# Patient Record
Sex: MALE | Race: WHITE | NOT HISPANIC OR LATINO | Employment: OTHER | URBAN - METROPOLITAN AREA
[De-identification: names, ages, dates, MRNs, and addresses within clinical notes are randomized per-mention and may not be internally consistent; named-entity substitution may affect disease eponyms.]

---

## 2017-09-20 ENCOUNTER — TRANSCRIBE ORDERS (OUTPATIENT)
Dept: LAB | Facility: CLINIC | Age: 62
End: 2017-09-20

## 2017-09-20 ENCOUNTER — APPOINTMENT (OUTPATIENT)
Dept: LAB | Facility: CLINIC | Age: 62
End: 2017-09-20
Payer: MEDICARE

## 2017-09-20 DIAGNOSIS — D64.9 ANEMIA, UNSPECIFIED: ICD-10-CM

## 2017-09-20 DIAGNOSIS — E55.9 UNSPECIFIED VITAMIN D DEFICIENCY: ICD-10-CM

## 2017-09-20 DIAGNOSIS — R06.00 DYSPNEA, UNSPECIFIED TYPE: ICD-10-CM

## 2017-09-20 DIAGNOSIS — R07.89 CHEST HEAVINESS: ICD-10-CM

## 2017-09-20 DIAGNOSIS — E78.2 MIXED HYPERLIPIDEMIA: Primary | ICD-10-CM

## 2017-09-20 DIAGNOSIS — E78.2 MIXED HYPERLIPIDEMIA: ICD-10-CM

## 2017-09-20 LAB
25(OH)D3 SERPL-MCNC: 29.8 NG/ML (ref 30–100)
ALBUMIN SERPL BCP-MCNC: 3.4 G/DL (ref 3.5–5)
ALP SERPL-CCNC: 112 U/L (ref 46–116)
ALT SERPL W P-5'-P-CCNC: 46 U/L (ref 12–78)
ANION GAP SERPL CALCULATED.3IONS-SCNC: 9 MMOL/L (ref 4–13)
AST SERPL W P-5'-P-CCNC: 25 U/L (ref 5–45)
BILIRUB SERPL-MCNC: 0.24 MG/DL (ref 0.2–1)
BUN SERPL-MCNC: 10 MG/DL (ref 5–25)
CALCIUM SERPL-MCNC: 8.7 MG/DL (ref 8.3–10.1)
CHLORIDE SERPL-SCNC: 108 MMOL/L (ref 100–108)
CHOLEST SERPL-MCNC: 198 MG/DL (ref 50–200)
CO2 SERPL-SCNC: 23 MMOL/L (ref 21–32)
CREAT SERPL-MCNC: 0.92 MG/DL (ref 0.6–1.3)
ERYTHROCYTE [DISTWIDTH] IN BLOOD BY AUTOMATED COUNT: 12.5 % (ref 11.6–15.1)
GFR SERPL CREATININE-BSD FRML MDRD: 89 ML/MIN/1.73SQ M
GLUCOSE P FAST SERPL-MCNC: 107 MG/DL (ref 65–99)
HCT VFR BLD AUTO: 42.2 % (ref 36.5–49.3)
HDLC SERPL-MCNC: 42 MG/DL (ref 40–60)
HGB BLD-MCNC: 15.1 G/DL (ref 12–17)
LDLC SERPL CALC-MCNC: 132 MG/DL (ref 0–100)
MCH RBC QN AUTO: 30.8 PG (ref 26.8–34.3)
MCHC RBC AUTO-ENTMCNC: 35.8 G/DL (ref 31.4–37.4)
MCV RBC AUTO: 86 FL (ref 82–98)
PLATELET # BLD AUTO: 233 THOUSANDS/UL (ref 149–390)
PMV BLD AUTO: 9.4 FL (ref 8.9–12.7)
POTASSIUM SERPL-SCNC: 3.9 MMOL/L (ref 3.5–5.3)
PROT SERPL-MCNC: 6.6 G/DL (ref 6.4–8.2)
RBC # BLD AUTO: 4.9 MILLION/UL (ref 3.88–5.62)
SODIUM SERPL-SCNC: 140 MMOL/L (ref 136–145)
TRIGL SERPL-MCNC: 121 MG/DL
WBC # BLD AUTO: 7.53 THOUSAND/UL (ref 4.31–10.16)

## 2017-09-20 PROCEDURE — 80053 COMPREHEN METABOLIC PANEL: CPT

## 2017-09-20 PROCEDURE — 36415 COLL VENOUS BLD VENIPUNCTURE: CPT

## 2017-09-20 PROCEDURE — 80061 LIPID PANEL: CPT

## 2017-09-20 PROCEDURE — 85027 COMPLETE CBC AUTOMATED: CPT

## 2017-09-20 PROCEDURE — 82306 VITAMIN D 25 HYDROXY: CPT

## 2017-10-06 ENCOUNTER — APPOINTMENT (OUTPATIENT)
Dept: URGENT CARE | Facility: CLINIC | Age: 62
End: 2017-10-06
Payer: MEDICARE

## 2017-10-06 ENCOUNTER — OFFICE VISIT (OUTPATIENT)
Dept: URGENT CARE | Facility: CLINIC | Age: 62
End: 2017-10-06
Payer: MEDICARE

## 2017-10-06 ENCOUNTER — HOSPITAL ENCOUNTER (EMERGENCY)
Facility: HOSPITAL | Age: 62
Discharge: HOME | End: 2017-10-06
Admitting: EMERGENCY MEDICINE
Payer: MEDICARE

## 2017-10-06 ENCOUNTER — APPOINTMENT (EMERGENCY)
Dept: RADIOLOGY | Facility: HOSPITAL | Age: 62
End: 2017-10-06
Payer: MEDICARE

## 2017-10-06 ENCOUNTER — APPOINTMENT (OUTPATIENT)
Dept: RADIOLOGY | Facility: CLINIC | Age: 62
End: 2017-10-06
Payer: MEDICARE

## 2017-10-06 VITALS
RESPIRATION RATE: 20 BRPM | TEMPERATURE: 97.5 F | SYSTOLIC BLOOD PRESSURE: 127 MMHG | OXYGEN SATURATION: 97 % | DIASTOLIC BLOOD PRESSURE: 96 MMHG | WEIGHT: 214.44 LBS | HEART RATE: 96 BPM

## 2017-10-06 DIAGNOSIS — R06.2 WHEEZING: Primary | ICD-10-CM

## 2017-10-06 DIAGNOSIS — R06.02 SHORTNESS OF BREATH: ICD-10-CM

## 2017-10-06 DIAGNOSIS — J42 ACUTE EXACERBATION OF CHRONIC BRONCHITIS (HCC): ICD-10-CM

## 2017-10-06 DIAGNOSIS — J20.9 ACUTE EXACERBATION OF CHRONIC BRONCHITIS (HCC): ICD-10-CM

## 2017-10-06 LAB
ALBUMIN SERPL BCP-MCNC: 3.7 G/DL (ref 3.5–5)
ALP SERPL-CCNC: 115 U/L (ref 46–116)
ALT SERPL W P-5'-P-CCNC: 29 U/L (ref 12–78)
ANION GAP SERPL CALCULATED.3IONS-SCNC: 7 MMOL/L (ref 4–13)
APTT PPP: 26 SECONDS (ref 23–35)
AST SERPL W P-5'-P-CCNC: 20 U/L (ref 5–45)
BASOPHILS # BLD AUTO: 0.06 THOUSANDS/ΜL (ref 0–0.1)
BASOPHILS NFR BLD AUTO: 1 % (ref 0–1)
BILIRUB SERPL-MCNC: 0.4 MG/DL (ref 0.2–1)
BUN SERPL-MCNC: 10 MG/DL (ref 5–25)
CALCIUM SERPL-MCNC: 8.6 MG/DL (ref 8.3–10.1)
CHLORIDE SERPL-SCNC: 104 MMOL/L (ref 100–108)
CO2 SERPL-SCNC: 29 MMOL/L (ref 21–32)
CREAT SERPL-MCNC: 1.08 MG/DL (ref 0.6–1.3)
EOSINOPHIL # BLD AUTO: 0.18 THOUSAND/ΜL (ref 0–0.61)
EOSINOPHIL NFR BLD AUTO: 3 % (ref 0–6)
ERYTHROCYTE [DISTWIDTH] IN BLOOD BY AUTOMATED COUNT: 12.6 % (ref 11.6–15.1)
GFR SERPL CREATININE-BSD FRML MDRD: 73 ML/MIN/1.73SQ M
GLUCOSE SERPL-MCNC: 106 MG/DL (ref 65–140)
HCT VFR BLD AUTO: 41.7 % (ref 36.5–49.3)
HGB BLD-MCNC: 14.6 G/DL (ref 12–17)
INR PPP: 0.99 (ref 0.86–1.16)
LYMPHOCYTES # BLD AUTO: 1.91 THOUSANDS/ΜL (ref 0.6–4.47)
LYMPHOCYTES NFR BLD AUTO: 26 % (ref 14–44)
MAGNESIUM SERPL-MCNC: 2.1 MG/DL (ref 1.6–2.6)
MCH RBC QN AUTO: 30.7 PG (ref 26.8–34.3)
MCHC RBC AUTO-ENTMCNC: 35 G/DL (ref 31.4–37.4)
MCV RBC AUTO: 88 FL (ref 82–98)
MONOCYTES # BLD AUTO: 0.46 THOUSAND/ΜL (ref 0.17–1.22)
MONOCYTES NFR BLD AUTO: 6 % (ref 4–12)
NEUTROPHILS # BLD AUTO: 4.67 THOUSANDS/ΜL (ref 1.85–7.62)
NEUTS SEG NFR BLD AUTO: 64 % (ref 43–75)
NT-PROBNP SERPL-MCNC: 16 PG/ML
PLATELET # BLD AUTO: 247 THOUSANDS/UL (ref 149–390)
PMV BLD AUTO: 8.6 FL (ref 8.9–12.7)
POTASSIUM SERPL-SCNC: 4 MMOL/L (ref 3.5–5.3)
PROT SERPL-MCNC: 6.7 G/DL (ref 6.4–8.2)
PROTHROMBIN TIME: 13 SECONDS (ref 12.1–14.4)
RBC # BLD AUTO: 4.76 MILLION/UL (ref 3.88–5.62)
SODIUM SERPL-SCNC: 140 MMOL/L (ref 136–145)
TROPONIN I SERPL-MCNC: <0.02 NG/ML
WBC # BLD AUTO: 7.28 THOUSAND/UL (ref 4.31–10.16)

## 2017-10-06 PROCEDURE — 85025 COMPLETE CBC W/AUTO DIFF WBC: CPT | Performed by: PHYSICIAN ASSISTANT

## 2017-10-06 PROCEDURE — 93005 ELECTROCARDIOGRAM TRACING: CPT | Performed by: PHYSICIAN ASSISTANT

## 2017-10-06 PROCEDURE — 71020 HB CHEST X-RAY 2VW FRONTAL&LATL: CPT

## 2017-10-06 PROCEDURE — 99203 OFFICE O/P NEW LOW 30 MIN: CPT

## 2017-10-06 PROCEDURE — 80053 COMPREHEN METABOLIC PANEL: CPT | Performed by: PHYSICIAN ASSISTANT

## 2017-10-06 PROCEDURE — G0463 HOSPITAL OUTPT CLINIC VISIT: HCPCS

## 2017-10-06 PROCEDURE — 85610 PROTHROMBIN TIME: CPT | Performed by: PHYSICIAN ASSISTANT

## 2017-10-06 PROCEDURE — 83735 ASSAY OF MAGNESIUM: CPT | Performed by: PHYSICIAN ASSISTANT

## 2017-10-06 PROCEDURE — 84484 ASSAY OF TROPONIN QUANT: CPT | Performed by: PHYSICIAN ASSISTANT

## 2017-10-06 PROCEDURE — 85730 THROMBOPLASTIN TIME PARTIAL: CPT | Performed by: PHYSICIAN ASSISTANT

## 2017-10-06 PROCEDURE — 83880 ASSAY OF NATRIURETIC PEPTIDE: CPT | Performed by: PHYSICIAN ASSISTANT

## 2017-10-06 PROCEDURE — 94640 AIRWAY INHALATION TREATMENT: CPT

## 2017-10-06 PROCEDURE — 99285 EMERGENCY DEPT VISIT HI MDM: CPT

## 2017-10-06 RX ORDER — ALBUTEROL SULFATE 90 UG/1
2 AEROSOL, METERED RESPIRATORY (INHALATION) EVERY 6 HOURS PRN
Qty: 1 INHALER | Refills: 0 | Status: SHIPPED | OUTPATIENT
Start: 2017-10-06 | End: 2018-09-17 | Stop reason: HOSPADM

## 2017-10-06 RX ORDER — IPRATROPIUM BROMIDE AND ALBUTEROL SULFATE 2.5; .5 MG/3ML; MG/3ML
3 SOLUTION RESPIRATORY (INHALATION)
Qty: 360 ML | Refills: 0 | Status: SHIPPED | OUTPATIENT
Start: 2017-10-06 | End: 2017-11-05

## 2017-10-06 RX ORDER — TAMSULOSIN HYDROCHLORIDE 0.4 MG/1
0.4 CAPSULE ORAL
COMMUNITY
End: 2018-09-13 | Stop reason: SDDI

## 2017-10-06 RX ORDER — CLOPIDOGREL BISULFATE 75 MG/1
75 TABLET ORAL DAILY
COMMUNITY
End: 2018-09-17 | Stop reason: HOSPADM

## 2017-10-06 RX ORDER — PREDNISONE 20 MG/1
60 TABLET ORAL DAILY
Qty: 15 TABLET | Refills: 0 | Status: SHIPPED | OUTPATIENT
Start: 2017-10-06 | End: 2018-09-13 | Stop reason: ALTCHOICE

## 2017-10-06 RX ORDER — ALBUTEROL SULFATE 2.5 MG/3ML
2.5 SOLUTION RESPIRATORY (INHALATION) ONCE
Status: COMPLETED | OUTPATIENT
Start: 2017-10-06 | End: 2017-10-06

## 2017-10-06 RX ORDER — ATORVASTATIN CALCIUM 10 MG/1
10 TABLET, FILM COATED ORAL DAILY
COMMUNITY
End: 2018-09-13 | Stop reason: SDDI

## 2017-10-06 RX ORDER — ALBUTEROL SULFATE 90 UG/1
2 AEROSOL, METERED RESPIRATORY (INHALATION) EVERY 6 HOURS PRN
Status: ON HOLD | COMMUNITY
End: 2018-09-17

## 2017-10-06 RX ORDER — AZITHROMYCIN 250 MG/1
TABLET, FILM COATED ORAL
Qty: 4 TABLET | Refills: 0 | Status: SHIPPED | OUTPATIENT
Start: 2017-10-06 | End: 2018-09-13 | Stop reason: ALTCHOICE

## 2017-10-06 RX ORDER — AZITHROMYCIN 250 MG/1
500 TABLET, FILM COATED ORAL ONCE
Status: COMPLETED | OUTPATIENT
Start: 2017-10-06 | End: 2017-10-06

## 2017-10-06 RX ORDER — AMLODIPINE BESYLATE 10 MG/1
10 TABLET ORAL DAILY
Status: ON HOLD | COMMUNITY
End: 2018-09-17

## 2017-10-06 RX ORDER — CITALOPRAM 40 MG/1
40 TABLET ORAL DAILY
Status: ON HOLD | COMMUNITY
End: 2018-09-17

## 2017-10-06 RX ADMIN — IPRATROPIUM BROMIDE 0.5 MG: 0.5 SOLUTION RESPIRATORY (INHALATION) at 15:02

## 2017-10-06 RX ADMIN — AZITHROMYCIN 500 MG: 250 TABLET, FILM COATED ORAL at 15:03

## 2017-10-06 RX ADMIN — ALBUTEROL SULFATE 2.5 MG: 2.5 SOLUTION RESPIRATORY (INHALATION) at 15:02

## 2017-10-06 NOTE — DISCHARGE INSTRUCTIONS
Acute Bronchitis   WHAT YOU SHOULD KNOW:   Acute bronchitis is swelling and irritation in the air passages of your lungs  This irritation may cause you to cough or have other breathing problems  Acute bronchitis often starts because of another viral illness, such as a cold or the flu  The illness spreads from your nose and throat to your windpipe and airways  Bronchitis is often called a chest cold  Acute bronchitis lasts about 2 weeks and is usually not a serious illness  AFTER YOU LEAVE:   Medicines:   · Ibuprofen or acetaminophen:  These medicines help lower a fever  They are available without a doctor's order  Ask your healthcare provider which medicine is right for you  Ask how much to take and how often to take it  Follow directions  These medicines can cause stomach bleeding if not taken correctly  Ibuprofen can cause kidney damage  Do not take ibuprofen if you have kidney disease, an ulcer, or allergies to aspirin  Acetaminophen can cause liver damage  Do not drink alcohol if you take acetaminophen  · Cough medicine: This medicine helps loosen mucus in your lungs and make it easier to cough up  This can help you breathe easier  · Inhalers: You may need one or more inhalers to help you breathe easier and cough less  An inhaler gives your medicine in a mist form so that you can breathe it into your lungs  Ask your healthcare provider to show you how to use your inhaler correctly  · Steroid medicine:  Steroid medicine helps open your air passages so you can breathe easier  · Take your medicine as directed  Call your healthcare provider if you think your medicine is not helping or if you have side effects  Tell him if you are allergic to any medicine  Keep a list of the medicines, vitamins, and herbs you take  Include the amounts, and when and why you take them  Bring the list or the pill bottles to follow-up visits  Carry your medicine list with you in case of an emergency    How to use an inhaler:   · Shake the inhaler well to make sure you get the correct amount of medicine per puff  Remove the cover from your inhaler's mouthpiece  If you are using a spacer, connect your inhaler to the flat end of the spacer  · Exhale as much air from your lungs as you can  Put the mouthpiece in your mouth past your front teeth and rest it on the top of your tongue  Do not block the mouthpiece opening with your tongue  · Breathe in through your mouth at a slow and steady rate  As you do this, press the inhaler to release the puff of medicine  Finish breathing in slowly and deeply as you inhale the medicine  When your lungs are full, hold your breath for 10 seconds  Then breathe out slowly through puckered lips or through your nose  · If you need to take more puffs, wait at least 1 minute between each puff  · Rinse your mouth with water after you use the inhaler  This may keep you from getting a mouth infection or irritation  · Follow the instructions that come with your inhaler to clean it  You should clean your inhaler at least once a week  Ways to care for yourself:   · Avoid alcohol:  Alcohol dulls your urge to cough and sneeze  When you have bronchitis, you need to be able to cough and sneeze to clear your air passages  Alcohol also causes your body to lose fluid  This can make the mucus in your lungs thicker and harder to cough up  · Avoid irritants in the air:  Do not smoke or allow others to smoke around you  Avoid chemicals, fumes, and dust  Wear a face mask if you must work around dust or fumes  Stay inside on days when air pollution levels are high  If you have allergies, stay inside when pollen counts are high  Avoid aerosol products  This includes spray-on deodorant, bug spray, and hair spray  · Drink more liquids:  Most people should drink at least 8 eight-ounce cups of water a day  You may need to drink more liquids when you have acute bronchitis   Liquids help keep your air passages moist and help you cough up mucus  · Get more rest:  You may feel like resting more  Slowly start to do more each day  Rest when you feel it is needed  · Eat healthy foods:  Eat a variety healthy foods every day  Your diet should include fruits, vegetables, breads, and protein (such as chicken, fish, and beans)  Dairy products (such as milk, cheese, and ice cream) can sometimes increase the amount of mucus your body makes  Ask if you should decrease your intake of dairy products  · Use a humidifier:  Use a cool mist humidifier to increase air moisture in your home  This may make it easier for you to breathe and help decrease your cough  Decrease your risk of acute bronchitis:   · Get the vaccinations you need:  Ask your healthcare provider if you should get vaccinated against the flu or pneumonia  · Avoid things that may irritate your lungs:  Stay inside or cover your mouth and nose with a scarf when you are outside during cold weather  You should also stay inside on days when air pollution levels are high  If you have allergies, stay inside when pollen counts are high  Avoid using aerosol products in your home  This includes spray-on deodorant, bug spray, and hair spray  · Avoid the spread of germs:        [facility] your hands often with soap and water  Carry germ-killing gel with you  You can use the gel to clean your hands when there is no soap and water available  ¨ Do not touch your eyes, nose, or mouth unless you have washed your hands first     ¨ Always cover your mouth when you cough  Cough into a tissue or your shirtsleeve so you do not spread germs from your hands  ¨ Try to avoid people who have a cold or the flu  If you are sick, stay away from others as much as possible  Follow up with your healthcare provider as directed:  Write down questions you have so you will remember to ask them during your follow-up visits    Contact your healthcare provider if:   · You have a fever     · Your skin becomes itchy or you have a rash after you take your medicine  · Your breathing problems do not go away or get worse  · Your cough does not get better with treatment  · You cough up blood  · You have questions or concerns about your condition or care  Seek care immediately or call 911 if:   · You faint  · Your lips or fingernails turn blue  · You feel like you are not getting enough air when you breathe  · You have swelling of your lips, tongue, or throat that makes it hard to breathe or swallow  © 2014 [employer] is for End User's use only and may not be sold, redistributed or otherwise used for commercial purposes  All illustrations and images included in CareNotes® are the copyrighted property of [employer]  or [employer]  The above information is an  only  It is not intended as medical advice for individual conditions or treatments  Talk to your doctor, nurse or pharmacist before following any medical regimen to see if it is safe and effective for you

## 2017-10-06 NOTE — ED NOTES
Performed a Peak flow test; the best of 3 was 300 pre breathing treatment     Shobha Wilkinson RN  10/06/17 4511

## 2017-10-07 NOTE — ED PROVIDER NOTES
History  Chief Complaint   Patient presents with    Chest Pain     chest pain sob today came from urgent care       History provided by:  Patient, relative and medical records (call ahead urgent care exam)  Chest Pain   Pain location:  L chest  Pain quality: tightness    Pain radiates to:  Does not radiate  Pain radiates to the back: no    Pain severity:  Mild  Duration:  3 days  Timing:  Intermittent  Progression:  Unchanged  Chronicity:  New  Context: breathing    Context: no drug use, not eating, no intercourse, not lifting, no movement, not raising an arm, not at rest, no stress and no trauma    Relieved by:  Nothing  Worsened by:  Nothing tried  Ineffective treatments:  None tried  Associated symptoms: cough and shortness of breath    Associated symptoms: no abdominal pain, no AICD problem, no altered mental status, no anorexia, no anxiety, no back pain, no claudication, no diaphoresis, no dizziness, no dysphagia, no fatigue, no fever, no headache, no heartburn, no lower extremity edema, no nausea, no numbness, no orthopnea, no palpitations, no PND, no syncope and not vomiting    Cough:     Cough characteristics:  Productive    Sputum characteristics:  Green, yellow and white    Severity:  Moderate    Onset quality:  Gradual    Duration:  4 days    Timing:  Constant    Progression:  Worsening    Chronicity:  New  Shortness of breath:     Severity:  Mild    Onset quality:  Gradual    Duration:  3 days    Timing:  Constant    Progression:  Waxing and waning  Risk factors: coronary artery disease and smoking    Risk factors: no diabetes mellitus, not obese and no prior DVT/PE    Risk factors comment:  Tobacco use, copd, noncomplaint with inhalers      Prior to Admission Medications   Prescriptions Last Dose Informant Patient Reported? Taking?    Roflumilast (DALIRESP PO)   Yes Yes   Sig: Take 500 mg by mouth daily   albuterol (PROVENTIL HFA,VENTOLIN HFA) 90 mcg/act inhaler   Yes Yes   Sig: Inhale 2 puffs every 6 (six) hours as needed for wheezing   amLODIPine (NORVASC) 10 mg tablet   Yes Yes   Sig: Take 10 mg by mouth daily   atorvastatin (LIPITOR) 10 mg tablet   Yes Yes   Sig: Take 10 mg by mouth daily   citalopram (CeleXA) 40 mg tablet   Yes Yes   Sig: Take 40 mg by mouth daily   clopidogrel (PLAVIX) 75 mg tablet   Yes Yes   Sig: Take 75 mg by mouth daily   tamsulosin (FLOMAX) 0 4 mg   Yes Yes   Sig: Take 0 4 mg by mouth daily with dinner   tiotropium (SPIRIVA) 18 mcg inhalation capsule   Yes Yes   Sig: Place 18 mcg into inhaler and inhale daily      Facility-Administered Medications: None       Past Medical History:   Diagnosis Date    COPD (chronic obstructive pulmonary disease) (HCC)     Enlarged prostate     Hyperlipidemia     Hypertension        Past Surgical History:   Procedure Laterality Date    CARDIAC SURGERY      HERNIA REPAIR         History reviewed  No pertinent family history  I have reviewed and agree with the history as documented  Social History   Substance Use Topics    Smoking status: Current Every Day Smoker    Smokeless tobacco: Never Used    Alcohol use No        Review of Systems   Constitutional: Negative for appetite change, chills, diaphoresis, fatigue, fever and unexpected weight change  HENT: Negative for congestion, ear pain, facial swelling, hearing loss, rhinorrhea, sinus pressure, sore throat, tinnitus and trouble swallowing  Eyes: Negative for photophobia, pain, redness, itching and visual disturbance  Respiratory: Positive for cough, chest tightness, shortness of breath and wheezing  Cardiovascular: Negative for chest pain, palpitations, orthopnea, claudication, leg swelling, syncope and PND  Gastrointestinal: Negative for abdominal pain, anorexia, constipation, diarrhea, heartburn, nausea and vomiting  Genitourinary: Negative for dysuria, flank pain, frequency, hematuria, testicular pain and urgency     Musculoskeletal: Negative for arthralgias, back pain and myalgias  Skin: Negative for rash and wound  Neurological: Negative for dizziness, tremors, syncope, numbness and headaches  Physical Exam  ED Triage Vitals [10/06/17 1326]   Temperature Pulse Respirations Blood Pressure SpO2   97 5 °F (36 4 °C) 75 20 128/75 99 %      Temp Source Heart Rate Source Patient Position - Orthostatic VS BP Location FiO2 (%)   Temporal Monitor Sitting Right arm --      Pain Score       2           Physical Exam   Constitutional: Vital signs are normal  He appears well-developed and well-nourished  Non-toxic appearance  No distress  HENT:   Head: Normocephalic and atraumatic  Right Ear: Tympanic membrane and external ear normal    Left Ear: Tympanic membrane and external ear normal    Nose: Nose normal  No rhinorrhea  Mouth/Throat: Uvula is midline and oropharynx is clear and moist    Eyes: Conjunctivae, EOM and lids are normal  Pupils are equal, round, and reactive to light  Neck: Normal range of motion and full passive range of motion without pain  Neck supple  No JVD present  Cardiovascular: Normal rate, regular rhythm and normal heart sounds  Pulmonary/Chest: Effort normal  No accessory muscle usage or stridor  No tachypnea  No respiratory distress  He has wheezes (fScattered expiratory wheezing)  He has no rales  He exhibits no tenderness  Abdominal: Soft  Normal appearance and bowel sounds are normal  He exhibits no distension  There is no hepatosplenomegaly  There is no tenderness  There is no CVA tenderness  No hernia  Musculoskeletal: Normal range of motion  He exhibits no edema or tenderness  Neurological: He is alert  Skin: Skin is warm, dry and intact  No rash noted  He is not diaphoretic  No erythema  No pallor  Psychiatric: He has a normal mood and affect  His speech is normal and behavior is normal    Nursing note and vitals reviewed        ED Medications  Medications    EMS REPLENISHMENT MED ( Does not apply Given to EMS 10/6/17 1258) albuterol inhalation solution 2 5 mg (2 5 mg Nebulization Given 10/6/17 1502)   ipratropium (ATROVENT) 0 02 % inhalation solution 0 5 mg (0 5 mg Nebulization Given 10/6/17 1502)   azithromycin (ZITHROMAX) tablet 500 mg (500 mg Oral Given 10/6/17 1503)       Diagnostic Studies  Labs Reviewed   CBC AND DIFFERENTIAL - Abnormal        Result Value Ref Range Status    MPV 8 6 (*) 8 9 - 12 7 fL Final    WBC 7 28  4 31 - 10 16 Thousand/uL Final    RBC 4 76  3 88 - 5 62 Million/uL Final    Hemoglobin 14 6  12 0 - 17 0 g/dL Final    Hematocrit 41 7  36 5 - 49 3 % Final    MCV 88  82 - 98 fL Final    MCH 30 7  26 8 - 34 3 pg Final    MCHC 35 0  31 4 - 37 4 g/dL Final    RDW 12 6  11 6 - 15 1 % Final    Platelets 128  157 - 390 Thousands/uL Final    Neutrophils Relative 64  43 - 75 % Final    Lymphocytes Relative 26  14 - 44 % Final    Monocytes Relative 6  4 - 12 % Final    Eosinophils Relative 3  0 - 6 % Final    Basophils Relative 1  0 - 1 % Final    Neutrophils Absolute 4 67  1 85 - 7 62 Thousands/µL Final    Lymphocytes Absolute 1 91  0 60 - 4 47 Thousands/µL Final    Monocytes Absolute 0 46  0 17 - 1 22 Thousand/µL Final    Eosinophils Absolute 0 18  0 00 - 0 61 Thousand/µL Final    Basophils Absolute 0 06  0 00 - 0 10 Thousands/µL Final   PROTIME-INR - Normal    Protime 13 0  12 1 - 14 4 seconds Final    INR 0 99  0 86 - 1 16 Final   APTT - Normal    PTT 26  23 - 35 seconds Final    Narrative:      Therapeutic Heparin Range = 60-90 seconds   MAGNESIUM - Normal    Magnesium 2 1  1 6 - 2 6 mg/dL Final   TROPONIN I - Normal    Troponin I <0 02  <=0 04 ng/mL Final    Comment: 3Autovalidation override    Narrative:     [employer] Chemistry analyzer 99% cutoff is > 0 04 ng/mL in network labs    o cTnI 99% cutoff is useful only when applied to patients in the clinical setting of myocardial ischemia  o cTnI 99% cutoff should be interpreted in the context of clinical history, ECG findings and possibly cardiac imaging to establish correct diagnosis  o cTnI 99% cutoff may be suggestive but clearly not indicative of a coronary event without the clinical setting of myocardial ischemia  NT-BNP PRO (BRAIN NATRIURETIC PEPTIDE) - Normal    NT-proBNP 16  <125 pg/mL Final   COMPREHENSIVE METABOLIC PANEL    Sodium 880  136 - 145 mmol/L Final    Potassium 4 0  3 5 - 5 3 mmol/L Final    Chloride 104  100 - 108 mmol/L Final    CO2 29  21 - 32 mmol/L Final    Anion Gap 7  4 - 13 mmol/L Final    BUN 10  5 - 25 mg/dL Final    Creatinine 1 08  0 60 - 1 30 mg/dL Final    Comment: Standardized to IDMS reference method    Glucose 106  65 - 140 mg/dL Final    Comment:   If the patient is fasting, the ADA then defines impaired fasting glucose as > 100 mg/dL and diabetes as > or equal to 123 mg/dL  Specimen collection should occur prior to Sulfasalazine administration due to the potential for falsely depressed results  Specimen collection should occur prior to Sulfapyridine administration due to the potential for falsely elevated results  Calcium 8 6  8 3 - 10 1 mg/dL Final    AST 20  5 - 45 U/L Final    Comment:   Specimen collection should occur prior to Sulfasalazine administration due to the potential for falsely depressed results  ALT 29  12 - 78 U/L Final    Comment:   Specimen collection should occur prior to Sulfasalazine administration due to the potential for falsely depressed results  Alkaline Phosphatase 115  46 - 116 U/L Final    Total Protein 6 7  6 4 - 8 2 g/dL Final    Albumin 3 7  3 5 - 5 0 g/dL Final    Total Bilirubin 0 40  0 20 - 1 00 mg/dL Final    eGFR 73  ml/min/1 73sq m Final    Narrative:     National Kidney Disease Education Program recommendations are as follows:  GFR calculation is accurate only with a steady state creatinine  Chronic Kidney disease less than 60 ml/min/1 73 sq  meters  Kidney failure less than 15 ml/min/1 73 sq  meters         No orders to display       Procedures  ECG 12 Lead Documentation  Date/Time: 10/6/2017 1:23 PM  Performed by: Lou Smiley  Authorized by: Lou Smiley     Indications / Diagnosis:  Sob  ECG reviewed by me, the ED Provider: yes    Patient location:  ED  Rate:     ECG rate:  77  Rhythm:     Rhythm: sinus rhythm    Ectopy:     Ectopy: none    QRS:     QRS axis:  Normal  Conduction:     Conduction: normal    ST segments:     ST segments:  Normal  T waves:     T waves: normal            Phone Contacts  ED Phone Contact    ED Course  ED Course as of Oct 07 1351   Fri Oct 06, 2017   1420 INR: 0 99   1420 Protime: 13 0   1420 PTT: 26   1420 WBC: 7 28   1421 Hemoglobin: 14 6   1421 Platelets: 585   7891 NT-proBNP: 16   1442 Magnesium: 2 1   1442 Sodium: 140   1442 Potassium: 4 0   1442 CO2: 29   1442 Anion Gap: 7   1442 Creatinine: 1 08   1442 Glucose: 106   1442 Calcium: 8 6   1442 ALT: 29   1442 Alkaline Phosphatase: 115   1442 Total Protein: 6 7   1442 Total Bilirubin: 0 40   1442 eGFR: 73   1607 Ambulatory pulse ox 95%  Pt reassessed and doing/feeling well  Less wheezing and no pain anymore  Ready for d/c     1608 Nebulizer machine provided at bedside, forms completed          MDM  Number of Diagnoses or Management Options  Acute exacerbation of chronic bronchitis ([facility] [address] ): new and requires workup  Wheezing: new and requires workup  Diagnosis management comments: 28-year-old male with acute exacerbation of chronic bronchitis, with productive cough, wheezing, shortness of breath, chest tightness, fatigue/malaise for 3-4 days  Known history of COPD, continued tobacco use  He takes Spiriva for maintenance therapy, he ran out about two weeks ago, states he only uses albuterol rescue inhaler when he really needs it because he fears if he takes his inhaler prior to smoking a cigarette the carpal go deeper into his lungs  Also new is he has been coughing more with each cigarette  No fevers or chills  No palpitations  No edema  No chest pain   Was seen at the urgent care just prior to transfer to the emergency department  He received pre-hospital 324 mg aspirin, 2 5-5 DuoNeb, 125 mg IV Solu-Medrol  He did not have any hypoxic episodes, he has no accessory muscle use or respiratory distress at either facility  He should improvement in emergency department with duo nebs along, ambulatory pulse ox was 95% and above, he does to follow-up from the emergency department with these, and a brisk pace  He is eager for discharge  He will be treated outpatient with oral steroids, oral antibiotics, duo nebs, provided with a nebulizer machine to go home with  He will follow up with PCP in one week, return to ED precautions were discussed with patient and son, understand and agree to plan  Amount and/or Complexity of Data Reviewed  Clinical lab tests: reviewed and ordered  Tests in the radiology section of CPT®: reviewed (Normal CXR at urgent care this morning)      CritCare Time    Disposition  Final diagnoses:   Wheezing   Acute exacerbation of chronic bronchitis [facility])     ED Disposition     ED Disposition Condition Comment    Discharge  [address] [facility] [address] [address] discharge to home/self care      Condition at discharge: Good        Follow-up Information     Follow up With Specialties Details Why Contact Info    Cheryl Tavares MD  Schedule an appointment as soon as possible for a visit Seen in ER need followup for illness [address] [place] [address] [address]  [address] [facility] [address]  [phone]          Discharge Medication List as of 10/6/2017  4:07 PM      START taking these medications    Details   !! albuterol (PROVENTIL HFA,VENTOLIN HFA) 90 mcg/act inhaler Inhale 2 puffs every 6 (six) hours as needed for wheezing, Starting Fri 10/6/2017, Print      azithromycin (ZITHROMAX) 250 mg tablet 250mg daily x 4 days, Print      ipratropium-albuterol (DUO-NEB) 0 5-2 5 mg/mL Take 3 mL by nebulization every 6 (six) hours for 30 days, Starting Fri 10/6/2017, Until Sun 11/5/2017, Print      predniSONE 20 mg tablet Take 3 tablets by mouth daily, Starting Fri 10/6/2017, Print       !! - Potential duplicate medications found  Please discuss with provider  CONTINUE these medications which have CHANGED    Details   tiotropium (SPIRIVA) 18 mcg inhalation capsule Place 1 capsule into inhaler and inhale daily, Starting Fri 10/6/2017, Print         CONTINUE these medications which have NOT CHANGED    Details   !! albuterol (PROVENTIL HFA,VENTOLIN HFA) 90 mcg/act inhaler Inhale 2 puffs every 6 (six) hours as needed for wheezing, Historical Med      amLODIPine (NORVASC) 10 mg tablet Take 10 mg by mouth daily, Historical Med      atorvastatin (LIPITOR) 10 mg tablet Take 10 mg by mouth daily, Historical Med      citalopram (CeleXA) 40 mg tablet Take 40 mg by mouth daily, Historical Med      clopidogrel (PLAVIX) 75 mg tablet Take 75 mg by mouth daily, Historical Med      Roflumilast (DALIRESP PO) Take 500 mg by mouth daily, Historical Med      tamsulosin (FLOMAX) 0 4 mg Take 0 4 mg by mouth daily with dinner, Historical Med       !! - Potential duplicate medications found  Please discuss with provider  No discharge procedures on file      ED Provider  Electronically Signed by       Phil Hermosillo PA-C  10/07/17 1195

## 2017-10-07 NOTE — PROGRESS NOTES
Assessment  1  Chest pain (786 50) (R07 9)    Plan  Chest pain    · EKG/ECG- POC; Status:Active - Perform Order,Retrospective By Protocol Authorization; Requested for:06Oct2017;   Shortness of breath at rest    · Aspirin 81 MG Oral Tablet Chewable   · * XR CHEST PA & LATERAL; Status:Resulted - Requires Verification,Retrospective By  Protocol Authorization;   Done: 20ISP1752 11:30AM    Discussion/Summary  Discussion Summary:   Patient given ASA, Chest x ray and ekg  transferred via [facility] with his  to [facility]    Understands and agrees with treatment plan: The treatment plan was reviewed with the patient/guardian  The patient/guardian understands and agrees with the treatment plan   Counseling Documentation With Imm: The patient, patient's caretaker was counseled regarding  Follow Up Instructions: Follow Up with your Primary Care Provider in 2-3 days  If your symptoms worsen, go to the nearest [place] [address] Emergency Department  Chief Complaint  1  Shortness of Breath  Chief Complaint Free Text Note Form: Pt c/o shortness of breath for the past three days  History of Present Illness  HPI: patient known cardiac with stent x 2 and previous cabg 2000, saw cardio 3 wks ago with normal visit, last nuclear studies negative 3 months ago  states having chest heaviness lt anterior chest since moving a chest by himself 4 days ago, also pain in lt posterior thorax (mid thorax) at posterior axillary line  no pleuritic component  known sever copd continuing to smoke  ran out of spiriva 5 days ago, using rescue inhailer  had cold and bronchitic symptoms beginning with sore throat 2 days ago, no sputum per se, no hemoptysis  sob with activity, not at rest, generally weak  meds noted  Hospital Based Practices Required Assessment:   Pain Assessment   the patient states they have pain  The pain is located in the see hpi   (on a scale of 0 to 10, the patient rates the pain at 5 ) Readiness To Learn: Receptive  Barriers To Learning: none  Education Completed: disease/condition-and-medications   Teaching Method: verbal   Person Taught: patient   Evaluation Of Learning: verbalized/demonstrated understanding   Shortness of Breath: Ronda Dunlap presents with complaints of shortness of breath  Associated symptoms include dyspnea,-exercise intolerance,-fatigue,-weakness,-chest tightness-and-cough, but-no lightheadedness,-no palpitations,-no orthopnea,-no chest pain-and-no choking sensation  Review of Systems  Focused-Male:   Constitutional: feeling poorly-and-feeling tired, but-no fever-and-no chills  ENT: sore throat  Cardiovascular: as noted in HPI  Respiratory: shortness of breath,-cough,-wheezing-and-shortness of breath during exertion, but-no orthopnea-and-no PND  Gastrointestinal: no complaints of abdominal pain, no constipation, no nausea or vomiting, no diarrhea or bloody stools  Genitourinary: no complaints of dysuria or incontinence, no hesitancy, no nocturia, no genital lesion, no inadequacy of penile erection  Integumentary: no complaints of skin rash or lesion, no itching or dry skin, no skin wounds  Neurological: no complaints of headache, no confusion, no numbness or tingling, no dizziness or fainting  ROS Reviewed:   ROS reviewed  Active Problems  1  Headache (784 0) (R51)   2  Hypercholesteremia (272 0) (E78 00)   3  Hypertension (401 9) (I10)   4  Lumbago (724 2) (M54 5)    Past Medical History  Active Problems And Past Medical History Reviewed: The active problems and past medical history were reviewed and updated today  Family History  Mother    1  Family history of cerebrovascular accident (CVA) (V17 1) (Z82 3)   2  Family history of congestive heart failure (V17 49) (Z82 49)  Father    3  Family history of cerebral aneurysm (V17 1) (Z82 49)  Family History Reviewed: The family history was reviewed and updated today         Social History   · Current every day smoker (305 1) (F17 200)  Social History Reviewed: The social history was reviewed and updated today  Surgical History  1  History of Anterior Gastropexy For Hiatal Hernia   2  History of Cath Stent Placement  Surgical History Reviewed: The surgical history was reviewed and updated today  Current Meds   1  Advair Diskus 500-50 MCG/DOSE Inhalation Aerosol Powder Breath Activated; Therapy: (Recorded:02Nov2015) to Recorded   2  Aspir-81 TBEC; Therapy: (Recorded:02Nov2015) to Recorded   3  Avapro 75 MG Oral Tablet; Therapy: (0498 72 13 49) to Recorded   4  BuSpar 15 MG TABS; Take 1 tablet twice daily; Therapy: (0498 72 13 49) to Recorded   5  CeleXA 20 MG Oral Tablet; Therapy: (0498 72 13 49) to Recorded   6  Clopidogrel Bisulfate 75 MG Oral Tablet; Therapy: (0498 72 13 49) to Recorded   7  Flonase 50 MCG/ACT SUSP; Therapy: (0498 72 13 49) to Recorded   8  Loratadine 10 MG Oral Tablet; Therapy: (0498 72 13 49) to Recorded   9  Metamucil POWD;   Therapy: (GYZPSWMI:40UKO6730) to Recorded   10  Metoprolol Tartrate 25 MG Oral Tablet; take 0 5 tablet daily; Therapy: (0498 72 13 49) to Recorded   11  Nitrostat 0 4 MG Sublingual Tablet Sublingual;    Therapy: (Recorded:02Nov2015) to Recorded   12  RaNITidine HCl - 150 MG Oral Tablet; Therapy: (0498 72 13 49) to Recorded   13  Restasis 0 05 % Ophthalmic Emulsion; Therapy: (0498 72 13 49) to Recorded   14  SEROquel 200 MG Oral Tablet; TAKE 1 TABLET Bedtime; Therapy: (0498 72 13 49) to Recorded   15  Spiriva HandiHaler 18 MCG Inhalation Capsule; Therapy: (0498 72 13 49) to Recorded   16  Ventolin  (90 Base) MCG/ACT Inhalation Aerosol Solution; Therapy: (0498 72 13 49) to Recorded   17  VESIcare 10 MG Oral Tablet; Therapy: (0498 72 13 49) to Recorded   18  Voltaren 1 % GEL;     Therapy: (Recorded:02Nov2015) to Recorded    Allergies  1  No Known Drug Allergies    Vitals  Signs   Recorded: 04TLA3491 10:54AM   Temperature: 98 F  Heart Rate: 97  Respiration: 18  Systolic: 091  Diastolic: 86  Weight: 364 lb 3 18 oz  O2 Saturation: 96    Physical Exam    Constitutional   General appearance: No acute distress, well appearing and well nourished  Eyes   Conjunctiva and lids: No swelling, erythema, or discharge  Pupils and irises: Equal, round and reactive to light  Ears, Nose, Mouth, and Throat   External inspection of ears and nose: Normal     Otoscopic examination: Tympanic membrance translucent with normal light reflex  Canals patent without erythema  Nasal mucosa, septum, and turbinates: Normal without edema or erythema  Oropharynx: Abnormal  -injected retropharynx without exudate  Pulmonary   Respiratory effort: No increased work of breathing or signs of respiratory distress  -no stridor, tachypnea  Auscultation of lungs: Abnormal  -bilat insp ronchi and wheeze  rales lt side heard anteriorly and posteriorly  Cardiovascular   Auscultation of heart: Normal rate and rhythm, normal S1 and S2, without murmurs  Examination of extremities for edema and/or varicosities: Normal     Neurologic   Reflexes: 2+ and symmetric  Psychiatric   Orientation to person, place and time: Normal     Mood and affect: Normal        Results/Data  * XR CHEST PA & LATERAL 06Oct2017 11:30AM Laith Yousif Order Number: [id number]   Performing Comments: Rm1     Test Name Result Flag Reference   XR CHEST PA & LATERAL (Report)     CHEST - DUAL ENERGY     INDICATION: Left-sided chest pain, shortness of breath, cough, abnormal lung exam     COMPARISON: None     VIEWS: PA (including soft tissue/bone algorithms) and lateral projections     IMAGES: 4     FINDINGS:        Cardiomediastinal silhouette appears unremarkable  Emphysematous changes are noted consistent with chronic obstructive pulmonary disease   No airspace opacity to suggest focal pneumonia  No pneumothorax or pleural effusion  Old left-sided rib fractures are seen  IMPRESSION:     No active pulmonary disease  COPD         Workstation performed: [id number]     Signed by:   Julissa Shipley MD   10/6/17                               Signatures   Electronically signed by : Liz Hayward DO; Oct  6 2017  1:56PM EST                       (Author)

## 2017-10-08 LAB
ATRIAL RATE: 71 BPM
ATRIAL RATE: 89 BPM
P AXIS: 50 DEGREES
PR INTERVAL: 158 MS
QRS AXIS: 80 DEGREES
QRS AXIS: 94 DEGREES
QRSD INTERVAL: 76 MS
QRSD INTERVAL: 86 MS
QT INTERVAL: 358 MS
QT INTERVAL: 420 MS
QTC INTERVAL: 435 MS
QTC INTERVAL: 456 MS
T WAVE AXIS: 104 DEGREES
T WAVE AXIS: 60 DEGREES
VENTRICULAR RATE: 71 BPM
VENTRICULAR RATE: 89 BPM

## 2018-01-18 NOTE — RESULT NOTES
Verified Results  * XR CHEST PA & LATERAL [id number] 11:30AM [place] Order Number: [id number]   Performing Comments: Rm1     Test Name Result Flag Reference   XR CHEST PA & LATERAL (Report)     CHEST - DUAL ENERGY     INDICATION: Left-sided chest pain, shortness of breath, cough, abnormal lung exam     COMPARISON: None     VIEWS: PA (including soft tissue/bone algorithms) and lateral projections     IMAGES: 4     FINDINGS:        Cardiomediastinal silhouette appears unremarkable  Emphysematous changes are noted consistent with chronic obstructive pulmonary disease  No airspace opacity to suggest focal pneumonia  No pneumothorax or pleural effusion  Old left-sided rib fractures are seen  IMPRESSION:     No active pulmonary disease  COPD         Workstation performed: [id number]     Signed by:   Ines Eduardo MD   10/6/17       Plan  Chest pain    · EKG/ECG- POC; Status:Active - Perform Order; Requested for:06Oct2017;   Shortness of breath at rest    · Aspirin 81 MG Oral Tablet Chewable   · * XR CHEST PA & LATERAL; Status:Complete;   Done: 20TVE1078 11:30AM

## 2018-03-12 ENCOUNTER — TRANSCRIBE ORDERS (OUTPATIENT)
Dept: ADMINISTRATIVE | Facility: HOSPITAL | Age: 63
End: 2018-03-12

## 2018-03-12 DIAGNOSIS — R09.89 ABNORMAL CHEST SOUNDS: Primary | ICD-10-CM

## 2018-03-26 ENCOUNTER — HOSPITAL ENCOUNTER (OUTPATIENT)
Dept: ULTRASOUND IMAGING | Facility: HOSPITAL | Age: 63
Discharge: HOME | End: 2018-03-26
Attending: INTERNAL MEDICINE
Payer: MEDICARE

## 2018-03-26 ENCOUNTER — HOSPITAL ENCOUNTER (OUTPATIENT)
Dept: MRI IMAGING | Facility: HOSPITAL | Age: 63
Discharge: HOME | End: 2018-03-26
Attending: INTERNAL MEDICINE
Payer: MEDICARE

## 2018-03-26 DIAGNOSIS — R09.89 ABNORMAL CHEST SOUNDS: ICD-10-CM

## 2018-03-26 PROCEDURE — 93880 EXTRACRANIAL BILAT STUDY: CPT

## 2018-03-26 PROCEDURE — 93880 EXTRACRANIAL BILAT STUDY: CPT | Performed by: SURGERY

## 2018-03-26 PROCEDURE — 72141 MRI NECK SPINE W/O DYE: CPT

## 2018-04-23 LAB
ALBUMIN SERPL BCP-MCNC: 4.4 G/DL (ref 3.5–5.7)
ALP SERPL-CCNC: 109 IU/L (ref 55–165)
ALT SERPL W P-5'-P-CCNC: 12 IU/L (ref 10–35)
ANION GAP SERPL CALCULATED.3IONS-SCNC: 16 MM/L
AST SERPL W P-5'-P-CCNC: 13 U/L (ref 8–27)
BASOPHILS # BLD AUTO: 0.1 X3/UL (ref 0–0.3)
BASOPHILS # BLD AUTO: 1.1 % (ref 0–2)
BILIRUB SERPL-MCNC: 0.5 MG/DL (ref 0.3–1)
BNP SERPL-MCNC: 21 PG/ML (ref 1–100)
BUN SERPL-MCNC: 8 MG/DL (ref 7–25)
CALCIUM SERPL-MCNC: 9.5 MG/DL (ref 8.6–10.5)
CHLORIDE SERPL-SCNC: 100 MM/L (ref 98–107)
CO2 SERPL-SCNC: 22 MM/L (ref 21–31)
CREAT SERPL-MCNC: 0.95 MG/DL (ref 0.7–1.3)
DEPRECATED RDW RBC AUTO: 12.9 % (ref 11.5–14.5)
EGFR (HISTORICAL): > 60 GFR
EGFR AFRICAN AMERICAN (HISTORICAL): > 60 GFR
EOSINOPHIL # BLD AUTO: 0.1 X3/UL (ref 0–0.5)
EOSINOPHIL NFR BLD AUTO: 0.7 % (ref 0–5)
GLUCOSE (HISTORICAL): 109 MG/DL (ref 65–99)
HCT VFR BLD AUTO: 42.9 % (ref 42–52)
HGB BLD-MCNC: 14.8 G/DL (ref 14–18)
LDL CHOLESTEROL DIRECT (HISTORICAL): 137.3 MG/DL
LYMPHOCYTES # BLD AUTO: 1.4 X3/UL (ref 1.2–4.2)
LYMPHOCYTES NFR BLD AUTO: 14.8 % (ref 20.5–51.1)
MAGNESIUM SERPL-MCNC: 2.2 MG/DL (ref 1.9–2.7)
MCH RBC QN AUTO: 30.1 PG (ref 26–34)
MCHC RBC AUTO-ENTMCNC: 34.4 G/DL (ref 31–36)
MCV RBC AUTO: 87.5 FL (ref 81–99)
MONOCYTES # BLD AUTO: 0.4 X3/UL (ref 0–1)
MONOCYTES NFR BLD AUTO: 4.3 % (ref 1.7–12)
NEUTROPHILS # BLD AUTO: 7.4 X3/UL (ref 1.4–6.5)
NEUTS SEG NFR BLD AUTO: 79.1 % (ref 42.2–75.2)
OSMOLALITY, SERUM (HISTORICAL): 267 MOSM (ref 262–291)
PLATELET # BLD AUTO: 297 X3/UL (ref 130–400)
PMV BLD AUTO: 6.9 FL (ref 8.6–11.7)
POTASSIUM SERPL-SCNC: 4 MM/L (ref 3.5–5.5)
RBC # BLD AUTO: 4.91 X6/UL (ref 4.3–5.9)
SODIUM SERPL-SCNC: 134 MM/L (ref 134–143)
TOTAL PROTEIN (HISTORICAL): 6.6 G/DL (ref 6.4–8.9)
WBC # BLD AUTO: 9.4 X3/UL (ref 4.8–10.8)

## 2018-04-24 LAB
ALBUMIN SERPL BCP-MCNC: 4 G/DL (ref 3.5–5.7)
ALP SERPL-CCNC: 97 IU/L (ref 55–165)
ALT SERPL W P-5'-P-CCNC: 11 IU/L (ref 10–35)
ANION GAP SERPL CALCULATED.3IONS-SCNC: 12.6 MM/L
AST SERPL W P-5'-P-CCNC: 11 U/L (ref 8–27)
BASOPHILS # BLD AUTO: 0.1 X3/UL (ref 0–0.3)
BASOPHILS # BLD AUTO: 1 % (ref 0–2)
BILIRUB SERPL-MCNC: 0.4 MG/DL (ref 0.3–1)
BUN SERPL-MCNC: 9 MG/DL (ref 7–25)
CALCIUM SERPL-MCNC: 9 MG/DL (ref 8.6–10.5)
CHLORIDE SERPL-SCNC: 103 MM/L (ref 98–107)
CO2 SERPL-SCNC: 25 MM/L (ref 21–31)
CREAT SERPL-MCNC: 0.9 MG/DL (ref 0.7–1.3)
DEPRECATED RDW RBC AUTO: 12.9 % (ref 11.5–14.5)
EGFR (HISTORICAL): > 60 GFR
EGFR AFRICAN AMERICAN (HISTORICAL): > 60 GFR
EOSINOPHIL # BLD AUTO: 0.2 X3/UL (ref 0–0.5)
EOSINOPHIL NFR BLD AUTO: 2 % (ref 0–5)
GLUCOSE (HISTORICAL): 104 MG/DL (ref 65–99)
HCT VFR BLD AUTO: 41.1 % (ref 42–52)
HGB BLD-MCNC: 14 G/DL (ref 14–18)
LYMPHOCYTES # BLD AUTO: 2.5 X3/UL (ref 1.2–4.2)
LYMPHOCYTES NFR BLD AUTO: 24.5 % (ref 20.5–51.1)
MCH RBC QN AUTO: 29.8 PG (ref 26–34)
MCHC RBC AUTO-ENTMCNC: 34.2 G/DL (ref 31–36)
MCV RBC AUTO: 87 FL (ref 81–99)
MONOCYTES # BLD AUTO: 0.5 X3/UL (ref 0–1)
MONOCYTES NFR BLD AUTO: 4.8 % (ref 1.7–12)
NEUTROPHILS # BLD AUTO: 6.8 X3/UL (ref 1.4–6.5)
NEUTS SEG NFR BLD AUTO: 67.7 % (ref 42.2–75.2)
OSMOLALITY, SERUM (HISTORICAL): 273 MOSM (ref 262–291)
PLATELET # BLD AUTO: 309 X3/UL (ref 130–400)
PMV BLD AUTO: 7 FL (ref 8.6–11.7)
POTASSIUM SERPL-SCNC: 3.6 MM/L (ref 3.5–5.5)
RBC # BLD AUTO: 4.72 X6/UL (ref 4.3–5.9)
SODIUM SERPL-SCNC: 137 MM/L (ref 134–143)
TOTAL PROTEIN (HISTORICAL): 6 G/DL (ref 6.4–8.9)
TROPONIN I SERPL-MCNC: < 0.03 NG/ML
WBC # BLD AUTO: 10 X3/UL (ref 4.8–10.8)

## 2018-04-25 LAB
ANION GAP SERPL CALCULATED.3IONS-SCNC: 14.2 MM/L
BASOPHILS # BLD AUTO: 0 X3/UL (ref 0–0.3)
BASOPHILS # BLD AUTO: 0.1 % (ref 0–2)
BUN SERPL-MCNC: 14 MG/DL (ref 7–25)
CALCIUM SERPL-MCNC: 9.7 MG/DL (ref 8.6–10.5)
CHLORIDE SERPL-SCNC: 101 MM/L (ref 98–107)
CO2 SERPL-SCNC: 24 MM/L (ref 21–31)
CREAT SERPL-MCNC: 0.87 MG/DL (ref 0.7–1.3)
DEPRECATED RDW RBC AUTO: 13 % (ref 11.5–14.5)
EGFR (HISTORICAL): > 60 GFR
EGFR AFRICAN AMERICAN (HISTORICAL): > 60 GFR
EOSINOPHIL # BLD AUTO: 0 X3/UL (ref 0–0.5)
EOSINOPHIL NFR BLD AUTO: 0 % (ref 0–5)
GLUCOSE (HISTORICAL): 172 MG/DL (ref 65–99)
HCT VFR BLD AUTO: 39.4 % (ref 42–52)
HGB BLD-MCNC: 13.5 G/DL (ref 14–18)
LYMPHOCYTES # BLD AUTO: 0.7 X3/UL (ref 1.2–4.2)
LYMPHOCYTES NFR BLD AUTO: 5.7 % (ref 20.5–51.1)
LYMPHOCYTES NFR BLD AUTO: 9 % (ref 20.5–51.1)
MCH RBC QN AUTO: 29.5 PG (ref 26–34)
MCHC RBC AUTO-ENTMCNC: 34.4 G/DL (ref 31–36)
MCV RBC AUTO: 85.9 FL (ref 81–99)
MONOCYTES # BLD AUTO: 0.1 X3/UL (ref 0–1)
MONOCYTES NFR BLD AUTO: 0.7 % (ref 1.7–12)
NEUTROPHILS # BLD AUTO: 12.1 X3/UL (ref 1.4–6.5)
NEUTROPHILS ABS COUNT (HISTORICAL): 91 % (ref 42.2–75.2)
NEUTS SEG NFR BLD AUTO: 93.5 % (ref 42.2–75.2)
OSMOLALITY, SERUM (HISTORICAL): 275 MOSM (ref 262–291)
PLATELET # BLD AUTO: 307 X3/UL (ref 130–400)
PLATELET ESTIMATE (HISTORICAL): NORMAL
PMV BLD AUTO: 7.1 FL (ref 8.6–11.7)
POTASSIUM SERPL-SCNC: 4.2 MM/L (ref 3.5–5.5)
RBC # BLD AUTO: 4.59 X6/UL (ref 4.3–5.9)
RBC MORPHOLOGY (HISTORICAL): NORMAL
SODIUM SERPL-SCNC: 135 MM/L (ref 134–143)
WBC # BLD AUTO: 12.9 X3/UL (ref 4.8–10.8)

## 2018-07-13 ENCOUNTER — APPOINTMENT (EMERGENCY)
Dept: RADIOLOGY | Facility: HOSPITAL | Age: 63
End: 2018-07-13
Payer: MEDICARE

## 2018-07-13 ENCOUNTER — HOSPITAL ENCOUNTER (EMERGENCY)
Facility: HOSPITAL | Age: 63
Discharge: HOME | End: 2018-07-13
Payer: MEDICARE

## 2018-07-13 VITALS
TEMPERATURE: 98.3 F | DIASTOLIC BLOOD PRESSURE: 81 MMHG | BODY MASS INDEX: 29.35 KG/M2 | HEART RATE: 90 BPM | OXYGEN SATURATION: 94 % | RESPIRATION RATE: 16 BRPM | SYSTOLIC BLOOD PRESSURE: 119 MMHG | WEIGHT: 205 LBS | HEIGHT: 70 IN

## 2018-07-13 DIAGNOSIS — R07.9 CHEST PAIN OF UNCERTAIN ETIOLOGY: Primary | ICD-10-CM

## 2018-07-13 LAB
ANION GAP SERPL CALCULATED.3IONS-SCNC: 10 MMOL/L (ref 4–13)
BASOPHILS # BLD AUTO: 0.1 THOUSANDS/ΜL (ref 0–0.1)
BASOPHILS NFR BLD AUTO: 1 % (ref 0–2)
BUN SERPL-MCNC: 13 MG/DL (ref 7–25)
CALCIUM SERPL-MCNC: 9.3 MG/DL (ref 8.6–10.5)
CHLORIDE SERPL-SCNC: 106 MMOL/L (ref 98–107)
CO2 SERPL-SCNC: 25 MMOL/L (ref 21–31)
CREAT SERPL-MCNC: 0.99 MG/DL (ref 0.7–1.3)
EOSINOPHIL # BLD AUTO: 0.2 THOUSAND/ΜL (ref 0–0.61)
EOSINOPHIL NFR BLD AUTO: 2 % (ref 0–5)
ERYTHROCYTE [DISTWIDTH] IN BLOOD BY AUTOMATED COUNT: 13.6 % (ref 11.5–14.5)
GFR SERPL CREATININE-BSD FRML MDRD: 81 ML/MIN/1.73SQ M
GLUCOSE SERPL-MCNC: 134 MG/DL (ref 65–99)
HCT VFR BLD AUTO: 38.7 % (ref 36.5–49.3)
HGB BLD-MCNC: 13.5 G/DL (ref 14–18)
LYMPHOCYTES # BLD AUTO: 2.2 THOUSANDS/ΜL (ref 0.6–4.47)
LYMPHOCYTES NFR BLD AUTO: 25 % (ref 21–51)
MCH RBC QN AUTO: 30.7 PG (ref 26–34)
MCHC RBC AUTO-ENTMCNC: 35 G/DL (ref 31–37)
MCV RBC AUTO: 88 FL (ref 81–99)
MONOCYTES # BLD AUTO: 0.5 THOUSAND/ΜL (ref 0.17–1.22)
MONOCYTES NFR BLD AUTO: 5 % (ref 2–12)
NEUTROPHILS # BLD AUTO: 5.9 THOUSANDS/ΜL (ref 1.4–6.5)
NEUTS SEG NFR BLD AUTO: 67 % (ref 42–75)
NRBC BLD AUTO-RTO: 0 /100 WBCS
PLATELET # BLD AUTO: 302 THOUSANDS/UL (ref 149–390)
PMV BLD AUTO: 7.2 FL (ref 8.6–11.7)
POTASSIUM SERPL-SCNC: 3.7 MMOL/L (ref 3.5–5.5)
RBC # BLD AUTO: 4.41 MILLION/UL (ref 4.3–5.9)
SODIUM SERPL-SCNC: 141 MMOL/L (ref 134–143)
TROPONIN I SERPL-MCNC: <0.03 NG/ML
WBC # BLD AUTO: 8.8 THOUSAND/UL (ref 4.8–10.8)

## 2018-07-13 PROCEDURE — 80048 BASIC METABOLIC PNL TOTAL CA: CPT | Performed by: NURSE PRACTITIONER

## 2018-07-13 PROCEDURE — 85025 COMPLETE CBC W/AUTO DIFF WBC: CPT | Performed by: NURSE PRACTITIONER

## 2018-07-13 PROCEDURE — 93005 ELECTROCARDIOGRAM TRACING: CPT

## 2018-07-13 PROCEDURE — 96374 THER/PROPH/DIAG INJ IV PUSH: CPT

## 2018-07-13 PROCEDURE — 71046 X-RAY EXAM CHEST 2 VIEWS: CPT

## 2018-07-13 PROCEDURE — 36415 COLL VENOUS BLD VENIPUNCTURE: CPT | Performed by: NURSE PRACTITIONER

## 2018-07-13 PROCEDURE — 99285 EMERGENCY DEPT VISIT HI MDM: CPT

## 2018-07-13 PROCEDURE — 84484 ASSAY OF TROPONIN QUANT: CPT | Performed by: NURSE PRACTITIONER

## 2018-07-13 RX ORDER — 0.9 % SODIUM CHLORIDE 0.9 %
3 VIAL (ML) INJECTION AS NEEDED
Status: DISCONTINUED | OUTPATIENT
Start: 2018-07-13 | End: 2018-07-13 | Stop reason: HOSPADM

## 2018-07-13 RX ORDER — ACETAMINOPHEN 325 MG/1
650 TABLET ORAL ONCE
Status: COMPLETED | OUTPATIENT
Start: 2018-07-13 | End: 2018-07-13

## 2018-07-13 RX ORDER — FLUTICASONE PROPIONATE 50 MCG
1 SPRAY, SUSPENSION (ML) NASAL DAILY
Status: ON HOLD | COMMUNITY
End: 2018-09-17

## 2018-07-13 RX ORDER — ASPIRIN 81 MG/1
81 TABLET ORAL DAILY
Status: ON HOLD | COMMUNITY
End: 2018-09-17

## 2018-07-13 RX ORDER — LORAZEPAM 1 MG/1
0.5 TABLET ORAL DAILY
COMMUNITY
End: 2018-09-17 | Stop reason: HOSPADM

## 2018-07-13 RX ADMIN — ACETAMINOPHEN 650 MG: 325 TABLET ORAL at 18:58

## 2018-07-13 RX ADMIN — SODIUM CHLORIDE 3 ML: 9 INJECTION, SOLUTION INTRAMUSCULAR; INTRAVENOUS; SUBCUTANEOUS at 18:33

## 2018-07-13 NOTE — ED PROVIDER NOTES
History  sscp last night no c/o at present, BIBA due to feeling terrible , smokes daily            Prior to Admission Medications   Prescriptions Last Dose Informant Patient Reported? Taking? Roflumilast (DALIRESP PO)   Yes No   Sig: Take 500 mg by mouth daily   albuterol (PROVENTIL HFA,VENTOLIN HFA) 90 mcg/act inhaler   Yes No   Sig: Inhale 2 puffs every 6 (six) hours as needed for wheezing   albuterol (PROVENTIL HFA,VENTOLIN HFA) 90 mcg/act inhaler   No No   Sig: Inhale 2 puffs every 6 (six) hours as needed for wheezing   amLODIPine (NORVASC) 10 mg tablet   Yes No   Sig: Take 10 mg by mouth daily   atorvastatin (LIPITOR) 10 mg tablet   Yes No   Sig: Take 10 mg by mouth daily   azithromycin (ZITHROMAX) 250 mg tablet   No No   Simg daily x 4 days   citalopram (CeleXA) 40 mg tablet   Yes No   Sig: Take 40 mg by mouth daily   clopidogrel (PLAVIX) 75 mg tablet   Yes No   Sig: Take 75 mg by mouth daily   predniSONE 20 mg tablet   No No   Sig: Take 3 tablets by mouth daily   tamsulosin (FLOMAX) 0 4 mg   Yes No   Sig: Take 0 4 mg by mouth daily with dinner   tiotropium (SPIRIVA) 18 mcg inhalation capsule   No No   Sig: Place 1 capsule into inhaler and inhale daily      Facility-Administered Medications: None       Past Medical History:   Diagnosis Date    COPD (chronic obstructive pulmonary disease) (HCC)     Enlarged prostate     Hyperlipidemia     Hypertension        Past Surgical History:   Procedure Laterality Date    CARDIAC SURGERY      HERNIA REPAIR         No family history on file  I have reviewed and agree with the history as documented  Social History   Substance Use Topics    Smoking status: Current Every Day Smoker    Smokeless tobacco: Never Used    Alcohol use No        Review of Systems   Constitutional: Negative  HENT: Negative  Eyes: Negative  Respiratory: Negative  Cardiovascular: Positive for chest pain  Gastrointestinal: Negative  Endocrine: Negative  Genitourinary: Negative  Musculoskeletal: Negative  Allergic/Immunologic: Negative  Neurological: Negative  Hematological: Negative  Psychiatric/Behavioral: Negative  Physical Exam  Physical Exam   Constitutional: He is oriented to person, place, and time  He appears well-developed and well-nourished  HENT:   Head: Normocephalic and atraumatic  Eyes: Conjunctivae and EOM are normal  Pupils are equal, round, and reactive to light  Neck: Normal range of motion  Cardiovascular: Normal rate, regular rhythm, normal heart sounds and intact distal pulses  Pulmonary/Chest: Breath sounds normal    Abdominal: Soft  Bowel sounds are normal    Musculoskeletal: Normal range of motion  Neurological: He is alert and oriented to person, place, and time  Skin: Skin is warm and dry  Capillary refill takes less than 2 seconds  Nursing note and vitals reviewed  Vital Signs  ED Triage Vitals   Temp Pulse Resp BP SpO2   -- -- -- -- --      Temp src Heart Rate Source Patient Position - Orthostatic VS BP Location FiO2 (%)   -- -- -- -- --      Pain Score       --           There were no vitals filed for this visit  Visual Acuity      ED Medications  Medications - No data to display    Diagnostic Studies  Results Reviewed     None                 No orders to display              Procedures  Procedures       Phone Contacts  ED Phone Contact    ED Course  ED Course as of Aug 06 1314   Fri Jul 13, 2018   1850 SR hr 88 wnl                                 MDM  CritCare Time    Disposition  Final diagnoses:   None     ED Disposition     None      Follow-up Information    None         Patient's Medications   Discharge Prescriptions    No medications on file     No discharge procedures on file      ED Provider  Electronically Signed by           ADRIANA Mccartney  07/13/18 [address] [place] [address]  08/06/18 8850

## 2018-07-13 NOTE — DISCHARGE INSTRUCTIONS
Chest Pain   WHAT YOU NEED TO KNOW:   Chest pain can be caused by a range of conditions, from not serious to life-threatening  Chest pain can be a symptom of a digestive problem, such as acid reflux or a stomach ulcer  An anxiety attack or a strong emotion, such as anger, can also cause chest pain  Infection, inflammation, or a fracture in the bones or cartilage in your chest can cause pain or discomfort  Sometimes chest pain or pressure is caused by poor blood flow to your heart (angina)  Chest pain may also be caused by life-threatening conditions such as a heart attack or blood clot in your lungs  DISCHARGE INSTRUCTIONS:   Call 911 if:   · You have any of the following signs of a heart attack:      ¨ Squeezing, pressure, or pain in your chest that lasts longer than 5 minutes or returns    ¨ Discomfort or pain in your back, neck, jaw, stomach, or arm     ¨ Trouble breathing    ¨ Nausea or vomiting    ¨ Lightheadedness or a sudden cold sweat, especially with chest pain or trouble breathing    Seek care immediately if:   · You have chest discomfort that gets worse, even with medicine  · You cough or vomit blood  · Your bowel movements are black or bloody  · You cannot stop vomiting, or it hurts to swallow  Contact your healthcare provider if:   · You have questions or concerns about your condition or care  Medicines:   · Medicines  may be given to treat the cause of your chest pain  Examples include pain medicine, anxiety medicine, or medicines to increase blood flow to your heart  · Do not take certain medicines without asking your healthcare provider first   These include NSAIDs, herbal or vitamin supplements, or hormones (estrogen or progestin)  · Take your medicine as directed  Contact your healthcare provider if you think your medicine is not helping or if you have side effects  Tell him or her if you are allergic to any medicine   Keep a list of the medicines, vitamins, and herbs you take  Include the amounts, and when and why you take them  Bring the list or the pill bottles to follow-up visits  Carry your medicine list with you in case of an emergency  Follow up with your healthcare provider within 72 hours, or as directed: You may need to return for more tests to find the cause of your chest pain  You may be referred to a specialist, such as a cardiologist or gastroenterologist  Write down your questions so you remember to ask them during your visits  Healthy living tips: The following are general healthy guidelines  If your chest pain is caused by a heart problem, your healthcare provider will give you specific guidelines to follow  · Do not smoke  Nicotine and other chemicals in cigarettes and cigars can cause lung and heart damage  Ask your healthcare provider for information if you currently smoke and need help to quit  E-cigarettes or smokeless tobacco still contain nicotine  Talk to your healthcare provider before you use these products  · Eat a variety of healthy, low-fat foods  Healthy foods include fruits, vegetables, whole-grain breads, low-fat dairy products, beans, lean meats, and fish  Ask for more information about a heart healthy diet  · Ask about activity  Your healthcare provider will tell you which activities to limit or avoid  Ask when you can drive, return to work, and have sex  Ask about the best exercise plan for you  · Maintain a healthy weight  Ask your healthcare provider how much you should weigh  Ask him or her to help you create a weight loss plan if you are overweight  © 2017 [employer] Information is for End User's use only and may not be sold, redistributed or otherwise used for commercial purposes  All illustrations and images included in CareNotes® are the copyrighted property of [employer]  or [employer]  The above information is an  only   It is not intended as medical advice for individual conditions or treatments  Talk to your doctor, nurse or pharmacist before following any medical regimen to see if it is safe and effective for you

## 2018-07-14 NOTE — ED ATTENDING ATTESTATION
I was the Attending Physician at the time of service and I was available for consult      Critical Care Time  CritCare Time    Procedures

## 2018-07-16 LAB
ATRIAL RATE: 103 BPM
P AXIS: 59 DEGREES
PR INTERVAL: 142 MS
QRS AXIS: 93 DEGREES
QRSD INTERVAL: 88 MS
QT INTERVAL: 350 MS
QTC INTERVAL: 458 MS
T WAVE AXIS: 63 DEGREES
VENTRICULAR RATE: 103 BPM

## 2018-09-13 ENCOUNTER — HOSPITAL ENCOUNTER (EMERGENCY)
Facility: HOSPITAL | Age: 63
End: 2018-09-13
Attending: EMERGENCY MEDICINE
Payer: MEDICARE

## 2018-09-13 ENCOUNTER — APPOINTMENT (EMERGENCY)
Dept: RADIOLOGY | Facility: HOSPITAL | Age: 63
End: 2018-09-13
Payer: MEDICARE

## 2018-09-13 ENCOUNTER — HOSPITAL ENCOUNTER (INPATIENT)
Facility: HOSPITAL | Age: 63
LOS: 4 days | Discharge: HOME | DRG: 066 | End: 2018-09-17
Attending: INTERNAL MEDICINE | Admitting: INTERNAL MEDICINE
Payer: MEDICARE

## 2018-09-13 ENCOUNTER — APPOINTMENT (EMERGENCY)
Dept: CT IMAGING | Facility: HOSPITAL | Age: 63
End: 2018-09-13
Payer: MEDICARE

## 2018-09-13 VITALS
DIASTOLIC BLOOD PRESSURE: 83 MMHG | RESPIRATION RATE: 14 BRPM | WEIGHT: 210 LBS | HEART RATE: 71 BPM | OXYGEN SATURATION: 96 % | SYSTOLIC BLOOD PRESSURE: 129 MMHG | HEIGHT: 63 IN | BODY MASS INDEX: 37.21 KG/M2

## 2018-09-13 DIAGNOSIS — I63.9 CVA (CEREBRAL VASCULAR ACCIDENT) (HCC): Primary | ICD-10-CM

## 2018-09-13 DIAGNOSIS — F32.A ANXIETY AND DEPRESSION: ICD-10-CM

## 2018-09-13 DIAGNOSIS — J44.9 CHRONIC OBSTRUCTIVE PULMONARY DISEASE, UNSPECIFIED COPD TYPE (HCC): ICD-10-CM

## 2018-09-13 DIAGNOSIS — R53.1 LEFT-SIDED WEAKNESS: Primary | ICD-10-CM

## 2018-09-13 DIAGNOSIS — F41.9 ANXIETY AND DEPRESSION: ICD-10-CM

## 2018-09-13 DIAGNOSIS — I10 ESSENTIAL HYPERTENSION: ICD-10-CM

## 2018-09-13 DIAGNOSIS — I65.09 VERTEBRAL ARTERY OCCLUSION: ICD-10-CM

## 2018-09-13 DIAGNOSIS — G45.9 TIA (TRANSIENT ISCHEMIC ATTACK): ICD-10-CM

## 2018-09-13 DIAGNOSIS — I63.511 ACUTE RIGHT MCA STROKE (HCC): ICD-10-CM

## 2018-09-13 LAB
ABO GROUP BLD: NORMAL
ANION GAP SERPL CALCULATED.3IONS-SCNC: 4 MMOL/L (ref 4–13)
APTT PPP: 25 SECONDS (ref 24–36)
BLD GP AB SCN SERPL QL: NEGATIVE
BUN SERPL-MCNC: 9 MG/DL (ref 7–25)
CALCIUM SERPL-MCNC: 8.8 MG/DL (ref 8.6–10.5)
CHLORIDE SERPL-SCNC: 104 MMOL/L (ref 98–107)
CO2 SERPL-SCNC: 28 MMOL/L (ref 21–31)
CREAT SERPL-MCNC: 0.82 MG/DL (ref 0.7–1.3)
ERYTHROCYTE [DISTWIDTH] IN BLOOD BY AUTOMATED COUNT: 13.9 % (ref 11.5–14.5)
GFR SERPL CREATININE-BSD FRML MDRD: 94 ML/MIN/1.73SQ M
GLUCOSE SERPL-MCNC: 82 MG/DL (ref 65–99)
HCT VFR BLD AUTO: 40.9 % (ref 36.5–49.3)
HGB BLD-MCNC: 14.4 G/DL (ref 14–18)
INR PPP: 1.02 (ref 0.9–1.5)
MCH RBC QN AUTO: 31 PG (ref 26–34)
MCHC RBC AUTO-ENTMCNC: 35.1 G/DL (ref 31–37)
MCV RBC AUTO: 88 FL (ref 81–99)
PLATELET # BLD AUTO: 223 THOUSANDS/UL (ref 149–390)
PMV BLD AUTO: 7.8 FL (ref 8.6–11.7)
POTASSIUM SERPL-SCNC: 4.5 MMOL/L (ref 3.5–5.5)
PROTHROMBIN TIME: 11.8 SECONDS (ref 10.1–12.9)
RBC # BLD AUTO: 4.63 MILLION/UL (ref 4.3–5.9)
RH BLD: POSITIVE
SODIUM SERPL-SCNC: 136 MMOL/L (ref 134–143)
SPECIMEN EXPIRATION DATE: NORMAL
WBC # BLD AUTO: 7.6 THOUSAND/UL (ref 4.8–10.8)

## 2018-09-13 PROCEDURE — 99291 CRITICAL CARE FIRST HOUR: CPT

## 2018-09-13 PROCEDURE — 86850 RBC ANTIBODY SCREEN: CPT | Performed by: EMERGENCY MEDICINE

## 2018-09-13 PROCEDURE — 93005 ELECTROCARDIOGRAM TRACING: CPT

## 2018-09-13 PROCEDURE — 86901 BLOOD TYPING SEROLOGIC RH(D): CPT | Performed by: EMERGENCY MEDICINE

## 2018-09-13 PROCEDURE — 85610 PROTHROMBIN TIME: CPT | Performed by: EMERGENCY MEDICINE

## 2018-09-13 PROCEDURE — 80048 BASIC METABOLIC PNL TOTAL CA: CPT | Performed by: EMERGENCY MEDICINE

## 2018-09-13 PROCEDURE — 71045 X-RAY EXAM CHEST 1 VIEW: CPT

## 2018-09-13 PROCEDURE — 85027 COMPLETE CBC AUTOMATED: CPT | Performed by: EMERGENCY MEDICINE

## 2018-09-13 PROCEDURE — 96360 HYDRATION IV INFUSION INIT: CPT

## 2018-09-13 PROCEDURE — 85730 THROMBOPLASTIN TIME PARTIAL: CPT | Performed by: EMERGENCY MEDICINE

## 2018-09-13 PROCEDURE — 70498 CT ANGIOGRAPHY NECK: CPT

## 2018-09-13 PROCEDURE — 70496 CT ANGIOGRAPHY HEAD: CPT

## 2018-09-13 PROCEDURE — 36415 COLL VENOUS BLD VENIPUNCTURE: CPT | Performed by: EMERGENCY MEDICINE

## 2018-09-13 PROCEDURE — 86900 BLOOD TYPING SEROLOGIC ABO: CPT | Performed by: EMERGENCY MEDICINE

## 2018-09-13 PROCEDURE — 94760 N-INVAS EAR/PLS OXIMETRY 1: CPT

## 2018-09-13 RX ORDER — ASPIRIN 325 MG
325 TABLET ORAL ONCE
Status: COMPLETED | OUTPATIENT
Start: 2018-09-13 | End: 2018-09-13

## 2018-09-13 RX ADMIN — SODIUM CHLORIDE 1000 ML: 0.9 INJECTION, SOLUTION INTRAVENOUS at 21:26

## 2018-09-13 RX ADMIN — ASPIRIN 325 MG: 325 TABLET ORAL at 21:49

## 2018-09-13 RX ADMIN — IOHEXOL 85 ML: 350 INJECTION, SOLUTION INTRAVENOUS at 21:03

## 2018-09-14 ENCOUNTER — APPOINTMENT (INPATIENT)
Dept: MRI IMAGING | Facility: HOSPITAL | Age: 63
DRG: 066 | End: 2018-09-14
Payer: MEDICARE

## 2018-09-14 ENCOUNTER — APPOINTMENT (INPATIENT)
Dept: NON INVASIVE DIAGNOSTICS | Facility: HOSPITAL | Age: 63
DRG: 066 | End: 2018-09-14
Payer: MEDICARE

## 2018-09-14 PROBLEM — I10 HYPERTENSION: Status: ACTIVE | Noted: 2018-09-14

## 2018-09-14 PROBLEM — J44.9 COPD (CHRONIC OBSTRUCTIVE PULMONARY DISEASE) (HCC): Status: ACTIVE | Noted: 2018-09-14

## 2018-09-14 PROBLEM — R53.1 LEFT-SIDED WEAKNESS: Status: ACTIVE | Noted: 2018-09-14

## 2018-09-14 PROBLEM — E78.5 HYPERLIPIDEMIA: Status: ACTIVE | Noted: 2018-09-14

## 2018-09-14 PROBLEM — I51.9 CARDIAC DISEASE: Status: ACTIVE | Noted: 2018-09-14

## 2018-09-14 LAB
ANION GAP SERPL CALCULATED.3IONS-SCNC: 8 MMOL/L (ref 4–13)
ATRIAL RATE: 82 BPM
BUN SERPL-MCNC: 9 MG/DL (ref 5–25)
CALCIUM SERPL-MCNC: 8.5 MG/DL (ref 8.3–10.1)
CHLORIDE SERPL-SCNC: 106 MMOL/L (ref 100–108)
CHOLEST SERPL-MCNC: 206 MG/DL (ref 50–200)
CO2 SERPL-SCNC: 27 MMOL/L (ref 21–32)
CREAT SERPL-MCNC: 0.9 MG/DL (ref 0.6–1.3)
ERYTHROCYTE [DISTWIDTH] IN BLOOD BY AUTOMATED COUNT: 12.7 % (ref 11.6–15.1)
EST. AVERAGE GLUCOSE BLD GHB EST-MCNC: 111 MG/DL
GFR SERPL CREATININE-BSD FRML MDRD: 91 ML/MIN/1.73SQ M
GLUCOSE SERPL-MCNC: 97 MG/DL (ref 65–140)
HBA1C MFR BLD: 5.5 % (ref 4.2–6.3)
HCT VFR BLD AUTO: 41.9 % (ref 36.5–49.3)
HDLC SERPL-MCNC: 31 MG/DL (ref 40–60)
HGB BLD-MCNC: 14.4 G/DL (ref 12–17)
LDLC SERPL CALC-MCNC: 136 MG/DL (ref 0–100)
MCH RBC QN AUTO: 30.5 PG (ref 26.8–34.3)
MCHC RBC AUTO-ENTMCNC: 34.4 G/DL (ref 31.4–37.4)
MCV RBC AUTO: 89 FL (ref 82–98)
P AXIS: 67 DEGREES
PA ADP BLD-ACNC: 213 PRU (ref 194–418)
PA ADP BLD-ACNC: 405 ARU
PLATELET # BLD AUTO: 236 THOUSANDS/UL (ref 149–390)
PMV BLD AUTO: 9.4 FL (ref 8.9–12.7)
POTASSIUM SERPL-SCNC: 3.5 MMOL/L (ref 3.5–5.3)
PR INTERVAL: 148 MS
QRS AXIS: 94 DEGREES
QRSD INTERVAL: 86 MS
QT INTERVAL: 388 MS
QTC INTERVAL: 453 MS
RBC # BLD AUTO: 4.72 MILLION/UL (ref 3.88–5.62)
SODIUM SERPL-SCNC: 141 MMOL/L (ref 136–145)
T WAVE AXIS: 77 DEGREES
TRIGL SERPL-MCNC: 195 MG/DL
TROPONIN I SERPL-MCNC: <0.02 NG/ML
VENTRICULAR RATE: 82 BPM
WBC # BLD AUTO: 7.25 THOUSAND/UL (ref 4.31–10.16)

## 2018-09-14 PROCEDURE — 80048 BASIC METABOLIC PNL TOTAL CA: CPT | Performed by: PHYSICIAN ASSISTANT

## 2018-09-14 PROCEDURE — G8988 SELF CARE GOAL STATUS: HCPCS

## 2018-09-14 PROCEDURE — 84484 ASSAY OF TROPONIN QUANT: CPT | Performed by: INTERNAL MEDICINE

## 2018-09-14 PROCEDURE — 85027 COMPLETE CBC AUTOMATED: CPT | Performed by: PHYSICIAN ASSISTANT

## 2018-09-14 PROCEDURE — G8996 SWALLOW CURRENT STATUS: HCPCS

## 2018-09-14 PROCEDURE — 80061 LIPID PANEL: CPT | Performed by: PHYSICIAN ASSISTANT

## 2018-09-14 PROCEDURE — 93306 TTE W/DOPPLER COMPLETE: CPT

## 2018-09-14 PROCEDURE — 93306 TTE W/DOPPLER COMPLETE: CPT | Performed by: INTERNAL MEDICINE

## 2018-09-14 PROCEDURE — G8987 SELF CARE CURRENT STATUS: HCPCS

## 2018-09-14 PROCEDURE — 99223 1ST HOSP IP/OBS HIGH 75: CPT | Performed by: INTERNAL MEDICINE

## 2018-09-14 PROCEDURE — 85576 BLOOD PLATELET AGGREGATION: CPT | Performed by: INTERNAL MEDICINE

## 2018-09-14 PROCEDURE — 97166 OT EVAL MOD COMPLEX 45 MIN: CPT

## 2018-09-14 PROCEDURE — G8998 SWALLOW D/C STATUS: HCPCS

## 2018-09-14 PROCEDURE — G8980 MOBILITY D/C STATUS: HCPCS

## 2018-09-14 PROCEDURE — 70551 MRI BRAIN STEM W/O DYE: CPT

## 2018-09-14 PROCEDURE — G8979 MOBILITY GOAL STATUS: HCPCS

## 2018-09-14 PROCEDURE — 97163 PT EVAL HIGH COMPLEX 45 MIN: CPT

## 2018-09-14 PROCEDURE — G8997 SWALLOW GOAL STATUS: HCPCS

## 2018-09-14 PROCEDURE — 92610 EVALUATE SWALLOWING FUNCTION: CPT

## 2018-09-14 PROCEDURE — 83036 HEMOGLOBIN GLYCOSYLATED A1C: CPT | Performed by: PHYSICIAN ASSISTANT

## 2018-09-14 PROCEDURE — 99222 1ST HOSP IP/OBS MODERATE 55: CPT | Performed by: PSYCHIATRY & NEUROLOGY

## 2018-09-14 PROCEDURE — G8978 MOBILITY CURRENT STATUS: HCPCS

## 2018-09-14 RX ORDER — LORAZEPAM 0.5 MG/1
0.5 TABLET ORAL EVERY 8 HOURS PRN
Status: DISCONTINUED | OUTPATIENT
Start: 2018-09-14 | End: 2018-09-17 | Stop reason: HOSPADM

## 2018-09-14 RX ORDER — FLUTICASONE PROPIONATE 50 MCG
1 SPRAY, SUSPENSION (ML) NASAL DAILY
Status: DISCONTINUED | OUTPATIENT
Start: 2018-09-14 | End: 2018-09-17 | Stop reason: HOSPADM

## 2018-09-14 RX ORDER — NICOTINE 21 MG/24HR
14 PATCH, TRANSDERMAL 24 HOURS TRANSDERMAL DAILY
Status: DISCONTINUED | OUTPATIENT
Start: 2018-09-14 | End: 2018-09-17 | Stop reason: HOSPADM

## 2018-09-14 RX ORDER — ACETAMINOPHEN 325 MG/1
650 TABLET ORAL EVERY 4 HOURS PRN
Status: DISCONTINUED | OUTPATIENT
Start: 2018-09-14 | End: 2018-09-17 | Stop reason: HOSPADM

## 2018-09-14 RX ORDER — CLOPIDOGREL BISULFATE 75 MG/1
75 TABLET ORAL DAILY
Status: DISCONTINUED | OUTPATIENT
Start: 2018-09-14 | End: 2018-09-14

## 2018-09-14 RX ORDER — ONDANSETRON 2 MG/ML
4 INJECTION INTRAMUSCULAR; INTRAVENOUS EVERY 6 HOURS PRN
Status: DISCONTINUED | OUTPATIENT
Start: 2018-09-14 | End: 2018-09-17 | Stop reason: HOSPADM

## 2018-09-14 RX ORDER — ASPIRIN 81 MG/1
81 TABLET ORAL DAILY
Status: DISCONTINUED | OUTPATIENT
Start: 2018-09-14 | End: 2018-09-17 | Stop reason: HOSPADM

## 2018-09-14 RX ORDER — AMLODIPINE BESYLATE 10 MG/1
10 TABLET ORAL DAILY
Status: DISCONTINUED | OUTPATIENT
Start: 2018-09-14 | End: 2018-09-17 | Stop reason: HOSPADM

## 2018-09-14 RX ORDER — CLOPIDOGREL BISULFATE 75 MG/1
150 TABLET ORAL DAILY
Status: DISCONTINUED | OUTPATIENT
Start: 2018-09-15 | End: 2018-09-17 | Stop reason: HOSPADM

## 2018-09-14 RX ORDER — LORAZEPAM 0.5 MG/1
0.5 TABLET ORAL
Status: DISCONTINUED | OUTPATIENT
Start: 2018-09-14 | End: 2018-09-14

## 2018-09-14 RX ORDER — CITALOPRAM 20 MG/1
40 TABLET ORAL DAILY
Status: DISCONTINUED | OUTPATIENT
Start: 2018-09-14 | End: 2018-09-17 | Stop reason: HOSPADM

## 2018-09-14 RX ORDER — ATORVASTATIN CALCIUM 40 MG/1
40 TABLET, FILM COATED ORAL EVERY EVENING
Status: DISCONTINUED | OUTPATIENT
Start: 2018-09-14 | End: 2018-09-17 | Stop reason: HOSPADM

## 2018-09-14 RX ORDER — MAGNESIUM HYDROXIDE/ALUMINUM HYDROXICE/SIMETHICONE 120; 1200; 1200 MG/30ML; MG/30ML; MG/30ML
30 SUSPENSION ORAL EVERY 6 HOURS PRN
Status: DISCONTINUED | OUTPATIENT
Start: 2018-09-14 | End: 2018-09-17 | Stop reason: HOSPADM

## 2018-09-14 RX ORDER — ALBUTEROL SULFATE 90 UG/1
2 AEROSOL, METERED RESPIRATORY (INHALATION) EVERY 6 HOURS PRN
Status: DISCONTINUED | OUTPATIENT
Start: 2018-09-14 | End: 2018-09-17 | Stop reason: HOSPADM

## 2018-09-14 RX ORDER — AMLODIPINE BESYLATE 10 MG/1
10 TABLET ORAL DAILY
Status: DISCONTINUED | OUTPATIENT
Start: 2018-09-14 | End: 2018-09-14

## 2018-09-14 RX ADMIN — ASPIRIN 81 MG: 81 TABLET, COATED ORAL at 08:49

## 2018-09-14 RX ADMIN — ACETAMINOPHEN 650 MG: 325 TABLET, FILM COATED ORAL at 01:01

## 2018-09-14 RX ADMIN — ENOXAPARIN SODIUM 40 MG: 40 INJECTION SUBCUTANEOUS at 08:49

## 2018-09-14 RX ADMIN — CITALOPRAM HYDROBROMIDE 40 MG: 20 TABLET ORAL at 08:49

## 2018-09-14 RX ADMIN — AMLODIPINE BESYLATE 10 MG: 10 TABLET ORAL at 08:49

## 2018-09-14 RX ADMIN — ATORVASTATIN CALCIUM 40 MG: 40 TABLET, FILM COATED ORAL at 00:54

## 2018-09-14 RX ADMIN — NICOTINE 14 MG: 14 PATCH, EXTENDED RELEASE TRANSDERMAL at 08:50

## 2018-09-14 RX ADMIN — CLOPIDOGREL 75 MG: 75 TABLET, FILM COATED ORAL at 08:49

## 2018-09-14 RX ADMIN — NICOTINE 14 MG: 14 PATCH, EXTENDED RELEASE TRANSDERMAL at 00:54

## 2018-09-14 RX ADMIN — LORAZEPAM 0.5 MG: 0.5 TABLET ORAL at 09:05

## 2018-09-14 RX ADMIN — FLUTICASONE PROPIONATE 1 SPRAY: 50 SPRAY, METERED NASAL at 08:50

## 2018-09-14 RX ADMIN — LORAZEPAM 0.5 MG: 0.5 TABLET ORAL at 22:19

## 2018-09-14 RX ADMIN — ATORVASTATIN CALCIUM 40 MG: 40 TABLET, FILM COATED ORAL at 17:37

## 2018-09-14 RX ADMIN — TIOTROPIUM BROMIDE 18 MCG: 18 CAPSULE ORAL; RESPIRATORY (INHALATION) at 08:50

## 2018-09-14 NOTE — PLAN OF CARE
Problem: OCCUPATIONAL THERAPY ADULT  Goal: Performs self-care activities at highest level of function for planned discharge setting  See evaluation for individualized goals  Treatment Interventions: ADL retraining, Functional transfer training, UE strengthening/ROM, Cognitive reorientation, Endurance training, Energy conservation, Activityengagement, Continued evaluation, Equipment evaluation/education, Patient/family training, Fine motor coordination activities, Compensatory technique education          See flowsheet documentation for full assessment, interventions and recommendations  Limitation: Decreased ADL status, Decreased UE strength, Decreased Safe judgement during ADL, Decreased cognition, Decreased endurance, Decreased high-level ADLs, Decreased self-care trans, Decreased sensation  Prognosis: Good  Assessment: Pt is a 61 y o  male seen for OT evaluation s/p admit to [facility] on 9/13/2018 w/ Left-sided weakness  MRI revealed: 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  Comorbidities affecting pt's functional performance at time of assessment include: tobacco abuse, HTN, COPD, cardiac disease  Personal factors affecting pt at time of IE include: lives alone, support system is his   Prior to admission, pt was living alone in apartment and reports independent w/ ADLs, independent w/ functional transfers and mobility w/ no AD, independent IADLs,  transports to appointments  Upon evaluation: Pt requires MOD I bed mobility, MOD I functional transfers, supervision functional mobility w/ no AD, supervision-min assist LB ADLS, supervision toileting 2* the following deficits impacting occupational performance:  Decreased endurance, slightly impaired balance, dyspnea on exertion, increased pain, slight dysmetria noted on L side, impaired FM coordination L side   Pt to benefit from continued skilled OT tx while in the hospital to address deficits as defined above and maximize level of functional independence w ADL's and functional mobility  Occupational Performance areas to address include: grooming, dressing, health maintenance, functional mobility, clothing management, cleaning and meal prep, L UE exercises including FM exercises  Pt educated and able to recall symptoms of stroke and what to be aware  Pt educated and provided FM activities of finger to thumb opposition on washcloth, and turning cards/coins over, folding towels, pt verbalized understanding and demonstrated  From OT standpoint, recommendation at time of d/c would be home w/ outpatient OT       OT Discharge Recommendation: Outpatient OT  OT - OK to Discharge:  (when medically stable)      Comments: Gabby Fallon MS, OTR/L

## 2018-09-14 NOTE — PLAN OF CARE
Problem: SLP ADULT - SWALLOWING, IMPAIRED  Goal: Initial SLP swallow eval performed  Outcome: Completed Date Met: 09/14/18

## 2018-09-14 NOTE — CASE MANAGEMENT
Initial Clinical Review    Admission: Date/Time/Statement: 9/13/18 @ 2307     PLEASE NOTE: Patient transferred from [facility] ED to [address] [facility]     Orders Placed This Encounter   Procedures    Inpatient Admission     Standing Status:   Standing     Number of Occurrences:   1     Order Specific Question:   Admitting Physician     Answer:   James Salinas [[id number]]     Order Specific Question:   Level of Care     Answer:   Med Surg [16]     Order Specific Question:   Estimated length of stay     Answer:   More than 2 Midnights     Order Specific Question:   Certification     Answer:   I certify that inpatient services are medically necessary for this patient for a duration of greater than two midnights  See H&P and MD Progress Notes for additional information about the patient's course of treatment  ED: Date/Time/Mode of Arrival:   ED Arrival Information     Patient not seen in ED                       PER ED NOTE from Zeeshan Rajan:  History of Illness: patient presents the emergency department complaining of a an odd feeling on his left side that started 2 days ago but became severe with weakness on the left side about 2 hours prior to arrival today patient also admitted to a headache no slurring of speech or difficulty swallowing feels paresthesias on entire left side and a subjective weakness in the left arm and leg which started 2 hours prior to arrival       CT Head: No acute intracranial findings  If symptoms persist or if further imaging  is clinically indicated, consider follow-up CT or MRI  Low attenuation in the white matter bilaterally, age indeterminate  This  is most commonly from small vessel ischemic disease  CTA Head & Neck: Significant narrowing of the left vertebral artery with apparent occlusion  of the proximal and distal portions of the artery with reconstitution of  flow within a narrowed left vertebral artery between these regions         ADM  Vital Signs:  97 3 - 61 - 17   158/59   93%  Weight: 98 6 kg      Abnormal Labs/Diagnostic Test Results:   MRI: There is area of diffusion restriction in the 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  No acute hemorrhage seen       Past Medical/Surgical History:   Past Medical History:   Diagnosis Date    Cardiac disease     COPD (chronic obstructive pulmonary disease) (HCC)     Enlarged prostate     [facility] [address] [facility]     Hyperlipidemia     Hypertension        Admitting Diagnosis: Stroke (cerebrum) ([facility] [address] ) [I63 9]    Age/Sex: 61 y o  male    Assessment/Plan:   Plan for the Primary Problem(s):  · Left sided weakness  ? Admit to med/surg on stroke pathway  Was stroke alert at [facility]  ED there spoke with Dr Kisha Chinchilla  Will order MRI  On ASA and plavix  ? CTA shows severe narrowing of left vertebral artery     Plan for Additional Problems:   · HTN- allow for permissive HTN  · Hyperlipidemia- check lipid panel  Start statin  · COPD- continue inhalers  · Cardiac disease- denies chest pain  Continue ASA/plavix     VTE Prophylaxis: Enoxaparin (Lovenox)  / sequential compression device   Code Status: full code  POLST: There is no POLST form on file for this patient (pre-hospital)     Anticipated Length of Stay:  Patient will be admitted on an Inpatient basis with an anticipated length of stay of  Greater than 2 midnights     Justification for Hospital Stay: patient admitted on stroke pathway    Admission Orders:  IP  TELE  Neuro checks q1h x 4, q2h x 4, q4h  Consult Neuro  PT / OT / Speech Eval  MRI Brain  ECHO  Serial Trops  CBC, BMP  SCD's    Scheduled Meds:   Current Facility-Administered Medications:                       amLODIPine 10 mg Oral Daily Kahlil Cheese, PA-C   aspirin 81 mg Oral Daily Kahlil Cheese, PA-C   atorvastatin 40 mg Oral QPM Kahlil Cheese, PA-C   citalopram 40 mg Oral Daily Kahlil Cheese, PA-C   clopidogrel 75 mg Oral Daily Blanche Nguyen CRISTIAN Clark   enoxaparin 40 mg Subcutaneous Daily Arturo Hernandez, PA-C   fluticasone 1 spray Nasal Daily Arturo Hernandez, PA-C          nicotine 14 mg Transdermal Daily Arturo Hernandez, PA-C          roflumilast 500 mcg Oral Daily Arturo Chol, PA-C   tiotropium 18 mcg Inhalation Daily Arturo Hernandez, PA-C     Continuous Infusions:    PRN Meds:   acetaminophen    albuterol    aluminum-magnesium hydroxide-simethicone    LORazepam    ondansetron    Thank you,  [facility]  Utilization Review Department  Phone: [phone]; Fax [phone]  ATTENTION: Please call with any questions or concerns to [phone]  and carefully follow the prompts so that you are directed to the right person  Send all requests for admission clinical reviews, approved or denied determinations and any other requests to fax [phone]   All voicemails are confidential

## 2018-09-14 NOTE — ED PROVIDER NOTES
History  Chief Complaint   Patient presents with   [address]     [facility] right to CT scanner, patient had left sided weakness and was brought in by ALS unit  Head patient presents the emergency department complaining of a an odd feeling on his left side that started 2 days ago but became severe with weakness on the left side about 2 hours prior to arrival today patient also admitted to a headache no slurring of speech or difficulty swallowing feels paresthesias on entire left side and a subjective weakness in the left arm and leg which started 2 hours prior to arrival           History provided by:  Patient  Neurologic Problem   Location:  Left  Severity:  Mild  Onset quality:  Sudden  Duration:  2 hours  Timing:  Constant  Progression:  Improving  Chronicity:  New  Associated symptoms: headaches    Associated symptoms: no abdominal pain, no chest pain, no congestion, no cough, no diarrhea, no ear pain, no fatigue, no fever, no myalgias, no nausea, no rash, no rhinorrhea, no shortness of breath, no sore throat, no vomiting and no wheezing        Prior to Admission Medications   Prescriptions Last Dose Informant Patient Reported? Taking?    LORazepam (ATIVAN) 1 mg tablet   Yes No   Sig: Take 0 5 mg by mouth daily   Roflumilast (DALIRESP PO)   Yes No   Sig: Take 500 mg by mouth daily   albuterol (PROVENTIL HFA,VENTOLIN HFA) 90 mcg/act inhaler   Yes No   Sig: Inhale 2 puffs every 6 (six) hours as needed for wheezing   albuterol (PROVENTIL HFA,VENTOLIN HFA) 90 mcg/act inhaler   No No   Sig: Inhale 2 puffs every 6 (six) hours as needed for wheezing   amLODIPine (NORVASC) 10 mg tablet   Yes No   Sig: Take 10 mg by mouth daily   aspirin (ECOTRIN LOW STRENGTH) 81 mg EC tablet   Yes No   Sig: Take 81 mg by mouth daily   citalopram (CeleXA) 40 mg tablet Unknown at Unknown time  Yes No   Sig: Take 40 mg by mouth daily   clopidogrel (PLAVIX) 75 mg tablet   Yes No   Sig: Take 75 mg by mouth daily   fluticasone (FLONASE) 50 mcg/act nasal spray   Yes No   Si spray into each nostril daily   tiotropium (SPIRIVA) 18 mcg inhalation capsule   No No   Sig: Place 1 capsule into inhaler and inhale daily      Facility-Administered Medications: None       Past Medical History:   Diagnosis Date    Cardiac disease     cardiac bypassx2 and stents    COPD (chronic obstructive pulmonary disease) (HCC)     Enlarged prostate     Heart attack ([facility] [address] )     Hyperlipidemia     Hypertension        Past Surgical History:   Procedure Laterality Date    CARDIAC SURGERY      HERNIA REPAIR         History reviewed  No pertinent family history  I have reviewed and agree with the history as documented  Social History   Substance Use Topics    Smoking status: Current Every Day Smoker     Packs/day: 1 00     Types: Cigarettes    Smokeless tobacco: Never Used    Alcohol use No        Review of Systems   Constitutional: Negative for activity change, appetite change, chills, fatigue and fever  HENT: Negative for congestion, ear pain, rhinorrhea and sore throat  Eyes: Negative for discharge, redness and visual disturbance  Respiratory: Negative for cough, chest tightness, shortness of breath and wheezing  Cardiovascular: Negative for chest pain and palpitations  Gastrointestinal: Negative for abdominal pain, constipation, diarrhea, nausea and vomiting  Endocrine: Negative for polydipsia and polyuria  Genitourinary: Negative for difficulty urinating, dysuria, frequency, hematuria and urgency  Musculoskeletal: Negative for arthralgias and myalgias  Skin: Negative for color change, pallor and rash  Neurological: Positive for weakness, numbness and headaches  Negative for dizziness, syncope, speech difficulty and light-headedness  Hematological: Negative for adenopathy  Does not bruise/bleed easily  All other systems reviewed and are negative        Physical Exam  Physical Exam Constitutional: He is oriented to person, place, and time  He appears well-developed and well-nourished  HENT:   Head: Normocephalic and atraumatic  Right Ear: External ear normal    Left Ear: External ear normal    Nose: Nose normal    Mouth/Throat: Oropharynx is clear and moist    Eyes: Conjunctivae and EOM are normal  Pupils are equal, round, and reactive to light  Neck: Normal range of motion  Neck supple  Cardiovascular: Normal rate, regular rhythm, normal heart sounds and intact distal pulses  Pulmonary/Chest: Effort normal and breath sounds normal  No respiratory distress  He has no wheezes  He has no rales  He exhibits no tenderness  Abdominal: Soft  Bowel sounds are normal  He exhibits no distension  There is no tenderness  There is no guarding  Musculoskeletal: Normal range of motion  Neurological: He is alert and oriented to person, place, and time  No cranial nerve deficit or sensory deficit  NIH score is 0 no focal motor sensory deficit or facial asymmetry appreciated no drift   Skin: Skin is warm and dry  Psychiatric: He has a normal mood and affect  Nursing note and vitals reviewed        Vital Signs  ED Triage Vitals   Temp Pulse Respirations Blood Pressure SpO2   -- 09/13/18 2109 09/13/18 2109 09/13/18 2100 09/13/18 2036    74 20 129/83 96 %      Temp src Heart Rate Source Patient Position - Orthostatic VS BP Location FiO2 (%)   -- 09/13/18 2109 09/13/18 2109 09/13/18 2109 --    Monitor Lying Right arm       Pain Score       09/13/18 2109       No Pain           Vitals:    09/13/18 2109 09/13/18 2115 09/13/18 2130 09/13/18 2145   BP: 129/83      Pulse: 74 76 73 77   Patient Position - Orthostatic VS: Lying          Visual Acuity  Visual Acuity      Most Recent Value   L Pupil Size (mm)  4   R Pupil Size (mm)  4          ED Medications  Medications   sodium chloride 0 9 % bolus 1,000 mL (1,000 mL Intravenous New Bag 9/13/18 2126)   iohexol (OMNIPAQUE) 350 MG/ML injection (MULTI-DOSE) 85 mL (85 mL Intravenous Given 9/13/18 2103)   aspirin tablet 325 mg (325 mg Oral Given 9/13/18 2149)       Diagnostic Studies  Results Reviewed     Procedure Component Value Units Date/Time    APTT [[id number]] Collected:  09/13/18 2156    Lab Status: In process Specimen:  Blood from Arm, Right Updated:  09/13/18 2158    Protime-INR [[id number]] Collected:  09/13/18 2156    Lab Status: In process Specimen:  Blood from Arm, Right Updated:  09/13/18 8091    Basic metabolic panel [[id number]] Collected:  09/13/18 2156    Lab Status: In process Specimen:  Blood from Arm, Right Updated:  09/13/18 2158    CBC [[id number]]  (Abnormal) Collected:  09/13/18 2123    Lab Status:  Final result Specimen:  Blood from Arm, Right Updated:  09/13/18 2151     WBC 7 60 Thousand/uL      RBC 4 63 Million/uL      Hemoglobin 14 4 g/dL      Hematocrit 40 9 %      MCV 88 fL      MCH 31 0 pg      MCHC 35 1 g/dL      RDW 13 9 %      Platelets 539 Thousands/uL      MPV 7 8 (L) fL                  CTA stroke alert (head/neck)   Final Result by [facility] (09/13 2158)   Significant narrowing of the left vertebral artery with apparent occlusion   of the proximal and distal portions of the artery with reconstitution of   flow within a narrowed left vertebral artery between these regions  NOTIFICATION:  The critical results of the study were discussed with, and   acknowledged by Dr Ann Marie Balderas, by telephone on September 13, 2018 at   21:48  Signed by Reid Coker MD      CT stroke alert brain   Final Result by Pavel El (09/13 2049)   Addendum 1 of 1 by Pavel El (09/13 2058)    ORIGINAL REPORT    INDICATION:  Acute headache, norm neuro exam; stroke protocol  ORDERING PROVIDER:  QIAN RUVALCABA  TECHNIQUE:  CT of the brain was performed without contrast         Automated mA/kV exposure control was utilized and patient examination was   performed in strict accordance with principles of ALARA        RADIATION AMOUNT:  913 6 mGy-cm  COMPARISON:  None Available  FINDINGS:    There is no acute intracranial hemorrhage, midline shift, mass effect or   extra-axial fluid collection  Gray-white differentiation is preserved  Brain volume and ventricular system are within normal limits for age  The skull base and calvarium are intact  The visualized paranasal sinuses   are unremarkable  The visualized orbits, globes, and mastoid air cells   are unremarkable  IMPRESSION:    No acute intracranial findings  If symptoms persist or if further imaging   is clinically indicated, consider follow-up CT or MRI  Low attenuation in the white matter bilaterally, age indeterminate  This   is most commonly from small vessel ischemic disease         Signed by JOSE DAVID Anand  ADDENDUM #1    NOTIFICATION:  Non-critical findings were relayed by Radiology Support   Staff to Dr Joey Miller on 09/13/2018 at 20:50  Signed by JOSE DAVID Anand  Final   No acute intracranial findings  If symptoms persist or if further imaging   is clinically indicated, consider follow-up CT or MRI  Low attenuation in the white matter bilaterally, age indeterminate    This   is most commonly from small vessel ischemic disease         Signed by JOSE DAVID Anand       X-ray chest 1 view portable    (Results Pending)              Procedures  ECG 12 Lead Documentation  Date/Time: 9/13/2018 9:14 PM  Performed by: Dalton Martinez by: Keyla Welsh     ECG reviewed by me, the ED Provider: yes    Patient location:  ED  Previous ECG:     Comparison to cardiac monitor: Yes    Rate:     ECG rate:  82    ECG rate assessment: normal    Rhythm:     Rhythm: sinus rhythm    QRS:     QRS axis:  Right  Conduction:     Conduction: normal    ST segments:     ST segments:  Non-specific  T waves:     T waves: non-specific             Phone Contacts  ED Phone Contact    ED Course       21:34 Spoke with Dr Killian Pepe on call neuro   Reviewed case and findings in the emergency department she recommends admit to Medicine aspirin and fluids and further stroke workup  Spoke with physician assistant on call for [facility] hospitalist medicine service accepting on behalf of Dr Verónica Rodrigues  Reviewed case and findings she accepts for transfer  findings of occlusion in the left vertebral artery were conveyed to me by the radiologist and advised that this is age indeterminate at this time patient is currently being transferred to [facility] for further evaluation for CVA/TIA and remains neurologically intact                  MDM  Number of Diagnoses or Management Options  CVA (cerebral vascular accident) [facility]): new and requires workup  TIA (transient ischemic attack): new and requires workup  Vertebral artery occlusion: new and requires workup     Amount and/or Complexity of Data Reviewed  Clinical lab tests: ordered and reviewed  Tests in the radiology section of CPT®: ordered and reviewed  Tests in the medicine section of CPT®: reviewed and ordered  Decide to obtain previous medical records or to obtain history from someone other than the patient: yes  Review and summarize past medical records: yes  Independent visualization of images, tracings, or specimens: yes    Risk of Complications, Morbidity, and/or Mortality  Presenting problems: moderate  Management options: moderate    Patient Progress  Patient progress: stable    CritCare Time    Disposition  Final diagnoses:   CVA (cerebral vascular accident) ([facility] [address] )   TIA (transient ischemic attack)   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     Time reflects when diagnosis was documented in both MDM as applicable and the Disposition within this note     Time User Action Codes Description Comment    9/13/2018  9:05 PM Maryjane Zacarias Add [I63 9] CVA (cerebral vascular accident) ([facility] [address] )     9/13/2018  9:06 PM Violette Garcia Add [G45 9] TIA (transient ischemic attack) 9/13/2018 10:05 PM Saul Smiley Add [I65 09] Vertebral artery occlusion     9/13/2018 10:05 PM Saul Smiley Modify [I65 09] Vertebral artery occlusion Age indeterminate      ED Disposition     ED Disposition Condition Comment    Transfer to Another [address] [facility] should be transferred out to [address] [facility]  MD Documentation      Most Recent Value   Patient Condition  The patient has been stabilized such that within reasonable medical probability, no material deterioration of the patient condition or the condition of the unborn child(karen) is likely to result from the transfer   Reason for Transfer  Level of Care needed not available at this facility   Benefits of Transfer  Specialized equipment and/or services available at the receiving facility (Include comment)________________________   Risks of Transfer  Potential for delay in receiving treatment, Potential deterioration of medical condition, Loss of IV, Increased discomfort during transfer   Accepting Physician  Dr Natanael Espana Name, [facility]    Sending MD  Dr Simona Smith   Provider Certification  General risk, such as traffic hazards, adverse weather conditions, rough terrain or turbulence, possible failure of equipment (including vehicle or aircraft), or consequences of actions of persons outside the control of the transport personnel      RN Documentation      Most [address] [facility] Name, Adrianna Merino    Bed Assignment  [address]   Medications Reviewed with Next Provider of Service  Yes   Transport Mode  Ambulance   Level of Care  Advanced life support      Follow-up Information    None         Patient's Medications   Discharge Prescriptions    No medications on file     No discharge procedures on file      ED Provider  Electronically Signed by           Mesha Tillman DO  09/13/18 0267
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
## 2018-09-14 NOTE — PLAN OF CARE
Problem: DISCHARGE PLANNING - CARE MANAGEMENT  Goal: Discharge to post-acute care or home with appropriate resources  INTERVENTIONS:  - Conduct assessment to determine patient/family and health care team treatment goals, and need for post-acute services based on payer coverage, community resources, and patient preferences, and barriers to discharge  - Address psychosocial, clinical, and financial barriers to discharge as identified in assessment in conjunction with the patient/family and health care team  - Arrange appropriate level of post-acute services according to patient's   needs and preference and payer coverage in collaboration with the physician and health care team  - Communicate with and update the patient/family, physician, and health care team regarding progress on the discharge plan  - Arrange appropriate transportation to post-acute venues  Outcome: Progressing  CM following as d/c needs arrise

## 2018-09-14 NOTE — PHYSICAL THERAPY NOTE
PT EVALUATION    Pt  Name: Doug Estrada  Pt  Age: 61 y o  MRN: [id number]  LENGTH OF STAY: 1    Patient Active Problem List   Diagnosis    Left-sided weakness    Hypertension    Hyperlipidemia    COPD [facility]chronic obstructive [facility] disease) ([facility] [address] [facility]    Cardiac disease       Admitting Diagnoses:   [facility] [I63 9]    Past Medical History:   Diagnosis Date    Cardiac disease     cardiac bypassx2 and stents    COPD (chronic obstructive pulmonary disease) ([facility] [address] )     Enlarged prostate     [facility] attack ([facility] )     Hyperlipidemia     Hypertension        Past Surgical History:   Procedure Laterality Date    CARDIAC SURGERY      HERNIA REPAIR         Imaging Studies:  MRI brain wo contrast   Final Result by Clair Zhu MD (09/14 1140)      There is area of diffusion restriction in the 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   No acute hemorrhage seen          I personally discussed this study with Lara Gonzales on 9/14/2018 at 11:40 AM                Workstation performed: [id number]            09/14/18 1345   Note Type   Note type Eval only   Pain Assessment   Pain Assessment 0-10   Pain Score 5   Pain Type Acute pain   Pain Location (lateral thigh)   Pain Orientation Left; Outer   Hospital Pain Intervention(s) Repositioned; Ambulation/increased activity; Emotional support; Rest   Home Living   Type of [address] One level;Stairs to enter with rails  (13STE)   [address]   Prior Function   Level of [place] Independent with ADLs and functional mobility  (w/o AD)   Lives With Alone   Receives Help From Other (Comment)  ()   ADL Assistance Independent   Falls in the last 6 months 1 to 4  (1x)   Restrictions/Precautions   Weight Bearing Precautions Per Order No   Other Precautions Telemetry; Fall Risk;Pain   General   Family/Caregiver Present No   Cognition   Overall Cognitive Status [facility] Arousal/Participation Alert   Orientation Level Oriented X4   Following Commands Follows multistep commands without difficulty   RUE Assessment   RUE Assessment (refer to OT )   LUE Assessment   LUE Assessment (refer to OT)   RLE Assessment   RLE Assessment WFL   Strength RLE   RLE Overall Strength 4+/5   LLE Assessment   LLE Assessment WFL   Strength LLE   LLE Overall Strength 4+/5   Coordination   Movements are Fluid and Coordinated 0  (mild fine motor coordination deficits L hand)   Coordination and Movement Description gross motor coordination intact except mild fine motor coordination deficits L hand   Sensation WFL   Bed Mobility   Supine to Sit 7  Independent   Sit to Supine 7  Independent   Transfers   Sit to Stand 7  Independent   Stand to Sit 7  Independent   Ambulation/Elevation   Gait pattern WNL; Wide RUPALI   Gait Assistance 7  Independent   Assistive Device None   Distance 200'x1   Stair Management Assistance 6  Modified independent   Stair Management Technique One rail R;Foreward; Alternating pattern;Reciprocal   Number of Stairs 4   Balance   Static Sitting Normal   Static Standing Normal   Ambulatory Good   Endurance Deficit   Endurance Deficit Yes   Endurance Deficit Description SOB but relieved w/ rest, SpO2 97-98% RA   Activity Tolerance   Activity Tolerance Patient tolerated treatment well   Nurse Made Aware Tatiana   Assessment   Prognosis Excellent   Problem List Obesity; Decreased coordination  (higher balance deficits)   Assessment Pt 61 y o  male admitted for Left-sided weakness, L hand & face paresthesias w/ (+) Small cortical right MCA (frontal lobe pre central gyrus) acute ischemic infarct per MRI  PTA, pt reports being I w/o AD  Pt referred to PT for mobility assessment & D/C planning  Pt demonstrates no significant decline in function to warrant skilled PT at this time  Pt  I w/ overall functional mobility including amb & stair mgt   Gross coodination, balance & gait WFL  (+) higher level balance & mild fine motor coordination deficits L hand hence will recommend OPPT at D/C  Will D/C PT  Pt may return home when medically cleared  Please advise PT when needs change  Pt to continue amb in unit as tolerated to prevent decline in function  Nsg notified      Barriers to Discharge Inaccessible home environment;Decreased caregiver support   Goals   Patient Goals go home   Treatment Day 0   Plan   Treatment/Interventions Spoke to nursing;OT  (PT eval only)   PT Frequency Other (Comment)  (D/C PT)   Recommendation   Recommendation Outpatient PT   Equipment Recommended Other (Comment)  (none)   PT - OK to Discharge Yes  (when medically cleared)   Barthel Index   Feeding 10   Bathing 5   Grooming Score 5   Dressing Score 5   Bladder Score 10   Bowels Score 10   Toilet Use Score 10   Transfers (Bed/Chair) Score 15   Mobility (Level Surface) Score 15   Stairs Score 10   Barthel Index Score 95   Hx/personal factors: co-morbidities, inaccessible home, home alone, telemetry, h/o of falls and fall risk  Examination: assessed body system, balance, endurance, amb, D/C disposition & fall risk  Clinical: unpredictable (ongoing medical status)  Complexity: high     Coni Jorgensen, PT

## 2018-09-14 NOTE — CONSULTS
Consultation - Neurology   Kenny Salmon 61 y o  male MRN: [id number]  Unit/Bed#: E4 -01 Encounter: [id number]      Assessment/Plan   Assessment:  1  Small cortical right MCA (frontal lobe pre central gyrus) acute ischemic infarct  Location suggests this could be embolic, i e  cardioembolic, despite his risk factors for small vessel thrombotic events including hypertension, hyperlipidemia, tobacco abuse, possible obstructive sleep apnea  Echocardiogram unrevealing for suggestion of embolic source  2   Left hand and face paresthesias secondary to 1   3   Plavix resistance   Plan:  1  Consider loop monitoring surveillance for atrial fibrillation  2  Continue aspirin and Plavix for now, if remains on dual anti-platelet therapy may increase Plavix to 150 mg  Ultimately may require anticoagulation  3  A statin has been restarted  4  Tobacco cessation (extensively discussed)  5  Outpatient sleep evaluation  6  PT and OT eval is a pending, will require hand exercises from OT  7  Stroke teaching initiated  8  Permissive hypertension for 24 hours more, then treat to sustain systolic blood pressure less than 140  9  Follow-up in the Stroke Clinic  10  Further comments and recommendations per the attending neurologist    History of Present Illness     Physician Requesting Consult: Nicki Owens MD  Reason for Consult / Principal Problem: Left sided weakness  Hx and PE limited by:  None  Stroke alert called: at [facility], see notes    HPI: Kenny Salmon is a 61y o  year old right handed male who presented to the [facility] ED as a stroke alert on 09/13/2018 at 2030 hrs after a sudden onset of vague left-sided sensory symptoms  He had a sense of the left facial distortion and tightness, although observers noted no actual lateralizing weakness, he had difficulty with fine motor tasks using the left hand and decreased     Today he reports that although his left fingers are moving fairly normally, his thumb, index, and middle finger feel numb  His face continues to feel droopy    He denies any intraoral sensory changes  He arrived in the ED with stable vital signs blood pressure 129/83  CT head was unremarkable for acute changes, CTA head and neck identified severe stenosis or occlusion in the left vertebral artery, otherwise scattered area of calcified plaque without critical stenosis  NIH stroke scale was 0, thus he did not receive tPA  He was transferred to [facility] in [facility] on the stroke pathway  Since admission MRI brain has revealed a small cortical infarct in the right pre central gyrus  His history gets little confusing due to numerous superimposed symptoms  Approximately a week ago he fell off the couch while standing to adjust curtains, striking his head, his leg, and straining his back and neck  Thus he complains of left leg weakness and difficulty walking which is more related to pain rather than lack of power  He continues to have right-sided neck pain radiating into his head  He has chronic mild left mid facial weakness, decreased brow elevation, and slightly drooping eyelid due to a remote severe bifrontal laceration  As a result of this injury, he also has localized mid frontal headaches  More importantly, he has a history of coronary artery disease, having undergone bypass in the past as well as PCI with stenting  He recalls 2-3 days ago he had a brief episode of palpitations, which he has never experienced in the past   He is maintained on aspirin 81 mg and Plavix 75 mg from his cardiologist, but he self discontinued statin  He continues to smoke 1+ pack of cigarettes per day  Inpatient consult to Neurology  Consult performed by: Chata Champion ordered by: Venita Max          Review of Systems   HENT: Positive for trouble swallowing (Intermittently with liquids and solids for several years)  Eyes: Negative for visual disturbance     Respiratory: Positive for shortness of breath (Chronic)  Cardiovascular: Positive for chest pain Audery Libman a paper cut last week) and leg swelling  Musculoskeletal: Positive for back pain (Post fall), gait problem (Post fall), neck pain (Post fall) and neck stiffness  Neurological: Positive for facial asymmetry (Chronic), weakness (Secondary to pain), numbness and headaches  Psychiatric/Behavioral: Positive for confusion (Feels cloudy today) and sleep disturbance (Snores)  All other systems reviewed and are negative  Historical Information   Past Medical History:   Diagnosis Date    Cardiac disease     cardiac bypassx2 and stents    [facility] [address]     Enlarged prostate     [facility] attack [facility] [address] [facility]     Hyperlipidemia     Hypertension    Lumbago; chronic headaches post CHI  Past Surgical History:   Procedure Laterality Date    CARDIAC SURGERY      HERNIA REPAIR     CABG 2000; PCI/stents X2  Social History   History   Alcohol Use No     History   Drug Use No     History   Smoking Status    Current Every Day Smoker    Packs/day: 1 00    Types: Cigarettes   Smokeless Tobacco    Never Used     Family History: Mo: CVA, CHF  Fa: cerebral aneurysm    Review of previous medical records was  completed       Meds/Allergies   current meds:   Current Facility-Administered Medications   Medication Dose Route Frequency    acetaminophen (TYLENOL) tablet 650 mg  650 mg Oral Q4H PRN    albuterol (PROVENTIL HFA,VENTOLIN HFA) inhaler 2 puff  2 puff Inhalation Q6H PRN    aluminum-magnesium hydroxide-simethicone (MYLANTA) 200-200-20 mg/5 mL oral suspension 30 mL  30 mL Oral Q6H PRN    amLODIPine (NORVASC) tablet 10 mg  10 mg Oral Daily    aspirin (ECOTRIN LOW STRENGTH) EC tablet 81 mg  81 mg Oral Daily    atorvastatin (LIPITOR) tablet 40 mg  40 mg Oral QPM    citalopram (CeleXA) tablet 40 mg  40 mg Oral Daily    clopidogrel (PLAVIX) tablet 75 mg  75 mg Oral Daily    enoxaparin (LOVENOX) subcutaneous injection 40 mg  40 mg Subcutaneous Daily    fluticasone (FLONASE) 50 mcg/act nasal spray 1 spray  1 spray Nasal Daily    LORazepam (ATIVAN) tablet 0 5 mg  0 5 mg Oral Q8H PRN    nicotine (NICODERM CQ) 14 mg/24hr TD 24 hr patch 14 mg  14 mg Transdermal Daily    ondansetron (ZOFRAN) injection 4 mg  4 mg Intravenous Q6H PRN    roflumilast (DALIRESP) tablet 500 mcg  500 mcg Oral Daily    tiotropium (SPIRIVA) capsule for inhaler 18 mcg  18 mcg Inhalation Daily    and PTA meds:   Prior to Admission Medications   Prescriptions Last Dose Informant Patient Reported? Taking?    LORazepam (ATIVAN) 1 mg tablet Past Week at Unknown time  Yes Yes   Sig: Take 0 5 mg by mouth daily   Roflumilast (DALIRESP PO) More than a month at Unknown time  Yes No   Sig: Take 500 mg by mouth daily   albuterol (PROVENTIL HFA,VENTOLIN HFA) 90 mcg/act inhaler 2018 at Unknown time  Yes Yes   Sig: Inhale 2 puffs every 6 (six) hours as needed for wheezing   albuterol (PROVENTIL HFA,VENTOLIN HFA) 90 mcg/act inhaler 2018 at Unknown time  No Yes   Sig: Inhale 2 puffs every 6 (six) hours as needed for wheezing   amLODIPine (NORVASC) 10 mg tablet 2018 at Unknown time  Yes Yes   Sig: Take 10 mg by mouth daily   aspirin (ECOTRIN LOW STRENGTH) 81 mg EC tablet 2018 at Unknown time  Yes Yes   Sig: Take 81 mg by mouth daily   citalopram (CeleXA) 40 mg tablet Past Month at Unknown time  Yes Yes   Sig: Take 40 mg by mouth daily   clopidogrel (PLAVIX) 75 mg tablet 2018 at Unknown time  Yes Yes   Sig: Take 75 mg by mouth daily   fluticasone (FLONASE) 50 mcg/act nasal spray 2018 at Unknown time  Yes Yes   Si spray into each nostril daily   tiotropium (SPIRIVA) 18 mcg inhalation capsule 2018 at Unknown time  No Yes   Sig: Place 1 capsule into inhaler and inhale daily      Facility-Administered Medications: None       No Known Allergies    Objective   Vitals:Blood pressure 126/81, pulse 77, temperature (!) 97 3 °F (36 3 °C), temperature source Tympanic, resp  rate 18, height 5' 10" (1 778 m), weight 98 6 kg (217 lb 6 oz), SpO2 96 %  ,Body mass index is 31 19 kg/m²  Intake/Output Summary (Last 24 hours) at 09/14/18 1021  Last data filed at 09/14/18 0429   Gross per 24 hour   Intake                0 ml   Output              700 ml   Net             -700 ml   Telemetry: SR    Physical Exam   Constitutional: He is oriented to person, place, and time  He appears well-developed and well-nourished  No distress  HENT:   Head: Normocephalic and atraumatic  Mouth/Throat: Oropharynx is clear and moist    Mid frontal cicatrix  Mild soft tissue crowding of posterior pharynx  Dry mucous membranes   Eyes: EOM are normal  Pupils are equal, round, and reactive to light  No scleral icterus  Bilateral conjunctival injection left greater than right   Neck: Normal range of motion  Neck supple  Carotid bruit is not present  Cardiovascular: Normal rate, regular rhythm and intact distal pulses  No murmur heard  Pulmonary/Chest: He has wheezes (Upper airway at rest)  Abdominal: Soft  Bowel sounds are normal    Musculoskeletal: Normal range of motion  He exhibits edema (Ankles into feet bilaterally)  Neurological: He is oriented to person, place, and time  He has a normal Finger-Nose-Finger Test and a normal Heel to [employer]    Reflex Scores:       Tricep reflexes are 2+ on the right side and 2+ on the left side  Bicep reflexes are 1+ on the right side and 1+ on the left side  Brachioradialis reflexes are 1+ on the right side and 1+ on the left side  Patellar reflexes are 1+ on the right side and 1+ on the left side  Achilles reflexes are Right achilles reflex grade: Trace  and 0 (Guarding) on the left side  Skin: Skin is warm and dry  He is not diaphoretic     Superficial varicosities bilateral ankles into feet, mild vascular ischemic changes bilateral ankles and feet   Psychiatric: His speech is normal and behavior is normal  Judgment and thought content normal    Vitals reviewed  Neurologic Exam     Mental Status   Oriented to person, place, and time  Follows 2 step commands  Attention: normal  Concentration: normal    Speech: speech is normal   Level of consciousness: alert  Able to perform simple calculations  Able to name object  Able to repeat  Normal comprehension  Cranial Nerves     CN II   Visual fields full to confrontation  CN III, IV, VI   Pupils are equal, round, and reactive to light  Extraocular motions are normal    Right pupil: Size: 2 mm  Shape: regular  Reactivity: brisk  Left pupil: Size: 2 mm  Shape: regular  Reactivity: brisk  Nystagmus: none   Conjugate gaze: present    CN V   Facial sensation intact  Right facial sensation deficit: none  Left facial sensation deficit: none    CN VII   Facial expression full, symmetric  Right facial weakness: none  Left facial weakness: none    CN VIII   CN VIII normal    Hearing: intact (Finger rub)    CN IX, X   CN IX normal    CN X normal    Palate: symmetric    CN XI   CN XI normal    Right sternocleidomastoid strength: normal  Left sternocleidomastoid strength: normal  Right trapezius strength: normal  Left trapezius strength: normal    CN XII   CN XII normal    Fasciculations: absent  Tongue deviation: none  Optic discs not visualized     Motor Exam   Muscle bulk: normal  Overall muscle tone: normal  Right arm pronator drift: absent  Left arm pronator drift: absent    Strength   Strength 5/5 except as noted     Right interossei: 4/5  Left interossei: 4/5    Sensory Exam   Light touch normal    Vibration normal    Pinprick normal    Temperature intact throughout     Gait, Coordination, and Reflexes     Gait  Gait: (Deferred)    Coordination   Finger to nose coordination: normal  Heel to shin coordination: normal    Tremor   Resting tremor: absent  Intention tremor: absent  Action tremor: absent    Reflexes   Right brachioradialis: 1+  Left brachioradialis: 1+  Right biceps: 1+  Left biceps: 1+  Right triceps: 2+  Left triceps: 2+  Right patellar: 1+  Left patellar: 1+  Right achilles reflex grade: Trace  Left achilles: 0 (Guarding)  Right plantar: normal  Left plantar: normal  Right Acevedo: absent  Left Acevedo: absent  Right ankle clonus: absent  Left ankle clonus: absentImpaired fine motor movements in the left hand and fingers  Lab Results:   CBC:   Results from last 7 days  Lab Units 09/14/18  0436 09/13/18 2123   WBC Thousand/uL 7 25 7 60   RBC Million/uL 4 72 4 63   HEMOGLOBIN g/dL 14 4 14 4   HEMATOCRIT % 41 9 40 9   MCV fL 89 88   PLATELETS Thousands/uL 236 223   , BMP/CMP:   Results from last 7 days  Lab Units 09/14/18  0436 09/13/18 2156   SODIUM mmol/L 141 136   POTASSIUM mmol/L 3 5 4 5   CHLORIDE mmol/L 106 104   CO2 mmol/L 27 28   BUN mg/dL 9 9   CREATININE mg/dL 0 90 0 82   CALCIUM mg/dL 8 5 8 8   EGFR ml/min/1 73sq m 91 94   , Coagulation:   Results from last 7 days  Lab Units 09/13/18 2156   INR  1 02      Results from last 7 days  Lab Units 09/14/18 0436   TRIGLYCERIDES mg/dL 195*   LDL CALC mg/dL 136*   HDL mg/dL 31*       Glu 82=>97  Trop <0 02   (adequate platelet inhibitor effect)  P2Y12 213 (no evidence platelet inhibitor effect)  A1c 5 5    Imaging Studies: I have personally reviewed pertinent reports  and I have personally reviewed pertinent films in [facility] brain: There is a cortical area of diffusion restriction in the right precentral gyrus representing a superficial cortical infarct in the right frontal region in the distribution of the right MCA Mild periventricular and white matter T2 hyperintensities noted related to chronic small vessel ischemic changes No acute intracranial hemorrhage seen    Scalloping of the right lateral ventricle, unchanged since the previous study of July 8, 2013    CT head: There is no acute intracranial hemorrhage, midline shift, mass effect or extra-axial fluid collection  Gray-white differentiation is preserved  Brain volume and ventricular system are within normal limits for age  The skull base and calvarium are intact  The visualized paranasal sinuses are unremarkable  The visualized orbits, globes, and mastoid air cells are unremarkable  CTA head and neck:   Cervical vasculature:   Arch and great vessels:  Atherosclerosis of the aortic arch is present  A three-vessel arch is demonstrated  Great vessels are widely patent  Right carotid: The bulb appears normal   The internal carotid artery is patent through its intradural bifurcation  The external carotid artery is patent  Left carotid: Calcified plaque is noted in the left carotid bulb and proximal left internal carotid artery resulting in at most mild stenosis  The external carotid artery is patent  Vertebrals: The right vertebral is widely patent at its origin  Vessel is then patent through the basilar  There is dominance of the right vertebral artery  There is severe narrowing of the proximal left vertebral artery and possible occlusion of the artery  Severe narrowing of the distal left vertebral artery also suggested proximal to the basilar  artery  Occlusion of the artery is suspected  Intracerebral vasculature: Atherosclerosis of the cavernous and paraclinoid portions of the internal carotid arteries noted bilaterally  The petrous portion of the internal carotid artery bilaterally demonstrates no significant stenosis or aneurysm  Anterior circulation:  Right:  Normal complement of anterior and middle cerebral vessels fills  with no significant stenosis, aneurysm or vascular malformation  Left:  Normal complement of anterior and middle cerebral vessels fills  with no significant stenosis, aneurysm or vascular malformation  Posterior circulation:  Basilar artery demonstrates no significant stenosis    The visualized posterior fossa branches show no significant abnormality  A normal complement of posterior cerebral vessels fills bilaterally with no stenosis, aneurysm or vascular malformation  03/2018 MRI cervical spine (right sided neck pain radiating to head):No significant central canal narrowing No significant cord impingement Multilevel foraminal narrowing with severe right foraminal narrowing at C3-4, severe right foraminal narrowing at C4-5 and moderate to severe left foraminal narrowing Severe bilateral foraminal narrowing at C6/7    07/2013 MRA head (headaches):   INTERNAL CAROTID ARTERIES-  Normal flow related enhancement of the distal cervical, petrous and cavernous  segments of the internal carotid arteries  Normal  ICA  terminus  ANTERIOR CIRCULATION-  Normal A1 segments   Normal anterior communicating artery  Normal flow-related  enhancement of the anterior cerebral  arteries  MIDDLE CEREBRAL ARTERY CIRCULATION- The M1 segment and middle cerebral artery branches demonstrate  normal  flow-related  enhancement  DISTAL VERTEBRAL ARTERIES- Distal vertebral arteries are patent with a normal vertebrobasilar junction  The  posterior inferior cerebellar artery origins are normal     BASILAR ARTERY- Normal   POSTERIOR CEREBRAL ARTERIES-  Both posterior cerebral arteries arises from the basilar tip  Both  arteries  demonstrate normal flow-related enhancement  Normal posterior communicating arteries  07/2013 MRI brain: Several tiny nonspecific T2 and FLAIR hyperintense foci are evident primarily within the deep white  matter of the frontal and parietal lobes bilaterally  These findings are stable  Most likely etiology is mild chronic  microvascular disease including the variety which can be associated with migraine headaches  There is no discrete  mass, mass effect or midline shift   No hemorrhage   Brainstem and cerebellum demonstrate normal signal  Diffusion  imaging is unremarkable  The ventricles are normal in size and contour      EKG, Pathology, and Other Studies: I have personally reviewed pertinent reports  Echo:  LEFT VENTRICLE:  Systolic function was normal  Ejection fraction was estimated to be 55 %  There were no regional wall motion abnormalities  Doppler parameters were consistent with abnormal left ventricular relaxation (grade 1 diastolic dysfunction)  LEFT ATRIUM: Size was normal       EKG: Normal sinus rhythm Rightward axis Borderline ECG When compared with ECG of 13-JUL-2018 18:19, No significant change was found    CXR:Patient is status post median sternotomy with CABG  The cardiomediastinal silhouette is normal in size  There is no consolidation or atelectasis in either lung  There are no pleural effusions  There is no pneumothorax  No acute osseous process  03/2018 Carotid US:  RIGHT: There is <50% stenosis noted in the internal carotid artery  Plaque is homogenous and smooth  Vertebral artery flow is antegrade  There is no significant subclavian artery disease  LEFT: There is <50% stenosis noted in the internal carotid artery  Plaque is heterogenous and irregular  Vertebral artery flow is antegrade  There is no significant subclavian artery disease  Compared to previous study on 6/3/2011, there is no significant change      VTE Prophylaxis: Sequential compression device (Venodyne)  and Enoxaparin (Lovenox)

## 2018-09-14 NOTE — SPEECH THERAPY NOTE
Speech Language/Pathology  Speech/Language Pathology  Assessment    Patient Name: Gaurang Marcano  IBWJP'R Date: 9/14/2018     Problem List  Patient Active Problem List   Diagnosis    Left-sided weakness    Hypertension    Hyperlipidemia    [facility]) [facility] [address]    Cardiac disease     Past Medical History  Past Medical History:   Diagnosis Date    Cardiac disease     cardiac bypassx2 and stents    [facility]ronic obstructive [facility] disease) [facility] [address]     Enlarged prostate     [facility] attack ([facility] [address] [facility]     Hyperlipidemia     Hypertension      Past Surgical History  Past Surgical History:   Procedure Laterality Date    CARDIAC SURGERY      HERNIA REPAIR       Physician Requesting Consult: Blas Marcus MD  Reason for Consult / Principal Problem: Left sided weakness  Hx and PE limited by:  None  Stroke alert called: at [facility], see notes   Per neuro consult[de-identified]  HPI: Gaurang Marcano is a 61y o  year old right handed male who presented to the [facility] ED as a stroke alert on 09/13/2018 at 2030 hrs after a sudden onset of vague left-sided sensory symptoms  He had a sense of the left facial distortion and tightness, although observers noted no actual lateralizing weakness, he had difficulty with fine motor tasks using the left hand and decreased   Today he reports that although his left fingers are moving fairly normally, his thumb, index, and middle finger feel numb  His face continues to feel droopy    He denies any intraoral sensory changes  He arrived in the ED with stable vital signs blood pressure 129/83  CT head was unremarkable for acute changes, CTA head and neck identified severe stenosis or occlusion in the left vertebral artery, otherwise scattered area of calcified plaque without critical stenosis  NIH stroke scale was 0, thus he did not receive tPA  He was transferred to [facility] in [facility] on the stroke pathway    Since admission MRI brain has revealed a small cortical infarct in the right pre central gyrus      His history gets little confusing due to numerous superimposed symptoms  Approximately a week ago he fell off the couch while standing to adjust curtains, striking his head, his leg, and straining his back and neck  Thus he complains of left leg weakness and difficulty walking which is more related to pain rather than lack of power  He continues to have right-sided neck pain radiating into his head  He has chronic mild left mid facial weakness, decreased brow elevation, and slightly drooping eyelid due to a remote severe bifrontal laceration  As a result of this injury, he also has localized mid frontal headaches      More importantly, he has a history of coronary artery disease, having undergone bypass in the past as well as PCI with stenting  He recalls 2-3 days ago he had a brief episode of palpitations, which he has never experienced in the past   He is maintained on aspirin 81 mg and Plavix 75 mg from his cardiologist, but he self discontinued statin  He continues to smoke 1+ pack of cigarettes per day    IMPRESSION:  There is area of diffusion restriction in the 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  No acute hemorrhage seen    Summary:  Pt presents w/ mild left labial weakness/droop  Tolerating diet  Reported he did cough on thin x 1 but was reclined at the time  Tolerating thin liquids and solid food this eval  He also reported he is to go for an egd and colonoscopy, and that he occasionally has "thrown up" food in the past      Recommendations:  Diet: regular  Liquid:thin  Meds:as tolerated  Supervision:none at this time  Positioning:Upright  Strategies: Pt to take PO/Meds only when fully alert and upright  Oral care  Aspiration precautions  Reflux precautions    Eval only, No f/u tx indicated  Pt given lip retraction exercises   When he focuses on the left side he is able to retract it symmetrically  (he was questioning if this may have been premorbid from previous injury)    Patient's goal: wanted to rest  Up much of the night  Consider consult w/:  Nutrition    Reason for consult:  R/o aspiration  Determine safest and least restrictive diet  New neuro event  Current diet:  regular  Premorbid diet[de-identified]  regular  Previous VBS:  none  O2 requirement:  none  Voice/Speech:  Mildly slower rate  Social:  Home alone  Follows commands:  yes                       Cognitive Status:  A&O  Oral mech exam:  Upper plate  Lower plate  Labial weakness on the left/mild    Items administered:  Fish/crab, mashed sweet potatoes, thin liquids  Oral stage:  WNL  Lip closure:wnl  Mastication: wnl  Bolus formation:wnl  Bolus control:wnl  Transfer:wnl  Oral residue:wnl  Pocketing:none    Pharyngeal stage:   WNL  Swallow promptness:prompt  Laryngeal rise:wnl  Wet voice:no  Throat clear:no  Cough:no  Secondary swallows:no  Audible swallows:no  No s/s aspiration    Esophageal stage:  H/o food regurge    Results d/w:  Pt, nursing

## 2018-09-14 NOTE — PLAN OF CARE
Activity Intolerance/Impaired Mobility     Mobility/activity is maintained at optimum level for patient Progressing        Communication Impairment     Ability to express needs and understand communication [address] Discharge to home or other facility with appropriate resources Progressing        Knowledge Deficit     Patient/family/caregiver demonstrates understanding of disease process, treatment plan, medications, and discharge instructions Progressing        Neurological Deficit     Neurological status is stable or improving Progressing        Nutrition     Nutrition/Hydration status is improving Progressing        PAIN - ADULT     Verbalizes/displays adequate comfort level or baseline comfort level Progressing        Potential for Aspiration     Non-ventilated patient's risk of aspiration is minimized Progressing        Potential for Falls     Patient will remain free of falls Progressing

## 2018-09-14 NOTE — OCCUPATIONAL THERAPY NOTE
[address] Evaluation     Patient Name: Frantz GLASS Date: 9/14/2018  Problem List  Patient Active Problem List   Diagnosis    Left-sided weakness    Hypertension    Hyperlipidemia    [facility]ch[facility] disease[facility]    Cardiac disease     Past Medical History  Past Medical History:   Diagnosis Date    Cardiac disease     cardiac bypassx2 and stents    [facility]chronic obstructive [facility] disease) [facility]     Enlarged prostate     [facility]     Hyperlipidemia     Hypertension      Past Surgical History  Past Surgical History:   Procedure Laterality Date    CARDIAC SURGERY      HERNIA REPAIR             09/14/18 1346   Note Type   Note type Eval only   Restrictions/Precautions   Weight Bearing Precautions Per Order No   Other Precautions Fall Risk;Telemetry   Pain Assessment   Pain Assessment 0-10   Pain Score 5   Pain Type Acute pain   Pain Location Leg   Pain Orientation Left   Hospital Pain Intervention(s) Ambulation/increased activity;Repositioned; Emotional support   Response to Interventions tolerated   Home Living   Type of [address] One level;Stairs to enter with rails  ([address])   Bathroom Shower/Tub Tub/shower unit   Bathroom Toilet Standard   Bathroom Equipment Grab bars in shower   [address]  (not using PTA)   Prior Function   Level of [place] Independent with ADLs and functional mobility  (w/o AD)   Lives With Alone   Receives Help From ()   ADL Assistance Independent   IADLs Independent   Falls in the last 6 months 1 to 4   Vocational Retired   Comments pt reports  takes him to appointments   Lifestyle   Autonomy per pt independent w/ ADLs, independent w/ functional transfers and mobility w/ no AD   Reciprocal Relationships    Service to Others retired  work   Intrinsic Gratification watching tv   ADL   Where [facility] Assistance 6  Modified independent   Grooming Assistance 6  Modified Independent   UB Bathing Assistance 6  Modified Independent   LB Bathing Assistance 5  Supervision/Setup   UB Dressing Assistance 6  Modified independent   LB Dressing Assistance 4  Minimal Assistance   Toileting Assistance  6  Modified independent   Functional Assistance 5  Supervision/Setup   Bed Mobility   Supine to Sit 6  Modified independent   Sit to Supine 6  Modified independent   Transfers   Sit to Stand 6  Modified independent   Stand to Sit 6  Modified independent   Functional Mobility   Functional Mobility 5  Supervision   Additional Comments w/ no AD   Balance   Static Sitting Normal   Dynamic Sitting Normal   Static Standing Normal   Dynamic Standing Good   Activity Tolerance   Activity Tolerance Patient tolerated treatment well   Nurse Made Aware appropriate to see per Nevaeh SMITH Assessment   RUE Assessment WFL  (4/5)   LUE Assessment   LUE Assessment WFL  (4/5)   Hand Function   Gross Motor Coordination Functional  (slight dysmetria noted)   Fine Motor Coordination Impaired  (impaired finger to thumb opposition)   Sensation   Light Touch Partial deficits in the LUE   Proprioception   Proprioception No apparent deficits   Vision-Basic Assessment   Current Vision Wears glasses all the time   Patient Visual Report (pt does not have glassess while in hospital)   Vision - Complex Assessment   Ocular Range of Motion [facility]   Acuity Able to read clock/calendar on wall without difficulty   Perception   Inattention/Neglect Appears intact   Cognition   Overall Cognitive Status [facility]   Arousal/Participation Alert; Cooperative   Attention Within functional limits   Orientation Level Oriented X4   Memory Decreased short term memory   Following Commands Follows one step commands without difficulty   Comments pt able to recall some of stroke symptoms and pt w/ STM memory deficts recalled 1/3 words after several minutes   Assessment   Limitation Decreased ADL status; Decreased UE strength;Decreased Safe judgement during ADL;Decreased cognition;Decreased endurance;Decreased high-level ADLs; Decreased self-care trans;Decreased sensation   Prognosis Good   Assessment Pt is a 61 y o  male seen for OT evaluation s/p admit to [facility] on [facility]/13/2018 w/ Left-sided weakness  MRI revealed: 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  Comorbidities affecting pt's functional performance at time of assessment include: tobacco abuse, HTN, COPD, cardiac disease  Personal factors affecting pt at time of IE include: lives alone, support system is his   Prior to admission, pt was living alone in apartment and reports independent w/ ADLs, independent w/ functional transfers and mobility w/ no AD, independent IADLs,  transports to appointments  Upon evaluation: Pt requires MOD I bed mobility, MOD I functional transfers, supervision functional mobility w/ no AD, supervision-min assist LB ADLS, supervision toileting 2* the following deficits impacting occupational performance:  Decreased endurance, impaired STM, slightly impaired balance, dyspnea on exertion, increased pain, slight dysmetria noted on L side, impaired FM coordination L side  Pt to benefit from continued skilled OT tx while in the hospital to address deficits as defined above and maximize level of functional independence w ADL's and functional mobility  Occupational Performance areas to address include: grooming, dressing, health maintenance, functional mobility, clothing management, cleaning and meal prep, L UE exercises including FM exercises  Pt educated and able to recall symptoms of stroke and what to be aware  Pt educated and provided FM activities of finger to thumb opposition on washcloth, and turning cards/coins over, folding towels, pt verbalized understanding and demonstrated   From OT standpoint, recommendation at time of d/c would be home w/ outpatient OT  Goals   Patient Goals "go home"   LTG Time Frame 7-10   Long Term Goal please see below goals   Plan   Treatment Interventions ADL retraining;Functional transfer training;UE strengthening/ROM; Cognitive reorientation; Endurance training;Energy conservation; Activityengagement;Continued evaluation;Equipment evaluation/education;Patient/family training;Fine motor coordination activities; Compensatory technique education   Goal Expiration Date 09/24/18   OT Frequency 3-5x/wk   Recommendation   OT Discharge Recommendation Outpatient OT   OT - OK to Discharge (when medically stable)   Barthel Index   Feeding 10   Bathing 5   Grooming Score 5   Dressing Score 5   Bladder Score 10   Bowels Score 10   Toilet Use Score 10   Transfers (Bed/Chair) Score 15   Mobility (Level Surface) Score 15   Stairs Score 10   Barthel Index Score 95   Modified [place] Scale   Modified Julio Scale 2     Occupational Therapy Goals to be met in 7-10 days:  1) Pt will improve activity tolerance to G for min 30 min txment sessions to enhance ADLs  2) Pt will complete ADLs/self care w/ mod I   3) Pt will improve functional transfers on/off all surfaces using DME PRN w/ G balance/safety including toileting w/ mod I  4) Pt will improve fx'l mobility during I/ADl/leisure tasks using DME PRN w/ g balance/safety w/ mod I  5) Pt will engage in ongoing cognitive assessment w/ G participation to A w/ safe d/c planning/recommendations  7) Pt will demonstrate G carryover of pt/caregiver education and training as appropriate w/ mod I  w/ G tolerance  8) Pt will engage in depression screen/leisure interest checklist w/ G participation to monitor s/s depression and ID 3 positive coping strategies to A w/ emotional regulation and management  9) Pt will demonstrate 100% carryover of E C  techniques w/ mod I t/o fx'l I/ADL/leisure tasks w/o cues s/p skilled education  10) Pt will demonstrate improved standing tolerance to 3-5 minutes during functional tasks w/ no LOB to enhance ADL performance  11) Pt will demonstrate improved b/l UE strength by 1 MMT grade and improve coordination of L UE to enhance ADLS and functional transfers     Documentation completed by: Jorge Luis Sheffield MS, OTR/L

## 2018-09-14 NOTE — EMTALA/ACUTE CARE TRANSFER
[address] [facility] [place]  [facility] [address] [id number]  [phone]  Dept: [phone]      EMTALA TRANSFER CONSENT    NAME Nemo Jin                                         1955                              MRN [id number]    I have been informed of my rights regarding examination, treatment, and transfer   by Dr Shakila Ricardo DO    Benefits: Specialized equipment and/or services available at the receiving facility (Include comment)________________________    Risks: Potential for delay in receiving treatment, Potential deterioration of medical condition, Loss of IV, Increased discomfort during transfer      Consent for Transfer:  I acknowledge that my medical condition has been evaluated and explained to me by the emergency department physician or other qualified medical person and/or my attending physician, who has recommended that I be transferred to the service of  Accepting Physician:  Neo Mayes) at [address] [place] [address] Name, [facility] : [address]  The above potential benefits of such transfer, the potential risks associated with such transfer, and the probable risks of not being transferred have been explained to me, and I fully understand them  The doctor has explained that, in my case, the benefits of transfer outweigh the risks  I agree to be transferred  I authorize the performance of emergency medical procedures and treatments upon me in both transit and upon arrival at the receiving facility  Additionally, I authorize the release of any and all medical records to the receiving facility and request they be transported with me, if possible  I understand that the safest mode of transportation during a medical emergency is an ambulance and that the Hospital advocates the use of this mode of transport   Risks of traveling to the receiving facility by car, including absence of medical control, life sustaining equipment, such as oxygen, and medical personnel has been explained to me and I fully understand them  (MARY CORRECT BOX BELOW)  [  ]  I consent to the stated transfer and to be transported by ambulance/helicopter  [  ]  I consent to the stated transfer, but refuse transportation by ambulance and accept full responsibility for my transportation by car  I understand the risks of non-ambulance transfers and I exonerate the Hospital and its staff from any deterioration in my condition that results from this refusal     X___________________________________________    DATE  18  TIME________  Signature of patient or legally responsible individual signing on patient behalf           RELATIONSHIP TO PATIENT_________________________          Provider Certification    NAME Bob Montes                                         1955                              MRN [id number]    A medical screening exam was performed on the above named patient  Based on the examination:    Condition Necessitating Transfer The primary encounter diagnosis was CVA (cerebral vascular accident) ([facility] )  Diagnoses of TIA (transient ischemic attack) and Vertebral artery occlusion were also pertinent to this visit      Patient Condition: The patient has been stabilized such that within reasonable medical probability, no material deterioration of the patient condition or the condition of the unborn child(karen) is likely to result from the transfer    Reason for Transfer: Level of Care needed not available at this facility    Transfer Requirements: [address]   · Space available and qualified personnel available for treatment as acknowledged by    · Agreed to accept transfer and to provide appropriate medical treatment as acknowledged by        June Gardner)  · Appropriate medical records of the examination and treatment of the patient are provided at the time of transfer   [address] _______  · Transfer will be performed by qualified personnel from    and appropriate transfer equipment as required, including the use of necessary and appropriate life support measures  Provider Certification: I have examined the patient and explained the following risks and benefits of being transferred/refusing transfer to the patient/family:  General risk, such as traffic hazards, adverse weather conditions, rough terrain or turbulence, possible failure of equipment (including vehicle or aircraft), or consequences of actions of persons outside the control of the transport personnel      Based on these reasonable risks and benefits to the patient and/or the unborn child(karen), and based upon the information available at the time of the patients examination, I certify that the medical benefits reasonably to be expected from the provision of appropriate medical treatments at another medical facility outweigh the increasing risks, if any, to the individuals medical condition, and in the case of labor to the unborn child, from effecting the transfer      X____________________________________________ DATE 09/13/18        TIME_______      ORIGINAL - SEND TO MEDICAL RECORDS   COPY - SEND WITH PATIENT DURING TRANSFER

## 2018-09-15 PROBLEM — I63.511 ACUTE RIGHT MCA STROKE (HCC): Status: ACTIVE | Noted: 2018-09-15

## 2018-09-15 PROCEDURE — 97535 SELF CARE MNGMENT TRAINING: CPT

## 2018-09-15 PROCEDURE — 99232 SBSQ HOSP IP/OBS MODERATE 35: CPT | Performed by: INTERNAL MEDICINE

## 2018-09-15 RX ORDER — DOCUSATE SODIUM 100 MG/1
100 CAPSULE, LIQUID FILLED ORAL 2 TIMES DAILY
Status: DISCONTINUED | OUTPATIENT
Start: 2018-09-15 | End: 2018-09-17 | Stop reason: HOSPADM

## 2018-09-15 RX ADMIN — ASPIRIN 81 MG: 81 TABLET, COATED ORAL at 09:18

## 2018-09-15 RX ADMIN — ACETAMINOPHEN 650 MG: 325 TABLET, FILM COATED ORAL at 21:21

## 2018-09-15 RX ADMIN — FLUTICASONE PROPIONATE 1 SPRAY: 50 SPRAY, METERED NASAL at 09:19

## 2018-09-15 RX ADMIN — AMLODIPINE BESYLATE 10 MG: 10 TABLET ORAL at 09:18

## 2018-09-15 RX ADMIN — LORAZEPAM 0.5 MG: 0.5 TABLET ORAL at 19:53

## 2018-09-15 RX ADMIN — LORAZEPAM 0.5 MG: 0.5 TABLET ORAL at 09:27

## 2018-09-15 RX ADMIN — TIOTROPIUM BROMIDE 18 MCG: 18 CAPSULE ORAL; RESPIRATORY (INHALATION) at 09:19

## 2018-09-15 RX ADMIN — DOCUSATE SODIUM 100 MG: 100 CAPSULE, LIQUID FILLED ORAL at 17:32

## 2018-09-15 RX ADMIN — ACETAMINOPHEN 650 MG: 325 TABLET, FILM COATED ORAL at 09:26

## 2018-09-15 RX ADMIN — NICOTINE 14 MG: 14 PATCH, EXTENDED RELEASE TRANSDERMAL at 09:19

## 2018-09-15 RX ADMIN — CITALOPRAM HYDROBROMIDE 40 MG: 20 TABLET ORAL at 09:18

## 2018-09-15 RX ADMIN — CLOPIDOGREL 150 MG: 75 TABLET, FILM COATED ORAL at 09:18

## 2018-09-15 RX ADMIN — ENOXAPARIN SODIUM 40 MG: 40 INJECTION SUBCUTANEOUS at 09:17

## 2018-09-15 RX ADMIN — DOCUSATE SODIUM 100 MG: 100 CAPSULE, LIQUID FILLED ORAL at 09:19

## 2018-09-15 RX ADMIN — ATORVASTATIN CALCIUM 40 MG: 40 TABLET, FILM COATED ORAL at 17:32

## 2018-09-15 NOTE — OCCUPATIONAL THERAPY NOTE
Occupational Therapy Treatment Note:         09/15/18 1530   Restrictions/Precautions   Weight Bearing Precautions Per Order No   Other Precautions Fall Risk;Telemetry   Pain Assessment   Pain Assessment 0-10   Pain Score No Pain   Pain Type Acute pain   Pain Location Head   Pain Orientation Left   ADL   Where Assessed Edge of bed   Grooming Assistance 6  Modified Independent   UB Bathing Assistance 6  Modified Independent   LB Bathing Assistance 5  Supervision/Setup   LB Bathing Deficit Setup   UB Dressing Assistance 6  Modified independent   LB Dressing Assistance 5  Supervision/Setup   LB Dressing Deficit Setup;Verbal cueing;Supervision/safety; Don/doff R sock; Don/doff L sock; Thread LLE into pants; Thread RLE into pants   LB Dressing Comments No LOB noted with activities  Toileting Assistance  5  Supervision/Setup   Toileting Deficit Setup;Verbal cueing;Supervison/safety   Functional Standing Tolerance   Time 10 mins   Activity functional mobility/self care   Comments Slight dizziness noted with initial sit to stand  Recommended counting to 3 instance before taking steps  Bed Mobility   Supine to Sit 6  Modified independent   Transfers   Sit to Stand 5  Supervision   Stand to Sit 5  Supervision   Stand pivot 5  Supervision   Toilet transfer 5  Supervision   Additional Comments cues for safety to pace self  No AD used  Functional Mobility   Functional Mobility 5  Supervision   Additional Comments no AD   Cognition   Overall Cognitive Status WFL   Arousal/Participation Alert; Responsive; Cooperative   Attention Within functional limits   Orientation Level Oriented X4   Memory Decreased short term memory;Decreased recall of precautions   Following Commands Follows multistep commands with increased time or repetition   Comments Pt able to recall reviewed information without need for redirection      Activity Tolerance   Activity Tolerance Patient limited by fatigue   Medical Staff Made Aware reported all findings to nursing staff  Assessment   Assessment Pt was seen for skilled OT with focus on completion of self care tasks, functional mobility, completion of visual spatial/fine motor coordination activity  review of fall prevention and review of current plan of care  See above levels of A required for all functional tasks  Pt required SBA overall for self care tasks with cues to pace self and inhibit noted dizziness  Pt without further c/o of dizziness with functional tasks instance for more than 10 mins  Spoke at length regarding need for fall prevention and pursuit of health lifestyle choices to promote optimal health  Pt reports having good understanding of reviewed information  Pt able to identify signs and symptoms of stroke following review of F A S T  Abbreviation indicators  No LOB noted with activity  Some numbness in L hand with activities  Encouraged item retrieval via card game at table top with focus on internal and external rotation of the wrist and pincher grasp  See above levels of a required for all functional tasks  Pt may benefit from further outpatient therapy with focus on achieving optimal performance levels with all functional tasks     Plan   Treatment Interventions ADL retraining;Functional transfer training;UE strengthening/ROM   Goal Expiration Date 09/24/18   Treatment Day 1   OT Frequency 3-5x/wk   Recommendation   OT Discharge Recommendation Outpatient OT   Barthel Index   Feeding 10   Bathing 5   Grooming Score 5   Dressing Score 5   Bladder Score 10   Bowels Score 10   Toilet Use Score 10   Transfers (Bed/Chair) Score 15   Mobility (Level Surface) Score 15   Stairs Score 10   Barthel Index Score 95   Modified [place] Scale   Modified [place] Scale 2   [place], [address]

## 2018-09-15 NOTE — PROGRESS NOTES
[facility] Internal Medicine Progress Note  Patient: Maria R Giordano 61 y o  male   MRN: [id number]  PCP: Rosalind Ulrich MD  Unit/Bed#: E4 -91 Encounter: [id number]  Date Of Visit: 09/15/18    Assessment:    Principal Problem:    Acute right MCA stroke [facility])  Active Problems:    Left-sided weakness    Hypertension    Hyperlipidemia    COPD (chronic obstructive pulmonary disease) ([facility] [address] )    Cardiac disease      Plan:    · Acute right MCA stroke as seen on MRI with single foci of ski me a in the right frontal cortex possibly consistent with embolic pattern appreciate Neurology input had low platelet inhibitor affect from present dosage Plavix which was doubled to 150 mg daily continue aspirin 81 mg a day based on a API sensitivity noted, started back on statin/ recommendation for loop recorder as outpatient  · Left-sided weakness with most of deficit noted in left hand appreciate OT PT recommendations for outpatient follow-up and hand strengthening needed will need follow up in Stroke Clinic  · Hyperlipidemia had stop taking a statin prior because of thing see heard on media reinstituted also has known history of coronary artery disease status post stents  · Hypertension will continue amlodipine out now will be outside of  permissive hypertension  · COPD continue outpatient management stable  · Constipation at stool softener  · Tobacco abuse smoking cessation counseling given and RT   · Suspect obstructive sleep apnea suggest outpatient sleep study      VTE Pharmacologic Prophylaxis:   Pharmacologic: Enoxaparin (Lovenox)  Mechanical VTE Prophylaxis in Place: Yes    Discussions with Specialists or Other Care Team Provider:  No     Time Spent for Care: 30 minutes  More than 50% of total time spent on counseling and coordination of care as described above        Subjective:   Awake alert no acute distress still some sensation of heaviness in left side of his face and some discoordination is the last 3 fingers of his left hand with some numbness was ambulatory yesterday with PT and OT eval recommended outpatient follow-up  Objective:     Vitals:   Temp (24hrs), Av °F (36 7 °C), Min:96 7 °F (35 9 °C), Max:98 6 °F (37 °C)    HR:  [80-89] 88  Resp:  [18] 18  BP: (107-136)/(72-87) 118/76  SpO2:  [93 %-96 %] 96 %  Body mass index is 31 19 kg/m²  Input and Output Summary (last 24 hours): Intake/Output Summary (Last 24 hours) at 09/15/18 5567  Last data filed at 18 1345   Gross per 24 hour   Intake              480 ml   Output                0 ml   Net              480 ml       Physical Exam:     Physical Exam:   General appearance: alert, appears stated age and cooperative  Head: Normocephalic, without obvious abnormality, atraumatic  Lungs: clear to auscultation bilaterally  Heart: regular rate and rhythm  Abdomen: soft, non-tender; bowel sounds normal; no masses,  no organomegaly  Back: negative  Extremities: extremities normal, atraumatic, no cyanosis or edema  Neurologic: Grossly normal      Additional Data:     Labs:      Results from last 7 days  Lab Units 18  0436   WBC Thousand/uL 7 25   HEMOGLOBIN g/dL 14 4   HEMATOCRIT % 41 9   PLATELETS Thousands/uL 236       Results from last 7 days  Lab Units 18  0436   SODIUM mmol/L 141   POTASSIUM mmol/L 3 5   CHLORIDE mmol/L 106   CO2 mmol/L 27   BUN mg/dL 9   CREATININE mg/dL 0 90   CALCIUM mg/dL 8 5       Results from last 7 days  Lab Units 18  2156   INR  1 02       * I Have Reviewed All Lab Data Listed Above  * Additional Pertinent Lab Tests Reviewed: All Labs For Current Hospital Admission Reviewed    Imaging:  X-ray Chest 1 View Portable    Result Date: 2018  Narrative: INDICATION:  Stroke  ORDERING PROVIDER:  QIAN RUVALCABA  TECHNIQUE:  Frontal chest was obtained at 2157 hours  COMPARISON:  CXR 18  FINDINGS:  Patient is status post median sternotomy with CABG  The cardiomediastinal silhouette is normal in size    There is no consolidation or atelectasis in either lung  There are no pleural effusions  There is no pneumothorax  No acute osseous process  Impression: No acute thoracic findings  Signed by Gypsy Londono MD    Mri Brain Wo Contrast    Result Date: 9/14/2018  Narrative: MRI BRAIN WITHOUT CONTRAST INDICATION: left sided weakness, r/o stroke  COMPARISON:   July 8, 2013 TECHNIQUE:  Sagittal T1, axial T2, axial FLAIR, axial T1, axial [place] and axial diffusion imaging  IMAGE QUALITY:  Diagnostic  FINDINGS: BRAIN PARENCHYMA:  There is a cortical area of diffusion restriction in the right precentral gyrus representing a superficial cortical infarct in the right frontal region in the distribution of the right MCA Mild periventricular and white matter T2 hyperintensities noted related to chronic small vessel ischemic changes No acute intracranial hemorrhage seen  VENTRICLES:  Scalloping of the right lateral ventricle, unchanged since the previous study of July 8, 2013 SELLA AND PITUITARY GLAND:  Normal  ORBITS:  Normal  PARANASAL SINUSES:  Normal  VASCULATURE:  Evaluation of the major intracranial vasculature demonstrates appropriate flow voids  CALVARIUM AND SKULL BASE:  Normal  EXTRACRANIAL SOFT TISSUES:  Normal      Impression: There is area of diffusion restriction in the 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 No acute hemorrhage seen  I personally discussed this study with MARICARMEN ZAPATA on 9/14/2018 at 11:40 AM  Workstation performed: [id number]     Ct Stroke Alert Brain    Addendum Date: 9/13/2018 Addendum:    ORIGINAL REPORT INDICATION:  Acute headache, norm neuro exam; stroke protocol  ORDERING PROVIDER:  QIAN RUVALCABA  TECHNIQUE:  CT of the brain was performed without contrast   Automated mA/kV exposure control was utilized and patient examination was performed in strict accordance with principles of ALARA  RADIATION AMOUNT:  913 6 mGy-cm   COMPARISON: None Available  FINDINGS: There is no acute intracranial hemorrhage, midline shift, mass effect or extra-axial fluid collection  Gray-white differentiation is preserved  Brain volume and ventricular system are within normal limits for age  The skull base and calvarium are intact  The visualized paranasal sinuses are unremarkable  The visualized orbits, globes, and mastoid air cells are unremarkable  IMPRESSION: No acute intracranial findings  If symptoms persist or if further imaging is clinically indicated, consider follow-up CT or MRI  Low attenuation in the white matter bilaterally, age indeterminate  This is most commonly from small vessel ischemic disease Signed by JOSE DAVID Ferrari  ADDENDUM #1 NOTIFICATION:  Non-critical findings were relayed by Radiology Support Staff to Dr Rajat Mohan on 09/13/2018 at 20:50  Signed by JOSE DAVID Ferrari  Result Date: 9/13/2018  Narrative: INDICATION:  Acute headache, norm neuro exam; stroke protocol  ORDERING PROVIDER:  QIAN RUVALCABA  TECHNIQUE:  CT of the brain was performed without contrast   Automated mA/kV exposure control was utilized and patient examination was performed in strict accordance with principles of ALARA  RADIATION AMOUNT:  913 6 mGy-cm  COMPARISON:  None Available  FINDINGS: There is no acute intracranial hemorrhage, midline shift, mass effect or extra-axial fluid collection  Gray-white differentiation is preserved  Brain volume and ventricular system are within normal limits for age  The skull base and calvarium are intact  The visualized paranasal sinuses are unremarkable  The visualized orbits, globes, and mastoid air cells are unremarkable  Impression: No acute intracranial findings  If symptoms persist or if further imaging is clinically indicated, consider follow-up CT or MRI  Low attenuation in the white matter bilaterally, age indeterminate  This is most commonly from small vessel ischemic disease Signed by JOSE DAVID Ferrari      Cta Stroke Alert (head/neck)    Result Date: 9/13/2018  Narrative: INDICATION:  Sudden onset headache; carotid/vertebral dissection suspected  ORDERING PROVIDER:  QIAN RUVALCABA  TECHNIQUE:  Helical CT is performed from aortic arch through the brain after bolus administration of Omnipaque 350  Images are reviewed and processed at a workstation according to the CT angiogram protocol with 3-D and/or MIP post processing imaging generated  Percentage of stenosis was estimated utilizing NASCET criteria with narrowest arterial segment compared with distal normal luminal diameter  Automated mA/kV exposure control was utilized and patient examination was performed in strict accordance with principles of ALARA  RADIATION AMOUNT:  798 8 mGy-cm  COMPARISON:  CT head 9/13/2018, 9/1/2017  FINDINGS: CTA Neck: Arch and great vessels:  Atherosclerosis of the aortic arch is present  A three-vessel arch is demonstrated  Great vessels are widely patent  Right carotid: The bulb appears normal   The internal carotid artery is patent through its intradural bifurcation  The external carotid artery is patent  Left carotid:  Calcified plaque is noted in the left carotid bulb and proximal left internal carotid artery resulting in at most mild stenosis  The external carotid artery is patent  Vertebrals: The right vertebral is widely patent at its origin  Vessel is then patent through the basilar  There is dominance of the right vertebral artery  There is severe narrowing of the proximal left vertebral artery and possible occlusion of the artery image 67 series 6B  Severe narrowing of the distal left vertebral artery also suggested proximal to the basilar artery  Occlusion of the artery is suspected  Soft tissues of the neck demonstrate no significant/acute abnormality  Mild emphysematous changes are present  Nodular density right lung apex measures 8 mm maximally  This appears not significantly changed compared with prior chest CT  The patient is status post prior median sternotomy  No focal bony lesions are seen  CTA Brain: The ventricular system appears normal   There  is no intracranial hemorrhage  There is no mass or mass effect  No abnormal enhancement is seen  There is patchy low density to the subcortical and periventricular white matter compatible with microangiopathic white matter change  No focal bony lesion is seen  Paranasal sinuses and mastoid air cells are clear  Atherosclerosis of the cavernous and paraclinoid portions of the internal carotid arteries noted bilaterally  The petrous portion of the internal carotid artery bilaterally demonstrates no significant stenosis or aneurysm  Anterior circulation: Right:  Normal complement of anterior and middle cerebral vessels fills with no significant stenosis, aneurysm or vascular malformation  Left:  Normal complement of anterior and middle cerebral vessels fills with no significant stenosis, aneurysm or vascular malformation  Posterior circulation:  Basilar artery demonstrates no significant stenosis  The visualized posterior fossa branches show no significant abnormality  A normal complement of posterior cerebral vessels fills bilaterally with no stenosis, aneurysm or vascular malformation  Impression: Significant narrowing of the left vertebral artery with apparent occlusion of the proximal and distal portions of the artery with reconstitution of flow within a narrowed left vertebral artery between these regions  NOTIFICATION:  The critical results of the study were discussed with, and acknowledged by Dr Dania Oh, by telephone on September 13, 2018 at 21:48  Signed by Valerio Ludwig MD    Imaging Reports Reviewed Today Include:  Review MRI  Imaging Personally Reviewed by Myself Includes:    Procedure: X-ray Chest 1 View Portable    Result Date: 9/13/2018  Narrative: INDICATION:  Stroke  ORDERING PROVIDER:  QIAN RUVALCABA   TECHNIQUE:  Frontal chest was obtained at 2157 hours  COMPARISON:  CXR 07/13/18  FINDINGS:  Patient is status post median sternotomy with CABG  The cardiomediastinal silhouette is normal in size  There is no consolidation or atelectasis in either lung  There are no pleural effusions  There is no pneumothorax  No acute osseous process  Impression: No acute thoracic findings  Signed by Mei Tejada MD    Procedure: Mri Brain Wo Contrast    Result Date: 9/14/2018  Narrative: MRI BRAIN WITHOUT CONTRAST INDICATION: left sided weakness, r/o stroke  COMPARISON:   July 8, 2013 TECHNIQUE:  Sagittal T1, axial T2, axial FLAIR, axial T1, axial [place] and axial diffusion imaging  IMAGE QUALITY:  Diagnostic  FINDINGS: BRAIN PARENCHYMA:  There is a cortical area of diffusion restriction in the right precentral gyrus representing a superficial cortical infarct in the right frontal region in the distribution of the right MCA Mild periventricular and white matter T2 hyperintensities noted related to chronic small vessel ischemic changes No acute intracranial hemorrhage seen  VENTRICLES:  Scalloping of the right lateral ventricle, unchanged since the previous study of July 8, 2013 SELLA AND PITUITARY GLAND:  Normal  ORBITS:  Normal  PARANASAL SINUSES:  Normal  VASCULATURE:  Evaluation of the major intracranial vasculature demonstrates appropriate flow voids  CALVARIUM AND SKULL BASE:  Normal  EXTRACRANIAL SOFT TISSUES:  Normal      Impression: There is area of diffusion restriction in the 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 No acute hemorrhage seen  I personally discussed this study with MARICARMEN ZAPATA on 9/14/2018 at 11:40 AM  Workstation performed: [id number]     Procedure: Ct Stroke Alert Brain    Addendum Date: 9/13/2018 Addendum:    ORIGINAL REPORT INDICATION:  Acute headache, norm neuro exam; stroke protocol  ORDERING PROVIDER:  QIAN RUVALCABA   TECHNIQUE:  CT of the brain was performed without contrast   Automated mA/kV exposure control was utilized and patient examination was performed in strict accordance with principles of ALARA  RADIATION AMOUNT:  913 6 mGy-cm  COMPARISON:  None Available  FINDINGS: There is no acute intracranial hemorrhage, midline shift, mass effect or extra-axial fluid collection  Gray-white differentiation is preserved  Brain volume and ventricular system are within normal limits for age  The skull base and calvarium are intact  The visualized paranasal sinuses are unremarkable  The visualized orbits, globes, and mastoid air cells are unremarkable  IMPRESSION: No acute intracranial findings  If symptoms persist or if further imaging is clinically indicated, consider follow-up CT or MRI  Low attenuation in the white matter bilaterally, age indeterminate  This is most commonly from small vessel ischemic disease Signed by JOSE DAVID Ferrari  ADDENDUM #1 NOTIFICATION:  Non-critical findings were relayed by Radiology Support Staff to Dr Rajat Mohan on 09/13/2018 at 20:50  Signed by JOSE DAVID Ferrari  Result Date: 9/13/2018  Narrative: INDICATION:  Acute headache, norm neuro exam; stroke protocol  ORDERING PROVIDER:  QIAN RUVALCABA  TECHNIQUE:  CT of the brain was performed without contrast   Automated mA/kV exposure control was utilized and patient examination was performed in strict accordance with principles of ALARA  RADIATION AMOUNT:  913 6 mGy-cm  COMPARISON:  None Available  FINDINGS: There is no acute intracranial hemorrhage, midline shift, mass effect or extra-axial fluid collection  Gray-white differentiation is preserved  Brain volume and ventricular system are within normal limits for age  The skull base and calvarium are intact  The visualized paranasal sinuses are unremarkable  The visualized orbits, globes, and mastoid air cells are unremarkable  Impression: No acute intracranial findings    If symptoms persist or if further imaging is clinically indicated, consider follow-up CT or MRI  Low attenuation in the white matter bilaterally, age indeterminate  This is most commonly from small vessel ischemic disease Signed by JOSE DAVID Anderson  Procedure: Cta Stroke Alert (head/neck)    Result Date: 9/13/2018  Narrative: INDICATION:  Sudden onset headache; carotid/vertebral dissection suspected  ORDERING PROVIDER:  QIAN RUVALCABA  TECHNIQUE:  Helical CT is performed from aortic arch through the brain after bolus administration of Omnipaque 350  Images are reviewed and processed at a workstation according to the CT angiogram protocol with 3-D and/or MIP post processing imaging generated  Percentage of stenosis was estimated utilizing NASCET criteria with narrowest arterial segment compared with distal normal luminal diameter  Automated mA/kV exposure control was utilized and patient examination was performed in strict accordance with principles of ALARA  RADIATION AMOUNT:  798 8 mGy-cm  COMPARISON:  CT head 9/13/2018, 9/1/2017  FINDINGS: CTA Neck: Arch and great vessels:  Atherosclerosis of the aortic arch is present  A three-vessel arch is demonstrated  Great vessels are widely patent  Right carotid: The bulb appears normal   The internal carotid artery is patent through its intradural bifurcation  The external carotid artery is patent  Left carotid:  Calcified plaque is noted in the left carotid bulb and proximal left internal carotid artery resulting in at most mild stenosis  The external carotid artery is patent  Vertebrals: The right vertebral is widely patent at its origin  Vessel is then patent through the basilar  There is dominance of the right vertebral artery  There is severe narrowing of the proximal left vertebral artery and possible occlusion of the artery image 67 series 6B  Severe narrowing of the distal left vertebral artery also suggested proximal to the basilar artery  Occlusion of the artery is suspected    Soft tissues of the neck demonstrate no significant/acute abnormality  Mild emphysematous changes are present  Nodular density right lung apex measures 8 mm maximally  This appears not significantly changed compared with prior chest CT  The patient is status post prior median sternotomy  No focal bony lesions are seen  CTA Brain: The ventricular system appears normal   There  is no intracranial hemorrhage  There is no mass or mass effect  No abnormal enhancement is seen  There is patchy low density to the subcortical and periventricular white matter compatible with microangiopathic white matter change  No focal bony lesion is seen  Paranasal sinuses and mastoid air cells are clear  Atherosclerosis of the cavernous and paraclinoid portions of the internal carotid arteries noted bilaterally  The petrous portion of the internal carotid artery bilaterally demonstrates no significant stenosis or aneurysm  Anterior circulation: Right:  Normal complement of anterior and middle cerebral vessels fills with no significant stenosis, aneurysm or vascular malformation  Left:  Normal complement of anterior and middle cerebral vessels fills with no significant stenosis, aneurysm or vascular malformation  Posterior circulation:  Basilar artery demonstrates no significant stenosis  The visualized posterior fossa branches show no significant abnormality  A normal complement of posterior cerebral vessels fills bilaterally with no stenosis, aneurysm or vascular malformation  Impression: Significant narrowing of the left vertebral artery with apparent occlusion of the proximal and distal portions of the artery with reconstitution of flow within a narrowed left vertebral artery between these regions  NOTIFICATION:  The critical results of the study were discussed with, and acknowledged by Dr Marcos Aceves, by telephone on September 13, 2018 at 21:48   Signed by Moises Vera MD       Recent Cultures (last 7 days):           Last 24 Hours Medication List: Current Facility-Administered Medications:  acetaminophen 650 mg Oral Q4H PRN Verlyn Debar, PA-C   albuterol 2 puff Inhalation Q6H PRN Verlyn Debar, PA-C   aluminum-magnesium hydroxide-simethicone 30 mL Oral Q6H PRN Verlyn Debar, PA-C   amLODIPine 10 mg Oral Daily Verlyn Debar, PA-C   aspirin 81 mg Oral Daily Verlyn Debar, PA-C   atorvastatin 40 mg Oral QPM Verlyn Jj, PA-C   citalopram 40 mg Oral Daily Verlyn Debar, PA-C   clopidogrel 150 mg Oral Daily Hansel Stearns DO   enoxaparin 40 mg Subcutaneous Daily Verlyn Jj, PA-C   fluticasone 1 spray Nasal Daily Verlyn Jj, PA-C   LORazepam 0 5 mg Oral Q8H PRN Stephen Benz MD   nicotine 14 mg Transdermal Daily Verlyn Jj, PA-C   ondansetron 4 mg Intravenous Q6H PRN Verlyn Jj, PA-C   roflumilast 500 mcg Oral Daily Verlyn Debar, PA-C   tiotropium 18 mcg Inhalation Daily Gracia Gardner, PA-C        Today, Patient Was Seen By: Stephen Benz MD    ** Please Note: Dragon 360 Dictation voice to text software may have been used in the creation of this document   **

## 2018-09-15 NOTE — PLAN OF CARE
Problem: OCCUPATIONAL THERAPY ADULT  Goal: Performs self-care activities at highest level of function for planned discharge setting  See evaluation for individualized goals  Treatment Interventions: ADL retraining, Functional transfer training, UE strengthening/ROM, Cognitive reorientation, Endurance training, Energy conservation, Activityengagement, Continued evaluation, Equipment evaluation/education, Patient/family training, Fine motor coordination activities, Compensatory technique education          See flowsheet documentation for full assessment, interventions and recommendations  Outcome: Progressing  Limitation: Decreased ADL status, Decreased UE strength, Decreased Safe judgement during ADL, Decreased cognition, Decreased endurance, Decreased high-level ADLs, Decreased self-care trans, Decreased sensation  Prognosis: Good  Assessment: Pt was seen for skilled OT with focus on completion of self care tasks, functional mobility, completion of visual spatial/fine motor coordination activity  review of fall prevention and review of current plan of care  See above levels of A required for all functional tasks  Pt required SBA overall for self care tasks with cues to pace self and inhibit noted dizziness  Pt without further c/o of dizziness with functional tasks instance for more than 10 mins  Spoke at length regarding need for fall prevention and pursuit of health lifestyle choices to promote optimal health  Pt reports having good understanding of reviewed information  Pt able to identify signs and symptoms of stroke following review of F A S T  Abbreviation indicators  No LOB noted with activity  Some numbness in L hand with activities  Encouraged item retrieval via card game at table top with focus on internal and external rotation of the wrist and pincher grasp  See above levels of a required for all functional tasks   Pt may benefit from further outpatient therapy with focus on achieving optimal performance levels with all functional tasks       OT Discharge Recommendation: Outpatient OT  OT - OK to Discharge:  (when medically stable)      Comments: Nimo Awad, [address]

## 2018-09-15 NOTE — PLAN OF CARE
Activity Intolerance/Impaired Mobility     Mobility/activity is maintained at optimum level for patient Progressing        CARDIOVASCULAR - ADULT     Maintains optimal cardiac output and hemodynamic stability Progressing     Absence of cardiac dysrhythmias or at baseline rhythm Progressing        Communication Impairment     Ability to express needs and understand communication Lance Ericfelipe Kalyani Discharge to home or other facility with appropriate resources Progressing        DISCHARGE PLANNING - CARE MANAGEMENT     Discharge to post-acute care or home with appropriate resources Progressing        Knowledge Deficit     Patient/family/caregiver demonstrates understanding of disease process, treatment plan, medications, and discharge instructions Progressing        METABOLIC, FLUID AND ELECTROLYTES - ADULT     Electrolytes maintained within normal limits Progressing     Fluid balance maintained Progressing        MUSCULOSKELETAL - ADULT     Maintain or return mobility to safest level of function Progressing        Neurological Deficit     Neurological status is stable or improving Progressing        NEUROSENSORY - ADULT     Achieves stable or improved neurological status Progressing     Achieves maximal functionality and self care Progressing        Nutrition     Nutrition/Hydration status is improving Progressing        PAIN - ADULT     Verbalizes/displays adequate comfort level or baseline comfort level Progressing        Potential for Aspiration     Non-ventilated patient's risk of aspiration is minimized Progressing        Potential for Falls     Patient will remain free of falls Progressing        RESPIRATORY - ADULT     Achieves optimal ventilation and oxygenation Progressing

## 2018-09-16 PROCEDURE — 99232 SBSQ HOSP IP/OBS MODERATE 35: CPT | Performed by: INTERNAL MEDICINE

## 2018-09-16 RX ADMIN — ATORVASTATIN CALCIUM 40 MG: 40 TABLET, FILM COATED ORAL at 17:37

## 2018-09-16 RX ADMIN — CITALOPRAM HYDROBROMIDE 40 MG: 20 TABLET ORAL at 09:36

## 2018-09-16 RX ADMIN — LORAZEPAM 0.5 MG: 0.5 TABLET ORAL at 16:19

## 2018-09-16 RX ADMIN — FLUTICASONE PROPIONATE 1 SPRAY: 50 SPRAY, METERED NASAL at 09:35

## 2018-09-16 RX ADMIN — DOCUSATE SODIUM 100 MG: 100 CAPSULE, LIQUID FILLED ORAL at 09:35

## 2018-09-16 RX ADMIN — ASPIRIN 81 MG: 81 TABLET, COATED ORAL at 09:36

## 2018-09-16 RX ADMIN — DOCUSATE SODIUM 100 MG: 100 CAPSULE, LIQUID FILLED ORAL at 17:37

## 2018-09-16 RX ADMIN — TIOTROPIUM BROMIDE 18 MCG: 18 CAPSULE ORAL; RESPIRATORY (INHALATION) at 09:35

## 2018-09-16 RX ADMIN — NICOTINE 14 MG: 14 PATCH, EXTENDED RELEASE TRANSDERMAL at 09:38

## 2018-09-16 RX ADMIN — LORAZEPAM 0.5 MG: 0.5 TABLET ORAL at 06:48

## 2018-09-16 RX ADMIN — ENOXAPARIN SODIUM 40 MG: 40 INJECTION SUBCUTANEOUS at 09:36

## 2018-09-16 RX ADMIN — ALBUTEROL SULFATE 2 PUFF: 90 AEROSOL, METERED RESPIRATORY (INHALATION) at 02:10

## 2018-09-16 RX ADMIN — CLOPIDOGREL 150 MG: 75 TABLET, FILM COATED ORAL at 09:36

## 2018-09-16 RX ADMIN — ACETAMINOPHEN 650 MG: 325 TABLET, FILM COATED ORAL at 06:51

## 2018-09-16 NOTE — PROGRESS NOTES
[facility] Internal Medicine Progress Note  Patient: Aline Singh 61 y o  male   MRN: [id number]  PCP: Winston Abebe MD  Unit/Bed#: E4 MS [facility]-01 Encounter: [id number]  Date Of Visit: 09/16/18    Assessment:    Principal Problem:    Acute right MCA stroke [facility])  Active Problems:    Left-sided weakness    Hypertension    Hyperlipidemia    COPD (chronic obstructive pulmonary disease) ([address] )    Cardiac disease      Plan:    · Acute right MCA stroke as seen on MRI with single foci ischemia in the right frontal cortex possibly consistent with embolic pattern appreciate Neurology input had low platelet inhibitor effect from present dosage Plavix which was doubled to 150 mg daily continue aspirin 81 mg a day based on a API sensitivity noted, started back on statin/ recommendation for loop recorder as outpatient  · Left-sided weakness with most of deficit noted in left hand appreciate OT PT recommendations for outpatient follow-up and hand strengthening needed will need follow up in Stroke Clinic  · Hyperlipidemia had stop taking a statin prior because of thing see heard on media reinstituted also has known history of coronary artery disease status post stents  · Hypertension will continue amlodipine out now will be outside of  permissive hypertension  · COPD continue outpatient management stable  · Constipation at stool softener  · Tobacco abuse smoking cessation counseling given and RT   · Suspect obstructive sleep apnea suggest outpatient sleep study      VTE Pharmacologic Prophylaxis:   Pharmacologic: Enoxaparin (Lovenox)  Mechanical VTE Prophylaxis in Place: Yes    Discussions with Specialists or Other Care Team Provider:  No     Time Spent for Care: 30 minutes  More than 50% of total time spent on counseling and coordination of care as described above        Subjective:   Awake alert no acute distress still some sensation of heaviness in left side of his face and some discoordination is the last 3 fingers of his left hand with some numbness was ambulatory yesterday with PT and OT martin recommended outpatient follow-up  When I mention this to him he was convinced he was to be placed in rehab and he remains quite in secure to going home by his self do not see criteria for rehab here and based on PT and OT recommendations doubt would qualify  He is extremely anxious and assured him that we can try and make a safe discharge plan for him tomorrow when we have more services available through case management  Objective:     Vitals:   Temp (24hrs), Av 2 °F (36 8 °C), Min:98 °F (36 7 °C), Max:98 5 °F (36 9 °C)    HR:  [79-82] 80  Resp:  [18] 18  BP: (114-124)/(58-70) 122/68  SpO2:  [98 %-99 %] 99 %  Body mass index is 31 19 kg/m²  Input and Output Summary (last 24 hours): Intake/Output Summary (Last 24 hours) at 18 1052  Last data filed at 18 0700   Gross per 24 hour   Intake             1200 ml   Output             1550 ml   Net             -350 ml       Physical Exam:     Physical Exam:   General appearance: alert, appears stated age and cooperative  Head: Normocephalic, without obvious abnormality, atraumatic  Lungs: clear to auscultation bilaterally  Heart: regular rate and rhythm  Abdomen: soft, non-tender; bowel sounds normal; no masses,  no organomegaly  Back: negative  Extremities: extremities normal, atraumatic, no cyanosis or edema  Neurologic: Grossly normal      Additional Data:     Labs:      Results from last 7 days  Lab Units 18  0436   WBC Thousand/uL 7 25   HEMOGLOBIN g/dL 14 4   HEMATOCRIT % 41 9   PLATELETS Thousands/uL 236       Results from last 7 days  Lab Units 18  0436   SODIUM mmol/L 141   POTASSIUM mmol/L 3 5   CHLORIDE mmol/L 106   CO2 mmol/L 27   BUN mg/dL 9   CREATININE mg/dL 0 90   CALCIUM mg/dL 8 5       Results from last 7 days  Lab Units 18  2156   INR  1 02       * I Have Reviewed All Lab Data Listed Above  * Additional Pertinent Lab Tests Reviewed:  All [facility] Admission Reviewed    Imaging:  X-ray Chest 1 View Portable    Result Date: 9/13/2018  Narrative: INDICATION:  Stroke  ORDERING PROVIDER:  QIAN RUVALCABA  TECHNIQUE:  Frontal chest was obtained at 2157 hours  COMPARISON:  CXR 07/13/18  FINDINGS:  Patient is status post median sternotomy with CABG  The cardiomediastinal silhouette is normal in size  There is no consolidation or atelectasis in either lung  There are no pleural effusions  There is no pneumothorax  No acute osseous process  Impression: No acute thoracic findings  Signed by Portillo Martínez MD    Mri Brain Wo Contrast    Result Date: 9/14/2018  Narrative: MRI BRAIN WITHOUT CONTRAST INDICATION: left sided weakness, r/o stroke  COMPARISON:   July 8, 2013 TECHNIQUE:  Sagittal T1, axial T2, axial FLAIR, axial T1, axial [place] and axial diffusion imaging  IMAGE QUALITY:  Diagnostic  FINDINGS: BRAIN PARENCHYMA:  There is a cortical area of diffusion restriction in the right precentral gyrus representing a superficial cortical infarct in the right frontal region in the distribution of the right MCA Mild periventricular and white matter T2 hyperintensities noted related to chronic small vessel ischemic changes No acute intracranial hemorrhage seen  VENTRICLES:  Scalloping of the right lateral ventricle, unchanged since the previous study of July 8, 2013 SELLA AND PITUITARY GLAND:  Normal  ORBITS:  Normal  PARANASAL SINUSES:  Normal  VASCULATURE:  Evaluation of the major intracranial vasculature demonstrates appropriate flow voids   CALVARIUM AND SKULL BASE:  Normal  EXTRACRANIAL SOFT TISSUES:  Normal      Impression: There is area of diffusion restriction in the 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 No acute hemorrhage seen  I personally discussed this study with MARICARMEN ZAPATA on 9/14/2018 at 11:40 AM  Workstation performed: [id number]     Ct Stroke Alert Brain    Addendum Date: 9/13/2018 Addendum:    ORIGINAL REPORT INDICATION:  Acute headache, norm neuro exam; stroke protocol  ORDERING PROVIDER:  QIAN RUVALCABA  TECHNIQUE:  CT of the brain was performed without contrast   Automated mA/kV exposure control was utilized and patient examination was performed in strict accordance with principles of ALARA  RADIATION AMOUNT:  913 6 mGy-cm  COMPARISON:  None Available  FINDINGS: There is no acute intracranial hemorrhage, midline shift, mass effect or extra-axial fluid collection  Gray-white differentiation is preserved  Brain volume and ventricular system are within normal limits for age  The skull base and calvarium are intact  The visualized paranasal sinuses are unremarkable  The visualized orbits, globes, and mastoid air cells are unremarkable  IMPRESSION: No acute intracranial findings  If symptoms persist or if further imaging is clinically indicated, consider follow-up CT or MRI  Low attenuation in the white matter bilaterally, age indeterminate  This is most commonly from small vessel ischemic disease Signed by JOSE DAVID Zapata  ADDENDUM #1 NOTIFICATION:  Non-critical findings were relayed by Radiology Support Staff to Dr Lucie Rodriguez on 09/13/2018 at 20:50  Signed by JOSE DAVID Zapata  Result Date: 9/13/2018  Narrative: INDICATION:  Acute headache, norm neuro exam; stroke protocol  ORDERING PROVIDER:  QIAN RUVALCABA  TECHNIQUE:  CT of the brain was performed without contrast   Automated mA/kV exposure control was utilized and patient examination was performed in strict accordance with principles of ALARA  RADIATION AMOUNT:  913 6 mGy-cm  COMPARISON:  None Available  FINDINGS: There is no acute intracranial hemorrhage, midline shift, mass effect or extra-axial fluid collection  Gray-white differentiation is preserved  Brain volume and ventricular system are within normal limits for age  The skull base and calvarium are intact    The visualized paranasal sinuses are unremarkable  The visualized orbits, globes, and mastoid air cells are unremarkable  Impression: No acute intracranial findings  If symptoms persist or if further imaging is clinically indicated, consider follow-up CT or MRI  Low attenuation in the white matter bilaterally, age indeterminate  This is most commonly from small vessel ischemic disease Signed by JOSE DAVID Acosta  Cta Stroke Alert (head/neck)    Result Date: 9/13/2018  Narrative: INDICATION:  Sudden onset headache; carotid/vertebral dissection suspected  ORDERING PROVIDER:  QIAN RUVALCABA  TECHNIQUE:  Helical CT is performed from aortic arch through the brain after bolus administration of Omnipaque 350  Images are reviewed and processed at a workstation according to the CT angiogram protocol with 3-D and/or MIP post processing imaging generated  Percentage of stenosis was estimated utilizing NASCET criteria with narrowest arterial segment compared with distal normal luminal diameter  Automated mA/kV exposure control was utilized and patient examination was performed in strict accordance with principles of ALARA  RADIATION AMOUNT:  798 8 mGy-cm  COMPARISON:  CT head 9/13/2018, 9/1/2017  FINDINGS: CTA Neck: Arch and great vessels:  Atherosclerosis of the aortic arch is present  A three-vessel arch is demonstrated  Great vessels are widely patent  Right carotid: The bulb appears normal   The internal carotid artery is patent through its intradural bifurcation  The external carotid artery is patent  Left carotid:  Calcified plaque is noted in the left carotid bulb and proximal left internal carotid artery resulting in at most mild stenosis  The external carotid artery is patent  Vertebrals: The right vertebral is widely patent at its origin  Vessel is then patent through the basilar  There is dominance of the right vertebral artery   There is severe narrowing of the proximal left vertebral artery and possible occlusion of the artery image 67 series 6B  Severe narrowing of the distal left vertebral artery also suggested proximal to the basilar artery  Occlusion of the artery is suspected  Soft tissues of the neck demonstrate no significant/acute abnormality  Mild emphysematous changes are present  Nodular density right lung apex measures 8 mm maximally  This appears not significantly changed compared with prior chest CT  The patient is status post prior median sternotomy  No focal bony lesions are seen  CTA Brain: The ventricular system appears normal   There  is no intracranial hemorrhage  There is no mass or mass effect  No abnormal enhancement is seen  There is patchy low density to the subcortical and periventricular white matter compatible with microangiopathic white matter change  No focal bony lesion is seen  Paranasal sinuses and mastoid air cells are clear  Atherosclerosis of the cavernous and paraclinoid portions of the internal carotid arteries noted bilaterally  The petrous portion of the internal carotid artery bilaterally demonstrates no significant stenosis or aneurysm  Anterior circulation: Right:  Normal complement of anterior and middle cerebral vessels fills with no significant stenosis, aneurysm or vascular malformation  Left:  Normal complement of anterior and middle cerebral vessels fills with no significant stenosis, aneurysm or vascular malformation  Posterior circulation:  Basilar artery demonstrates no significant stenosis  The visualized posterior fossa branches show no significant abnormality  A normal complement of posterior cerebral vessels fills bilaterally with no stenosis, aneurysm or vascular malformation  Impression: Significant narrowing of the left vertebral artery with apparent occlusion of the proximal and distal portions of the artery with reconstitution of flow within a narrowed left vertebral artery between these regions   NOTIFICATION:  The critical results of the study were discussed with, and acknowledged by Dr Gia Vega, by telephone on September 13, 2018 at 21:48  Signed by Shaggy Santizo MD    Imaging Reports Reviewed Today Include:  Review MRI  Imaging Personally Reviewed by Myself Includes:    Procedure: X-ray Chest 1 View Portable    Result Date: 9/13/2018  Narrative: INDICATION:  Stroke  ORDERING PROVIDER:  QIAN RUVALCABA  TECHNIQUE:  Frontal chest was obtained at 2157 hours  COMPARISON:  CXR 07/13/18  FINDINGS:  Patient is status post median sternotomy with CABG  The cardiomediastinal silhouette is normal in size  There is no consolidation or atelectasis in either lung  There are no pleural effusions  There is no pneumothorax  No acute osseous process  Impression: No acute thoracic findings  Signed by Shaggy Santizo MD    Procedure: Mri Brain Wo Contrast    Result Date: 9/14/2018  Narrative: MRI BRAIN WITHOUT CONTRAST INDICATION: left sided weakness, r/o stroke  COMPARISON:   July 8, 2013 TECHNIQUE:  Sagittal T1, axial T2, axial FLAIR, axial T1, axial [place] and axial diffusion imaging  IMAGE QUALITY:  Diagnostic  FINDINGS: BRAIN PARENCHYMA:  There is a cortical area of diffusion restriction in the right precentral gyrus representing a superficial cortical infarct in the right frontal region in the distribution of the right MCA Mild periventricular and white matter T2 hyperintensities noted related to chronic small vessel ischemic changes No acute intracranial hemorrhage seen  VENTRICLES:  Scalloping of the right lateral ventricle, unchanged since the previous study of July 8, 2013 SELLA AND PITUITARY GLAND:  Normal  ORBITS:  Normal  PARANASAL SINUSES:  Normal  VASCULATURE:  Evaluation of the major intracranial vasculature demonstrates appropriate flow voids   CALVARIUM AND SKULL BASE:  Normal  EXTRACRANIAL SOFT TISSUES:  Normal      Impression: There is area of diffusion restriction in the 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 No acute hemorrhage seen  I personally discussed this study with Jennifer Jewell on 9/14/2018 at 11:40 AM  Workstation performed: [id number]     Procedure: Ct Stroke Alert Brain    Addendum Date: 9/13/2018 Addendum:    ORIGINAL REPORT INDICATION:  Acute headache, norm neuro exam; stroke protocol  ORDERING PROVIDER:  QIAN RUVALCABA  TECHNIQUE:  CT of the brain was performed without contrast   Automated mA/kV exposure control was utilized and patient examination was performed in strict accordance with principles of ALARA  RADIATION AMOUNT:  913 6 mGy-cm  COMPARISON:  None Available  FINDINGS: There is no acute intracranial hemorrhage, midline shift, mass effect or extra-axial fluid collection  Gray-white differentiation is preserved  Brain volume and ventricular system are within normal limits for age  The skull base and calvarium are intact  The visualized paranasal sinuses are unremarkable  The visualized orbits, globes, and mastoid air cells are unremarkable  IMPRESSION: No acute intracranial findings  If symptoms persist or if further imaging is clinically indicated, consider follow-up CT or MRI  Low attenuation in the white matter bilaterally, age indeterminate  This is most commonly from small vessel ischemic disease Signed by JOSE DAVID Stout  ADDENDUM #1 NOTIFICATION:  Non-critical findings were relayed by Radiology Support Staff to Dr Johnathon Chaudhry on 09/13/2018 at 20:50  Signed by JOSE DAVID Stout  Result Date: 9/13/2018  Narrative: INDICATION:  Acute headache, norm neuro exam; stroke protocol  ORDERING PROVIDER:  QIAN RUVALCABA  TECHNIQUE:  CT of the brain was performed without contrast   Automated mA/kV exposure control was utilized and patient examination was performed in strict accordance with principles of ALARA  RADIATION AMOUNT:  913 6 mGy-cm  COMPARISON:  None Available   FINDINGS: There is no acute intracranial hemorrhage, midline shift, mass effect or extra-axial fluid collection  Gray-white differentiation is preserved  Brain volume and ventricular system are within normal limits for age  The skull base and calvarium are intact  The visualized paranasal sinuses are unremarkable  The visualized orbits, globes, and mastoid air cells are unremarkable  Impression: No acute intracranial findings  If symptoms persist or if further imaging is clinically indicated, consider follow-up CT or MRI  Low attenuation in the white matter bilaterally, age indeterminate  This is most commonly from small vessel ischemic disease Signed by JOSE DAVID Borrego  Procedure: Cta Stroke Alert (head/neck)    Result Date: 9/13/2018  Narrative: INDICATION:  Sudden onset headache; carotid/vertebral dissection suspected  ORDERING PROVIDER:  QIAN RUVALCABA  TECHNIQUE:  Helical CT is performed from aortic arch through the brain after bolus administration of Omnipaque 350  Images are reviewed and processed at a workstation according to the CT angiogram protocol with 3-D and/or MIP post processing imaging generated  Percentage of stenosis was estimated utilizing NASCET criteria with narrowest arterial segment compared with distal normal luminal diameter  Automated mA/kV exposure control was utilized and patient examination was performed in strict accordance with principles of ALARA  RADIATION AMOUNT:  798 8 mGy-cm  COMPARISON:  CT head 9/13/2018, 9/1/2017  FINDINGS: CTA Neck: Arch and great vessels:  Atherosclerosis of the aortic arch is present  A three-vessel arch is demonstrated  Great vessels are widely patent  Right carotid: The bulb appears normal   The internal carotid artery is patent through its intradural bifurcation  The external carotid artery is patent  Left carotid:  Calcified plaque is noted in the left carotid bulb and proximal left internal carotid artery resulting in at most mild stenosis  The external carotid artery is patent  Vertebrals:  The right vertebral is widely patent at its origin  Vessel is then patent through the basilar  There is dominance of the right vertebral artery  There is severe narrowing of the proximal left vertebral artery and possible occlusion of the artery image 67 series 6B  Severe narrowing of the distal left vertebral artery also suggested proximal to the basilar artery  Occlusion of the artery is suspected  Soft tissues of the neck demonstrate no significant/acute abnormality  Mild emphysematous changes are present  Nodular density right lung apex measures 8 mm maximally  This appears not significantly changed compared with prior chest CT  The patient is status post prior median sternotomy  No focal bony lesions are seen  CTA Brain: The ventricular system appears normal   There  is no intracranial hemorrhage  There is no mass or mass effect  No abnormal enhancement is seen  There is patchy low density to the subcortical and periventricular white matter compatible with microangiopathic white matter change  No focal bony lesion is seen  Paranasal sinuses and mastoid air cells are clear  Atherosclerosis of the cavernous and paraclinoid portions of the internal carotid arteries noted bilaterally  The petrous portion of the internal carotid artery bilaterally demonstrates no significant stenosis or aneurysm  Anterior circulation: Right:  Normal complement of anterior and middle cerebral vessels fills with no significant stenosis, aneurysm or vascular malformation  Left:  Normal complement of anterior and middle cerebral vessels fills with no significant stenosis, aneurysm or vascular malformation  Posterior circulation:  Basilar artery demonstrates no significant stenosis  The visualized posterior fossa branches show no significant abnormality  A normal complement of posterior cerebral vessels fills bilaterally with no stenosis, aneurysm or vascular malformation       Impression: Significant narrowing of the left vertebral artery with apparent occlusion of the proximal and distal portions of the artery with reconstitution of flow within a narrowed left vertebral artery between these regions  NOTIFICATION:  The critical results of the study were discussed with, and acknowledged by Dr Dania Oh, by telephone on September 13, 2018 at 21:48  Signed by Valerio Ludwig MD       Recent Cultures (last 7 days):           Last 24 Hours Medication List:     Current Facility-Administered Medications:  acetaminophen 650 mg Oral Q4H PRN Wellington Lather, PA-C   albuterol 2 puff Inhalation Q6H PRN Wellington Lather, PA-C   aluminum-magnesium hydroxide-simethicone 30 mL Oral Q6H PRN Wellington Lather, PA-C   amLODIPine 10 mg Oral Daily Wellington Lather, PA-C   aspirin 81 mg Oral Daily Wellington Lather, PA-C   atorvastatin 40 mg Oral QPM Wellington Lather, PA-C   citalopram 40 mg Oral Daily Wellington Lather, PA-C   clopidogrel 150 mg Oral Daily Hansel Stearns DO   docusate sodium 100 mg Oral BID Juan Dorantes MD   enoxaparin 40 mg Subcutaneous Daily Wellington Lather, PA-C   fluticasone 1 spray Nasal Daily Wellington Lather, PA-C   LORazepam 0 5 mg Oral Q8H PRN Juan Dorantes MD   nicotine 14 mg Transdermal Daily Wellington Lather, PA-C   ondansetron 4 mg Intravenous Q6H PRN Wellington Lather, PA-C   roflumilast 500 mcg Oral Daily Wellington Lather, PA-C   tiotropium 18 mcg Inhalation Daily Wellington Lather, PA-C        Today, Patient Was Seen By: Juan Dorantes MD    ** Please Note: Dragon 360 Dictation voice to text software may have been used in the creation of this document   **

## 2018-09-17 VITALS
OXYGEN SATURATION: 97 % | HEART RATE: 73 BPM | SYSTOLIC BLOOD PRESSURE: 132 MMHG | RESPIRATION RATE: 18 BRPM | BODY MASS INDEX: 31.12 KG/M2 | DIASTOLIC BLOOD PRESSURE: 90 MMHG | WEIGHT: 217.37 LBS | TEMPERATURE: 97.9 F | HEIGHT: 70 IN

## 2018-09-17 PROBLEM — I25.10 CORONARY ARTERY DISEASE INVOLVING NATIVE CORONARY ARTERY OF NATIVE HEART WITHOUT ANGINA PECTORIS: Status: ACTIVE | Noted: 2018-09-14

## 2018-09-17 PROBLEM — F32.A ANXIETY AND DEPRESSION: Status: ACTIVE | Noted: 2018-09-17

## 2018-09-17 PROBLEM — F41.9 ANXIETY AND DEPRESSION: Status: ACTIVE | Noted: 2018-09-17

## 2018-09-17 PROCEDURE — 99239 HOSP IP/OBS DSCHRG MGMT >30: CPT | Performed by: INTERNAL MEDICINE

## 2018-09-17 RX ORDER — CITALOPRAM 40 MG/1
40 TABLET ORAL DAILY
Qty: 30 TABLET | Refills: 0 | Status: SHIPPED | OUTPATIENT
Start: 2018-09-17 | End: 2020-03-12 | Stop reason: HOSPADM

## 2018-09-17 RX ORDER — LORAZEPAM 0.5 MG/1
0.5 TABLET ORAL 2 TIMES DAILY PRN
Qty: 14 TABLET | Refills: 0 | Status: ON HOLD | OUTPATIENT
Start: 2018-09-17 | End: 2021-09-22 | Stop reason: SDUPTHER

## 2018-09-17 RX ORDER — FLUTICASONE PROPIONATE 50 MCG
1 SPRAY, SUSPENSION (ML) NASAL DAILY
Qty: 16 G | Refills: 0 | Status: SHIPPED | OUTPATIENT
Start: 2018-09-17

## 2018-09-17 RX ORDER — CLOPIDOGREL BISULFATE 75 MG/1
150 TABLET ORAL DAILY
Qty: 60 TABLET | Refills: 0 | Status: ON HOLD | OUTPATIENT
Start: 2018-09-18 | End: 2020-07-01

## 2018-09-17 RX ORDER — LORAZEPAM 0.5 MG/1
0.5 TABLET ORAL 2 TIMES DAILY PRN
Qty: 30 TABLET | Refills: 0 | Status: SHIPPED | OUTPATIENT
Start: 2018-09-17 | End: 2018-09-17

## 2018-09-17 RX ORDER — ALBUTEROL SULFATE 90 UG/1
2 AEROSOL, METERED RESPIRATORY (INHALATION) EVERY 4 HOURS PRN
Qty: 1 INHALER | Refills: 0 | Status: SHIPPED | OUTPATIENT
Start: 2018-09-17 | End: 2022-06-09

## 2018-09-17 RX ORDER — ATORVASTATIN CALCIUM 40 MG/1
40 TABLET, FILM COATED ORAL EVERY EVENING
Qty: 30 TABLET | Refills: 0 | Status: SHIPPED | OUTPATIENT
Start: 2018-09-17 | End: 2020-01-16 | Stop reason: SDUPTHER

## 2018-09-17 RX ORDER — ASPIRIN 81 MG/1
81 TABLET ORAL DAILY
Qty: 30 TABLET | Refills: 0 | Status: SHIPPED | OUTPATIENT
Start: 2018-09-17

## 2018-09-17 RX ORDER — AMLODIPINE BESYLATE 10 MG/1
10 TABLET ORAL DAILY
Qty: 30 TABLET | Refills: 0 | Status: SHIPPED | OUTPATIENT
Start: 2018-09-17 | End: 2020-01-16 | Stop reason: DRUGHIGH

## 2018-09-17 RX ADMIN — TIOTROPIUM BROMIDE 18 MCG: 18 CAPSULE ORAL; RESPIRATORY (INHALATION) at 14:42

## 2018-09-17 RX ADMIN — NICOTINE 14 MG: 14 PATCH, EXTENDED RELEASE TRANSDERMAL at 09:41

## 2018-09-17 RX ADMIN — DOCUSATE SODIUM 100 MG: 100 CAPSULE, LIQUID FILLED ORAL at 09:39

## 2018-09-17 RX ADMIN — ENOXAPARIN SODIUM 40 MG: 40 INJECTION SUBCUTANEOUS at 09:39

## 2018-09-17 RX ADMIN — AMLODIPINE BESYLATE 10 MG: 10 TABLET ORAL at 09:40

## 2018-09-17 RX ADMIN — FLUTICASONE PROPIONATE 1 SPRAY: 50 SPRAY, METERED NASAL at 09:44

## 2018-09-17 RX ADMIN — ASPIRIN 81 MG: 81 TABLET, COATED ORAL at 09:41

## 2018-09-17 RX ADMIN — LORAZEPAM 0.5 MG: 0.5 TABLET ORAL at 09:44

## 2018-09-17 RX ADMIN — CLOPIDOGREL 150 MG: 75 TABLET, FILM COATED ORAL at 09:40

## 2018-09-17 RX ADMIN — CITALOPRAM HYDROBROMIDE 40 MG: 20 TABLET ORAL at 09:40

## 2018-09-17 RX ADMIN — LORAZEPAM 0.5 MG: 0.5 TABLET ORAL at 00:28

## 2018-09-17 NOTE — DISCHARGE INSTRUCTIONS
You had a small stroke in the right side of your brain  Continue aspirin 81 mg daily    Increase your Plavix dose to 150 mg daily (two 75 mg tablets daily)  Start Lipitor 40 mg daily  These medications are to help prevent another stroke  Follow-up at the stroke clinic in 4-6 weeks  The heart doctor office will call you to schedule a test called a "loop recorder"   Stop smoking

## 2018-09-17 NOTE — PLAN OF CARE
Problem: DISCHARGE PLANNING - CARE MANAGEMENT  Goal: Discharge to post-acute care or home with appropriate resources  INTERVENTIONS:  - Conduct assessment to determine patient/family and health care team treatment goals, and need for post-acute services based on payer coverage, community resources, and patient preferences, and barriers to discharge  - Address psychosocial, clinical, and financial barriers to discharge as identified in assessment in conjunction with the patient/family and health care team  - Arrange appropriate level of post-acute services according to patient's   needs and preference and payer coverage in collaboration with the physician and health care team  - Communicate with and update the patient/family, physician, and health care team regarding progress on the discharge plan  - Arrange appropriate transportation to post-acute venues   Outcome: Adequate for Discharge  Discharge plan is outpt PT/OT  Pt has SL green sheet with list of therapy locations

## 2018-09-17 NOTE — ASSESSMENT & PLAN NOTE
Without exacerbation  Strongly advised against ongoing tobacco smoking  Continue Spiriva daily  He has not been taking daliresp for months

## 2018-09-17 NOTE — ASSESSMENT & PLAN NOTE
Status post CABG  Without ACS  Continue previous baby aspirin dose  Due to his acute stroke, his Plavix was increased to 150 mg daily by Neurology

## 2018-09-17 NOTE — ASSESSMENT & PLAN NOTE
Patient was admitted for left sided paresthesias of the face and left hand  MRI confirmed small stroke within the right precentral gyrus  Patient has been evaluated by Neurology, PT, OT  The patient has no residual weakness  Plan is to discharge him on aspirin 81 mg, Plavix 150 mg daily, Lipitor 40 mg daily, outpatient OT and PT, outpatient follow-up with the stroke clinic

## 2018-09-17 NOTE — DISCHARGE SUMMARY
Discharge- Erasto Mike 1955, 61 y o  male MRN: [id number]    Unit/Bed#: E4 -01 Encounter: [id number]    Primary Care Provider: Beronica Vivar MD   Date and time admitted to hospital: 9/13/2018 11:08 PM        * Acute right MCA stroke [facility])   Assessment & Plan    Patient was admitted for left sided paresthesias of the face and left hand  MRI confirmed small stroke within the right precentral gyrus  Patient has been evaluated by Neurology, PT, OT  The patient has no residual weakness  Plan is to discharge him on aspirin 81 mg, Plavix 150 mg daily, Lipitor 40 mg daily, outpatient OT and PT, outpatient follow-up with the stroke clinic  Coronary artery disease involving native coronary artery of native heart without angina pectoris   Assessment & Plan    Status post CABG  Without ACS  Continue previous baby aspirin dose  Due to his acute stroke, his Plavix was increased to 150 mg daily by Neurology  COPD (chronic obstructive pulmonary disease) (HCC)   Assessment & Plan    Without exacerbation  Strongly advised against ongoing tobacco smoking  Continue Spiriva daily  He has not been taking daliresp for months          Hypertension   Assessment & Plan    Continue amlodipine 10 mg daily        Hyperlipidemia   Assessment & Plan    Goal LDL less than 100  Will DC on Lipitor 40 mg daily        Anxiety and depression   Assessment & Plan    I tried to call his intensive  Dora Ventura,  Left a voicemail for Ruth Cueva at [phone]  [facility]  The patient is requesting a refill of his Ativan    Will provide one-week supply until he is able to follow up with his PCP  Will prescribe Celexa            Discharging Physician / Practitioner: Kapil Khan MD  PCP: Beronica Vivar MD  Admission Date:   Admission Orders     Ordered        09/13/18 4358  Inpatient Admission  Once             Discharge Date: 09/17/18    Resolved Problems  Date Reviewed: 9/17/2018    None Consultations During Hospital Stay:  · Neurology    Procedures Performed:     · None    Significant Findings / Test Results:     · MRI brain- right precentral gyrus stroke    Incidental Findings:   · none     Test Results Pending at Discharge (will require follow up):   · none     Outpatient Tests Requested:  · Loop recorder    Complications:  None    Reason for Admission: left sided weakness    Hospital Course:     Cydney Falcon is a 61 y o  male patient who originally presented to the hospital on 9/13/2018 due to sudden left-sided weakness and vague left-sided sensory symptoms  He reported a sense of left facial distortion and tightness associated with difficulty performing motor tasks using the left hand  The patient initially presented at [facility]  A stroke alert was called and due to an NIH score of 0, he did not receive tPA  He was transferred to our institution for further evaluation and treatment  He underwent a stroke pathway and was found to have a small stroke in the right precentral gyrus corresponding to his symptoms  He was evaluated by Neurology, PT, OT, speech and case management  He was placed on aspirin 81 mg and Plavix 150 mg daily since his P2Y12 test showed subtherapeutic response to Plavix 75 mg daily  He was also started on Lipitor 40 mg daily  PT and OT recommended outpatient rehab  Neurology also recommended outpatient loop recorder placement which will be set up through the Cardiology office  Prescriptions will be provided from our home start pharmacy  Patient requested a refill for all of his medication  Please see above list of diagnoses and related plan for additional information  Condition at Discharge: good     Discharge Day Visit / Exam:     Subjective:  Denies new weakness or numbness  Only complains of slight tingling of the left 3 fingers    Was reluctant to leave yesterday but feels better now  Vitals: Blood Pressure: 132/90 (09/17/18 0700)  Pulse: 73 (09/17/18 0700)  Temperature: 97 9 °F (36 6 °C) (09/17/18 0700)  Temp Source: Temporal (09/17/18 0700)  Respirations: 18 (09/17/18 0700)  Height: 5' 10" (177 8 cm) (09/13/18 2330)  Weight - Scale: 98 6 kg (217 lb 6 oz) (09/13/18 2330)  SpO2: 97 % (09/17/18 0700)  Exam:   Physical Exam   Constitutional: He is oriented to person, place, and time  He appears well-developed and well-nourished  HENT:   Head: Normocephalic and atraumatic  Mouth/Throat: No oropharyngeal exudate  Eyes: No scleral icterus  Neck: No JVD present  Cardiovascular: Regular rhythm  Exam reveals no friction rub  No murmur heard  Pulmonary/Chest: Effort normal  No respiratory distress  He has no wheezes  Abdominal: Soft  He exhibits no distension  There is no tenderness  Musculoskeletal: He exhibits no edema  Neurological: He is alert and oriented to person, place, and time  Symmetrical motor movements  Steady gait  No facial droop  Fluent speech   Skin: Skin is warm and dry  Psychiatric:   Mood is slightly anxious       Discussion with Family:  Offered to call his sister which he declined    Discharge instructions/Information to patient and family:   See after visit summary for information provided to patient and family  Provisions for Follow-Up Care:  See after visit summary for information related to follow-up care and any pertinent home health orders  Disposition:     Home      Planned Readmission: none     Discharge Statement:  I spent >30 minutes discharging the patient  This time was spent on the day of discharge  I had direct contact with the patient on the day of discharge  Greater than 50% of the total time was spent examining patient, answering all patient questions, arranging and discussing plan of care with patient as well as directly providing post-discharge instructions  Additional time then spent on discharge activities      Discharge Medications:  See after visit summary for reconciled discharge medications provided to patient and family        ** Please Note: This note has been constructed using a voice recognition system **

## 2018-09-17 NOTE — ASSESSMENT & PLAN NOTE
I tried to call his intensive  Cornel Arreola,  Left a voicemail for Blayne Curiel at [phone]  [facility]  The patient is requesting a refill of his Ativan    Will provide one-week supply until he is able to follow up with his PCP  Will prescribe Celexa

## 2018-09-20 ENCOUNTER — TELEPHONE (OUTPATIENT)
Dept: NEUROLOGY | Facility: CLINIC | Age: 63
End: 2018-09-20

## 2018-09-20 NOTE — TELEPHONE ENCOUNTER
----- Message from Brenna Farr sent at 9/14/2018  4:05 PM EDT -----  Regarding: HFU  Please schedule f/u in Stroke Clinic

## 2018-09-20 NOTE — TELEPHONE ENCOUNTER
----- Message from Elizabeth Arndt, [address] sent at 9/14/2018  4:05 PM EDT -----  Regarding: HFU  Please schedule f/u in Stroke Clinic

## 2018-10-10 ENCOUNTER — HOSPITAL ENCOUNTER (EMERGENCY)
Facility: HOSPITAL | Age: 63
Discharge: HOME | End: 2018-10-10
Payer: MEDICARE

## 2018-10-10 ENCOUNTER — APPOINTMENT (EMERGENCY)
Dept: RADIOLOGY | Facility: HOSPITAL | Age: 63
End: 2018-10-10
Payer: MEDICARE

## 2018-10-10 ENCOUNTER — APPOINTMENT (EMERGENCY)
Dept: CT IMAGING | Facility: HOSPITAL | Age: 63
End: 2018-10-10
Payer: MEDICARE

## 2018-10-10 VITALS
WEIGHT: 205 LBS | BODY MASS INDEX: 29.35 KG/M2 | DIASTOLIC BLOOD PRESSURE: 82 MMHG | TEMPERATURE: 98.3 F | RESPIRATION RATE: 22 BRPM | OXYGEN SATURATION: 95 % | HEART RATE: 69 BPM | HEIGHT: 70 IN | SYSTOLIC BLOOD PRESSURE: 129 MMHG

## 2018-10-10 DIAGNOSIS — R07.9 CHEST PAIN, UNSPECIFIED TYPE: Primary | ICD-10-CM

## 2018-10-10 DIAGNOSIS — H53.8 BLURRING OF VISION: ICD-10-CM

## 2018-10-10 LAB
ALBUMIN SERPL BCP-MCNC: 4.3 G/DL (ref 3.5–5.7)
ALP SERPL-CCNC: 83 U/L (ref 55–165)
ALT SERPL W P-5'-P-CCNC: 11 U/L (ref 7–52)
ANION GAP SERPL CALCULATED.3IONS-SCNC: 6 MMOL/L (ref 4–13)
APTT PPP: 28 SECONDS (ref 24–36)
AST SERPL W P-5'-P-CCNC: 12 U/L (ref 13–39)
ATRIAL RATE: 88 BPM
BASOPHILS # BLD AUTO: 0.1 THOUSANDS/ΜL (ref 0–0.1)
BASOPHILS NFR BLD AUTO: 1 % (ref 0–2)
BILIRUB SERPL-MCNC: 0.5 MG/DL (ref 0.2–1)
BUN SERPL-MCNC: 8 MG/DL (ref 7–25)
CALCIUM SERPL-MCNC: 9.1 MG/DL (ref 8.6–10.5)
CHLORIDE SERPL-SCNC: 104 MMOL/L (ref 98–107)
CO2 SERPL-SCNC: 26 MMOL/L (ref 21–31)
CREAT SERPL-MCNC: 0.93 MG/DL (ref 0.7–1.3)
EOSINOPHIL # BLD AUTO: 0.1 THOUSAND/ΜL (ref 0–0.61)
EOSINOPHIL NFR BLD AUTO: 1 % (ref 0–5)
ERYTHROCYTE [DISTWIDTH] IN BLOOD BY AUTOMATED COUNT: 13.4 % (ref 11.5–14.5)
GFR SERPL CREATININE-BSD FRML MDRD: 87 ML/MIN/1.73SQ M
GLUCOSE SERPL-MCNC: 89 MG/DL (ref 65–140)
GLUCOSE SERPL-MCNC: 95 MG/DL (ref 65–99)
HCT VFR BLD AUTO: 42.9 % (ref 36.5–49.3)
HGB BLD-MCNC: 15 G/DL (ref 14–18)
INR PPP: 1.07 (ref 0.9–1.5)
LYMPHOCYTES # BLD AUTO: 1.6 THOUSANDS/ΜL (ref 0.6–4.47)
LYMPHOCYTES NFR BLD AUTO: 20 % (ref 21–51)
MCH RBC QN AUTO: 31.1 PG (ref 26–34)
MCHC RBC AUTO-ENTMCNC: 35 G/DL (ref 31–37)
MCV RBC AUTO: 89 FL (ref 81–99)
MONOCYTES # BLD AUTO: 0.5 THOUSAND/ΜL (ref 0.17–1.22)
MONOCYTES NFR BLD AUTO: 6 % (ref 2–12)
NEUTROPHILS # BLD AUTO: 5.8 THOUSANDS/ΜL (ref 1.4–6.5)
NEUTS SEG NFR BLD AUTO: 72 % (ref 42–75)
NRBC BLD AUTO-RTO: 0 /100 WBCS
P AXIS: 58 DEGREES
PLATELET # BLD AUTO: 236 THOUSANDS/UL (ref 149–390)
PMV BLD AUTO: 7.1 FL (ref 8.6–11.7)
POTASSIUM SERPL-SCNC: 3.7 MMOL/L (ref 3.5–5.5)
PR INTERVAL: 150 MS
PROT SERPL-MCNC: 6.6 G/DL (ref 6.4–8.9)
PROTHROMBIN TIME: 12.4 SECONDS (ref 10.1–12.9)
QRS AXIS: 91 DEGREES
QRSD INTERVAL: 88 MS
QT INTERVAL: 384 MS
QTC INTERVAL: 464 MS
RBC # BLD AUTO: 4.83 MILLION/UL (ref 4.3–5.9)
SODIUM SERPL-SCNC: 136 MMOL/L (ref 134–143)
T WAVE AXIS: 67 DEGREES
TROPONIN I SERPL-MCNC: <0.03 NG/ML
VENTRICULAR RATE: 88 BPM
WBC # BLD AUTO: 8.1 THOUSAND/UL (ref 4.8–10.8)

## 2018-10-10 PROCEDURE — 99285 EMERGENCY DEPT VISIT HI MDM: CPT

## 2018-10-10 PROCEDURE — 84484 ASSAY OF TROPONIN QUANT: CPT

## 2018-10-10 PROCEDURE — 96361 HYDRATE IV INFUSION ADD-ON: CPT

## 2018-10-10 PROCEDURE — 93010 ELECTROCARDIOGRAM REPORT: CPT | Performed by: INTERNAL MEDICINE

## 2018-10-10 PROCEDURE — 96374 THER/PROPH/DIAG INJ IV PUSH: CPT

## 2018-10-10 PROCEDURE — 80053 COMPREHEN METABOLIC PANEL: CPT

## 2018-10-10 PROCEDURE — 85025 COMPLETE CBC W/AUTO DIFF WBC: CPT

## 2018-10-10 PROCEDURE — 85610 PROTHROMBIN TIME: CPT

## 2018-10-10 PROCEDURE — 71045 X-RAY EXAM CHEST 1 VIEW: CPT

## 2018-10-10 PROCEDURE — 85730 THROMBOPLASTIN TIME PARTIAL: CPT

## 2018-10-10 PROCEDURE — 36415 COLL VENOUS BLD VENIPUNCTURE: CPT

## 2018-10-10 PROCEDURE — 82948 REAGENT STRIP/BLOOD GLUCOSE: CPT

## 2018-10-10 PROCEDURE — 93005 ELECTROCARDIOGRAM TRACING: CPT

## 2018-10-10 PROCEDURE — 70450 CT HEAD/BRAIN W/O DYE: CPT

## 2018-10-10 RX ORDER — SODIUM CHLORIDE 9 MG/ML
125 INJECTION, SOLUTION INTRAVENOUS CONTINUOUS
Status: DISCONTINUED | OUTPATIENT
Start: 2018-10-10 | End: 2018-10-10 | Stop reason: HOSPADM

## 2018-10-10 RX ORDER — LORAZEPAM 2 MG/ML
1 INJECTION INTRAMUSCULAR ONCE
Status: COMPLETED | OUTPATIENT
Start: 2018-10-10 | End: 2018-10-10

## 2018-10-10 RX ADMIN — LORAZEPAM 1 MG: 2 INJECTION INTRAMUSCULAR; INTRAVENOUS at 16:28

## 2018-10-10 RX ADMIN — SODIUM CHLORIDE 125 ML/HR: 0.9 INJECTION, SOLUTION INTRAVENOUS at 16:28

## 2018-10-10 NOTE — ED PROVIDER NOTES
History  Chief Complaint   Patient presents with    Chest Pain    Blurred Vision     Nathaniel santana a 24-year-old male came to the emergency department due to chest pain that started several days prior to arrival followed by blurring of vision that started 2 days prior to arrival   Patient denies shortness of breath  He denies fever or chills  He denies headache or dizziness  History provided by:  Patient   used: No    Chest Pain   Pain location:  L chest  Pain quality: throbbing    Pain radiates to:  Does not radiate  Pain radiates to the back: no    Pain severity:  Mild  Onset quality:  Gradual  Duration: Few  Timing:  Intermittent  Progression:  Waxing and waning  Chronicity:  Recurrent  Context: not breathing, no drug use, not eating, no intercourse, not lifting, no movement, not raising an arm, not at rest, no stress and no trauma    Relieved by:  Nothing  Worsened by:  Nothing tried  Ineffective treatments:  None tried  Associated symptoms: no abdominal pain, no AICD problem, no altered mental status, no anorexia, no anxiety, no back pain, no claudication, no cough, no diaphoresis, no dizziness, no dysphagia, no fatigue, no fever, no headache, no heartburn, no lower extremity edema, no nausea, no near-syncope, no numbness, no orthopnea, no palpitations, no PND, no shortness of breath, no syncope, not vomiting and no weakness    Risk factors: hypertension        Prior to Admission Medications   Prescriptions Last Dose Informant Patient Reported? Taking?    LORazepam (ATIVAN) 0 5 mg tablet   No No   Sig: Take 1 tablet (0 5 mg total) by mouth 2 (two) times a day as needed for anxiety for up to 7 days   albuterol (PROVENTIL HFA,VENTOLIN HFA) 90 mcg/act inhaler   No No   Sig: Inhale 2 puffs every 4 (four) hours as needed for wheezing   amLODIPine (NORVASC) 10 mg tablet   No No   Sig: Take 1 tablet (10 mg total) by mouth daily   aspirin (ECOTRIN LOW STRENGTH) 81 mg EC tablet   No No Sig: Take 1 tablet (81 mg total) by mouth daily   atorvastatin (LIPITOR) 40 mg tablet   No No   Sig: Take 1 tablet (40 mg total) by mouth every evening   citalopram (CeleXA) 40 mg tablet   No No   Sig: Take 1 tablet (40 mg total) by mouth daily   clopidogrel (PLAVIX) 75 mg tablet   No No   Sig: Take 2 tablets (150 mg total) by mouth daily   fluticasone (FLONASE) 50 mcg/act nasal spray   No No   Si spray into each nostril daily   tiotropium (SPIRIVA) 18 mcg inhalation capsule   No No   Sig: Place 1 capsule (18 mcg total) into inhaler and inhale daily      Facility-Administered Medications: None       Past Medical History:   Diagnosis Date    [facility] right MCA stroke ([facility] ) 9/15/2018    Cardiac disease     cardiac bypassx2 and stents    COPD (chronic obstructive pulmonary disease) ([facility] )     Enlarged prostate     Heart attack ([facility] )     Hyperlipidemia     Hypertension        Past Surgical History:   Procedure Laterality Date    CARDIAC SURGERY      HERNIA REPAIR         History reviewed  No pertinent family history  I have reviewed and agree with the history as documented  Social History   Substance Use Topics    Smoking status: Current Every Day Smoker     Packs/day: 1 00     Types: Cigarettes    Smokeless tobacco: Never Used    Alcohol use No        Review of Systems   Constitutional: Negative for diaphoresis, fatigue and fever  HENT: Negative for trouble swallowing  Eyes: Negative  Respiratory: Negative for cough and shortness of breath  Cardiovascular: Positive for chest pain  Negative for palpitations, orthopnea, claudication, syncope, PND and near-syncope  Gastrointestinal: Negative for abdominal pain, anorexia, heartburn, nausea and vomiting  Endocrine: Negative  Genitourinary: Negative  Musculoskeletal: Negative for back pain  Skin: Negative  Allergic/Immunologic: Negative  Neurological: Negative for dizziness, weakness, numbness and headaches     Hematological: Negative  Psychiatric/Behavioral: Negative  Physical Exam  Physical Exam   Constitutional: He is oriented to person, place, and time  He appears well-developed and well-nourished  No distress  HENT:   Head: Normocephalic and atraumatic  Right Ear: External ear normal    Left Ear: External ear normal    Nose: Nose normal    Mouth/Throat: Oropharynx is clear and moist  No oropharyngeal exudate  Eyes: Pupils are equal, round, and reactive to light  Conjunctivae and EOM are normal  Right eye exhibits no discharge  Left eye exhibits no discharge  No scleral icterus  Neck: Normal range of motion  Neck supple  No tracheal deviation present  No thyromegaly present  Cardiovascular: Normal rate, regular rhythm, normal heart sounds and intact distal pulses  Pulmonary/Chest: Effort normal and breath sounds normal  No respiratory distress  Abdominal: Soft  Bowel sounds are normal  He exhibits no distension  There is no tenderness  Musculoskeletal: Normal range of motion  He exhibits no edema, tenderness or deformity  Lymphadenopathy:     He has no cervical adenopathy  Neurological: He is alert and oriented to person, place, and time  No cranial nerve deficit or sensory deficit  He exhibits normal muscle tone  Coordination normal    Skin: Skin is warm and dry  No rash noted  He is not diaphoretic  No erythema  No pallor  Psychiatric: He has a normal mood and affect  His behavior is normal  Judgment and thought content normal    Nursing note and vitals reviewed        Vital Signs  ED Triage Vitals [10/10/18 1555]   Temperature Pulse Respirations Blood Pressure SpO2   98 3 °F (36 8 °C) 91 16 145/93 96 %      Temp Source Heart Rate Source Patient Position - Orthostatic VS BP Location FiO2 (%)   Temporal Monitor Sitting Left arm --      Pain Score       No Pain           Vitals:    10/10/18 1615 10/10/18 1630 10/10/18 1700 10/10/18 1715   BP: 124/77 130/83 138/87    Pulse: 79 79 78 73   Patient Position - Orthostatic VS:           Visual Acuity  Visual Acuity      Most Recent Value   L Pupil Size (mm)  3   R Pupil Size (mm)  3          ED Medications  Medications   sodium chloride 0 9 % infusion (125 mL/hr Intravenous New Bag 10/10/18 1628)   LORazepam (ATIVAN) 2 mg/mL injection 1 mg (1 mg Intravenous Given 10/10/18 1628)       Diagnostic Studies  Results Reviewed     Procedure Component Value Units Date/Time    Troponin I [[id number]]  (Normal) Collected:  10/10/18 1615    Lab Status:  Final result Specimen:  Blood from Arm, Right Updated:  10/10/18 1647     Troponin I <0 03 ng/mL     Comprehensive metabolic panel [[id number]]  (Abnormal) Collected:  10/10/18 1615    Lab Status:  Final result Specimen:  Blood from Arm, Right Updated:  10/10/18 1647     Sodium 136 mmol/L      Potassium 3 7 mmol/L      Chloride 104 mmol/L      CO2 26 mmol/L      ANION GAP 6 mmol/L      BUN 8 mg/dL      Creatinine 0 93 mg/dL      Glucose 95 mg/dL      Calcium 9 1 mg/dL      AST 12 (L) U/L      ALT 11 U/L      Alkaline Phosphatase 83 U/L      Total Protein 6 6 g/dL      Albumin 4 3 g/dL      Total Bilirubin 0 50 mg/dL      eGFR 87 ml/min/1 73sq m     Narrative:         National Kidney Disease Education Program recommendations are as follows:  GFR calculation is accurate only with a steady state creatinine  Chronic Kidney disease less than 60 ml/min/1 73 sq  meters  Kidney failure less than 15 ml/min/1 73 sq  meters      Protime-INR [[id number]]  (Normal) Collected:  10/10/18 1616    Lab Status:  Final result Specimen:  Blood from Arm, Right Updated:  10/10/18 1639     Protime 12 4 seconds      INR 1 07    APTT [[id number]]  (Normal) Collected:  10/10/18 1616    Lab Status:  Final result Specimen:  Blood from Arm, Right Updated:  10/10/18 1639     PTT 28 seconds     CBC and differential [[id number]]  (Abnormal) Collected:  10/10/18 1615    Lab Status:  Final result Specimen:  Blood from Arm, Right Updated:  10/10/18 1626     WBC 8 10 Thousand/uL      RBC 4 83 Million/uL      Hemoglobin 15 0 g/dL      Hematocrit 42 9 %      MCV 89 fL      MCH 31 1 pg      MCHC 35 0 g/dL      RDW 13 4 %      MPV 7 1 (L) fL      Platelets 037 Thousands/uL      nRBC 0 /100 WBCs      Neutrophils Relative 72 %      Lymphocytes Relative 20 (L) %      Monocytes Relative 6 %      Eosinophils Relative 1 %      Basophils Relative 1 %      Neutrophils Absolute 5 80 Thousands/µL      Lymphocytes Absolute 1 60 Thousands/µL      Monocytes Absolute 0 50 Thousand/µL      Eosinophils Absolute 0 10 Thousand/µL      Basophils Absolute 0 10 Thousands/µL     Fingerstick Glucose (POCT) [[id number]]  (Normal) Collected:  10/10/18 1619    Lab Status:  Final result Updated:  10/10/18 1620     POC Glucose 89 mg/dl     Troponin I [[id number]] Collected:  10/10/18 1615    Lab Status:  No result Specimen:  Blood from Arm, Right                  CT head without contrast   Final Result by Ed Maycol Spaulding (10/10 1732)   No change  No acute intracranial process  Signed by Stuart Garcia MD      XR chest 1 view portable   ED Interpretation by Rodney Kelley MD (10/10 [phone])   No acute disease      Final Result by Mino Hamilton (10/10 1722)   No acute process             Signed by Mino Hamilton MD                 Procedures  ECG 12 Lead Documentation  Date/Time: 10/10/2018 4:01 PM  Performed by: PRIYA Barba  Authorized by: PRIYA Barba     Indications / Diagnosis:  Chest pain  ECG reviewed by me, the ED Provider: yes    Patient location:  ED  Previous ECG:     Previous ECG:  Unavailable    Comparison to cardiac monitor: Yes    Interpretation:     Interpretation: non-specific    Quality:     Tracing quality:  Limited by artifact  Rate:     ECG rate:  80 beats per minute    ECG rate assessment: normal    Rhythm:     Rhythm: sinus rhythm    Ectopy:     Ectopy: none    QRS:     QRS axis:  Normal    QRS intervals:  Normal  Conduction:     Conduction: normal    ST segments:     ST segments: Non-specific  T waves:     T waves: non-specific             Phone Contacts  ED Phone Contact    ED Course  ED Course as of Oct 10 1738   Wed Oct 10, 2018   1736 Patient feels better and would like to go home  Test results were discussed with him  MDM  Number of Diagnoses or Management Options  Blurring of vision: new and requires workup  Chest pain, unspecified type: new and requires workup     Amount and/or Complexity of Data Reviewed  Clinical lab tests: ordered and reviewed  Tests in the radiology section of CPT®: ordered and reviewed  Tests in the medicine section of CPT®: ordered and reviewed  Decide to obtain previous medical records or to obtain history from someone other than the patient: yes  Review and summarize past medical records: yes  Independent visualization of images, tracings, or specimens: yes    Risk of Complications, Morbidity, and/or Mortality  Presenting problems: moderate  Diagnostic procedures: moderate  Management options: moderate    Patient Progress  Patient progress: stable    CritCare Time    Disposition  Final diagnoses:   Chest pain, unspecified type   Blurring of vision     Time reflects when diagnosis was documented in both MDM as applicable and the Disposition within this note     Time User Action Codes Description Comment    10/10/2018  5:22 PM Soraida Pierre Add [R07 9] Chest pain, unspecified type     10/10/2018  5:22 PM Candelario Kurtz Add [H53 8] Blurring of vision       ED Disposition     ED Disposition Condition Comment    Discharge  [address] discharge to home/self care  Condition at discharge: Good        Follow-up Information     Follow up With Specialties Details Why Contact Info    Nina Mcallister MD Internal Medicine In 2 days  [address] [place] [address]  [phone]            Patient's Medications   Discharge Prescriptions    No medications on file     No discharge procedures on file      ED Provider  Electronically Signed by           Tammie Ortiz MD  10/10/18 2705

## 2018-10-17 ENCOUNTER — TELEPHONE (OUTPATIENT)
Dept: NEUROLOGY | Facility: CLINIC | Age: 63
End: 2018-10-17

## 2018-10-17 NOTE — TELEPHONE ENCOUNTER
30 Day Post Discharge Stroke Follow-Up Call Questionnaire    The purpose of this phone call is to assess patient's general wellbeing or for any assistance needed with follow-up care  Patient did not answer  Left a message on voicemail box for call back

## 2018-10-30 NOTE — TELEPHONE ENCOUNTER
Patient returned my call  Since discharge, he continues to experienced blurred vision  He states it worsens on days his appetite is decreased  Appetite has been poor lately  Appetite worsens with inadequate sleep  Patient has not followed up with sleep center  Patient ambulates independently, performs his own ADLs, and lives alone with no form of transportation  Patient had one ED visit 10/10 for chest pain and blurred vision and was instructed to follow up with his PCP  Patient has not seen is PCP  I reeducated importance of follow up appointments  He verbalized understanding and states he will call their office after he meets his  this week  Patient does not have a car and  transports him to his appointments  Patient did follow up with neurology last week and saw Dr Ohara Fails  I do not see an office encounter from his practice  He is unsure if he wants to continue care with Dr Oriana Lindo and states he thinks he would prefer to establish with [facility]  At that appointment, patient claims there was discussion of a loop recorder  He wishes to see [facility] cardiology for procedure  He has not made an appointment yet  States he will follow up with   Patient manages his own medications  He states he had some difficulty with picking up medications due to affordability  Patient had missed the first few doses of his plavix 75 mg  Patient did not take plavix yesterday either because he forgot to bring it with him after he "went out"  And when he returned home today, this morning, he took it as prescribed  He is not sure how many doses total he has missed  He denies any missed doses of his other medications  I reeducated him on medication compliance  He verbalizes understanding and states he will continue to be compliant  Patient did received stroke binder eladio inpatient  Patient unsure of his risk factors  I discussed with him the importance of follow up appointments  Patient does not monitor his BP unless taking a trip to [address] and uses the automated cuff there  He does not know his average BP  Patient continues to smoke 1/2 pack a day  I reeducated smoking cessation and regular BP monitoring  Patient asking me what steps to take next  I reviewed latest AVS and suggested calling his PCP as he has yet to do so  He again stated he will speak with his  first  I told patient to read stroke binder for additional information  I reeducated stroke symptoms  Patient verbalized understanding  I provided the number to our office of he has any questions or wishes to schedule an appointment  Patient has no other questions or concerns at this time

## 2018-12-06 ENCOUNTER — PATIENT OUTREACH (OUTPATIENT)
Dept: OTHER | Facility: HOSPITAL | Age: 63
End: 2018-12-06

## 2018-12-14 ENCOUNTER — TRANSCRIBE ORDERS (OUTPATIENT)
Dept: ADMINISTRATIVE | Facility: HOSPITAL | Age: 63
End: 2018-12-14

## 2018-12-17 ENCOUNTER — PATIENT OUTREACH (OUTPATIENT)
Dept: CASE MANAGEMENT | Facility: OTHER | Age: 63
End: 2018-12-17

## 2018-12-20 ENCOUNTER — TRANSCRIBE ORDERS (OUTPATIENT)
Dept: ADMINISTRATIVE | Facility: HOSPITAL | Age: 63
End: 2018-12-20

## 2018-12-20 DIAGNOSIS — I63.9 CEREBROVASCULAR ACCIDENT (CVA), UNSPECIFIED MECHANISM (HCC): Primary | ICD-10-CM

## 2019-01-03 ENCOUNTER — HOSPITAL ENCOUNTER (OUTPATIENT)
Dept: NON INVASIVE DIAGNOSTICS | Facility: HOSPITAL | Age: 64
Discharge: HOME | End: 2019-01-03
Payer: MEDICARE

## 2019-01-03 DIAGNOSIS — I63.9 CEREBROVASCULAR ACCIDENT (CVA), UNSPECIFIED MECHANISM (HCC): ICD-10-CM

## 2019-01-03 PROCEDURE — 93880 EXTRACRANIAL BILAT STUDY: CPT

## 2019-01-03 PROCEDURE — 93306 TTE W/DOPPLER COMPLETE: CPT | Performed by: INTERNAL MEDICINE

## 2019-01-03 PROCEDURE — 93880 EXTRACRANIAL BILAT STUDY: CPT | Performed by: SURGERY

## 2019-01-03 PROCEDURE — 93306 TTE W/DOPPLER COMPLETE: CPT

## 2019-01-10 ENCOUNTER — HOSPITAL ENCOUNTER (EMERGENCY)
Facility: HOSPITAL | Age: 64
Discharge: HOME | End: 2019-01-10
Attending: EMERGENCY MEDICINE | Admitting: EMERGENCY MEDICINE
Payer: MEDICARE

## 2019-01-10 ENCOUNTER — APPOINTMENT (EMERGENCY)
Dept: CT IMAGING | Facility: HOSPITAL | Age: 64
End: 2019-01-10
Payer: MEDICARE

## 2019-01-10 VITALS
BODY MASS INDEX: 30.78 KG/M2 | WEIGHT: 215 LBS | HEIGHT: 70 IN | TEMPERATURE: 97.3 F | RESPIRATION RATE: 18 BRPM | SYSTOLIC BLOOD PRESSURE: 130 MMHG | OXYGEN SATURATION: 96 % | DIASTOLIC BLOOD PRESSURE: 81 MMHG | HEART RATE: 84 BPM

## 2019-01-10 DIAGNOSIS — N45.1 LEFT EPIDIDYMITIS: Primary | ICD-10-CM

## 2019-01-10 LAB
ALBUMIN SERPL BCP-MCNC: 3.9 G/DL (ref 3.5–5.7)
ALP SERPL-CCNC: 77 U/L (ref 55–165)
ALT SERPL W P-5'-P-CCNC: 11 U/L (ref 7–52)
ANION GAP SERPL CALCULATED.3IONS-SCNC: 5 MMOL/L (ref 4–13)
AST SERPL W P-5'-P-CCNC: 10 U/L (ref 13–39)
BASOPHILS # BLD AUTO: 0.1 THOUSANDS/ΜL (ref 0–0.1)
BASOPHILS NFR BLD AUTO: 1 % (ref 0–2)
BILIRUB SERPL-MCNC: 0.4 MG/DL (ref 0.2–1)
BILIRUB UR QL STRIP: NEGATIVE
BUN SERPL-MCNC: 12 MG/DL (ref 7–25)
CALCIUM SERPL-MCNC: 9 MG/DL (ref 8.6–10.5)
CHLORIDE SERPL-SCNC: 104 MMOL/L (ref 98–107)
CLARITY UR: CLEAR
CO2 SERPL-SCNC: 27 MMOL/L (ref 21–31)
COLOR UR: NORMAL
CREAT SERPL-MCNC: 0.94 MG/DL (ref 0.7–1.3)
EOSINOPHIL # BLD AUTO: 0.2 THOUSAND/ΜL (ref 0–0.61)
EOSINOPHIL NFR BLD AUTO: 3 % (ref 0–5)
ERYTHROCYTE [DISTWIDTH] IN BLOOD BY AUTOMATED COUNT: 13.4 % (ref 11.5–14.5)
GFR SERPL CREATININE-BSD FRML MDRD: 86 ML/MIN/1.73SQ M
GLUCOSE SERPL-MCNC: 95 MG/DL (ref 65–99)
GLUCOSE UR STRIP-MCNC: NEGATIVE MG/DL
HCT VFR BLD AUTO: 42.7 % (ref 36.5–49.3)
HGB BLD-MCNC: 14.7 G/DL (ref 14–18)
HGB UR QL STRIP.AUTO: NEGATIVE
KETONES UR STRIP-MCNC: NEGATIVE MG/DL
LEUKOCYTE ESTERASE UR QL STRIP: NEGATIVE
LIPASE SERPL-CCNC: 24 U/L (ref 11–82)
LYMPHOCYTES # BLD AUTO: 2.1 THOUSANDS/ΜL (ref 0.6–4.47)
LYMPHOCYTES NFR BLD AUTO: 29 % (ref 21–51)
MCH RBC QN AUTO: 30.9 PG (ref 26–34)
MCHC RBC AUTO-ENTMCNC: 34.4 G/DL (ref 31–37)
MCV RBC AUTO: 90 FL (ref 81–99)
MONOCYTES # BLD AUTO: 0.5 THOUSAND/ΜL (ref 0.17–1.22)
MONOCYTES NFR BLD AUTO: 7 % (ref 2–12)
NEUTROPHILS # BLD AUTO: 4.4 THOUSANDS/ΜL (ref 1.4–6.5)
NEUTS SEG NFR BLD AUTO: 61 % (ref 42–75)
NITRITE UR QL STRIP: NEGATIVE
NRBC BLD AUTO-RTO: 0 /100 WBCS
PH UR STRIP.AUTO: 7.5 [PH] (ref 5–8)
PLATELET # BLD AUTO: 248 THOUSANDS/UL (ref 149–390)
PMV BLD AUTO: 7 FL (ref 8.6–11.7)
POTASSIUM SERPL-SCNC: 3.6 MMOL/L (ref 3.5–5.5)
PROT SERPL-MCNC: 5.8 G/DL (ref 6.4–8.9)
PROT UR STRIP-MCNC: NEGATIVE MG/DL
RBC # BLD AUTO: 4.75 MILLION/UL (ref 4.3–5.9)
SODIUM SERPL-SCNC: 136 MMOL/L (ref 134–143)
SP GR UR STRIP.AUTO: 1.01 (ref 1–1.03)
UROBILINOGEN UR QL STRIP.AUTO: 0.2 E.U./DL
WBC # BLD AUTO: 7.2 THOUSAND/UL (ref 4.8–10.8)

## 2019-01-10 PROCEDURE — 36415 COLL VENOUS BLD VENIPUNCTURE: CPT | Performed by: PHYSICIAN ASSISTANT

## 2019-01-10 PROCEDURE — 99284 EMERGENCY DEPT VISIT MOD MDM: CPT

## 2019-01-10 PROCEDURE — 96374 THER/PROPH/DIAG INJ IV PUSH: CPT

## 2019-01-10 PROCEDURE — 83690 ASSAY OF LIPASE: CPT | Performed by: PHYSICIAN ASSISTANT

## 2019-01-10 PROCEDURE — 85025 COMPLETE CBC W/AUTO DIFF WBC: CPT | Performed by: PHYSICIAN ASSISTANT

## 2019-01-10 PROCEDURE — 81003 URINALYSIS AUTO W/O SCOPE: CPT | Performed by: PHYSICIAN ASSISTANT

## 2019-01-10 PROCEDURE — 74176 CT ABD & PELVIS W/O CONTRAST: CPT

## 2019-01-10 PROCEDURE — 96361 HYDRATE IV INFUSION ADD-ON: CPT

## 2019-01-10 PROCEDURE — 80053 COMPREHEN METABOLIC PANEL: CPT | Performed by: PHYSICIAN ASSISTANT

## 2019-01-10 RX ORDER — TRAMADOL HYDROCHLORIDE 50 MG/1
TABLET ORAL
Qty: 20 TABLET | Refills: 0 | Status: SHIPPED | OUTPATIENT
Start: 2019-01-10 | End: 2019-03-24 | Stop reason: ALTCHOICE

## 2019-01-10 RX ORDER — CIPROFLOXACIN 500 MG/1
500 TABLET, FILM COATED ORAL EVERY 12 HOURS SCHEDULED
Qty: 20 TABLET | Refills: 0 | Status: SHIPPED | OUTPATIENT
Start: 2019-01-10 | End: 2019-01-20

## 2019-01-10 RX ORDER — ASPIRIN 81 MG/1
81 TABLET, CHEWABLE ORAL DAILY
COMMUNITY
End: 2019-02-08

## 2019-01-10 RX ADMIN — MORPHINE SULFATE 2 MG: 2 INJECTION, SOLUTION INTRAMUSCULAR; INTRAVENOUS at 14:31

## 2019-01-10 RX ADMIN — SODIUM CHLORIDE 1000 ML: 0.9 INJECTION, SOLUTION INTRAVENOUS at 14:07

## 2019-01-10 NOTE — DISCHARGE INSTRUCTIONS
Epididymitis   WHAT YOU SHOULD KNOW:   Epididymitis is inflammation of your epididymis  The epididymis is a long curled tube inside your scrotum  It stores and carries sperm from your testicles to your penis  Acute epididymitis lasts for 6 weeks or less and becomes chronic if it lasts longer than 3 months  AFTER YOU LEAVE:   Medicines:   · Antibiotics: This medicine is given if epididymitis is caused by a bacterial infection  Take them as directed  · NSAIDs:  These medicines decrease swelling, pain, and fever  NSAIDs are available without a doctor's order  Ask which medicine is right for you and how much to take  Take as directed  NSAIDs can cause stomach bleeding or kidney problems if not taken correctly  · Pain medicine: You may be given a prescription medicine to decrease pain  Do not wait until the pain is severe before you take this medicine  · Take your medicine as directed  Call your healthcare provider if you think your medicine is not helping or if you have side effects  Tell him if you are allergic to any medicine  Keep a list of the medicines, vitamins, and herbs you take  Include the amounts, and when and why you take them  Bring the list or the pill bottles to follow-up visits  Carry your medicine list with you in case of an emergency  Follow up with your healthcare provider as directed: You may need to return for blood tests or other testing after you are done with treatment  Write down your questions so you remember to ask them during your visits  Self-care:   · Ice:  Ice helps decrease swelling and pain  Ice may also help prevent tissue damage  Use an ice pack or put crushed ice in a plastic bag  Cover it with a towel and place it on your swollen testicle or scrotum for 15 to 20 minutes every hour as directed  · Rest:  Rest or decreased activity may help decrease your pain  It may also help you heal faster  Return to normal activities as directed      · Safe sex:  Use a latex condom during oral, vaginal, or anal sex  Do not have sex with someone who has an STI  If you have an infection, let your sexual partner know so they can be checked for an STI and treated if needed  Do not have sex while you or your partner is being treated for an STI, or until your healthcare provider says that it is okay  · Scrotal support: You may be told to put a pillow or rolled up towel under your scrotum to elevate your scrotum when you sit or lie down  This may help reduce your pain  An athletic supporter may make you more comfortable when you stand  Contact your healthcare providerif:   · You have a fever  · Your signs and symptoms do not improve within 3 days of treatment or come back after treatment  · You have questions or concerns about your condition or care  Seek care immediately or call 911 if:   · You feel lightheaded or faint  · You have severe pain in your testicles that starts suddenly or follows an injury  · Your symptoms become worse, even after you start treatment with medicine  © 2014 [employer] is for End User's use only and may not be sold, redistributed or otherwise used for commercial purposes  All illustrations and images included in CareNotes® are the copyrighted property of [employer]  or [employer]  The above information is an  only  It is not intended as medical advice for individual conditions or treatments  Talk to your doctor, nurse or pharmacist before following any medical regimen to see if it is safe and effective for you

## 2019-01-10 NOTE — ED PROVIDER NOTES
History  Chief Complaint   Patient presents with    Abdominal Pain     x 2weeks  feels constipated     Patient is a 79-year-old male with a history of hypertension and recent CVA in September of this year on Plavix and aspirin who presents to the emergency department for evaluation of left-sided flank pain that radiates into his left groin and left testicle intermittently over the last 2 weeks  Patient states that the pain gets significantly worse at night roughly around 4:00 a m  In the morning that wakes him from sleep  Patient does admit to having constipation over the last 1 2 weeks which generally is not normal for him  He also complains of some abnormal sensation during urination  He denies recent trauma fevers chills nausea vomiting diarrhea chest pain shortness of breath cough hemoptysis  Prior to Admission Medications   Prescriptions Last Dose Informant Patient Reported? Taking?    LORazepam (ATIVAN) 0 5 mg tablet   No Yes   Sig: Take 1 tablet (0 5 mg total) by mouth 2 (two) times a day as needed for anxiety for up to 7 days   albuterol (PROVENTIL HFA,VENTOLIN HFA) 90 mcg/act inhaler   No Yes   Sig: Inhale 2 puffs every 4 (four) hours as needed for wheezing   amLODIPine (NORVASC) 10 mg tablet   No Yes   Sig: Take 1 tablet (10 mg total) by mouth daily   aspirin (ECOTRIN LOW STRENGTH) 81 mg EC tablet   No No   Sig: Take 1 tablet (81 mg total) by mouth daily   aspirin 81 mg chewable tablet   Yes Yes   Sig: Chew 81 mg daily   atorvastatin (LIPITOR) 40 mg tablet   No Yes   Sig: Take 1 tablet (40 mg total) by mouth every evening   citalopram (CeleXA) 40 mg tablet   No Yes   Sig: Take 1 tablet (40 mg total) by mouth daily   clopidogrel (PLAVIX) 75 mg tablet   No Yes   Sig: Take 2 tablets (150 mg total) by mouth daily   fluticasone (FLONASE) 50 mcg/act nasal spray   No Yes   Si spray into each nostril daily   tiotropium (SPIRIVA) 18 mcg inhalation capsule   No Yes   Sig: Place 1 capsule (18 mcg total) into inhaler and inhale daily      Facility-Administered Medications: None       Past Medical History:   Diagnosis Date    Acute right MCA stroke ([facility] ) 9/15/2018    Cardiac disease     cardiac bypassx2 and stents    COPD (chronic obstructive pulmonary disease) ([facility] )     Enlarged prostate     Heart attack ([facility] )     Hyperlipidemia     Hypertension        Past Surgical History:   Procedure Laterality Date    CARDIAC SURGERY      HERNIA REPAIR         History reviewed  No pertinent family history  I have reviewed and agree with the history as documented  Social History   Substance Use Topics    Smoking status: Current Every Day Smoker     Packs/day: 1 00     Types: Cigarettes    Smokeless tobacco: Never Used    Alcohol use No        Review of Systems   Constitutional: Negative for chills, diaphoresis, fatigue and fever  HENT: Negative for congestion, ear pain, rhinorrhea, sneezing and sore throat  Respiratory: Negative for cough, shortness of breath, wheezing and stridor  Cardiovascular: Negative for chest pain, palpitations and leg swelling  Gastrointestinal: Positive for abdominal pain  Negative for abdominal distention, blood in stool, constipation, diarrhea, nausea and vomiting  Genitourinary: Positive for testicular pain  Negative for difficulty urinating, dysuria, frequency, hematuria and urgency  Musculoskeletal: Negative for gait problem, myalgias and neck pain  Skin: Negative for rash  Neurological: Negative for dizziness, syncope, weakness, light-headedness and headaches  All other systems reviewed and are negative  Physical Exam  Physical Exam   Constitutional: He appears well-developed and well-nourished  HENT:   Head: Normocephalic and atraumatic  Cardiovascular: Normal rate, regular rhythm, normal heart sounds and intact distal pulses  Exam reveals no gallop and no friction rub  No murmur heard    Pulmonary/Chest: Effort normal and breath sounds normal  No respiratory distress  He has no wheezes  He has no rales  He exhibits no tenderness  Abdominal: Soft  Bowel sounds are normal  He exhibits no distension and no mass  There is no tenderness  There is no rebound and no guarding  No hernia  Genitourinary: Penis normal  Left testis shows tenderness  Left testis shows no mass  Cremasteric reflex is not absent on the left side  Musculoskeletal: Normal range of motion  He exhibits no edema or deformity  Skin: Skin is warm and dry  Capillary refill takes less than 2 seconds  No rash noted  No erythema  No pallor         Vital Signs  ED Triage Vitals   Temperature Pulse Respirations Blood Pressure SpO2   01/10/19 1305 01/10/19 1305 01/10/19 1305 01/10/19 1305 01/10/19 1305   (!) 97 3 °F (36 3 °C) 86 20 121/80 96 %      Temp Source Heart Rate Source Patient Position - Orthostatic VS BP Location FiO2 (%)   01/10/19 1305 -- 01/10/19 1656 01/10/19 1656 --   Temporal  Lying Left arm       Pain Score       01/10/19 1305       No Pain           Vitals:    01/10/19 1305 01/10/19 1656   BP: 121/80 130/81   Pulse: 86 84   Patient Position - Orthostatic VS:  Lying       Visual Acuity      ED Medications  Medications   sodium chloride 0 9 % bolus 1,000 mL (0 mL Intravenous Stopped 1/10/19 1656)   morphine injection 2 mg (2 mg Intravenous Given 1/10/19 1431)       Diagnostic Studies  Results Reviewed     Procedure Component Value Units Date/Time    UA w Reflex to Microscopic w Reflex to Culture [[id number]]  (Normal) Collected:  01/10/19 1629    Lab Status:  Final result Specimen:  Urine from Urine, Clean Catch Updated:  01/10/19 1639     Color, UA Straw     Clarity, UA Clear     Specific Gravity, UA 1 010     pH, UA 7 5     Leukocytes, UA Negative     Nitrite, UA Negative     Protein, UA Negative mg/dl      Glucose, UA Negative mg/dl      Ketones, UA Negative mg/dl      Urobilinogen, UA 0 2 E U /dl      Bilirubin, UA Negative     Blood, UA Negative    Lipase [[id number]]  (Normal) Collected:  01/10/19 1404    Lab Status:  Final result Specimen:  Blood from Arm, Right Updated:  01/10/19 1449     Lipase 24 u/L     Comprehensive metabolic panel [[id number]]  (Abnormal) Collected:  01/10/19 1404    Lab Status:  Final result Specimen:  Blood from Arm, Right Updated:  01/10/19 1449     Sodium 136 mmol/L      Potassium 3 6 mmol/L      Chloride 104 mmol/L      CO2 27 mmol/L      ANION GAP 5 mmol/L      BUN 12 mg/dL      Creatinine 0 94 mg/dL      Glucose 95 mg/dL      Calcium 9 0 mg/dL      AST 10 (L) U/L      ALT 11 U/L      Alkaline Phosphatase 77 U/L      Total Protein 5 8 (L) g/dL      Albumin 3 9 g/dL      Total Bilirubin 0 40 mg/dL      eGFR 86 ml/min/1 73sq m     Narrative:         National Kidney Disease Education Program recommendations are as follows:  GFR calculation is accurate only with a steady state creatinine  Chronic Kidney disease less than 60 ml/min/1 73 sq  meters  Kidney failure less than 15 ml/min/1 73 sq  meters  CBC and differential [[id number]]  (Abnormal) Collected:  01/10/19 1404    Lab Status:  Final result Specimen:  Blood from Arm, Right Updated:  01/10/19 1412     WBC 7 20 Thousand/uL      RBC 4 75 Million/uL      Hemoglobin 14 7 g/dL      Hematocrit 42 7 %      MCV 90 fL      MCH 30 9 pg      MCHC 34 4 g/dL      RDW 13 4 %      MPV 7 0 (L) fL      Platelets 208 Thousands/uL      nRBC 0 /100 WBCs      Neutrophils Relative 61 %      Lymphocytes Relative 29 %      Monocytes Relative 7 %      Eosinophils Relative 3 %      Basophils Relative 1 %      Neutrophils Absolute 4 40 Thousands/µL      Lymphocytes Absolute 2 10 Thousands/µL      Monocytes Absolute 0 50 Thousand/µL      Eosinophils Absolute 0 20 Thousand/µL      Basophils Absolute 0 10 Thousands/µL                  CT abdomen pelvis wo contrast   Final Result by Dallin Orellana MD (01/10 1558)      No urinary tract calculus or inflammatory process identified in the abdomen or pelvis  Workstation performed: [id number]                    Procedures  Procedures       Phone Contacts  ED Phone Contact    ED Course                               MDM  Number of Diagnoses or Management Options  Left epididymitis:   Diagnosis management comments: Labs and imaging are reassuring CT reveals no acute intra-abdominal pathology based on patient's physical exam with tenderness along the posterior aspect of the epididymis at this time will treat for epididymitis with Cipro and have patient follow up with his family physician for recheck in a week  CritCare Time    Disposition  Final diagnoses:   Left epididymitis     Time reflects when diagnosis was documented in both MDM as applicable and the Disposition within this note     Time User Action Codes Description Comment    1/10/2019  4:43 PM Jacques Jamshid Add [N45 1] Left epididymitis       ED Disposition     ED Disposition Condition Comment    Discharge  [address] discharge to home/self care      Condition at discharge: Good        Follow-up Information     Follow up With Specialties Details Why Amarilys Ware MD Internal Medicine In 1 week  [address]  [address] [facility] [address]  [phone]            Discharge Medication List as of 1/10/2019  4:45 PM      START taking these medications    Details   ciprofloxacin (CIPRO) 500 mg tablet Take 1 tablet (500 mg total) by mouth every 12 (twelve) hours for 10 days, Starting Thu 1/10/2019, Until Sun 1/20/2019, Print         CONTINUE these medications which have NOT CHANGED    Details   albuterol (PROVENTIL HFA,VENTOLIN HFA) 90 mcg/act inhaler Inhale 2 puffs every 4 (four) hours as needed for wheezing, Starting Mon 9/17/2018, Print      amLODIPine (NORVASC) 10 mg tablet Take 1 tablet (10 mg total) by mouth daily, Starting Mon 9/17/2018, Print      aspirin 81 mg chewable tablet Chew 81 mg daily, Historical Med      atorvastatin (LIPITOR) 40 mg tablet Take 1 tablet (40 mg total) by mouth every evening, Starting Mon 9/17/2018, Print      citalopram (CeleXA) 40 mg tablet Take 1 tablet (40 mg total) by mouth daily, Starting Mon 9/17/2018, Print      clopidogrel (PLAVIX) 75 mg tablet Take 2 tablets (150 mg total) by mouth daily, Starting Tue 9/18/2018, Print      fluticasone (FLONASE) 50 mcg/act nasal spray 1 spray into each nostril daily, Starting Mon 9/17/2018, Print      LORazepam (ATIVAN) 0 5 mg tablet Take 1 tablet (0 5 mg total) by mouth 2 (two) times a day as needed for anxiety for up to 7 days, Starting Mon 9/17/2018, Until Thu 1/10/2019, Print      tiotropium (SPIRIVA) 18 mcg inhalation capsule Place 1 capsule (18 mcg total) into inhaler and inhale daily, Starting Mon 9/17/2018, Print      aspirin (ECOTRIN LOW STRENGTH) 81 mg EC tablet Take 1 tablet (81 mg total) by mouth daily, Starting Mon 9/17/2018, Print           No discharge procedures on file      ED Provider  Electronically Signed by           Renan Mccoy PA-C  01/10/19 7115

## 2019-01-24 ENCOUNTER — CONSULT (OUTPATIENT)
Dept: CARDIOLOGY CLINIC | Facility: CLINIC | Age: 64
End: 2019-01-24
Payer: MEDICARE

## 2019-01-24 VITALS
WEIGHT: 214 LBS | SYSTOLIC BLOOD PRESSURE: 140 MMHG | DIASTOLIC BLOOD PRESSURE: 78 MMHG | HEIGHT: 70 IN | BODY MASS INDEX: 30.64 KG/M2 | HEART RATE: 80 BPM

## 2019-01-24 DIAGNOSIS — E78.2 MIXED HYPERLIPIDEMIA: ICD-10-CM

## 2019-01-24 DIAGNOSIS — I25.10 CORONARY ARTERY DISEASE INVOLVING NATIVE CORONARY ARTERY OF NATIVE HEART WITHOUT ANGINA PECTORIS: Primary | ICD-10-CM

## 2019-01-24 DIAGNOSIS — Z86.73 HISTORY OF ISCHEMIC STROKE IN PRIOR THREE MONTHS: ICD-10-CM

## 2019-01-24 DIAGNOSIS — I63.511 ACUTE RIGHT MCA STROKE (HCC): ICD-10-CM

## 2019-01-24 PROCEDURE — 99214 OFFICE O/P EST MOD 30 MIN: CPT | Performed by: INTERNAL MEDICINE

## 2019-01-24 NOTE — ASSESSMENT & PLAN NOTE
It was recommended that he have a loop recorder but somehow this was not performed  I am going to arrange this  The purpose is to exclude paroxysmal atrial fibrillation and the need for more intense anticoagulation

## 2019-01-24 NOTE — PROGRESS NOTES
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 but patent native coronaries  Hypertension  Adequately controlled  History of ischemic stroke in prior three months  It was recommended that he have a loop recorder but somehow this was not performed  I am going to arrange this  The purpose is to exclude paroxysmal atrial fibrillation and the need for more intense anticoagulation  Hyperlipidemia   tolerating statin therapy  Follow up Plan:  Loop recorder implant is being arranged and this note will serve as the H and P when he comes for that  HPI:  The patient is seen in follow-up today regarding a history of CAD  In September he had a stroke and was hospitalized at [facility] in [facility]  He was seen by Neurology  Although an echo is normal loop recorder implant was recommended  He has not had this because there was a mixup in address  No recent chest pain or chest pressure  Ambulation is reasonable  Word finding has been occasionally difficult and garbled  No fast walking because of chronic dyspnea  No results found for this visit on 01/24/19  Most recent or relevant cardiac/vascular testing:    Echocardiogram 1/3/2019:  Normal LV function  Mild right ventricular enlargement  Past Surgical History:   Procedure Laterality Date    CARDIAC CATHETERIZATION  08/19/2015    LIMA occluded   No severe native lesions    CORONARY ARTERY BYPASS GRAFT  2000    HERNIA REPAIR       CMP:   Lab Results   Component Value Date     06/22/2018    K 3 6 01/10/2019    K 3 4 (L) 06/22/2018     01/10/2019     06/22/2018    CO2 27 01/10/2019    CO2 24 06/22/2018    BUN 12 01/10/2019    BUN 14 06/22/2018    CREATININE 0 94 01/10/2019    CREATININE 0 86 06/22/2018    EGFR 86 01/10/2019       Lipid Profile:   Lab Results   Component Value Date    TRIG 195 (H) 09/14/2018    HDL 31 (L) 09/14/2018         Review of Systems   10  point ROS  was otherwise non pertinent or negative except as per HPI or as below  Gait:  Normal but slow  Word-finding is occasionally difficult  Objective:     /78   Pulse 80   Ht 5' 10" (1 778 m)   Wt 97 1 kg (214 lb)   BMI 30 71 kg/m²     PHYSICAL EXAM:    General:  Normal appearance in no distress  Eyes:  Anicteric  Oral mucosa:  Moist   Neck:  No JVD  Carotid upstrokes are brisk without bruits  No masses  Chest:  Clear to auscultation and percussion  Cardiac:  Normal PMI  Normal S1 and S2  No murmur gallop or rub  Abdomen:  Soft and nontender  No palpable organomegaly or aortic enlargement  Extremities:  No peripheral edema  Musculoskeletal:  Symmetric  Vascular:  Femoral pulses are brisk without bruits  Popliteal pulses are intact bilaterally  Pedal pulses are diminished  Neuro:  Grossly symmetric  Psych:  Alert and oriented x3          Current Outpatient Prescriptions:     albuterol (PROVENTIL HFA,VENTOLIN HFA) 90 mcg/act inhaler, Inhale 2 puffs every 4 (four) hours as needed for wheezing, Disp: 1 Inhaler, Rfl: 0    amLODIPine (NORVASC) 10 mg tablet, Take 1 tablet (10 mg total) by mouth daily, Disp: 30 tablet, Rfl: 0    aspirin (ECOTRIN LOW STRENGTH) 81 mg EC tablet, Take 1 tablet (81 mg total) by mouth daily, Disp: 30 tablet, Rfl: 0    aspirin 81 mg chewable tablet, Chew 81 mg daily, Disp: , Rfl:     atorvastatin (LIPITOR) 40 mg tablet, Take 1 tablet (40 mg total) by mouth every evening, Disp: 30 tablet, Rfl: 0    citalopram (CeleXA) 40 mg tablet, Take 1 tablet (40 mg total) by mouth daily, Disp: 30 tablet, Rfl: 0    clopidogrel (PLAVIX) 75 mg tablet, Take 2 tablets (150 mg total) by mouth daily, Disp: 60 tablet, Rfl: 0    fluticasone (FLONASE) 50 mcg/act nasal spray, 1 spray into each nostril daily, Disp: 16 g, Rfl: 0    LORazepam (ATIVAN) 0 5 mg tablet, Take 1 tablet (0 5 mg total) by mouth 2 (two) times a day as needed for anxiety for up to 7 days, Disp: 14 tablet, Rfl: 0    tiotropium (SPIRIVA) 18 mcg inhalation capsule, Place 1 capsule (18 mcg total) into inhaler and inhale daily, Disp: 30 capsule, Rfl: 0    traMADol (ULTRAM) 50 mg tablet, 1-2 tabs p o  Q 6 hr p r n   Pain, Disp: 20 tablet, Rfl: 0  Allergies   Allergen Reactions    Gabapentin Headache     Past Medical History:   Diagnosis Date    [facility] ([facility] ) 9/15/2018    CAD (coronary artery disease)     s/p CABG 2000    COPD (chronic obstructive pulmonary disease) (HCC)     Enlarged prostate     [facility]     Hyperlipidemia     Hypertension     Lexiscan nuclear stress test 03/19/2016    EF 74% Normal           History   Smoking Status    Current Every Day Smoker    Packs/day: 1 00    Types: Cigarettes   Smokeless Tobacco    Never Used

## 2019-02-06 ENCOUNTER — HOSPITAL ENCOUNTER (OUTPATIENT)
Dept: SURGERY | Facility: HOSPITAL | Age: 64
Discharge: HOME | End: 2019-02-06

## 2019-02-08 ENCOUNTER — HOSPITAL ENCOUNTER (OUTPATIENT)
Facility: HOSPITAL | Age: 64
Setting detail: OBSERVATION
Discharge: HOME | End: 2019-02-09
Attending: EMERGENCY MEDICINE | Admitting: INTERNAL MEDICINE
Payer: MEDICARE

## 2019-02-08 ENCOUNTER — APPOINTMENT (EMERGENCY)
Dept: RADIOLOGY | Facility: HOSPITAL | Age: 64
End: 2019-02-08
Payer: MEDICARE

## 2019-02-08 DIAGNOSIS — R07.9 CHEST PAIN: Primary | ICD-10-CM

## 2019-02-08 DIAGNOSIS — R20.2 PARESTHESIAS: ICD-10-CM

## 2019-02-08 LAB
ALBUMIN SERPL BCP-MCNC: 4.1 G/DL (ref 3.5–5.7)
ALP SERPL-CCNC: 86 U/L (ref 55–165)
ALT SERPL W P-5'-P-CCNC: 11 U/L (ref 7–52)
ANION GAP SERPL CALCULATED.3IONS-SCNC: 7 MMOL/L (ref 4–13)
APTT PPP: 33 SECONDS (ref 26–38)
AST SERPL W P-5'-P-CCNC: 12 U/L (ref 13–39)
ATRIAL RATE: 76 BPM
BASOPHILS # BLD AUTO: 0.1 THOUSANDS/ΜL (ref 0–0.1)
BASOPHILS NFR BLD AUTO: 1 % (ref 0–2)
BILIRUB SERPL-MCNC: 0.6 MG/DL (ref 0.2–1)
BNP SERPL-MCNC: 17 PG/ML (ref 1–100)
BUN SERPL-MCNC: 8 MG/DL (ref 7–25)
CALCIUM SERPL-MCNC: 9.1 MG/DL (ref 8.6–10.5)
CHLORIDE SERPL-SCNC: 99 MMOL/L (ref 98–107)
CO2 SERPL-SCNC: 26 MMOL/L (ref 21–31)
CREAT SERPL-MCNC: 0.91 MG/DL (ref 0.7–1.3)
EOSINOPHIL # BLD AUTO: 0.2 THOUSAND/ΜL (ref 0–0.61)
EOSINOPHIL NFR BLD AUTO: 2 % (ref 0–5)
ERYTHROCYTE [DISTWIDTH] IN BLOOD BY AUTOMATED COUNT: 13.1 % (ref 11.5–14.5)
GFR SERPL CREATININE-BSD FRML MDRD: 89 ML/MIN/1.73SQ M
GLUCOSE SERPL-MCNC: 101 MG/DL (ref 65–99)
HCT VFR BLD AUTO: 43.9 % (ref 36.5–49.3)
HGB BLD-MCNC: 14.9 G/DL (ref 14–18)
INR PPP: 1.03 (ref 0.9–1.5)
LYMPHOCYTES # BLD AUTO: 2.2 THOUSANDS/ΜL (ref 0.6–4.47)
LYMPHOCYTES NFR BLD AUTO: 27 % (ref 21–51)
MAGNESIUM SERPL-MCNC: 2 MG/DL (ref 1.9–2.7)
MCH RBC QN AUTO: 30.8 PG (ref 26–34)
MCHC RBC AUTO-ENTMCNC: 33.8 G/DL (ref 31–37)
MCV RBC AUTO: 91 FL (ref 81–99)
MONOCYTES # BLD AUTO: 0.6 THOUSAND/ΜL (ref 0.17–1.22)
MONOCYTES NFR BLD AUTO: 7 % (ref 2–12)
NEUTROPHILS # BLD AUTO: 5 THOUSANDS/ΜL (ref 1.4–6.5)
NEUTS SEG NFR BLD AUTO: 63 % (ref 42–75)
NRBC BLD AUTO-RTO: 0 /100 WBCS
P AXIS: 40 DEGREES
PLATELET # BLD AUTO: 253 THOUSANDS/UL (ref 149–390)
PMV BLD AUTO: 7.2 FL (ref 8.6–11.7)
POTASSIUM SERPL-SCNC: 3.7 MMOL/L (ref 3.5–5.5)
PR INTERVAL: 152 MS
PROT SERPL-MCNC: 6.5 G/DL (ref 6.4–8.9)
PROTHROMBIN TIME: 12 SECONDS (ref 10.2–13)
QRS AXIS: 81 DEGREES
QRSD INTERVAL: 90 MS
QT INTERVAL: 398 MS
QTC INTERVAL: 447 MS
RBC # BLD AUTO: 4.82 MILLION/UL (ref 4.3–5.9)
SODIUM SERPL-SCNC: 132 MMOL/L (ref 134–143)
T WAVE AXIS: 67 DEGREES
TROPONIN I SERPL-MCNC: <0.03 NG/ML
TROPONIN I SERPL-MCNC: <0.03 NG/ML
VENTRICULAR RATE: 76 BPM
WBC # BLD AUTO: 8 THOUSAND/UL (ref 4.8–10.8)

## 2019-02-08 PROCEDURE — 93010 ELECTROCARDIOGRAM REPORT: CPT | Performed by: INTERNAL MEDICINE

## 2019-02-08 PROCEDURE — 83880 ASSAY OF NATRIURETIC PEPTIDE: CPT | Performed by: EMERGENCY MEDICINE

## 2019-02-08 PROCEDURE — 80053 COMPREHEN METABOLIC PANEL: CPT | Performed by: EMERGENCY MEDICINE

## 2019-02-08 PROCEDURE — 99285 EMERGENCY DEPT VISIT HI MDM: CPT

## 2019-02-08 PROCEDURE — 85610 PROTHROMBIN TIME: CPT | Performed by: EMERGENCY MEDICINE

## 2019-02-08 PROCEDURE — 36415 COLL VENOUS BLD VENIPUNCTURE: CPT | Performed by: EMERGENCY MEDICINE

## 2019-02-08 PROCEDURE — 93005 ELECTROCARDIOGRAM TRACING: CPT

## 2019-02-08 PROCEDURE — 85730 THROMBOPLASTIN TIME PARTIAL: CPT | Performed by: EMERGENCY MEDICINE

## 2019-02-08 PROCEDURE — 71045 X-RAY EXAM CHEST 1 VIEW: CPT

## 2019-02-08 PROCEDURE — 84484 ASSAY OF TROPONIN QUANT: CPT | Performed by: EMERGENCY MEDICINE

## 2019-02-08 PROCEDURE — 84484 ASSAY OF TROPONIN QUANT: CPT | Performed by: INTERNAL MEDICINE

## 2019-02-08 PROCEDURE — 83735 ASSAY OF MAGNESIUM: CPT | Performed by: EMERGENCY MEDICINE

## 2019-02-08 PROCEDURE — 85025 COMPLETE CBC W/AUTO DIFF WBC: CPT | Performed by: EMERGENCY MEDICINE

## 2019-02-08 PROCEDURE — 99220 PR INITIAL OBSERVATION CARE/DAY 70 MINUTES: CPT | Performed by: INTERNAL MEDICINE

## 2019-02-08 RX ORDER — ALBUTEROL SULFATE 90 UG/1
2 AEROSOL, METERED RESPIRATORY (INHALATION) EVERY 4 HOURS PRN
Status: DISCONTINUED | OUTPATIENT
Start: 2019-02-08 | End: 2019-02-09 | Stop reason: HOSPADM

## 2019-02-08 RX ORDER — FLUTICASONE PROPIONATE 50 MCG
1 SPRAY, SUSPENSION (ML) NASAL DAILY
Status: DISCONTINUED | OUTPATIENT
Start: 2019-02-09 | End: 2019-02-09 | Stop reason: HOSPADM

## 2019-02-08 RX ORDER — NICOTINE 21 MG/24HR
1 PATCH, TRANSDERMAL 24 HOURS TRANSDERMAL DAILY
Status: DISCONTINUED | OUTPATIENT
Start: 2019-02-09 | End: 2019-02-09 | Stop reason: HOSPADM

## 2019-02-08 RX ORDER — LORAZEPAM 0.5 MG/1
0.5 TABLET ORAL 2 TIMES DAILY PRN
Status: DISCONTINUED | OUTPATIENT
Start: 2019-02-08 | End: 2019-02-09 | Stop reason: HOSPADM

## 2019-02-08 RX ORDER — ONDANSETRON 2 MG/ML
4 INJECTION INTRAMUSCULAR; INTRAVENOUS EVERY 8 HOURS PRN
Status: DISCONTINUED | OUTPATIENT
Start: 2019-02-08 | End: 2019-02-09 | Stop reason: HOSPADM

## 2019-02-08 RX ORDER — CLOPIDOGREL BISULFATE 75 MG/1
150 TABLET ORAL DAILY
Status: DISCONTINUED | OUTPATIENT
Start: 2019-02-09 | End: 2019-02-09 | Stop reason: HOSPADM

## 2019-02-08 RX ORDER — AMLODIPINE BESYLATE 5 MG/1
10 TABLET ORAL DAILY
Status: DISCONTINUED | OUTPATIENT
Start: 2019-02-09 | End: 2019-02-09 | Stop reason: HOSPADM

## 2019-02-08 RX ORDER — ASPIRIN 81 MG/1
81 TABLET ORAL DAILY
Status: DISCONTINUED | OUTPATIENT
Start: 2019-02-09 | End: 2019-02-09 | Stop reason: HOSPADM

## 2019-02-08 RX ORDER — ACETAMINOPHEN 325 MG/1
650 TABLET ORAL EVERY 6 HOURS PRN
Status: DISCONTINUED | OUTPATIENT
Start: 2019-02-08 | End: 2019-02-09 | Stop reason: HOSPADM

## 2019-02-08 RX ORDER — ATORVASTATIN CALCIUM 40 MG/1
40 TABLET, FILM COATED ORAL EVERY EVENING
Status: DISCONTINUED | OUTPATIENT
Start: 2019-02-08 | End: 2019-02-09 | Stop reason: HOSPADM

## 2019-02-08 RX ORDER — CITALOPRAM 20 MG/1
40 TABLET ORAL DAILY
Status: DISCONTINUED | OUTPATIENT
Start: 2019-02-09 | End: 2019-02-09 | Stop reason: HOSPADM

## 2019-02-08 RX ADMIN — LORAZEPAM 0.5 MG: 0.5 TABLET ORAL at 20:29

## 2019-02-08 RX ADMIN — ATORVASTATIN CALCIUM 40 MG: 40 TABLET, FILM COATED ORAL at 20:02

## 2019-02-08 NOTE — ED PROVIDER NOTES
History  Chief Complaint   Patient presents with    Numbness     "traveling all over my body now" Started 3 days ago per patient    Tingling     Hx ischemic CVA, DM, dyslipidemia, CAD s/p CABG with known occlusion of a mammary graft to the LAD but patent native coronaries (per Cardiology consult 2019), presents with tingling in both feet and hands and also intermittent left chest pain with SOB  No fever, cough, or acute GI/ symptoms  No focal weakness  ECHO 6273:   -Systolic function was normal   -Ejection fraction was estimated to be 60 %   -no regional wall motion abnormalities   -there was mild concentric hypertrophy            Prior to Admission Medications   Prescriptions Last Dose Informant Patient Reported? Taking?    LORazepam (ATIVAN) 0 5 mg tablet 2019 at Unknown time  No Yes   Sig: Take 1 tablet (0 5 mg total) by mouth 2 (two) times a day as needed for anxiety for up to 7 days   albuterol (PROVENTIL HFA,VENTOLIN HFA) 90 mcg/act inhaler 2019 at Unknown time  No Yes   Sig: Inhale 2 puffs every 4 (four) hours as needed for wheezing   amLODIPine (NORVASC) 10 mg tablet 2019 at Unknown time  No Yes   Sig: Take 1 tablet (10 mg total) by mouth daily   aspirin (ECOTRIN LOW STRENGTH) 81 mg EC tablet 2019 at Unknown time  No Yes   Sig: Take 1 tablet (81 mg total) by mouth daily   atorvastatin (LIPITOR) 40 mg tablet Past Week at Unknown time  No Yes   Sig: Take 1 tablet (40 mg total) by mouth every evening   citalopram (CeleXA) 40 mg tablet 2019 at Unknown time  No Yes   Sig: Take 1 tablet (40 mg total) by mouth daily   clopidogrel (PLAVIX) 75 mg tablet 2019 at Unknown time  No Yes   Sig: Take 2 tablets (150 mg total) by mouth daily   fluticasone (FLONASE) 50 mcg/act nasal spray 2019 at Unknown time  No Yes   Si spray into each nostril daily   tiotropium (SPIRIVA) 18 mcg inhalation capsule 2019 at Unknown time  No Yes   Sig: Place 1 capsule (18 mcg total) into inhaler and inhale daily   traMADol (ULTRAM) 50 mg tablet Not Taking at Unknown time  No No   Si-2 tabs p o  Q 6 hr p r n  Pain   Patient not taking: Reported on 2019       Facility-Administered Medications: None       Past Medical History:   Diagnosis Date    Acute right MCA stroke ([facility] ) 9/15/2018    CAD (coronary artery disease)     s/p CABG     COPD (chronic obstructive pulmonary disease) ([facility] )     Heart attack ([facility] )     Hyperlipidemia     Hypertension     Lexiscan nuclear stress test 2016    EF 74% Normal       Past Surgical History:   Procedure Laterality Date    CARDIAC CATHETERIZATION  2015    LIMA occluded  No severe native lesions    CORONARY ARTERY BYPASS GRAFT      HERNIA REPAIR      times 2       History reviewed  No pertinent family history  I have reviewed and agree with the history as documented  Social History   Substance Use Topics    Smoking status: Current Every Day Smoker     Packs/day: 1 00     Types: Cigarettes    Smokeless tobacco: Never Used    Alcohol use No        Review of Systems   Constitutional: Negative for chills and fever  HENT: Negative for rhinorrhea and sore throat  Eyes: Negative for visual disturbance  Respiratory: Positive for shortness of breath  Negative for cough  Cardiovascular: Positive for chest pain  Negative for leg swelling  Gastrointestinal: Negative for abdominal pain, diarrhea, nausea and vomiting  Genitourinary: Negative for dysuria  Musculoskeletal: Negative for back pain and myalgias  Skin: Negative for rash  Neurological: Negative for dizziness and headaches  Has tingling   Psychiatric/Behavioral: Negative for confusion  All other systems reviewed and are negative  Physical Exam  Physical Exam   Constitutional: He is oriented to person, place, and time  He appears well-developed and well-nourished     HENT:   Nose: Nose normal    Mouth/Throat: Oropharynx is clear and moist  No oropharyngeal exudate  Eyes: Pupils are equal, round, and reactive to light  Conjunctivae and EOM are normal  No scleral icterus  Neck: Normal range of motion  Neck supple  No JVD present  No tracheal deviation present  Cardiovascular: Normal rate, regular rhythm and normal heart sounds  No murmur heard  Pulmonary/Chest: Effort normal and breath sounds normal  No respiratory distress  He has no wheezes  He has no rales  Abdominal: Soft  Bowel sounds are normal  There is no tenderness  There is no guarding  Musculoskeletal: Normal range of motion  He exhibits no tenderness  Neurological: He is alert and oriented to person, place, and time  No cranial nerve deficit or sensory deficit  He exhibits normal muscle tone  5/5 motor, nl sens   Skin: Skin is warm and dry  Psychiatric: He has a normal mood and affect  His behavior is normal    Nursing note and vitals reviewed        Vital Signs  ED Triage Vitals [02/08/19 1413]   Temperature Pulse Respirations Blood Pressure SpO2   97 9 °F (36 6 °C) 88 18 129/80 98 %      Temp Source Heart Rate Source Patient Position - Orthostatic VS BP Location FiO2 (%)   Temporal Monitor Sitting Left arm --      Pain Score       2           Vitals:    02/09/19 0342 02/09/19 0746 02/09/19 0905 02/09/19 1135   BP: 110/60 108/82 122/80 92/56   Pulse: 74 79  72   Patient Position - Orthostatic VS: Lying Lying  Lying       Visual Acuity  Visual Acuity      Most Recent Value   L Pupil Size (mm)  3   R Pupil Size (mm)  3          ED Medications  Medications   amLODIPine (NORVASC) tablet 10 mg (10 mg Oral Given 2/9/19 0905)   aspirin (ECOTRIN LOW STRENGTH) EC tablet 81 mg (81 mg Oral Given 2/9/19 0906)   atorvastatin (LIPITOR) tablet 40 mg (40 mg Oral Given 2/8/19 2002)   citalopram (CeleXA) tablet 40 mg (40 mg Oral Given 2/9/19 0916)   clopidogrel (PLAVIX) tablet 150 mg (150 mg Oral Given 2/9/19 0906)   fluticasone (FLONASE) 50 mcg/act nasal spray 1 spray (1 spray Nasal Given 2/9/19 0917)   tiotropium (SPIRIVA) capsule for inhaler 18 mcg (18 mcg Inhalation Not Given 2/9/19 0917)   albuterol (PROVENTIL HFA,VENTOLIN HFA) inhaler 2 puff (2 puffs Inhalation Given 2/9/19 0917)   LORazepam (ATIVAN) tablet 0 5 mg (0 5 mg Oral Given 2/9/19 0906)   ondansetron (ZOFRAN) injection 4 mg (not administered)   nicotine (NICODERM CQ) 21 mg/24 hr TD 24 hr patch 1 patch (1 patch Transdermal Medication Applied 2/9/19 0905)   enoxaparin (LOVENOX) subcutaneous injection 40 mg (40 mg Subcutaneous Not Given 2/9/19 0907)   acetaminophen (TYLENOL) tablet 650 mg (not administered)       Diagnostic Studies  Results Reviewed     Procedure Component Value Units Date/Time    B-Type Natriuretic Peptide [facility] ONLY) [[id number]]  (Normal) Collected:  02/08/19 1459    Lab Status:  Final result Specimen:  Blood from Arm, Left Updated:  02/08/19 1532     BNP 17 pg/mL     Troponin I [[id number]]  (Normal) Collected:  02/08/19 1459    Lab Status:  Final result Specimen:  Blood from Arm, Left Updated:  02/08/19 1530     Troponin I <0 03 ng/mL     Magnesium [[id number]]  (Normal) Collected:  02/08/19 1459    Lab Status:  Final result Specimen:  Blood from Arm, Left Updated:  02/08/19 1527     Magnesium 2 0 mg/dL     Comprehensive metabolic panel [[id number]]  (Abnormal) Collected:  02/08/19 1459    Lab Status:  Final result Specimen:  Blood from Arm, Left Updated:  02/08/19 1527     Sodium 132 (L) mmol/L      Potassium 3 7 mmol/L      Chloride 99 mmol/L      CO2 26 mmol/L      ANION GAP 7 mmol/L      BUN 8 mg/dL      Creatinine 0 91 mg/dL      Glucose 101 (H) mg/dL      Calcium 9 1 mg/dL      AST 12 (L) U/L      ALT 11 U/L      Alkaline Phosphatase 86 U/L      Total Protein 6 5 g/dL      Albumin 4 1 g/dL      Total Bilirubin 0 60 mg/dL      eGFR 89 ml/min/1 73sq m     Narrative:         National Kidney Disease Education Program recommendations are as follows:  GFR calculation is accurate only with a steady state creatinine  Chronic Kidney disease less than 60 ml/min/1 73 sq  meters  Kidney failure less than 15 ml/min/1 73 sq  meters  Protime-INR [[id number]]  (Normal) Collected:  02/08/19 1459    Lab Status:  Final result Specimen:  Blood from Arm, Left Updated:  02/08/19 1521     Protime 12 0 seconds      INR 1 03    APTT [[id number]]  (Normal) Collected:  02/08/19 1459    Lab Status:  Final result Specimen:  Blood from Arm, Left Updated:  02/08/19 1521     PTT 33 seconds     CBC and differential [[id number]]  (Abnormal) Collected:  02/08/19 1459    Lab Status:  Final result Specimen:  Blood from Arm, Left Updated:  02/08/19 1508     WBC 8 00 Thousand/uL      RBC 4 82 Million/uL      Hemoglobin 14 9 g/dL      Hematocrit 43 9 %      MCV 91 fL      MCH 30 8 pg      MCHC 33 8 g/dL      RDW 13 1 %      MPV 7 2 (L) fL      Platelets 331 Thousands/uL      nRBC 0 /100 WBCs      Neutrophils Relative 63 %      Lymphocytes Relative 27 %      Monocytes Relative 7 %      Eosinophils Relative 2 %      Basophils Relative 1 %      Neutrophils Absolute 5 00 Thousands/µL      Lymphocytes Absolute 2 20 Thousands/µL      Monocytes Absolute 0 60 Thousand/µL      Eosinophils Absolute 0 20 Thousand/µL      Basophils Absolute 0 10 Thousands/µL     UA w Reflex to Microscopic [[id number]]     Lab Status:  No result Specimen:  Urine                  XR chest 1 view portable   Final Result by Subhash Ellis MD (02/08 1626)      No active pulmonary disease              Workstation performed: [id number]                    Procedures  ECG 12 Lead Documentation  Date/Time: 2/8/2019 2:51 PM  Performed by: Davin Isaac  Authorized by: Davin Iasac     ECG reviewed by me, the ED Provider: yes    Patient location:  ED  Comments:      NSR at 75, with TWI in V2/3, no ST elevations, no PVCs; c/w EKG dated 10/10/2018 TWI are new           Phone Contacts  ED Phone Contact    ED Course  ED Course as of Feb 09 1237 Fri Feb 08, 2019   1633 Trop negative; discussed w/Dr Joshua Lora who will admit (tele-obs) for further evaluation of chest pain; patient has HEART score = 4 (moderate risk for MACE)                                MDM  Number of Diagnoses or Management Options  Diagnosis management comments: Initial Impression: non-specific generalized paresthesias w/o focal weakness, also chest pain, no fever; will screen with EKG, CXR and labs, then re-evaluate; already took Plavix today      Disposition  Final diagnoses:   Chest pain   Paresthesias     Time reflects when diagnosis was documented in both MDM as applicable and the Disposition within this note     Time User Action Codes Description Comment    2/8/2019  4:39 PM Ebb  Add [R07 9] Chest pain     2/8/2019  4:39 PM Ebb  Add [R20 2] Paresthesias       ED Disposition     ED Disposition Condition Date/Time Comment    Admit  Fri Feb 8, 2019  4:39 PM Case was discussed with Dr Joshua Lora and the patient's admission status was agreed to be Admission Status: observation status to the service of Dr Joshua Lora           Follow-up Information    None         Current Discharge Medication List      CONTINUE these medications which have NOT CHANGED    Details   albuterol (PROVENTIL HFA,VENTOLIN HFA) 90 mcg/act inhaler Inhale 2 puffs every 4 (four) hours as needed for wheezing  Qty: 1 Inhaler, Refills: 0    Associated Diagnoses: Chronic obstructive pulmonary disease, unspecified COPD type (HCC)      amLODIPine (NORVASC) 10 mg tablet Take 1 tablet (10 mg total) by mouth daily  Qty: 30 tablet, Refills: 0    Associated Diagnoses: Essential hypertension      aspirin (ECOTRIN LOW STRENGTH) 81 mg EC tablet Take 1 tablet (81 mg total) by mouth daily  Qty: 30 tablet, Refills: 0    Associated Diagnoses: Acute right MCA stroke (HCC)      atorvastatin (LIPITOR) 40 mg tablet Take 1 tablet (40 mg total) by mouth every evening  Qty: 30 tablet, Refills: 0    Associated Diagnoses: Acute right MCA stroke (HCC) citalopram (CeleXA) 40 mg tablet Take 1 tablet (40 mg total) by mouth daily  Qty: 30 tablet, Refills: 0    Associated Diagnoses: Anxiety and depression      clopidogrel (PLAVIX) 75 mg tablet Take 2 tablets (150 mg total) by mouth daily  Qty: 60 tablet, Refills: 0    Associated Diagnoses: Acute right MCA stroke ([facility])      fluticasone (FLONASE) 50 mcg/act nasal spray 1 spray into each nostril daily  Qty: 16 g, Refills: 0    Associated Diagnoses: Chronic obstructive pulmonary disease, unspecified COPD type ([facility])      LORazepam (ATIVAN) 0 5 mg tablet Take 1 tablet (0 5 mg total) by mouth 2 (two) times a day as needed for anxiety for up to 7 days  Qty: 14 tablet, Refills: 0    Associated Diagnoses: Anxiety and depression      tiotropium (SPIRIVA) 18 mcg inhalation capsule Place 1 capsule (18 mcg total) into inhaler and inhale daily  Qty: 30 capsule, Refills: 0    Associated Diagnoses: Chronic obstructive pulmonary disease, unspecified COPD type ([facility])      traMADol (ULTRAM) 50 mg tablet 1-2 tabs p o  Q 6 hr p r n  Pain  Qty: 20 tablet, Refills: 0    Associated Diagnoses: Left epididymitis           No discharge procedures on file      ED Provider  Electronically Signed by           Elder Moore MD  02/09/19 8718

## 2019-02-09 VITALS
SYSTOLIC BLOOD PRESSURE: 118 MMHG | DIASTOLIC BLOOD PRESSURE: 76 MMHG | RESPIRATION RATE: 18 BRPM | BODY MASS INDEX: 30.92 KG/M2 | OXYGEN SATURATION: 97 % | TEMPERATURE: 97.8 F | WEIGHT: 216 LBS | HEART RATE: 82 BPM | HEIGHT: 70 IN

## 2019-02-09 LAB
ANION GAP SERPL CALCULATED.3IONS-SCNC: 7 MMOL/L (ref 4–13)
BASOPHILS # BLD AUTO: 0.1 THOUSANDS/ΜL (ref 0–0.1)
BASOPHILS NFR BLD AUTO: 1 % (ref 0–2)
BUN SERPL-MCNC: 12 MG/DL (ref 7–25)
CALCIUM SERPL-MCNC: 9.1 MG/DL (ref 8.6–10.5)
CHLORIDE SERPL-SCNC: 105 MMOL/L (ref 98–107)
CO2 SERPL-SCNC: 27 MMOL/L (ref 21–31)
CREAT SERPL-MCNC: 0.92 MG/DL (ref 0.7–1.3)
EOSINOPHIL # BLD AUTO: 0.2 THOUSAND/ΜL (ref 0–0.61)
EOSINOPHIL NFR BLD AUTO: 3 % (ref 0–5)
ERYTHROCYTE [DISTWIDTH] IN BLOOD BY AUTOMATED COUNT: 13.2 % (ref 11.5–14.5)
GFR SERPL CREATININE-BSD FRML MDRD: 88 ML/MIN/1.73SQ M
GLUCOSE SERPL-MCNC: 100 MG/DL (ref 65–99)
HCT VFR BLD AUTO: 41.8 % (ref 36.5–49.3)
HGB BLD-MCNC: 14.5 G/DL (ref 14–18)
LYMPHOCYTES # BLD AUTO: 2.2 THOUSANDS/ΜL (ref 0.6–4.47)
LYMPHOCYTES NFR BLD AUTO: 27 % (ref 21–51)
MCH RBC QN AUTO: 31.5 PG (ref 26–34)
MCHC RBC AUTO-ENTMCNC: 34.6 G/DL (ref 31–37)
MCV RBC AUTO: 91 FL (ref 81–99)
MONOCYTES # BLD AUTO: 0.6 THOUSAND/ΜL (ref 0.17–1.22)
MONOCYTES NFR BLD AUTO: 7 % (ref 2–12)
NEUTROPHILS # BLD AUTO: 5.1 THOUSANDS/ΜL (ref 1.4–6.5)
NEUTS SEG NFR BLD AUTO: 62 % (ref 42–75)
NRBC BLD AUTO-RTO: 0 /100 WBCS
PLATELET # BLD AUTO: 244 THOUSANDS/UL (ref 149–390)
PMV BLD AUTO: 7.6 FL (ref 8.6–11.7)
POTASSIUM SERPL-SCNC: 3.5 MMOL/L (ref 3.5–5.5)
RBC # BLD AUTO: 4.59 MILLION/UL (ref 4.3–5.9)
SODIUM SERPL-SCNC: 139 MMOL/L (ref 134–143)
WBC # BLD AUTO: 8.2 THOUSAND/UL (ref 4.8–10.8)

## 2019-02-09 PROCEDURE — 80048 BASIC METABOLIC PNL TOTAL CA: CPT | Performed by: INTERNAL MEDICINE

## 2019-02-09 PROCEDURE — 99214 OFFICE O/P EST MOD 30 MIN: CPT | Performed by: STUDENT IN AN ORGANIZED HEALTH CARE EDUCATION/TRAINING PROGRAM

## 2019-02-09 PROCEDURE — 99217 PR OBSERVATION CARE DISCHARGE MANAGEMENT: CPT | Performed by: INTERNAL MEDICINE

## 2019-02-09 PROCEDURE — 85025 COMPLETE CBC W/AUTO DIFF WBC: CPT | Performed by: INTERNAL MEDICINE

## 2019-02-09 RX ADMIN — ENOXAPARIN SODIUM 40 MG: 40 INJECTION SUBCUTANEOUS at 09:05

## 2019-02-09 RX ADMIN — FLUTICASONE PROPIONATE 1 SPRAY: 50 SPRAY, METERED NASAL at 09:17

## 2019-02-09 RX ADMIN — ATORVASTATIN CALCIUM 40 MG: 40 TABLET, FILM COATED ORAL at 17:10

## 2019-02-09 RX ADMIN — ASPIRIN 81 MG: 81 TABLET, COATED ORAL at 09:06

## 2019-02-09 RX ADMIN — LORAZEPAM 0.5 MG: 0.5 TABLET ORAL at 09:06

## 2019-02-09 RX ADMIN — CITALOPRAM HYDROBROMIDE 40 MG: 20 TABLET ORAL at 09:16

## 2019-02-09 RX ADMIN — AMLODIPINE BESYLATE 10 MG: 5 TABLET ORAL at 09:05

## 2019-02-09 RX ADMIN — CLOPIDOGREL BISULFATE 150 MG: 75 TABLET ORAL at 09:06

## 2019-02-09 RX ADMIN — ALBUTEROL SULFATE 2 PUFF: 90 AEROSOL, METERED RESPIRATORY (INHALATION) at 09:17

## 2019-02-09 RX ADMIN — NICOTINE 1 PATCH: 21 PATCH, EXTENDED RELEASE TRANSDERMAL at 09:05

## 2019-02-09 NOTE — PLAN OF CARE
Problem: DISCHARGE PLANNING - CARE MANAGEMENT  Goal: Discharge to post-acute care or home with appropriate resources  INTERVENTIONS:  - Conduct assessment to determine patient/family and health care team treatment goals, and need for post-acute services based on payer coverage, community resources, and patient preferences, and barriers to discharge  - Address psychosocial, clinical, and financial barriers to discharge as identified in assessment in conjunction with the patient/family and health care team  - Arrange appropriate level of post-acute services according to patient's   needs and preference and payer coverage in collaboration with the physician and health care team  - Communicate with and update the patient/family, physician, and health care team regarding progress on the discharge plan  - Arrange appropriate transportation to post-acute venues  Discharge to home      Outcome: Progressing

## 2019-02-09 NOTE — H&P
H&P- Kenia Jose 1955, 61 y o  male MRN: [id number]    Unit/Bed#: -01 Encounter: [id number]    Primary Care Provider: Jean Pierre Ricci MD   Date and time admitted to hospital: 2/8/2019  2:11 PM        * Chest pain   Assessment & Plan    · Will monitor on tele  · Trend troponins  · Cardiology consult  · Continue home meds     Coronary artery disease involving native coronary artery of native heart without angina pectoris   Assessment & Plan    · Please see above  · Will continue home meds  · Cardiology consult     Anxiety and depression   Assessment & Plan    · Stable  · Continue home meds     History of CVA (cerebrovascular accident)   Assessment & Plan    · Patient diagnosed with CVA in September of 2018  · Continue statin and Plavix  · Supportive care  · Patient is scheduled to get a loop recorder implanted by Cardiology     Hypertension   Assessment & Plan    · BP stable  · Continue home meds       VTE Prophylaxis: Enoxaparin (Lovenox)  Code Status: full  POLST: There is no POLST form on file for this patient (pre-hospital)    Anticipated Length of Stay:  Patient will be admitted on an Observation basis with an anticipated length of stay of  < 2 midnights  Justification for Hospital Stay:  Chest pain    Total Time for Visit, including Counseling / Coordination of Care: 45 minutes  Greater than 50% of this total time spent on direct patient counseling and coordination of care  Chief Complaint:   Chest pain    History of Present Illness:    Kenia Jose is a 61 y o  male who presents with chest pain  Patient states that he has been having left-sided chest pain associated with numbness tingling in fingers of bilateral upper extremities  Pain is nonradiating  No alleviating or exacerbating factors  Pain is intermittent and nonexertional   Patient states he felt similar symptoms when he was previously found to have coronary artery disease  Patient denies any nausea or vomiting    Denies any palpitations  Patient recently had a CVA in September of 2018  He also had an echocardiogram done in January which was just last month  Patient also scheduled to have loop recorder implanted soon with Cardiology  Review of Systems:  Review of Systems   Constitutional: Positive for activity change  Negative for chills, fatigue and fever  Respiratory: Negative for cough and shortness of breath  Cardiovascular: Positive for chest pain  Negative for palpitations and leg swelling  Gastrointestinal: Negative for abdominal pain  Genitourinary: Negative for dysuria  Neurological: Positive for numbness  Negative for dizziness, syncope, facial asymmetry and weakness  All other systems reviewed and are negative  Past Medical and Surgical History:   Past Medical History:   Diagnosis Date    Acute right MCA stroke ([facility] ) 9/15/2018    CAD (coronary artery disease)     s/p CABG 2000    COPD (chronic obstructive pulmonary disease) ([facility] )     Heart attack ([facility] )     Hyperlipidemia     Hypertension     Lexiscan nuclear stress test 03/19/2016    EF 74% Normal       Past Surgical History:   Procedure Laterality Date    CARDIAC CATHETERIZATION  08/19/2015    LIMA occluded  No severe native lesions    CORONARY ARTERY BYPASS GRAFT  2000    HERNIA REPAIR      times 2       Meds/Allergies:  Prior to Admission medications    Medication Sig Start Date End Date Taking?  Authorizing Provider   albuterol (PROVENTIL HFA,VENTOLIN HFA) 90 mcg/act inhaler Inhale 2 puffs every 4 (four) hours as needed for wheezing 9/17/18  Yes Yeny Montero MD   amLODIPine (NORVASC) 10 mg tablet Take 1 tablet (10 mg total) by mouth daily 9/17/18  Yes Yeny Montero MD   aspirin (ECOTRIN LOW STRENGTH) 81 mg EC tablet Take 1 tablet (81 mg total) by mouth daily 9/17/18  Yes Yeny Montero MD   atorvastatin (LIPITOR) 40 mg tablet Take 1 tablet (40 mg total) by mouth every evening 9/17/18  Yes Yeny Montero MD   citalopram (CeleXA) 40 mg tablet Take 1 tablet (40 mg total) by mouth daily 9/17/18  Yes Lawrence Brice MD   clopidogrel (PLAVIX) 75 mg tablet Take 2 tablets (150 mg total) by mouth daily 9/18/18  Yes Lawrence Brice MD   fluticasone [facility] Spurling) 50 mcg/act nasal spray 1 spray into each nostril daily 9/17/18  Yes Lawrence Brice MD   LORazepam (ATIVAN) 0 5 mg tablet Take 1 tablet (0 5 mg total) by mouth 2 (two) times a day as needed for anxiety for up to 7 days 9/17/18 2/8/19 Yes Lawrence Brice MD   tiotropium [facility]) 18 mcg inhalation capsule Place 1 capsule (18 mcg total) into inhaler and inhale daily 9/17/18  Yes Lawrence Brice MD   traMADol (ULTRAM) 50 mg tablet 1-2 tabs p o  Q 6 hr p r n  Pain  Patient not taking: Reported on 2/8/2019  1/10/19   Celine Miller PA-C   aspirin 81 mg chewable tablet Chew 81 mg daily  2/8/19  Historical Provider, MD     I have reviewed home medications with patient personally  Allergies: Allergies   Allergen Reactions    Gabapentin Headache       Social History:  Marital Status: Single   Occupation: none  Patient Pre-hospital Living Situation:  Lives alone  Patient Pre-hospital Level of Mobility:  Ambulatory  Patient Pre-hospital Diet Restrictions:  None  Substance Use History:   History   Alcohol Use No     History   Smoking Status    Current Every Day Smoker    Packs/day: 1 00    Types: Cigarettes   Smokeless Tobacco    Never Used     History   Drug Use No       Family History:  I have reviewed the patients family history    Physical Exam:   Vitals:   Blood Pressure: 122/70 (02/08/19 1709)  Pulse: 76 (02/08/19 1709)  Temperature: 97 5 °F (36 4 °C) (02/08/19 1709)  Temp Source: Temporal (02/08/19 1709)  Respirations: 20 (02/08/19 1709)  Height: 5' 10" (177 8 cm) (Stated) (02/08/19 1709)  Weight - Scale: 98 kg (216 lb) (Stranding scale) (02/08/19 1709)  SpO2: 96 % (02/08/19 1709)    Physical Exam   Constitutional: He appears well-developed  No distress  HENT:   Head: Normocephalic and atraumatic     Eyes: Conjunctivae and EOM are normal    Neck: Normal range of motion  Neck supple  Cardiovascular: Normal rate and regular rhythm  Pulmonary/Chest: Effort normal  No respiratory distress  Abdominal: Soft  He exhibits no distension  There is no tenderness  Musculoskeletal: Normal range of motion  He exhibits no edema  Neurological: He is alert  No cranial nerve deficit  Skin: Skin is warm and dry  Psychiatric: He has a normal mood and affect  Additional Data:   Lab Results: I have personally reviewed pertinent reports  Results from last 7 days  Lab Units 02/08/19  1459   WBC Thousand/uL 8 00   HEMOGLOBIN g/dL 14 9   HEMATOCRIT % 43 9   PLATELETS Thousands/uL 253   NEUTROS PCT % 63   LYMPHS PCT % 27   MONOS PCT % 7   EOS PCT % 2       Results from last 7 days  Lab Units 02/08/19  1459   POTASSIUM mmol/L 3 7   CHLORIDE mmol/L 99   CO2 mmol/L 26   BUN mg/dL 8   CREATININE mg/dL 0 91   CALCIUM mg/dL 9 1   ALK PHOS U/L 86   ALT U/L 11   AST U/L 12*       Results from last 7 days  Lab Units 02/08/19  1459   INR  1 03               Imaging: I have personally reviewed pertinent reports  XR chest 1 view portable   Final Result by Brooklyn Marvin MD (02/08 1626)      No active pulmonary disease  Workstation performed: [id number]             [employer] / [employer] Records Reviewed: Yes     ** Please Note: This note has been constructed using a voice recognition system   **

## 2019-02-09 NOTE — DISCHARGE INSTRUCTIONS
Anxiety   WHAT YOU NEED TO KNOW:   Anxiety is a condition that causes you to feel extremely worried or nervous  The feelings are so strong that they can cause problems with your daily activities or sleep  Anxiety may be triggered by something you fear, or it may happen without a cause  Family or work stress, smoking, caffeine, and alcohol can increase your risk for anxiety  Certain medicines or health conditions can also increase your risk  Anxiety can become a long-term condition if it is not managed or treated  DISCHARGE INSTRUCTIONS:   Call 911 if:   · You have chest pain, tightness, or heaviness that may spread to your shoulders, arms, jaw, neck, or back  · You feel like hurting yourself or someone else  Contact your healthcare provider if:   · Your symptoms get worse or do not get better with treatment  · Your anxiety keeps you from doing your regular daily activities  · You have new symptoms since your last visit  · You have questions or concerns about your condition or care  Medicines:   · Medicines  may be given to help you feel more calm and relaxed, and decrease your symptoms  · Take your medicine as directed  Contact your healthcare provider if you think your medicine is not helping or if you have side effects  Tell him of her if you are allergic to any medicine  Keep a list of the medicines, vitamins, and herbs you take  Include the amounts, and when and why you take them  Bring the list or the pill bottles to follow-up visits  Carry your medicine list with you in case of an emergency  Follow up with your healthcare provider within 2 weeks or as directed:  Write down your questions so you remember to ask them during your visits  Manage anxiety:   · Talk to someone about your anxiety  Your healthcare provider may suggest counseling  Cognitive behavioral therapy can help you understand and change how you react to events that trigger your symptoms   You might feel more comfortable talking with a friend or family member about your anxiety  Choose someone you know will be supportive and encouraging  · Find ways to relax  Activities such as exercise, meditation, or listening to music can help you relax  Spend time with friends, or do things you enjoy  · Practice deep breathing  Deep breathing can help you relax when you feel anxious  Focus on taking slow, deep breaths several times a day, or during an anxiety attack  Breathe in through your nose and out through your mouth  · Create a regular sleep routine  Regular sleep can help you feel calmer during the day  Go to sleep and wake up at the same times every day  Do not watch television or use the computer right before bed  Your room should be comfortable, dark, and quiet  · Eat a variety of healthy foods  Healthy foods include fruits, vegetables, low-fat dairy products, lean meats, fish, whole-grain breads, and cooked beans  Healthy foods can help you feel less anxious and have more energy  · Exercise regularly  Exercise can increase your energy level  Exercise may also lift your mood and help you sleep better  Your healthcare provider can help you create an exercise plan  · Do not smoke  Nicotine and other chemicals in cigarettes and cigars can increase anxiety  Ask your healthcare provider for information if you currently smoke and need help to quit  E-cigarettes or smokeless tobacco still contain nicotine  Talk to your healthcare provider before you use these products  · Do not have caffeine  Caffeine can make your symptoms worse  Do not have foods or drinks that are meant to increase your energy level  · Limit or do not drink alcohol  Ask your healthcare provider if alcohol is safe for you  You may not be able to drink alcohol if you take certain anxiety or depression medicines  Limit alcohol to 1 drink per day if you are a woman  Limit alcohol to 2 drinks per day if you are a man   A drink of alcohol is 12 ounces of beer, 5 ounces of wine, or 1½ ounces of liquor  · Do not use drugs  Drugs can make your anxiety worse  It can also make anxiety hard to manage  Talk to your healthcare provider if you use drugs and want help to quit  © 2017 [address] [employer] Information is for End User's use only and may not be sold, redistributed or otherwise used for commercial purposes  All illustrations and images included in CareNotes® are the copyrighted property of "[employer]  or T[employer]  The above information is an  only  It is not intended as medical advice for individual conditions or treatments  Talk to your doctor, nurse or pharmacist before following any medical regimen to see if it is safe and effective for you  How to Stop Smoking   WHAT YOU NEED TO KNOW:   You will improve your health and the health of others around you if you stop smoking  Your risk for heart and lung disease, cancer, stroke, heart attack, and vision problems will also decrease  You can benefit from quitting no matter how long you have smoked  DISCHARGE INSTRUCTIONS:   Prepare to stop smoking:  Nicotine is a highly addictive drug found in cigarettes  Withdrawal symptoms can happen when you stop smoking and make it hard to quit  These include anxiety, depression, irritability, trouble sleeping, and increased appetite  You increase your chances of success if you prepare to quit  · Set a quit date  Roger Owusu a date that is within the next 2 weeks  Do not pick a day that you think may be stressful or busy  Write down the day or [place] it on your calender  · Tell friends and family that you plan to quit  Explain that you may have withdrawal symptoms when you try to quit  Ask them to support you  They may be able to encourage you and help reduce your stress to make it easier for you to quit  · Make a list of your reasons for quitting    Put the list somewhere you will see it every day, such as your refrigerator  You can look at the list when you have a craving  · Remove all tobacco and nicotine products from your home, car, and workplace  Also, remove anything else that will tempt you to smoke, such as lighters, matches, or ashtrays  Clean your car, home, and places at work that smell like smoke  The smell of smoke can trigger a craving  · Identify triggers that make you want to smoke  This may include activities, feelings, or people  Also write down 1 way you can deal with each of your triggers  For example, if you want to smoke as soon as you wake up, plan another activity during this time, such as exercise  · Make a plan for how you will quit  Learn about the tools that can help you quit, such as medicine, counseling, or nicotine replacement therapy  Choose at least 2 options to help you quit  Tools to help you stop smoking:   · Counseling  from a trained healthcare provider can provide you with support and skills to quit smoking  The provider will also teach you to manage your withdrawal symptoms and cravings  You may receive counseling from one counselor, in group therapy, or through phone therapy called a quit line  · Nicotine replacement therapy (NRT)  such as nicotine patches, gum, or lozenges may help reduce your nicotine cravings  You may get these without a doctor's order  Do not use e-cigarettes or smokeless tobacco in place of cigarettes or to help you quit  They still contain nicotine  · Prescription medicines  such as nasal sprays or nicotine inhalers may help reduce your withdrawal symptoms  Other medicines may also be used to reduce your urge to smoke  Ask your healthcare provider about these medicines  You may need to start certain medicines 2 weeks before your quit date for them to work well  · Hypnosis  is a practice that helps guide you through thoughts and feelings  Hypnosis may help decrease your cravings and make you more willing to quit  · Acupuncture therapy  uses very thin needles to balance energy channels in the body  This is thought to help decrease cravings and symptoms of nicotine withdrawal      · Support groups  let you talk to others who are trying to quit or have already quit  It may be helpful to speak with others about how they quit  Manage your cravings:   · Avoid situations, people, and places that tempt you to smoke  Go to nonsmoking places, such as libraries or restaurants  Understand what tempts you and try to avoid these things  · Keep your hands busy  Hold things such as a stress ball or pen  · Put candy or toothpicks in your mouth  Keep lollipops, sugarless gum, or toothpicks with you at all times  · Do not have alcohol or caffeine  These drinks may tempt you to smoke  Drink healthy liquids such as water or juice instead  · Reward yourself when you resist your cravings  Rewards will motivate you and help you stay positive  · Do an activity that distracts you from your craving  Examples include going for a walk, exercising, or cleaning  Prevent weight gain after you quit:  You may gain a few pounds after you quit smoking  It is healthier for you to gain a few pounds than to continue to smoke  The following can help you prevent weight gain:  · Eat healthy foods  These include fruits, vegetables, whole-grain breads, low-fat dairy products, beans, lean meats, and fish  Eat healthy snacks, such as low-fat yogurt, if you get hungry between meals  · Drink water before, during, and between meals  This will make your stomach feel full and help prevent you from overeating  Ask your healthcare provider how much liquid to drink each day and which liquids are best for you  · Exercise  Take a walk or do some kind of exercise every day  Ask your healthcare provider what exercise is right for you  This may help reduce your cravings and reduce stress    For support and more information: · Smokefree  gov  Phone: 4- 457 - 050-4278  Web Address: www [employer]  © 2017 [address] [employer] Information is for End User's use only and may not be sold, redistributed or otherwise used for commercial purposes  All illustrations and images included in CareNotes® are the copyrighted property of A D A M , Inc  or [employer]  The above information is an  only  It is not intended as medical advice for individual conditions or treatments  Talk to your doctor, nurse or pharmacist before following any medical regimen to see if it is safe and effective for you  Lung Cancer Screening   WHAT YOU NEED TO KNOW:   What is lung cancer screening? Lung cancer screening is a test done every year to find lung cancer early  Screening is different from diagnosis because screening is used before you have any signs or symptoms  Screening may be helpful if you are or were a heavy smoker, or if you smoked for many years  This is because smoking increases your risk for lung cancer  Lung cancer screening has benefits and risks  Talk with your healthcare provider about the benefits and risks to help you decide if lung cancer screening is right for you  What do I need to know about lung cancer? · Lung cancer cells can form a tumor in your lungs and can spread to other areas of your body  This cancer is often found after it has spread  By this time, it is more difficult to treat  Treatment may include surgery to remove lung tissue that contains cancer cells  · Lung cancer is the leading cause of cancer deaths  Each year, about 220,000 people find out that they have lung cancer  About 150,000 people die each year from lung cancer  · Half of the people who have lung cancer are over the age of 79  The most common age of people who die from lung cancer is 67  · Talk to your healthcare provider if you think you have signs or symptoms of lung cancer   Examples include chest pain, a cough that does not go away, coughing up blood, wheezing, hoarseness, and neck swelling  Am I a good candidate for lung cancer screening? You may be able to have lung cancer screening if the following are true:  · You are between 54and [de-identified]years old  · You have never had lung cancer  · You do not currently have any signs or symptoms of lung cancer  · You currently smoke, or you quit less than 15 years ago  You also are or were a heavy smoker (30 pack-years or more)  · You do not have any other serious conditions that may affect how long you live  · You are willing to have surgery if screening shows signs of lung cancer  · You are willing to have screening every year until you have not smoked for 15 years or reach 80years of age  Screening may also stop if you develop another health condition or you are no longer willing to have treatment  What are pack-years? Pack-years are the number of cigarette packs you smoked multiplied by the number of years you smoked  A heavy smoker has 30 pack-years or more  Examples of 30 pack-years are 1 pack of cigarettes smoked each day for 30 years, or 2 packs each day for 15 years  Your healthcare provider can help you calculate your pack-years  How is lung cancer screening done? Lung cancer screening is done with a low-dose CT scan (LDCT)  A CT uses an x-ray and a computer to give detailed pictures of your lungs  You will lie on a table for this test  A low amount of radiation from the x-ray machine is used to take pictures of your lungs  This test only takes a few minutes  You will be asked to hold your breath for about 6 seconds while the pictures are taken  What are the benefits of lung cancer screening? · LDCT can find some kinds of lung cancer early  This means it can be treated with less invasive surgery, or less lung tissue may need to be removed  · The scan is not invasive or painful, and takes only a short amount of time      · LDCT does not leave radiation in your body after the scan is done  · LDCT can lower your risk of death from lung cancer by 16% to 20% because the cancer is found early  Your risk for death from any cause is lowered by 6 7%  This is because the scan may show signs of problems other than lung cancer that need to be treated  What are the risks of lung cancer screening? · LDCT exposes you to a small amount of radiation that can increase your risk for cancer  The amount is less than is given during a mammogram  It is about the same amount you get from the sun in a year  Because you will need to be screened every year, your total radiation exposure over time is increased  · The test can give a false-positive result  This means that screening shows you have lung cancer, but follow-up tests show that you do not  You may get more tests or even treatments that are not needed  It can also be stressful to think you have lung cancer when you do not  The risk for a false-positive result is about 23%  · The test can give a false-negative result  This means that the test does not find signs of cancer, but you later develop signs and symptoms and need treatment  A false-negative result can keep you from getting treatment as early as possible  The risk for a false-negative result is about 4%  · Screening may lead to over-diagnosis if tumors are found that are not life-threatening  Some forms of cancer grow quickly and need to be treated  Other forms grow slowly and may not be life-threatening in your lifetime  · Screening may lead to over-treatment  This means you get treatment that is not necessary  About 10% of people with lung cancer receive treatment that was not needed  What happens after I have lung cancer screening? You will meet with your healthcare provider to go over the results of your screening   You may need more tests to diagnose anything that showed up on the screening test   Lung cancer screening needs to be done each year  Even if your result shows you do not have lung cancer, it is important to continue getting screened each year  This is the best way to find a new cancer, at the earliest possible stage  What questions should I ask my healthcare provider to help me make a decision about screening? · How high is my risk for lung cancer? · What are my pack-years? · Will I be able to help create my treatment plan if screening shows I have lung cancer? · Will my insurance cover screening? · Where is the screening done? · Do I need to do anything to get ready to have screening? · When and how do I get the results of my screening? What do I need to think about before I decide to have lung cancer screening? · Benefits and risks of lung cancer screening:      ¨ Do I understand the benefits and risks of lung cancer screening with LDCT? How concerned am I about the risks? Do I think the benefits are greater than the risks? ¨ One of the main risks is that the radiation I get during screening can cause cancer  Do I understand how high the risk is? How worried am I about the risk? · Possibility of finding lung cancer: How will I feel if I find out I have lung cancer? Am I willing to get the treatment I might need? · Possibility of false results:  I might get a false-positive or false-negative result  How will I feel if the test results are wrong? · Need for annual screening:  Screening needs to be done every year  Am I willing to have screening until I am a nonsmoker for 15 years, am 80, or develop another condition? Screening will also stop if I am no longer willing to have treatment  How do I feel about the need for surgery or other treatment for lung cancer? Will I be comfortable with my decision not to have treatment, and to stop having screening? What do I need to know about quitting smoking? If you currently smoke, it is very important that you quit   If you already quit smoking, it is important that you do not start smoking again  You will also need to avoid secondhand smoke from others  Nicotine and other chemicals in cigarettes and cigars increase your risk for cancer  Your risk for lung cancer gets lower each year that you do not smoke  Even if you get lung cancer screening every year, it is still important not to smoke  Ask your healthcare provider for information if you currently smoke and need help to quit  You can find support and more information here:  · Smokefree  gov  Phone: 3- 304 - 159-8020  Web Address: [employer] smokefree  gov  Where can I find support and more information about lung cancer? · American [employer] Association  [address] [place] [address],[address]  [address]  Phone: [facility] [address]  Phone: 7- 547 - 339-7754  Web Address: [facility]  CARE AGREEMENT:   You have the right to help plan your care  Learn about your health condition and how it may be treated  Discuss treatment options with your caregivers to decide what care you want to receive  You always have the right to refuse treatment  The above information is an  only  It is not intended as medical advice for individual conditions or treatments  Talk to your doctor, nurse or pharmacist before following any medical regimen to see if it is safe and effective for you  © 2017 [address] [employer] Information is for End User's use only and may not be sold, redistributed or otherwise used for commercial purposes  All illustrations and images included in CareNotes® are the copyrighted property of A D A M , Inc  or T[employer] Latif

## 2019-02-09 NOTE — CONSULTS
[facility]    (Previously [facility])   Cardiology Consultation      Chief Complaint:  Chest pain      History of Present Illness:    63M with PMH of 2v CABG, CVA recently, COPD, who presents with chest pain  Patient reports that his symptoms began in a glove and stocking pattern of tingling in both arms and legs  Patient reports that the pain progressed proximally on his left arm and then he had his usual substernal pressure-like chest pain  This pain eventually resolved after his arrival to the ED  Patient reports taking nitro in the past  He reports that the nitro sublingual typically works (on one occasion, he had to take two tablets instead of just one)  However, he gets a severe headache and therefore, did not try taking nitro prior to coming into the ED  Denies repeat symptoms  Felt well throughout the rest of his stay  Had no other concerns and was comfortable going home today  We discussed the plan for ILR to evaluate for a fib  We discussed possibility of a pacemaker (to allow for use of beta-blockers in patients with CAD, this is an ACC class 1 indication for implantation of pacemaker)  However, he was not interested in undergoing a PPM implantation  He continues to smoke  Review of Systems: a 12 point review of systems was conducted and is negative except for as mentioned in the HPI or as below  ROS    Past Medical and Surgical History:  Past Medical History:   Diagnosis Date    Acute right MCA stroke ([facility] [address] ) 9/15/2018    CAD (coronary artery disease)     s/p CABG 2000    COPD [facility]chronic obstructive pulmonary disease) ([facility] [address] )     Heart attack ([facility] [address] )     Hyperlipidemia     Hypertension     Lexiscan nuclear stress test 03/19/2016    EF 74% Normal     Past Surgical History:   Procedure Laterality Date    CARDIAC CATHETERIZATION  08/19/2015    LIMA occluded   No severe native lesions    CORONARY ARTERY BYPASS GRAFT  2000    HERNIA REPAIR      times 2     History   Alcohol Use No     History   Drug Use No     History   Smoking Status    Current Every Day Smoker    Packs/day: 1 00    Types: Cigarettes   Smokeless Tobacco    Never Used       Family History:  History reviewed  No pertinent family history  Medication:  No prescriptions prior to admission  Allergies: Allergies   Allergen Reactions    Gabapentin Headache       Physical Exam:  Vitals: Blood pressure 118/76, pulse 82, temperature 97 8 °F (36 6 °C), temperature source Temporal, resp  rate 18, height 5' 10" (1 778 m), weight 98 kg (216 lb), SpO2 97 %  , Body mass index is 30 99 kg/m²  GEN: Appears well  No acute distress  HEENT: pupils equal, round, and reactive to light; extraocular muscles intact  NECK: supple, no carotid bruits   HEART: regular rhythm, normal S1 and S2, No murmurs, clicks, gallops or rubs   LUNGS: clear to auscultation bilaterally; no wheezes, rales, or rhonchi   ABDOMEN: normal bowel sounds, soft, no tenderness, no distention  EXTREMITIES: peripheral pulses normal; no clubbing, cyanosis, or edema  MSK: 5/5 strength x 4 extremities  Skin: No cyanosis  Neuro: AOx3    Cardiac Imaging:   Systolic function was normal  Ejection fraction was estimated to be 55 %  There were no regional wall motion abnormalities  Doppler parameters were consistent with abnormal left ventricular relaxation (grade 1 diastolic dysfunction)  EKG: NSR  Rightward axis  Anterior infarct       Lab Results:   Troponins:   Results from last 7 days  Lab Units 02/08/19 2008 02/08/19  1459   TROPONIN I ng/mL <0 03 <0 03     CBC with diff:   Results from last 7 days  Lab Units 02/09/19  0629 02/08/19  1459   WBC Thousand/uL 8 20 8 00   HEMOGLOBIN g/dL 14 5 14 9   HEMATOCRIT % 41 8 43 9   MCV fL 91 91   PLATELETS Thousands/uL 244 253   MCH pg 31 5 30 8   MCHC g/dL 34 6 33 8   RDW % 13 2 13 1   MPV fL 7 6* 7 2*   NRBC AUTO /100 WBCs 0 0     CMP:  Results from last 7 days  Lab Units 02/09/19  0629 02/08/19  1459   POTASSIUM mmol/L 3 5 3 7   CHLORIDE mmol/L 105 99   CO2 mmol/L 27 26   BUN mg/dL 12 8   CREATININE mg/dL 0 92 0 91   CALCIUM mg/dL 9 1 9 1   AST U/L  --  12*   ALT U/L  --  11   ALK PHOS U/L  --  86   EGFR ml/min/1 73sq m 88 89     Lipid Profile:       Assessment/Plan:    CAD: Chest pain  - c/w home meds  - can not take beta-blockers due to bradycardia  - patient is not interested in pacemaker  - unable to escalate anti-anginals at this time  - counseled on smoking cessation  - note that plavix is for stroke  - headaches with nitrates  - outpatient f/u for possible stress test

## 2019-02-09 NOTE — SOCIAL WORK
Evaluated the pt at the bedside  Pt states he lives alone in a 2 story home  Pt states he ahs 2 CHYNA and 13 steps inside  Pt indicated he is independent with ADL's and IADL's  Does not drive  Pt states his PCP is in [facility] and his [facility]  drives him to the PCP  Pt walks to the [facility] and [address] [place] [address] in [place]  Pt states his sister lives in [place]  He does not have transport home and refuses to pay for a cab  Pt states "I always walk home"  States he has no one to call  Nursing supervisor states there is no christopher cash for a cab  Pt is awaiting a evaluation by MD Belinda Armas the Observation status of admission and provided booklet of same  Pt verbalized understanding of same  CM reviewed d/c planning process including the following: identifying help at home, patient preference for d/c planning needs, availability of treatment team to discuss questions or concerns patient and/or family may have regarding understanding medications and recognizing signs and symptoms once discharged  CM also encouraged patient to follow up with all recommended appointments after discharge  Patient advised of importance for patient and family to participate in managing patients medical well being

## 2019-02-09 NOTE — ASSESSMENT & PLAN NOTE
· No tele events  · Troponins negative  · Cardiology consult appreciated, no acute intervention needed  · Continue home meds  · Follow up as an outpatient with Cardiology

## 2019-02-09 NOTE — ASSESSMENT & PLAN NOTE
· Patient diagnosed with CVA in September of 2018  · Continue statin and Plavix  · Supportive care  · Patient is scheduled to get a loop recorder implanted by Cardiology

## 2019-02-09 NOTE — DISCHARGE SUMMARY
Discharge- Dwayne Pierre 1955, 61 y o  male MRN: [id number]    Unit/Bed#: -01 Encounter: [id number]    Primary Care Provider: Amos Liirano MD   Date and time admitted to hospital: 2/8/2019  2:11 PM        * Chest pain   Assessment & Plan    · No tele events  · Troponins negative  · Cardiology consult appreciated, no acute intervention needed  · Continue home meds  · Follow up as an outpatient with Cardiology     Coronary artery disease involving native coronary artery of native heart without angina pectoris   Assessment & Plan    · Stable  · Continue home meds     Anxiety and depression   Assessment & Plan    · Stable  · Continue home meds     History of CVA (cerebrovascular accident)   Assessment & Plan    · Patient diagnosed with CVA in September of 2018  · Continue statin and Plavix  · Supportive care  · Patient is scheduled to get a loop recorder implanted by Cardiology     Hypertension   Assessment & Plan    · BP stable  · Continue home meds       Discharging Physician / Practitioner: Sina Harding MD  PCP: Amos Liriano MD  Admission Date:   Admission Orders     Ordered        02/08/19 1640  Place in Observation (expected length of stay for this patient is less than two midnights)  Once             Discharge Date: 02/09/19    Resolved Problems  Date Reviewed: 1/24/2019    None          Consultations During Hospital Stay:  · Cardiology    Procedures Performed:   · None    Significant Findings / Test Results:   · Chest pain    Incidental Findings:   · None     Test Results Pending at Discharge (will require follow up): · None     Outpatient Tests Requested:  · None    Complications:  None    Reason for Admission:  Chest pain    Hospital Course:     Dwayne Pierre is a 61 y o  male patient who originally presented to the hospital on 2/8/2019 due to chest pain  Patient admitted with chest pain that was left-sided  Patient also had some numbness tingling in bilateral upper and lower extremities  Symptoms gradually improved  Troponins were negative  Cardiology was consulted and recommended outpatient follow-up  No signs of acute ACS  Patient was advised to follow up with Cardiology as outpatient  Return to ER if worsening symptoms  Patient was in agreement with discharge plan  Please see above list of diagnoses and related plan for additional information  Condition at Discharge: stable     Discharge Day Visit / Exam:     Subjective:  No chest pain or shortness of breath  Patient is tolerating diet  Patient is ambulating well  Vitals: Blood Pressure: 92/56 (02/09/19 1135)  Pulse: 72 (02/09/19 1135)  Temperature: 98 2 °F (36 8 °C) (02/09/19 1135)  Temp Source: Temporal (02/09/19 1135)  Respirations: 18 (02/09/19 1135)  Height: 5' 10" (177 8 cm) (Stated) (02/08/19 1709)  Weight - Scale: 98 kg (216 lb) (patient didnt want to get on standing scale he ststaed his w) (02/09/19 0600)  SpO2: 91 % (02/09/19 1135)     Exam:   Physical Exam   Constitutional: He appears well-developed  No distress  HENT:   Head: Normocephalic and atraumatic  Eyes: Conjunctivae and EOM are normal    Neck: Normal range of motion  Neck supple  Cardiovascular: Normal rate and regular rhythm  Pulmonary/Chest: Effort normal  No respiratory distress  Abdominal: Soft  He exhibits no distension  There is no tenderness  Musculoskeletal: Normal range of motion  He exhibits no edema  Neurological: He is alert  No cranial nerve deficit  Skin: Skin is warm and dry  Psychiatric: He has a normal mood and affect  Discussion with Family:  No family at bedside    Discharge instructions/Information to patient and family:   See after visit summary for information provided to patient and family  Provisions for Follow-Up Care:  See after visit summary for information related to follow-up care and any pertinent home health orders        Disposition:     Home    For Discharges to [facility]F:   · Not Applicable to this Patient - Not Applicable to this Patient    Planned Readmission: no     Discharge Statement:  I spent 35 minutes discharging the patient  This time was spent on the day of discharge  I had direct contact with the patient on the day of discharge  Greater than 50% of the total time was spent examining patient, answering all patient questions, arranging and discussing plan of care with patient as well as directly providing post-discharge instructions  Additional time then spent on discharge activities  Discharge Medications:  See after visit summary for reconciled discharge medications provided to patient and family        ** Please Note: This note has been constructed using a voice recognition system **

## 2019-02-09 NOTE — UTILIZATION REVIEW
Initial Clinical Review    Admission: Date/Time/Statement:   OBS  ORDER   2/8  @    1640   Orders Placed This Encounter   Procedures    Place in Observation (expected length of stay for this patient is less than two midnights)     Standing Status:   Standing     Number of Occurrences:   1     Order Specific Question:   Admitting Physician     Answer:   Kobe Veloz [[id number]]     Order Specific Question:   Level of Care     Answer:   Med Surg [[id number]]     Order Specific Question:   Bed request comments     Answer:   Telemetry     ED: Date/Time/Mode of Arrival:   ED Arrival Information     Expected Arrival Acuity Means of Arrival Escorted By Service Admission Type    - 2/8/2019 14:05 Urgent Walk-In Self General Medicine Urgent    Arrival Complaint    Numbness in feet and hands        Chief Complaint:   Chief Complaint   Patient presents with    Numbness     "traveling all over my body now" Started 3 days ago per patient    Tingling     History of Illness: Sagrario Matamoros is a 61 y o  male who presents with chest pain  Patient states that he has been having left-sided chest pain associated with numbness tingling in fingers of bilateral upper extremities  Pain is nonradiating  No alleviating or exacerbating factors  Pain is intermittent and nonexertional   Patient states he felt similar symptoms when he was previously found to have coronary artery disease  Patient denies any nausea or vomiting  Denies any palpitations        Patient recently had a CVA in September of 2018  He also had an echocardiogram done in January which was just last month    Patient also scheduled to have loop recorder implanted soon with Cardiology          ED Vital Signs:   ED Triage Vitals [02/08/19 1413]   Temperature Pulse Respirations Blood Pressure SpO2   97 9 °F (36 6 °C) 88 18 129/80 98 %      Temp Source Heart Rate Source Patient Position - Orthostatic VS BP Location FiO2 (%)   Temporal Monitor Sitting Left arm --      Pain Score 2        Wt Readings from Last 1 Encounters:   02/09/19 98 kg (216 lb)     Vital Signs (abnormal): above    Pertinent Labs/Diagnostic Test Results:    NA  132  CXR:  NAD    ED Treatment:   Medication Administration from 02/08/2019 1405 to 02/08/2019 1708     None        Past Medical/Surgical History:    Active Ambulatory Problems     Diagnosis Date Noted    Hypertension 09/14/2018    Hyperlipidemia 09/14/2018    [facility]ch[facility] pulmonary [facility]) ([facility] [address] ) 09/14/2018    Coronary artery disease involving native coronary artery of native heart without angina pectoris 09/14/2018    History of CVA (cerebrovascular accident) 09/15/2018    Anxiety and depression 09/17/2018     Resolved Ambulatory Problems     Diagnosis Date Noted    No Resolved Ambulatory Problems     Past Medical History:   Diagnosis Date    [facility] right MCA stroke ([facility] ) 9/15/2018    CAD (coronary artery disease)     [facility]ch[facility]     Heart attack ([facility] )     Hyperlipidemia     Hypertension     Lexiscan nuclear stress test 03/19/2016     Admitting Diagnosis: Numbness [R20 0]  Tingling [R20 2]  Chest pain [R07 9]  Paresthesias [R20 2]       Assessment/Plan:   Chest pain   Assessment & Plan     · Will monitor on tele  · Trend troponins  · Cardiology consult  · Continue home meds      Coronary artery disease involving native coronary artery of native heart without angina pectoris   Assessment & Plan     · Please see above  · Will continue home meds  · Cardiology consult      Anxiety and depression   Assessment & Plan     · Stable  · Continue home meds      History of CVA (cerebrovascular accident)   Assessment & Plan     · Patient diagnosed with CVA in September of 2018  · Continue statin and Plavix  · Supportive care  · Patient is scheduled to get a loop recorder implanted by Cardiology     Hypertension   Assessment & Plan     · BP stable  · Continue home meds        Anticipated Length of Stay: Patient will be admitted on an Observation basis with an anticipated length of stay of  < 2 midnights     Justification for Hospital Stay:  Chest pain    Admission Orders:   OBS  ORDER   2/8  @  1640  Scheduled Meds:   Current Facility-Administered Medications:  acetaminophen 650 mg Oral Q6H PRN Stacey Hernandez MD   albuterol 2 puff Inhalation Q4H PRN Stacey Hernandez MD   amLODIPine 10 mg Oral Daily Stacey Hernandez MD   aspirin 81 mg Oral Daily Stacey Hernandez MD   atorvastatin 40 mg Oral QPM Stacey Hernandez MD   citalopram 40 mg Oral Daily Stacey Hernandez MD   clopidogrel 150 mg Oral Daily Stacey Hernandez MD   enoxaparin 40 mg Subcutaneous Daily Stacey Hernandez MD   fluticasone 1 spray Nasal Daily Stacey Hernandez MD   LORazepam 0 5 mg Oral BID PRN Stacey Hernandez MD   nicotine 1 patch Transdermal Daily Stacey Hernandez MD   ondansetron 4 mg Intravenous Q8H PRN Stacey Hernandez MD   tiotropium 18 mcg Inhalation Daily Stacey Hernandez MD     Continuous Infusions:    PRN Meds:   acetaminophen    albuterol    LORazepam    ondansetron     Tele  Cardiac  Diet  Cons cardiology

## 2019-02-09 NOTE — NURSING NOTE
Discharge instructions and medications reviewed with patient at bedside  Verbalized understanding of same  Patient discharged to home via wheelchair; vital signs stable at time of discharge  Roommates spouse taking patient to his home in [place]

## 2019-02-09 NOTE — PLAN OF CARE
Problem: Potential for Falls  Goal: Patient will remain free of falls  INTERVENTIONS:  - Assess patient frequently for physical needs  -  Identify cognitive and physical deficits and behaviors that affect risk of falls  -  [place] fall precautions as indicated by assessment   - Educate patient/family on patient safety including physical limitations  - Instruct patient to call for assistance with activity based on assessment  - Modify environment to reduce risk of injury  - Consider OT/PT consult to assist with strengthening/mobility   Outcome: Progressing      Problem: CARDIOVASCULAR - ADULT  Goal: Maintains optimal cardiac output and hemodynamic stability  INTERVENTIONS:  - Monitor I/O, vital signs and rhythm  - Monitor for S/S and trends of decreased cardiac output i e  bleeding, hypotension  - Administer and titrate ordered vasoactive medications to optimize hemodynamic stability  - Assess quality of pulses, skin color and temperature  - Assess for signs of decreased coronary artery perfusion - ex   Angina  - Instruct patient to report change in severity of symptoms   Outcome: Progressing    Goal: Absence of cardiac dysrhythmias or at baseline rhythm  INTERVENTIONS:  - Continuous cardiac monitoring, monitor vital signs, obtain 12 lead EKG if indicated  - Administer antiarrhythmic and heart rate control medications as ordered  - Monitor electrolytes and administer replacement therapy as ordered   Outcome: Progressing      Problem: PAIN - ADULT  Goal: Verbalizes/displays adequate comfort level or baseline comfort level  Interventions:  - Encourage patient to monitor pain and request assistance  - Assess pain using appropriate pain scale  - Administer analgesics based on type and severity of pain and evaluate response  - Implement non-pharmacological measures as appropriate and evaluate response  - Consider cultural and social influences on pain and pain management  - Notify physician/advanced practitioner if interventions unsuccessful or patient reports new pain   Outcome: Progressing      Problem: INFECTION - ADULT  Goal: Absence or prevention of progression during hospitalization  INTERVENTIONS:  - Assess and monitor for signs and symptoms of infection  - Monitor lab/diagnostic results  - Monitor all insertion sites, i e  indwelling lines, tubes, and drains  - Monitor endotracheal (as able) and nasal secretions for changes in amount and color  - [place] appropriate cooling/warming therapies per order  - Administer medications as ordered  - Instruct and encourage patient and family to use good hand hygiene technique  - Identify and instruct in appropriate isolation precautions for identified infection/condition   Outcome: Progressing    Goal: Absence of fever/infection during neutropenic period  INTERVENTIONS:  - Monitor WBC  - Implement neutropenic guidelines   Outcome: Progressing      Problem: SAFETY ADULT  Goal: Maintain or return to baseline ADL function  INTERVENTIONS:  -  Assess patient's ability to carry out ADLs; assess patient's baseline for ADL function and identify physical deficits which impact ability to perform ADLs (bathing, care of mouth/teeth, toileting, grooming, dressing, etc )  - Assess/evaluate cause of self-care deficits   - Assess range of motion  - Assess patient's mobility; develop plan if impaired  - Assess patient's need for assistive devices and provide as appropriate  - Encourage maximum independence but intervene and supervise when necessary  ¯ Involve family in performance of ADLs  ¯ Assess for home care needs following discharge   ¯ Request OT consult to assist with ADL evaluation and planning for discharge  ¯ Provide patient education as appropriate   Outcome: Progressing    Goal: Maintain or return mobility status to optimal level  INTERVENTIONS:  - Assess patient's baseline mobility status (ambulation, transfers, stairs, etc )    - Identify cognitive and physical deficits and behaviors that affect mobility  - Identify mobility aids required to assist with transfers and/or ambulation (gait belt, sit-to-stand, lift, walker, cane, etc )  - [place] fall precautions as indicated by assessment  - Record patient progress and toleration of activity level on Mobility SBAR; progress patient to next Phase/Stage  - Instruct patient to call for assistance with activity based on assessment  - Request Rehabilitation consult to assist with strengthening/weightbearing, etc    Outcome: Progressing      Problem: DISCHARGE PLANNING  Goal: Discharge to home or other facility with appropriate resources  INTERVENTIONS:  - Identify barriers to discharge w/patient and caregiver  - Arrange for needed discharge resources and transportation as appropriate  - Identify discharge learning needs (meds, wound care, etc )  - Arrange for interpretive services to assist at discharge as needed  - Refer to Case Management Department for coordinating discharge planning if the patient needs post-hospital services based on physician/advanced practitioner order or complex needs related to functional status, cognitive ability, or social support system   Outcome: Progressing

## 2019-02-27 ENCOUNTER — HOSPITAL ENCOUNTER (OUTPATIENT)
Dept: CT IMAGING | Facility: HOSPITAL | Age: 64
Discharge: HOME | End: 2019-02-27
Payer: MEDICARE

## 2019-02-27 ENCOUNTER — TRANSCRIBE ORDERS (OUTPATIENT)
Dept: ADMINISTRATIVE | Facility: HOSPITAL | Age: 64
End: 2019-02-27

## 2019-02-27 DIAGNOSIS — F17.200 TOBACCO USE DISORDER: Primary | ICD-10-CM

## 2019-02-27 DIAGNOSIS — F17.200 TOBACCO USE DISORDER: ICD-10-CM

## 2019-03-24 ENCOUNTER — APPOINTMENT (EMERGENCY)
Dept: RADIOLOGY | Facility: HOSPITAL | Age: 64
End: 2019-03-24
Payer: MEDICARE

## 2019-03-24 ENCOUNTER — APPOINTMENT (EMERGENCY)
Dept: CT IMAGING | Facility: HOSPITAL | Age: 64
End: 2019-03-24
Payer: MEDICARE

## 2019-03-24 ENCOUNTER — HOSPITAL ENCOUNTER (OUTPATIENT)
Facility: HOSPITAL | Age: 64
Setting detail: OBSERVATION
Discharge: HOME | End: 2019-03-25
Attending: EMERGENCY MEDICINE | Admitting: INTERNAL MEDICINE
Payer: MEDICARE

## 2019-03-24 DIAGNOSIS — R07.9 CHEST PAIN, UNSPECIFIED TYPE: Primary | ICD-10-CM

## 2019-03-24 DIAGNOSIS — E78.2 MIXED HYPERLIPIDEMIA: ICD-10-CM

## 2019-03-24 DIAGNOSIS — F17.200 TOBACCO DEPENDENCE SYNDROME: ICD-10-CM

## 2019-03-24 DIAGNOSIS — I25.10 CORONARY ARTERY DISEASE INVOLVING NATIVE CORONARY ARTERY OF NATIVE HEART WITHOUT ANGINA PECTORIS: ICD-10-CM

## 2019-03-24 PROBLEM — K21.9 GASTROESOPHAGEAL REFLUX DISEASE: Status: ACTIVE | Noted: 2019-03-24

## 2019-03-24 PROBLEM — J30.2 SEASONAL ALLERGIC RHINITIS: Status: ACTIVE | Noted: 2019-03-24

## 2019-03-24 PROBLEM — K57.90 DIVERTICULAR DISEASE: Status: ACTIVE | Noted: 2019-03-24

## 2019-03-24 PROBLEM — G40.401 GRAND MAL STATUS (HCC): Status: ACTIVE | Noted: 2019-03-24

## 2019-03-24 PROBLEM — E53.9 VITAMIN B DEFICIENCY: Status: ACTIVE | Noted: 2019-03-24

## 2019-03-24 PROBLEM — R13.10 DYSPHAGIA: Status: ACTIVE | Noted: 2019-03-24

## 2019-03-24 PROBLEM — G47.00 INSOMNIA: Status: ACTIVE | Noted: 2019-03-24

## 2019-03-24 PROBLEM — R32 URINARY INCONTINENCE: Status: ACTIVE | Noted: 2019-03-24

## 2019-03-24 PROBLEM — K52.9 DYSPEPTIC DIARRHEA: Status: ACTIVE | Noted: 2019-03-24

## 2019-03-24 PROBLEM — K57.12 DIVERTICULITIS OF SMALL INTESTINE: Status: ACTIVE | Noted: 2019-03-24

## 2019-03-24 PROBLEM — E66.3 OVERWEIGHT: Status: ACTIVE | Noted: 2019-03-24

## 2019-03-24 PROBLEM — K59.09 CHRONIC CONSTIPATION: Status: ACTIVE | Noted: 2019-03-24

## 2019-03-24 PROBLEM — N52.9 ED (ERECTILE DYSFUNCTION) OF ORGANIC ORIGIN: Status: ACTIVE | Noted: 2019-03-24

## 2019-03-24 PROBLEM — K42.9 UMBILICAL HERNIA: Status: ACTIVE | Noted: 2019-03-24

## 2019-03-24 PROBLEM — I95.1 ORTHOSTATIC HYPOTENSION: Status: ACTIVE | Noted: 2019-03-24

## 2019-03-24 LAB
ALBUMIN SERPL BCP-MCNC: 3.8 G/DL (ref 3.5–5.7)
ALP SERPL-CCNC: 76 U/L (ref 55–165)
ALT SERPL W P-5'-P-CCNC: 9 U/L (ref 7–52)
ANION GAP SERPL CALCULATED.3IONS-SCNC: 6 MMOL/L (ref 4–13)
APTT PPP: 31 SECONDS (ref 26–38)
AST SERPL W P-5'-P-CCNC: 11 U/L (ref 13–39)
ATRIAL RATE: 80 BPM
BASOPHILS # BLD AUTO: 0.1 THOUSANDS/ΜL (ref 0–0.1)
BASOPHILS NFR BLD AUTO: 1 % (ref 0–2)
BILIRUB SERPL-MCNC: 0.4 MG/DL (ref 0.2–1)
BILIRUB UR QL STRIP: NEGATIVE
BNP SERPL-MCNC: 27 PG/ML (ref 1–100)
BUN SERPL-MCNC: 10 MG/DL (ref 7–25)
CALCIUM SERPL-MCNC: 8.9 MG/DL (ref 8.6–10.5)
CHLORIDE SERPL-SCNC: 106 MMOL/L (ref 98–107)
CK SERPL-CCNC: 48 U/L (ref 30–308)
CLARITY UR: CLEAR
CO2 SERPL-SCNC: 24 MMOL/L (ref 21–31)
COLOR UR: YELLOW
CREAT SERPL-MCNC: 0.96 MG/DL (ref 0.7–1.3)
EOSINOPHIL # BLD AUTO: 0.2 THOUSAND/ΜL (ref 0–0.61)
EOSINOPHIL NFR BLD AUTO: 3 % (ref 0–5)
ERYTHROCYTE [DISTWIDTH] IN BLOOD BY AUTOMATED COUNT: 12.8 % (ref 11.5–14.5)
GFR SERPL CREATININE-BSD FRML MDRD: 83 ML/MIN/1.73SQ M
GLUCOSE SERPL-MCNC: 100 MG/DL (ref 65–99)
GLUCOSE SERPL-MCNC: 81 MG/DL (ref 65–140)
GLUCOSE UR STRIP-MCNC: NEGATIVE MG/DL
HCT VFR BLD AUTO: 44.2 % (ref 42–47)
HGB BLD-MCNC: 15 G/DL (ref 14–18)
HGB UR QL STRIP.AUTO: NEGATIVE
INR PPP: 1.01 (ref 0.9–1.5)
KETONES UR STRIP-MCNC: NEGATIVE MG/DL
LEUKOCYTE ESTERASE UR QL STRIP: NEGATIVE
LYMPHOCYTES # BLD AUTO: 2.8 THOUSANDS/ΜL (ref 0.6–4.47)
LYMPHOCYTES NFR BLD AUTO: 32 % (ref 21–51)
MAGNESIUM SERPL-MCNC: 2.1 MG/DL (ref 1.9–2.7)
MCH RBC QN AUTO: 30.4 PG (ref 26–34)
MCHC RBC AUTO-ENTMCNC: 33.8 G/DL (ref 31–37)
MCV RBC AUTO: 90 FL (ref 81–99)
MONOCYTES # BLD AUTO: 0.6 THOUSAND/ΜL (ref 0.17–1.22)
MONOCYTES NFR BLD AUTO: 7 % (ref 2–12)
NEUTROPHILS # BLD AUTO: 5 THOUSANDS/ΜL (ref 1.4–6.5)
NEUTS SEG NFR BLD AUTO: 58 % (ref 42–75)
NITRITE UR QL STRIP: NEGATIVE
P AXIS: 54 DEGREES
PH UR STRIP.AUTO: 6.5 [PH]
PLATELET # BLD AUTO: 286 THOUSANDS/UL (ref 149–390)
PMV BLD AUTO: 7.5 FL (ref 8.6–11.7)
POTASSIUM SERPL-SCNC: 3.7 MMOL/L (ref 3.5–5.5)
PR INTERVAL: 150 MS
PROT SERPL-MCNC: 5.9 G/DL (ref 6.4–8.9)
PROT UR STRIP-MCNC: NEGATIVE MG/DL
PROTHROMBIN TIME: 11.7 SECONDS (ref 10.2–13)
QRS AXIS: 79 DEGREES
QRSD INTERVAL: 86 MS
QT INTERVAL: 378 MS
QTC INTERVAL: 435 MS
RBC # BLD AUTO: 4.93 MILLION/UL (ref 4.3–5.9)
SODIUM SERPL-SCNC: 136 MMOL/L (ref 134–143)
SP GR UR STRIP.AUTO: 1.01 (ref 1–1.03)
T WAVE AXIS: 69 DEGREES
TROPONIN I SERPL-MCNC: <0.03 NG/ML
TROPONIN I SERPL-MCNC: <0.03 NG/ML
TSH SERPL DL<=0.05 MIU/L-ACNC: 1.19 UIU/ML (ref 0.45–5.33)
UROBILINOGEN UR QL STRIP.AUTO: 0.2 E.U./DL
VENTRICULAR RATE: 80 BPM
WBC # BLD AUTO: 8.8 THOUSAND/UL (ref 4.8–10.8)

## 2019-03-24 PROCEDURE — 36415 COLL VENOUS BLD VENIPUNCTURE: CPT | Performed by: EMERGENCY MEDICINE

## 2019-03-24 PROCEDURE — 84484 ASSAY OF TROPONIN QUANT: CPT | Performed by: EMERGENCY MEDICINE

## 2019-03-24 PROCEDURE — 99285 EMERGENCY DEPT VISIT HI MDM: CPT

## 2019-03-24 PROCEDURE — 96375 TX/PRO/DX INJ NEW DRUG ADDON: CPT

## 2019-03-24 PROCEDURE — 93005 ELECTROCARDIOGRAM TRACING: CPT

## 2019-03-24 PROCEDURE — 96361 HYDRATE IV INFUSION ADD-ON: CPT

## 2019-03-24 PROCEDURE — 85610 PROTHROMBIN TIME: CPT | Performed by: EMERGENCY MEDICINE

## 2019-03-24 PROCEDURE — 85730 THROMBOPLASTIN TIME PARTIAL: CPT | Performed by: EMERGENCY MEDICINE

## 2019-03-24 PROCEDURE — 83880 ASSAY OF NATRIURETIC PEPTIDE: CPT | Performed by: EMERGENCY MEDICINE

## 2019-03-24 PROCEDURE — 70450 CT HEAD/BRAIN W/O DYE: CPT

## 2019-03-24 PROCEDURE — 84443 ASSAY THYROID STIM HORMONE: CPT | Performed by: EMERGENCY MEDICINE

## 2019-03-24 PROCEDURE — 99219 PR INITIAL OBSERVATION CARE/DAY 50 MINUTES: CPT | Performed by: PHYSICIAN ASSISTANT

## 2019-03-24 PROCEDURE — 81003 URINALYSIS AUTO W/O SCOPE: CPT | Performed by: EMERGENCY MEDICINE

## 2019-03-24 PROCEDURE — 83735 ASSAY OF MAGNESIUM: CPT | Performed by: EMERGENCY MEDICINE

## 2019-03-24 PROCEDURE — 85025 COMPLETE CBC W/AUTO DIFF WBC: CPT | Performed by: EMERGENCY MEDICINE

## 2019-03-24 PROCEDURE — 71045 X-RAY EXAM CHEST 1 VIEW: CPT

## 2019-03-24 PROCEDURE — 80053 COMPREHEN METABOLIC PANEL: CPT | Performed by: EMERGENCY MEDICINE

## 2019-03-24 PROCEDURE — 82948 REAGENT STRIP/BLOOD GLUCOSE: CPT

## 2019-03-24 PROCEDURE — 82550 ASSAY OF CK (CPK): CPT | Performed by: EMERGENCY MEDICINE

## 2019-03-24 PROCEDURE — 96374 THER/PROPH/DIAG INJ IV PUSH: CPT

## 2019-03-24 PROCEDURE — 93010 ELECTROCARDIOGRAM REPORT: CPT | Performed by: INTERNAL MEDICINE

## 2019-03-24 RX ORDER — CITALOPRAM 10 MG/1
40 TABLET ORAL
Status: DISCONTINUED | OUTPATIENT
Start: 2019-03-24 | End: 2019-03-25 | Stop reason: HOSPADM

## 2019-03-24 RX ORDER — ASPIRIN 81 MG/1
324 TABLET, CHEWABLE ORAL ONCE
Status: COMPLETED | OUTPATIENT
Start: 2019-03-24 | End: 2019-03-24

## 2019-03-24 RX ORDER — NICOTINE 21 MG/24HR
1 PATCH, TRANSDERMAL 24 HOURS TRANSDERMAL DAILY
Status: DISCONTINUED | OUTPATIENT
Start: 2019-03-25 | End: 2019-03-25 | Stop reason: HOSPADM

## 2019-03-24 RX ORDER — FLUTICASONE PROPIONATE 50 MCG
1 SPRAY, SUSPENSION (ML) NASAL DAILY
Status: DISCONTINUED | OUTPATIENT
Start: 2019-03-25 | End: 2019-03-25 | Stop reason: HOSPADM

## 2019-03-24 RX ORDER — ASPIRIN 81 MG/1
81 TABLET ORAL DAILY
Status: DISCONTINUED | OUTPATIENT
Start: 2019-03-25 | End: 2019-03-25 | Stop reason: HOSPADM

## 2019-03-24 RX ORDER — ACETAMINOPHEN AND CODEINE PHOSPHATE 300; 30 MG/1; MG/1
1 TABLET ORAL DAILY PRN
Status: ON HOLD | COMMUNITY
End: 2020-07-01

## 2019-03-24 RX ORDER — CLOPIDOGREL BISULFATE 75 MG/1
150 TABLET ORAL DAILY
Status: DISCONTINUED | OUTPATIENT
Start: 2019-03-25 | End: 2019-03-25 | Stop reason: HOSPADM

## 2019-03-24 RX ORDER — ATORVASTATIN CALCIUM 40 MG/1
40 TABLET, FILM COATED ORAL EVERY EVENING
Status: DISCONTINUED | OUTPATIENT
Start: 2019-03-24 | End: 2019-03-25 | Stop reason: HOSPADM

## 2019-03-24 RX ORDER — ALBUTEROL SULFATE 90 UG/1
2 AEROSOL, METERED RESPIRATORY (INHALATION) EVERY 4 HOURS PRN
Status: DISCONTINUED | OUTPATIENT
Start: 2019-03-24 | End: 2019-03-25 | Stop reason: HOSPADM

## 2019-03-24 RX ORDER — ONDANSETRON 2 MG/ML
4 INJECTION INTRAMUSCULAR; INTRAVENOUS ONCE
Status: COMPLETED | OUTPATIENT
Start: 2019-03-24 | End: 2019-03-24

## 2019-03-24 RX ORDER — CITALOPRAM 10 MG/1
40 TABLET ORAL DAILY
Status: DISCONTINUED | OUTPATIENT
Start: 2019-03-25 | End: 2019-03-24

## 2019-03-24 RX ORDER — HEPARIN SODIUM 5000 [USP'U]/ML
5000 INJECTION, SOLUTION INTRAVENOUS; SUBCUTANEOUS EVERY 8 HOURS SCHEDULED
Status: DISCONTINUED | OUTPATIENT
Start: 2019-03-24 | End: 2019-03-25 | Stop reason: HOSPADM

## 2019-03-24 RX ORDER — AMLODIPINE BESYLATE 5 MG/1
10 TABLET ORAL DAILY
Status: DISCONTINUED | OUTPATIENT
Start: 2019-03-25 | End: 2019-03-25 | Stop reason: HOSPADM

## 2019-03-24 RX ORDER — FENTANYL CITRATE 50 UG/ML
50 INJECTION, SOLUTION INTRAMUSCULAR; INTRAVENOUS ONCE
Status: COMPLETED | OUTPATIENT
Start: 2019-03-24 | End: 2019-03-24

## 2019-03-24 RX ORDER — ACETAMINOPHEN 325 MG/1
650 TABLET ORAL EVERY 6 HOURS PRN
Status: DISCONTINUED | OUTPATIENT
Start: 2019-03-24 | End: 2019-03-25 | Stop reason: HOSPADM

## 2019-03-24 RX ORDER — ACETAMINOPHEN 325 MG/1
650 TABLET ORAL ONCE
Status: COMPLETED | OUTPATIENT
Start: 2019-03-24 | End: 2019-03-24

## 2019-03-24 RX ORDER — LORAZEPAM 0.5 MG/1
0.5 TABLET ORAL 2 TIMES DAILY PRN
Status: DISCONTINUED | OUTPATIENT
Start: 2019-03-24 | End: 2019-03-25 | Stop reason: HOSPADM

## 2019-03-24 RX ADMIN — HEPARIN SODIUM 5000 UNITS: 5000 INJECTION, SOLUTION INTRAVENOUS; SUBCUTANEOUS at 23:20

## 2019-03-24 RX ADMIN — SODIUM CHLORIDE 1000 ML: 0.9 INJECTION, SOLUTION INTRAVENOUS at 19:24

## 2019-03-24 RX ADMIN — CITALOPRAM HYDROBROMIDE 40 MG: 10 TABLET ORAL at 23:21

## 2019-03-24 RX ADMIN — ASPIRIN 81 MG 324 MG: 81 TABLET ORAL at 20:46

## 2019-03-24 RX ADMIN — FENTANYL CITRATE: 50 INJECTION INTRAMUSCULAR; INTRAVENOUS at 19:50

## 2019-03-24 RX ADMIN — ATORVASTATIN CALCIUM 40 MG: 40 TABLET, FILM COATED ORAL at 23:21

## 2019-03-24 RX ADMIN — ONDANSETRON 4 MG: 2 INJECTION INTRAMUSCULAR; INTRAVENOUS at 19:29

## 2019-03-24 RX ADMIN — LORAZEPAM 0.5 MG: 0.5 TABLET ORAL at 23:21

## 2019-03-24 RX ADMIN — ACETAMINOPHEN 650 MG: 325 TABLET ORAL at 20:46

## 2019-03-25 VITALS
TEMPERATURE: 97.7 F | OXYGEN SATURATION: 96 % | HEART RATE: 77 BPM | HEIGHT: 70 IN | SYSTOLIC BLOOD PRESSURE: 142 MMHG | BODY MASS INDEX: 30.24 KG/M2 | DIASTOLIC BLOOD PRESSURE: 86 MMHG | RESPIRATION RATE: 18 BRPM | WEIGHT: 211.2 LBS

## 2019-03-25 LAB
ANION GAP SERPL CALCULATED.3IONS-SCNC: 7 MMOL/L (ref 4–13)
ATRIAL RATE: 51 BPM
BUN SERPL-MCNC: 9 MG/DL (ref 7–25)
CALCIUM SERPL-MCNC: 8.7 MG/DL (ref 8.6–10.5)
CHLORIDE SERPL-SCNC: 107 MMOL/L (ref 98–107)
CO2 SERPL-SCNC: 24 MMOL/L (ref 21–31)
CREAT SERPL-MCNC: 0.85 MG/DL (ref 0.7–1.3)
ERYTHROCYTE [DISTWIDTH] IN BLOOD BY AUTOMATED COUNT: 13 % (ref 11.5–14.5)
GFR SERPL CREATININE-BSD FRML MDRD: 92 ML/MIN/1.73SQ M
GLUCOSE SERPL-MCNC: 94 MG/DL (ref 65–99)
HCT VFR BLD AUTO: 42.8 % (ref 42–47)
HGB BLD-MCNC: 14.8 G/DL (ref 14–18)
MCH RBC QN AUTO: 31.3 PG (ref 26–34)
MCHC RBC AUTO-ENTMCNC: 34.6 G/DL (ref 31–37)
MCV RBC AUTO: 91 FL (ref 81–99)
P AXIS: 33 DEGREES
PLATELET # BLD AUTO: 282 THOUSANDS/UL (ref 149–390)
PMV BLD AUTO: 7.8 FL (ref 8.6–11.7)
POTASSIUM SERPL-SCNC: 3.6 MMOL/L (ref 3.5–5.5)
PR INTERVAL: 168 MS
QRS AXIS: 81 DEGREES
QRSD INTERVAL: 74 MS
QT INTERVAL: 446 MS
QTC INTERVAL: 411 MS
RBC # BLD AUTO: 4.73 MILLION/UL (ref 4.3–5.9)
SODIUM SERPL-SCNC: 138 MMOL/L (ref 134–143)
T WAVE AXIS: 71 DEGREES
TROPONIN I SERPL-MCNC: <0.03 NG/ML
VENTRICULAR RATE: 51 BPM
WBC # BLD AUTO: 6.1 THOUSAND/UL (ref 4.8–10.8)

## 2019-03-25 PROCEDURE — 85027 COMPLETE CBC AUTOMATED: CPT | Performed by: PHYSICIAN ASSISTANT

## 2019-03-25 PROCEDURE — 84484 ASSAY OF TROPONIN QUANT: CPT | Performed by: PHYSICIAN ASSISTANT

## 2019-03-25 PROCEDURE — 93010 ELECTROCARDIOGRAM REPORT: CPT | Performed by: INTERNAL MEDICINE

## 2019-03-25 PROCEDURE — 93005 ELECTROCARDIOGRAM TRACING: CPT

## 2019-03-25 PROCEDURE — 99215 OFFICE O/P EST HI 40 MIN: CPT | Performed by: PHYSICIAN ASSISTANT

## 2019-03-25 PROCEDURE — 80048 BASIC METABOLIC PNL TOTAL CA: CPT | Performed by: PHYSICIAN ASSISTANT

## 2019-03-25 PROCEDURE — 99217 PR OBSERVATION CARE DISCHARGE MANAGEMENT: CPT | Performed by: HOSPITALIST

## 2019-03-25 RX ADMIN — AMLODIPINE BESYLATE 10 MG: 5 TABLET ORAL at 08:08

## 2019-03-25 RX ADMIN — LORAZEPAM 0.5 MG: 0.5 TABLET ORAL at 10:49

## 2019-03-25 RX ADMIN — CLOPIDOGREL BISULFATE 150 MG: 75 TABLET ORAL at 08:08

## 2019-03-25 RX ADMIN — NICOTINE 1 PATCH: 21 PATCH TRANSDERMAL at 08:08

## 2019-03-25 RX ADMIN — TIOTROPIUM BROMIDE 18 MCG: 18 CAPSULE ORAL; RESPIRATORY (INHALATION) at 08:07

## 2019-03-25 RX ADMIN — HEPARIN SODIUM 5000 UNITS: 5000 INJECTION, SOLUTION INTRAVENOUS; SUBCUTANEOUS at 05:42

## 2019-03-25 RX ADMIN — ASPIRIN 81 MG: 81 TABLET, COATED ORAL at 08:08

## 2019-03-25 RX ADMIN — FLUTICASONE PROPIONATE 1 SPRAY: 50 SPRAY, METERED NASAL at 08:07

## 2019-03-27 ENCOUNTER — HOSPITAL ENCOUNTER (EMERGENCY)
Facility: HOSPITAL | Age: 64
Discharge: HOME | End: 2019-03-27
Attending: EMERGENCY MEDICINE | Admitting: EMERGENCY MEDICINE
Payer: MEDICARE

## 2019-03-27 VITALS
HEIGHT: 70 IN | RESPIRATION RATE: 20 BRPM | WEIGHT: 209.44 LBS | HEART RATE: 66 BPM | OXYGEN SATURATION: 96 % | BODY MASS INDEX: 29.98 KG/M2 | SYSTOLIC BLOOD PRESSURE: 121 MMHG | TEMPERATURE: 98.1 F | DIASTOLIC BLOOD PRESSURE: 73 MMHG

## 2019-03-27 DIAGNOSIS — R07.9 CHEST PAIN, UNSPECIFIED TYPE: Primary | ICD-10-CM

## 2019-03-27 LAB
ALBUMIN SERPL BCP-MCNC: 4.5 G/DL (ref 3.5–5.7)
ALP SERPL-CCNC: 86 U/L (ref 55–165)
ALT SERPL W P-5'-P-CCNC: 11 U/L (ref 7–52)
ANION GAP SERPL CALCULATED.3IONS-SCNC: 9 MMOL/L (ref 4–13)
APTT PPP: 33 SECONDS (ref 26–38)
AST SERPL W P-5'-P-CCNC: 10 U/L (ref 13–39)
ATRIAL RATE: 71 BPM
BASOPHILS # BLD AUTO: 0.1 THOUSANDS/ΜL (ref 0–0.1)
BASOPHILS NFR BLD AUTO: 1 % (ref 0–2)
BILIRUB SERPL-MCNC: 0.7 MG/DL (ref 0.2–1)
BUN SERPL-MCNC: 11 MG/DL (ref 7–25)
CALCIUM SERPL-MCNC: 9.4 MG/DL (ref 8.6–10.5)
CHLORIDE SERPL-SCNC: 102 MMOL/L (ref 98–107)
CO2 SERPL-SCNC: 26 MMOL/L (ref 21–31)
CREAT SERPL-MCNC: 1.07 MG/DL (ref 0.7–1.3)
EOSINOPHIL # BLD AUTO: 0.2 THOUSAND/ΜL (ref 0–0.61)
EOSINOPHIL NFR BLD AUTO: 2 % (ref 0–5)
ERYTHROCYTE [DISTWIDTH] IN BLOOD BY AUTOMATED COUNT: 12.9 % (ref 11.5–14.5)
GFR SERPL CREATININE-BSD FRML MDRD: 73 ML/MIN/1.73SQ M
GLUCOSE SERPL-MCNC: 90 MG/DL (ref 65–99)
HCT VFR BLD AUTO: 44.6 % (ref 42–47)
HGB BLD-MCNC: 15.6 G/DL (ref 14–18)
INR PPP: 1.02 (ref 0.9–1.5)
LIPASE SERPL-CCNC: 22 U/L (ref 11–82)
LYMPHOCYTES # BLD AUTO: 2.2 THOUSANDS/ΜL (ref 0.6–4.47)
LYMPHOCYTES NFR BLD AUTO: 26 % (ref 21–51)
MCH RBC QN AUTO: 31.4 PG (ref 26–34)
MCHC RBC AUTO-ENTMCNC: 35 G/DL (ref 31–37)
MCV RBC AUTO: 90 FL (ref 81–99)
MONOCYTES # BLD AUTO: 0.6 THOUSAND/ΜL (ref 0.17–1.22)
MONOCYTES NFR BLD AUTO: 7 % (ref 2–12)
NEUTROPHILS # BLD AUTO: 5.4 THOUSANDS/ΜL (ref 1.4–6.5)
NEUTS SEG NFR BLD AUTO: 64 % (ref 42–75)
P AXIS: 41 DEGREES
PLATELET # BLD AUTO: 282 THOUSANDS/UL (ref 149–390)
PMV BLD AUTO: 7.4 FL (ref 8.6–11.7)
POTASSIUM SERPL-SCNC: 3.9 MMOL/L (ref 3.5–5.5)
PR INTERVAL: 154 MS
PROT SERPL-MCNC: 6.7 G/DL (ref 6.4–8.9)
PROTHROMBIN TIME: 11.8 SECONDS (ref 10.2–13)
QRS AXIS: 69 DEGREES
QRSD INTERVAL: 84 MS
QT INTERVAL: 392 MS
QTC INTERVAL: 425 MS
RBC # BLD AUTO: 4.97 MILLION/UL (ref 4.3–5.9)
SODIUM SERPL-SCNC: 137 MMOL/L (ref 134–143)
T WAVE AXIS: 59 DEGREES
TROPONIN I SERPL-MCNC: <0.03 NG/ML
VENTRICULAR RATE: 71 BPM
WBC # BLD AUTO: 8.4 THOUSAND/UL (ref 4.8–10.8)

## 2019-03-27 PROCEDURE — 85025 COMPLETE CBC W/AUTO DIFF WBC: CPT | Performed by: EMERGENCY MEDICINE

## 2019-03-27 PROCEDURE — 80053 COMPREHEN METABOLIC PANEL: CPT | Performed by: EMERGENCY MEDICINE

## 2019-03-27 PROCEDURE — 84484 ASSAY OF TROPONIN QUANT: CPT | Performed by: EMERGENCY MEDICINE

## 2019-03-27 PROCEDURE — 85610 PROTHROMBIN TIME: CPT | Performed by: EMERGENCY MEDICINE

## 2019-03-27 PROCEDURE — 83690 ASSAY OF LIPASE: CPT | Performed by: EMERGENCY MEDICINE

## 2019-03-27 PROCEDURE — 93005 ELECTROCARDIOGRAM TRACING: CPT

## 2019-03-27 PROCEDURE — 85730 THROMBOPLASTIN TIME PARTIAL: CPT | Performed by: EMERGENCY MEDICINE

## 2019-03-27 PROCEDURE — 36415 COLL VENOUS BLD VENIPUNCTURE: CPT | Performed by: EMERGENCY MEDICINE

## 2019-03-27 PROCEDURE — 93010 ELECTROCARDIOGRAM REPORT: CPT | Performed by: INTERNAL MEDICINE

## 2019-03-27 PROCEDURE — 99285 EMERGENCY DEPT VISIT HI MDM: CPT

## 2019-03-27 RX ORDER — CLOPIDOGREL BISULFATE 75 MG/1
150 TABLET ORAL ONCE
Status: COMPLETED | OUTPATIENT
Start: 2019-03-27 | End: 2019-03-27

## 2019-03-27 RX ORDER — LORAZEPAM 0.5 MG/1
0.5 TABLET ORAL ONCE
Status: COMPLETED | OUTPATIENT
Start: 2019-03-27 | End: 2019-03-27

## 2019-03-27 RX ADMIN — LORAZEPAM 0.5 MG: 0.5 TABLET ORAL at 11:10

## 2019-03-27 RX ADMIN — CLOPIDOGREL BISULFATE 150 MG: 75 TABLET ORAL at 11:49

## 2019-03-27 NOTE — ED PROVIDER NOTES
History  Chief Complaint   Patient presents with    Chest Pain     started this am  about 2 am took 1 nitro then     PATIENT PRESENTS TO THE ER BECAUSE OF CHEST PAIN, HE POINTS TO THE LEFT UPPER SUB GLENOID AREA  IT IS BOTH SHARP AND CRAMPY  IT STARTED AT 2:00 A  M , AND THEN HE HAD ANOTHER EPISODE AT AROUND 6:30 A  M   HE DENIES DIAPHORESIS OR SHORTNESS OF BREATH  HE NOTES THAT HE WAS JUST HERE FOR THE SAME COMPLAINT, ADMITTED OVERNIGHT ON  NIGHT IN DISCHARGE MONDAY, AND WONDERS WHY IT HAS COME BACK  HE DENIES EPIGASTRIC OR RIGHT UPPER QUADRANT PAIN  Prior to Admission Medications   Prescriptions Last Dose Informant Patient Reported? Taking?    LORazepam (ATIVAN) 0 5 mg tablet   No No   Sig: Take 1 tablet (0 5 mg total) by mouth 2 (two) times a day as needed for anxiety for up to 7 days   acetaminophen-codeine (TYLENOL #3) 300-30 mg per tablet  Self Yes Yes   Sig: Take 1 tablet by mouth daily as needed for moderate pain   albuterol (PROVENTIL HFA,VENTOLIN HFA) 90 mcg/act inhaler   No Yes   Sig: Inhale 2 puffs every 4 (four) hours as needed for wheezing   amLODIPine (NORVASC) 10 mg tablet   No Yes   Sig: Take 1 tablet (10 mg total) by mouth daily   aspirin (ECOTRIN LOW STRENGTH) 81 mg EC tablet   No Yes   Sig: Take 1 tablet (81 mg total) by mouth daily   atorvastatin (LIPITOR) 40 mg tablet   No Yes   Sig: Take 1 tablet (40 mg total) by mouth every evening   citalopram (CeleXA) 40 mg tablet   No Yes   Sig: Take 1 tablet (40 mg total) by mouth daily   clopidogrel (PLAVIX) 75 mg tablet   No Yes   Sig: Take 2 tablets (150 mg total) by mouth daily   fluticasone (FLONASE) 50 mcg/act nasal spray   No Yes   Si spray into each nostril daily   tiotropium (SPIRIVA) 18 mcg inhalation capsule   No Yes   Sig: Place 1 capsule (18 mcg total) into inhaler and inhale daily      Facility-Administered Medications: None       Past Medical History:   Diagnosis Date    [facility] ) 9/15/2018    CAD (coronary artery disease)     s/p CABG 2000    [facility]     [facility]    [facility] attack [facility]     Hyperlipidemia     Hypertension     Lexiscan nuclear stress test 03/19/2016    EF 74% Normal       Past Surgical History:   Procedure Laterality Date    CARDIAC CATHETERIZATION  08/19/2015    LIMA occluded  No severe native lesions    COLONOSCOPY      CORONARY ARTERY BYPASS GRAFT  2000    HERNIA REPAIR      times 2       Family History   Problem Relation Age of Onset    Cancer Mother     Heart disease Mother     Cancer Father     Cancer Maternal Grandmother     Cancer Paternal Grandfather      I have reviewed and agree with the history as documented  Social History     Tobacco Use    Smoking status: Current Every Day Smoker     Packs/day: 1 00     Years: 50 00     Pack years: 50 00     Types: Cigarettes    Smokeless tobacco: Never Used   Substance Use Topics    Alcohol use: Not Currently    Drug use: Never        Review of Systems   Respiratory: Negative for cough and shortness of breath  Cardiovascular: Positive for chest pain  Negative for palpitations and leg swelling  Gastrointestinal: Negative for abdominal pain  Genitourinary: Negative for difficulty urinating and dysuria  Neurological: Negative for dizziness, syncope and headaches  All other systems reviewed and are negative  Physical Exam  Physical Exam   Constitutional: He is oriented to person, place, and time  He appears well-developed and well-nourished  PATIENT IS AMBULATORY AND IN NO ACUTE DISTRESS  HE IS NONTOXIC  HENT:   Head: Normocephalic and atraumatic  Eyes: EOM are normal    Neck: Normal range of motion  Neck supple  Cardiovascular: Normal rate, regular rhythm and normal pulses  Exam reveals no friction rub  No systolic murmur is present  Pulmonary/Chest: Effort normal  No respiratory distress  Abdominal: Soft   Bowel sounds are normal  He exhibits no distension  There is no tenderness  There is no guarding  Musculoskeletal: Normal range of motion  Lymphadenopathy:     He has no cervical adenopathy  Neurological: He is alert and oriented to person, place, and time  No cranial nerve deficit  Skin: Skin is warm and dry  Capillary refill takes less than 2 seconds  No rash noted  He is not diaphoretic  No erythema  Vitals reviewed        Vital Signs  ED Triage Vitals [03/27/19 0953]   Temperature Pulse Respirations Blood Pressure SpO2   97 7 °F (36 5 °C) 72 20 105/75 98 %      Temp src Heart Rate Source Patient Position - Orthostatic VS BP Location FiO2 (%)   -- -- Lying Left arm --      Pain Score       No Pain           Vitals:    03/27/19 0953   BP: 105/75   Pulse: 72   Patient Position - Orthostatic VS: Lying         Visual Acuity      ED Medications  Medications - No data to display    Diagnostic Studies  Results Reviewed     Procedure Component Value Units Date/Time    CBC and differential [[id number]] Collected:  03/27/19 1002    Lab Status:  No result Specimen:  Blood from Arm, Right     Protime-INR [[id number]] Collected:  03/27/19 1002    Lab Status:  No result Specimen:  Blood from Arm, Right     APTT [[id number]] Collected:  03/27/19 1002    Lab Status:  No result Specimen:  Blood from Arm, Right     Comprehensive metabolic panel [[id number]] Collected:  03/27/19 1002    Lab Status:  No result Specimen:  Blood from Arm, Right     Lipase [[id number]] Collected:  03/27/19 1002    Lab Status:  No result Specimen:  Blood from Arm, Right                  No orders to display              Procedures  ECG 12 Lead Documentation  Date/Time: 3/27/2019 10:02 AM  Performed by: Erick Joshi DO  Authorized by: Erick Joshi DO     Indications / Diagnosis:  CHEST PAIN  ECG reviewed by me, the ED Provider: yes    Patient location:  ED  Previous ECG:     Previous ECG:  Unavailable  Interpretation:     Interpretation: normal    Rate:     ECG rate:  71    ECG rate assessment: normal    Rhythm:     Rhythm: sinus rhythm    Ectopy:     Ectopy: none    ST segments:     ST segments:  Normal  Comments:      NO ST SEGMENT ELEVATION OR DEPRESSION TO SUGGEST ISCHEMIA           Phone Contacts  ED Phone Contact    ED Course    EXAMINATION AND EVALUATION  PATIENT TOOK HIS ASPIRIN PRIOR TO ARRIVAL  ED CHEST PAIN WAS WAXING AND WANING DURING HIS ED VISIT  LATER HE DID ASK FOR ATIVAN WHICH HE HAD TAKEN, AND HE FELT HE NEEDED  HE FELT BETTER AFTER TAKING ATIVAN  I REVIEWED THE PATIENT'S CASE IN COARSE WITH THE CARDIOLOGIST, WHO RECOMMENDED THAT THE PATIENT IS SAFE FOR DISCHARGE GIVEN THE INITIAL ONSET OF PAIN GREATER THAN 8 HOURS PRIOR TO ARRIVAL  THE PATIENT WAS OFFERED AND AGREED TO A 2ND TROPONIN AT APPROXIMATELY 12 NOON, HOWEVER HE SHORTLY THEREAFTER CHANGES MIND AND REQUESTED DISCHARGE, WHICH IS APPROPRIATE GIVEN HIS NORMAL EKG AND NORMAL TROPONIN                             MDM    Disposition  Final diagnoses:   None     ED Disposition     None      Follow-up Information    None         Patient's Medications   Discharge Prescriptions    No medications on file     No discharge procedures on file      ED Provider  Electronically Signed by           Marisol Cazares DO  03/27/19 9132

## 2019-03-27 NOTE — DISCHARGE INSTRUCTIONS
CONTINUE YOUR USUAL MEDS, AND FOLLOW UP WITH YOUR SCHEDULED SCAN TOMORROW  RETURN IF YOUR CP IS SIGNIFICANTLY WORSE

## 2019-03-28 ENCOUNTER — HOSPITAL ENCOUNTER (OUTPATIENT)
Dept: NON INVASIVE DIAGNOSTICS | Facility: CLINIC | Age: 64
Discharge: HOME | End: 2019-03-28
Payer: MEDICARE

## 2019-03-28 DIAGNOSIS — F17.200 TOBACCO DEPENDENCE SYNDROME: ICD-10-CM

## 2019-03-28 DIAGNOSIS — I25.10 CORONARY ARTERY DISEASE INVOLVING NATIVE CORONARY ARTERY OF NATIVE HEART WITHOUT ANGINA PECTORIS: ICD-10-CM

## 2019-03-28 DIAGNOSIS — E78.2 MIXED HYPERLIPIDEMIA: ICD-10-CM

## 2019-03-28 DIAGNOSIS — R07.9 CHEST PAIN, UNSPECIFIED TYPE: ICD-10-CM

## 2019-03-28 PROCEDURE — 93016 CV STRESS TEST SUPVJ ONLY: CPT | Performed by: INTERNAL MEDICINE

## 2019-03-28 PROCEDURE — 93017 CV STRESS TEST TRACING ONLY: CPT

## 2019-03-28 PROCEDURE — 78452 HT MUSCLE IMAGE SPECT MULT: CPT | Performed by: INTERNAL MEDICINE

## 2019-03-28 PROCEDURE — A9502 TC99M TETROFOSMIN: HCPCS

## 2019-03-28 PROCEDURE — 93018 CV STRESS TEST I&R ONLY: CPT | Performed by: INTERNAL MEDICINE

## 2019-03-28 PROCEDURE — 78452 HT MUSCLE IMAGE SPECT MULT: CPT

## 2019-03-28 RX ADMIN — REGADENOSON 0.4 MG: 0.08 INJECTION, SOLUTION INTRAVENOUS at 11:35

## 2019-03-29 LAB
ARRHY DURING EX: NORMAL
CHEST PAIN STATEMENT: NORMAL
MAX DIASTOLIC BP: 70 MMHG
MAX HEART RATE: 121 BPM
MAX PREDICTED HEART RATE: 156 BPM
MAX. SYSTOLIC BP: 134 MMHG
PROTOCOL NAME: NORMAL
REASON FOR TERMINATION: NORMAL
TARGET HR FORMULA: NORMAL
TEST INDICATION: NORMAL
TIME IN EXERCISE PHASE: NORMAL

## 2019-04-11 ENCOUNTER — OFFICE VISIT (OUTPATIENT)
Dept: CARDIOLOGY CLINIC | Facility: CLINIC | Age: 64
End: 2019-04-11
Payer: MEDICARE

## 2019-04-11 VITALS
WEIGHT: 218 LBS | BODY MASS INDEX: 31.21 KG/M2 | SYSTOLIC BLOOD PRESSURE: 118 MMHG | DIASTOLIC BLOOD PRESSURE: 78 MMHG | HEIGHT: 70 IN | HEART RATE: 88 BPM

## 2019-04-11 DIAGNOSIS — E78.2 MIXED HYPERLIPIDEMIA: ICD-10-CM

## 2019-04-11 DIAGNOSIS — I25.10 CORONARY ARTERY DISEASE INVOLVING NATIVE CORONARY ARTERY OF NATIVE HEART WITHOUT ANGINA PECTORIS: ICD-10-CM

## 2019-04-11 DIAGNOSIS — F17.200 TOBACCO DEPENDENCE SYNDROME: ICD-10-CM

## 2019-04-11 DIAGNOSIS — I25.119 CORONARY ARTERY DISEASE INVOLVING NATIVE CORONARY ARTERY OF NATIVE HEART WITH ANGINA PECTORIS (HCC): Primary | ICD-10-CM

## 2019-04-11 PROCEDURE — 93000 ELECTROCARDIOGRAM COMPLETE: CPT | Performed by: INTERNAL MEDICINE

## 2019-04-11 PROCEDURE — 99214 OFFICE O/P EST MOD 30 MIN: CPT | Performed by: INTERNAL MEDICINE

## 2019-04-22 ENCOUNTER — APPOINTMENT (OUTPATIENT)
Dept: LAB | Facility: HOSPITAL | Age: 64
End: 2019-04-22
Payer: MEDICARE

## 2019-04-22 ENCOUNTER — TRANSCRIBE ORDERS (OUTPATIENT)
Dept: ADMINISTRATIVE | Facility: HOSPITAL | Age: 64
End: 2019-04-22

## 2019-04-22 ENCOUNTER — HOSPITAL ENCOUNTER (OUTPATIENT)
Dept: RADIOLOGY | Facility: HOSPITAL | Age: 64
Discharge: HOME | End: 2019-04-22
Payer: MEDICARE

## 2019-04-22 DIAGNOSIS — M25.572 PAIN OF JOINT OF LEFT ANKLE AND FOOT: ICD-10-CM

## 2019-04-22 DIAGNOSIS — R41.3 MEMORY LOSS: ICD-10-CM

## 2019-04-22 DIAGNOSIS — R51.9 FACIAL PAIN: ICD-10-CM

## 2019-04-22 DIAGNOSIS — M25.572 PAIN OF JOINT OF LEFT ANKLE AND FOOT: Primary | ICD-10-CM

## 2019-04-22 LAB
AMMONIA PLAS-SCNC: 55 UMOL/L (ref 25–90)
ERYTHROCYTE [SEDIMENTATION RATE] IN BLOOD: 13 MM/HOUR (ref 0–20)
TSH SERPL DL<=0.05 MIU/L-ACNC: 0.95 UIU/ML (ref 0.45–5.33)
VIT B12 SERPL-MCNC: 266 PG/ML (ref 100–900)

## 2019-04-22 PROCEDURE — 86592 SYPHILIS TEST NON-TREP QUAL: CPT

## 2019-04-22 PROCEDURE — 73630 X-RAY EXAM OF FOOT: CPT

## 2019-04-22 PROCEDURE — 86618 LYME DISEASE ANTIBODY: CPT

## 2019-04-22 PROCEDURE — 82607 VITAMIN B-12: CPT

## 2019-04-22 PROCEDURE — 82140 ASSAY OF AMMONIA: CPT

## 2019-04-22 PROCEDURE — 36415 COLL VENOUS BLD VENIPUNCTURE: CPT

## 2019-04-22 PROCEDURE — 84443 ASSAY THYROID STIM HORMONE: CPT

## 2019-04-22 PROCEDURE — 85652 RBC SED RATE AUTOMATED: CPT

## 2019-04-22 PROCEDURE — 73610 X-RAY EXAM OF ANKLE: CPT

## 2019-04-23 LAB
B BURGDOR IGG SER IA-ACNC: 0.12
B BURGDOR IGM SER IA-ACNC: 0.44
RPR SER QL: NORMAL

## 2019-04-29 ENCOUNTER — APPOINTMENT (EMERGENCY)
Dept: RADIOLOGY | Facility: HOSPITAL | Age: 64
End: 2019-04-29
Payer: MEDICARE

## 2019-04-29 ENCOUNTER — HOSPITAL ENCOUNTER (EMERGENCY)
Facility: HOSPITAL | Age: 64
Discharge: HOME | End: 2019-04-29
Attending: EMERGENCY MEDICINE | Admitting: EMERGENCY MEDICINE
Payer: MEDICARE

## 2019-04-29 VITALS
BODY MASS INDEX: 30.92 KG/M2 | HEIGHT: 70 IN | DIASTOLIC BLOOD PRESSURE: 76 MMHG | TEMPERATURE: 97.5 F | RESPIRATION RATE: 14 BRPM | OXYGEN SATURATION: 98 % | HEART RATE: 70 BPM | WEIGHT: 216 LBS | SYSTOLIC BLOOD PRESSURE: 129 MMHG

## 2019-04-29 DIAGNOSIS — R07.9 CHEST PAIN, UNSPECIFIED TYPE: Primary | ICD-10-CM

## 2019-04-29 LAB
ALBUMIN SERPL BCP-MCNC: 4.1 G/DL (ref 3.5–5.7)
ALP SERPL-CCNC: 85 U/L (ref 55–165)
ALT SERPL W P-5'-P-CCNC: 13 U/L (ref 7–52)
ANION GAP SERPL CALCULATED.3IONS-SCNC: 7 MMOL/L (ref 4–13)
APTT PPP: 30 SECONDS (ref 26–38)
AST SERPL W P-5'-P-CCNC: 17 U/L (ref 13–39)
ATRIAL RATE: 67 BPM
BILIRUB SERPL-MCNC: 0.4 MG/DL (ref 0.2–1)
BNP SERPL-MCNC: 43 PG/ML (ref 1–100)
BUN SERPL-MCNC: 11 MG/DL (ref 7–25)
CALCIUM SERPL-MCNC: 9.3 MG/DL (ref 8.6–10.5)
CHLORIDE SERPL-SCNC: 102 MMOL/L (ref 98–107)
CO2 SERPL-SCNC: 25 MMOL/L (ref 21–31)
CREAT SERPL-MCNC: 0.99 MG/DL (ref 0.7–1.3)
DEPRECATED D DIMER PPP: 166 NG/ML (FEU)
GFR SERPL CREATININE-BSD FRML MDRD: 80 ML/MIN/1.73SQ M
GLUCOSE SERPL-MCNC: 101 MG/DL (ref 65–99)
INR PPP: 0.99 (ref 0.9–1.5)
P AXIS: 47 DEGREES
POTASSIUM SERPL-SCNC: 3.8 MMOL/L (ref 3.5–5.5)
PR INTERVAL: 168 MS
PROT SERPL-MCNC: 6.2 G/DL (ref 6.4–8.9)
PROTHROMBIN TIME: 11.5 SECONDS (ref 10.2–13)
QRS AXIS: 80 DEGREES
QRSD INTERVAL: 76 MS
QT INTERVAL: 398 MS
QTC INTERVAL: 420 MS
SODIUM SERPL-SCNC: 134 MMOL/L (ref 134–143)
T WAVE AXIS: 67 DEGREES
TROPONIN I SERPL-MCNC: <0.03 NG/ML
VENTRICULAR RATE: 67 BPM

## 2019-04-29 PROCEDURE — 80053 COMPREHEN METABOLIC PANEL: CPT | Performed by: EMERGENCY MEDICINE

## 2019-04-29 PROCEDURE — 84484 ASSAY OF TROPONIN QUANT: CPT | Performed by: EMERGENCY MEDICINE

## 2019-04-29 PROCEDURE — 71045 X-RAY EXAM CHEST 1 VIEW: CPT

## 2019-04-29 PROCEDURE — 96374 THER/PROPH/DIAG INJ IV PUSH: CPT

## 2019-04-29 PROCEDURE — 83880 ASSAY OF NATRIURETIC PEPTIDE: CPT | Performed by: EMERGENCY MEDICINE

## 2019-04-29 PROCEDURE — 36415 COLL VENOUS BLD VENIPUNCTURE: CPT | Performed by: EMERGENCY MEDICINE

## 2019-04-29 PROCEDURE — 85379 FIBRIN DEGRADATION QUANT: CPT | Performed by: EMERGENCY MEDICINE

## 2019-04-29 PROCEDURE — 99285 EMERGENCY DEPT VISIT HI MDM: CPT

## 2019-04-29 PROCEDURE — 93010 ELECTROCARDIOGRAM REPORT: CPT | Performed by: INTERNAL MEDICINE

## 2019-04-29 PROCEDURE — 85610 PROTHROMBIN TIME: CPT | Performed by: EMERGENCY MEDICINE

## 2019-04-29 PROCEDURE — 85730 THROMBOPLASTIN TIME PARTIAL: CPT | Performed by: EMERGENCY MEDICINE

## 2019-04-29 PROCEDURE — 93005 ELECTROCARDIOGRAM TRACING: CPT

## 2019-04-29 RX ORDER — LORAZEPAM 2 MG/ML
1 INJECTION INTRAMUSCULAR ONCE
Status: COMPLETED | OUTPATIENT
Start: 2019-04-29 | End: 2019-04-29

## 2019-04-29 RX ORDER — ASPIRIN 325 MG
325 TABLET ORAL ONCE
Status: COMPLETED | OUTPATIENT
Start: 2019-04-29 | End: 2019-04-29

## 2019-04-29 RX ADMIN — LORAZEPAM 1 MG: 2 INJECTION INTRAMUSCULAR; INTRAVENOUS at 10:33

## 2019-04-29 RX ADMIN — ASPIRIN 325 MG: 325 TABLET, FILM COATED ORAL at 10:32

## 2019-06-07 ENCOUNTER — HOSPITAL ENCOUNTER (OUTPATIENT)
Dept: NON INVASIVE DIAGNOSTICS | Facility: HOSPITAL | Age: 64
Discharge: HOME | End: 2019-06-07
Payer: MEDICARE

## 2019-06-07 ENCOUNTER — TRANSCRIBE ORDERS (OUTPATIENT)
Dept: ADMINISTRATIVE | Facility: HOSPITAL | Age: 64
End: 2019-06-07

## 2019-06-07 DIAGNOSIS — D64.9 ANEMIA, UNSPECIFIED TYPE: ICD-10-CM

## 2019-06-07 DIAGNOSIS — I25.10 ATHEROSCLEROSIS OF NATIVE CORONARY ARTERY, ANGINA PRESENCE UNSPECIFIED, UNSPECIFIED WHETHER NATIVE OR TRANSPLANTED HEART: ICD-10-CM

## 2019-06-07 DIAGNOSIS — I25.10 ATHEROSCLEROSIS OF NATIVE CORONARY ARTERY, ANGINA PRESENCE UNSPECIFIED, UNSPECIFIED WHETHER NATIVE OR TRANSPLANTED HEART: Primary | ICD-10-CM

## 2019-06-07 DIAGNOSIS — E78.2 MIXED HYPERLIPIDEMIA: ICD-10-CM

## 2019-06-07 DIAGNOSIS — E11.8 TYPE 2 DIABETES MELLITUS WITH COMPLICATION, UNSPECIFIED WHETHER LONG TERM INSULIN USE: ICD-10-CM

## 2019-06-07 LAB
ATRIAL RATE: 72 BPM
P AXIS: 48 DEGREES
PR INTERVAL: 158 MS
QRS AXIS: 77 DEGREES
QRSD INTERVAL: 90 MS
QT INTERVAL: 402 MS
QTC INTERVAL: 440 MS
T WAVE AXIS: 65 DEGREES
VENTRICULAR RATE: 72 BPM

## 2019-06-07 PROCEDURE — 93010 ELECTROCARDIOGRAM REPORT: CPT | Performed by: INTERNAL MEDICINE

## 2019-06-07 PROCEDURE — 93005 ELECTROCARDIOGRAM TRACING: CPT

## 2019-06-11 ENCOUNTER — OFFICE VISIT (OUTPATIENT)
Dept: CARDIOLOGY CLINIC | Facility: CLINIC | Age: 64
End: 2019-06-11
Payer: MEDICARE

## 2019-06-11 VITALS
WEIGHT: 225 LBS | BODY MASS INDEX: 32.21 KG/M2 | DIASTOLIC BLOOD PRESSURE: 78 MMHG | HEART RATE: 84 BPM | SYSTOLIC BLOOD PRESSURE: 116 MMHG | HEIGHT: 70 IN

## 2019-06-11 DIAGNOSIS — I25.10 CORONARY ARTERIOSCLEROSIS IN NATIVE ARTERY: Primary | ICD-10-CM

## 2019-06-11 DIAGNOSIS — F17.200 TOBACCO DEPENDENCE SYNDROME: ICD-10-CM

## 2019-06-11 DIAGNOSIS — R60.0 LOWER EXTREMITY EDEMA: ICD-10-CM

## 2019-06-11 PROCEDURE — 99214 OFFICE O/P EST MOD 30 MIN: CPT | Performed by: INTERNAL MEDICINE

## 2019-06-11 RX ORDER — PANTOPRAZOLE SODIUM 40 MG/1
40 TABLET, DELAYED RELEASE ORAL DAILY
COMMUNITY
End: 2019-07-30 | Stop reason: SDUPTHER

## 2019-06-11 RX ORDER — NITROGLYCERIN 0.4 MG/1
0.4 TABLET SUBLINGUAL
COMMUNITY

## 2019-06-11 RX ORDER — FUROSEMIDE 40 MG/1
40 TABLET ORAL DAILY
Qty: 90 TABLET | Refills: 3 | Status: SHIPPED | OUTPATIENT
Start: 2019-06-11 | End: 2019-06-11 | Stop reason: CLARIF

## 2019-06-11 RX ORDER — FUROSEMIDE 40 MG/1
40 TABLET ORAL DAILY
Qty: 90 TABLET | Refills: 3 | Status: SHIPPED | OUTPATIENT
Start: 2019-06-11 | End: 2020-08-30

## 2019-06-14 ENCOUNTER — HOSPITAL ENCOUNTER (OUTPATIENT)
Dept: NON INVASIVE DIAGNOSTICS | Facility: HOSPITAL | Age: 64
Discharge: HOME | End: 2019-06-14
Payer: MEDICARE

## 2019-06-14 ENCOUNTER — APPOINTMENT (OUTPATIENT)
Dept: LAB | Facility: HOSPITAL | Age: 64
End: 2019-06-14
Payer: MEDICARE

## 2019-06-14 DIAGNOSIS — I25.10 ATHEROSCLEROSIS OF NATIVE CORONARY ARTERY, ANGINA PRESENCE UNSPECIFIED, UNSPECIFIED WHETHER NATIVE OR TRANSPLANTED HEART: ICD-10-CM

## 2019-06-14 DIAGNOSIS — R60.0 LOWER EXTREMITY EDEMA: ICD-10-CM

## 2019-06-14 DIAGNOSIS — R06.02 SHORTNESS OF BREATH: Primary | ICD-10-CM

## 2019-06-14 DIAGNOSIS — D64.9 ANEMIA, UNSPECIFIED TYPE: ICD-10-CM

## 2019-06-14 DIAGNOSIS — E11.8 TYPE 2 DIABETES MELLITUS WITH COMPLICATION, UNSPECIFIED WHETHER LONG TERM INSULIN USE: ICD-10-CM

## 2019-06-14 DIAGNOSIS — E78.2 MIXED HYPERLIPIDEMIA: ICD-10-CM

## 2019-06-14 LAB
ALBUMIN SERPL BCP-MCNC: 4 G/DL (ref 3.5–5.7)
ALP SERPL-CCNC: 83 U/L (ref 55–165)
ALT SERPL W P-5'-P-CCNC: 13 U/L (ref 7–52)
ANION GAP SERPL CALCULATED.3IONS-SCNC: 6 MMOL/L (ref 4–13)
AST SERPL W P-5'-P-CCNC: 13 U/L (ref 13–39)
BILIRUB SERPL-MCNC: 0.5 MG/DL (ref 0.2–1)
BNP SERPL-MCNC: 36 PG/ML (ref 1–100)
BUN SERPL-MCNC: 8 MG/DL (ref 7–25)
CALCIUM SERPL-MCNC: 9.1 MG/DL (ref 8.6–10.5)
CHLORIDE SERPL-SCNC: 100 MMOL/L (ref 98–107)
CHOLEST SERPL-MCNC: 158 MG/DL (ref 0–200)
CO2 SERPL-SCNC: 26 MMOL/L (ref 21–31)
CREAT SERPL-MCNC: 0.92 MG/DL (ref 0.7–1.3)
ERYTHROCYTE [DISTWIDTH] IN BLOOD BY AUTOMATED COUNT: 12.8 % (ref 11.5–14.5)
EST. AVERAGE GLUCOSE BLD GHB EST-MCNC: 120 MG/DL
GFR SERPL CREATININE-BSD FRML MDRD: 88 ML/MIN/1.73SQ M
GLUCOSE SERPL-MCNC: 97 MG/DL (ref 65–99)
HBA1C MFR BLD: 5.8 % (ref 4.2–6.3)
HCT VFR BLD AUTO: 40.6 % (ref 42–47)
HDLC SERPL-MCNC: 30 MG/DL (ref 40–60)
HGB BLD-MCNC: 14.4 G/DL (ref 14–18)
LDLC SERPL CALC-MCNC: 88 MG/DL (ref 0–100)
MCH RBC QN AUTO: 31 PG (ref 26–34)
MCHC RBC AUTO-ENTMCNC: 35.4 G/DL (ref 31–37)
MCV RBC AUTO: 88 FL (ref 81–99)
NONHDLC SERPL-MCNC: 128 MG/DL
PLATELET # BLD AUTO: 277 THOUSANDS/UL (ref 149–390)
PMV BLD AUTO: 7.4 FL (ref 8.6–11.7)
POTASSIUM SERPL-SCNC: 3.7 MMOL/L (ref 3.5–5.5)
PROT SERPL-MCNC: 6.4 G/DL (ref 6.4–8.9)
RBC # BLD AUTO: 4.63 MILLION/UL (ref 4.3–5.9)
SODIUM SERPL-SCNC: 132 MMOL/L (ref 134–143)
TRIGL SERPL-MCNC: 202 MG/DL (ref 44–166)
WBC # BLD AUTO: 9 THOUSAND/UL (ref 4.8–10.8)

## 2019-06-14 PROCEDURE — 93971 EXTREMITY STUDY: CPT

## 2019-06-14 PROCEDURE — 83036 HEMOGLOBIN GLYCOSYLATED A1C: CPT

## 2019-06-14 PROCEDURE — 80061 LIPID PANEL: CPT

## 2019-06-14 PROCEDURE — 83880 ASSAY OF NATRIURETIC PEPTIDE: CPT

## 2019-06-14 PROCEDURE — 36415 COLL VENOUS BLD VENIPUNCTURE: CPT

## 2019-06-14 PROCEDURE — 93971 EXTREMITY STUDY: CPT | Performed by: SURGERY

## 2019-06-14 PROCEDURE — 80053 COMPREHEN METABOLIC PANEL: CPT

## 2019-06-14 PROCEDURE — 85027 COMPLETE CBC AUTOMATED: CPT

## 2019-06-27 ENCOUNTER — TELEPHONE (OUTPATIENT)
Dept: CARDIOLOGY CLINIC | Facility: CLINIC | Age: 64
End: 2019-06-27

## 2019-07-01 ENCOUNTER — OFFICE VISIT (OUTPATIENT)
Dept: URGENT CARE | Facility: CLINIC | Age: 64
End: 2019-07-01
Payer: MEDICARE

## 2019-07-01 VITALS
OXYGEN SATURATION: 96 % | HEART RATE: 90 BPM | BODY MASS INDEX: 32.21 KG/M2 | TEMPERATURE: 97.7 F | WEIGHT: 225 LBS | SYSTOLIC BLOOD PRESSURE: 131 MMHG | RESPIRATION RATE: 16 BRPM | DIASTOLIC BLOOD PRESSURE: 80 MMHG | HEIGHT: 70 IN

## 2019-07-01 DIAGNOSIS — T20.10XA SUPERFICIAL BURN OF FACE, INITIAL ENCOUNTER: Primary | ICD-10-CM

## 2019-07-01 PROCEDURE — 99214 OFFICE O/P EST MOD 30 MIN: CPT | Performed by: NURSE PRACTITIONER

## 2019-07-01 PROCEDURE — G0463 HOSPITAL OUTPT CLINIC VISIT: HCPCS | Performed by: NURSE PRACTITIONER

## 2019-07-01 NOTE — PATIENT INSTRUCTIONS
Use the bacitracin 2-3x/day to the burns until healed  If you run out of the small tube given here, just make sure you buy plain bacitracin not regular triple antibiotic ointment  Superficial Burn   AMBULATORY CARE:   What is a superficial burn? A superficial burn, or first-degree burn, is when the outer layer of skin has been burned  Common signs and symptoms of a superficial burn include the following:   · Red, dry, tender skin    · Swelling    · Area turns white when touched  Call 911 for any of the following:   · You have trouble breathing  Seek care immediately if:   · You have a burn to the face, neck, hands, or genitals  · Your burn covers a large area such as your trunk or entire arm or leg  · You have increased redness, numbness, or swelling in the superficial burn area  · You have red streaks or blisters spreading outward from the superficial burn  · Your pain is not relieved, or is getting worse even after you take medicine  Contact your healthcare provider if:   · You have a fever  · You have questions or concerns about your condition or care  Treatment for a superficial burn  may not be needed  A superficial burn usually heals in 3 to 5 days without scarring or blisters  Use the following first-aid guide to treat your burn:  · Remove clothing and jewelry immediately  · Flush liquid chemicals from your skin completely with large amounts of cool running water  Do not splash the chemicals into your eyes  · Brush dry chemicals off your skin if large amounts of water are not available  Small amounts of water will activate some chemicals, such as lime, and cause more damage  Do not splash the chemicals into your eyes  · Run cool or cold temperature water over the burned area for 10 minutes  A cold or cool compress can also be put on the burn  Do not use ice or ice water on the affected area  This may cause more damage to the skin      · Use an antibacterial ointment or skin cream, such as aloe vera cream, to soothe the skin  Do not put butter, petroleum jelly, or other home remedies on skin burned by a chemical     · Do not put a bandage on the burn until you are told to do so by your healthcare provider  Prevent superficial burns:   · Do not leave cups, mugs, or bowls containing hot liquids at the edge of a table  Keep pot handles turned away from the stove front  · Do not leave a lit cigarette  Discard it properly  Keep cigarette lighters and matches in a safe place where children cannot reach them  · Keep your water heater setting to low or medium (90°F to 120°F, or 32°C to 48°C)  · Wear sunscreen that has a sun protectant factor (SPF) of 15 or higher  The sunscreen should also have ultraviolet A (UVA) and ultraviolet B (UVB) protection  Follow the directions on the label when you use sunscreen  Put on more sunscreen if you are in the sun for more than an hour  Reapply sunscreen often if you go swimming or are sweating  Follow up with your healthcare provider as directed:  Write down your questions so you remember to ask them during your visits  © 2017 [employer] Information is for End User's use only and may not be sold, redistributed or otherwise used for commercial purposes  All illustrations and images included in CareNotes® are the copyrighted property of [employer]  or [employer]  The above information is an  only  It is not intended as medical advice for individual conditions or treatments  Talk to your doctor, nurse or pharmacist before following any medical regimen to see if it is safe and effective for you

## 2019-07-30 ENCOUNTER — HOSPITAL ENCOUNTER (EMERGENCY)
Facility: HOSPITAL | Age: 64
Discharge: HOME | End: 2019-07-30
Attending: EMERGENCY MEDICINE | Admitting: EMERGENCY MEDICINE
Payer: MEDICARE

## 2019-07-30 ENCOUNTER — APPOINTMENT (EMERGENCY)
Dept: RADIOLOGY | Facility: HOSPITAL | Age: 64
End: 2019-07-30
Payer: MEDICARE

## 2019-07-30 VITALS
WEIGHT: 215 LBS | SYSTOLIC BLOOD PRESSURE: 111 MMHG | RESPIRATION RATE: 22 BRPM | BODY MASS INDEX: 30.78 KG/M2 | HEIGHT: 70 IN | OXYGEN SATURATION: 95 % | HEART RATE: 75 BPM | DIASTOLIC BLOOD PRESSURE: 73 MMHG | TEMPERATURE: 98.1 F

## 2019-07-30 DIAGNOSIS — R07.9 CHEST PAIN, UNSPECIFIED: Primary | ICD-10-CM

## 2019-07-30 DIAGNOSIS — K44.9 HIATAL HERNIA: ICD-10-CM

## 2019-07-30 LAB
ALBUMIN SERPL BCP-MCNC: 4.1 G/DL (ref 3.5–5.7)
ALP SERPL-CCNC: 92 U/L (ref 55–165)
ALT SERPL W P-5'-P-CCNC: 14 U/L (ref 7–52)
ANION GAP SERPL CALCULATED.3IONS-SCNC: 9 MMOL/L (ref 4–13)
APTT PPP: 33 SECONDS (ref 23–37)
AST SERPL W P-5'-P-CCNC: 15 U/L (ref 13–39)
BASOPHILS # BLD AUTO: 0.1 THOUSANDS/ΜL (ref 0–0.1)
BASOPHILS NFR BLD AUTO: 1 % (ref 0–2)
BILIRUB SERPL-MCNC: 0.5 MG/DL (ref 0.2–1)
BUN SERPL-MCNC: 8 MG/DL (ref 7–25)
CALCIUM SERPL-MCNC: 8.9 MG/DL (ref 8.6–10.5)
CHLORIDE SERPL-SCNC: 100 MMOL/L (ref 98–107)
CO2 SERPL-SCNC: 25 MMOL/L (ref 21–31)
CREAT SERPL-MCNC: 1.05 MG/DL (ref 0.7–1.3)
DEPRECATED D DIMER PPP: 155 NG/ML (FEU)
EOSINOPHIL # BLD AUTO: 0.2 THOUSAND/ΜL (ref 0–0.61)
EOSINOPHIL NFR BLD AUTO: 3 % (ref 0–5)
ERYTHROCYTE [DISTWIDTH] IN BLOOD BY AUTOMATED COUNT: 13.1 % (ref 11.5–14.5)
GFR SERPL CREATININE-BSD FRML MDRD: 75 ML/MIN/1.73SQ M
GLUCOSE SERPL-MCNC: 153 MG/DL (ref 65–99)
HCT VFR BLD AUTO: 41.6 % (ref 42–47)
HGB BLD-MCNC: 14.4 G/DL (ref 14–18)
INR PPP: 1 (ref 0.9–1.5)
LYMPHOCYTES # BLD AUTO: 1.9 THOUSANDS/ΜL (ref 0.6–4.47)
LYMPHOCYTES NFR BLD AUTO: 26 % (ref 21–51)
MCH RBC QN AUTO: 30.6 PG (ref 26–34)
MCHC RBC AUTO-ENTMCNC: 34.6 G/DL (ref 31–37)
MCV RBC AUTO: 88 FL (ref 81–99)
MONOCYTES # BLD AUTO: 0.4 THOUSAND/ΜL (ref 0.17–1.22)
MONOCYTES NFR BLD AUTO: 6 % (ref 2–12)
NEUTROPHILS # BLD AUTO: 4.7 THOUSANDS/ΜL (ref 1.4–6.5)
NEUTS SEG NFR BLD AUTO: 65 % (ref 42–75)
PLATELET # BLD AUTO: 262 THOUSANDS/UL (ref 149–390)
PMV BLD AUTO: 7.4 FL (ref 8.6–11.7)
POTASSIUM SERPL-SCNC: 3.5 MMOL/L (ref 3.5–5.5)
PROT SERPL-MCNC: 6.6 G/DL (ref 6.4–8.9)
PROTHROMBIN TIME: 11.6 SECONDS (ref 10.2–13)
RBC # BLD AUTO: 4.71 MILLION/UL (ref 4.3–5.9)
SODIUM SERPL-SCNC: 134 MMOL/L (ref 134–143)
TROPONIN I SERPL-MCNC: <0.03 NG/ML
WBC # BLD AUTO: 7.2 THOUSAND/UL (ref 4.8–10.8)

## 2019-07-30 PROCEDURE — 36415 COLL VENOUS BLD VENIPUNCTURE: CPT | Performed by: EMERGENCY MEDICINE

## 2019-07-30 PROCEDURE — 80053 COMPREHEN METABOLIC PANEL: CPT | Performed by: EMERGENCY MEDICINE

## 2019-07-30 PROCEDURE — 84484 ASSAY OF TROPONIN QUANT: CPT | Performed by: EMERGENCY MEDICINE

## 2019-07-30 PROCEDURE — 85025 COMPLETE CBC W/AUTO DIFF WBC: CPT | Performed by: EMERGENCY MEDICINE

## 2019-07-30 PROCEDURE — 85610 PROTHROMBIN TIME: CPT | Performed by: EMERGENCY MEDICINE

## 2019-07-30 PROCEDURE — 71045 X-RAY EXAM CHEST 1 VIEW: CPT

## 2019-07-30 PROCEDURE — 99285 EMERGENCY DEPT VISIT HI MDM: CPT

## 2019-07-30 PROCEDURE — 85730 THROMBOPLASTIN TIME PARTIAL: CPT | Performed by: EMERGENCY MEDICINE

## 2019-07-30 PROCEDURE — 93005 ELECTROCARDIOGRAM TRACING: CPT

## 2019-07-30 PROCEDURE — 85379 FIBRIN DEGRADATION QUANT: CPT | Performed by: EMERGENCY MEDICINE

## 2019-07-30 RX ORDER — PANTOPRAZOLE SODIUM 40 MG/1
40 TABLET, DELAYED RELEASE ORAL DAILY
Qty: 30 TABLET | Refills: 0 | Status: SHIPPED | OUTPATIENT
Start: 2019-07-30 | End: 2022-05-11

## 2019-07-30 RX ORDER — FAMOTIDINE 20 MG/1
20 TABLET, FILM COATED ORAL 2 TIMES DAILY
Qty: 60 TABLET | Refills: 0 | Status: SHIPPED | OUTPATIENT
Start: 2019-07-30 | End: 2020-07-01 | Stop reason: ALTCHOICE

## 2019-07-30 RX ORDER — GINSENG 100 MG
1 CAPSULE ORAL ONCE
Status: COMPLETED | OUTPATIENT
Start: 2019-07-30 | End: 2019-07-30

## 2019-07-30 RX ORDER — CALCIUM CARBONATE 750 MG/1
1 TABLET, CHEWABLE ORAL 3 TIMES DAILY PRN
Qty: 60 TABLET | Refills: 0 | Status: SHIPPED | OUTPATIENT
Start: 2019-07-30

## 2019-07-30 RX ORDER — MAGNESIUM HYDROXIDE/ALUMINUM HYDROXICE/SIMETHICONE 120; 1200; 1200 MG/30ML; MG/30ML; MG/30ML
30 SUSPENSION ORAL ONCE
Status: COMPLETED | OUTPATIENT
Start: 2019-07-30 | End: 2019-07-30

## 2019-07-30 RX ADMIN — ALUMINUM HYDROXIDE, MAGNESIUM HYDROXIDE, AND SIMETHICONE 30 ML: 200; 200; 20 SUSPENSION ORAL at 17:14

## 2019-07-30 RX ADMIN — BACITRACIN ZINC 1 SMALL APPLICATION: 500 OINTMENT TOPICAL at 18:35

## 2019-07-30 NOTE — ED PROVIDER NOTES
History  Chief Complaint   Patient presents with    Chest Pain     pt here for Chest pain for past 2 days  HX open heart 2000  Patient is a 28-year-old male with a history of a hiatal hernia who states he has had discomfort in his substernal area for the past 2 days  He says the discomfort is like an ache but occasionally gets sharp and burning  He states the pain occasionally goes through to his back  Pain is not worse with position  It is not worse with deep breath  It is not worse with eating  Patient denies any shortness breath  Denies any fever chills  Denies any cough          Prior to Admission Medications   Prescriptions Last Dose Informant Patient Reported? Taking?    LORazepam (ATIVAN) 0 5 mg tablet 7/30/2019 at Unknown time  No Yes   Sig: Take 1 tablet (0 5 mg total) by mouth 2 (two) times a day as needed for anxiety for up to 7 days   acetaminophen-codeine (TYLENOL #3) 300-30 mg per tablet Not Taking at Unknown time Self Yes No   Sig: Take 1 tablet by mouth daily as needed for moderate pain   albuterol (PROVENTIL HFA,VENTOLIN HFA) 90 mcg/act inhaler 7/30/2019 at Unknown time  No Yes   Sig: Inhale 2 puffs every 4 (four) hours as needed for wheezing   amLODIPine (NORVASC) 10 mg tablet 7/30/2019 at Unknown time  No Yes   Sig: Take 1 tablet (10 mg total) by mouth daily   aspirin (ECOTRIN LOW STRENGTH) 81 mg EC tablet 7/30/2019 at Unknown time  No Yes   Sig: Take 1 tablet (81 mg total) by mouth daily   atorvastatin (LIPITOR) 40 mg tablet 7/30/2019 at Unknown time  No Yes   Sig: Take 1 tablet (40 mg total) by mouth every evening   citalopram (CeleXA) 40 mg tablet 7/30/2019 at Unknown time  No Yes   Sig: Take 1 tablet (40 mg total) by mouth daily   clopidogrel (PLAVIX) 75 mg tablet 7/30/2019 at Unknown time  No Yes   Sig: Take 2 tablets (150 mg total) by mouth daily   Patient taking differently: Take 75 mg by mouth 2 (two) times a day    fluticasone (FLONASE) 50 mcg/act nasal spray 7/30/2019 at Unknown time  No Yes   Si spray into each nostril daily   furosemide (LASIX) 40 mg tablet 2019 at Unknown time  No Yes   Sig: Take 1 tablet (40 mg total) by mouth daily   nitroglycerin (NITROSTAT) 0 4 mg SL tablet Unknown at Unknown time  Yes No   Sig: Place 0 4 mg under the tongue every 5 (five) minutes as needed for chest pain   pantoprazole (PROTONIX) 40 mg tablet 2019 at Unknown time  Yes Yes   Sig: Take 40 mg by mouth daily   roflumilast (DALIRESP) 500 mcg tablet 2019 at Unknown time  Yes Yes   Sig: Take 500 mcg by mouth daily   tiotropium (SPIRIVA) 18 mcg inhalation capsule 2019 at Unknown time  No Yes   Sig: Place 1 capsule (18 mcg total) into inhaler and inhale daily      Facility-Administered Medications: None       Past Medical History:   Diagnosis Date    Acute right MCA [facility] ([facility] ) 9/15/2018    CAD (coronary artery disease)     s/p CABG     COPD [facility]chronic obstructive [facility] disease) [facility]     Grand mal status ([facility] ) 3/24/2019    Heart attack ([facility]     Hyperlipidemia     Hypertension     Lexiscan nuclear stress test 2016    EF 74% Normal    TIA (transient ischemic attack) 2018       Past Surgical History:   Procedure Laterality Date    CARDIAC CATHETERIZATION  2015    LIMA occluded  No severe native lesions    COLONOSCOPY      CORONARY ARTERY BYPASS GRAFT      HERNIA REPAIR      times 2       Family History   Problem Relation Age of Onset    Cancer Mother     Heart disease Mother     Cancer Father     Cancer Maternal Grandmother     Cancer Paternal Grandfather      I have reviewed and agree with the history as documented  Social History     Tobacco Use    Smoking status: Current Every Day Smoker     Packs/day: 0 25     Years: 50 00     Pack years: 12 50     Types: Cigarettes    Smokeless tobacco: Never Used   Substance Use Topics    Alcohol use: Not Currently    Drug use: Never        Review of Systems   Constitutional: Negative  HENT: Negative  Respiratory: Negative  Cardiovascular: Positive for chest pain  Gastrointestinal: Negative  Genitourinary: Negative  Musculoskeletal: Negative  Skin: Negative  Neurological: Negative  Hematological: Bruises/bleeds easily  Psychiatric/Behavioral: The patient is nervous/anxious  All other systems reviewed and are negative  Physical Exam  Physical Exam   Constitutional: He is oriented to person, place, and time  He appears well-developed and well-nourished  HENT:   Head: Normocephalic and atraumatic  Neck: Normal range of motion  Cardiovascular: Normal rate, regular rhythm and normal pulses  Pulmonary/Chest: Effort normal and breath sounds normal    Abdominal: Soft  There is tenderness  There is no rebound and no guarding  Mild epigastric tenderness palpation   Musculoskeletal: Normal range of motion  Right lower leg: He exhibits edema  He exhibits no tenderness  Left lower leg: He exhibits no tenderness and no edema  Trace edema right lower extremity negative Paz sign  Negative Homans sign  Negative erythema   Neurological: He is alert and oriented to person, place, and time  Skin: Skin is warm and dry  No erythema  Psychiatric: He has a normal mood and affect  His behavior is normal    Nursing note and vitals reviewed        Vital Signs  ED Triage Vitals [07/30/19 1625]   Temperature Pulse Respirations Blood Pressure SpO2   98 1 °F (36 7 °C) 78 20 120/68 96 %      Temp Source Heart Rate Source Patient Position - Orthostatic VS BP Location FiO2 (%)   Temporal Monitor Lying Left arm --      Pain Score       3           Vitals:    07/30/19 1625 07/30/19 1730   BP: 120/68 92/68   Pulse: 78 68   Patient Position - Orthostatic VS: Lying          Visual Acuity      ED Medications  Medications   aluminum-magnesium hydroxide-simethicone (MYLANTA) 200-200-20 mg/5 mL oral suspension 30 mL (30 mL Oral Given 7/30/19 1714)       Diagnostic Studies  Results Reviewed     Procedure Component Value Units Date/Time    Comprehensive metabolic panel [[id number]]  (Abnormal) Collected:  07/30/19 1711    Lab Status:  Final result Specimen:  Blood from Arm, Right Updated:  07/30/19 1742     Sodium 134 mmol/L      Potassium 3 5 mmol/L      Chloride 100 mmol/L      CO2 25 mmol/L      ANION GAP 9 mmol/L      BUN 8 mg/dL      Creatinine 1 05 mg/dL      Glucose 153 mg/dL      Calcium 8 9 mg/dL      AST 15 U/L      ALT 14 U/L      Alkaline Phosphatase 92 U/L      Total Protein 6 6 g/dL      Albumin 4 1 g/dL      Total Bilirubin 0 50 mg/dL      eGFR 75 ml/min/1 73sq m     Narrative:       [place] guidelines for Chronic Kidney Disease (CKD):     Stage 1 with normal or high GFR (GFR > 90 mL/min/1 73 square meters)    Stage 2 Mild CKD (GFR = 60-89 mL/min/1 73 square meters)    Stage 3A Moderate CKD (GFR = 45-59 mL/min/1 73 square meters)    Stage 3B Moderate CKD (GFR = 30-44 mL/min/1 73 square meters)    Stage 4 Severe CKD (GFR = 15-29 mL/min/1 73 square meters)    Stage 5 End Stage CKD (GFR <15 mL/min/1 73 square meters)  Note: GFR calculation is accurate only with a steady state creatinine    Troponin I [[id number]]  (Normal) Collected:  07/30/19 1711    Lab Status:  Final result Specimen:  Blood from Arm, Right Updated:  07/30/19 1741     Troponin I <0 03 ng/mL     Protime-INR [[id number]]  (Normal) Collected:  07/30/19 1711    Lab Status:  Final result Specimen:  Blood from Arm, Right Updated:  07/30/19 1731     Protime 11 6 seconds      INR 1 00    D-Dimer [[id number]]  (Normal) Collected:  07/30/19 1711    Lab Status:  Final result Specimen:  Blood from Arm, Right Updated:  07/30/19 1731     D-Dimer, Quant 155 ng/ml (FEU)     APTT [[id number]]  (Normal) Collected:  07/30/19 1711    Lab Status:  Final result Specimen:  Blood from Arm, Right Updated:  07/30/19 1731     PTT 33 seconds     CBC and differential [[id number]]  (Abnormal) Collected:  07/30/19 1711    Lab Status:  Final result Specimen:  Blood from Arm, Right Updated:  07/30/19 1724     WBC 7 20 Thousand/uL      RBC 4 71 Million/uL      Hemoglobin 14 4 g/dL      Hematocrit 41 6 %      MCV 88 fL      MCH 30 6 pg      MCHC 34 6 g/dL      RDW 13 1 %      MPV 7 4 fL      Platelets 726 Thousands/uL      Neutrophils Relative 65 %      Lymphocytes Relative 26 %      Monocytes Relative 6 %      Eosinophils Relative 3 %      Basophils Relative 1 %      Neutrophils Absolute 4 70 Thousands/µL      Lymphocytes Absolute 1 90 Thousands/µL      Monocytes Absolute 0 40 Thousand/µL      Eosinophils Absolute 0 20 Thousand/µL      Basophils Absolute 0 10 Thousands/µL                  XR chest 1 view portable   ED Interpretation by Tone Alva MD (07/30 1736)   nad                 Procedures  Procedures       ED Course                               MDM  Number of Diagnoses or Management Options  Diagnosis management comments: Troponin D-dimer negative  EKG normal   Chest x-ray no acute disease  Patient given GI cocktail with mild moderate relief of symptoms  Suspect hiatal hernias etiology       Amount and/or Complexity of Data Reviewed  Clinical lab tests: reviewed  Tests in the radiology section of CPT®: reviewed  Tests in the medicine section of CPT®: reviewed    Risk of Complications, Morbidity, and/or Mortality  Presenting problems: moderate  Diagnostic procedures: moderate  Management options: low    Patient Progress  Patient progress: improved      Disposition  Final diagnoses:   None     ED Disposition     None      Follow-up Information    None         Patient's Medications   Discharge Prescriptions    No medications on file     No discharge procedures on file      ED Provider  Electronically Signed by           Tone Alva MD  07/30/19 6559

## 2019-07-31 LAB
ATRIAL RATE: 81 BPM
P AXIS: 57 DEGREES
PR INTERVAL: 154 MS
QRS AXIS: 87 DEGREES
QRSD INTERVAL: 86 MS
QT INTERVAL: 398 MS
QTC INTERVAL: 462 MS
T WAVE AXIS: 75 DEGREES
VENTRICULAR RATE: 81 BPM

## 2019-07-31 PROCEDURE — 93010 ELECTROCARDIOGRAM REPORT: CPT | Performed by: INTERNAL MEDICINE

## 2019-09-26 ENCOUNTER — APPOINTMENT (EMERGENCY)
Dept: CT IMAGING | Facility: HOSPITAL | Age: 64
End: 2019-09-26
Payer: MEDICARE

## 2019-09-26 ENCOUNTER — APPOINTMENT (EMERGENCY)
Dept: RADIOLOGY | Facility: HOSPITAL | Age: 64
End: 2019-09-26
Payer: MEDICARE

## 2019-09-26 ENCOUNTER — HOSPITAL ENCOUNTER (EMERGENCY)
Facility: HOSPITAL | Age: 64
Discharge: HOME | End: 2019-09-26
Admitting: FAMILY MEDICINE
Payer: MEDICARE

## 2019-09-26 VITALS
HEIGHT: 70 IN | TEMPERATURE: 98.3 F | DIASTOLIC BLOOD PRESSURE: 93 MMHG | RESPIRATION RATE: 17 BRPM | OXYGEN SATURATION: 96 % | HEART RATE: 72 BPM | WEIGHT: 220 LBS | SYSTOLIC BLOOD PRESSURE: 147 MMHG | BODY MASS INDEX: 31.5 KG/M2

## 2019-09-26 DIAGNOSIS — E04.1 THYROID NODULE: ICD-10-CM

## 2019-09-26 DIAGNOSIS — T07.XXXA MULTIPLE CONTUSIONS: Primary | ICD-10-CM

## 2019-09-26 PROCEDURE — 70450 CT HEAD/BRAIN W/O DYE: CPT

## 2019-09-26 PROCEDURE — 73610 X-RAY EXAM OF ANKLE: CPT

## 2019-09-26 PROCEDURE — 73110 X-RAY EXAM OF WRIST: CPT

## 2019-09-26 PROCEDURE — 72125 CT NECK SPINE W/O DYE: CPT

## 2019-09-26 PROCEDURE — 99284 EMERGENCY DEPT VISIT MOD MDM: CPT

## 2019-09-26 PROCEDURE — 73000 X-RAY EXAM OF COLLAR BONE: CPT

## 2019-09-26 NOTE — ED PROVIDER NOTES
History  Chief Complaint   Patient presents with   Shayy Kashmir Fall     about a week ago  Complaints of head pain, left arm pain, and left ankle pain  Patient states that approximately 1 week ago he was walking up the stairs at a medical center he went around a corner on a landing when he did not see a patio brick tripped over it and hit the left side of his body on a concrete wall  Since then patient has had intermittent left temporal headache left-sided neck pain, left collarbone pain, left wrist pain, left ankle pain  History provided by:  Patient  Fall       Prior to Admission Medications   Prescriptions Last Dose Informant Patient Reported? Taking?    LORazepam (ATIVAN) 0 5 mg tablet   No No   Sig: Take 1 tablet (0 5 mg total) by mouth 2 (two) times a day as needed for anxiety for up to 7 days   acetaminophen-codeine (TYLENOL #3) 300-30 mg per tablet  Self Yes No   Sig: Take 1 tablet by mouth daily as needed for moderate pain   albuterol (PROVENTIL HFA,VENTOLIN HFA) 90 mcg/act inhaler   No No   Sig: Inhale 2 puffs every 4 (four) hours as needed for wheezing   amLODIPine (NORVASC) 10 mg tablet   No No   Sig: Take 1 tablet (10 mg total) by mouth daily   aspirin (ECOTRIN LOW STRENGTH) 81 mg EC tablet   No No   Sig: Take 1 tablet (81 mg total) by mouth daily   atorvastatin (LIPITOR) 40 mg tablet   No No   Sig: Take 1 tablet (40 mg total) by mouth every evening   calcium carbonate (TUMS EX) 750 mg chewable tablet   No No   Sig: Chew 1 tablet (750 mg total) 3 (three) times a day as needed for indigestion or heartburn   citalopram (CeleXA) 40 mg tablet   No No   Sig: Take 1 tablet (40 mg total) by mouth daily   clopidogrel (PLAVIX) 75 mg tablet   No No   Sig: Take 2 tablets (150 mg total) by mouth daily   Patient taking differently: Take 75 mg by mouth 2 (two) times a day    famotidine (PEPCID) 20 mg tablet   No No   Sig: Take 1 tablet (20 mg total) by mouth 2 (two) times a day   fluticasone (FLONASE) 50 mcg/act nasal spray   No No   Si spray into each nostril daily   furosemide (LASIX) 40 mg tablet   No No   Sig: Take 1 tablet (40 mg total) by mouth daily   nitroglycerin (NITROSTAT) 0 4 mg SL tablet   Yes No   Sig: Place 0 4 mg under the tongue every 5 (five) minutes as needed for chest pain   pantoprazole (PROTONIX) 40 mg tablet   No No   Sig: Take 1 tablet (40 mg total) by mouth daily   roflumilast (DALIRESP) 500 mcg tablet   Yes No   Sig: Take 500 mcg by mouth daily   tiotropium (SPIRIVA) 18 mcg inhalation capsule   No No   Sig: Place 1 capsule (18 mcg total) into inhaler and inhale daily      Facility-Administered Medications: None       Past Medical History:   Diagnosis Date    Acute right MCA stroke ([facility] ) 9/15/2018    CAD (coronary artery disease)     s/p CABG     COPD [facility]chronic obstructive pulmonary disease) ([facility]     Grand mal status ([facility] ) 3/24/2019    Heart attack ([facility] )     Hyperlipidemia     Hypertension     Lexiscan nuclear stress test 2016    EF 74% Normal    TIA (transient ischemic attack) 2018       Past Surgical History:   Procedure Laterality Date    CARDIAC CATHETERIZATION  2015    LIMA occluded  No severe native lesions    COLONOSCOPY      CORONARY ARTERY BYPASS GRAFT      HERNIA REPAIR      times 2       Family History   Problem Relation Age of Onset    Cancer Mother     Heart disease Mother     Cancer Father     Cancer Maternal Grandmother     Cancer Paternal Grandfather      I have reviewed and agree with the history as documented      Social History     Tobacco Use    Smoking status: Current Every Day Smoker     Packs/day: 0 25     Years: 50 00     Pack years: 12 50     Types: Cigars, Pipe    Smokeless tobacco: Never Used   Substance Use Topics    Alcohol use: Not Currently    Drug use: Never        Review of Systems    Physical Exam  Physical Exam    Vital Signs  ED Triage Vitals [19 1457]   Temperature Pulse Respirations Blood Pressure SpO2 98 3 °F (36 8 °C) 72 17 147/93 96 %      Temp Source Heart Rate Source Patient Position - Orthostatic VS BP Location FiO2 (%)   Temporal Monitor Sitting Left arm --      Pain Score       4           Vitals:    09/26/19 1457   BP: 147/93   Pulse: 72   Patient Position - Orthostatic VS: Sitting         Visual Acuity      ED Medications  Medications - No data to display    Diagnostic Studies  Results Reviewed     None                 XR clavicle LEFT   ED Interpretation by Darren Austin PA-C (09/26 1638)   NAD      XR wrist 3+ views LEFT   ED Interpretation by Darren Austin PA-C (09/26 1638)   NAD      XR ankle 3+ views LEFT   ED Interpretation by Darren Austin PA-C (09/26 1638)   NAD      CT head without contrast   Final Result by Robert Warner MD (09/26 1608)      No acute intracranial abnormality  Workstation performed: [id number]         CT spine cervical without contrast   Final Result by Robert Warner MD (09/26 1607)      No cervical spine fracture or traumatic malalignment  Workstation performed: [id number]                    Procedures  Procedures       ED Course                               MDM    Disposition  Final diagnoses:   Multiple contusions   Thyroid nodule     Time reflects when diagnosis was documented in both MDM as applicable and the Disposition within this note     Time User Action Codes Description Comment    9/26/2019  4:41 PM Sneha Ly [T07  XXXA] Multiple contusions     9/26/2019  4:41 PM Sneha Ly [E04 1] Thyroid nodule       ED Disposition     ED Disposition Condition Date/Time Comment    Discharge Stable Thu Sep 26, 2019  4:41 PM [address] discharge to home/self care              Follow-up Information     Follow up With Specialties Details Why Contact Info    Jennifer Gonzalez MD Internal Medicine Schedule an appointment as soon as possible for a visit   [address]  [address] [address]  [phone]            Patient's Medications   Discharge Prescriptions    No medications on file     No discharge procedures on file      ED Provider  Electronically Signed by           Adrianna Roberson PA-C  09/26/19 0951

## 2019-09-26 NOTE — ED NOTES
Patient describes pain to the left forearm, left ankle and left neck for 1x week  No tenderness or difficulty with ROM noted on assessment of the neck, left arm, and left ankle        Cleve Kayser, RN  09/26/19 9269

## 2019-10-10 ENCOUNTER — TRANSCRIBE ORDERS (OUTPATIENT)
Dept: ADMINISTRATIVE | Facility: HOSPITAL | Age: 64
End: 2019-10-10

## 2019-10-10 DIAGNOSIS — E04.1 THYROID NODULE: Primary | ICD-10-CM

## 2019-10-10 DIAGNOSIS — H93.19 TINNITUS, UNSPECIFIED LATERALITY: ICD-10-CM

## 2019-10-10 DIAGNOSIS — R51.9 NONINTRACTABLE HEADACHE, UNSPECIFIED CHRONICITY PATTERN, UNSPECIFIED HEADACHE TYPE: Primary | ICD-10-CM

## 2019-10-18 ENCOUNTER — HOSPITAL ENCOUNTER (OUTPATIENT)
Dept: ULTRASOUND IMAGING | Facility: HOSPITAL | Age: 64
Discharge: HOME | End: 2019-10-18
Payer: MEDICARE

## 2019-10-18 ENCOUNTER — HOSPITAL ENCOUNTER (OUTPATIENT)
Dept: MRI IMAGING | Facility: HOSPITAL | Age: 64
Discharge: HOME | End: 2019-10-18
Payer: MEDICARE

## 2019-10-18 ENCOUNTER — HOSPITAL ENCOUNTER (OUTPATIENT)
Dept: RADIOLOGY | Facility: HOSPITAL | Age: 64
Discharge: HOME | End: 2019-10-18
Payer: MEDICARE

## 2019-10-18 ENCOUNTER — TRANSCRIBE ORDERS (OUTPATIENT)
Dept: ADMINISTRATIVE | Facility: HOSPITAL | Age: 64
End: 2019-10-18

## 2019-10-18 DIAGNOSIS — H93.19 TINNITUS, UNSPECIFIED LATERALITY: ICD-10-CM

## 2019-10-18 DIAGNOSIS — M25.552 PAIN OF LEFT HIP JOINT: ICD-10-CM

## 2019-10-18 DIAGNOSIS — E04.1 THYROID NODULE: ICD-10-CM

## 2019-10-18 DIAGNOSIS — M25.569 ARTHRALGIA OF LOWER LEG, UNSPECIFIED LATERALITY: ICD-10-CM

## 2019-10-18 DIAGNOSIS — M25.552 PAIN OF LEFT HIP JOINT: Primary | ICD-10-CM

## 2019-10-18 DIAGNOSIS — R51.9 NONINTRACTABLE HEADACHE, UNSPECIFIED CHRONICITY PATTERN, UNSPECIFIED HEADACHE TYPE: ICD-10-CM

## 2019-10-18 DIAGNOSIS — M54.50 LOW BACK PAIN, UNSPECIFIED BACK PAIN LATERALITY, UNSPECIFIED CHRONICITY, UNSPECIFIED WHETHER SCIATICA PRESENT: ICD-10-CM

## 2019-10-18 PROCEDURE — 73502 X-RAY EXAM HIP UNI 2-3 VIEWS: CPT

## 2019-10-18 PROCEDURE — 76536 US EXAM OF HEAD AND NECK: CPT

## 2019-10-18 PROCEDURE — 73562 X-RAY EXAM OF KNEE 3: CPT

## 2019-10-18 PROCEDURE — 70551 MRI BRAIN STEM W/O DYE: CPT

## 2019-10-18 PROCEDURE — 72110 X-RAY EXAM L-2 SPINE 4/>VWS: CPT

## 2019-11-14 ENCOUNTER — OFFICE VISIT (OUTPATIENT)
Dept: CARDIOLOGY CLINIC | Facility: CLINIC | Age: 64
End: 2019-11-14
Payer: MEDICARE

## 2019-11-14 VITALS
BODY MASS INDEX: 32.78 KG/M2 | OXYGEN SATURATION: 96 % | DIASTOLIC BLOOD PRESSURE: 72 MMHG | WEIGHT: 229 LBS | SYSTOLIC BLOOD PRESSURE: 104 MMHG | HEART RATE: 104 BPM | HEIGHT: 70 IN

## 2019-11-14 DIAGNOSIS — R07.89 OTHER CHEST PAIN: ICD-10-CM

## 2019-11-14 DIAGNOSIS — I25.10 CORONARY ARTERY DISEASE INVOLVING NATIVE CORONARY ARTERY OF NATIVE HEART WITHOUT ANGINA PECTORIS: ICD-10-CM

## 2019-11-14 DIAGNOSIS — R05.8 PRODUCTIVE COUGH: ICD-10-CM

## 2019-11-14 DIAGNOSIS — R60.0 LOWER EXTREMITY EDEMA: ICD-10-CM

## 2019-11-14 DIAGNOSIS — I10 ESSENTIAL HYPERTENSION: ICD-10-CM

## 2019-11-14 DIAGNOSIS — J44.9 CHRONIC OBSTRUCTIVE PULMONARY DISEASE, UNSPECIFIED COPD TYPE (HCC): ICD-10-CM

## 2019-11-14 DIAGNOSIS — R00.0 TACHYCARDIA: Primary | ICD-10-CM

## 2019-11-14 DIAGNOSIS — R50.9 FEVER, UNSPECIFIED FEVER CAUSE: ICD-10-CM

## 2019-11-14 PROCEDURE — 99214 OFFICE O/P EST MOD 30 MIN: CPT | Performed by: INTERNAL MEDICINE

## 2019-11-14 PROCEDURE — 93000 ELECTROCARDIOGRAM COMPLETE: CPT | Performed by: INTERNAL MEDICINE

## 2019-11-14 RX ORDER — MELOXICAM 15 MG/1
15 TABLET ORAL DAILY PRN
Refills: 0 | COMMUNITY
Start: 2019-10-30 | End: 2021-03-24

## 2019-11-14 NOTE — PROGRESS NOTES
[facility]     Lana  / 59 y o  male / : 1955 / MRN: [id number]  Date of Visit: 19    ASSESSMENT/PLAN  1  Dyspnea on exertion   · Chronic, BNP normal 4x this year  · Has underlying COPD, has not seen pulmonology for >2 years - will place referral  · He continues to smoke      2  Right lower extremity edema   · Chronic in the last 6 months- lower extremity doppler in  negative for DVT   · Appears improved on my exam today  · Continue lasix on a PRN basis     3  CAD   · S/p remote CABG in   · Junnie Decant 3/28/19 was nonischemic  · No recent exertional chest pain or NTG use  · Continue ASA and atorvastatin     4  HTN - controlled   · Continue amlodipine     5  HLD   · Tolerating atorvastatin     6  Hx of CVA   · Scheduled for loop recorder on multiple occasions but ultimately decided not to proceed   · On ASA and Plavix      7  Tobacco abuse   · Continues to smoke 1ppd   · Cessation discussed at length     He reports intermittent subjective fevers in the last few days with worsening cough productive of white/yellow sputum  He is tachycardic today  Will check CXR, CBC, BMP  Will review results by phone - f/u in 1 year  Encouraged him to f/u with pulmonology  SUBJECTIVE:  HPI: Lana  is a 59 y o  male who presents regarding dyspnea on exertion, edema and chest pain  He has chronic dyspnea on exertion with underlying COPD  He previously followed with a pulmonologist in [facility] but has not followed up in <2 years  Multiple BNPs have been normal this year  At last visit I advised him to f/u with pulm but he has failed to f/u due to a 1-month wait for an appointment  He continues to have right lower extremity edema - lower extremity doppler this summer was negative for DVT  He takes lasix as needed, when edema is worse - he rarely requires it  In the last few days he reports worsening dyspnea with cough productive of white/yellow sputum   He notes intermittent subjective fevers and sharp left-sided chest pain while in bed, not associated with exertion  Cardiac testing:    Lexiscan 3/28/19 -  No evidence of ischemia  EF 65%     TTE 1/3/19 - EF 60%, mild LVH       Social history: Current smoker, 0 5-1 ppd     Allergies   Allergen Reactions    Gabapentin Headache         Current Outpatient Medications:     albuterol (PROVENTIL HFA,VENTOLIN HFA) 90 mcg/act inhaler, Inhale 2 puffs every 4 (four) hours as needed for wheezing, Disp: 1 Inhaler, Rfl: 0    amLODIPine (NORVASC) 10 mg tablet, Take 1 tablet (10 mg total) by mouth daily, Disp: 30 tablet, Rfl: 0    aspirin (ECOTRIN LOW STRENGTH) 81 mg EC tablet, Take 1 tablet (81 mg total) by mouth daily, Disp: 30 tablet, Rfl: 0    atorvastatin (LIPITOR) 40 mg tablet, Take 1 tablet (40 mg total) by mouth every evening, Disp: 30 tablet, Rfl: 0    calcium carbonate (TUMS EX) 750 mg chewable tablet, Chew 1 tablet (750 mg total) 3 (three) times a day as needed for indigestion or heartburn, Disp: 60 tablet, Rfl: 0    clopidogrel (PLAVIX) 75 mg tablet, Take 2 tablets (150 mg total) by mouth daily (Patient taking differently: Take 75 mg by mouth daily ), Disp: 60 tablet, Rfl: 0    famotidine (PEPCID) 20 mg tablet, Take 1 tablet (20 mg total) by mouth 2 (two) times a day, Disp: 60 tablet, Rfl: 0    fluticasone (FLONASE) 50 mcg/act nasal spray, 1 spray into each nostril daily, Disp: 16 g, Rfl: 0    furosemide (LASIX) 40 mg tablet, Take 1 tablet (40 mg total) by mouth daily (Patient taking differently: Take 40 mg by mouth as needed ), Disp: 90 tablet, Rfl: 3    LORazepam (ATIVAN) 0 5 mg tablet, Take 1 tablet (0 5 mg total) by mouth 2 (two) times a day as needed for anxiety for up to 7 days, Disp: 14 tablet, Rfl: 0    nitroglycerin (NITROSTAT) 0 4 mg SL tablet, Place 0 4 mg under the tongue every 5 (five) minutes as needed for chest pain, Disp: , Rfl:     pantoprazole (PROTONIX) 40 mg tablet, Take 1 tablet (40 mg total) by mouth daily, Disp: 30 tablet, Rfl: 0    roflumilast (DALIRESP) 500 mcg tablet, Take 500 mcg by mouth daily, Disp: , Rfl:     tiotropium (SPIRIVA) 18 mcg inhalation capsule, Place 1 capsule (18 mcg total) into inhaler and inhale daily, Disp: 30 capsule, Rfl: 0    acetaminophen-codeine (TYLENOL #3) 300-30 mg per tablet, Take 1 tablet by mouth daily as needed for moderate pain, Disp: , Rfl:     citalopram (CeleXA) 40 mg tablet, Take 1 tablet (40 mg total) by mouth daily (Patient not taking: Reported on 11/14/2019), Disp: 30 tablet, Rfl: 0    meloxicam (MOBIC) 15 mg tablet, Take 15 mg by mouth daily as needed, Disp: , Rfl: 0    Past Medical History:   Diagnosis Date    Acute right MCA stroke ([facility] ) 9/15/2018    CAD (coronary artery disease)     s/p CABG 2000    COPD [facility]chronic obstructive pulmonary disease) ([facility]     Grand mal status ([facility] ) 3/24/2019    Heart attack ([facility]     Hyperlipidemia     Hypertension     Lexiscan nuclear stress test 03/19/2016    EF 74% Normal    TIA (transient ischemic attack) 09/13/2018       Family History   Problem Relation Age of Onset    Cancer Mother     Heart disease Mother     Cancer Father     Cancer Maternal Grandmother     Cancer Paternal Grandfather        Past Surgical History:   Procedure Laterality Date    CARDIAC CATHETERIZATION  08/19/2015    LIMA occluded   No severe native lesions    COLONOSCOPY      CORONARY ARTERY BYPASS GRAFT  2000    HERNIA REPAIR      times 2       Social History     Socioeconomic History    Marital status: Single     Spouse name: Not on file    Number of children: Not on file    Years of education: Not on file    Highest education level: Not on file   Occupational History    Not on file   Social Needs    Financial resource strain: Not on file    Food insecurity:     Worry: Not on file     Inability: Not on file    Transportation needs:     Medical: Not on file     Non-medical: Not on file   Tobacco Use    Smoking status: Current Every Day Smoker     Packs/day: 0 25     Years: 50 00     Pack years: 12 50     Types: Cigars, Pipe    Smokeless tobacco: Never Used   Substance and Sexual Activity    Alcohol use: Not Currently    Drug use: Never    Sexual activity: Not Currently   Lifestyle    Physical activity:     Days per week: Not on file     Minutes per session: Not on file    Stress: Not on file   Relationships    Social connections:     Talks on phone: Not on file     Gets together: Not on file     Attends [employer] witness service: Not on file     Active member of club or organization: Not on file     Attends meetings of clubs or organizations: Not on file     Relationship status: Not on file    Intimate partner violence:     Fear of current or ex partner: Not on file     Emotionally abused: Not on file     Physically abused: Not on file     Forced sexual activity: Not on file   Other Topics Concern    Not on file   Social History Narrative    Not on file       Review of Systems   Constitution: Negative  HENT: Negative  Eyes: Negative  Cardiovascular: Positive for chest pain, dyspnea on exertion and leg swelling (right lower extremity)  Negative for claudication, irregular heartbeat, near-syncope, orthopnea, palpitations, paroxysmal nocturnal dyspnea and syncope  Respiratory: Positive for cough, shortness of breath, sputum production and wheezing  Endocrine: Negative  Skin: Negative  Musculoskeletal: Negative  Gastrointestinal: Negative  Genitourinary: Negative  Neurological: Negative for dizziness and light-headedness  Psychiatric/Behavioral: Negative  OBJECTIVE:  Vitals: /72   Pulse 104   Ht 5' 10" (1 778 m)   Wt 104 kg (229 lb)   SpO2 96%   BMI 32 86 kg/m²     Physical exam:   Physical Exam   Constitutional: He is oriented to person, place, and time  He appears well-developed and well-nourished  No distress  HENT:   Head: Normocephalic and atraumatic     Eyes: EOM are normal  No scleral icterus  Neck: Normal range of motion  Neck supple  No JVD present  Cardiovascular: Regular rhythm, S1 normal and S2 normal  Tachycardia present  No murmur heard  Pulmonary/Chest: Effort normal  He has wheezes (end expiratory)  He has no rales  Abdominal: Soft  Bowel sounds are normal    Musculoskeletal: He exhibits edema (1+ right lower extremity)  Neurological: He is alert and oriented to person, place, and time  Skin: Skin is warm and dry  Psychiatric: He has a normal mood and affect   His behavior is normal        Lab Results:   Lab Results   Component Value Date    HGBA1C 5 8 06/14/2019    HGBA1C 5 5 09/14/2018     No results found for: CHOL  Lab Results   Component Value Date    HDL 30 (L) 06/14/2019    HDL 31 (L) 09/14/2018    HDL 42 09/20/2017     Lab Results   Component Value Date    LDLCALC 88 06/14/2019    LDLCALC 136 (H) 09/14/2018    LDLCALC 132 (H) 09/20/2017     Lab Results   Component Value Date    TRIG 202 (H) 06/14/2019    TRIG 195 (H) 09/14/2018    TRIG 121 09/20/2017     No results found for: CHOLHDL

## 2019-11-15 ENCOUNTER — APPOINTMENT (EMERGENCY)
Dept: RADIOLOGY | Facility: HOSPITAL | Age: 64
End: 2019-11-15
Payer: MEDICARE

## 2019-11-15 ENCOUNTER — HOSPITAL ENCOUNTER (EMERGENCY)
Facility: HOSPITAL | Age: 64
Discharge: HOME | End: 2019-11-15
Attending: EMERGENCY MEDICINE
Payer: MEDICARE

## 2019-11-15 VITALS
WEIGHT: 229 LBS | SYSTOLIC BLOOD PRESSURE: 111 MMHG | TEMPERATURE: 97.7 F | HEIGHT: 70 IN | DIASTOLIC BLOOD PRESSURE: 79 MMHG | RESPIRATION RATE: 20 BRPM | HEART RATE: 84 BPM | OXYGEN SATURATION: 96 % | BODY MASS INDEX: 32.78 KG/M2

## 2019-11-15 DIAGNOSIS — Z72.0 TOBACCO ABUSE: ICD-10-CM

## 2019-11-15 DIAGNOSIS — I10 HYPERTENSION: ICD-10-CM

## 2019-11-15 DIAGNOSIS — Z86.79 HISTORY OF CORONARY ARTERY DISEASE: ICD-10-CM

## 2019-11-15 DIAGNOSIS — E78.5 HYPERLIPIDEMIA: ICD-10-CM

## 2019-11-15 DIAGNOSIS — R07.9 CHEST PAIN: ICD-10-CM

## 2019-11-15 DIAGNOSIS — J44.1 ACUTE EXACERBATION OF CHRONIC OBSTRUCTIVE PULMONARY DISEASE (COPD) (HCC): Primary | ICD-10-CM

## 2019-11-15 LAB
ALBUMIN SERPL BCP-MCNC: 4.3 G/DL (ref 3.5–5.7)
ALP SERPL-CCNC: 68 U/L (ref 55–165)
ALT SERPL W P-5'-P-CCNC: 18 U/L (ref 7–52)
ANION GAP SERPL CALCULATED.3IONS-SCNC: 7 MMOL/L (ref 4–13)
APTT PPP: 25 SECONDS (ref 23–37)
AST SERPL W P-5'-P-CCNC: 17 U/L (ref 13–39)
BASOPHILS # BLD AUTO: 0 THOUSANDS/ΜL (ref 0–0.1)
BASOPHILS NFR BLD AUTO: 0 % (ref 0–2)
BILIRUB SERPL-MCNC: 0.4 MG/DL (ref 0.2–1)
BNP SERPL-MCNC: 19 PG/ML (ref 1–100)
BUN SERPL-MCNC: 10 MG/DL (ref 7–25)
CALCIUM SERPL-MCNC: 9 MG/DL (ref 8.6–10.5)
CHLORIDE SERPL-SCNC: 101 MMOL/L (ref 98–107)
CO2 SERPL-SCNC: 25 MMOL/L (ref 21–31)
CREAT SERPL-MCNC: 0.92 MG/DL (ref 0.7–1.3)
EOSINOPHIL # BLD AUTO: 0.3 THOUSAND/ΜL (ref 0–0.61)
EOSINOPHIL NFR BLD AUTO: 3 % (ref 0–5)
ERYTHROCYTE [DISTWIDTH] IN BLOOD BY AUTOMATED COUNT: 13.3 % (ref 11.5–14.5)
GFR SERPL CREATININE-BSD FRML MDRD: 88 ML/MIN/1.73SQ M
GLUCOSE SERPL-MCNC: 88 MG/DL (ref 65–99)
HCT VFR BLD AUTO: 41.6 % (ref 42–47)
HGB BLD-MCNC: 14.7 G/DL (ref 14–18)
INR PPP: 1.01 (ref 0.9–1.5)
LYMPHOCYTES # BLD AUTO: 2.2 THOUSANDS/ΜL (ref 0.6–4.47)
LYMPHOCYTES NFR BLD AUTO: 26 % (ref 21–51)
MCH RBC QN AUTO: 31 PG (ref 26–34)
MCHC RBC AUTO-ENTMCNC: 35.4 G/DL (ref 31–37)
MCV RBC AUTO: 88 FL (ref 81–99)
MONOCYTES # BLD AUTO: 0.5 THOUSAND/ΜL (ref 0.17–1.22)
MONOCYTES NFR BLD AUTO: 6 % (ref 2–12)
NEUTROPHILS # BLD AUTO: 5.5 THOUSANDS/ΜL (ref 1.4–6.5)
NEUTS SEG NFR BLD AUTO: 65 % (ref 42–75)
PLATELET # BLD AUTO: 248 THOUSANDS/UL (ref 149–390)
PMV BLD AUTO: 7.1 FL (ref 8.6–11.7)
POTASSIUM SERPL-SCNC: 3.8 MMOL/L (ref 3.5–5.5)
PROT SERPL-MCNC: 6.5 G/DL (ref 6.4–8.9)
PROTHROMBIN TIME: 11.7 SECONDS (ref 10.2–13)
RBC # BLD AUTO: 4.75 MILLION/UL (ref 4.3–5.9)
SODIUM SERPL-SCNC: 133 MMOL/L (ref 134–143)
TROPONIN I SERPL-MCNC: <0.03 NG/ML
TROPONIN I SERPL-MCNC: <0.03 NG/ML
WBC # BLD AUTO: 8.4 THOUSAND/UL (ref 4.8–10.8)

## 2019-11-15 PROCEDURE — 80053 COMPREHEN METABOLIC PANEL: CPT | Performed by: PHYSICIAN ASSISTANT

## 2019-11-15 PROCEDURE — 84484 ASSAY OF TROPONIN QUANT: CPT | Performed by: PHYSICIAN ASSISTANT

## 2019-11-15 PROCEDURE — 83880 ASSAY OF NATRIURETIC PEPTIDE: CPT | Performed by: PHYSICIAN ASSISTANT

## 2019-11-15 PROCEDURE — 99285 EMERGENCY DEPT VISIT HI MDM: CPT | Performed by: PHYSICIAN ASSISTANT

## 2019-11-15 PROCEDURE — 85730 THROMBOPLASTIN TIME PARTIAL: CPT | Performed by: PHYSICIAN ASSISTANT

## 2019-11-15 PROCEDURE — 36415 COLL VENOUS BLD VENIPUNCTURE: CPT | Performed by: PHYSICIAN ASSISTANT

## 2019-11-15 PROCEDURE — 85610 PROTHROMBIN TIME: CPT | Performed by: PHYSICIAN ASSISTANT

## 2019-11-15 PROCEDURE — 93005 ELECTROCARDIOGRAM TRACING: CPT

## 2019-11-15 PROCEDURE — 85025 COMPLETE CBC W/AUTO DIFF WBC: CPT | Performed by: PHYSICIAN ASSISTANT

## 2019-11-15 PROCEDURE — 71046 X-RAY EXAM CHEST 2 VIEWS: CPT

## 2019-11-15 PROCEDURE — 99284 EMERGENCY DEPT VISIT MOD MDM: CPT

## 2019-11-15 RX ORDER — ACETAMINOPHEN 325 MG/1
650 TABLET ORAL ONCE
Status: COMPLETED | OUTPATIENT
Start: 2019-11-15 | End: 2019-11-15

## 2019-11-15 RX ORDER — AMOXICILLIN AND CLAVULANATE POTASSIUM 875; 125 MG/1; MG/1
1 TABLET, FILM COATED ORAL ONCE
Status: COMPLETED | OUTPATIENT
Start: 2019-11-15 | End: 2019-11-15

## 2019-11-15 RX ORDER — AMOXICILLIN AND CLAVULANATE POTASSIUM 875; 125 MG/1; MG/1
1 TABLET, FILM COATED ORAL EVERY 12 HOURS SCHEDULED
Qty: 14 TABLET | Refills: 0 | Status: SHIPPED | OUTPATIENT
Start: 2019-11-15 | End: 2019-11-22

## 2019-11-15 RX ADMIN — ACETAMINOPHEN 650 MG: 325 TABLET ORAL at 20:21

## 2019-11-15 RX ADMIN — AMOXICILLIN AND CLAVULANATE POTASSIUM 1 TABLET: 875; 125 TABLET, FILM COATED ORAL at 19:56

## 2019-11-15 NOTE — ED PROVIDER NOTES
History  Chief Complaint   Patient presents with    URI     went to see his heart doctor yesterday  Dr Marisela Weiss told him that he may have bronchitis and wanted him to have labs and a chest x ray  Patient was unable to go yesterday and didnt want to wait till [address] [place] [address]  [de-identified] male presents emergency room with complaint of chest pain and shortness of breath which she states has been intermittent over the past 2 weeks  Patient states when he lies down at night he has difficulty with shortness of breath  He has had increasing chest pain over the past 2 days in his left-sided chest   Pain currently is a 3/10 and describes it as a pressure feeling  Patient without fevers chills no nausea vomiting or diaphoresis  Patient denies headache lightheaded dizziness  Patient was seen by cardiology Dr Daryl Fleming in 2 days ago and was advised to present to emergency room for further evaluation due to shortness of breath and chest pain however patient states he did not want to come the day and waited until today if the pain still persisted    No medication or treatment prior to arrival       History provided by:  Patient   used: No    URI   Presenting symptoms: congestion, cough and rhinorrhea    Presenting symptoms: no ear pain, no fatigue, no fever and no sore throat    Congestion:     Location:  Nasal    Interferes with sleep: no      Interferes with eating/drinking: no    Cough:     Cough characteristics:  Productive    Sputum characteristics:  Yellow    Severity:  Mild    Onset quality:  Gradual    Duration:  2 weeks    Timing:  Intermittent    Progression:  Unchanged    Chronicity:  New  Severity:  Moderate  Onset quality:  Gradual  Duration:  2 weeks  Timing:  Constant  Progression:  Unchanged  Chronicity:  New  Relieved by:  Nothing  Worsened by:  Nothing  Ineffective treatments:  Inhaler  Associated symptoms: no headaches, no myalgias, no neck pain, no sneezing and no wheezing    Risk factors: chronic cardiac disease and chronic respiratory disease    Chest Pain   Pain location:  L chest  Pain quality: pressure    Pain radiates to:  L arm  Pain radiates to the back: no    Pain severity:  Mild (3/10 currenlty at rest in ER)  Onset quality:  Gradual  Duration:  2 days  Timing:  Intermittent  Progression:  Waxing and waning  Chronicity:  Recurrent  Context: movement and at rest    Relieved by:  Nothing  Worsened by:  Exertion  Ineffective treatments:  None tried  Associated symptoms: cough and shortness of breath    Associated symptoms: no abdominal pain, no diaphoresis, no dizziness, no fatigue, no fever, no headache, no nausea, no palpitations, no syncope, not vomiting and no weakness    Shortness of breath:     Severity:  Mild    Onset quality:  Gradual    Duration:  2 weeks    Timing:  Intermittent    Progression:  Waxing and waning  Risk factors: coronary artery disease, hypertension, obesity and smoking        Allergies   Allergen Reactions    Gabapentin Headache           Prior to Admission Medications   Prescriptions Last Dose Informant Patient Reported? Taking?    LORazepam (ATIVAN) 0 5 mg tablet   No No   Sig: Take 1 tablet (0 5 mg total) by mouth 2 (two) times a day as needed for anxiety for up to 7 days   acetaminophen-codeine (TYLENOL #3) 300-30 mg per tablet  Self Yes No   Sig: Take 1 tablet by mouth daily as needed for moderate pain   albuterol (PROVENTIL HFA,VENTOLIN HFA) 90 mcg/act inhaler   No No   Sig: Inhale 2 puffs every 4 (four) hours as needed for wheezing   amLODIPine (NORVASC) 10 mg tablet   No No   Sig: Take 1 tablet (10 mg total) by mouth daily   aspirin (ECOTRIN LOW STRENGTH) 81 mg EC tablet   No No   Sig: Take 1 tablet (81 mg total) by mouth daily   atorvastatin (LIPITOR) 40 mg tablet   No No   Sig: Take 1 tablet (40 mg total) by mouth every evening   calcium carbonate (TUMS EX) 750 mg chewable tablet   No No   Sig: Chew 1 tablet (750 mg total) 3 (three) times a day as needed for indigestion or heartburn   citalopram (CeleXA) 40 mg tablet   No No   Sig: Take 1 tablet (40 mg total) by mouth daily   Patient not taking: Reported on 2019   clopidogrel (PLAVIX) 75 mg tablet   No No   Sig: Take 2 tablets (150 mg total) by mouth daily   Patient taking differently: Take 75 mg by mouth daily    famotidine (PEPCID) 20 mg tablet   No No   Sig: Take 1 tablet (20 mg total) by mouth 2 (two) times a day   fluticasone (FLONASE) 50 mcg/act nasal spray   No No   Si spray into each nostril daily   furosemide (LASIX) 40 mg tablet   No No   Sig: Take 1 tablet (40 mg total) by mouth daily   Patient taking differently: Take 40 mg by mouth as needed    meloxicam (MOBIC) 15 mg tablet   Yes No   Sig: Take 15 mg by mouth daily as needed   nitroglycerin (NITROSTAT) 0 4 mg SL tablet   Yes No   Sig: Place 0 4 mg under the tongue every 5 (five) minutes as needed for chest pain   pantoprazole (PROTONIX) 40 mg tablet   No No   Sig: Take 1 tablet (40 mg total) by mouth daily   roflumilast (DALIRESP) 500 mcg tablet   Yes No   Sig: Take 500 mcg by mouth daily   tiotropium (SPIRIVA) 18 mcg inhalation capsule   No No   Sig: Place 1 capsule (18 mcg total) into inhaler and inhale daily      Facility-Administered Medications: None       Past Medical History:   Diagnosis Date    [facility] right MCA stroke ([facility] ) 9/15/2018    CAD (coronary artery disease)     s/p CABG     COPD [facility]chronic obstructive [facility] disease) [facility] )     [facility] ) 3/24/2019    Heart attack ([facility] )     Hyperlipidemia     Hypertension     Lexiscan nuclear stress test 2016    EF 74% Normal    TIA (transient ischemic attack) 2018       Past Surgical History:   Procedure Laterality Date    CARDIAC CATHETERIZATION  2015    LIMA occluded   No severe native lesions    COLONOSCOPY      CORONARY ARTERY BYPASS GRAFT  2000    HERNIA REPAIR      times 2       Family History   Problem Relation Age of Onset    Cancer Mother  Heart disease Mother     Cancer Father     Cancer Maternal Grandmother     Cancer Paternal Grandfather      I have reviewed and agree with the history as documented  Social History     Tobacco Use    Smoking status: Current Every Day Smoker     Packs/day: 0 25     Years: 50 00     Pack years: 12 50     Types: Cigars, Pipe    Smokeless tobacco: Never Used   Substance Use Topics    Alcohol use: Not Currently    Drug use: Never        Review of Systems   Constitutional: Negative for chills, diaphoresis, fatigue and fever  HENT: Positive for congestion and rhinorrhea  Negative for ear pain, sneezing and sore throat  Respiratory: Positive for cough and shortness of breath  Negative for wheezing and stridor  Cardiovascular: Positive for chest pain  Negative for palpitations, leg swelling and syncope  Gastrointestinal: Negative for abdominal pain, constipation, diarrhea, nausea and vomiting  Genitourinary: Negative for difficulty urinating, dysuria, frequency, hematuria and urgency  Musculoskeletal: Negative for gait problem, myalgias and neck pain  Skin: Negative for rash  Neurological: Negative for dizziness, syncope, weakness, light-headedness and headaches  All other systems reviewed and are negative  Physical Exam  Physical Exam   Constitutional: He is oriented to person, place, and time  He appears well-developed and well-nourished  HENT:   Head: Normocephalic and atraumatic  Right Ear: External ear normal    Left Ear: External ear normal    Nose: Nose normal    Mouth/Throat: Oropharynx is clear and moist    Eyes: Pupils are equal, round, and reactive to light  Conjunctivae and EOM are normal    Neck: Normal range of motion  Neck supple  No tracheal deviation present  Cardiovascular: Normal rate, regular rhythm, normal heart sounds and intact distal pulses  Pulmonary/Chest: Effort normal  No accessory muscle usage or stridor  No tachypnea  No respiratory distress   He has no wheezes  He has rhonchi (Scattered throughout bilaterally)  He has no rales  Abdominal: Soft  Bowel sounds are normal  He exhibits no distension  There is no tenderness  obese   Musculoskeletal: Normal range of motion  He exhibits no edema or tenderness  Neurological: He is alert and oriented to person, place, and time  Skin: Skin is warm and dry  No rash noted  No erythema  Nursing note and vitals reviewed        Vital Signs  ED Triage Vitals [11/15/19 1658]   Temperature Pulse Respirations Blood Pressure SpO2   97 7 °F (36 5 °C) 99 18 149/86 96 %      Temp Source Heart Rate Source Patient Position - Orthostatic VS BP Location FiO2 (%)   Temporal Monitor Sitting Left arm --      Pain Score       2           Vitals:    11/15/19 2000 11/15/19 2015 11/15/19 2030 11/15/19 2045   BP: 123/85  111/79    Pulse: 77 81 93 84   Patient Position - Orthostatic VS:             Visual Acuity      ED Medications  Medications   amoxicillin-clavulanate (AUGMENTIN) 875-125 mg per tablet 1 tablet (1 tablet Oral Given 11/15/19 1956)   acetaminophen (TYLENOL) tablet 650 mg (650 mg Oral Given 11/15/19 2021)       Diagnostic Studies  Results Reviewed     Procedure Component Value Units Date/Time    Troponin I [[id number]]  (Normal) Collected:  11/15/19 2000    Lab Status:  Final result Specimen:  Blood from Arm, Left Updated:  11/15/19 2026     Troponin I <0 03 ng/mL     B-Type Natriuretic Peptide ([facility]) [[id number]]  (Normal) Collected:  11/15/19 1729    Lab Status:  Final result Specimen:  Blood from Arm, Left Updated:  11/15/19 1826     BNP 19 pg/mL     Protime-INR [[id number]]  (Normal) Collected:  11/15/19 1815    Lab Status:  Final result Specimen:  Blood from Arm, Left Updated:  11/15/19 1826     Protime 11 7 seconds      INR 1 01    APTT [[id number]]  (Normal) Collected:  11/15/19 1815    Lab Status:  Final result Specimen:  Blood from Arm, Left Updated:  11/15/19 1826     PTT 25 seconds     Comprehensive metabolic panel [[id number]]  (Abnormal) Collected:  11/15/19 1729    Lab Status:  Final result Specimen:  Blood from Arm, Left Updated:  11/15/19 1820     Sodium 133 mmol/L      Potassium 3 8 mmol/L      Chloride 101 mmol/L      CO2 25 mmol/L      ANION GAP 7 mmol/L      BUN 10 mg/dL      Creatinine 0 92 mg/dL      Glucose 88 mg/dL      Calcium 9 0 mg/dL      AST 17 U/L      ALT 18 U/L      Alkaline Phosphatase 68 U/L      Total Protein 6 5 g/dL      Albumin 4 3 g/dL      Total Bilirubin 0 40 mg/dL      eGFR 88 ml/min/1 73sq m     Narrative:       [place] guidelines for Chronic Kidney Disease (CKD):     Stage 1 with normal or high GFR (GFR > 90 mL/min/1 73 square meters)    Stage 2 Mild CKD (GFR = 60-89 mL/min/1 73 square meters)    Stage 3A Moderate CKD (GFR = 45-59 mL/min/1 73 square meters)    Stage 3B Moderate CKD (GFR = 30-44 mL/min/1 73 square meters)    Stage 4 Severe CKD (GFR = 15-29 mL/min/1 73 square meters)    Stage 5 End Stage CKD (GFR <15 mL/min/1 73 square meters)  Note: GFR calculation is accurate only with a steady state creatinine    Troponin I [[id number]]  (Normal) Collected:  11/15/19 1729    Lab Status:  Final result Specimen:  Blood from Arm, Left Updated:  11/15/19 1817     Troponin I <0 03 ng/mL     CBC and differential [[id number]]  (Abnormal) Collected:  11/15/19 1729    Lab Status:  Final result Specimen:  Blood from Arm, Left Updated:  11/15/19 1743     WBC 8 40 Thousand/uL      RBC 4 75 Million/uL      Hemoglobin 14 7 g/dL      Hematocrit 41 6 %      MCV 88 fL      MCH 31 0 pg      MCHC 35 4 g/dL      RDW 13 3 %      MPV 7 1 fL      Platelets 634 Thousands/uL      Neutrophils Relative 65 %      Lymphocytes Relative 26 %      Monocytes Relative 6 %      Eosinophils Relative 3 %      Basophils Relative 0 %      Neutrophils Absolute 5 50 Thousands/µL      Lymphocytes Absolute 2 20 Thousands/µL      Monocytes Absolute 0 50 Thousand/µL      Eosinophils Absolute 0 30 Thousand/µL      Basophils Absolute 0 00 Thousands/µL                  XR chest 2 views   Final Result by Romana Galloway, MD (11/15 1750)      No acute cardiopulmonary disease  Workstation performed: [id number]                    Procedures  ECG 12 Lead Documentation Only  Date/Time: 11/15/2019 5:11 PM  Performed by: Shay Velazquez PA-C  Authorized by: Shay Velazquez PA-C     Indications / Diagnosis:  Shortness of breath  ECG reviewed by me, the ED Provider: yes (& Dr Iraj Huang)    Patient location:  ED  Interpretation:     Interpretation: normal    Rate:     ECG rate:  86    ECG rate assessment: normal    Rhythm:     Rhythm: sinus rhythm    QRS:     QRS axis:  Normal  Conduction:     Conduction: normal    ST segments:     ST segments:  Normal  T waves:     T waves: normal             ED Course  ED Course as of Nov 15 2054   Fri Nov 15, 2019   1929 Upon re-evaluation patient still has some mild baseline chest pain which he describes as pressure 3/10 left-sided chest   No shortness of breath currently while at rest       1932 Call placed to hospitalist to discuss possible admission versus discharge home  Discussed with Belem Lopez hospitalist on call who will discuss with Dr Marisela Weiss of Cardiology as they would like to clear this with Cardiology first       1947 Discussed with Belem Lopez, according to Dr Marisela Weiss patient was not told to come to ER from a cardiac standpoint  Will await for second troponin  If negative will d/c patient with recommendation for outpatient follow-up diagnosis of acute bronchitis at this time with antibiotics  2050 Patient 2nd troponin returned within normal limits  No change in his appears to be chronic chest pain of the left chest   Will treat for acute COPD exacerbation with antibiotics and advise follow-up close to PCP and Cardiology    Advised if he develops increasing chest pain or new worsening symptoms shortness of breath re-presented to emergency room or call 911              HEART Risk Score      Most Recent Value   History  0 Filed at: 11/15/2019 1736   ECG  0 Filed at: 11/15/2019 1736   Age  1 Filed at: 11/15/2019 1736   Risk Factors  2 Filed at: 11/15/2019 1736   Troponin  0 Filed at: 11/15/2019 1736   Heart Score Risk Calculator   History  0 Filed at: 11/15/2019 1736   ECG  0 Filed at: 11/15/2019 1736   Age  1 Filed at: 11/15/2019 1736   Risk Factors  2 Filed at: 11/15/2019 1736   Troponin  0 Filed at: 11/15/2019 1736   HEART Score  3 Filed at: 11/15/2019 1736   HEART Score  3 Filed at: 11/15/2019 1736                            MDM  Number of Diagnoses or Management Options  Acute exacerbation of chronic obstructive pulmonary disease (COPD) ([facility] ): new and requires workup  Chest pain:   History of coronary artery disease:   Hyperlipidemia:   Hypertension:   Tobacco abuse:      Amount and/or Complexity of Data Reviewed  Clinical lab tests: ordered and reviewed  Tests in the radiology section of CPT®: reviewed  Discuss the patient with other providers: yes (Hospitalist, [employer])  Independent visualization of images, tracings, or specimens: yes    Risk of Complications, Morbidity, and/or Mortality  Presenting problems: moderate  Diagnostic procedures: moderate  Management options: moderate  General comments: 28-year-old male with complaint of shortness of breath and productive cough presents with additional complaint of chest pain which appears to be chronic  Patient is status post troponin x2 with returned within normal limits as well as chest x-ray with no acute findings  Will treat for acute COPD exacerbation encouraged tobacco cessation advised follow with PCP and Cardiology patient develops new or worsening symptoms return to emergency room      Patient Progress  Patient progress: stable      Disposition  Final diagnoses:   Acute exacerbation of chronic obstructive pulmonary disease (COPD) ([facility] )   Chest pain   Hypertension   Hyperlipidemia   History of coronary artery disease   Tobacco abuse     Time reflects when diagnosis was documented in both MDM as applicable and the Disposition within this note     Time User Action Codes Description Comment    11/15/2019  7:52 PM Magdalene Oakes Add [J44 1] Acute exacerbation of chronic obstructive pulmonary disease (COPD) ([facility] )     11/15/2019  7:52 PM Kiersten MAIN Add [R07 9] Chest pain     11/15/2019  8:01 PM Magdalene Oakes Add [I10] Hypertension     11/15/2019  8:02 PM Magdalene Oakes Add [E78 5] Hyperlipidemia     11/15/2019  8:02 PM Kiersten MAIN Add [Z86 79] History of coronary artery disease     11/15/2019  8:02 PM Kiersten MAIN Add [Z72 0] Tobacco abuse       ED Disposition     ED Disposition Condition Date/Time Comment    Discharge Stable Fri Nov 15, 2019  8:46 PM [address] discharge to home/self care  Follow-up Information     Follow up With Specialties Details Why Contact Info    Rae Balderas MD Internal Medicine Schedule an appointment as soon as possible for a visit in 5 days If symptoms worsen return to ER  [address] [facility]      Anthony Rubi MD Cardiology Schedule an appointment as soon as possible for a visit  If symptoms worsen  [address] [facility]            Patient's Medications   Discharge Prescriptions    AMOXICILLIN-CLAVULANATE (AUGMENTIN) 875-125 MG PER TABLET    Take 1 tablet by mouth every 12 (twelve) hours for 7 days       Start Date: 11/15/2019End Date: 11/22/2019       Order Dose: 1 tablet       Quantity: 14 tablet    Refills: 0     No discharge procedures on file      ED Provider  Electronically Signed by           Irina Lantigua PA-C  11/15/19 9157

## 2019-11-16 NOTE — DISCHARGE INSTRUCTIONS
Take medication as prescribed  If you develop new or worsening symptoms including chest pain shortness breath return to emergency room

## 2019-11-18 LAB
ATRIAL RATE: 86 BPM
P AXIS: 56 DEGREES
PR INTERVAL: 154 MS
QRS AXIS: 88 DEGREES
QRSD INTERVAL: 88 MS
QT INTERVAL: 366 MS
QTC INTERVAL: 437 MS
T WAVE AXIS: 70 DEGREES
VENTRICULAR RATE: 86 BPM

## 2019-11-18 PROCEDURE — 93010 ELECTROCARDIOGRAM REPORT: CPT | Performed by: INTERNAL MEDICINE

## 2020-01-03 ENCOUNTER — HOSPITAL ENCOUNTER (EMERGENCY)
Facility: HOSPITAL | Age: 65
Discharge: HOME | End: 2020-01-03
Attending: EMERGENCY MEDICINE | Admitting: EMERGENCY MEDICINE
Payer: MEDICARE

## 2020-01-03 ENCOUNTER — APPOINTMENT (EMERGENCY)
Dept: CT IMAGING | Facility: HOSPITAL | Age: 65
End: 2020-01-03
Payer: MEDICARE

## 2020-01-03 ENCOUNTER — APPOINTMENT (EMERGENCY)
Dept: RADIOLOGY | Facility: HOSPITAL | Age: 65
End: 2020-01-03
Payer: MEDICARE

## 2020-01-03 VITALS
RESPIRATION RATE: 23 BRPM | OXYGEN SATURATION: 98 % | TEMPERATURE: 97.9 F | BODY MASS INDEX: 31.5 KG/M2 | WEIGHT: 220 LBS | SYSTOLIC BLOOD PRESSURE: 131 MMHG | HEART RATE: 80 BPM | DIASTOLIC BLOOD PRESSURE: 83 MMHG | HEIGHT: 70 IN

## 2020-01-03 DIAGNOSIS — R07.9 CHEST PAIN: ICD-10-CM

## 2020-01-03 DIAGNOSIS — R29.90 STROKE-LIKE SYMPTOM: ICD-10-CM

## 2020-01-03 DIAGNOSIS — L03.90 CELLULITIS: Primary | ICD-10-CM

## 2020-01-03 LAB
ALBUMIN SERPL BCP-MCNC: 4.1 G/DL (ref 3.5–5.7)
ALP SERPL-CCNC: 74 U/L (ref 55–165)
ALT SERPL W P-5'-P-CCNC: 27 U/L (ref 7–52)
ANION GAP SERPL CALCULATED.3IONS-SCNC: 7 MMOL/L (ref 4–13)
AST SERPL W P-5'-P-CCNC: 21 U/L (ref 13–39)
BASOPHILS # BLD AUTO: 0.1 THOUSANDS/ΜL (ref 0–0.1)
BASOPHILS NFR BLD AUTO: 1 % (ref 0–2)
BILIRUB SERPL-MCNC: 0.4 MG/DL (ref 0.2–1)
BILIRUB UR QL STRIP: NEGATIVE
BUN SERPL-MCNC: 8 MG/DL (ref 7–25)
CALCIUM SERPL-MCNC: 9.1 MG/DL (ref 8.6–10.5)
CHLORIDE SERPL-SCNC: 102 MMOL/L (ref 98–107)
CLARITY UR: CLEAR
CO2 SERPL-SCNC: 26 MMOL/L (ref 21–31)
COLOR UR: YELLOW
CREAT SERPL-MCNC: 0.86 MG/DL (ref 0.7–1.3)
EOSINOPHIL # BLD AUTO: 0.1 THOUSAND/ΜL (ref 0–0.61)
EOSINOPHIL NFR BLD AUTO: 1 % (ref 0–5)
ERYTHROCYTE [DISTWIDTH] IN BLOOD BY AUTOMATED COUNT: 13.6 % (ref 11.5–14.5)
GFR SERPL CREATININE-BSD FRML MDRD: 92 ML/MIN/1.73SQ M
GLUCOSE SERPL-MCNC: 108 MG/DL (ref 65–99)
GLUCOSE UR STRIP-MCNC: NEGATIVE MG/DL
HCT VFR BLD AUTO: 43.6 % (ref 42–47)
HGB BLD-MCNC: 14.8 G/DL (ref 14–18)
HGB UR QL STRIP.AUTO: NEGATIVE
KETONES UR STRIP-MCNC: NEGATIVE MG/DL
LEUKOCYTE ESTERASE UR QL STRIP: NEGATIVE
LYMPHOCYTES # BLD AUTO: 1.6 THOUSANDS/ΜL (ref 0.6–4.47)
LYMPHOCYTES NFR BLD AUTO: 17 % (ref 21–51)
MCH RBC QN AUTO: 31 PG (ref 26–34)
MCHC RBC AUTO-ENTMCNC: 34 G/DL (ref 31–37)
MCV RBC AUTO: 91 FL (ref 81–99)
MONOCYTES # BLD AUTO: 0.5 THOUSAND/ΜL (ref 0.17–1.22)
MONOCYTES NFR BLD AUTO: 6 % (ref 2–12)
NEUTROPHILS # BLD AUTO: 7.2 THOUSANDS/ΜL (ref 1.4–6.5)
NEUTS SEG NFR BLD AUTO: 75 % (ref 42–75)
NITRITE UR QL STRIP: NEGATIVE
PH UR STRIP.AUTO: 7 [PH]
PLATELET # BLD AUTO: 258 THOUSANDS/UL (ref 149–390)
PMV BLD AUTO: 6.9 FL (ref 8.6–11.7)
POTASSIUM SERPL-SCNC: 3.5 MMOL/L (ref 3.5–5.5)
PROT SERPL-MCNC: 6.3 G/DL (ref 6.4–8.9)
PROT UR STRIP-MCNC: NEGATIVE MG/DL
RBC # BLD AUTO: 4.78 MILLION/UL (ref 4.3–5.9)
SODIUM SERPL-SCNC: 135 MMOL/L (ref 134–143)
SP GR UR STRIP.AUTO: 1.01 (ref 1–1.03)
TROPONIN I SERPL-MCNC: <0.03 NG/ML
TSH SERPL DL<=0.05 MIU/L-ACNC: 0.87 UIU/ML (ref 0.45–5.33)
UROBILINOGEN UR QL STRIP.AUTO: 0.2 E.U./DL
WBC # BLD AUTO: 9.6 THOUSAND/UL (ref 4.8–10.8)

## 2020-01-03 PROCEDURE — 84484 ASSAY OF TROPONIN QUANT: CPT | Performed by: EMERGENCY MEDICINE

## 2020-01-03 PROCEDURE — 73130 X-RAY EXAM OF HAND: CPT

## 2020-01-03 PROCEDURE — 80053 COMPREHEN METABOLIC PANEL: CPT | Performed by: EMERGENCY MEDICINE

## 2020-01-03 PROCEDURE — 71046 X-RAY EXAM CHEST 2 VIEWS: CPT

## 2020-01-03 PROCEDURE — 99284 EMERGENCY DEPT VISIT MOD MDM: CPT | Performed by: EMERGENCY MEDICINE

## 2020-01-03 PROCEDURE — 81003 URINALYSIS AUTO W/O SCOPE: CPT

## 2020-01-03 PROCEDURE — 84443 ASSAY THYROID STIM HORMONE: CPT | Performed by: EMERGENCY MEDICINE

## 2020-01-03 PROCEDURE — 70496 CT ANGIOGRAPHY HEAD: CPT

## 2020-01-03 PROCEDURE — 70498 CT ANGIOGRAPHY NECK: CPT

## 2020-01-03 PROCEDURE — 36415 COLL VENOUS BLD VENIPUNCTURE: CPT | Performed by: EMERGENCY MEDICINE

## 2020-01-03 PROCEDURE — 85025 COMPLETE CBC W/AUTO DIFF WBC: CPT | Performed by: EMERGENCY MEDICINE

## 2020-01-03 PROCEDURE — 99284 EMERGENCY DEPT VISIT MOD MDM: CPT

## 2020-01-03 RX ORDER — ASPIRIN 81 MG/1
324 TABLET, CHEWABLE ORAL ONCE
Status: COMPLETED | OUTPATIENT
Start: 2020-01-03 | End: 2020-01-03

## 2020-01-03 RX ORDER — AMOXICILLIN AND CLAVULANATE POTASSIUM 875; 125 MG/1; MG/1
1 TABLET, FILM COATED ORAL ONCE
Status: COMPLETED | OUTPATIENT
Start: 2020-01-03 | End: 2020-01-03

## 2020-01-03 RX ORDER — LIDOCAINE HYDROCHLORIDE AND EPINEPHRINE BITARTRATE 20; .01 MG/ML; MG/ML
1 INJECTION, SOLUTION SUBCUTANEOUS ONCE
Status: COMPLETED | OUTPATIENT
Start: 2020-01-03 | End: 2020-01-03

## 2020-01-03 RX ORDER — AMOXICILLIN AND CLAVULANATE POTASSIUM 875; 125 MG/1; MG/1
1 TABLET, FILM COATED ORAL EVERY 12 HOURS
Qty: 14 TABLET | Refills: 0 | Status: SHIPPED | OUTPATIENT
Start: 2020-01-03 | End: 2020-01-10

## 2020-01-03 RX ORDER — BACITRACIN, NEOMYCIN, POLYMYXIN B 400; 3.5; 5 [USP'U]/G; MG/G; [USP'U]/G
1 OINTMENT TOPICAL ONCE
Status: COMPLETED | OUTPATIENT
Start: 2020-01-03 | End: 2020-01-03

## 2020-01-03 RX ORDER — NITROGLYCERIN 0.4 MG/1
0.4 TABLET SUBLINGUAL
Status: DISCONTINUED | OUTPATIENT
Start: 2020-01-03 | End: 2020-01-03 | Stop reason: HOSPADM

## 2020-01-03 RX ORDER — SODIUM CHLORIDE 9 MG/ML
3 INJECTION INTRAVENOUS AS NEEDED
Status: DISCONTINUED | OUTPATIENT
Start: 2020-01-03 | End: 2020-01-03 | Stop reason: HOSPADM

## 2020-01-03 RX ADMIN — LIDOCAINE HYDROCHLORIDE,EPINEPHRINE BITARTRATE 1 ML: 20; .01 INJECTION, SOLUTION INFILTRATION; PERINEURAL at 20:20

## 2020-01-03 RX ADMIN — BACITRACIN, NEOMYCIN, POLYMYXIN B 1 SMALL APPLICATION: 400; 3.5; 5 OINTMENT TOPICAL at 20:40

## 2020-01-03 RX ADMIN — ASPIRIN 81 MG 324 MG: 81 TABLET ORAL at 18:45

## 2020-01-03 RX ADMIN — IOHEXOL 85 ML: 350 INJECTION, SOLUTION INTRAVENOUS at 20:05

## 2020-01-03 RX ADMIN — AMOXICILLIN AND CLAVULANATE POTASSIUM 1 TABLET: 875; 125 TABLET, FILM COATED ORAL at 21:17

## 2020-01-03 NOTE — ED PROVIDER NOTES
History  Chief Complaint   Patient presents with    Thumb Injury     swellign in left thumb that began a month ago  patient tried to cut it with a knife to open it up  This is a 60-year-old man who complained of left thumb discomfort  He stated that there was some swelling that he attempted to drain approximately 1 month ago  Initially there is some improvement but the last few days it increased in discomfort as did some feelings of generalized fatigue and malaise  Patient notes that the thumb was worse pain when being palpated better with rest   Rates the pain as moderate  Patient also notes that he has crushing chest pain similar to when he previously had an MI  Worse with exertion  Nothing makes it better he also reports he had some shortness of breath associated with this  The pain occasionally radiates to bilateral shoulders  Patient states he previously had a CABG  Patient also notes shiva he has had right lower extremity weakness  He states it feels similar to his prior CVA  States the symptoms have been present for at least 3 days  The heavy sensation nothing seems to make it feel better or worse  It is not painful  He states there is still some sensation to about everything is decreased  Prior to Admission Medications   Prescriptions Last Dose Informant Patient Reported? Taking?    LORazepam (ATIVAN) 0 5 mg tablet   No No   Sig: Take 1 tablet (0 5 mg total) by mouth 2 (two) times a day as needed for anxiety for up to 7 days   acetaminophen-codeine (TYLENOL #3) 300-30 mg per tablet  Self Yes No   Sig: Take 1 tablet by mouth daily as needed for moderate pain   albuterol (PROVENTIL HFA,VENTOLIN HFA) 90 mcg/act inhaler   No No   Sig: Inhale 2 puffs every 4 (four) hours as needed for wheezing   amLODIPine (NORVASC) 10 mg tablet   No No   Sig: Take 1 tablet (10 mg total) by mouth daily   aspirin (ECOTRIN LOW STRENGTH) 81 mg EC tablet   No No   Sig: Take 1 tablet (81 mg total) by mouth daily   atorvastatin (LIPITOR) 40 mg tablet   No No   Sig: Take 1 tablet (40 mg total) by mouth every evening   calcium carbonate (TUMS EX) 750 mg chewable tablet   No No   Sig: Chew 1 tablet (750 mg total) 3 (three) times a day as needed for indigestion or heartburn   citalopram (CeleXA) 40 mg tablet   No No   Sig: Take 1 tablet (40 mg total) by mouth daily   Patient not taking: Reported on 2019   clopidogrel (PLAVIX) 75 mg tablet   No No   Sig: Take 2 tablets (150 mg total) by mouth daily   Patient taking differently: Take 75 mg by mouth daily    famotidine (PEPCID) 20 mg tablet   No No   Sig: Take 1 tablet (20 mg total) by mouth 2 (two) times a day   fluticasone (FLONASE) 50 mcg/act nasal spray   No No   Si spray into each nostril daily   furosemide (LASIX) 40 mg tablet   No No   Sig: Take 1 tablet (40 mg total) by mouth daily   Patient taking differently: Take 40 mg by mouth as needed    irbesartan (AVAPRO) 75 mg tablet   Yes No   Sig: Take by mouth Daily   meloxicam (MOBIC) 15 mg tablet   Yes No   Sig: Take 15 mg by mouth daily as needed   metoprolol succinate (TOPROL-XL) 25 mg 24 hr tablet   Yes No   Sig: Take by mouth Daily   nitroglycerin (NITROSTAT) 0 4 mg SL tablet   Yes No   Sig: Place 0 4 mg under the tongue every 5 (five) minutes as needed for chest pain   pantoprazole (PROTONIX) 40 mg tablet   No No   Sig: Take 1 tablet (40 mg total) by mouth daily   roflumilast (DALIRESP) 500 mcg tablet   Yes No   Sig: Take 500 mcg by mouth daily   solifenacin (VESICARE) 10 MG tablet   Yes No   Sig: Take by mouth Daily   tiotropium (SPIRIVA) 18 mcg inhalation capsule   No No   Sig: Place 1 capsule (18 mcg total) into inhaler and inhale daily      Facility-Administered Medications: None       Past Medical History:   Diagnosis Date    Acute right MCA stroke ([place] [address] ) 9/15/2018    CAD (coronary artery disease)     s/p CABG 2000    COPD (chronic obstructive pulmonary disease) ([place] [address] )     Grand mal status ([place] [address] ) 3/24/2019    [facility] [address] [facility]     Hyperlipidemia     Hypertension     Lexiscan nuclear stress test 03/19/2016    EF 74% Normal    TIA (transient ischemic attack) 09/13/2018       Past Surgical History:   Procedure Laterality Date    CARDIAC CATHETERIZATION  08/19/2015    LIMA occluded  No severe native lesions    COLONOSCOPY      CORONARY ARTERY BYPASS GRAFT  2000    HERNIA REPAIR      times 2       Family History   Problem Relation Age of Onset    Cancer Mother     Heart disease Mother     Cancer Father     Cancer Maternal Grandmother     Cancer Paternal Grandfather      I have reviewed and agree with the history as documented  Social History     Tobacco Use    Smoking status: Current Every Day Smoker     Packs/day: 0 25     Years: 50 00     Pack years: 12 50     Types: Cigars, Pipe    Smokeless tobacco: Never Used   Substance Use Topics    Alcohol use: Not Currently    Drug use: Never        Review of Systems   Constitutional: Positive for activity change, chills, fatigue and fever  HENT: Positive for congestion  Eyes: Negative for visual disturbance  Respiratory: Positive for chest tightness and shortness of breath  Negative for cough  Cardiovascular: Positive for chest pain  Negative for palpitations and leg swelling  Gastrointestinal: Positive for nausea  Negative for abdominal distention, abdominal pain, constipation, diarrhea and vomiting  Endocrine: Negative for polyuria  Genitourinary: Negative for dysuria  Musculoskeletal: Positive for myalgias  Skin: Positive for rash and wound  Allergic/Immunologic: Negative for immunocompromised state  Neurological: Positive for dizziness, weakness, numbness and headaches  Physical Exam  Physical Exam   Constitutional: He is oriented to person, place, and time  He appears well-developed and well-nourished  No distress  HENT:   Head: Normocephalic and atraumatic     Eyes: Pupils are equal, round, and reactive to light  Conjunctivae and EOM are normal    Neck: Normal range of motion  Neck supple  Cardiovascular: Normal rate, regular rhythm and normal heart sounds  Pulmonary/Chest: Effort normal and breath sounds normal  No respiratory distress  Abdominal: Soft  Bowel sounds are normal  He exhibits no distension  There is no tenderness  There is no rebound and no guarding  Musculoskeletal: Normal range of motion  Neurological: He is alert and oriented to person, place, and time  Skin: Skin is warm and dry  Psychiatric: He has a normal mood and affect   His behavior is normal  Judgment and thought content normal        Vital Signs  ED Triage Vitals [01/03/20 1739]   Temperature Pulse Respirations Blood Pressure SpO2   97 9 °F (36 6 °C) 104 16 153/89 95 %      Temp Source Heart Rate Source Patient Position - Orthostatic VS BP Location FiO2 (%)   Temporal Monitor Sitting Left arm --      Pain Score       7           Vitals:    01/03/20 1739   BP: 153/89   Pulse: 104   Patient Position - Orthostatic VS: Sitting         Visual Acuity      ED Medications  Medications   sodium chloride (PF) 0 9 % injection 3 mL (has no administration in time range)   aspirin chewable tablet 324 mg (has no administration in time range)   nitroglycerin (NITROSTAT) SL tablet 0 4 mg (has no administration in time range)       Diagnostic Studies  Results Reviewed     Procedure Component Value Units Date/Time    CBC and differential [[id number]]     Lab Status:  No result Specimen:  Blood     Troponin I [[id number]]     Lab Status:  No result Specimen:  Blood     Comprehensive metabolic panel [[id number]]     Lab Status:  No result Specimen:  Blood     TSH, 3rd generation [[id number]]     Lab Status:  No result Specimen:  Blood                  X-ray chest 2 views    (Results Pending)   CT stroke alert brain    (Results Pending)   CTA stroke alert (head/neck)    (Results Pending)   X-ray chest 1 view portable    (Results Pending) Procedures  Procedures         ED Course             Stroke Assessment     Row Name 01/03/20 1810             NIH Stroke Scale    Interval  Baseline      Level of Consciousness (1a )  0      LOC Questions (1b )  0      LOC Commands (1c )  0      Best Gaze (2 )  0      Visual (3 )  0      Facial Palsy (4 )  0      Motor Arm, Left (5a )  0      Motor Arm, Right (5b )  0      Motor Leg, Left (6a )  0      Motor Leg, Right (6b )  0      Limb Ataxia (7 )  0      Sensory (8 )  1      Best Language (9 )  0      Dysarthria (10 )  0      Extinction and Inattention (11 ) (Formerly Neglect)  0      Total  1          First Filed Value   TPA Decision  Patient not a TPA candidate  Patient is not a candidate options  Unclear time of onset outside appropriate time window , Symptoms resolved/clearly non disabling  MDM  Number of Diagnoses or Management Options  Cellulitis: new and requires workup  Diagnosis management comments: This is a 77-year-old male who appeared to have cellulitis around his thumb  Patient started on Augmentin for the cellulitis  Patient given troponin chest x-ray and EKG for his crushing chest pain similar to previous coronary artery disease  Chest x-ray, EKG and troponin all demonstrated no signs of acute coronary disease  Patient was complaining of stroke-like symptoms so a CTA head and neck was completed which demonstrated no acute findings  Patient was in the process of being admitted upstairs when he elected to leave against medical advice  Patient appeared of sound mind and appeared to understand consequences         Amount and/or Complexity of Data Reviewed  Clinical lab tests: ordered and reviewed  Tests in the radiology section of CPT®: ordered and reviewed          Disposition  Final diagnoses:   None     ED Disposition     None      Follow-up Information    None         Patient's Medications   Discharge Prescriptions    No medications on file     No discharge procedures on file      ED Provider  Electronically Signed by           Alona Weinberg MD  01/04/20 7243

## 2020-01-16 ENCOUNTER — OFFICE VISIT (OUTPATIENT)
Dept: CARDIOLOGY CLINIC | Facility: CLINIC | Age: 65
End: 2020-01-16
Payer: MEDICARE

## 2020-01-16 VITALS
DIASTOLIC BLOOD PRESSURE: 72 MMHG | WEIGHT: 225 LBS | BODY MASS INDEX: 32.21 KG/M2 | HEIGHT: 70 IN | SYSTOLIC BLOOD PRESSURE: 108 MMHG | HEART RATE: 88 BPM

## 2020-01-16 DIAGNOSIS — E78.2 MIXED HYPERLIPIDEMIA: ICD-10-CM

## 2020-01-16 DIAGNOSIS — I63.511 ACUTE RIGHT MCA STROKE (HCC): ICD-10-CM

## 2020-01-16 DIAGNOSIS — R60.0 LOWER EXTREMITY EDEMA: ICD-10-CM

## 2020-01-16 DIAGNOSIS — I25.10 CORONARY ARTERY DISEASE INVOLVING NATIVE CORONARY ARTERY OF NATIVE HEART WITHOUT ANGINA PECTORIS: Primary | ICD-10-CM

## 2020-01-16 PROCEDURE — 99214 OFFICE O/P EST MOD 30 MIN: CPT | Performed by: INTERNAL MEDICINE

## 2020-01-16 RX ORDER — ATORVASTATIN CALCIUM 40 MG/1
40 TABLET, FILM COATED ORAL EVERY EVENING
Qty: 90 TABLET | Refills: 5 | Status: ON HOLD
Start: 2020-01-16 | End: 2020-07-02 | Stop reason: SDUPTHER

## 2020-01-16 NOTE — PROGRESS NOTES
Patient ID: Jordi Byrd is a 59 y o  male  Plan:      Hyperlipidemia    He had inexplicably stop atorvastatin  I reiterated that he should restart this  Coronary artery disease involving native coronary artery of native heart without angina pectoris    No symptoms 19 or 20 years post CABG  James Biggs last year was nonischemic  Lower extremity edema    Chronic  Right greater than left  I would like to see how he does off amlodipine  Prior venous duplex exam was normal       Follow up Plan:  Changes as above  One month return visit to see how he is doing  He also has a chronic cough and I strongly implored him to stop cigarette smoking  HPI:   The patient is seen in follow-up today regarding dyspnea, edema, CAD, and leg swelling  He has had no recent chest pain or chest pressure  He is also worried about his chronic cough but has not been able to cut back on his cigarette habit  To reiterate, he had heart bypass surgery in the year 2000  In 2015 heart catheterization revealed an occluded mammary graft to the LAD but no severe native lesions  Most recent or relevant cardiac/vascular testing:      Lexiscan Myoview 03/28/2019: Normal ejection fraction  No ischemia  Echocardiogram 01/03/2019: Normal ejection fraction  Mild LVH  Past Surgical History:   Procedure Laterality Date    CARDIAC CATHETERIZATION  08/19/2015    LIMA occluded   No severe native lesions    COLONOSCOPY      CORONARY ARTERY BYPASS GRAFT  2000    HERNIA REPAIR      times 2     CMP:   Lab Results   Component Value Date     06/22/2018    K 3 5 01/03/2020    K 3 4 (L) 06/22/2018     01/03/2020     06/22/2018    CO2 26 01/03/2020    CO2 24 06/22/2018    BUN 8 01/03/2020    BUN 14 06/22/2018    CREATININE 0 86 01/03/2020    CREATININE 0 86 06/22/2018    EGFR 92 01/03/2020       Lipid Profile:   Lab Results   Component Value Date    TRIG 202 (H) 06/14/2019    HDL 30 (L) 06/14/2019 Review of Systems   10  point ROS  was otherwise non pertinent or negative except as per HPI or as below  Gait:   Slow  Objective:     /72   Pulse 88   Ht 5' 10" (1 778 m)   Wt 102 kg (225 lb)   BMI 32 28 kg/m²     PHYSICAL EXAM:    General:  Normal appearance in no distress  Eyes:  Anicteric  Oral mucosa:  Moist   Neck:  No JVD  Carotid upstrokes are brisk without bruits  No masses  Chest:  Clear to auscultation and percussion  Well-healed midline sternotomy scar  Cardiac:  Normal PMI  Normal S1 and S2  No murmur gallop or rub  Abdomen:  Soft and nontender  No palpable organomegaly or aortic enlargement  Extremities:    1 to 2+ edema in the right lower leg  1+ in the left lower leg  Musculoskeletal:  Symmetric  Vascular:  Femoral pulses are brisk without bruits  Popliteal pulses are intact bilaterally  Pedal pulses are intact  Neuro:  Grossly symmetric  Psych:  Alert and oriented x3          Current Outpatient Medications:     albuterol (PROVENTIL HFA,VENTOLIN HFA) 90 mcg/act inhaler, Inhale 2 puffs every 4 (four) hours as needed for wheezing, Disp: 1 Inhaler, Rfl: 0    aspirin (ECOTRIN LOW STRENGTH) 81 mg EC tablet, Take 1 tablet (81 mg total) by mouth daily, Disp: 30 tablet, Rfl: 0    calcium carbonate (TUMS EX) 750 mg chewable tablet, Chew 1 tablet (750 mg total) 3 (three) times a day as needed for indigestion or heartburn, Disp: 60 tablet, Rfl: 0    clopidogrel (PLAVIX) 75 mg tablet, Take 2 tablets (150 mg total) by mouth daily (Patient taking differently: Take 75 mg by mouth daily ), Disp: 60 tablet, Rfl: 0    fluticasone (FLONASE) 50 mcg/act nasal spray, 1 spray into each nostril daily, Disp: 16 g, Rfl: 0    furosemide (LASIX) 40 mg tablet, Take 1 tablet (40 mg total) by mouth daily (Patient taking differently: Take 40 mg by mouth as needed ), Disp: 90 tablet, Rfl: 3    LORazepam (ATIVAN) 0 5 mg tablet, Take 1 tablet (0 5 mg total) by mouth 2 (two) times a day as needed for anxiety for up to 7 days, Disp: 14 tablet, Rfl: 0    meloxicam (MOBIC) 15 mg tablet, Take 15 mg by mouth daily as needed, Disp: , Rfl: 0    nitroglycerin (NITROSTAT) 0 4 mg SL tablet, Place 0 4 mg under the tongue every 5 (five) minutes as needed for chest pain, Disp: , Rfl:     pantoprazole (PROTONIX) 40 mg tablet, Take 1 tablet (40 mg total) by mouth daily, Disp: 30 tablet, Rfl: 0    roflumilast (DALIRESP) 500 mcg tablet, Take 500 mcg by mouth daily, Disp: , Rfl:     tiotropium (SPIRIVA) 18 mcg inhalation capsule, Place 1 capsule (18 mcg total) into inhaler and inhale daily, Disp: 30 capsule, Rfl: 0    acetaminophen-codeine (TYLENOL #3) 300-30 mg per tablet, Take 1 tablet by mouth daily as needed for moderate pain, Disp: , Rfl:     atorvastatin (LIPITOR) 40 mg tablet, Take 1 tablet (40 mg total) by mouth every evening, Disp: 90 tablet, Rfl: 5    citalopram (CeleXA) 40 mg tablet, Take 1 tablet (40 mg total) by mouth daily (Patient not taking: Reported on 11/14/2019), Disp: 30 tablet, Rfl: 0    famotidine (PEPCID) 20 mg tablet, Take 1 tablet (20 mg total) by mouth 2 (two) times a day (Patient not taking: Reported on 1/16/2020), Disp: 60 tablet, Rfl: 0    irbesartan (AVAPRO) 75 mg tablet, Take by mouth Daily, Disp: , Rfl:     metoprolol succinate (TOPROL-XL) 25 mg 24 hr tablet, Take by mouth Daily, Disp: , Rfl:     solifenacin (VESICARE) 10 MG tablet, Take by mouth Daily, Disp: , Rfl:   Allergies   Allergen Reactions    Gabapentin Headache     Past Medical History:   Diagnosis Date    [facility] ) 9/15/2018    CAD (coronary artery disease)     s/p CABG 2000    [facility] [facility]     Grand mal status ([facility] ) 3/24/2019    Heart attack ([facility] [facility]     Hyperlipidemia     Hypertension     Lexiscan nuclear stress test 03/19/2016    EF 74% Normal    TIA (transient ischemic attack) 09/13/2018           Social History     Tobacco Use   Smoking Status Current Every Day Smoker    Packs/day: 0 25    Years: 50 00    Pack years: 12 50    Types: Cigars, Pipe   Smokeless Tobacco Never Used

## 2020-01-16 NOTE — ASSESSMENT & PLAN NOTE
Chronic  Right greater than left  I would like to see how he does off amlodipine    Prior venous duplex exam was normal

## 2020-02-10 ENCOUNTER — TRANSCRIBE ORDERS (OUTPATIENT)
Dept: ADMINISTRATIVE | Facility: HOSPITAL | Age: 65
End: 2020-02-10

## 2020-02-10 DIAGNOSIS — R39.12 POOR URINARY STREAM: ICD-10-CM

## 2020-02-10 DIAGNOSIS — N50.819 TESTICLE PAIN: ICD-10-CM

## 2020-02-10 DIAGNOSIS — F17.209 NICOTINE DEPENDENCE WITH NICOTINE-INDUCED DISORDER, UNSPECIFIED NICOTINE PRODUCT TYPE: Primary | ICD-10-CM

## 2020-02-21 ENCOUNTER — HOSPITAL ENCOUNTER (OUTPATIENT)
Dept: ULTRASOUND IMAGING | Facility: HOSPITAL | Age: 65
Discharge: HOME | End: 2020-02-21
Payer: MEDICARE

## 2020-02-21 DIAGNOSIS — R39.12 POOR URINARY STREAM: ICD-10-CM

## 2020-02-21 DIAGNOSIS — N50.819 TESTICLE PAIN: ICD-10-CM

## 2020-02-21 PROCEDURE — 76870 US EXAM SCROTUM: CPT

## 2020-02-21 PROCEDURE — 76770 US EXAM ABDO BACK WALL COMP: CPT

## 2020-03-11 ENCOUNTER — APPOINTMENT (OUTPATIENT)
Dept: CT IMAGING | Facility: HOSPITAL | Age: 65
End: 2020-03-11
Payer: MEDICARE

## 2020-03-11 ENCOUNTER — HOSPITAL ENCOUNTER (OUTPATIENT)
Facility: HOSPITAL | Age: 65
Setting detail: OBSERVATION
Discharge: HOME | End: 2020-03-12
Attending: EMERGENCY MEDICINE | Admitting: INTERNAL MEDICINE
Payer: MEDICARE

## 2020-03-11 ENCOUNTER — APPOINTMENT (EMERGENCY)
Dept: RADIOLOGY | Facility: HOSPITAL | Age: 65
End: 2020-03-11
Payer: MEDICARE

## 2020-03-11 DIAGNOSIS — F17.200 TOBACCO DEPENDENCE SYNDROME: ICD-10-CM

## 2020-03-11 DIAGNOSIS — I20.0 UNSTABLE ANGINA PECTORIS (HCC): Primary | ICD-10-CM

## 2020-03-11 DIAGNOSIS — R07.9 CHEST PAIN: ICD-10-CM

## 2020-03-11 DIAGNOSIS — I25.10 CORONARY ARTERY DISEASE INVOLVING NATIVE CORONARY ARTERY OF NATIVE HEART WITHOUT ANGINA PECTORIS: ICD-10-CM

## 2020-03-11 PROBLEM — E87.6 HYPOKALEMIA: Status: ACTIVE | Noted: 2020-03-11

## 2020-03-11 LAB
ANION GAP SERPL CALCULATED.3IONS-SCNC: 11 MMOL/L (ref 4–13)
ANION GAP SERPL CALCULATED.3IONS-SCNC: 8 MMOL/L (ref 4–13)
ATRIAL RATE: 74 BPM
ATRIAL RATE: 91 BPM
BASOPHILS # BLD AUTO: 0.1 THOUSANDS/ΜL (ref 0–0.1)
BASOPHILS NFR BLD AUTO: 1 % (ref 0–2)
BUN SERPL-MCNC: 8 MG/DL (ref 7–25)
BUN SERPL-MCNC: 9 MG/DL (ref 7–25)
CALCIUM SERPL-MCNC: 8.8 MG/DL (ref 8.6–10.5)
CALCIUM SERPL-MCNC: 8.9 MG/DL (ref 8.6–10.5)
CHLORIDE SERPL-SCNC: 100 MMOL/L (ref 98–107)
CHLORIDE SERPL-SCNC: 102 MMOL/L (ref 98–107)
CO2 SERPL-SCNC: 27 MMOL/L (ref 21–31)
CO2 SERPL-SCNC: 27 MMOL/L (ref 21–31)
CREAT SERPL-MCNC: 0.9 MG/DL (ref 0.7–1.3)
CREAT SERPL-MCNC: 0.91 MG/DL (ref 0.7–1.3)
D DIMER PPP FEU-MCNC: 0.21 UG/ML FEU
EOSINOPHIL # BLD AUTO: 0.3 THOUSAND/ΜL (ref 0–0.61)
EOSINOPHIL NFR BLD AUTO: 3 % (ref 0–5)
ERYTHROCYTE [DISTWIDTH] IN BLOOD BY AUTOMATED COUNT: 13 % (ref 11.5–14.5)
ERYTHROCYTE [DISTWIDTH] IN BLOOD BY AUTOMATED COUNT: 13.2 % (ref 11.5–14.5)
GFR SERPL CREATININE-BSD FRML MDRD: 88 ML/MIN/1.73SQ M
GFR SERPL CREATININE-BSD FRML MDRD: 89 ML/MIN/1.73SQ M
GLUCOSE SERPL-MCNC: 97 MG/DL (ref 65–99)
GLUCOSE SERPL-MCNC: 99 MG/DL (ref 65–99)
HCT VFR BLD AUTO: 42 % (ref 42–47)
HCT VFR BLD AUTO: 42.2 % (ref 42–47)
HGB BLD-MCNC: 14.4 G/DL (ref 14–18)
HGB BLD-MCNC: 14.5 G/DL (ref 14–18)
LYMPHOCYTES # BLD AUTO: 2.7 THOUSANDS/ΜL (ref 0.6–4.47)
LYMPHOCYTES NFR BLD AUTO: 25 % (ref 21–51)
MCH RBC QN AUTO: 30.8 PG (ref 26–34)
MCH RBC QN AUTO: 31 PG (ref 26–34)
MCHC RBC AUTO-ENTMCNC: 34.2 G/DL (ref 31–37)
MCHC RBC AUTO-ENTMCNC: 34.5 G/DL (ref 31–37)
MCV RBC AUTO: 90 FL (ref 81–99)
MCV RBC AUTO: 90 FL (ref 81–99)
MONOCYTES # BLD AUTO: 0.6 THOUSAND/ΜL (ref 0.17–1.22)
MONOCYTES NFR BLD AUTO: 6 % (ref 2–12)
NEUTROPHILS # BLD AUTO: 7 THOUSANDS/ΜL (ref 1.4–6.5)
NEUTS SEG NFR BLD AUTO: 65 % (ref 42–75)
P AXIS: 51 DEGREES
P AXIS: 70 DEGREES
PLATELET # BLD AUTO: 244 THOUSANDS/UL (ref 149–390)
PLATELET # BLD AUTO: 244 THOUSANDS/UL (ref 149–390)
PMV BLD AUTO: 7.2 FL (ref 8.6–11.7)
PMV BLD AUTO: 7.6 FL (ref 8.6–11.7)
POTASSIUM SERPL-SCNC: 3 MMOL/L (ref 3.5–5.5)
POTASSIUM SERPL-SCNC: 3.2 MMOL/L (ref 3.5–5.5)
PR INTERVAL: 142 MS
PR INTERVAL: 152 MS
QRS AXIS: 80 DEGREES
QRS AXIS: 88 DEGREES
QRSD INTERVAL: 88 MS
QRSD INTERVAL: 94 MS
QT INTERVAL: 398 MS
QT INTERVAL: 444 MS
QTC INTERVAL: 489 MS
QTC INTERVAL: 492 MS
RBC # BLD AUTO: 4.67 MILLION/UL (ref 4.3–5.9)
RBC # BLD AUTO: 4.69 MILLION/UL (ref 4.3–5.9)
SODIUM SERPL-SCNC: 137 MMOL/L (ref 134–143)
SODIUM SERPL-SCNC: 138 MMOL/L (ref 134–143)
T WAVE AXIS: 70 DEGREES
T WAVE AXIS: 80 DEGREES
TROPONIN I SERPL-MCNC: <0.03 NG/ML
VENTRICULAR RATE: 74 BPM
VENTRICULAR RATE: 91 BPM
WBC # BLD AUTO: 10.8 THOUSAND/UL (ref 4.8–10.8)
WBC # BLD AUTO: 9.8 THOUSAND/UL (ref 4.8–10.8)

## 2020-03-11 PROCEDURE — 70450 CT HEAD/BRAIN W/O DYE: CPT

## 2020-03-11 PROCEDURE — 84484 ASSAY OF TROPONIN QUANT: CPT | Performed by: EMERGENCY MEDICINE

## 2020-03-11 PROCEDURE — 36415 COLL VENOUS BLD VENIPUNCTURE: CPT | Performed by: EMERGENCY MEDICINE

## 2020-03-11 PROCEDURE — 80048 BASIC METABOLIC PNL TOTAL CA: CPT | Performed by: PHYSICIAN ASSISTANT

## 2020-03-11 PROCEDURE — 85379 FIBRIN DEGRADATION QUANT: CPT | Performed by: EMERGENCY MEDICINE

## 2020-03-11 PROCEDURE — 93005 ELECTROCARDIOGRAM TRACING: CPT

## 2020-03-11 PROCEDURE — 85027 COMPLETE CBC AUTOMATED: CPT | Performed by: PHYSICIAN ASSISTANT

## 2020-03-11 PROCEDURE — 71046 X-RAY EXAM CHEST 2 VIEWS: CPT

## 2020-03-11 PROCEDURE — 99220 PR INITIAL OBSERVATION CARE/DAY 70 MINUTES: CPT | Performed by: INTERNAL MEDICINE

## 2020-03-11 PROCEDURE — 99285 EMERGENCY DEPT VISIT HI MDM: CPT

## 2020-03-11 PROCEDURE — 85025 COMPLETE CBC W/AUTO DIFF WBC: CPT | Performed by: EMERGENCY MEDICINE

## 2020-03-11 PROCEDURE — 84484 ASSAY OF TROPONIN QUANT: CPT | Performed by: PHYSICIAN ASSISTANT

## 2020-03-11 PROCEDURE — 80048 BASIC METABOLIC PNL TOTAL CA: CPT | Performed by: EMERGENCY MEDICINE

## 2020-03-11 PROCEDURE — 93010 ELECTROCARDIOGRAM REPORT: CPT | Performed by: INTERNAL MEDICINE

## 2020-03-11 PROCEDURE — 99284 EMERGENCY DEPT VISIT MOD MDM: CPT | Performed by: EMERGENCY MEDICINE

## 2020-03-11 RX ORDER — OXYBUTYNIN CHLORIDE 5 MG/1
10 TABLET, EXTENDED RELEASE ORAL DAILY
Status: DISCONTINUED | OUTPATIENT
Start: 2020-03-11 | End: 2020-03-12 | Stop reason: HOSPADM

## 2020-03-11 RX ORDER — POTASSIUM CHLORIDE 20 MEQ/1
20 TABLET, EXTENDED RELEASE ORAL DAILY
Status: DISCONTINUED | OUTPATIENT
Start: 2020-03-11 | End: 2020-03-12 | Stop reason: HOSPADM

## 2020-03-11 RX ORDER — SODIUM CHLORIDE 9 MG/ML
3 INJECTION INTRAVENOUS AS NEEDED
Status: DISCONTINUED | OUTPATIENT
Start: 2020-03-11 | End: 2020-03-12 | Stop reason: HOSPADM

## 2020-03-11 RX ORDER — FLUTICASONE PROPIONATE 50 MCG
1 SPRAY, SUSPENSION (ML) NASAL DAILY
Status: DISCONTINUED | OUTPATIENT
Start: 2020-03-11 | End: 2020-03-12 | Stop reason: HOSPADM

## 2020-03-11 RX ORDER — HEPARIN SODIUM 5000 [USP'U]/ML
5000 INJECTION, SOLUTION INTRAVENOUS; SUBCUTANEOUS EVERY 8 HOURS SCHEDULED
Status: DISCONTINUED | OUTPATIENT
Start: 2020-03-11 | End: 2020-03-12 | Stop reason: HOSPADM

## 2020-03-11 RX ORDER — ALBUTEROL SULFATE 90 UG/1
2 AEROSOL, METERED RESPIRATORY (INHALATION) EVERY 4 HOURS PRN
Status: DISCONTINUED | OUTPATIENT
Start: 2020-03-11 | End: 2020-03-12 | Stop reason: HOSPADM

## 2020-03-11 RX ORDER — METOPROLOL SUCCINATE 25 MG/1
25 TABLET, EXTENDED RELEASE ORAL DAILY
Status: DISCONTINUED | OUTPATIENT
Start: 2020-03-11 | End: 2020-03-12 | Stop reason: HOSPADM

## 2020-03-11 RX ORDER — ASPIRIN 81 MG/1
81 TABLET ORAL DAILY
Status: DISCONTINUED | OUTPATIENT
Start: 2020-03-11 | End: 2020-03-12 | Stop reason: HOSPADM

## 2020-03-11 RX ORDER — LOSARTAN POTASSIUM 25 MG/1
25 TABLET ORAL DAILY
Status: DISCONTINUED | OUTPATIENT
Start: 2020-03-11 | End: 2020-03-12 | Stop reason: HOSPADM

## 2020-03-11 RX ORDER — LORAZEPAM 0.5 MG/1
0.5 TABLET ORAL 2 TIMES DAILY PRN
Status: DISCONTINUED | OUTPATIENT
Start: 2020-03-11 | End: 2020-03-12 | Stop reason: HOSPADM

## 2020-03-11 RX ORDER — CLOPIDOGREL BISULFATE 75 MG/1
75 TABLET ORAL DAILY
Status: DISCONTINUED | OUTPATIENT
Start: 2020-03-11 | End: 2020-03-12 | Stop reason: HOSPADM

## 2020-03-11 RX ORDER — NICOTINE 21 MG/24HR
14 PATCH, TRANSDERMAL 24 HOURS TRANSDERMAL DAILY
Status: DISCONTINUED | OUTPATIENT
Start: 2020-03-11 | End: 2020-03-12 | Stop reason: HOSPADM

## 2020-03-11 RX ORDER — FAMOTIDINE 20 MG/1
20 TABLET, FILM COATED ORAL 2 TIMES DAILY
Status: DISCONTINUED | OUTPATIENT
Start: 2020-03-11 | End: 2020-03-12 | Stop reason: HOSPADM

## 2020-03-11 RX ORDER — NICOTINE 21 MG/24HR
1 PATCH, TRANSDERMAL 24 HOURS TRANSDERMAL DAILY
Status: DISCONTINUED | OUTPATIENT
Start: 2020-03-11 | End: 2020-03-11

## 2020-03-11 RX ORDER — NITROGLYCERIN 0.4 MG/1
0.4 TABLET SUBLINGUAL
Status: DISCONTINUED | OUTPATIENT
Start: 2020-03-11 | End: 2020-03-12 | Stop reason: HOSPADM

## 2020-03-11 RX ORDER — POTASSIUM CHLORIDE 20 MEQ/1
40 TABLET, EXTENDED RELEASE ORAL ONCE
Status: COMPLETED | OUTPATIENT
Start: 2020-03-11 | End: 2020-03-11

## 2020-03-11 RX ORDER — MELOXICAM 7.5 MG/1
15 TABLET ORAL DAILY PRN
Status: DISCONTINUED | OUTPATIENT
Start: 2020-03-11 | End: 2020-03-12 | Stop reason: HOSPADM

## 2020-03-11 RX ORDER — ATORVASTATIN CALCIUM 40 MG/1
40 TABLET, FILM COATED ORAL EVERY EVENING
Status: DISCONTINUED | OUTPATIENT
Start: 2020-03-11 | End: 2020-03-12 | Stop reason: HOSPADM

## 2020-03-11 RX ORDER — LORAZEPAM 0.5 MG/1
0.5 TABLET ORAL ONCE
Status: COMPLETED | OUTPATIENT
Start: 2020-03-11 | End: 2020-03-11

## 2020-03-11 RX ORDER — CALCIUM CARBONATE 200(500)MG
750 TABLET,CHEWABLE ORAL 3 TIMES DAILY PRN
Status: DISCONTINUED | OUTPATIENT
Start: 2020-03-11 | End: 2020-03-12 | Stop reason: HOSPADM

## 2020-03-11 RX ORDER — PANTOPRAZOLE SODIUM 40 MG/1
40 TABLET, DELAYED RELEASE ORAL
Status: DISCONTINUED | OUTPATIENT
Start: 2020-03-11 | End: 2020-03-12 | Stop reason: HOSPADM

## 2020-03-11 RX ADMIN — LOSARTAN POTASSIUM 25 MG: 25 TABLET, FILM COATED ORAL at 08:54

## 2020-03-11 RX ADMIN — ROFLUMILAST 500 MCG: 500 TABLET ORAL at 08:56

## 2020-03-11 RX ADMIN — FAMOTIDINE 20 MG: 20 TABLET ORAL at 17:53

## 2020-03-11 RX ADMIN — OXYBUTYNIN CHLORIDE 10 MG: 5 TABLET, EXTENDED RELEASE ORAL at 08:55

## 2020-03-11 RX ADMIN — FAMOTIDINE 20 MG: 20 TABLET ORAL at 08:53

## 2020-03-11 RX ADMIN — CLOPIDOGREL BISULFATE 75 MG: 75 TABLET ORAL at 08:53

## 2020-03-11 RX ADMIN — POTASSIUM CHLORIDE 40 MEQ: 1500 TABLET, EXTENDED RELEASE ORAL at 06:06

## 2020-03-11 RX ADMIN — FLUTICASONE PROPIONATE 1 SPRAY: 50 SPRAY, METERED NASAL at 09:02

## 2020-03-11 RX ADMIN — LORAZEPAM 0.5 MG: 0.5 TABLET ORAL at 04:24

## 2020-03-11 RX ADMIN — TIOTROPIUM BROMIDE 18 MCG: 18 CAPSULE ORAL; RESPIRATORY (INHALATION) at 09:00

## 2020-03-11 RX ADMIN — HEPARIN SODIUM 5000 UNITS: 5000 INJECTION, SOLUTION INTRAVENOUS; SUBCUTANEOUS at 08:51

## 2020-03-11 RX ADMIN — METOPROLOL SUCCINATE 25 MG: 25 TABLET, EXTENDED RELEASE ORAL at 09:00

## 2020-03-11 RX ADMIN — ASPIRIN 81 MG: 81 TABLET, COATED ORAL at 08:57

## 2020-03-11 RX ADMIN — MELOXICAM 15 MG: 7.5 TABLET ORAL at 21:56

## 2020-03-11 RX ADMIN — HEPARIN SODIUM 5000 UNITS: 5000 INJECTION, SOLUTION INTRAVENOUS; SUBCUTANEOUS at 15:45

## 2020-03-11 RX ADMIN — HEPARIN SODIUM 5000 UNITS: 5000 INJECTION, SOLUTION INTRAVENOUS; SUBCUTANEOUS at 21:58

## 2020-03-11 RX ADMIN — ALBUTEROL SULFATE 2 PUFF: 90 AEROSOL, METERED RESPIRATORY (INHALATION) at 21:58

## 2020-03-11 RX ADMIN — ATORVASTATIN CALCIUM 40 MG: 40 TABLET, FILM COATED ORAL at 17:52

## 2020-03-11 RX ADMIN — LORAZEPAM 0.5 MG: 0.5 TABLET ORAL at 17:53

## 2020-03-11 NOTE — ED PROVIDER NOTES
History  Chief Complaint   Patient presents with    Chest Pain     Seven days of increased frequency of exertional chest pain associated shortness of breath  The pain is described as pressing radiating to the neck and the left arm lasting minutes and completely resolving afterwards  No fever no chills no cough no sputum no sore throat no abdominal pain or urinary bowel stiffness  Patient has chronic right leg edema  He had an ultrasound in the middle of last year that was negative for DVT  There has been no progression of this edema  To his knowledge he has never had a DVT  Prior to Admission Medications   Prescriptions Last Dose Informant Patient Reported? Taking?    LORazepam (ATIVAN) 0 5 mg tablet   No No   Sig: Take 1 tablet (0 5 mg total) by mouth 2 (two) times a day as needed for anxiety for up to 7 days   acetaminophen-codeine (TYLENOL #3) 300-30 mg per tablet  Self Yes No   Sig: Take 1 tablet by mouth daily as needed for moderate pain   albuterol (PROVENTIL HFA,VENTOLIN HFA) 90 mcg/act inhaler   No No   Sig: Inhale 2 puffs every 4 (four) hours as needed for wheezing   aspirin (ECOTRIN LOW STRENGTH) 81 mg EC tablet   No No   Sig: Take 1 tablet (81 mg total) by mouth daily   atorvastatin (LIPITOR) 40 mg tablet   No No   Sig: Take 1 tablet (40 mg total) by mouth every evening   calcium carbonate (TUMS EX) 750 mg chewable tablet   No No   Sig: Chew 1 tablet (750 mg total) 3 (three) times a day as needed for indigestion or heartburn   citalopram (CeleXA) 40 mg tablet   No No   Sig: Take 1 tablet (40 mg total) by mouth daily   Patient not taking: Reported on 11/14/2019   clopidogrel (PLAVIX) 75 mg tablet   No No   Sig: Take 2 tablets (150 mg total) by mouth daily   Patient taking differently: Take 75 mg by mouth daily    famotidine (PEPCID) 20 mg tablet   No No   Sig: Take 1 tablet (20 mg total) by mouth 2 (two) times a day   Patient not taking: Reported on 1/16/2020   fluticasone (FLONASE) 50 mcg/act nasal spray   No No   Si spray into each nostril daily   furosemide (LASIX) 40 mg tablet   No No   Sig: Take 1 tablet (40 mg total) by mouth daily   Patient taking differently: Take 40 mg by mouth as needed    irbesartan (AVAPRO) 75 mg tablet   Yes No   Sig: Take by mouth Daily   meloxicam (MOBIC) 15 mg tablet   Yes No   Sig: Take 15 mg by mouth daily as needed   metoprolol succinate (TOPROL-XL) 25 mg 24 hr tablet   Yes No   Sig: Take by mouth Daily   nitroglycerin (NITROSTAT) 0 4 mg SL tablet   Yes No   Sig: Place 0 4 mg under the tongue every 5 (five) minutes as needed for chest pain   pantoprazole (PROTONIX) 40 mg tablet   No No   Sig: Take 1 tablet (40 mg total) by mouth daily   roflumilast (DALIRESP) 500 mcg tablet   Yes No   Sig: Take 500 mcg by mouth daily   solifenacin (VESICARE) 10 MG tablet   Yes No   Sig: Take by mouth Daily   tiotropium (SPIRIVA) 18 mcg inhalation capsule   No No   Sig: Place 1 capsule (18 mcg total) into inhaler and inhale daily      Facility-Administered Medications: None       Past Medical History:   Diagnosis Date    [facility] ) 9/15/2018    CAD (coronary artery disease)     s/p CABG     [facility] [address] [facility]     Grand mal status ([facility] ) 3/24/2019    Heart attack ([facility] [address] [facility]     Hyperlipidemia     Hypertension     Lexiscan nuclear stress test 2016    EF 74% Normal    TIA (transient ischemic attack) 2018       Past Surgical History:   Procedure Laterality Date    CARDIAC CATHETERIZATION  2015    LIMA occluded  No severe native lesions    COLONOSCOPY      CORONARY ARTERY BYPASS GRAFT      HERNIA REPAIR      times 2       Family History   Problem Relation Age of Onset    Cancer Mother     Heart disease Mother     Cancer Father     Cancer Maternal Grandmother     Cancer Paternal Grandfather      I have reviewed and agree with the history as documented      E-Cigarette/Vaping    E-Cigarette Use Never User      E-Cigarette/Vaping Substances     Social History     Tobacco Use    Smoking status: Current Every Day Smoker     Packs/day: 1 00     Years: 50 00     Pack years: 50 00     Types: Cigars, Pipe    Smokeless tobacco: Never Used   Substance Use Topics    Alcohol use: Not Currently    Drug use: Never       Review of Systems   Constitutional: Negative for fever  HENT: Negative for rhinorrhea  Eyes: Negative for visual disturbance  Respiratory: Positive for shortness of breath  Cardiovascular: Positive for chest pain  Gastrointestinal: Negative for abdominal pain, diarrhea and vomiting  Endocrine: Negative for polydipsia  Genitourinary: Negative for dysuria, frequency and hematuria  Musculoskeletal: Negative for neck stiffness  Skin: Negative for rash  Allergic/Immunologic: Negative for immunocompromised state  Neurological: Negative for speech difficulty, weakness and numbness  Psychiatric/Behavioral: Negative for confusion  Physical Exam  Physical Exam   Constitutional: He is oriented to person, place, and time  He appears well-nourished  No distress  HENT:   Head: Normocephalic and atraumatic  Eyes: Conjunctivae and EOM are normal    Neck: Normal range of motion  Neck supple  Cardiovascular: Regular rhythm and normal heart sounds  Pulmonary/Chest: Effort normal  He has wheezes in the right upper field, the right middle field, the right lower field, the left upper field, the left middle field and the left lower field  Abdominal: Soft  Bowel sounds are normal  He exhibits no mass  There is no tenderness  There is no guarding  Musculoskeletal:        Right lower leg: He exhibits edema  He exhibits no tenderness  Neurological: He is alert and oriented to person, place, and time  Skin: Skin is warm and dry  Psychiatric: He has a normal mood and affect         Vital Signs  ED Triage Vitals [03/11/20 0224]   Temp Pulse Respirations Blood Pressure SpO2   -- 85 22 139/98 98 %      Temp src Heart Rate Source Patient Position - Orthostatic VS BP Location FiO2 (%)   -- -- -- -- --      Pain Score       No Pain           Vitals:    03/11/20 0224   BP: 139/98   Pulse: 85         Visual Acuity      ED Medications  Medications   sodium chloride (PF) 0 9 % injection 3 mL (has no administration in time range)       Diagnostic Studies  Results Reviewed     Procedure Component Value Units Date/Time    D-Dimer [[id number]]     Lab Status:  No result Specimen:  Blood     CBC and differential [[id number]]     Lab Status:  No result Specimen:  Blood     Basic metabolic panel [[id number]]     Lab Status:  No result Specimen:  Blood     Troponin I [[id number]]     Lab Status:  No result Specimen:  Blood                  X-ray chest 2 views    (Results Pending)              Procedures  Procedures         ED Course                               MDM  Number of Diagnoses or Management Options  Diagnosis management comments: Change in pattern of chest pain on exertion in a patient with significant coronary history  EKG performed at 2:17 a m  Shows sinus rhythm rate of 90 1:00 a m  Within normal limits no acute ischemic changes  Heart score is 5  Seeming troponin is negative plan will be to observe  Disposition  Final diagnoses:   None     ED Disposition     None      Follow-up Information    None         Patient's Medications   Discharge Prescriptions    No medications on file     No discharge procedures on file      PDMP Review     None          ED Provider  Electronically Signed by           Drea Strange MD  03/11/20 5111

## 2020-03-11 NOTE — PLAN OF CARE
Problem: PAIN - ADULT  Goal: Verbalizes/displays adequate comfort level or baseline comfort level  Description  Interventions:  - Encourage patient to monitor pain and request assistance  - Assess pain using appropriate pain scale  - Administer analgesics based on type and severity of pain and evaluate response  - Implement non-pharmacological measures as appropriate and evaluate response  - Consider cultural and social influences on pain and pain management  - Notify physician/advanced practitioner if interventions unsuccessful or patient reports new pain  3/11/2020 1856 by Jenifer Brice RN  Outcome: Progressing  3/11/2020 1856 by Jenifer Brice RN  Outcome: Not Progressing     Problem: INFECTION - ADULT  Goal: Absence or prevention of progression during hospitalization  Description  INTERVENTIONS:  - Assess and monitor for signs and symptoms of infection  - Monitor lab/diagnostic results  - Monitor all insertion sites, i e  indwelling lines, tubes, and drains  - Monitor endotracheal if appropriate and nasal secretions for changes in amount and color  - [place] appropriate cooling/warming therapies per order  - Administer medications as ordered  - Instruct and encourage patient and family to use good hand hygiene technique  - Identify and instruct in appropriate isolation precautions for identified infection/condition  3/11/2020 1856 by Jenifer Brice RN  Outcome: Progressing  3/11/2020 1856 by Jenifer Brice RN  Outcome: Not Progressing     Problem: SAFETY ADULT  Goal: Patient will remain free of falls  Description  INTERVENTIONS:  - Assess patient frequently for physical needs  -  Identify cognitive and physical deficits and behaviors that affect risk of falls    -  [place] fall precautions as indicated by assessment   - Educate patient/family on patient safety including physical limitations  - Instruct patient to call for assistance with activity based on assessment  - Modify environment to reduce risk of injury  - Consider OT/PT consult to assist with strengthening/mobility  3/11/2020 1856 by Camilo Lamas RN  Outcome: Progressing  3/11/2020 1856 by Camilo Lamas RN  Outcome: Not Progressing  Goal: Maintain or return to baseline ADL function  Description  INTERVENTIONS:  -  Assess patient's ability to carry out ADLs; assess patient's baseline for ADL function and identify physical deficits which impact ability to perform ADLs (bathing, care of mouth/teeth, toileting, grooming, dressing, etc )  - Assess/evaluate cause of self-care deficits   - Assess range of motion  - Assess patient's mobility; develop plan if impaired  - Assess patient's need for assistive devices and provide as appropriate  - Encourage maximum independence but intervene and supervise when necessary  - Involve family in performance of ADLs  - Assess for home care needs following discharge   - Consider OT consult to assist with ADL evaluation and planning for discharge  - Provide patient education as appropriate  3/11/2020 1856 by Camilo Lamas RN  Outcome: Progressing  3/11/2020 1856 by Camilo Lamas RN  Outcome: Not Progressing  Goal: Maintain or return mobility status to optimal level  Description  INTERVENTIONS:  - Assess patient's baseline mobility status (ambulation, transfers, stairs, etc )    - Identify cognitive and physical deficits and behaviors that affect mobility  - Identify mobility aids required to assist with transfers and/or ambulation (gait belt, sit-to-stand, lift, walker, cane, etc )  - [place] fall precautions as indicated by assessment  - Record patient progress and toleration of activity level on Mobility SBAR; progress patient to next Phase/Stage  - Instruct patient to call for assistance with activity based on assessment  - Consider rehabilitation consult to assist with strengthening/weightbearing, etc   3/11/2020 1856 by Camilo Lamas RN  Outcome: Progressing  3/11/2020 1856 by Camilo Lamas RN  Outcome: Not Progressing Problem: DISCHARGE PLANNING  Goal: Discharge to home or other facility with appropriate resources  Description  INTERVENTIONS:  - Identify barriers to discharge w/patient and caregiver  - Arrange for needed discharge resources and transportation as appropriate  - Identify discharge learning needs (meds, wound care, etc )  - Arrange for interpretive services to assist at discharge as needed  - Refer to Case Management Department for coordinating discharge planning if the patient needs post-hospital services based on physician/advanced practitioner order or complex needs related to functional status, cognitive ability, or social support system  3/11/2020 1856 by Emanuel Mccabe RN  Outcome: Progressing  3/11/2020 1856 by Emanuel Mccabe RN  Outcome: Not Progressing     Problem: Knowledge Deficit  Goal: Patient/family/caregiver demonstrates understanding of disease process, treatment plan, medications, and discharge instructions  Description  Complete learning assessment and assess knowledge base    Interventions:  - Provide teaching at level of understanding  - Provide teaching via preferred learning methods  3/11/2020 1856 by Emanuel Mccabe RN  Outcome: Progressing  3/11/2020 1856 by Emanuel Mccabe RN  Outcome: Not Progressing     Problem: CARDIOVASCULAR - ADULT  Goal: Maintains optimal cardiac output and hemodynamic stability  Description  INTERVENTIONS:  - Monitor I/O, vital signs and rhythm  - Monitor for S/S and trends of decreased cardiac output  - Administer and titrate ordered vasoactive medications to optimize hemodynamic stability  - Assess quality of pulses, skin color and temperature  - Assess for signs of decreased coronary artery perfusion  - Instruct patient to report change in severity of symptoms  3/11/2020 1856 by Emanuel Mccabe RN  Outcome: Progressing  3/11/2020 1856 by Emanuel Mccabe RN  Outcome: Not Progressing  Goal: Absence of cardiac dysrhythmias or at baseline rhythm  Description  INTERVENTIONS:  - Continuous cardiac monitoring, vital signs, obtain 12 lead EKG if ordered  - Administer antiarrhythmic and heart rate control medications as ordered  - Monitor electrolytes and administer replacement therapy as ordered  3/11/2020 1856 by Carlyn Restrepo RN  Outcome: Progressing  3/11/2020 1856 by Carlyn Restrepo RN  Outcome: Not Progressing

## 2020-03-11 NOTE — ASSESSMENT & PLAN NOTE
Patient received p o  Repletion in the ER, will continue K-Dur 20 mEq p o  Daily and monitor with repeat labs in a m

## 2020-03-11 NOTE — ED NOTES
Pt reported  # 3 chest pain  3rd troponin completed   EKG completed and seen by ER physician     Bhaskar Cruz RN  03/11/20 0797

## 2020-03-11 NOTE — ED NOTES
Patient is resting comfortably  Sleeping pertodically since change of shift    Offering no complaints     Abdelrahman Avila, RN  03/11/20 1100

## 2020-03-11 NOTE — H&P
H&P- Pablo Lozano 1955, 72 y o  male MRN: [id number]    Unit/Bed#: ED 02 Encounter: [id number]    Primary Care Provider: Vinicius Jernigan MD   Date and time admitted to hospital: 3/11/2020  2:24 AM        Chest pain  Assessment & Plan  · Placed in observation telemetry  · Trend cardiac enzymes  · Repeat EKG with 3rd cardiac enzyme  · Consult Cardiology in a m  · Continue pre-hospital aspirin 81 mg p o  Daily, Plavix 75 mg p o  Daily, Toprol XL 25 mg p o  Daily, Cozaar 25 mg p o  Daily and resume Lipitor 40 mg p o  Daily    Tobacco dependence syndrome  Assessment & Plan  Give nicotine 21 mg transdermal daily consult for smoking cessation education in a m  Gastroesophageal reflux disease  Assessment & Plan  Continue pre-hospital Protonix 40 mg p o  Daily and Pepcid 20 mg p o  B i d  Anxiety and depression  Assessment & Plan  Continue pre-hospital Ativan 0 5 mg p o  B i d  P r n  COPD (chronic obstructive pulmonary disease) (HCC)  Assessment & Plan  Continue pre-hospital Spiriva 18 mcg inhalation daily, Daliresp 500 mg p o  Daily, Flonase 50 mcg 1 spray each nostril daily and albuterol MDI p r n  Hyperlipidemia  Assessment & Plan  Resume previously prescribed Lipitor 40 mg p o  Daily    Hypertension  Assessment & Plan  Continue pre-hospital Toprol XL 25 mg p o  Daily and Cozaar 25 mg p o  Daily      VTE Prophylaxis: Heparin  Code Status:  Level 1  POLST: There is no POLST form on file for this patient (pre-hospital)  Discussion with family:  None present at bedside at time of exam    Anticipated Length of Stay:  Patient will be admitted on an Observation basis with an anticipated length of stay of  < 2 midnights  Justification for Hospital Stay:  Chest pain rule out MI requiring further cardiac evaluation    Chief Complaint:   Chest pain x1 week    History of Present Illness:    Pablo Lozano is a 72 y o  male who presents with chest pain x1 week    Patient presents ER for further evaluation treatment of his one-week history of intermittent chest pain  Patient reports midsternal chest pain which he rates as a 5/10 and pressure in nature with radiation into his right neck and shoulder accompanied with some lightheadedness and shortness of breath  Patient denies any diaphoresis, nausea, vomiting, palpitations or syncope  Patient has a longstanding cardiac history and was recently seen in the office by Dr Alondra Nieto on 01/16/2020 for routine follow-up  Patient has a history of noncompliance and had recently stopped his atorvastatin due to something I saw on TV  Patient has a history of CABG approximately 20 years ago and cardiac catheterization on 3 separate occasions with last being 10 years ago according to the patient though Dr Xiomara Berrios note reflects that it was in 2015 and revealed an occluded mammary graft to the LAD but no severe native lesions  Patient recently underwent a Pennye Mullens on 03/28/2019 which showed normal ejection fraction and no ischemia and most recent echocardiogram was on 01/03/2019 which showed a normal ejection fraction with mild left ventricular hypertrophy  Patient reports his chest pain has been episodic and the episode that brought him to the ER was tonight while walking up a hill lasting approximately 15 minutes before resolving on its own  Patient is somewhat vague in his history and though he initially called this pain later he related that I might have just been scared  Review of Systems:  Review of Systems   Constitutional: Negative for chills, diaphoresis and fever  Respiratory: Positive for shortness of breath  Cardiovascular: Positive for chest pain and leg swelling  Gastrointestinal: Negative for diarrhea, nausea and vomiting  Genitourinary: Negative for dysuria, frequency, hematuria and urgency  Musculoskeletal: Positive for neck pain  Neurological: Positive for light-headedness  Negative for dizziness, syncope and weakness  All other systems reviewed and are negative  Past Medical and Surgical History:   Past Medical History:   Diagnosis Date    Acute right MCA stroke ([facility] [address] ) 9/15/2018    CAD (coronary artery disease)     s/p CABG 2000    COPD (chronic obstructive pulmonary disease) ([facility] [address] )     Grand mal status ([facility] [address] ) 3/24/2019    Heart attack ([facility] [address] )     Hyperlipidemia     Hypertension     Lexiscan nuclear stress test 03/19/2016    EF 74% Normal    TIA (transient ischemic attack) 09/13/2018       Past Surgical History:   Procedure Laterality Date    CARDIAC CATHETERIZATION  08/19/2015    LIMA occluded  No severe native lesions    COLONOSCOPY      CORONARY ARTERY BYPASS GRAFT  2000    HERNIA REPAIR      times 2       Meds/Allergies:  Prior to Admission medications    Medication Sig Start Date End Date Taking?  Authorizing Provider   aspirin (ECOTRIN LOW STRENGTH) 81 mg EC tablet Take 1 tablet (81 mg total) by mouth daily 9/17/18  Yes Malissa Haas MD   calcium carbonate (TUMS EX) 750 mg chewable tablet Chew 1 tablet (750 mg total) 3 (three) times a day as needed for indigestion or heartburn 7/30/19  Yes Radha Bauer MD   clopidogrel (PLAVIX) 75 mg tablet Take 2 tablets (150 mg total) by mouth daily  Patient taking differently: Take 75 mg by mouth daily  9/18/18  Yes Malissa Haas MD   fluticasone [facility]) 50 mcg/act nasal spray 1 spray into each nostril daily 9/17/18  Yes Malissa Haas MD   LORazepam (ATIVAN) 0 5 mg tablet Take 1 tablet (0 5 mg total) by mouth 2 (two) times a day as needed for anxiety for up to 7 days 9/17/18  Yes Malissa Haas MD   nitroglycerin (NITROSTAT) 0 4 mg SL tablet Place 0 4 mg under the tongue every 5 (five) minutes as needed for chest pain   Yes Historical Provider, MD   pantoprazole (PROTONIX) 40 mg tablet Take 1 tablet (40 mg total) by mouth daily 7/30/19  Yes Radha Bauer MD   roflumilast (DALIRESP) 500 mcg tablet Take 500 mcg by mouth daily   Yes Historical Provider, MD   tiotropium (SPIRIVA) 18 mcg inhalation capsule Place 1 capsule (18 mcg total) into inhaler and inhale daily 9/17/18  Yes Cassandra Rincon MD   acetaminophen-codeine (TYLENOL #3) 300-30 mg per tablet Take 1 tablet by mouth daily as needed for moderate pain    Historical Provider, MD   albuterol (PROVENTIL HFA,VENTOLIN HFA) 90 mcg/act inhaler Inhale 2 puffs every 4 (four) hours as needed for wheezing 9/17/18   Cassandra Rincon MD   atorvastatin (LIPITOR) 40 mg tablet Take 1 tablet (40 mg total) by mouth every evening 1/16/20   Yadira Talbert MD   citalopram (CeleXA) 40 mg tablet Take 1 tablet (40 mg total) by mouth daily  Patient not taking: Reported on 11/14/2019 9/17/18   Cassandra Rincon MD   famotidine (PEPCID) 20 mg tablet Take 1 tablet (20 mg total) by mouth 2 (two) times a day 7/30/19   Roxi Brownlee MD   furosemide (LASIX) 40 mg tablet Take 1 tablet (40 mg total) by mouth daily  Patient taking differently: Take 40 mg by mouth as needed  6/11/19   Mandeep Urrutia PA-C   irbesartan (AVAPRO) 75 mg tablet Take by mouth Daily 4/11/17   Historical Provider, MD   meloxicam (MOBIC) 15 mg tablet Take 15 mg by mouth daily as needed 10/30/19   Historical Provider, MD   metoprolol succinate (TOPROL-XL) 25 mg 24 hr tablet Take by mouth Daily 4/11/17   Historical Provider, MD   solifenacin (VESICARE) 10 MG tablet Take by mouth Daily 4/11/17   Historical Provider, MD     I have reviewed home medications with patient personally  Allergies:    Allergies   Allergen Reactions    Gabapentin Headache       Social History:  Marital Status: Single   Occupation:  Retired   Patient Pre-hospital Living Situation:  Resides at home alone  Patient Pre-hospital Level of Mobility:  Full without assist  Patient Pre-hospital Diet Restrictions:  None  Substance Use History:   Social History     Substance and Sexual Activity   Alcohol Use Not Currently     Social History     Tobacco Use   Smoking Status Current Every Day Smoker    Packs/day: 1 00    Years: 50 00    Pack years: 50 00    Types: Cigars, Pipe   Smokeless Tobacco Never Used     Social History     Substance and Sexual Activity   Drug Use Never       Family History:  I have reviewed the patients family history    Physical Exam:   Vitals:   Blood Pressure: 156/76 (03/11/20 0530)  Pulse: 66 (03/11/20 0530)  Respirations: (!) 23 (03/11/20 0530)  Weight - Scale: 102 kg (225 lb) (03/11/20 0224)  SpO2: 95 % (03/11/20 0530)    Physical Exam   Constitutional: He is oriented to person, place, and time  He appears well-developed and well-nourished  HENT:   Head: Normocephalic and atraumatic  Mouth/Throat: No oropharyngeal exudate  Eyes: Pupils are equal, round, and reactive to light  EOM are normal  No scleral icterus  Neck: Normal range of motion  Neck supple  No JVD present  Cardiovascular: Normal rate, regular rhythm and normal heart sounds  No murmur heard  Pulmonary/Chest: Effort normal  No respiratory distress  He has wheezes  He has no rhonchi  He has no rales  Abdominal: Soft  Bowel sounds are normal  There is no tenderness  There is no rebound and no guarding  Musculoskeletal: Normal range of motion  He exhibits edema  2+ edema right lower extremity which patient states is chronic   Lymphadenopathy:     He has no cervical adenopathy  Neurological: He is alert and oriented to person, place, and time  Skin: Skin is warm and dry  No rash noted  No erythema  Psychiatric: He has a normal mood and affect  His behavior is normal    Nursing note and vitals reviewed  Additional Data:   Lab Results: I have personally reviewed pertinent reports      Results from last 7 days   Lab Units 03/11/20  0235   WBC Thousand/uL 10 80   HEMOGLOBIN g/dL 14 4   HEMATOCRIT % 42 0   PLATELETS Thousands/uL 244   NEUTROS PCT % 65   LYMPHS PCT % 25   MONOS PCT % 6   EOS PCT % 3     Results from last 7 days   Lab Units 03/11/20  0235   POTASSIUM mmol/L 3 0*   CHLORIDE mmol/L 100 CO2 mmol/L 27   BUN mg/dL 9   CREATININE mg/dL 0 90   CALCIUM mg/dL 8 8                   Imaging: I have personally reviewed pertinent reports  X-ray chest 2 views   Final Result by Damari Colindres MD (03/11 0256)      No acute cardiopulmonary disease  Workstation performed: [id number]             EKG, Pathology, and Other Studies Reviewed on Admission:   · EKG: N/A    NetAccess / Epic Records Reviewed: Yes     ** Please Note: This note has been constructed using a voice recognition system   **

## 2020-03-11 NOTE — ASSESSMENT & PLAN NOTE
Continue pre-hospital Spiriva 18 mcg inhalation daily, Daliresp 500 mg p o  Daily, Flonase 50 mcg 1 spray each nostril daily and albuterol MDI p r n

## 2020-03-11 NOTE — ASSESSMENT & PLAN NOTE
· Placed in observation telemetry  · Trend cardiac enzymes  · Repeat EKG with 3rd cardiac enzyme  · Consult Cardiology in a m  · Continue pre-hospital aspirin 81 mg p o  Daily, Plavix 75 mg p o  Daily, Toprol XL 25 mg p o  Daily, Cozaar 25 mg p o  Daily and resume Lipitor 40 mg p o   Daily

## 2020-03-12 ENCOUNTER — APPOINTMENT (OUTPATIENT)
Dept: NON INVASIVE DIAGNOSTICS | Facility: HOSPITAL | Age: 65
End: 2020-03-12
Payer: MEDICARE

## 2020-03-12 VITALS
RESPIRATION RATE: 18 BRPM | HEART RATE: 64 BPM | TEMPERATURE: 97.3 F | SYSTOLIC BLOOD PRESSURE: 175 MMHG | HEIGHT: 70 IN | OXYGEN SATURATION: 97 % | WEIGHT: 224.87 LBS | BODY MASS INDEX: 32.19 KG/M2 | DIASTOLIC BLOOD PRESSURE: 92 MMHG

## 2020-03-12 LAB
ANION GAP SERPL CALCULATED.3IONS-SCNC: 9 MMOL/L (ref 4–13)
BUN SERPL-MCNC: 10 MG/DL (ref 7–25)
CALCIUM SERPL-MCNC: 8.7 MG/DL (ref 8.6–10.5)
CHLORIDE SERPL-SCNC: 103 MMOL/L (ref 98–107)
CO2 SERPL-SCNC: 25 MMOL/L (ref 21–31)
CREAT SERPL-MCNC: 0.89 MG/DL (ref 0.7–1.3)
ERYTHROCYTE [DISTWIDTH] IN BLOOD BY AUTOMATED COUNT: 13 % (ref 11.5–14.5)
GFR SERPL CREATININE-BSD FRML MDRD: 90 ML/MIN/1.73SQ M
GLUCOSE SERPL-MCNC: 139 MG/DL (ref 65–99)
HCT VFR BLD AUTO: 39.5 % (ref 42–47)
HGB BLD-MCNC: 13.8 G/DL (ref 14–18)
MCH RBC QN AUTO: 31.4 PG (ref 26–34)
MCHC RBC AUTO-ENTMCNC: 34.9 G/DL (ref 31–37)
MCV RBC AUTO: 90 FL (ref 81–99)
PLATELET # BLD AUTO: 247 THOUSANDS/UL (ref 149–390)
PMV BLD AUTO: 7.8 FL (ref 8.6–11.7)
POTASSIUM SERPL-SCNC: 3.3 MMOL/L (ref 3.5–5.5)
RBC # BLD AUTO: 4.4 MILLION/UL (ref 4.3–5.9)
SODIUM SERPL-SCNC: 137 MMOL/L (ref 134–143)
WBC # BLD AUTO: 8.1 THOUSAND/UL (ref 4.8–10.8)

## 2020-03-12 PROCEDURE — 93306 TTE W/DOPPLER COMPLETE: CPT

## 2020-03-12 PROCEDURE — 99217 PR OBSERVATION CARE DISCHARGE MANAGEMENT: CPT | Performed by: INTERNAL MEDICINE

## 2020-03-12 PROCEDURE — 85027 COMPLETE CBC AUTOMATED: CPT | Performed by: INTERNAL MEDICINE

## 2020-03-12 PROCEDURE — 93306 TTE W/DOPPLER COMPLETE: CPT | Performed by: INTERNAL MEDICINE

## 2020-03-12 PROCEDURE — 80048 BASIC METABOLIC PNL TOTAL CA: CPT | Performed by: INTERNAL MEDICINE

## 2020-03-12 PROCEDURE — 99213 OFFICE O/P EST LOW 20 MIN: CPT | Performed by: INTERNAL MEDICINE

## 2020-03-12 RX ORDER — NICOTINE 21 MG/24HR
1 PATCH, TRANSDERMAL 24 HOURS TRANSDERMAL DAILY
Qty: 28 PATCH | Refills: 0 | Status: SHIPPED | OUTPATIENT
Start: 2020-03-13 | End: 2022-06-30

## 2020-03-12 RX ADMIN — MELOXICAM 15 MG: 7.5 TABLET ORAL at 09:56

## 2020-03-12 RX ADMIN — ASPIRIN 81 MG: 81 TABLET, COATED ORAL at 09:53

## 2020-03-12 RX ADMIN — LORAZEPAM 0.5 MG: 0.5 TABLET ORAL at 05:31

## 2020-03-12 RX ADMIN — OXYBUTYNIN CHLORIDE 10 MG: 5 TABLET, EXTENDED RELEASE ORAL at 09:53

## 2020-03-12 RX ADMIN — FLUTICASONE PROPIONATE 1 SPRAY: 50 SPRAY, METERED NASAL at 09:53

## 2020-03-12 RX ADMIN — HEPARIN SODIUM 5000 UNITS: 5000 INJECTION, SOLUTION INTRAVENOUS; SUBCUTANEOUS at 05:31

## 2020-03-12 RX ADMIN — TIOTROPIUM BROMIDE 18 MCG: 18 CAPSULE ORAL; RESPIRATORY (INHALATION) at 09:53

## 2020-03-12 RX ADMIN — LOSARTAN POTASSIUM 25 MG: 25 TABLET, FILM COATED ORAL at 09:53

## 2020-03-12 RX ADMIN — POTASSIUM CHLORIDE 20 MEQ: 1500 TABLET, EXTENDED RELEASE ORAL at 09:54

## 2020-03-12 RX ADMIN — FAMOTIDINE 20 MG: 20 TABLET ORAL at 09:53

## 2020-03-12 RX ADMIN — PANTOPRAZOLE SODIUM 40 MG: 40 TABLET, DELAYED RELEASE ORAL at 05:31

## 2020-03-12 RX ADMIN — CLOPIDOGREL BISULFATE 75 MG: 75 TABLET ORAL at 09:53

## 2020-03-12 NOTE — DISCHARGE SUMMARY
Discharge- Ean Pearson 1955, 72 y o  male MRN: [id number]    Unit/Bed#: -02 Encounter: [id number]    Primary Care Provider: Latrice Velazquez MD   Date and time admitted to hospital: 3/11/2020  2:24 AM        Hypokalemia  Assessment & Plan  · Repleted    Tobacco dependence syndrome  Assessment & Plan  · Nicotine patch    Coronary artery disease involving native coronary artery of native heart without angina pectoris  Assessment & Plan  · Continue home medication regimen    Hypertension  Assessment & Plan  · Continue pre-hospital Toprol XL 25 mg p o  Daily and Cozaar 25 mg p o  Daily    * Chest pain  Assessment & Plan  · Serial troponins negative  · Seen by Cardiology  · Recommend outpatient stress test  · Patient cleared for discharge by Cardiology        Discharging Physician / Practitioner: Ed Malik MD  PCP: Latrice Velazquez MD  Admission Date:   Admission Orders (From admission, onward)     Ordered        03/11/20 0430  Place in Observation  Once                   Discharge Date: 03/12/20    Resolved Problems  Date Reviewed: 3/11/2020    None          Consultations During Hospital Stay:  · cardiology    Procedures Performed:   · n/a    Significant Findings / Test Results:   · n/a    Incidental Findings:   · n/a     Test Results Pending at Discharge (will require follow up):   · n/a     Outpatient Tests Requested:  · Stress test    Complications:     n/a    Reason for Admission:  Chest pain    Hospital Course:     Ean Pearson is a 72 y o  male patient who originally presented to the hospital on 3/11/2020 due to chest pain  Patient was admitted under observation  Serial troponins were negative  EKG was unremarkable  He was seen in consultation by cardiology recommended outpatient stress test   As patient's symptoms improved, he was deemed stable for discharge and will be following up with his primary care physician Cardiology as an outpatient      Please see above list of diagnoses and related plan for additional information  Condition at Discharge: fair     Discharge Day Visit / Exam:     Subjective:  Patient seen examined  No acute events overnight  Feels better today  Vitals: Blood Pressure: (!) 175/92 (03/12/20 1157)  Pulse: 64 (03/12/20 1157)  Temperature: (!) 97 3 °F (36 3 °C) (03/12/20 1157)  Temp Source: Tympanic (03/12/20 1157)  Respirations: 18 (03/12/20 1157)  Height: 5' 10" (177 8 cm) (03/11/20 1210)  Weight - Scale: 102 kg (224 lb 13 9 oz) (03/12/20 0600)  SpO2: 97 % (03/12/20 1157)  Exam:   Physical Exam   Constitutional: He is oriented to person, place, and time  He appears well-developed and well-nourished  HENT:   Head: Normocephalic and atraumatic  Eyes: Pupils are equal, round, and reactive to light  EOM are normal    Neck: Normal range of motion  Neck supple  Cardiovascular: Normal rate and regular rhythm  Pulmonary/Chest: Effort normal and breath sounds normal    Abdominal: Soft  Bowel sounds are normal    Obese   Musculoskeletal: Normal range of motion  He exhibits no edema  Neurological: He is alert and oriented to person, place, and time  Skin: Skin is warm  Capillary refill takes less than 2 seconds  Psychiatric: He has a normal mood and affect  His behavior is normal  Judgment and thought content normal    Nursing note and vitals reviewed  Discussion with Family: n/a    Discharge instructions/Information to patient and family:   See after visit summary for information provided to patient and family  Provisions for Follow-Up Care:  See after visit summary for information related to follow-up care and any pertinent home health orders  Disposition:     Home    For Discharges to [facility] SNF:   · Not Applicable to this Patient - Not Applicable to this Patient    Planned Readmission:    n/a     Discharge Statement:  I spent 33 minutes discharging the patient  This time was spent on the day of discharge   I had direct contact with the patient on the day of discharge  Greater than 50% of the total time was spent examining patient, answering all patient questions, arranging and discussing plan of care with patient as well as directly providing post-discharge instructions  Additional time then spent on discharge activities  Discharge Medications:  See after visit summary for reconciled discharge medications provided to patient and family        ** Please Note: This note has been constructed using a voice recognition system **

## 2020-03-12 NOTE — SOCIAL WORK
Chart reviewed by cm, assessment was completed, pt is aware that he is here as an obs, lives in 2nd floor apartment, 2 steps outside, 13 steps to his apartemnt, pt has hx of anxiety and depression, pt does not drive , he has a  that drives him or he walks, pt was asked about tte [facility] bus, he stated he used them before I explained he is able to get the services if he wants, I offered to make a referral for him and he stated why should you? I can walk across the street and fill out the paper work, pt has a rx plan at [address] [place], he has wcane and nebulizer, he stated he will nned a ride home, pt smokes 1/2- 1pkg cig/day he declined teaching and stated he knows what he needs to do, denies alcohol, d/c ordre has been written, he requested he make his own appointments since he needs to have his cm take him, [facility] ambulatory transport was arranged for the pt, pt was gong to walk home, pt in agreement with the d/c and d/c plan home  Patient/caregiver received discharge checklist   Content reviewed  Patient/caregiver encouraged to participate in discharge plan of care prior to discharge home  CM reviewed d/c planning process including the following: identifying help at home, patient preference for d/c planning needs, availability of treatment team to discuss questions or concerns patient and/or family may have regarding understanding medications and recognizing signs and symptoms once discharged  CM also encouraged patient to follow up with all recommended appointments after discharge  Patient advised of importance for patient and family to participate in managing patients medical well being

## 2020-03-12 NOTE — PLAN OF CARE
Problem: PAIN - ADULT  Goal: Verbalizes/displays adequate comfort level or baseline comfort level  Description  Interventions:  - Encourage patient to monitor pain and request assistance  - Assess pain using appropriate pain scale  - Administer analgesics based on type and severity of pain and evaluate response  - Implement non-pharmacological measures as appropriate and evaluate response  - Consider cultural and social influences on pain and pain management  - Notify physician/advanced practitioner if interventions unsuccessful or patient reports new pain  Outcome: Progressing     Problem: INFECTION - ADULT  Goal: Absence or prevention of progression during hospitalization  Description  INTERVENTIONS:  - Assess and monitor for signs and symptoms of infection  - Monitor lab/diagnostic results  - Monitor all insertion sites, i e  indwelling lines, tubes, and drains  - Monitor endotracheal if appropriate and nasal secretions for changes in amount and color  - [place] appropriate cooling/warming therapies per order  - Administer medications as ordered  - Instruct and encourage patient and family to use good hand hygiene technique  - Identify and instruct in appropriate isolation precautions for identified infection/condition  Outcome: Progressing     Problem: SAFETY ADULT  Goal: Patient will remain free of falls  Description  INTERVENTIONS:  - Assess patient frequently for physical needs  -  Identify cognitive and physical deficits and behaviors that affect risk of falls    -  [place] fall precautions as indicated by assessment   - Educate patient/family on patient safety including physical limitations  - Instruct patient to call for assistance with activity based on assessment  - Modify environment to reduce risk of injury  - Consider OT/PT consult to assist with strengthening/mobility  Outcome: Progressing  Goal: Maintain or return to baseline ADL function  Description  INTERVENTIONS:  -  Assess patient's ability to carry out ADLs; assess patient's baseline for ADL function and identify physical deficits which impact ability to perform ADLs (bathing, care of mouth/teeth, toileting, grooming, dressing, etc )  - Assess/evaluate cause of self-care deficits   - Assess range of motion  - Assess patient's mobility; develop plan if impaired  - Assess patient's need for assistive devices and provide as appropriate  - Encourage maximum independence but intervene and supervise when necessary  - Involve family in performance of ADLs  - Assess for home care needs following discharge   - Consider OT consult to assist with ADL evaluation and planning for discharge  - Provide patient education as appropriate  Outcome: Progressing  Goal: Maintain or return mobility status to optimal level  Description  INTERVENTIONS:  - Assess patient's baseline mobility status (ambulation, transfers, stairs, etc )    - Identify cognitive and physical deficits and behaviors that affect mobility  - Identify mobility aids required to assist with transfers and/or ambulation (gait belt, sit-to-stand, lift, walker, cane, etc )  - [place] fall precautions as indicated by assessment  - Record patient progress and toleration of activity level on Mobility SBAR; progress patient to next Phase/Stage  - Instruct patient to call for assistance with activity based on assessment  - Consider rehabilitation consult to assist with strengthening/weightbearing, etc   Outcome: Progressing     Problem: DISCHARGE PLANNING  Goal: Discharge to home or other facility with appropriate resources  Description  INTERVENTIONS:  - Identify barriers to discharge w/patient and caregiver  - Arrange for needed discharge resources and transportation as appropriate  - Identify discharge learning needs (meds, wound care, etc )  - Refer to Case Management Department for coordinating discharge planning if the patient needs post-hospital services based on physician/advanced practitioner order or complex needs related to functional status, cognitive ability, or social support system   Outcome: Progressing     Problem: Knowledge Deficit  Goal: Patient/family/caregiver demonstrates understanding of disease process, treatment plan, medications, and discharge instructions  Description  Complete learning assessment and assess knowledge base  Interventions:  - Provide teaching at level of understanding  - Provide teaching via preferred learning methods  Outcome: Progressing     Problem: CARDIOVASCULAR - ADULT  Goal: Maintains optimal cardiac output and hemodynamic stability  Description  INTERVENTIONS:  - Monitor I/O, vital signs and rhythm  - Monitor for S/S and trends of decreased cardiac output  - Administer and titrate ordered vasoactive medications to optimize hemodynamic stability  - Assess quality of pulses, skin color and temperature  - Assess for signs of decreased coronary artery perfusion  - Instruct patient to report change in severity of symptoms  Outcome: Progressing  Goal: Absence of cardiac dysrhythmias or at baseline rhythm  Description  INTERVENTIONS:  - Continuous cardiac monitoring, vital signs, obtain 12 lead EKG if ordered  - Administer antiarrhythmic and heart rate control medications as ordered  - Monitor electrolytes and administer replacement therapy as ordered  Outcome: Progressing     Problem: Potential for Falls  Goal: Patient will remain free of falls  Description  INTERVENTIONS:  - Assess patient frequently for physical needs  -  Identify cognitive and physical deficits and behaviors that affect risk of falls    -  [place] fall precautions as indicated by assessment   - Educate patient/family on patient safety including physical limitations  - Instruct patient to call for assistance with activity based on assessment  - Modify environment to reduce risk of injury  - Consider OT/PT consult to assist with strengthening/mobility  Outcome: Progressing

## 2020-03-12 NOTE — UTILIZATION REVIEW
Initial Clinical Review    Admission: Date/Time/Statement: Admission Orders (From admission, onward)     Ordered        03/11/20 0430  Place in Observation  Once                   Orders Placed This Encounter   Procedures    Place in Observation     Standing Status:   Standing     Number of Occurrences:   1     Order Specific Question:   Admitting Physician     Answer:   Sugey Reeder [[id number]]     Order Specific Question:   Level of Care     Answer:   Med Surg [[id number]]     Order Specific Question:   Bed request comments     Answer:   tele     ED Arrival Information     Expected Arrival Acuity Means of Arrival Escorted By Service Admission Type    - 3/11/2020 02:11 Urgent Ambulance [facility] Urgent    Arrival Complaint    chest pain        Chief Complaint   Patient presents with    Chest Pain     Assessment/Plan:   65 yom Seven days of increased frequency of exertional chest pain associated shortness of breath  The pain is described as pressing radiating to the neck and the left arm lasting minutes and completely resolving afterwards  No fever no chills no cough no sputum no sore throat no abdominal pain or urinary bowel stiffness  Patient has chronic right leg edema  He had an ultrasound in the middle of last year that was negative for DVT  There has been no progression of this edema  To his knowledge he has never had a DVT  Chest pain  Assessment & Plan  · Placed in observation telemetry  · Trend cardiac enzymes  · Repeat EKG with 3rd cardiac enzyme  · Consult Cardiology in a m  · Continue pre-hospital aspirin 81 mg p o  Daily, Plavix 75 mg p o  Daily, Toprol XL 25 mg p o  Daily, Cozaar 25 mg p o  Daily and resume Lipitor 40 mg p o   Daily  ED Triage Vitals   Temperature Pulse Respirations Blood Pressure SpO2   03/11/20 1210 03/11/20 0224 03/11/20 0224 03/11/20 0224 03/11/20 0224   (!) 97 2 °F (36 2 °C) 85 22 139/98 98 %      Temp Source Heart Rate Source Patient Position - Orthostatic VS BP Location FiO2 (%)   03/11/20 1210 03/11/20 1210 03/11/20 1210 03/11/20 1210 --   Temporal Monitor Lying Left arm       Pain Score       03/11/20 0224       No Pain        Wt Readings from Last 1 Encounters:   03/12/20 102 kg (224 lb 13 9 oz)     Additional Vital Signs:   Date/Time  Temp  Pulse  Resp  BP  MAP (mmHg)  SpO2  O2 Device  Patient Position - Orthostatic VS   03/12/20 0327  96 9 °F (36 1 °C)Abnormal   86  18  139/69  --  96 %  None (Room air)  Sitting   03/11/20 2338  98 °F (36 7 °C)  66  18  112/64  --  96 %  None (Room air)  Lying   03/11/20 1913  98 9 °F (37 2 °C)  78  19  120/87  --  97 %  None (Room air)  Lying   03/11/20 1621  98 7 °F (37 1 °C)  68  20  157/88  --  96 %  None (Room air)  Lying   Pertinent Labs/Diagnostic Test Results:   Results from last 7 days   Lab Units 03/12/20  0553 03/11/20  0644 03/11/20  0235   WBC Thousand/uL 8 10 9 80 10 80   HEMOGLOBIN g/dL 13 8* 14 5 14 4   HEMATOCRIT % 39 5* 42 2 42 0   PLATELETS Thousands/uL 247 244 244   NEUTROS ABS Thousands/µL  --   --  7 00*     Results from last 7 days   Lab Units 03/11/20  0644 03/11/20  0235   SODIUM mmol/L 137 138   POTASSIUM mmol/L 3 2* 3 0*   CHLORIDE mmol/L 102 100   CO2 mmol/L 27 27   ANION GAP mmol/L 8 11   BUN mg/dL 8 9   CREATININE mg/dL 0 91 0 90   EGFR ml/min/1 73sq m 88 89   CALCIUM mg/dL 8 9 8 8     Results from last 7 days   Lab Units 03/11/20  0644 03/11/20  0235   GLUCOSE RANDOM mg/dL 99 97     Results from last 7 days   Lab Units 03/11/20  1154 03/11/20  0942 03/11/20  0644 03/11/20  0235   TROPONIN I ng/mL <0 03 <0 03 <0 03 <0 03     Results from last 7 days   Lab Units 03/11/20  0235   D-DIMER QUANTITATIVE ug/ml FEU 0 21   3/11  Cxr=No acute cardiopulmonary disease    Ct head=  Ekg=Normal sinus rhythm    ED Treatment:   Medication Administration from 03/11/2020 0211 to 03/11/2020 1214       Date/Time Order Dose Route Action     03/11/2020 0424 LORazepam (ATIVAN) tablet 0 5 mg 0 5 mg Oral Given     03/11/2020 0606 potassium chloride (K-DUR,KLOR-CON) CR tablet 40 mEq 40 mEq Oral Given     03/11/2020 0857 aspirin (ECOTRIN LOW STRENGTH) EC tablet 81 mg 81 mg Oral Given     03/11/2020 0853 clopidogrel (PLAVIX) tablet 75 mg 75 mg Oral Given     03/11/2020 0853 famotidine (PEPCID) tablet 20 mg 20 mg Oral Given     03/11/2020 0902 fluticasone (FLONASE) 50 mcg/act nasal spray 1 spray 1 spray Nasal Given     03/11/2020 0854 losartan (COZAAR) tablet 25 mg 25 mg Oral Given     03/11/2020 0900 metoprolol succinate (TOPROL-XL) 24 hr tablet 25 mg 25 mg Oral Given     03/11/2020 0855 oxybutynin (DITROPAN-XL) 24 hr tablet 10 mg 10 mg Oral Given     03/11/2020 0900 tiotropium (SPIRIVA) capsule for inhaler 18 mcg 18 mcg Inhalation Given     03/11/2020 0851 heparin (porcine) subcutaneous injection 5,000 Units 5,000 Units Subcutaneous Given     03/11/2020 0856 roflumilast (DALIRESP) tablet 500 mcg 500 mcg Oral Given        Past Medical History:   Diagnosis Date    Acute right MCA stroke ([facility] ) 9/15/2018    CAD (coronary artery disease)     s/p CABG 2000    [facility]ch[facility]     Grand mal status ([facility] ) 3/24/2019    Heart attack ([facility] )     Hyperlipidemia     Hypertension     Lexiscan nuclear stress test 03/19/2016    EF 74% Normal    TIA (transient ischemic attack) 09/13/2018     Present on Admission:   Chest pain   Hypertension   Hyperlipidemia   Anxiety and depression   Gastroesophageal reflux disease   [facility]   Tobacco dependence syndrome   Hypokalemia  Admitting Diagnosis: Unstable angina pectoris ([facility] ) [I20 0]  Chest pain [R07 9]  Age/Sex: 72 y o  male  Admission Orders:  ekg with serial troponin  Telemetry  Scd/foot pumps  Consult cardio  Scheduled Medications:  aspirin 81 mg Oral Daily   atorvastatin 40 mg Oral QPM   clopidogrel 75 mg Oral Daily   famotidine 20 mg Oral BID   fluticasone 1 spray Nasal Daily   heparin (porcine) 5,000 Units Subcutaneous Q8H [address]   losartan 25 mg Oral Daily   metoprolol succinate 25 mg Oral Daily   nicotine 14 mg Transdermal Daily   oxybutynin 10 mg Oral Daily   pantoprazole 40 mg Oral Early Morning   potassium chloride 20 mEq Oral Daily   roflumilast 500 mcg Oral Daily   tiotropium 18 mcg Inhalation Daily     PRN Meds:  albuterol 2 puff Inhalation Q4H PRN   calcium carbonate 750 mg Oral TID PRN   LORazepam 0 5 mg Oral BID PRN   meloxicam 15 mg Oral Daily PRN   nitroglycerin 0 4 mg Sublingual Q5 Min PRN   sodium chloride (PF) 3 mL Intravenous PRN     Network Utilization Review Department  [email or web address]  ATTENTION: Please call with any questions or concerns to [phone] and carefully listen to the prompts so that you are directed to the right person  All voicemails are confidential   Kianna Isaacs all requests for admission clinical reviews, approved or denied determinations and any other requests to dedicated fax number below belonging to the campus where the patient is receiving treatment   List of dedicated fax numbers for the Facilities:  [address] DENIALS (Administrative/Medical Necessity) [phone]   [address] (Maternity/NICU/Pediatrics) [phone]   Lesli Mcneal [phone]   [facility] [phone]   Michael Shaw [phone]   Nitza Swift [phone]   [address] [phone]   [facility]  [phone]   [address] [facility]  [address] [facility] [address] [facility] [phone]

## 2020-03-12 NOTE — ASSESSMENT & PLAN NOTE
Prior CABG  Again unclear whether current symptoms represent angina  Patient cannot walk quickly  With negative troponin I am in favor of early discharge but return for [facility] in the relative near term

## 2020-03-12 NOTE — CONSULTS
Consult- Yoni Jeffries 1955, 72 y o  male MRN: [id number]    Unit/Bed#: -02 Encounter: [id number]    Primary Care Provider: Karly Rodríguez MD   Date and time admitted to hospital: 3/11/2020  2:24 AM      Inpatient consult to Cardiology  Consult performed by: Thad Reyna MD  Consult ordered by: Rochell Duverney, PA-C          Tobacco dependence syndrome  Assessment & Plan  Smoking cessation again discussed  Coronary artery disease involving native coronary artery of native heart without angina pectoris  Assessment & Plan  Prior CABG  Again unclear whether current symptoms represent angina  Patient cannot walk quickly  With negative troponin I am in favor of early discharge but return for [facility] in the relative near term  Hyperlipidemia  Assessment & Plan  Tolerating statin therapy  * Chest pain  Assessment & Plan  A mix of typical and atypical features  Other summary comments: In general the patient seems well today  If the echocardiogram is abnormal then outpatient [facility] will be arranged  HPI: Yoni Jeffries is a 72y o  year old male who presented with a constellation of symptoms  Five days ago he walked up pill at midnight to get cigarettes and in addition to not feeling quite right may have had brief chest pain  However he returned home  Since then he has felt stuffy in the head and a little bit out of it  He also has had some fleeting chest pain  It is hard to nail him down as to when exactly has this chest pain but it seems to last less than 30 seconds  Sometimes it is central in sometimes it radiates around his chest   Yesterday mainly because he still was not feeling quite right he came to the hospital and was admitted for further care  He has  had negative troponin  Currently he is having an echocardiogram     To reiterate, he had heart bypass surgery in the year 2000   In 2015 heart catheterization revealed an occluded mammary graft to the LAD but no severe native lesions  EKG:   Sinus rhythm  No ischemic ST segment changes  MOST  RECENT CARDIAC IMAGING:   Echocardiogram 1/3/2019: Normal LV function  Mild right ventricular enlargement         Review of Systems: a 10 point review of systems was conducted and is negative except for as mentioned in the HPI or as below  Historical Information   Past Medical History:   Diagnosis Date    [facility] MCA stroke ([facility] ) 9/15/2018    CAD (coronary artery disease)     s/p CABG 2000    COPD (chronic obstructive pulmonary disease) ([facility] )     Grand mal status ([place] [id number] ) 3/24/2019    Heart attack ([place] [id number] )     Hyperlipidemia     Hypertension     Lexiscan nuclear stress test 03/19/2016    EF 74% Normal    TIA (transient ischemic attack) 09/13/2018     Past Surgical History:   Procedure Laterality Date    CARDIAC CATHETERIZATION  08/19/2015    LIMA occluded   No severe native lesions    COLONOSCOPY      CORONARY ARTERY BYPASS GRAFT  2000    HERNIA REPAIR      times 2     Social History     Substance and Sexual Activity   Alcohol Use Not Currently     Social History     Substance and Sexual Activity   Drug Use Never     Social History     Tobacco Use   Smoking Status Current Every Day Smoker    Packs/day: 1 00    Years: 50 00    Pack years: 50 00    Types: Cigars, Pipe   Smokeless Tobacco Never Used       Family History:   No longer relevant    Meds/Allergies   all current active meds have been reviewed  Medications Prior to Admission   Medication    albuterol (PROVENTIL HFA,VENTOLIN HFA) 90 mcg/act inhaler    aspirin (ECOTRIN LOW STRENGTH) 81 mg EC tablet    atorvastatin (LIPITOR) 40 mg tablet    calcium carbonate (TUMS EX) 750 mg chewable tablet    clopidogrel (PLAVIX) 75 mg tablet    fluticasone (FLONASE) 50 mcg/act nasal spray    furosemide (LASIX) 40 mg tablet    LORazepam (ATIVAN) 0 5 mg tablet    metoprolol succinate (TOPROL-XL) 25 mg 24 hr tablet    nitroglycerin (NITROSTAT) 0 4 mg SL tablet    pantoprazole (PROTONIX) 40 mg tablet    roflumilast (DALIRESP) 500 mcg tablet    tiotropium (SPIRIVA) 18 mcg inhalation capsule    acetaminophen-codeine (TYLENOL #3) 300-30 mg per tablet    citalopram (CeleXA) 40 mg tablet    famotidine (PEPCID) 20 mg tablet    irbesartan (AVAPRO) 75 mg tablet    meloxicam (MOBIC) 15 mg tablet    solifenacin (VESICARE) 10 MG tablet       Allergies   Allergen Reactions    Gabapentin Headache       Objective   Vitals: Blood pressure 167/97, pulse 71, temperature 97 9 °F (36 6 °C), temperature source Tympanic, resp  rate 18, height 5' 10" (1 778 m), weight 102 kg (224 lb 13 9 oz), SpO2 97 %  , Body mass index is 32 27 kg/m² ,   Orthostatic Blood Pressures      Most Recent Value   Blood Pressure  167/97 filed at 03/12/2020 0808   Patient Position - Orthostatic VS  Sitting filed at 03/12/2020 8132          Systolic (27NNM), QLM:461 , Min:112 , OCQ:644     Diastolic (85IFV), WXD:58, Min:64, Max:97              Physical Exam:   General:  Normal appearance in no distress  Eyes:  Anicteric  Oral mucosa:  Moist   Neck:  No JVD  Carotid upstrokes are brisk without bruits  No masses  Chest:  Clear to auscultation and percussion  Well-healed midline sternotomy scar  Cardiac:  Normal PMI  Normal S1 and S2  No murmur gallop or rub  Abdomen:  Soft and nontender  No palpable organomegaly or aortic enlargement  Extremities:  Trace pretibial edema  Musculoskeletal:  Symmetric  Vascular:  Femoral pulses are brisk without bruits  Popliteal pulses are intact bilaterally  Pedal pulses are intact  Neuro:  Grossly symmetric  Psych:  Alert and oriented x3          Lab Results:     Troponins:   Results from last 7 days   Lab Units 03/11/20  1154 03/11/20  0942 03/11/20  0644   TROPONIN I ng/mL <0 03 <0 03 <0 03     BNP:   Results from last 6 Months   Lab Units 11/15/19  1729   BNP pg/mL 19       CBC :   Results from last 7 days   Lab Units 03/12/20  0553 03/11/20  0644   WBC Thousand/uL 8 10 9 80   HEMOGLOBIN g/dL 13 8* 14 5   HEMATOCRIT % 39 5* 42 2   MCV fL 90 90   PLATELETS Thousands/uL 247 244     TSH:     CMP:   Results from last 7 days   Lab Units 03/12/20  0553 03/11/20  0644   POTASSIUM mmol/L 3 3* 3 2*   CHLORIDE mmol/L 103 102   CO2 mmol/L 25 27   BUN mg/dL 10 8   CREATININE mg/dL 0 89 0 91   EGFR ml/min/1 73sq m 90 88     Lipid Profile:     Coags:

## 2020-03-12 NOTE — ASSESSMENT & PLAN NOTE
· Serial troponins negative  · Seen by Cardiology  · Recommend outpatient stress test  · Patient cleared for discharge by Cardiology

## 2020-03-17 ENCOUNTER — APPOINTMENT (EMERGENCY)
Dept: RADIOLOGY | Facility: HOSPITAL | Age: 65
End: 2020-03-17
Payer: MEDICARE

## 2020-03-17 ENCOUNTER — HOSPITAL ENCOUNTER (EMERGENCY)
Facility: HOSPITAL | Age: 65
Discharge: HOME | End: 2020-03-17
Attending: EMERGENCY MEDICINE | Admitting: EMERGENCY MEDICINE
Payer: MEDICARE

## 2020-03-17 VITALS
RESPIRATION RATE: 20 BRPM | HEIGHT: 70 IN | SYSTOLIC BLOOD PRESSURE: 154 MMHG | DIASTOLIC BLOOD PRESSURE: 92 MMHG | TEMPERATURE: 97.2 F | WEIGHT: 220 LBS | OXYGEN SATURATION: 95 % | BODY MASS INDEX: 31.5 KG/M2 | HEART RATE: 92 BPM

## 2020-03-17 DIAGNOSIS — K20.80 PILL ESOPHAGITIS: Primary | ICD-10-CM

## 2020-03-17 DIAGNOSIS — T50.905A PILL ESOPHAGITIS: Primary | ICD-10-CM

## 2020-03-17 PROCEDURE — 99285 EMERGENCY DEPT VISIT HI MDM: CPT

## 2020-03-17 PROCEDURE — 99285 EMERGENCY DEPT VISIT HI MDM: CPT | Performed by: EMERGENCY MEDICINE

## 2020-03-17 PROCEDURE — 93005 ELECTROCARDIOGRAM TRACING: CPT

## 2020-03-17 PROCEDURE — 36415 COLL VENOUS BLD VENIPUNCTURE: CPT | Performed by: EMERGENCY MEDICINE

## 2020-03-17 PROCEDURE — 70360 X-RAY EXAM OF NECK: CPT

## 2020-03-17 RX ORDER — MAGNESIUM HYDROXIDE/ALUMINUM HYDROXICE/SIMETHICONE 120; 1200; 1200 MG/30ML; MG/30ML; MG/30ML
15 SUSPENSION ORAL ONCE
Status: COMPLETED | OUTPATIENT
Start: 2020-03-17 | End: 2020-03-17

## 2020-03-17 RX ORDER — IPRATROPIUM BROMIDE AND ALBUTEROL SULFATE .5; 3 MG/3ML; MG/3ML
1 SOLUTION RESPIRATORY (INHALATION) ONCE
Status: COMPLETED | OUTPATIENT
Start: 2020-03-17 | End: 2020-03-17

## 2020-03-17 RX ORDER — LIDOCAINE HYDROCHLORIDE 20 MG/ML
10 SOLUTION OROPHARYNGEAL ONCE
Status: COMPLETED | OUTPATIENT
Start: 2020-03-17 | End: 2020-03-17

## 2020-03-17 RX ADMIN — LIDOCAINE HYDROCHLORIDE 10 ML: 20 SOLUTION ORAL; TOPICAL at 08:55

## 2020-03-17 RX ADMIN — ALUMINUM HYDROXIDE, MAGNESIUM HYDROXIDE, AND SIMETHICONE 15 ML: 200; 200; 20 SUSPENSION ORAL at 08:56

## 2020-03-17 NOTE — ED PROVIDER NOTES
History  Chief Complaint   Patient presents with    Shortness of Breath     patient awoke this morning around 0300 and took a sleeping pill  States it got stuck in his throat (this happens frequently since his stroke) Since then he has had wheezing and a cough  Patient arrives via EMS for complaint of pain in his lower throat since taking a diphenhydramine pill to sleep last night  He awoke at 0300 hours to do this and states he could feel it get stuck despite drinking water  He states this happens often since he has had a stroke, even with small pills  He has been trying to cough it out, vomit it up, including giving himself the Heimlich maneuver, without relief  He drank several cups of water, tea and milk without relief  He reported EMS that he was wheezing and they gave a nebulizer treatment on route  He does have a history asthma/COPD for which he uses an albuterol inhaler prn  He had no wheezing or dyspnea upon arrival to the emergency department  He is not coughing and has normal oxygen saturations on room air  He denies any other pain or symptoms elsewhere  No recent travel or sick contacts  No associated fever, LH/dizziness, CP, abdominal pain, n/v/d  Denies other injury or complaint                    History provided by:  Patient, medical records and EMS personnel  Shortness of Breath   Associated symptoms: sore throat and wheezing (pre-hospital)    Associated symptoms: no abdominal pain, no chest pain, no cough, no diaphoresis, no fever, no headaches, no neck pain, no rash and no vomiting    Sore Throat   Associated symptoms: shortness of breath    Associated symptoms: no abdominal pain, no adenopathy, no chest pain, no chills, no cough, no fever, no headaches, no neck stiffness, no rash, no rhinorrhea and no trouble swallowing    Swallowed Foreign Body   Location:  Upper esophagus  Quality:  Burning  Severity:  Moderate  Onset quality:  Sudden  Duration:  5 hours  Timing: Constant  Progression:  Unchanged  Chronicity:  New  Context:  Took a sleeping pill prior to symptom onset  Relieved by:  Nothing  Worsened by:  Nothing  Ineffective treatments:  Drinking liquids  Associated symptoms: shortness of breath, sore throat and wheezing (pre-hospital)    Associated symptoms: no abdominal pain, no chest pain, no congestion, no cough, no diarrhea, no fatigue, no fever, no headaches, no loss of consciousness, no myalgias, no nausea, no rash, no rhinorrhea and no vomiting        Prior to Admission Medications   Prescriptions Last Dose Informant Patient Reported? Taking?    LORazepam (ATIVAN) 0 5 mg tablet   No No   Sig: Take 1 tablet (0 5 mg total) by mouth 2 (two) times a day as needed for anxiety for up to 7 days   acetaminophen-codeine (TYLENOL #3) 300-30 mg per tablet  Self Yes No   Sig: Take 1 tablet by mouth daily as needed for moderate pain   albuterol (PROVENTIL HFA,VENTOLIN HFA) 90 mcg/act inhaler   No No   Sig: Inhale 2 puffs every 4 (four) hours as needed for wheezing   aspirin (ECOTRIN LOW STRENGTH) 81 mg EC tablet   No No   Sig: Take 1 tablet (81 mg total) by mouth daily   atorvastatin (LIPITOR) 40 mg tablet   No No   Sig: Take 1 tablet (40 mg total) by mouth every evening   calcium carbonate (TUMS EX) 750 mg chewable tablet   No No   Sig: Chew 1 tablet (750 mg total) 3 (three) times a day as needed for indigestion or heartburn   clopidogrel (PLAVIX) 75 mg tablet   No No   Sig: Take 2 tablets (150 mg total) by mouth daily   Patient taking differently: Take 75 mg by mouth daily    famotidine (PEPCID) 20 mg tablet   No No   Sig: Take 1 tablet (20 mg total) by mouth 2 (two) times a day   fluticasone (FLONASE) 50 mcg/act nasal spray   No No   Si spray into each nostril daily   furosemide (LASIX) 40 mg tablet   No No   Sig: Take 1 tablet (40 mg total) by mouth daily   Patient taking differently: Take 40 mg by mouth as needed    irbesartan (AVAPRO) 75 mg tablet   Yes No   Sig: Take by mouth Daily   meloxicam (MOBIC) 15 mg tablet   Yes No   Sig: Take 15 mg by mouth daily as needed   metoprolol succinate (TOPROL-XL) 25 mg 24 hr tablet   Yes No   Sig: Take by mouth Daily   nicotine (NICODERM CQ) 14 mg/24hr TD 24 hr patch   No No   Sig: Place 1 patch on the skin daily   nitroglycerin (NITROSTAT) 0 4 mg SL tablet   Yes No   Sig: Place 0 4 mg under the tongue every 5 (five) minutes as needed for chest pain   pantoprazole (PROTONIX) 40 mg tablet   No No   Sig: Take 1 tablet (40 mg total) by mouth daily   roflumilast (DALIRESP) 500 mcg tablet   Yes No   Sig: Take 500 mcg by mouth daily   solifenacin (VESICARE) 10 MG tablet   Yes No   Sig: Take by mouth Daily   tiotropium (SPIRIVA) 18 mcg inhalation capsule   No No   Sig: Place 1 capsule (18 mcg total) into inhaler and inhale daily      Facility-Administered Medications: None       Past Medical History:   Diagnosis Date    Acute right MCA stroke ([facility] ) 9/15/2018    CAD (coronary artery disease)     s/p CABG 2000    COPD (chronic obstructive pulmonary disease) ([facility] )     Grand mal status ([facility] ) 3/24/2019    Heart attack ([facility] )     Hyperlipidemia     Hypertension     Lexiscan nuclear stress test 03/19/2016    EF 74% Normal    TIA (transient ischemic attack) 09/13/2018       Past Surgical History:   Procedure Laterality Date    CARDIAC CATHETERIZATION  08/19/2015    LIMA occluded  No severe native lesions    COLONOSCOPY      CORONARY ARTERY BYPASS GRAFT  2000    HERNIA REPAIR      times 2       Family History   Problem Relation Age of Onset    Cancer Mother     Heart disease Mother     Cancer Father     Cancer Maternal Grandmother     Cancer Paternal Grandfather      I have reviewed and agree with the history as documented      E-Cigarette/Vaping    E-Cigarette Use Never User      E-Cigarette/Vaping Substances     Social History     Tobacco Use    Smoking status: Current Every Day Smoker     Packs/day: 1 00     Years: 50 00     Pack years: 50  00     Types: Cigars, Pipe    Smokeless tobacco: Never Used   Substance Use Topics    Alcohol use: Not Currently    Drug use: Never       Review of Systems   Constitutional: Negative for chills, diaphoresis, fatigue and fever  HENT: Positive for sore throat  Negative for congestion, rhinorrhea and trouble swallowing  Respiratory: Positive for shortness of breath and wheezing (pre-hospital)  Negative for cough and chest tightness  Cardiovascular: Negative for chest pain, palpitations and leg swelling  Gastrointestinal: Negative for abdominal distention, abdominal pain, constipation, diarrhea, nausea and vomiting  Genitourinary: Negative for difficulty urinating, dysuria, flank pain, frequency, hematuria and urgency  Musculoskeletal: Negative for arthralgias, back pain, myalgias, neck pain and neck stiffness  Skin: Negative for pallor and rash  Neurological: Negative for dizziness, loss of consciousness, weakness, light-headedness and headaches  Hematological: Negative for adenopathy  Psychiatric/Behavioral: Negative for confusion  The patient is not nervous/anxious  All other systems reviewed and are negative  Physical Exam  Physical Exam   Constitutional: He is oriented to person, place, and time  He appears well-developed and well-nourished  Non-toxic appearance  He does not appear ill  No distress  HENT:   Head: Normocephalic  Mouth/Throat: Oropharynx is clear and moist  No oropharyngeal exudate  Eyes: Pupils are equal, round, and reactive to light  Conjunctivae and EOM are normal  No scleral icterus  Neck: Normal range of motion  Neck supple  Cardiovascular: Normal rate and regular rhythm  No murmur heard  Pulmonary/Chest: Effort normal and breath sounds normal    Abdominal: Soft  Bowel sounds are normal  He exhibits no distension  There is no tenderness  Musculoskeletal: Normal range of motion  He exhibits no edema or tenderness     Lymphadenopathy:     He has no cervical adenopathy  Neurological: He is alert and oriented to person, place, and time  He has normal reflexes  No cranial nerve deficit  He exhibits normal muscle tone  Skin: Skin is warm and dry  No rash noted  He is not diaphoretic  No erythema  No pallor  Psychiatric: He has a normal mood and affect  His behavior is normal  Thought content normal    Vitals reviewed  Vital Signs  ED Triage Vitals [03/17/20 0807]   Temperature Pulse Respirations Blood Pressure SpO2   (!) 97 2 °F (36 2 °C) 92 20 154/92 95 %      Temp Source Heart Rate Source Patient Position - Orthostatic VS BP Location FiO2 (%)   Temporal Monitor Sitting Left arm --      Pain Score       No Pain           Vitals:    03/17/20 0807   BP: 154/92   Pulse: 92   Patient Position - Orthostatic VS: Sitting         Visual Acuity      ED Medications  Medications   ipratropium-albuterol (FOR EMS ONLY) (DUO-NEB) 0 5-2 5 mg/3 mL inhalation solution 3 mL (has no administration in time range)   aluminum-magnesium hydroxide-simethicone (MYLANTA) 200-200-20 mg/5 mL oral suspension 15 mL (15 mL Oral Given 3/17/20 0856)   Lidocaine Viscous HCl (XYLOCAINE) 2 % mucosal solution 10 mL (10 mL Swish & Swallow Given 3/17/20 0855)       Diagnostic Studies  Results Reviewed     Procedure Component Value Units Date/Time    CBC and differential [[id number]] Collected:  03/17/20 0814    Lab Status:  No result Specimen:  Blood from Arm, Left     Comprehensive metabolic panel [[id number]] Collected:  03/17/20 0814    Lab Status:  No result Specimen:  Blood from Arm, Left     Troponin I [[id number]] Collected:  03/17/20 3138    Lab Status:  No result Specimen:  Blood from Arm, Left                  XR neck soft tissue   Final Result by Adrianne Olsen MD (03/17 8787)      No radiopaque foreign body appreciated  Workstation performed: [id number]                    Procedures  ECG 12 Lead Documentation Only  Date/Time: 3/17/2020 8:04 AM  Performed by:  Castro Newman [id number] [place] [address]  Authorized by: Tamiko Mars DO     Indications / Diagnosis:  Throat pain  ECG reviewed by me, the ED Provider: yes    Patient location:  ED  Interpretation:     Interpretation: non-specific    Rate:     ECG rate:  91  Rhythm:     Rhythm: sinus rhythm    Ectopy:     Ectopy: none    QRS:     QRS axis:  Normal  Conduction:     Conduction: normal    ST segments:     ST segments:  Normal  T waves:     T waves: normal    Other findings:     Other findings: prolonged qTc interval               ED Course  ED Course as of Mar 17 0911   Tue Mar 17, 2020   0901   Results reviewed with patient  Feels better  All questions answered to the patient's satisfaction  Recommend continued supportive treatment at home and follow up with GI                                           MDM  Number of Diagnoses or Management Options  Pill esophagitis:   Diagnosis management comments: DDx:  Throat pain - pill esophagitis, doubt perforation or bleeding, aspiration or pneumonia  A/P: Will check soft tissue neck x-ray, treat symptoms with GI cocktail, reevaluate for further work up and disposition  Amount and/or Complexity of Data Reviewed  Tests in the radiology section of CPT®: reviewed and ordered  Review and summarize past medical records: yes          Disposition  Final diagnoses:   Pill esophagitis     Time reflects when diagnosis was documented in both MDM as applicable and the Disposition within this note     Time User Action Codes Description Comment    3/17/2020  9:03 AM [id number] [place] [address]  2800, 2000 Susan [address]e [[id number]] Pill esophagitis       ED Disposition     ED Disposition Condition Date/Time Comment    Discharge Stable Tue Mar 17, 2020  9:03 AM [facility] discharge to home/self care              Follow-up Information     Follow up With Specialties Details Why [facility] Call today for further evaluation and treatment [address] [id number]  [phone]            Patient's Medications   Discharge Prescriptions    ALUMINUM-MAGNESIUM HYDROXIDE 200-200 MG/5ML SUSPENSION    Take 5 mL by mouth every 6 (six) hours as needed for heartburn       Start Date: 3/17/2020 End Date: --       Order Dose: 5 mL       Quantity: 355 mL    Refills: 0     No discharge procedures on file      PDMP Review     None          ED Provider  Electronically Signed by           Casey Barnett DO  03/17/20 0822

## 2020-03-18 LAB
ATRIAL RATE: 91 BPM
P AXIS: 53 DEGREES
PR INTERVAL: 150 MS
QRS AXIS: 90 DEGREES
QRSD INTERVAL: 88 MS
QT INTERVAL: 392 MS
QTC INTERVAL: 482 MS
T WAVE AXIS: 68 DEGREES
VENTRICULAR RATE: 91 BPM

## 2020-03-18 PROCEDURE — 93010 ELECTROCARDIOGRAM REPORT: CPT | Performed by: INTERNAL MEDICINE

## 2020-04-19 ENCOUNTER — HOSPITAL ENCOUNTER (EMERGENCY)
Facility: HOSPITAL | Age: 65
Discharge: HOME | End: 2020-04-19
Attending: EMERGENCY MEDICINE | Admitting: EMERGENCY MEDICINE
Payer: MEDICARE

## 2020-04-19 ENCOUNTER — APPOINTMENT (EMERGENCY)
Dept: CT IMAGING | Facility: HOSPITAL | Age: 65
End: 2020-04-19
Payer: MEDICARE

## 2020-04-19 ENCOUNTER — APPOINTMENT (EMERGENCY)
Dept: RADIOLOGY | Facility: HOSPITAL | Age: 65
End: 2020-04-19
Payer: MEDICARE

## 2020-04-19 VITALS
DIASTOLIC BLOOD PRESSURE: 89 MMHG | BODY MASS INDEX: 31.5 KG/M2 | OXYGEN SATURATION: 97 % | HEIGHT: 70 IN | SYSTOLIC BLOOD PRESSURE: 145 MMHG | HEART RATE: 89 BPM | TEMPERATURE: 97.2 F | RESPIRATION RATE: 34 BRPM | WEIGHT: 220 LBS

## 2020-04-19 DIAGNOSIS — S06.0X9A CONCUSSION: ICD-10-CM
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
## 2020-04-19 LAB
ANION GAP SERPL CALCULATED.3IONS-SCNC: 6 MMOL/L (ref 4–13)
APTT PPP: 30 SECONDS (ref 23–37)
BUN SERPL-MCNC: 9 MG/DL (ref 7–25)
CALCIUM SERPL-MCNC: 9.1 MG/DL (ref 8.6–10.5)
CHLORIDE SERPL-SCNC: 101 MMOL/L (ref 98–107)
CO2 SERPL-SCNC: 28 MMOL/L (ref 21–31)
CREAT SERPL-MCNC: 0.98 MG/DL (ref 0.7–1.3)
ERYTHROCYTE [DISTWIDTH] IN BLOOD BY AUTOMATED COUNT: 13.3 % (ref 11.5–14.5)
GFR SERPL CREATININE-BSD FRML MDRD: 81 ML/MIN/1.73SQ M
GLUCOSE SERPL-MCNC: 107 MG/DL (ref 65–99)
HCT VFR BLD AUTO: 42 % (ref 42–47)
HGB BLD-MCNC: 14.7 G/DL (ref 14–18)
INR PPP: 1.03 (ref 0.9–1.5)
MCH RBC QN AUTO: 31.3 PG (ref 26–34)
MCHC RBC AUTO-ENTMCNC: 34.9 G/DL (ref 31–37)
MCV RBC AUTO: 90 FL (ref 81–99)
PLATELET # BLD AUTO: 253 THOUSANDS/UL (ref 149–390)
PMV BLD AUTO: 7.1 FL (ref 8.6–11.7)
POTASSIUM SERPL-SCNC: 3.5 MMOL/L (ref 3.5–5.5)
PROTHROMBIN TIME: 11.8 SECONDS (ref 10.2–13)
RBC # BLD AUTO: 4.69 MILLION/UL (ref 4.3–5.9)
SODIUM SERPL-SCNC: 135 MMOL/L (ref 134–143)
TROPONIN I SERPL-MCNC: <0.03 NG/ML
WBC # BLD AUTO: 8.2 THOUSAND/UL (ref 4.8–10.8)

## 2020-04-19 PROCEDURE — 71046 X-RAY EXAM CHEST 2 VIEWS: CPT

## 2020-04-19 PROCEDURE — 84484 ASSAY OF TROPONIN QUANT: CPT | Performed by: EMERGENCY MEDICINE

## 2020-04-19 PROCEDURE — 99285 EMERGENCY DEPT VISIT HI MDM: CPT | Performed by: EMERGENCY MEDICINE

## 2020-04-19 PROCEDURE — 85027 COMPLETE CBC AUTOMATED: CPT | Performed by: EMERGENCY MEDICINE

## 2020-04-19 PROCEDURE — 93005 ELECTROCARDIOGRAM TRACING: CPT

## 2020-04-19 PROCEDURE — 85610 PROTHROMBIN TIME: CPT | Performed by: EMERGENCY MEDICINE

## 2020-04-19 PROCEDURE — 99284 EMERGENCY DEPT VISIT MOD MDM: CPT

## 2020-04-19 PROCEDURE — 36415 COLL VENOUS BLD VENIPUNCTURE: CPT | Performed by: EMERGENCY MEDICINE

## 2020-04-19 PROCEDURE — 70496 CT ANGIOGRAPHY HEAD: CPT

## 2020-04-19 PROCEDURE — 80048 BASIC METABOLIC PNL TOTAL CA: CPT | Performed by: EMERGENCY MEDICINE

## 2020-04-19 PROCEDURE — 85730 THROMBOPLASTIN TIME PARTIAL: CPT | Performed by: EMERGENCY MEDICINE

## 2020-04-19 PROCEDURE — 70498 CT ANGIOGRAPHY NECK: CPT

## 2020-04-19 RX ADMIN — IOHEXOL 100 ML: 350 INJECTION, SOLUTION INTRAVENOUS at 22:03

## 2020-04-20 LAB
ATRIAL RATE: 86 BPM
P AXIS: 56 DEGREES
PR INTERVAL: 156 MS
QRS AXIS: 80 DEGREES
QRSD INTERVAL: 88 MS
QT INTERVAL: 398 MS
QTC INTERVAL: 476 MS
T WAVE AXIS: 68 DEGREES
VENTRICULAR RATE: 86 BPM

## 2020-04-20 PROCEDURE — 93010 ELECTROCARDIOGRAM REPORT: CPT | Performed by: INTERNAL MEDICINE

## 2020-05-09 NOTE — DISCHARGE INSTRUCTIONS
Blurred Vision   WHAT YOU NEED TO KNOW:   Blurred vision is when you cannot see fine details  You may have blurred vision if you are nearsighted or farsighted and you need glasses  Blurred vision may be caused by a corneal abrasion (scratch on the cornea) or a corneal ulcer (open sore)  You may have blurred vision if your eye came into contact with a chemical  A foreign body or infection may also cause blurred vision  Medical conditions, such as cataracts, glaucoma, detached retina, and nerve disorders can also cause blurred vision  Blurred vision may also be caused by a concussion or a tumor  If you have diabetes, you may develop diabetic retinopathy  Diabetic retinopathy damages the blood vessels of your retina  DISCHARGE INSTRUCTIONS:   Return to the emergency department if:   · You have weakness in an arm or leg, difficulty speaking or seeing, and a severe headache  · You have a fever, eye pain, or discharge  · You have a sudden loss of vision  Contact your healthcare provider if:   · Your blurred vision gets worse  · Your blurred vision is worse in the morning  · You have a sudden headache or eye pain  · Your eye has swelling, redness, or discharge  · You see floaters, flashes of light, fine dots, or cobweb shapes  · You have questions or concerns about your condition or care  Medicines: You may  need any of the following:  · Prescription pain medicine  may be given  Ask how to take this medicine safely  · Antibiotics  help prevent or treat an eye infection caused by bacteria  It may be given as eyedrops or an ointment  · Take your medicine as directed  Contact your healthcare provider if you think your medicine is not helping or if you have side effects  Tell him of her if you are allergic to any medicine  Keep a list of the medicines, vitamins, and herbs you take  Include the amounts, and when and why you take them  Bring the list or the pill bottles to follow-up visits  Carry your medicine list with you in case of an emergency  Manage your blurred vision:  Your healthcare provider may ask you to do any of the following:  · Use artificial tears  to keep your eye moist or to soothe your irritated eye  · Apply a cool compress  to decrease any swelling or pain  Wet a clean washcloth with cool water and place it on your eye  Use the cool compress as often as directed  · Wear an eye patch as directed  to protect your eye  Follow up with your healthcare provider as directed: You may need other eye exams and medicines  Write down your questions so you remember to ask them during your visits  © 2017 [address] [employer] Information is for End User's use only and may not be sold, redistributed or otherwise used for commercial purposes  All illustrations and images included in CareNotes® are the copyrighted property of [employer]  or [employer]  The above information is an  only  It is not intended as medical advice for individual conditions or treatments  Talk to your doctor, nurse or pharmacist before following any medical regimen to see if it is safe and effective for you  Chest Pain   WHAT YOU NEED TO KNOW:   Chest pain can be caused by a range of conditions, from not serious to life-threatening  Chest pain can be a symptom of a digestive problem, such as acid reflux or a stomach ulcer  An anxiety attack or a strong emotion, such as anger, can also cause chest pain  Infection, inflammation, or a fracture in the bones or cartilage in your chest can cause pain or discomfort  Sometimes chest pain or pressure is caused by poor blood flow to your heart (angina)  Chest pain may also be caused by life-threatening conditions such as a heart attack or blood clot in your lungs     DISCHARGE INSTRUCTIONS:   Call 911 if:   · You have any of the following signs of a heart attack:      ¨ Squeezing, pressure, or pain in your chest that lasts longer than 5 minutes or returns    ¨ Discomfort or pain in your back, neck, jaw, stomach, or arm     ¨ Trouble breathing    ¨ Nausea or vomiting    ¨ Lightheadedness or a sudden cold sweat, especially with chest pain or trouble breathing    Return to the emergency department if:   · You have chest discomfort that gets worse, even with medicine  · You cough or vomit blood  · Your bowel movements are black or bloody  · You cannot stop vomiting, or it hurts to swallow  Contact your healthcare provider if:   · You have questions or concerns about your condition or care  Medicines:   · Medicines  may be given to treat the cause of your chest pain  Examples include pain medicine, anxiety medicine, or medicines to increase blood flow to your heart  · Do not take certain medicines without asking your healthcare provider first   These include NSAIDs, herbal or vitamin supplements, or hormones (estrogen or progestin)  · Take your medicine as directed  Contact your healthcare provider if you think your medicine is not helping or if you have side effects  Tell him or her if you are allergic to any medicine  Keep a list of the medicines, vitamins, and herbs you take  Include the amounts, and when and why you take them  Bring the list or the pill bottles to follow-up visits  Carry your medicine list with you in case of an emergency  Follow up with your healthcare provider within 72 hours, or as directed: You may need to return for more tests to find the cause of your chest pain  You may be referred to a specialist, such as a cardiologist or gastroenterologist  Write down your questions so you remember to ask them during your visits  Healthy living tips: The following are general healthy guidelines  If your chest pain is caused by a heart problem, your healthcare provider will give you specific guidelines to follow  · Do not smoke    Nicotine and other chemicals in cigarettes and cigars can cause lung and heart damage  Ask your healthcare provider for information if you currently smoke and need help to quit  E-cigarettes or smokeless tobacco still contain nicotine  Talk to your healthcare provider before you use these products  · Eat a variety of healthy, low-fat foods  Healthy foods include fruits, vegetables, whole-grain breads, low-fat dairy products, beans, lean meats, and fish  Ask for more information about a heart healthy diet  · Ask about activity  Your healthcare provider will tell you which activities to limit or avoid  Ask when you can drive, return to work, and have sex  Ask about the best exercise plan for you  · Maintain a healthy weight  Ask your healthcare provider how much you should weigh  Ask him or her to help you create a weight loss plan if you are overweight  · Get the flu and pneumonia vaccines  All adults should get the influenza (flu) vaccine  Get it every year as soon as it becomes available  The pneumococcal vaccine is given to adults aged 72 years or older  The vaccine is given every 5 years to prevent pneumococcal disease, such as pneumonia  © 2017 [address] [facility] Information is for End User's use only and may not be sold, redistributed or otherwise used for commercial purposes  All illustrations and images included in CareNotes® are the copyrighted property of A D A M [employer] Inc  or [employer]  The above information is an  only  It is not intended as medical advice for individual conditions or treatments  Talk to your doctor, nurse or pharmacist before following any medical regimen to see if it is safe and effective for you  tachycardia tachycardia tachycardia

## 2020-05-26 ENCOUNTER — APPOINTMENT (EMERGENCY)
Dept: RADIOLOGY | Facility: HOSPITAL | Age: 65
End: 2020-05-26
Payer: MEDICARE

## 2020-05-26 ENCOUNTER — HOSPITAL ENCOUNTER (EMERGENCY)
Facility: HOSPITAL | Age: 65
Discharge: HOME | End: 2020-05-26
Attending: INTERNAL MEDICINE | Admitting: INTERNAL MEDICINE
Payer: MEDICARE

## 2020-05-26 VITALS
HEIGHT: 70 IN | OXYGEN SATURATION: 96 % | HEART RATE: 106 BPM | BODY MASS INDEX: 31.5 KG/M2 | SYSTOLIC BLOOD PRESSURE: 160 MMHG | WEIGHT: 220 LBS | DIASTOLIC BLOOD PRESSURE: 100 MMHG | RESPIRATION RATE: 16 BRPM

## 2020-05-26 DIAGNOSIS — J44.1 COPD EXACERBATION (HCC): Primary | ICD-10-CM

## 2020-05-26 DIAGNOSIS — J44.1 CHRONIC OBSTRUCTIVE PULMONARY DISEASE WITH ACUTE EXACERBATION (HCC): ICD-10-CM

## 2020-05-26 LAB
ALBUMIN SERPL BCP-MCNC: 4.1 G/DL (ref 3.5–5.7)
ALP SERPL-CCNC: 70 U/L (ref 55–165)
ALT SERPL W P-5'-P-CCNC: 16 U/L (ref 7–52)
ANION GAP SERPL CALCULATED.3IONS-SCNC: 10 MMOL/L (ref 4–13)
APTT PPP: 26 SECONDS (ref 23–37)
AST SERPL W P-5'-P-CCNC: 14 U/L (ref 13–39)
BASOPHILS # BLD AUTO: 0.1 THOUSANDS/ΜL (ref 0–0.1)
BASOPHILS NFR BLD AUTO: 1 % (ref 0–2)
BILIRUB SERPL-MCNC: 0.5 MG/DL (ref 0.2–1)
BNP SERPL-MCNC: 40 PG/ML (ref 1–100)
BUN SERPL-MCNC: 9 MG/DL (ref 7–25)
CALCIUM SERPL-MCNC: 8.8 MG/DL (ref 8.6–10.5)
CHLORIDE SERPL-SCNC: 102 MMOL/L (ref 98–107)
CO2 SERPL-SCNC: 25 MMOL/L (ref 21–31)
CREAT SERPL-MCNC: 0.95 MG/DL (ref 0.7–1.3)
EOSINOPHIL # BLD AUTO: 0.3 THOUSAND/ΜL (ref 0–0.61)
EOSINOPHIL NFR BLD AUTO: 4 % (ref 0–5)
ERYTHROCYTE [DISTWIDTH] IN BLOOD BY AUTOMATED COUNT: 12.9 % (ref 11.5–14.5)
GFR SERPL CREATININE-BSD FRML MDRD: 84 ML/MIN/1.73SQ M
GLUCOSE SERPL-MCNC: 121 MG/DL (ref 65–99)
HCT VFR BLD AUTO: 40.5 % (ref 42–47)
HGB BLD-MCNC: 14 G/DL (ref 14–18)
INR PPP: 1.06 (ref 0.84–1.19)
LYMPHOCYTES # BLD AUTO: 2 THOUSANDS/ΜL (ref 0.6–4.47)
LYMPHOCYTES NFR BLD AUTO: 24 % (ref 21–51)
MCH RBC QN AUTO: 30.8 PG (ref 26–34)
MCHC RBC AUTO-ENTMCNC: 34.6 G/DL (ref 31–37)
MCV RBC AUTO: 89 FL (ref 81–99)
MONOCYTES # BLD AUTO: 0.5 THOUSAND/ΜL (ref 0.17–1.22)
MONOCYTES NFR BLD AUTO: 6 % (ref 2–12)
NEUTROPHILS # BLD AUTO: 5.3 THOUSANDS/ΜL (ref 1.4–6.5)
NEUTS SEG NFR BLD AUTO: 65 % (ref 42–75)
PLATELET # BLD AUTO: 246 THOUSANDS/UL (ref 149–390)
PMV BLD AUTO: 7 FL (ref 8.6–11.7)
POTASSIUM SERPL-SCNC: 3.1 MMOL/L (ref 3.5–5.5)
PROT SERPL-MCNC: 6 G/DL (ref 6.4–8.9)
PROTHROMBIN TIME: 13.3 SECONDS (ref 11.6–14.5)
RBC # BLD AUTO: 4.56 MILLION/UL (ref 4.3–5.9)
SODIUM SERPL-SCNC: 137 MMOL/L (ref 134–143)
TROPONIN I SERPL-MCNC: <0.03 NG/ML
WBC # BLD AUTO: 8.2 THOUSAND/UL (ref 4.8–10.8)

## 2020-05-26 PROCEDURE — 85730 THROMBOPLASTIN TIME PARTIAL: CPT | Performed by: INTERNAL MEDICINE

## 2020-05-26 PROCEDURE — 99285 EMERGENCY DEPT VISIT HI MDM: CPT | Performed by: INTERNAL MEDICINE

## 2020-05-26 PROCEDURE — 71045 X-RAY EXAM CHEST 1 VIEW: CPT

## 2020-05-26 PROCEDURE — 96374 THER/PROPH/DIAG INJ IV PUSH: CPT

## 2020-05-26 PROCEDURE — 85025 COMPLETE CBC W/AUTO DIFF WBC: CPT | Performed by: INTERNAL MEDICINE

## 2020-05-26 PROCEDURE — 36415 COLL VENOUS BLD VENIPUNCTURE: CPT | Performed by: INTERNAL MEDICINE

## 2020-05-26 PROCEDURE — 94640 AIRWAY INHALATION TREATMENT: CPT

## 2020-05-26 PROCEDURE — 94640 AIRWAY INHALATION TREATMENT: CPT | Performed by: INTERNAL MEDICINE

## 2020-05-26 PROCEDURE — 99285 EMERGENCY DEPT VISIT HI MDM: CPT

## 2020-05-26 PROCEDURE — 83880 ASSAY OF NATRIURETIC PEPTIDE: CPT | Performed by: INTERNAL MEDICINE

## 2020-05-26 PROCEDURE — 93005 ELECTROCARDIOGRAM TRACING: CPT

## 2020-05-26 PROCEDURE — 80053 COMPREHEN METABOLIC PANEL: CPT | Performed by: INTERNAL MEDICINE

## 2020-05-26 PROCEDURE — 85610 PROTHROMBIN TIME: CPT | Performed by: INTERNAL MEDICINE

## 2020-05-26 PROCEDURE — 84484 ASSAY OF TROPONIN QUANT: CPT | Performed by: INTERNAL MEDICINE

## 2020-05-26 RX ORDER — CEFUROXIME AXETIL 250 MG/1
250 TABLET ORAL EVERY 12 HOURS SCHEDULED
Qty: 14 TABLET | Refills: 0 | Status: SHIPPED | OUTPATIENT
Start: 2020-05-26 | End: 2020-06-02

## 2020-05-26 RX ORDER — METHYLPREDNISOLONE SODIUM SUCCINATE 125 MG/2ML
80 INJECTION, POWDER, LYOPHILIZED, FOR SOLUTION INTRAMUSCULAR; INTRAVENOUS ONCE
Status: COMPLETED | OUTPATIENT
Start: 2020-05-26 | End: 2020-05-26

## 2020-05-26 RX ORDER — ALBUTEROL SULFATE 2.5 MG/3ML
2.5 SOLUTION RESPIRATORY (INHALATION) ONCE
Status: COMPLETED | OUTPATIENT
Start: 2020-05-26 | End: 2020-05-26

## 2020-05-26 RX ORDER — PREDNISONE 10 MG/1
TABLET ORAL
Qty: 20 TABLET | Refills: 0 | Status: SHIPPED | OUTPATIENT
Start: 2020-05-26 | End: 2020-07-01 | Stop reason: ALTCHOICE

## 2020-05-26 RX ORDER — CEFUROXIME AXETIL 250 MG/1
250 TABLET ORAL ONCE
Status: COMPLETED | OUTPATIENT
Start: 2020-05-26 | End: 2020-05-26

## 2020-05-26 RX ORDER — ALBUTEROL SULFATE 2.5 MG/3ML
2.5 SOLUTION RESPIRATORY (INHALATION) EVERY 6 HOURS PRN
Qty: 25 VIAL | Refills: 2 | Status: SHIPPED | OUTPATIENT
Start: 2020-05-26

## 2020-05-26 RX ADMIN — ALBUTEROL SULFATE 2.5 MG: 2.5 SOLUTION RESPIRATORY (INHALATION) at 21:40

## 2020-05-26 RX ADMIN — METHYLPREDNISOLONE SODIUM SUCCINATE 80 MG: 125 INJECTION, POWDER, FOR SOLUTION INTRAMUSCULAR; INTRAVENOUS at 23:15

## 2020-05-26 RX ADMIN — ALBUTEROL SULFATE 2.5 MG: 2.5 SOLUTION RESPIRATORY (INHALATION) at 23:16

## 2020-05-26 RX ADMIN — CEFUROXIME AXETIL 250 MG: 250 TABLET, FILM COATED ORAL at 23:14

## 2020-05-27 LAB
ATRIAL RATE: 106 BPM
P AXIS: 51 DEGREES
PR INTERVAL: 152 MS
QRS AXIS: 87 DEGREES
QRSD INTERVAL: 90 MS
QT INTERVAL: 382 MS
QTC INTERVAL: 507 MS
T WAVE AXIS: 73 DEGREES
VENTRICULAR RATE: 106 BPM

## 2020-05-27 PROCEDURE — 93010 ELECTROCARDIOGRAM REPORT: CPT | Performed by: INTERNAL MEDICINE

## 2020-07-01 ENCOUNTER — HOSPITAL ENCOUNTER (OUTPATIENT)
Facility: HOSPITAL | Age: 65
Setting detail: OBSERVATION
Discharge: HOME | End: 2020-07-02
Attending: EMERGENCY MEDICINE | Admitting: INTERNAL MEDICINE
Payer: MEDICARE

## 2020-07-01 ENCOUNTER — APPOINTMENT (EMERGENCY)
Dept: CT IMAGING | Facility: HOSPITAL | Age: 65
End: 2020-07-01
Payer: MEDICARE

## 2020-07-01 ENCOUNTER — APPOINTMENT (EMERGENCY)
Dept: RADIOLOGY | Facility: HOSPITAL | Age: 65
End: 2020-07-01
Payer: MEDICARE

## 2020-07-01 ENCOUNTER — APPOINTMENT (OUTPATIENT)
Dept: NON INVASIVE DIAGNOSTICS | Facility: HOSPITAL | Age: 65
End: 2020-07-01
Payer: MEDICARE

## 2020-07-01 DIAGNOSIS — J44.9 COPD (CHRONIC OBSTRUCTIVE PULMONARY DISEASE) (HCC): ICD-10-CM

## 2020-07-01 DIAGNOSIS — E87.6 HYPOKALEMIA: ICD-10-CM

## 2020-07-01 DIAGNOSIS — Z72.0 TOBACCO USE: ICD-10-CM

## 2020-07-01 DIAGNOSIS — Z79.01 CHRONIC ANTICOAGULATION: ICD-10-CM

## 2020-07-01 DIAGNOSIS — Z95.5 H/O HEART ARTERY STENT: ICD-10-CM

## 2020-07-01 DIAGNOSIS — R07.9 CHEST PAIN, UNSPECIFIED TYPE: Primary | ICD-10-CM

## 2020-07-01 DIAGNOSIS — I25.10 CAD (CORONARY ARTERY DISEASE): ICD-10-CM

## 2020-07-01 DIAGNOSIS — T80.1XXA INTRAVENOUS INFILTRATION, INITIAL ENCOUNTER: ICD-10-CM

## 2020-07-01 DIAGNOSIS — R07.9 CHEST PAIN: ICD-10-CM

## 2020-07-01 DIAGNOSIS — I25.10 CORONARY ARTERIOSCLEROSIS IN NATIVE ARTERY: ICD-10-CM

## 2020-07-01 DIAGNOSIS — I63.511 ACUTE RIGHT MCA STROKE (HCC): ICD-10-CM

## 2020-07-01 LAB
ALBUMIN SERPL BCP-MCNC: 3.7 G/DL (ref 3.5–5)
ALP SERPL-CCNC: 91 U/L (ref 46–116)
ALT SERPL W P-5'-P-CCNC: 21 U/L (ref 12–78)
ANION GAP SERPL CALCULATED.3IONS-SCNC: 9 MMOL/L (ref 4–13)
APTT PPP: 25 SECONDS (ref 23–37)
AST SERPL W P-5'-P-CCNC: 17 U/L (ref 5–45)
ATRIAL RATE: 78 BPM
ATRIAL RATE: 92 BPM
BASOPHILS # BLD AUTO: 0.07 THOUSANDS/ΜL (ref 0–0.1)
BASOPHILS NFR BLD AUTO: 1 % (ref 0–1)
BILIRUB SERPL-MCNC: 0.33 MG/DL (ref 0.2–1)
BUN SERPL-MCNC: 12 MG/DL (ref 5–25)
CALCIUM SERPL-MCNC: 8.6 MG/DL (ref 8.3–10.1)
CHLORIDE SERPL-SCNC: 101 MMOL/L (ref 100–108)
CK SERPL-CCNC: 80 U/L (ref 39–308)
CO2 SERPL-SCNC: 28 MMOL/L (ref 21–32)
CREAT SERPL-MCNC: 1.16 MG/DL (ref 0.6–1.3)
EOSINOPHIL # BLD AUTO: 0.21 THOUSAND/ΜL (ref 0–0.61)
EOSINOPHIL NFR BLD AUTO: 2 % (ref 0–6)
ERYTHROCYTE [DISTWIDTH] IN BLOOD BY AUTOMATED COUNT: 11.9 % (ref 11.6–15.1)
GFR SERPL CREATININE-BSD FRML MDRD: 66 ML/MIN/1.73SQ M
GLUCOSE SERPL-MCNC: 105 MG/DL (ref 65–140)
HCT VFR BLD AUTO: 40.1 % (ref 36.5–49.3)
HGB BLD-MCNC: 14 G/DL (ref 12–17)
IMM GRANULOCYTES # BLD AUTO: 0.04 THOUSAND/UL (ref 0–0.2)
IMM GRANULOCYTES NFR BLD AUTO: 0 % (ref 0–2)
INR PPP: 0.93 (ref 0.84–1.19)
LYMPHOCYTES # BLD AUTO: 2.8 THOUSANDS/ΜL (ref 0.6–4.47)
LYMPHOCYTES NFR BLD AUTO: 27 % (ref 14–44)
MAGNESIUM SERPL-MCNC: 1.9 MG/DL (ref 1.6–2.6)
MCH RBC QN AUTO: 31.1 PG (ref 26.8–34.3)
MCHC RBC AUTO-ENTMCNC: 34.9 G/DL (ref 31.4–37.4)
MCV RBC AUTO: 89 FL (ref 82–98)
MONOCYTES # BLD AUTO: 0.55 THOUSAND/ΜL (ref 0.17–1.22)
MONOCYTES NFR BLD AUTO: 5 % (ref 4–12)
NEUTROPHILS # BLD AUTO: 6.9 THOUSANDS/ΜL (ref 1.85–7.62)
NEUTS SEG NFR BLD AUTO: 65 % (ref 43–75)
NRBC BLD AUTO-RTO: 0 /100 WBCS
P AXIS: 59 DEGREES
P AXIS: 69 DEGREES
PLATELET # BLD AUTO: 259 THOUSANDS/UL (ref 149–390)
PMV BLD AUTO: 9.1 FL (ref 8.9–12.7)
POTASSIUM SERPL-SCNC: 3.1 MMOL/L (ref 3.5–5.3)
PR INTERVAL: 146 MS
PR INTERVAL: 166 MS
PROT SERPL-MCNC: 6.7 G/DL (ref 6.4–8.2)
PROTHROMBIN TIME: 12.6 SECONDS (ref 11.6–14.5)
QRS AXIS: 86 DEGREES
QRS AXIS: 88 DEGREES
QRSD INTERVAL: 84 MS
QRSD INTERVAL: 94 MS
QT INTERVAL: 372 MS
QT INTERVAL: 400 MS
QTC INTERVAL: 456 MS
QTC INTERVAL: 460 MS
RBC # BLD AUTO: 4.5 MILLION/UL (ref 3.88–5.62)
SARS-COV-2 RNA RESP QL NAA+PROBE: NEGATIVE
SODIUM SERPL-SCNC: 138 MMOL/L (ref 136–145)
T WAVE AXIS: 65 DEGREES
T WAVE AXIS: 72 DEGREES
TROPONIN I SERPL-MCNC: <0.02 NG/ML
VENTRICULAR RATE: 78 BPM
VENTRICULAR RATE: 92 BPM
WBC # BLD AUTO: 10.57 THOUSAND/UL (ref 4.31–10.16)

## 2020-07-01 PROCEDURE — 1123F ACP DISCUSS/DSCN MKR DOCD: CPT

## 2020-07-01 PROCEDURE — 93923 UPR/LXTR ART STDY 3+ LVLS: CPT

## 2020-07-01 PROCEDURE — 99220 PR INITIAL OBSERVATION CARE/DAY 70 MINUTES: CPT | Performed by: INTERNAL MEDICINE

## 2020-07-01 PROCEDURE — 94640 AIRWAY INHALATION TREATMENT: CPT

## 2020-07-01 PROCEDURE — 85730 THROMBOPLASTIN TIME PARTIAL: CPT | Performed by: NURSE PRACTITIONER

## 2020-07-01 PROCEDURE — 93005 ELECTROCARDIOGRAM TRACING: CPT

## 2020-07-01 PROCEDURE — 93923 UPR/LXTR ART STDY 3+ LVLS: CPT | Performed by: SURGERY

## 2020-07-01 PROCEDURE — 99284 EMERGENCY DEPT VISIT MOD MDM: CPT | Performed by: NURSE PRACTITIONER

## 2020-07-01 PROCEDURE — 74174 CTA ABD&PLVS W/CONTRAST: CPT

## 2020-07-01 PROCEDURE — 82550 ASSAY OF CK (CPK): CPT | Performed by: NURSE PRACTITIONER

## 2020-07-01 PROCEDURE — 85610 PROTHROMBIN TIME: CPT | Performed by: NURSE PRACTITIONER

## 2020-07-01 PROCEDURE — 99215 OFFICE O/P EST HI 40 MIN: CPT

## 2020-07-01 PROCEDURE — 87635 SARS-COV-2 COVID-19 AMP PRB: CPT | Performed by: NURSE PRACTITIONER

## 2020-07-01 PROCEDURE — 71275 CT ANGIOGRAPHY CHEST: CPT

## 2020-07-01 PROCEDURE — 85025 COMPLETE CBC W/AUTO DIFF WBC: CPT | Performed by: NURSE PRACTITIONER

## 2020-07-01 PROCEDURE — 93010 ELECTROCARDIOGRAM REPORT: CPT | Performed by: INTERNAL MEDICINE

## 2020-07-01 PROCEDURE — 36415 COLL VENOUS BLD VENIPUNCTURE: CPT | Performed by: NURSE PRACTITIONER

## 2020-07-01 PROCEDURE — 84484 ASSAY OF TROPONIN QUANT: CPT | Performed by: INTERNAL MEDICINE

## 2020-07-01 PROCEDURE — 80053 COMPREHEN METABOLIC PANEL: CPT | Performed by: NURSE PRACTITIONER

## 2020-07-01 PROCEDURE — 84484 ASSAY OF TROPONIN QUANT: CPT | Performed by: NURSE PRACTITIONER

## 2020-07-01 PROCEDURE — 83735 ASSAY OF MAGNESIUM: CPT | Performed by: NURSE PRACTITIONER

## 2020-07-01 PROCEDURE — 71045 X-RAY EXAM CHEST 1 VIEW: CPT

## 2020-07-01 PROCEDURE — 99285 EMERGENCY DEPT VISIT HI MDM: CPT

## 2020-07-01 RX ORDER — NITROGLYCERIN 0.4 MG/1
0.4 TABLET SUBLINGUAL
Status: DISCONTINUED | OUTPATIENT
Start: 2020-07-01 | End: 2020-07-02 | Stop reason: HOSPADM

## 2020-07-01 RX ORDER — ATORVASTATIN CALCIUM 40 MG/1
40 TABLET, FILM COATED ORAL EVERY EVENING
Status: DISCONTINUED | OUTPATIENT
Start: 2020-07-01 | End: 2020-07-02 | Stop reason: HOSPADM

## 2020-07-01 RX ORDER — IPRATROPIUM BROMIDE AND ALBUTEROL SULFATE 2.5; .5 MG/3ML; MG/3ML
3 SOLUTION RESPIRATORY (INHALATION) ONCE
Status: COMPLETED | OUTPATIENT
Start: 2020-07-01 | End: 2020-07-01

## 2020-07-01 RX ORDER — POTASSIUM CHLORIDE 20 MEQ/1
40 TABLET, EXTENDED RELEASE ORAL DAILY
Status: DISCONTINUED | OUTPATIENT
Start: 2020-07-02 | End: 2020-07-02 | Stop reason: HOSPADM

## 2020-07-01 RX ORDER — POTASSIUM CHLORIDE 20 MEQ/1
40 TABLET, EXTENDED RELEASE ORAL ONCE
Status: COMPLETED | OUTPATIENT
Start: 2020-07-01 | End: 2020-07-01

## 2020-07-01 RX ORDER — FLUTICASONE PROPIONATE 50 MCG
1 SPRAY, SUSPENSION (ML) NASAL DAILY
Status: DISCONTINUED | OUTPATIENT
Start: 2020-07-01 | End: 2020-07-02 | Stop reason: HOSPADM

## 2020-07-01 RX ORDER — OXYCODONE HYDROCHLORIDE 5 MG/1
5 TABLET ORAL EVERY 4 HOURS PRN
Status: DISCONTINUED | OUTPATIENT
Start: 2020-07-01 | End: 2020-07-02 | Stop reason: HOSPADM

## 2020-07-01 RX ORDER — ONDANSETRON 2 MG/ML
4 INJECTION INTRAMUSCULAR; INTRAVENOUS EVERY 6 HOURS PRN
Status: DISCONTINUED | OUTPATIENT
Start: 2020-07-01 | End: 2020-07-02 | Stop reason: HOSPADM

## 2020-07-01 RX ORDER — PANTOPRAZOLE SODIUM 40 MG/1
40 TABLET, DELAYED RELEASE ORAL DAILY
Status: DISCONTINUED | OUTPATIENT
Start: 2020-07-01 | End: 2020-07-02 | Stop reason: HOSPADM

## 2020-07-01 RX ORDER — CLOPIDOGREL BISULFATE 75 MG/1
75 TABLET ORAL DAILY
Status: DISCONTINUED | OUTPATIENT
Start: 2020-07-01 | End: 2020-07-02 | Stop reason: HOSPADM

## 2020-07-01 RX ORDER — LORAZEPAM 1 MG/1
0.5 TABLET ORAL 2 TIMES DAILY PRN
Status: DISCONTINUED | OUTPATIENT
Start: 2020-07-01 | End: 2020-07-02 | Stop reason: HOSPADM

## 2020-07-01 RX ORDER — ACETAMINOPHEN 325 MG/1
650 TABLET ORAL EVERY 6 HOURS PRN
Status: DISCONTINUED | OUTPATIENT
Start: 2020-07-01 | End: 2020-07-02 | Stop reason: HOSPADM

## 2020-07-01 RX ORDER — LORAZEPAM 0.5 MG/1
0.5 TABLET ORAL ONCE
Status: COMPLETED | OUTPATIENT
Start: 2020-07-01 | End: 2020-07-01

## 2020-07-01 RX ORDER — ASPIRIN 81 MG/1
81 TABLET ORAL DAILY
Status: DISCONTINUED | OUTPATIENT
Start: 2020-07-01 | End: 2020-07-02 | Stop reason: HOSPADM

## 2020-07-01 RX ORDER — FUROSEMIDE 40 MG/1
40 TABLET ORAL DAILY
Status: DISCONTINUED | OUTPATIENT
Start: 2020-07-01 | End: 2020-07-02 | Stop reason: HOSPADM

## 2020-07-01 RX ORDER — CLOPIDOGREL BISULFATE 75 MG/1
75 TABLET ORAL DAILY
COMMUNITY
End: 2020-08-11

## 2020-07-01 RX ORDER — HEPARIN SODIUM 5000 [USP'U]/ML
5000 INJECTION, SOLUTION INTRAVENOUS; SUBCUTANEOUS EVERY 8 HOURS SCHEDULED
Status: DISCONTINUED | OUTPATIENT
Start: 2020-07-01 | End: 2020-07-02 | Stop reason: HOSPADM

## 2020-07-01 RX ADMIN — HEPARIN SODIUM 5000 UNITS: 5000 INJECTION INTRAVENOUS; SUBCUTANEOUS at 14:06

## 2020-07-01 RX ADMIN — TIOTROPIUM BROMIDE 18 MCG: 18 CAPSULE ORAL; RESPIRATORY (INHALATION) at 14:06

## 2020-07-01 RX ADMIN — POTASSIUM CHLORIDE 40 MEQ: 1500 TABLET, EXTENDED RELEASE ORAL at 05:11

## 2020-07-01 RX ADMIN — IPRATROPIUM BROMIDE AND ALBUTEROL SULFATE 3 ML: 2.5; .5 SOLUTION RESPIRATORY (INHALATION) at 09:14

## 2020-07-01 RX ADMIN — FUROSEMIDE 40 MG: 40 TABLET ORAL at 14:04

## 2020-07-01 RX ADMIN — OXYCODONE HYDROCHLORIDE 5 MG: 5 TABLET ORAL at 14:04

## 2020-07-01 RX ADMIN — FLUTICASONE PROPIONATE 1 SPRAY: 50 SPRAY, METERED NASAL at 14:06

## 2020-07-01 RX ADMIN — ASPIRIN 81 MG: 81 TABLET, COATED ORAL at 14:05

## 2020-07-01 RX ADMIN — LORAZEPAM 0.5 MG: 0.5 TABLET ORAL at 05:11

## 2020-07-01 RX ADMIN — ATORVASTATIN CALCIUM 40 MG: 40 TABLET, FILM COATED ORAL at 18:08

## 2020-07-01 RX ADMIN — PANTOPRAZOLE SODIUM 40 MG: 40 TABLET, DELAYED RELEASE ORAL at 14:05

## 2020-07-01 RX ADMIN — IOHEXOL 100 ML: 350 INJECTION, SOLUTION INTRAVENOUS at 06:55

## 2020-07-01 RX ADMIN — HEPARIN SODIUM 5000 UNITS: 5000 INJECTION INTRAVENOUS; SUBCUTANEOUS at 21:26

## 2020-07-01 RX ADMIN — CLOPIDOGREL BISULFATE 75 MG: 75 TABLET ORAL at 14:06

## 2020-07-01 RX ADMIN — TIOTROPIUM BROMIDE 18 MCG: 18 CAPSULE ORAL; RESPIRATORY (INHALATION) at 09:14

## 2020-07-01 NOTE — PLAN OF CARE
Problem: Potential for Falls  Goal: Patient will remain free of falls  Description  INTERVENTIONS:  - Assess patient frequently for physical needs  -  Identify cognitive and physical deficits and behaviors that affect risk of falls    -  [place] fall precautions as indicated by assessment   - Educate patient/family on patient safety including physical limitations  - Instruct patient to call for assistance with activity based on assessment  - Modify environment to reduce risk of injury  - Consider OT/PT consult to assist with strengthening/mobility  7/1/2020 1614 by Alex Coon RN  Outcome: Progressing  7/1/2020 1613 by Alex Coon RN  Outcome: Progressing     Problem: PAIN - ADULT  Goal: Verbalizes/displays adequate comfort level or baseline comfort level  Description  Interventions:  - Encourage patient to monitor pain and request assistance  - Assess pain using appropriate pain scale  - Administer analgesics based on type and severity of pain and evaluate response  - Implement non-pharmacological measures as appropriate and evaluate response  - Consider cultural and social influences on pain and pain management  - Notify physician/advanced practitioner if interventions unsuccessful or patient reports new pain  Outcome: Progressing     Problem: INFECTION - ADULT  Goal: Absence or prevention of progression during hospitalization  Description  INTERVENTIONS:  - Assess and monitor for signs and symptoms of infection  - Monitor lab/diagnostic results  - Monitor all insertion sites, i e  indwelling lines, tubes, and drains  - Monitor endotracheal if appropriate and nasal secretions for changes in amount and color  - [place] appropriate cooling/warming therapies per order  - Administer medications as ordered  - Instruct and encourage patient and family to use good hand hygiene technique  - Identify and instruct in appropriate isolation precautions for identified infection/condition  Outcome: Progressing  Goal: Absence of fever/infection during neutropenic period  Description  INTERVENTIONS:  - Monitor WBC    Outcome: Progressing     Problem: SAFETY ADULT  Goal: Patient will remain free of falls  Description  INTERVENTIONS:  - Assess patient frequently for physical needs  -  Identify cognitive and physical deficits and behaviors that affect risk of falls    -  [place] fall precautions as indicated by assessment   - Educate patient/family on patient safety including physical limitations  - Instruct patient to call for assistance with activity based on assessment  - Modify environment to reduce risk of injury  - Consider OT/PT consult to assist with strengthening/mobility  7/1/2020 1614 by Tess Johnson RN  Outcome: Progressing  7/1/2020 1613 by Tess Johnson RN  Outcome: Progressing  Goal: Maintain or return to baseline ADL function  Description  INTERVENTIONS:  -  Assess patient's ability to carry out ADLs; assess patient's baseline for ADL function and identify physical deficits which impact ability to perform ADLs (bathing, care of mouth/teeth, toileting, grooming, dressing, etc )  - Assess/evaluate cause of self-care deficits   - Assess range of motion  - Assess patient's mobility; develop plan if impaired  - Assess patient's need for assistive devices and provide as appropriate  - Encourage maximum independence but intervene and supervise when necessary  - Involve family in performance of ADLs  - Assess for home care needs following discharge   - Consider OT consult to assist with ADL evaluation and planning for discharge  - Provide patient education as appropriate  Outcome: Progressing  Goal: Maintain or return mobility status to optimal level  Description  INTERVENTIONS:  - Assess patient's baseline mobility status (ambulation, transfers, stairs, etc )    - Identify cognitive and physical deficits and behaviors that affect mobility  - Identify mobility aids required to assist with transfers and/or ambulation (gait belt, sit-to-stand, lift, walker, cane, etc )  - [place] fall precautions as indicated by assessment  - Record patient progress and toleration of activity level on Mobility SBAR; progress patient to next Phase/Stage  - Instruct patient to call for assistance with activity based on assessment  - Consider rehabilitation consult to assist with strengthening/weightbearing, etc   Outcome: Progressing     Problem: DISCHARGE PLANNING  Goal: Discharge to home or other facility with appropriate resources  Description  INTERVENTIONS:  - Identify barriers to discharge w/patient and caregiver  - Arrange for needed discharge resources and transportation as appropriate  - Identify discharge learning needs (meds, wound care, etc )  - Arrange for interpretive services to assist at discharge as needed  - Refer to Case Management Department for coordinating discharge planning if the patient needs post-hospital services based on physician/advanced practitioner order or complex needs related to functional status, cognitive ability, or social support system  Outcome: Progressing     Problem: Knowledge Deficit  Goal: Patient/family/caregiver demonstrates understanding of disease process, treatment plan, medications, and discharge instructions  Description  Complete learning assessment and assess knowledge base    Interventions:  - Provide teaching at level of understanding  - Provide teaching via preferred learning methods  Outcome: Progressing

## 2020-07-01 NOTE — ED NOTES
Patient transported to [address] [facility], [facility]  07/01/20 0791

## 2020-07-01 NOTE — PLAN OF CARE
Problem: Potential for Falls  Goal: Patient will remain free of falls  Description  INTERVENTIONS:  - Assess patient frequently for physical needs  -  Identify cognitive and physical deficits and behaviors that affect risk of falls    -  [place] fall precautions as indicated by assessment   - Educate patient/family on patient safety including physical limitations  - Instruct patient to call for assistance with activity based on assessment  - Modify environment to reduce risk of injury  - Consider OT/PT consult to assist with strengthening/mobility  Outcome: Progressing

## 2020-07-01 NOTE — ASSESSMENT & PLAN NOTE
· Hypokalemia in the setting of furosemide use  · Replete and recheck in a m      Results from last 7 days   Lab Units 07/01/20  0425   POTASSIUM mmol/L 3 1*

## 2020-07-01 NOTE — H&P
H&P- Manjeet Barry 1955, 72 y o  male MRN: [id number]  Unit/Bed#: E4 -01 Encounter: [id number]  Primary Care Provider: Red Houston MD   Date and time admitted to hospital: 7/1/2020  4:17 AM        Assessment and Plan  * Coronary arteriosclerosis in native artery  Assessment & Plan  · Chest pain with history of coronary artery disease status post CABG 2000 follows with Dr Edmar Das  · Has had worsening chest discomfort with activity  · Trend cardiac enzymes and monitor on telemetry but have cardiology evaluate  · Continue clopidogrel and aspirin  · Patient was not taking atorvastatin as prescribed    Results from last 7 days   Lab Units 07/01/20  0722 07/01/20  0425   TROPONIN I ng/mL <0 02 <0 02       Hypokalemia  Assessment & Plan  · Hypokalemia in the setting of furosemide use  · Replete and recheck in a m  Results from last 7 days   Lab Units 07/01/20  0425   POTASSIUM mmol/L 3 1*       Gastroesophageal reflux disease  Assessment & Plan  · GERD continue pantoprazole    Anxiety and depression  Assessment & Plan  · Anxiety and depression  Continue lorazepam    COPD (chronic obstructive pulmonary disease) (HCC)  Assessment & Plan  · COPD without exacerbation  Continue Spiriva and Daliresp  Hypertension  Assessment & Plan  · Essential hypertension states that recently been discontinued on metoprolol succinate, irbesartan, and amlodipine  · Continue furosemide with potassium supplementation    VTE Prophylaxis: Heparin  Code Status: Level 1 - Full Code  Anticipated Length of Stay:  Patient will be admitted on an Observation basis with an anticipated length of stay of  less than 2 midnights  Justification for Hospital Stay: Coronary arteriosclerosis in native artery  Total Time for Visit, including Counseling / Coordination of Care: x  mins  Greater than 50% of this total time spent on direct patient counseling and coordination of care      Chief Complaint:     Chief Complaint   Patient presents with    Chest Pain     Pt reports having on and off chest pain for the past week  Pt states he woke up this morning with more "chest tightness"  Pt reports taking 6 asprin today  History of Present Illness:    Sruthi Gaytan is a 72 y o  male who presents with worsening chest pain  The patient states that since the new year he has had intermittent chest pain  He knew things were bad when he could not walk up a hill and smoke a cigarette at the same time  His cardiologist had recommended a nuclear stress test but then COVID-19 hit and he could not get this scheduled  He woke up yesterday with severe midline substernal chest pain that radiated to the back  This recurred intermittently throughout the day but the patient decided not to seek medical attention until 3:00 a m  this morning when he could not sleep any called EMS  At time examination he is still having some chest discomfort but no shortness of breath    Of note patient is complaining of arm pain due to extravasation of contrast    Review of Systems:  History obtained from chart review and the patient  General ROS: negative for - chills or fever  Psychological ROS: negative for - hallucinations or hostility  Ophthalmic ROS: negative for - blurry vision or loss of vision  Respiratory ROS: negative for - cough or shortness of breath  Cardiovascular ROS: positive for - chest pain  Gastrointestinal ROS: negative for - heartburn or hematemesis  Genito-Urinary ROS: negative for - hematuria  Musculoskeletal ROS: positive for - muscle pain  Neurological ROS: negative for - seizures  Otherwise, all other 12 point review of systems normal     Past Medical and Surgical History:   Past Medical History:   Diagnosis Date    [facility] 9/15/2018    CAD (coronary artery disease)     s/p CABG 2000    COPD (chronic obstructive pulmonary disease) ([facility] )     Grand mal status ([facility] ) 3/24/2019    Heart attack ([facility] )     Hyperlipidemia     Hypertension     Lexiscan nuclear stress test 2016    EF 74% Normal    TIA (transient ischemic attack) 2018     Past Surgical History:   Procedure Laterality Date    CARDIAC CATHETERIZATION  2015    LIMA occluded  No severe native lesions    COLONOSCOPY      CORONARY ARTERY BYPASS GRAFT  2000    HERNIA REPAIR      times 2     Meds/Allergies: Allergies: Allergies   Allergen Reactions    Gabapentin Headache     Prior to Admission Medications   Prescriptions Last Dose Informant Patient Reported? Taking?    LORazepam (ATIVAN) 0 5 mg tablet   No Yes   Sig: Take 1 tablet (0 5 mg total) by mouth 2 (two) times a day as needed for anxiety for up to 7 days   albuterol (2 5 mg/3 mL) 0 083 % nebulizer solution   No Yes   Sig: Take 1 vial (2 5 mg total) by nebulization every 6 (six) hours as needed for wheezing or shortness of breath   albuterol (PROVENTIL HFA,VENTOLIN HFA) 90 mcg/act inhaler   No Yes   Sig: Inhale 2 puffs every 4 (four) hours as needed for wheezing   aluminum-magnesium hydroxide 200-200 MG/5ML suspension   No Yes   Sig: Take 5 mL by mouth every 6 (six) hours as needed for heartburn   aspirin (ECOTRIN LOW STRENGTH) 81 mg EC tablet   No Yes   Sig: Take 1 tablet (81 mg total) by mouth daily   atorvastatin (LIPITOR) 40 mg tablet Not Taking at Unknown time  No No   Sig: Take 1 tablet (40 mg total) by mouth every evening   Patient not taking: Reported on 2020   calcium carbonate (TUMS EX) 750 mg chewable tablet   No Yes   Sig: Chew 1 tablet (750 mg total) 3 (three) times a day as needed for indigestion or heartburn   clopidogrel (PLAVIX) 75 mg tablet   Yes Yes   Sig: Take 75 mg by mouth daily   fluticasone (FLONASE) 50 mcg/act nasal spray   No Yes   Si spray into each nostril daily   furosemide (LASIX) 40 mg tablet   No Yes   Sig: Take 1 tablet (40 mg total) by mouth daily   Patient taking differently: Take 40 mg by mouth as needed    meloxicam (MOBIC) 15 mg tablet   Yes Yes   Sig: Take 15 mg by mouth daily as needed   nicotine (NICODERM CQ) 14 mg/24hr TD 24 hr patch   No Yes   Sig: Place 1 patch on the skin daily   nitroglycerin (NITROSTAT) 0 4 mg SL tablet   Yes Yes   Sig: Place 0 4 mg under the tongue every 5 (five) minutes as needed for chest pain   pantoprazole (PROTONIX) 40 mg tablet   No Yes   Sig: Take 1 tablet (40 mg total) by mouth daily   roflumilast (DALIRESP) 500 mcg tablet   Yes Yes   Sig: Take 500 mcg by mouth daily   solifenacin (VESICARE) 10 MG tablet   Yes Yes   Sig: Take by mouth Daily   tiotropium (SPIRIVA) 18 mcg inhalation capsule   No Yes   Sig: Place 1 capsule (18 mcg total) into inhaler and inhale daily      Facility-Administered Medications: None     Social History:     Social History     Socioeconomic History    Marital status: Single     Spouse name: Not on file    Number of children: Not on file    Years of education: Not on file    Highest education level: Not on file   Occupational History    Not on file   Social Needs    Financial resource strain: Not on file    Food insecurity:     Worry: Not on file     Inability: Not on file    Transportation needs:     Medical: Not on file     Non-medical: Not on file   Tobacco Use    Smoking status: Current Every Day Smoker     Packs/day: 1 00     Years: 50 00     Pack years: 50 00     Types: Cigars, Pipe    Smokeless tobacco: Never Used   Substance and Sexual Activity    Alcohol use: Not Currently    Drug use: Never    Sexual activity: Not Currently   Lifestyle    Physical activity:     Days per week: Not on file     Minutes per session: Not on file    Stress: Not on file   Relationships    Social connections:     Talks on phone: Not on file     Gets together: Not on file     Attends Oriental orthodox service: Not on file     Active member of club or organization: Not on file     Attends meetings of clubs or organizations: Not on file     Relationship status: Not on file    Intimate partner violence:     Fear of current or ex partner: Not on file     Emotionally abused: Not on file     Physically abused: Not on file     Forced sexual activity: Not on file   Other Topics Concern    Not on file   Social History Narrative    Not on file     Patient Pre-hospital Living Situation:   Patient Pre-hospital Level of Mobility:   Patient Pre-hospital Diet Restrictions:     Family History:  Family History   Problem Relation Age of Onset    Cancer Mother     Heart disease Mother     Cancer Father     Cancer Maternal Grandmother     Cancer Paternal Grandfather      Physical Exam:   Vitals:   Blood Pressure: 122/79 (07/01/20 1032)  Pulse: 78 (07/01/20 1032)  Temperature: 97 5 °F (36 4 °C) (07/01/20 1032)  Temp Source: Temporal (07/01/20 1032)  Respirations: 18 (07/01/20 1032)  Height: 5' 10" (177 8 cm) (07/01/20 1032)  Weight - Scale: 98 3 kg (216 lb 11 4 oz) (07/01/20 1032)  SpO2: 93 % (07/01/20 1032)    General appearance: alert, appears stated age and cooperative  Skin: Skin color, texture, turgor normal  No rashes or lesions  Head: Normocephalic, without obvious abnormality, atraumatic  Eyes: conjunctivae/corneas clear  PERRL, EOM's intact  Lungs: diminished breath sounds and wheezes  Heart: regular rate and rhythm  Abdomen: Soft nontender positive bowel sounds  Back: range of motion normal  Extremities: No lower extremity swelling, left upper extremity wrapped  Neurologic: Grossly normal  Psychiatric:  Appropriate mood    Lab Results: I have personally reviewed pertinent reports      Results from last 7 days   Lab Units 07/01/20  0425   WBC Thousand/uL 10 57*   HEMOGLOBIN g/dL 14 0   HEMATOCRIT % 40 1   PLATELETS Thousands/uL 259   NEUTROS PCT % 65   LYMPHS PCT % 27   MONOS PCT % 5   EOS PCT % 2     Results from last 7 days   Lab Units 07/01/20  0425   SODIUM mmol/L 138   POTASSIUM mmol/L 3 1*   CHLORIDE mmol/L 101   CO2 mmol/L 28   ANION GAP mmol/L 9   BUN mg/dL 12   CREATININE mg/dL 1 16   CALCIUM mg/dL 8 6   ALBUMIN g/dL 3  7   TOTAL BILIRUBIN mg/dL 0 33   ALK PHOS U/L 91   ALT U/L 21   AST U/L 17   EGFR ml/min/1 73sq m 66   GLUCOSE RANDOM mg/dL 105     Results from last 7 days   Lab Units 07/01/20  0425   INR  0 93     Results from last 7 days   Lab Units 07/01/20  0722 07/01/20  0425   CK TOTAL U/L  --  80   TROPONIN I ng/mL <0 02 <0 02                    Imaging: I have personally reviewed pertinent films in PACS  Xr Chest 1 View Portable    Result Date: 7/1/2020  Impression: No acute cardiopulmonary disease  Emphysema  Workstation performed: [id number]     Cta Dissection Protocol Chest Abdomen Pelvis W Wo Contrast    Result Date: 7/1/2020  Impression: 1  No CT evidence of aortic dissection  2   Ectasia of the ascending aorta with focal outpouching along the anterior, right lateral margin  In retrospect, this has not significantly changed from a prior CT lung screening performed February 2019  Findings likely represent a small saccular aneurysm  Recommend follow-up cardiothoracic surgery consultation  3   Moderate atherosclerotic disease of the abdominal aorta and its branch vessels  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  4   Colonic diverticulosis  5   Prostatomegaly with probable bladder outlet obstruction  Irregularity along the floor of the urinary bladder, likely due to enlarged prostate gland, however bladder neoplasm not excluded  Recommend follow-up urology consultation and cystoscopy  I personally discussed this study with Dr Keren Liriano on 7/1/2020 at 8:05 AM  Workstation performed: [id number]       EKG, Pathology, and Other Studies Reviewed on Admission:   EKG  Result Date: 07/01/20  Personally reviewed strips with impression of:  Normal sinus rhythm 92 bpm    Allscripts/ Epic Records Reviewed: Yes    ** Please Note: This note has been constructed using a voice recognition system   **

## 2020-07-01 NOTE — ED NOTES
Pt returned from [address] [facility] [employer] notified RN that line extravasated  RN noted L arm swelling  Arm was wrapped with ACE wrap and ice was applied   Forms filled out per protocol     Maria Teresa Anthony RN  07/01/20 4869

## 2020-07-01 NOTE — PROGRESS NOTES
Cardiology Consultation  MD Daniel Pittman MD Ronn Eng, DO, MD Hi Gifford DO, Shelby Ramirez DO, [facility]  ----------------------------------------------------------------  [place] [address] 72 y o  male MRN: [id number]  Unit/Bed#: E4 -01 Encounter: [id number]      07/01/20    Referring Physian: Sanjay Lara DO    Chief Complain/Reason for Referal:  Chest pain    IMPRESSION:  1  Exertional chest pain  2  Coronary artery disease status post CABG about 20 years ago  He thinks he had it done in [place]  3  Dyslipidemia  4  Hypertension  5  CVA in the past      DISCUSSION/RECOMMENDATIONS:   Initial cardiac enzymes are negative   EKG is nonischemic   Has been having off and on chest pain over the past several months  If cardiac enzyme trend remains negative would plan for pharmacologic nuclear stress tomorrow   Just had echocardiogram back in March with normal EF and no regional wall motion abnormalities   Unfortunately he continues to smoke  Counseled on smoking cessation today   Continue aspirin, Plavix, Lipitor, Lasix   Check lipid panel   Check arterial Dopplers of lower extremities  He does describe some intermittent claudication type symptoms as well  I reviewed his CTA of the chest abdomen and pelvis and he has a totally occluded internal iliac artery on the right   If cardiac enzymes are elevated with chest pain, may need direct coronary visualization as opposed to stress test       Gary Almonte DO, [facility]  --------------------------------------------------------  EKG: Normal sinus rhythm  Normal ECG  CTA  IMPRESSION:  1  No CT evidence of aortic dissection  2   Ectasia of the ascending aorta with focal outpouching along the anterior, right lateral margin    In retrospect, this has not significantly changed from a prior CT lung screening performed February 2019   Findings likely represent a small saccular   aneurysm  Recommend follow-up cardiothoracic surgery consultation  3   Moderate atherosclerotic disease of the abdominal aorta and its branch vessels  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  4   Colonic diverticulosis  5   Prostatomegaly with probable bladder outlet obstruction  Irregularity along the floor of the urinary bladder, likely due to enlarged prostate gland, however bladder neoplasm not excluded  Recommend follow-up urology consultation and cystoscopy    CXR: No acute cardiopulmonary disease  Emphysema  PRIOR STRESS TEST:   03/28/2019  SUMMARY:  -  Stress results: There was no chest pain during stress  -  ECG conclusions: The stress ECG was negative for ischemia and normal   -  Perfusion imaging: There was a moderate-sized, mildly severe, fixed myocardial perfusion defect of the inferior wall likely due to diaphragm attenuation   -  Gated SPECT: The calculated left ventricular ejection fraction was 65 %  There was no left ventricular regional abnormality  ECHO:   3/12/2020  LEFT VENTRICLE:  Systolic function was normal  Ejection fraction was estimated to be 60 %  There were no regional wall motion abnormalities      RIGHT VENTRICLE:  The ventricle was mildly dilated      AORTA:  The root exhibited mild dilatation     ======================================================    HPI:  I am seeing this patient in cardiology consultation for:  Chest Pain    Ze Deras is a 72 y o  male with history of coronary artery bypass graft surgery about 20 years ago, active smoking, COPD, obesity, right MCA stroke in the past, hypertension, dyslipidemia who presents to the hospital today with complaints of worsening exertional chest pain    He states for the past several months he has had exertional chest pain which has since progressed to now chest pain and pressure that woke him up from sleep 2 days ago  He describes it as a pain that started his anterior chest and progressed through his chest towards his back  He went for a CTA in the emergency department which ruled out acute dissection  He does have peripheral vascular disease noted on CTA with occlusion of the right internal iliac artery  He does complain of some intermittent claudication type symptoms as well  He follows with our group and was recently seen back in March and recommended for a Lexiscan stress test unfortunately secondary to COVID 19 this was not performed  He was admitted for chest pain  His cardiac enzymes are negative for the initial set  EKG shows no evidence of acute or active ischemia  EF by echo 3 months ago was 60% with no regional wall motion abnormalities          Past Medical History:   Diagnosis Date    Acute right MCA stroke ([facility] [address] ) 9/15/2018    CAD (coronary artery disease)     s/p CABG 2000    COPD (chronic obstructive pulmonary disease) ([facility] [address] )     [facility] 3/24/2019    [facility]     Hyperlipidemia     Hypertension     Lexiscan nuclear stress test 03/19/2016    EF 74% Normal    TIA (transient ischemic attack) 09/13/2018         Scheduled Meds:  Current Facility-Administered Medications:  acetaminophen 650 mg Oral Q6H PRN Kirkman Sinwero, DO   aspirin 81 mg Oral Daily Nader Palmer, DO   atorvastatin 40 mg Oral QPM Nader Palmer, DO   clopidogrel 75 mg Oral Daily Nader Palmer, DO   fluticasone 1 spray Nasal Daily Nader Palmer, DO   furosemide 40 mg Oral Daily Nader Palmer, DO   heparin (porcine) 5,000 Units Subcutaneous Q8H [address] Nader Palmer, DO   LORazepam 0 5 mg Oral BID PRN Kirkman Sinner, DO   nitroglycerin 0 4 mg Sublingual Q5 Min PRN Nader Palmer, DO   ondansetron 4 mg Intravenous Q6H PRN Nader Palmer, DO   pantoprazole 40 mg Oral Daily Nader Palmer, DO   roflumilast 500 mcg Oral Daily Nader Palmer, DO   tiotropium 18 mcg Inhalation Daily Nader Palmer DO     Continuous Infusions:   PRN Meds:   acetaminophen    LORazepam    nitroglycerin    ondansetron  Allergies   Allergen Reactions    Gabapentin Headache     I reviewed the Home Medication list in the chart       Family History   Problem Relation Age of Onset    Cancer Mother     Heart disease Mother     Cancer Father     Cancer Maternal Grandmother     Cancer Paternal Grandfather        Social History     Socioeconomic History    Marital status: Single     Spouse name: Not on file    Number of children: Not on file    Years of education: Not on file    Highest education level: Not on file   Occupational History    Not on file   Social Needs    Financial resource strain: Not on file    Food insecurity:     Worry: Not on file     Inability: Not on file    Transportation needs:     Medical: Not on file     Non-medical: Not on file   Tobacco Use    Smoking status: Current Every Day Smoker     Packs/day: 1 00     Years: 50 00     Pack years: 50 00     Types: Cigars, Pipe    Smokeless tobacco: Never Used   Substance and Sexual Activity    Alcohol use: Not Currently    Drug use: Never    Sexual activity: Not Currently   Lifestyle    Physical activity:     Days per week: Not on file     Minutes per session: Not on file    Stress: Not on file   Relationships    Social connections:     Talks on phone: Not on file     Gets together: Not on file     Attends Orthodoxy service: Not on file     Active member of club or organization: Not on file     Attends meetings of clubs or organizations: Not on file     Relationship status: Not on file    Intimate partner violence:     Fear of current or ex partner: Not on file     Emotionally abused: Not on file     Physically abused: Not on file     Forced sexual activity: Not on file   Other Topics Concern    Not on file   Social History Narrative    Not on file       Review of Systems   Review of Systems   Constitutional: Negative for chills and fever  HENT: Negative for facial swelling and sore throat  Eyes: Negative for visual disturbance  Respiratory: Positive for shortness of breath  Negative for cough, chest tightness and wheezing  Cardiovascular: Positive for chest pain  Negative for palpitations and leg swelling  Gastrointestinal: Negative for abdominal pain, blood in stool, constipation, diarrhea, nausea and vomiting  Endocrine: Negative for cold intolerance and heat intolerance  Genitourinary: Negative for decreased urine volume, difficulty urinating, dysuria and hematuria  Musculoskeletal: Negative for arthralgias, back pain and myalgias  Claudication symptoms   Skin: Negative for rash  Neurological: Negative for dizziness, syncope, weakness and numbness  Psychiatric/Behavioral: Negative for agitation, behavioral problems and confusion  The patient is not nervous/anxious  Vitals:    07/01/20 1032   BP: 122/79   Pulse: 78   Resp: 18   Temp: 97 5 °F (36 4 °C)   SpO2: 93%     I/O     None        Weight (last 2 days)     Date/Time   Weight    07/01/20 1032   98 3 (216 71)              Physical Exam   General appearance: alert and oriented, in no acute distress, obese male  Head: Normocephalic, without obvious abnormality, atraumatic  Eyes: conjunctivae/corneas clear  Anicteric    Neck: no adenopathy, no carotid bruit, no JVD, supple, symmetrical, trachea midline and thyroid not enlarged, no tenderness  Lungs: clear to auscultation bilaterally  Heart: regular rate and rhythm, S1, S2 normal, no murmur, no click, rub or gallop  Abdomen: obese male, soft, non-tender; bowel sounds normal; no masses,  no organomegaly  Extremities: extremities normal, warm and well-perfused; no cyanosis, clubbing, or edema  Skin: Skin color, texture, turgor normal  No rashes or lesions        Results from last 7 days   Lab Units 07/01/20  0425   WBC Thousand/uL 10 57*   HEMOGLOBIN g/dL 14 0   HEMATOCRIT % 40 1   PLATELETS Thousands/uL 259   NEUTROS PCT % 65   MONOS PCT % 5     Results from last 7 days   Lab Units 07/01/20  0425   POTASSIUM mmol/L 3 1*   CHLORIDE mmol/L 101   CO2 mmol/L 28   BUN mg/dL 12   CREATININE mg/dL 1 16   CALCIUM mg/dL 8 6     Results from last 7 days   Lab Units 07/01/20  0425   POTASSIUM mmol/L 3 1*   CHLORIDE mmol/L 101   CO2 mmol/L 28   BUN mg/dL 12   CREATININE mg/dL 1 16   CALCIUM mg/dL 8 6   ALK PHOS U/L 91   ALT U/L 21   AST U/L 17     No results found for: TROPONINT      Results from last 7 days   Lab Units 07/01/20  0722 07/01/20  0425   CK TOTAL U/L  --  80   TROPONIN I ng/mL <0 02 <0 02         Results from last 7 days   Lab Units 07/01/20  0425   INR  0 93               I have personally reviewed the EKG, CXR and Telemetry images directly        Patient Active Problem List    Diagnosis Date Noted    Hypokalemia 03/11/2020     Priority: Low    Lower extremity edema 06/11/2019     Priority: Low    Diverticular disease 03/24/2019     Priority: Low    Chronic constipation 03/24/2019     Priority: Low    Diverticulitis of small intestine 03/24/2019     Priority: Low    Dyspeptic diarrhea 03/24/2019     Priority: Low    Dysphagia 03/24/2019     Priority: Low    ED (erectile dysfunction) of organic origin 03/24/2019     Priority: Low    Gastroesophageal reflux disease 03/24/2019     Priority: Low    Grand mal status ([facility] [address] [facility] 03/24/2019     Priority: Low    Umbilical hernia 24/96/7002     Priority: Low    Insomnia 03/24/2019     Priority: Low    Tobacco dependence syndrome 03/24/2019     Priority: Low    Overweight 03/24/2019     Priority: Low    Urinary incontinence 03/24/2019     Priority: Low    Vitamin B deficiency 03/24/2019     Priority: Low    Seasonal allergic rhinitis 03/24/2019     Priority: Low    Coronary arteriosclerosis in native artery 03/24/2019     Priority: Low    Orthostatic hypotension 03/24/2019     Priority: Low    Chest pain 02/08/2019     Priority: Low    Anxiety and depression 09/17/2018     Priority: Low    History of CVA (cerebrovascular accident) 09/15/2018     Priority: Low    Hypertension 09/14/2018     Priority: Low    Hyperlipidemia 09/14/2018     Priority: Low    COPD (chronic obstructive pulmonary disease) ([facility] [address] ) 09/14/2018     Priority: Low    Coronary artery disease involving native coronary artery of native heart without angina pectoris 09/14/2018     Priority: Low    Low back pain 11/02/2015     Priority: Low    Benign neoplasm of colon 11/19/2010     Priority: Low    Chronic ischemic heart disease 11/19/2010     Priority: Low       Portions of the record may have been created with voice recognition software  Occasional wrong word or "sound a like" substitutions may have occurred due to the inherent limitations of voice recognition software  Read the chart carefully and recognize, using context, where substitutions have occurred          Easton Kirby DO, [facility] [place]  7/1/2020 12:31 PM

## 2020-07-01 NOTE — ED PROVIDER NOTES
History  Chief Complaint   Patient presents with    Chest Pain     Pt reports having on and off chest pain for the past week  Pt states he woke up this morning with more "chest tightness"  Pt reports taking 6 asprin today  This is a 72year old male who comes to the ED via EMS with c/o chest pain that goes through to his back  Pt states that he had chest pain prior to [place] and was to have a stress test but it was cancelled due to [place]  He states over the last week he has experienced intermittent chest pain that radiates into both sides of his neck and worse with exertion and has SOB  He states he has cardiac stents placed around 2009  He states his cardiologist is in [facility]  He smokes 1 ppd x 46 yrs  He denies RDA or ETOH  He told EMS that he wanted "to be brought to a place with a cardiac cath lab"  Pt took 6 baby aspirin today, he states he has nitro but does not like to use it  He was given nitro via EMS and is currently pain free  History provided by:  Medical records and patient   used: No    Chest Pain   Pain location:  Substernal area  Pain radiates to the back: yes    Pain severity:  Moderate  Onset quality:  Gradual  Timing:  Intermittent  Chronicity:  Chronic  Context: movement and at rest    Relieved by:  Aspirin and nitroglycerin  Worsened by:  Exertion and movement  Associated symptoms: back pain, cough, lower extremity edema and shortness of breath    Risk factors: coronary artery disease, high cholesterol, hypertension, male sex, obesity and smoking        Prior to Admission Medications   Prescriptions Last Dose Informant Patient Reported? Taking?    LORazepam (ATIVAN) 0 5 mg tablet   No Yes   Sig: Take 1 tablet (0 5 mg total) by mouth 2 (two) times a day as needed for anxiety for up to 7 days   acetaminophen-codeine (TYLENOL #3) 300-30 mg per tablet  Self Yes Yes   Sig: Take 1 tablet by mouth daily as needed for moderate pain   albuterol (2 5 mg/3 mL) 0 083 % nebulizer solution   No Yes   Sig: Take 1 vial (2 5 mg total) by nebulization every 6 (six) hours as needed for wheezing or shortness of breath   albuterol (PROVENTIL HFA,VENTOLIN HFA) 90 mcg/act inhaler   No Yes   Sig: Inhale 2 puffs every 4 (four) hours as needed for wheezing   aluminum-magnesium hydroxide 200-200 MG/5ML suspension   No Yes   Sig: Take 5 mL by mouth every 6 (six) hours as needed for heartburn   aspirin (ECOTRIN LOW STRENGTH) 81 mg EC tablet   No Yes   Sig: Take 1 tablet (81 mg total) by mouth daily   atorvastatin (LIPITOR) 40 mg tablet Not Taking at Unknown time  No No   Sig: Take 1 tablet (40 mg total) by mouth every evening   Patient not taking: Reported on 2020   calcium carbonate (TUMS EX) 750 mg chewable tablet   No Yes   Sig: Chew 1 tablet (750 mg total) 3 (three) times a day as needed for indigestion or heartburn   clopidogrel (PLAVIX) 75 mg tablet   No Yes   Sig: Take 2 tablets (150 mg total) by mouth daily   Patient taking differently: Take 75 mg by mouth daily    fluticasone (FLONASE) 50 mcg/act nasal spray   No Yes   Si spray into each nostril daily   furosemide (LASIX) 40 mg tablet   No Yes   Sig: Take 1 tablet (40 mg total) by mouth daily   Patient taking differently: Take 40 mg by mouth as needed    irbesartan (AVAPRO) 75 mg tablet   Yes Yes   Sig: Take by mouth Daily   meloxicam (MOBIC) 15 mg tablet   Yes Yes   Sig: Take 15 mg by mouth daily as needed   metoprolol succinate (TOPROL-XL) 25 mg 24 hr tablet   Yes Yes   Sig: Take by mouth Daily   nicotine (NICODERM CQ) 14 mg/24hr TD 24 hr patch   No Yes   Sig: Place 1 patch on the skin daily   nitroglycerin (NITROSTAT) 0 4 mg SL tablet   Yes Yes   Sig: Place 0 4 mg under the tongue every 5 (five) minutes as needed for chest pain   pantoprazole (PROTONIX) 40 mg tablet   No Yes   Sig: Take 1 tablet (40 mg total) by mouth daily   roflumilast (DALIRESP) 500 mcg tablet   Yes Yes   Sig: Take 500 mcg by mouth daily   solifenacin (VESICARE) 10 MG tablet   Yes Yes   Sig: Take by mouth Daily   tiotropium (SPIRIVA) 18 mcg inhalation capsule   No Yes   Sig: Place 1 capsule (18 mcg total) into inhaler and inhale daily      Facility-Administered Medications: None       Past Medical History:   Diagnosis Date    [facility] 9/15/2018    CAD (coronary artery disease)     s/p CABG 2000    [facility]     Grand mal status ([facility] ) 3/24/2019    Heart attack ([facility] )     Hyperlipidemia     Hypertension     Lexiscan nuclear stress test 03/19/2016    EF 74% Normal    TIA (transient ischemic attack) 09/13/2018       Past Surgical History:   Procedure Laterality Date    CARDIAC CATHETERIZATION  08/19/2015    LIMA occluded  No severe native lesions    COLONOSCOPY      CORONARY ARTERY BYPASS GRAFT  2000    HERNIA REPAIR      times 2       Family History   Problem Relation Age of Onset    Cancer Mother     Heart disease Mother     Cancer Father     Cancer Maternal Grandmother     Cancer Paternal Grandfather      I have reviewed and agree with the history as documented  E-Cigarette/Vaping    E-Cigarette Use Never User      E-Cigarette/Vaping Substances     Social History     Tobacco Use    Smoking status: Current Every Day Smoker     Packs/day: 1 00     Years: 50 00     Pack years: 50 00     Types: Cigars, Pipe    Smokeless tobacco: Never Used   Substance Use Topics    Alcohol use: Not Currently    Drug use: Never       Review of Systems   Respiratory: Positive for cough and shortness of breath  Cardiovascular: Positive for chest pain and leg swelling  Musculoskeletal: Positive for back pain  All other systems reviewed and are negative  Physical Exam  Physical Exam   Constitutional: He appears well-developed and well-nourished  Non-toxic appearance  He does not appear ill  No distress  Smells of cigarette smoke    HENT:   Head: Normocephalic and atraumatic     Eyes: Pupils are equal, round, and reactive to light  EOM are normal    Neck: Normal range of motion  Neck supple  Cardiovascular: Normal rate, regular rhythm, intact distal pulses and normal pulses  Pulmonary/Chest: Effort normal    Wet sounding upper airway cough    Abdominal: Soft  Bowel sounds are normal    Musculoskeletal: Normal range of motion  Right lower leg: He exhibits edema  Left lower leg: He exhibits edema  Neurological: He is alert  Skin: Skin is warm and dry  Capillary refill takes less than 2 seconds  Psychiatric: He has a normal mood and affect  His behavior is normal    Nursing note and vitals reviewed  Vital Signs  ED Triage Vitals   Temperature Pulse Respirations Blood Pressure SpO2   07/01/20 0422 07/01/20 0418 07/01/20 0418 07/01/20 0418 07/01/20 0418   97 9 °F (36 6 °C) 87 18 153/100 96 %      Temp Source Heart Rate Source Patient Position - Orthostatic VS BP Location FiO2 (%)   07/01/20 0422 07/01/20 0418 07/01/20 0418 07/01/20 0418 --   Temporal Monitor Lying Left arm       Pain Score       07/01/20 0418       No Pain           Vitals:    07/01/20 0418 07/01/20 0512 07/01/20 0638   BP: 153/100 135/67 137/90   Pulse: 87 84 68   Patient Position - Orthostatic VS: Lying Lying Lying         Visual Acuity      ED Medications  Medications   tiotropium (SPIRIVA) capsule for inhaler 18 mcg (has no administration in time range)   potassium chloride (K-DUR,KLOR-CON) CR tablet 40 mEq (40 mEq Oral Given 7/1/20 0511)   LORazepam (ATIVAN) tablet 0 5 mg (0 5 mg Oral Given 7/1/20 0511)       Diagnostic Studies  Results Reviewed     Procedure Component Value Units Date/Time    Troponin I [[id number]]     Lab Status:  No result Specimen:  Blood     Novel Coron[facility]Covid-[facility] [[id number]]  (Normal) Collected:  07/01/20 0437    Lab Status:  Final result Specimen:  Nares from Nose Updated:  07/01/20 0537     SARS-CoV-2 Negative    Narrative:        The specimen collection materials, transport medium, and/or testing methodology utilized in the production of these test results have been proven to be reliable in a limited validation with an abbreviated program under the Emergency Utilization Authorization provided by the FDA  Testing reported as "Presumptive positive" will be confirmed with secondary testing with a reference laboratory to ensure result accuracy  Clinical caution and judgement should be used with the interpretation of these results with consideration of the clinical impression and other laboratory testing  Testing reported as "Positive" or "Negative" has been proven to be accurate according to standard laboratory validation requirements  All testing is performed with control materials showing appropriate reactivity at standard intervals        Comprehensive metabolic panel [[id number]]  (Abnormal) Collected:  07/01/20 0423    Lab Status:  Final result Specimen:  Blood from Arm, Right Updated:  07/01/20 0509     Sodium 138 mmol/L      Potassium 3 1 mmol/L      Chloride 101 mmol/L      CO2 28 mmol/L      ANION GAP 9 mmol/L      BUN 12 mg/dL      Creatinine 1 16 mg/dL      Glucose 105 mg/dL      Calcium 8 6 mg/dL      AST 17 U/L      ALT 21 U/L      Alkaline Phosphatase 91 U/L      Total Protein 6 7 g/dL      Albumin 3 7 g/dL      Total Bilirubin 0 33 mg/dL      eGFR 66 ml/min/1 73sq m     Narrative:       [place] guidelines for Chronic Kidney Disease (CKD):     Stage 1 with normal or high GFR (GFR > 90 mL/min/1 73 square meters)    Stage 2 Mild CKD (GFR = 60-89 mL/min/1 73 square meters)    Stage 3A Moderate CKD (GFR = 45-59 mL/min/1 73 square meters)    Stage 3B Moderate CKD (GFR = 30-44 mL/min/1 73 square meters)    Stage 4 Severe CKD (GFR = 15-29 mL/min/1 73 square meters)    Stage 5 End Stage CKD (GFR <15 mL/min/1 73 square meters)  Note: GFR calculation is accurate only with a steady state creatinine    Troponin I [[id number]]  (Normal) Collected:  07/01/20 0425    Lab Status:  Final result Specimen:  Blood from Arm, Right Updated:  07/01/20 0453     Troponin I <0 02 ng/mL     Magnesium [[id number]]  (Normal) Collected:  07/01/20 0425    Lab Status:  Final result Specimen:  Blood from Arm, Right Updated:  07/01/20 0452     Magnesium 1 9 mg/dL     CK Total with Reflex CKMB [[id number]]  (Normal) Collected:  07/01/20 0425    Lab Status:  Final result Specimen:  Blood from Arm, Right Updated:  07/01/20 0452     Total CK 80 U/L     Protime-INR [[id number]]  (Normal) Collected:  07/01/20 0425    Lab Status:  Final result Specimen:  Blood from Arm, Right Updated:  07/01/20 0444     Protime 12 6 seconds      INR 0 93    APTT [[id number]]  (Normal) Collected:  07/01/20 0425    Lab Status:  Final result Specimen:  Blood from Arm, Right Updated:  07/01/20 0444     PTT 25 seconds     CBC and differential [[id number]]  (Abnormal) Collected:  07/01/20 0425    Lab Status:  Final result Specimen:  Blood from Arm, Right Updated:  07/01/20 0436     WBC 10 57 Thousand/uL      RBC 4 50 Million/uL      Hemoglobin 14 0 g/dL      Hematocrit 40 1 %      MCV 89 fL      MCH 31 1 pg      MCHC 34 9 g/dL      RDW 11 9 %      MPV 9 1 fL      Platelets 881 Thousands/uL      nRBC 0 /100 WBCs      Neutrophils Relative 65 %      Immat GRANS % 0 %      Lymphocytes Relative 27 %      Monocytes Relative 5 %      Eosinophils Relative 2 %      Basophils Relative 1 %      Neutrophils Absolute 6 90 Thousands/µL      Immature Grans Absolute 0 04 Thousand/uL      Lymphocytes Absolute 2 80 Thousands/µL      Monocytes Absolute 0 55 Thousand/µL      Eosinophils Absolute 0 21 Thousand/µL      Basophils Absolute 0 07 Thousands/µL                  XR chest 1 view portable   ED Interpretation by ADRIANA Bond (07/01 4079)   preliminary reading   No acute process   Waiting on rad read       CTA dissection protocol chest abdomen pelvis w wo contrast    (Results Pending) Procedures  ECG 12 Lead Documentation Only  Date/Time: 7/1/2020 4:19 AM  Performed by: ADRIANA Peoples  Authorized by: ADRIANA Peoples     Indications / Diagnosis:  Chest pain SOB   ECG reviewed by me, the ED Provider: yes (Dr Gina Jiménez )    Patient location:  ED  Previous ECG:     Previous ECG:  Compared to current    Similarity:  No change  Interpretation:     Interpretation: normal    Rate:     ECG rate:  92    ECG rate assessment: normal    Rhythm:     Rhythm: sinus rhythm    Ectopy:     Ectopy: none    QRS:     QRS axis:  Normal    QRS intervals:  Normal  Conduction:     Conduction: normal    ST segments:     ST segments:  Normal  T waves:     T waves: normal               ED Course  ED Course as of Jul 01 0646   Wed Jul 01, 2020   0503 Potassium(!): 3 1   0503 Trop < 0 02 EKG NSR HR score 6   K 3 1 will repleate  Will admit pt to [facility] for serial trops and EKG's  Pt agrees with POC    Troponin I: <0 02   0509 Magnesium: 1 9   0514 Pt asks for ativan- states he didn't take it today  2929 CT states that pt IV left forearm infiltrated  Report form completed  RN aware  Neuro vascular checks ordered  Unable to complete dissection study due to IV infiltration  5945 EXT SARS-COV-2: Negative   0645 Report to Dr Trinidad French for reassessment, review of CT and admit  Pt requesting spiriva  /90 (BP Location: Right arm)   Pulse 68   Temp 97 9 °F (36 6 °C) (Temporal)   Resp 20   SpO2 98%     US AUDIT      Most Recent Value   Initial Alcohol Screen: US AUDIT-C    1  How often do you have a drink containing alcohol?  0 Filed at: 07/01/2020 0420   2  How many drinks containing alcohol do you have on a typical day you are drinking? 0 Filed at: 07/01/2020 0420   3a  Male UNDER 65: How often do you have five or more drinks on one occasion? 0 Filed at: 07/01/2020 0420   3b  FEMALE Any Age, or MALE 65+:  How often do you have 4 or more drinks on one occassion?  0 Filed at: 07/01/2020 0420   Audit-C Score  0 Filed at: 07/01/2020 0420            HEART Risk Score      Most Recent Value   Heart Score Risk Calculator   History  2 Filed at: 07/01/2020 0503   ECG  0 Filed at: 07/01/2020 0503   Age  2 Filed at: 07/01/2020 0503   Risk Factors  2 Filed at: 07/01/2020 0503   Troponin  0 Filed at: 07/01/2020 0503   HEART Score  6 Filed at: 07/01/2020 0503            COLTEN/DAST-10      Most Recent Value   How many times in the past year have you    Used an illegal drug or used a prescription medication for non-medical reasons?   Never Filed at: 07/01/2020 3867                                MDM  Number of Diagnoses or Management Options  Diagnosis management comments: Chest pain  Hx CAD, smoker, stents    DDX:    STEMI  COPD exacerbation  CHF    Plan  EKG  Tele  Labs   Urine  IV  CXR  CT dissection study            Amount and/or Complexity of Data Reviewed  Clinical lab tests: ordered and reviewed  Tests in the radiology section of CPT®: ordered and reviewed  Review and summarize past medical records: yes          Disposition  Final diagnoses:   Chest pain, unspecified type   Hypokalemia   CAD (coronary artery disease)   H/O heart artery stent   Chronic anticoagulation   Tobacco use   COPD (chronic obstructive pulmonary disease) ([facility] )   Intravenous infiltration, initial encounter - left forearm     Time reflects when diagnosis was documented in both MDM as applicable and the Disposition within this note     Time User Action Codes Description Comment    7/1/2020  5:04 AM Ray Root Add [I20 0] Unstable angina pectoris ([facility] )     7/1/2020  5:04 AM Ray Root Remove [I20 0] Unstable angina pectoris ([facility] )     7/1/2020  5:05 AM Ray Root Add [R07 9] Chest pain, unspecified type     7/1/2020  5:05 AM Ray Root Add [E87 6] Hypokalemia     7/1/2020  5:05 AM Ray Root Add [I25 10] CAD (coronary artery disease)     7/1/2020  5:06 AM Ray Root Add [Z95 5] H/O heart artery stent     7/1/2020  5:06 AM Jillyn Brooks Add [Z79 01] Chronic anticoagulation     7/1/2020  5:06 AM Jillyn Brooks Add [Z72 0] Tobacco use     7/1/2020  5:07 AM Jillyn Brooks Add [J44 9] COPD (chronic obstructive pulmonary disease) ([facility] )     7/1/2020  5:58 AM Jillyn Brooks Add [T80  1XXA] Intravenous infiltration, initial encounter     7/1/2020  5:58 AM Margene Even [T80  1XXA] Intravenous infiltration, initial encounter left forearm      ED Disposition     None      Follow-up Information    None         Patient's Medications   Discharge Prescriptions    No medications on file     No discharge procedures on file      PDMP Review     None          ED Provider  Electronically Signed by           Brenna Shukla  07/01/20 1474

## 2020-07-01 NOTE — CONSULTS
Cardiology Consultation  MD Simona Perry MD Burt Duster, DO, MD Nani Monet DO, Tiff Dias DO, [facility] [place]  ----------------------------------------------------------------  [address] 72 y o  male MRN: [id number]  Unit/Bed#: E4 -01 Encounter: [id number]      07/01/20    Referring Physian: Rachel Sanders DO    Chief Complain/Reason for Referal:  Chest pain    IMPRESSION:  1  Exertional chest pain  2  Coronary artery disease status post CABG about 20 years ago  He thinks he had it done in [place]  3  Dyslipidemia  4  Hypertension  5  CVA in the past      DISCUSSION/RECOMMENDATIONS:   Initial cardiac enzymes are negative   EKG is nonischemic   Has been having off and on chest pain over the past several months  If cardiac enzyme trend remains negative would plan for pharmacologic nuclear stress tomorrow   Just had echocardiogram back in March with normal EF and no regional wall motion abnormalities   Unfortunately he continues to smoke  Counseled on smoking cessation today   Continue aspirin, Plavix, Lipitor, Lasix   Check lipid panel   Check arterial Dopplers of lower extremities  He does describe some intermittent claudication type symptoms as well  I reviewed his CTA of the chest abdomen and pelvis and he has a totally occluded internal iliac artery on the right   If cardiac enzymes are elevated with chest pain, may need direct coronary visualization as opposed to stress test       Roscoe Patton DO, [facility] [place]  --------------------------------------------------------  EKG: Normal sinus rhythm  Normal ECG  CTA  IMPRESSION:  1  No CT evidence of aortic dissection  2   Ectasia of the ascending aorta with focal outpouching along the anterior, right lateral margin    In retrospect, this has not significantly changed from a prior CT lung screening performed February 2019   Findings likely represent a small saccular   aneurysm  Recommend follow-up cardiothoracic surgery consultation  3   Moderate atherosclerotic disease of the abdominal aorta and its branch vessels  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  4   Colonic diverticulosis  5   Prostatomegaly with probable bladder outlet obstruction  Irregularity along the floor of the urinary bladder, likely due to enlarged prostate gland, however bladder neoplasm not excluded  Recommend follow-up urology consultation and cystoscopy    CXR: No acute cardiopulmonary disease  Emphysema  PRIOR STRESS TEST:   03/28/2019  SUMMARY:  -  Stress results: There was no chest pain during stress  -  ECG conclusions: The stress ECG was negative for ischemia and normal   -  Perfusion imaging: There was a moderate-sized, mildly severe, fixed myocardial perfusion defect of the inferior wall likely due to diaphragm attenuation   -  Gated SPECT: The calculated left ventricular ejection fraction was 65 %  There was no left ventricular regional abnormality  ECHO:   3/12/2020  LEFT VENTRICLE:  Systolic function was normal  Ejection fraction was estimated to be 60 %  There were no regional wall motion abnormalities      RIGHT VENTRICLE:  The ventricle was mildly dilated      AORTA:  The root exhibited mild dilatation     ======================================================    HPI:  I am seeing this patient in cardiology consultation for:  Chest Pain    Nancy Thakkar is a 72 y o  male with history of coronary artery bypass graft surgery about 20 years ago, active smoking, COPD, obesity, right MCA stroke in the past, hypertension, dyslipidemia who presents to the hospital today with complaints of worsening exertional chest pain    He states for the past several months he has had exertional chest pain which has since progressed to now chest pain and pressure that woke him up from sleep 2 days ago  He describes it as a pain that started his anterior chest and progressed through his chest towards his back  He went for a CTA in the emergency department which ruled out acute dissection  He does have peripheral vascular disease noted on CTA with occlusion of the right internal iliac artery  He does complain of some intermittent claudication type symptoms as well  He follows with our group and was recently seen back in March and recommended for a Lexiscan stress test unfortunately secondary to COVID 19 this was not performed  He was admitted for chest pain  His cardiac enzymes are negative for the initial set  EKG shows no evidence of acute or active ischemia  EF by echo 3 months ago was 60% with no regional wall motion abnormalities          Past Medical History:   Diagnosis Date    Acute right MCA stroke [facility] ) 9/15/2018    CAD (coronary artery disease)     s/p CABG 2000    COPD (chronic obstructive pulmonary disease) [facility] )     [facility] mal status ([facility] ) 3/24/2019    Heart attack ([facility] )     Hyperlipidemia     Hypertension     Lexiscan nuclear stress test 03/19/2016    EF 74% Normal    TIA (transient ischemic attack) 09/13/2018         Scheduled Meds:  Current Facility-Administered Medications:  acetaminophen 650 mg Oral Q6H PRN Janie Bering, DO   aspirin 81 mg Oral Daily Nader Palmer, DO   atorvastatin 40 mg Oral QPM Nader Palmer, DO   clopidogrel 75 mg Oral Daily Nader Palmer, DO   fluticasone 1 spray Nasal Daily Nader Palmer, DO   furosemide 40 mg Oral Daily Nader Palmer, DO   heparin (porcine) 5,000 Units Subcutaneous Q8H [address] Nader Palmer, DO   LORazepam 0 5 mg Oral BID PRN Janie Bering, DO   nitroglycerin 0 4 mg Sublingual Q5 Min PRN Nader Palmer, DO   ondansetron 4 mg Intravenous Q6H PRN Nader Palmer, DO   pantoprazole 40 mg Oral Daily Nader Palmer, DO   roflumilast 500 mcg Oral Daily Nader Palmer, DO   tiotropium 18 mcg Inhalation Daily Nader Palmer DO     Continuous Infusions:   PRN Meds:   acetaminophen    LORazepam    nitroglycerin    ondansetron  Allergies   Allergen Reactions    Gabapentin Headache     I reviewed the Home Medication list in the chart       Family History   Problem Relation Age of Onset    Cancer Mother     Heart disease Mother     Cancer Father     Cancer Maternal Grandmother     Cancer Paternal Grandfather        Social History     Socioeconomic History    Marital status: Single     Spouse name: Not on file    Number of children: Not on file    Years of education: Not on file    Highest education level: Not on file   Occupational History    Not on file   Social Needs    Financial resource strain: Not on file    Food insecurity:     Worry: Not on file     Inability: Not on file    Transportation needs:     Medical: Not on file     Non-medical: Not on file   Tobacco Use    Smoking status: Current Every Day Smoker     Packs/day: 1 00     Years: 50 00     Pack years: 50 00     Types: Cigars, Pipe    Smokeless tobacco: Never Used   Substance and Sexual Activity    Alcohol use: Not Currently    Drug use: Never    Sexual activity: Not Currently   Lifestyle    Physical activity:     Days per week: Not on file     Minutes per session: Not on file    Stress: Not on file   Relationships    Social connections:     Talks on phone: Not on file     Gets together: Not on file     Attends Yarsanism service: Not on file     Active member of club or organization: Not on file     Attends meetings of clubs or organizations: Not on file     Relationship status: Not on file    Intimate partner violence:     Fear of current or ex partner: Not on file     Emotionally abused: Not on file     Physically abused: Not on file     Forced sexual activity: Not on file   Other Topics Concern    Not on file   Social History Narrative    Not on file       Review of Systems   Review of Systems   Constitutional: Negative for chills and fever  HENT: Negative for facial swelling and sore throat  Eyes: Negative for visual disturbance  Respiratory: Positive for shortness of breath  Negative for cough, chest tightness and wheezing  Cardiovascular: Positive for chest pain  Negative for palpitations and leg swelling  Gastrointestinal: Negative for abdominal pain, blood in stool, constipation, diarrhea, nausea and vomiting  Endocrine: Negative for cold intolerance and heat intolerance  Genitourinary: Negative for decreased urine volume, difficulty urinating, dysuria and hematuria  Musculoskeletal: Negative for arthralgias, back pain and myalgias  Claudication symptoms   Skin: Negative for rash  Neurological: Negative for dizziness, syncope, weakness and numbness  Psychiatric/Behavioral: Negative for agitation, behavioral problems and confusion  The patient is not nervous/anxious  Vitals:    07/01/20 1032   BP: 122/79   Pulse: 78   Resp: 18   Temp: 97 5 °F (36 4 °C)   SpO2: 93%     I/O     None        Weight (last 2 days)     Date/Time   Weight    07/01/20 1032   98 3 (216 71)              Physical Exam   General appearance: alert and oriented, in no acute distress, obese male  Head: Normocephalic, without obvious abnormality, atraumatic  Eyes: conjunctivae/corneas clear  Anicteric    Neck: no adenopathy, no carotid bruit, no JVD, supple, symmetrical, trachea midline and thyroid not enlarged, no tenderness  Lungs: clear to auscultation bilaterally  Heart: regular rate and rhythm, S1, S2 normal, no murmur, no click, rub or gallop  Abdomen: obese male, soft, non-tender; bowel sounds normal; no masses,  no organomegaly  Extremities: extremities normal, warm and well-perfused; no cyanosis, clubbing, or edema  Skin: Skin color, texture, turgor normal  No rashes or lesions        Results from last 7 days   Lab Units 07/01/20  0425   WBC Thousand/uL 10 57*   HEMOGLOBIN g/dL 14 0   HEMATOCRIT % 40 1   PLATELETS Thousands/uL 259   NEUTROS PCT % 65   MONOS PCT % 5     Results from last 7 days   Lab Units 07/01/20  0425   POTASSIUM mmol/L 3 1*   CHLORIDE mmol/L 101   CO2 mmol/L 28   BUN mg/dL 12   CREATININE mg/dL 1 16   CALCIUM mg/dL 8 6     Results from last 7 days   Lab Units 07/01/20  0425   POTASSIUM mmol/L 3 1*   CHLORIDE mmol/L 101   CO2 mmol/L 28   BUN mg/dL 12   CREATININE mg/dL 1 16   CALCIUM mg/dL 8 6   ALK PHOS U/L 91   ALT U/L 21   AST U/L 17     No results found for: TROPONINT      Results from last 7 days   Lab Units 07/01/20  0722 07/01/20  0425   CK TOTAL U/L  --  80   TROPONIN I ng/mL <0 02 <0 02         Results from last 7 days   Lab Units 07/01/20  0425   INR  0 93               I have personally reviewed the EKG, CXR and Telemetry images directly        Patient Active Problem List    Diagnosis Date Noted    Hypokalemia 03/11/2020     Priority: Low    Lower extremity edema 06/11/2019     Priority: Low    Diverticular disease 03/24/2019     Priority: Low    Chronic constipation 03/24/2019     Priority: Low    Diverticulitis of small intestine 03/24/2019     Priority: Low    Dyspeptic diarrhea 03/24/2019     Priority: Low    Dysphagia 03/24/2019     Priority: Low    ED (erectile dysfunction) of organic origin 03/24/2019     Priority: Low    Gastroesophageal reflux disease 03/24/2019     Priority: Low    Grand mal status ([facility] ) 03/24/2019     Priority: Low    Umbilical hernia 96/98/6847     Priority: Low    Insomnia 03/24/2019     Priority: Low    Tobacco dependence syndrome 03/24/2019     Priority: Low    Overweight 03/24/2019     Priority: Low    Urinary incontinence 03/24/2019     Priority: Low    Vitamin B deficiency 03/24/2019     Priority: Low    Seasonal allergic rhinitis 03/24/2019     Priority: Low    Coronary arteriosclerosis in native artery 03/24/2019     Priority: Low    Orthostatic hypotension 03/24/2019     Priority: Low    Chest pain 02/08/2019     Priority: Low    Anxiety and depression 09/17/2018     Priority: Low    History of CVA (cerebrovascular accident) 09/15/2018     Priority: Low    Hypertension 09/14/2018     Priority: Low    Hyperlipidemia 09/14/2018     Priority: Low    COPD (chronic obstructive pulmonary disease) ([facility] ) 09/14/2018     Priority: Low    Coronary artery disease involving native coronary artery of native heart without angina pectoris 09/14/2018     Priority: Low    Low back pain 11/02/2015     Priority: Low    Benign neoplasm of colon 11/19/2010     Priority: Low    Chronic ischemic heart disease 11/19/2010     Priority: Low       Portions of the record may have been created with voice recognition software  Occasional wrong word or "sound a like" substitutions may have occurred due to the inherent limitations of voice recognition software  Read the chart carefully and recognize, using context, where substitutions have occurred          Karin Leon DO, [facility]  7/1/2020 12:31 PM

## 2020-07-01 NOTE — ASSESSMENT & PLAN NOTE
· Essential hypertension states that recently been discontinued on metoprolol succinate, irbesartan, and amlodipine  · Continue furosemide with potassium supplementation

## 2020-07-01 NOTE — ASSESSMENT & PLAN NOTE
· Chest pain with history of coronary artery disease status post CABG 2000 follows with Dr Yarelis Loya  · Has had worsening chest discomfort with activity  · Trend cardiac enzymes and monitor on telemetry but have cardiology evaluate  · Continue clopidogrel and aspirin  · Patient was not taking atorvastatin as prescribed    Results from last 7 days   Lab Units 07/01/20  0722 07/01/20  0425   TROPONIN I ng/mL <0 02 <0 02

## 2020-07-02 ENCOUNTER — APPOINTMENT (OUTPATIENT)
Dept: RADIOLOGY | Facility: HOSPITAL | Age: 65
End: 2020-07-02
Payer: MEDICARE

## 2020-07-02 ENCOUNTER — APPOINTMENT (OUTPATIENT)
Dept: NUCLEAR MEDICINE | Facility: HOSPITAL | Age: 65
End: 2020-07-02
Payer: MEDICARE

## 2020-07-02 ENCOUNTER — APPOINTMENT (OUTPATIENT)
Dept: NON INVASIVE DIAGNOSTICS | Facility: HOSPITAL | Age: 65
End: 2020-07-02
Payer: MEDICARE

## 2020-07-02 VITALS
TEMPERATURE: 97.5 F | DIASTOLIC BLOOD PRESSURE: 76 MMHG | OXYGEN SATURATION: 97 % | HEIGHT: 70 IN | BODY MASS INDEX: 31.03 KG/M2 | SYSTOLIC BLOOD PRESSURE: 142 MMHG | WEIGHT: 216.71 LBS | HEART RATE: 67 BPM | RESPIRATION RATE: 18 BRPM

## 2020-07-02 PROBLEM — I71.9 AORTIC ANEURYSM (HCC): Status: ACTIVE | Noted: 2020-07-02

## 2020-07-02 LAB
ALBUMIN SERPL BCP-MCNC: 3.3 G/DL (ref 3.5–5)
ALP SERPL-CCNC: 86 U/L (ref 46–116)
ALT SERPL W P-5'-P-CCNC: 19 U/L (ref 12–78)
ANION GAP SERPL CALCULATED.3IONS-SCNC: 11 MMOL/L (ref 4–13)
AST SERPL W P-5'-P-CCNC: 15 U/L (ref 5–45)
BILIRUB SERPL-MCNC: 0.4 MG/DL (ref 0.2–1)
BUN SERPL-MCNC: 11 MG/DL (ref 5–25)
CALCIUM SERPL-MCNC: 8.3 MG/DL (ref 8.3–10.1)
CHLORIDE SERPL-SCNC: 102 MMOL/L (ref 100–108)
CHOLEST SERPL-MCNC: 203 MG/DL (ref 50–200)
CO2 SERPL-SCNC: 25 MMOL/L (ref 21–32)
CREAT SERPL-MCNC: 1.03 MG/DL (ref 0.6–1.3)
ERYTHROCYTE [DISTWIDTH] IN BLOOD BY AUTOMATED COUNT: 11.9 % (ref 11.6–15.1)
GFR SERPL CREATININE-BSD FRML MDRD: 76 ML/MIN/1.73SQ M
GLUCOSE P FAST SERPL-MCNC: 97 MG/DL (ref 65–99)
GLUCOSE SERPL-MCNC: 97 MG/DL (ref 65–140)
HCT VFR BLD AUTO: 40.1 % (ref 36.5–49.3)
HDLC SERPL-MCNC: 28 MG/DL
HGB BLD-MCNC: 14.1 G/DL (ref 12–17)
LDLC SERPL CALC-MCNC: 142 MG/DL (ref 0–100)
MCH RBC QN AUTO: 31.3 PG (ref 26.8–34.3)
MCHC RBC AUTO-ENTMCNC: 35.2 G/DL (ref 31.4–37.4)
MCV RBC AUTO: 89 FL (ref 82–98)
NONHDLC SERPL-MCNC: 175 MG/DL
PLATELET # BLD AUTO: 258 THOUSANDS/UL (ref 149–390)
PMV BLD AUTO: 9.4 FL (ref 8.9–12.7)
POTASSIUM SERPL-SCNC: 3.3 MMOL/L (ref 3.5–5.3)
PROT SERPL-MCNC: 6.1 G/DL (ref 6.4–8.2)
RBC # BLD AUTO: 4.51 MILLION/UL (ref 3.88–5.62)
SODIUM SERPL-SCNC: 138 MMOL/L (ref 136–145)
TRIGL SERPL-MCNC: 164 MG/DL
WBC # BLD AUTO: 7.71 THOUSAND/UL (ref 4.31–10.16)

## 2020-07-02 PROCEDURE — 80053 COMPREHEN METABOLIC PANEL: CPT | Performed by: INTERNAL MEDICINE

## 2020-07-02 PROCEDURE — NC001 PR NO CHARGE

## 2020-07-02 PROCEDURE — 93016 CV STRESS TEST SUPVJ ONLY: CPT

## 2020-07-02 PROCEDURE — 73100 X-RAY EXAM OF WRIST: CPT

## 2020-07-02 PROCEDURE — RECHECK: Performed by: INTERNAL MEDICINE

## 2020-07-02 PROCEDURE — 80061 LIPID PANEL: CPT | Performed by: INTERNAL MEDICINE

## 2020-07-02 PROCEDURE — 78452 HT MUSCLE IMAGE SPECT MULT: CPT

## 2020-07-02 PROCEDURE — 85027 COMPLETE CBC AUTOMATED: CPT | Performed by: INTERNAL MEDICINE

## 2020-07-02 PROCEDURE — 99217 PR OBSERVATION CARE DISCHARGE MANAGEMENT: CPT | Performed by: INTERNAL MEDICINE

## 2020-07-02 PROCEDURE — 93017 CV STRESS TEST TRACING ONLY: CPT

## 2020-07-02 PROCEDURE — A9502 TC99M TETROFOSMIN: HCPCS

## 2020-07-02 PROCEDURE — 99214 OFFICE O/P EST MOD 30 MIN: CPT

## 2020-07-02 PROCEDURE — 93018 CV STRESS TEST I&R ONLY: CPT

## 2020-07-02 RX ORDER — ISOSORBIDE MONONITRATE 30 MG/1
30 TABLET, EXTENDED RELEASE ORAL DAILY
Qty: 90 TABLET | Refills: 0 | Status: SHIPPED | OUTPATIENT
Start: 2020-07-02 | End: 2020-11-11 | Stop reason: SDUPTHER

## 2020-07-02 RX ORDER — DOCUSATE SODIUM 100 MG/1
100 CAPSULE, LIQUID FILLED ORAL 2 TIMES DAILY
Status: DISCONTINUED | OUTPATIENT
Start: 2020-07-02 | End: 2020-07-02 | Stop reason: HOSPADM

## 2020-07-02 RX ORDER — METOPROLOL SUCCINATE 25 MG/1
25 TABLET, EXTENDED RELEASE ORAL DAILY
Qty: 90 TABLET | Refills: 0 | Status: SHIPPED | OUTPATIENT
Start: 2020-07-02 | End: 2020-11-11 | Stop reason: SDUPTHER

## 2020-07-02 RX ORDER — METOPROLOL SUCCINATE 25 MG/1
25 TABLET, EXTENDED RELEASE ORAL DAILY
Status: DISCONTINUED | OUTPATIENT
Start: 2020-07-02 | End: 2020-07-02 | Stop reason: HOSPADM

## 2020-07-02 RX ORDER — POTASSIUM CHLORIDE 1500 MG/1
20 TABLET, FILM COATED, EXTENDED RELEASE ORAL DAILY
COMMUNITY

## 2020-07-02 RX ORDER — ATORVASTATIN CALCIUM 40 MG/1
40 TABLET, FILM COATED ORAL EVERY EVENING
Qty: 90 TABLET | Refills: 0 | Status: SHIPPED | OUTPATIENT
Start: 2020-07-02 | End: 2022-07-06 | Stop reason: SDUPTHER

## 2020-07-02 RX ORDER — ALBUTEROL SULFATE 90 UG/1
2 AEROSOL, METERED RESPIRATORY (INHALATION) EVERY 4 HOURS PRN
Status: DISCONTINUED | OUTPATIENT
Start: 2020-07-02 | End: 2020-07-02 | Stop reason: HOSPADM

## 2020-07-02 RX ORDER — ISOSORBIDE MONONITRATE 30 MG/1
30 TABLET, EXTENDED RELEASE ORAL DAILY
Status: DISCONTINUED | OUTPATIENT
Start: 2020-07-02 | End: 2020-07-02 | Stop reason: HOSPADM

## 2020-07-02 RX ADMIN — PANTOPRAZOLE SODIUM 40 MG: 40 TABLET, DELAYED RELEASE ORAL at 06:43

## 2020-07-02 RX ADMIN — FLUTICASONE PROPIONATE 1 SPRAY: 50 SPRAY, METERED NASAL at 10:55

## 2020-07-02 RX ADMIN — ACETAMINOPHEN 650 MG: 325 TABLET ORAL at 00:41

## 2020-07-02 RX ADMIN — HEPARIN SODIUM 5000 UNITS: 5000 INJECTION INTRAVENOUS; SUBCUTANEOUS at 05:13

## 2020-07-02 RX ADMIN — DOCUSATE SODIUM 100 MG: 100 CAPSULE, LIQUID FILLED ORAL at 10:55

## 2020-07-02 RX ADMIN — DOCUSATE SODIUM 100 MG: 100 CAPSULE, LIQUID FILLED ORAL at 00:53

## 2020-07-02 RX ADMIN — HEPARIN SODIUM 5000 UNITS: 5000 INJECTION INTRAVENOUS; SUBCUTANEOUS at 15:41

## 2020-07-02 RX ADMIN — CLOPIDOGREL BISULFATE 75 MG: 75 TABLET ORAL at 10:54

## 2020-07-02 RX ADMIN — ATORVASTATIN CALCIUM 40 MG: 40 TABLET, FILM COATED ORAL at 17:18

## 2020-07-02 RX ADMIN — TIOTROPIUM BROMIDE 18 MCG: 18 CAPSULE ORAL; RESPIRATORY (INHALATION) at 10:55

## 2020-07-02 RX ADMIN — POTASSIUM CHLORIDE 40 MEQ: 1500 TABLET, EXTENDED RELEASE ORAL at 10:54

## 2020-07-02 RX ADMIN — DOCUSATE SODIUM 100 MG: 100 CAPSULE, LIQUID FILLED ORAL at 17:18

## 2020-07-02 RX ADMIN — ASPIRIN 81 MG: 81 TABLET, COATED ORAL at 10:54

## 2020-07-02 RX ADMIN — OXYCODONE HYDROCHLORIDE 5 MG: 5 TABLET ORAL at 11:30

## 2020-07-02 RX ADMIN — FUROSEMIDE 40 MG: 40 TABLET ORAL at 10:55

## 2020-07-02 RX ADMIN — REGADENOSON 0.4 MG: 0.08 INJECTION, SOLUTION INTRAVENOUS at 09:24

## 2020-07-02 NOTE — PROGRESS NOTES
Progress Note - Ilana Brush 1955, 72 y o  male MRN: [id number]    Unit/Bed#: E4 -01 Encounter: [id number]    Primary Care Provider: Andrea Oakes MD   Date and time admitted to hospital: 7/1/2020  4:17 AM        * Coronary arteriosclerosis in native artery  Assessment & Plan  · Chest pain with history of coronary artery disease status post CABG 2000 follows with Dr Rj Faulkner  · Has had worsening chest discomfort with activity  · Cardiology ordered nuclear stress test, results pending  · Continue clopidogrel and aspirin  · Patient was not taking atorvastatin as prescribed    Results from last 7 days   Lab Units 07/01/20  1624 07/01/20  1256 07/01/20  0722 07/01/20  0425   TROPONIN I ng/mL <0 02 <0 02 <0 02 <0 02       Aortic aneurysm [facility])  Assessment & Plan  · Aortic aneurysm case was discussed by ED with CTS  · Needs monitoring after discharge    Hypokalemia  Assessment & Plan  · Hypokalemia in the setting of furosemide use  · Continue to replete    Results from last 7 days   Lab Units 07/02/20  0504 07/01/20  0425   POTASSIUM mmol/L 3 3* 3 1*       Gastroesophageal reflux disease  Assessment & Plan  · GERD continue pantoprazole    Anxiety and depression  Assessment & Plan  · Anxiety and depression  Continue lorazepam    COPD (chronic obstructive pulmonary disease) ([facility])  Assessment & Plan  · COPD without exacerbation  Continue Spiriva    Hyperlipidemia  Assessment & Plan  · Hyperlipidemia has not been taking atorvastatin    Advised to restart after discharge    Results from last 7 days   Lab Units 07/02/20  0504   TRIGLYCERIDES mg/dL 164*   CHOLESTEROL mg/dL 203*   LDL CALC mg/dL 142*   HDL mg/dL 28*       Hypertension  Assessment & Plan  · Essential hypertension states that recently been discontinued on metoprolol succinate, irbesartan, and amlodipine  · Continue furosemide with potassium supplementation      VTE Pharmacologic Prophylaxis: Heparin    Patient Centered Rounds: I have performed bedside rounds with nursing staff today  Discussions with Specialists or Other Care Team Provider:   Education and Discussions with Family / Patient:     Time Spent for Care: 25 mins  More than 50% of total time spent on counseling and coordination of care as described above  Current Length of Stay: 0 day(s)  Current Patient Status: Observation     Certification Statement: The patient will continue to require additional inpatient hospital stay due to Coronary arteriosclerosis in native artery  Discharge Plan / Estimated Discharge Date:  Awaiting stress test results    Code Status: Level 1 - Full Code  ______________________________________________________________________________    Subjective:   Patient seen and examined  Still having intermittent chest pain with ambulation  Complaining of left wrist pain after fall several weeks ago    Objective:   Vitals: Blood pressure 142/76, pulse 67, temperature 97 5 °F (36 4 °C), temperature source Temporal, resp  rate 18, height 5' 10" (1 778 m), weight 98 3 kg (216 lb 11 4 oz), SpO2 97 %      Physical Exam:   General appearance: alert, appears stated age and cooperative  Head: Normocephalic, without obvious abnormality, atraumatic  Lungs: diminished breath sounds  Heart: regular rate and rhythm  Abdomen: soft, non-tender, positive bowel sounds   Back: negative  Extremities: left wrist tenderness palpation  Neurologic: Grossly normal    Additional Data:   Labs:  Results from last 7 days   Lab Units 07/02/20  0504 07/01/20  0425   WBC Thousand/uL 7 71 10 57*   HEMOGLOBIN g/dL 14 1 14 0   HEMATOCRIT % 40 1 40 1   MCV fL 89 89   PLATELETS Thousands/uL 258 259   INR   --  0 93     Results from last 7 days   Lab Units 07/02/20  0504 07/01/20  0425   SODIUM mmol/L 138 138   POTASSIUM mmol/L 3 3* 3 1*   CHLORIDE mmol/L 102 101   CO2 mmol/L 25 28   ANION GAP mmol/L 11 9   BUN mg/dL 11 12   CREATININE mg/dL 1 03 1 16   CALCIUM mg/dL 8 3 8 6   ALBUMIN g/dL 3 3* 3 7   TOTAL BILIRUBIN mg/dL 0 40 0 33   ALK PHOS U/L 86 91   ALT U/L 19 21   AST U/L 15 17   EGFR ml/min/1 73sq m 76 66   GLUCOSE RANDOM mg/dL 97 105     Results from last 7 days   Lab Units 07/01/20  0425   MAGNESIUM mg/dL 1 9     Results from last 7 days   Lab Units 07/01/20  1624 07/01/20  1256 07/01/20  0722 07/01/20  0425   CK TOTAL U/L  --   --   --  80   TROPONIN I ng/mL <0 02 <0 02 <0 02 <0 02                          * I Have Reviewed All Lab Data Listed Above  Cultures:               Imaging:  Imaging Reports Reviewed Today Include:   Xr Chest 1 View Portable    Result Date: 7/1/2020  Impression: No acute cardiopulmonary disease  Emphysema  Workstation performed: [id number]     Cta Dissection Protocol Chest Abdomen Pelvis W Wo Contrast    Result Date: 7/1/2020  Impression: 1  No CT evidence of aortic dissection  2   Ectasia of the ascending aorta with focal outpouching along the anterior, right lateral margin  In retrospect, this has not significantly changed from a prior CT lung screening performed February 2019  Findings likely represent a small saccular aneurysm  Recommend follow-up cardiothoracic surgery consultation  3   Moderate atherosclerotic disease of the abdominal aorta and its branch vessels  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  4   Colonic diverticulosis  5   Prostatomegaly with probable bladder outlet obstruction  Irregularity along the floor of the urinary bladder, likely due to enlarged prostate gland, however bladder neoplasm not excluded  Recommend follow-up urology consultation and cystoscopy    I personally discussed this study with Dr Bishop Akers on 7/1/2020 at 8:05 AM  Workstation performed: [id number]     Scheduled Meds:  Current Facility-Administered Medications:  acetaminophen 650 mg Oral Q6H PRN Hershell Lupis, DO   albuterol 2 puff Inhalation Q4H PRN Teryl Morning, CRNP   aspirin 81 mg Oral Daily Dorsie Quiet, DO   atorvastatin 40 mg Oral QPM Nader Palmer, DO   clopidogrel 75 mg Oral Daily Nader Palmer, DO   docusate sodium 100 mg Oral BID ADRIANA Villareal   fluticasone 1 spray Nasal Daily Nader Palmer, DO   furosemide 40 mg Oral Daily Nader Palmer, DO   heparin (porcine) 5,000 Units Subcutaneous [facility] Nader Palmer, DO   LORazepam 0 5 mg Oral BID PRN Dorsie Quiet, DO   morphine injection 2 mg Intravenous Q4H PRN Dorsie Quiet, DO   nitroglycerin 0 4 mg Sublingual Q5 Min PRN Nader Palmer, DO   ondansetron 4 mg Intravenous Q6H PRN Dorsie Quiet, DO   oxyCODONE 5 mg Oral Q4H PRN Dorsie Quiet, DO   pantoprazole 40 mg Oral Daily Nader Palmer, DO   potassium chloride 40 mEq Oral Daily Dorsie Quiet, DO   tiotropium 18 mcg Inhalation Daily Dorsie Quiet, DO       Dorsie Quiet, DO  [facility] Internal Medicine  Hospitalist    ** Please Note: This note has been constructed using a voice recognition system   **

## 2020-07-02 NOTE — SOCIAL WORK
MD is planning on discharging pt today  Pt will need to use the LYFT ride and signed pt  Paper put in bin to be scan  Pt wants to use pharmacy at [facility]placed by [facility] and made RN aware of this  Pt also has keys and wallet in safe and made RN aware of this  TC for LYFT and  time is 1800  RN is aware of this in lobby by parking deck

## 2020-07-02 NOTE — PROGRESS NOTES
Reviewed the results of the stress test done today  There is a small amount of ischemia in the inferoseptal region  EF is preserved  Would treat with antianginal medications at this time  Continue aspirin Plavix Lipitor  Blood pressure is elevated  Add metoprolol XL 25 mg  Add isosorbide mononitrate 30 mg daily with plan to quickly up titrate dose as outpatient based on symptoms  May also need to add Ranexa as outpatient  Needs to quit smoking! If still symptoms despite maximal medical therapy, consideration for catheterization in the future    This can be discussion between himself and his primary cardiologist

## 2020-07-02 NOTE — ASSESSMENT & PLAN NOTE
· Hyperlipidemia has not been taking atorvastatin    Advised to restart after discharge    Results from last 7 days   Lab Units 07/02/20  0504   TRIGLYCERIDES mg/dL 164*   CHOLESTEROL mg/dL 203*   LDL CALC mg/dL 142*   HDL mg/dL 28*

## 2020-07-02 NOTE — ASSESSMENT & PLAN NOTE
· Hypokalemia in the setting of furosemide use    · Continue to replete    Results from last 7 days   Lab Units 07/02/20  0504 07/01/20  0425   POTASSIUM mmol/L 3 3* 3 1*

## 2020-07-02 NOTE — PROGRESS NOTES
Progress Note - Cardiology   Michael Muhammad 72 y o  male MRN: [id number]  Unit/Bed#: E4 -01 Encounter: [id number]      Assessment/Recommendations/Discussion:   1  Exertional chest pain  2  Coronary artery disease status post CABG about 20 years ago  He thinks he had it done in [place]  3  Dyslipidemia  4  Hypertension  5  CVA in the past    PLAN   Troponins negative x3, total cholesterol 203 triglycerides 164 HDL 28    Continue aspirin, Plavix, Lipitor, Lasix   Increase Lipitor to 80 mg   Needs to quit smoking   Lower extremity Doppler/PVRs show MELINDA about 1 3 bilaterally with normal triphasic waveforms  Probably calcified vessels  No evidence of significant obstruction  CTA showed right internal iliac artery obstruction but no significant obstructive disease by CTA in the common iliac or external iliac arteries   Plan for pharmacologic nuclear stress today   Further recommendations pending results of stress test      Will need close outpatient follow-up  Would repeat lipid panel in another 3 months  If triglycerides and LDL are still elevated, would consider adding Lovaza or Vascepa (preferably) to his regimen        Subjective:   HPI  No more episodes of chest pain while in the hospital   Cardiac enzymes are negative  Plan for nuclear stress today      Review of Systems: As noted in HPI  Rest of ROS is negative  Vitals:   /91 (BP Location: Right arm)   Pulse 80   Temp 98 9 °F (37 2 °C) (Temporal)   Resp 18   Ht 5' 10" (1 778 m)   Wt 98 3 kg (216 lb 11 4 oz)   SpO2 95%   BMI 31 09 kg/m²   Vitals:    07/01/20 1032   Weight: 98 3 kg (216 lb 11 4 oz)     No intake or output data in the 24 hours ending 07/02/20 0946    Physical Exam:  General appearance: alert and oriented, in no acute distress, obese male  Head: Normocephalic, without obvious abnormality, atraumatic  Eyes: conjunctivae/corneas clear  Anicteric    Neck: no adenopathy, no carotid bruit, no JVD  Lungs: clear to auscultation bilaterally  Heart:  Midline sternotomy scar, regular rate and rhythm, S1, S2 normal, no murmur, no click, rub or gallop  Abdomen:  Obese, soft, non-tender; bowel sounds normal; no masses,  no organomegaly  Extremities: extremities normal, warm and well-perfused; no cyanosis, clubbing, or edema  Skin: Skin color, texture, turgor normal  No rashes or lesions     Lab Results:  Results from last 7 days   Lab Units 07/02/20  0504   WBC Thousand/uL 7 71   HEMOGLOBIN g/dL 14 1   HEMATOCRIT % 40 1   PLATELETS Thousands/uL 258     Results from last 7 days   Lab Units 07/02/20  0504   POTASSIUM mmol/L 3 3*   CHLORIDE mmol/L 102   CO2 mmol/L 25   BUN mg/dL 11   CREATININE mg/dL 1 03   CALCIUM mg/dL 8 3   ALK PHOS U/L 86   ALT U/L 19   AST U/L 15     Results from last 7 days   Lab Units 07/02/20  0504   POTASSIUM mmol/L 3 3*   CHLORIDE mmol/L 102   CO2 mmol/L 25   BUN mg/dL 11   CREATININE mg/dL 1 03   CALCIUM mg/dL 8 3           Medications:    Current Facility-Administered Medications:     acetaminophen (TYLENOL) tablet 650 mg, 650 mg, Oral, Q6H PRN, Nader Palmer DO, 650 mg at 07/02/20 0041    albuterol (PROVENTIL HFA,VENTOLIN HFA) inhaler 2 puff, 2 puff, Inhalation, Q4H PRN, ADRIANA Guy    aspirin (ECOTRIN LOW STRENGTH) EC tablet 81 mg, 81 mg, Oral, Daily, Nader Palmer DO, 81 mg at 07/01/20 1405    atorvastatin (LIPITOR) tablet 40 mg, 40 mg, Oral, QPM, Nader Palmer DO, 40 mg at 07/01/20 1808    clopidogrel (PLAVIX) tablet 75 mg, 75 mg, Oral, Daily, Nader Palmer DO, 75 mg at 07/01/20 1406    docusate sodium (COLACE) capsule 100 mg, 100 mg, Oral, BID, ADRIANA Crystal, 100 mg at 07/02/20 0053    fluticasone (FLONASE) 50 mcg/act nasal spray 1 spray, 1 spray, Nasal, Daily, Nader Palmer DO, 1 spray at 07/01/20 1406    furosemide (LASIX) tablet 40 mg, 40 mg, Oral, Daily, Nader Palmer, , 40 mg at 07/01/20 1404    heparin (porcine) subcutaneous injection 5,000 Units, 5,000 Units, Subcutaneous, Q8H [facility] & assisted, Nader Palmer DO, 5,000 Units at 07/02/20 0513    LORazepam (ATIVAN) tablet 0 5 mg, 0 5 mg, Oral, BID PRN, Sanjay Catching, DO    morphine injection 2 mg, 2 mg, Intravenous, Q4H PRN, Sanjay Catching, DO    nitroglycerin (NITROSTAT) SL tablet 0 4 mg, 0 4 mg, Sublingual, Q5 Min PRN, Nader Palmer DO    ondansetron (ZOFRAN) injection 4 mg, 4 mg, Intravenous, Q6H PRN, Sanjay Catching, DO    oxyCODONE (ROXICODONE) IR tablet 5 mg, 5 mg, Oral, Q4H PRN, Nader Palmer DO, 5 mg at 07/01/20 1404    pantoprazole (PROTONIX) EC tablet 40 mg, 40 mg, Oral, Daily, Nader Palmer DO, 40 mg at 07/02/20 7364    potassium chloride (K-DUR,KLOR-CON) CR tablet 40 mEq, 40 mEq, Oral, Daily, Nader Palmer DO    tiotropium (SPIRIVA) capsule for inhaler 18 mcg, 18 mcg, Inhalation, Daily, Nader Palmer DO, 18 mcg at 07/01/20 1406    This note was completed in part utilizing [email or web address] Fluency Direct Software  Grammatical errors, random word insertions, spelling mistakes, and incomplete sentences may be an occasional consequence of this system secondary to software limitations, ambient noise, and hardware issues  If you have any questions or concerns about the content, text, or information contained within the body of this dictation, please contact the provider for clarification        Gary Almonte DO, [facility]  7/2/2020 9:46 AM

## 2020-07-02 NOTE — CASE MANAGEMENT
This case management assistant provided the patient with a copy of his medicare obs paperwork and reviewed it with him  He signed the document

## 2020-07-02 NOTE — ASSESSMENT & PLAN NOTE
· Hypokalemia in the setting of furosemide use    · Continue to replete on furosemide    Results from last 7 days   Lab Units 07/02/20  0504 07/01/20  0425   POTASSIUM mmol/L 3 3* 3 1*

## 2020-07-02 NOTE — DISCHARGE SUMMARY
Discharge- Francisca Napier 1955, 72 y o  male MRN: [id number]  Unit/Bed#: E4 -01 Encounter: [id number]  Primary Care Provider: Husam Alfred MD   Date and time admitted to hospital: 7/1/2020  4:17 AM        Admitting Provider:  Ema Escalera DO  Discharge Provider:  Ema Escalera DO  Admission Date: 7/1/2020       Discharge Date: 07/02/20   LOS: 0  Primary Care Physician at Discharge: Husam Alfred, [address] [address]:  Francisca Napier is a 72 y o  male who presented to hospital chest pain  The patient typically follows with Dr Rafaela Aquino in and has history of coronary artery disease status post CABG  He stop taking his atorvastatin and recently was discontinued on his metoprolol amlodipine and ARB  He has had worsening dyspnea and chest pain on exertion which prompted EMS call and patient was brought here to the hospital where he was seen by cardiology  Stress test was ordered which demonstrates small area of reversible ischemia which cardiology recommends the addition of metoprolol and isosorbide  He will need close follow-up with his cardiologist   On CTA there was noted occlusion of right internal iliac artery however lower extremity arterial duplex which demonstrated good MELINDA  He is advised smoking cessation  Please see problem list listed below  DISCHARGE DIAGNOSES  * Coronary arteriosclerosis in native artery  Assessment & Plan  · Chest pain with history of coronary artery disease status post CABG 2000 follows with Dr Leila Guerra  · Has had worsening chest discomfort with activity  · Cardiology ordered nuclear stress test with small area of reversible ischemia which cardiology recommends restarting metoprolol succinate in the addition of isosorbide    Needs close follow-up with Dr Leila Guerra  · Continue clopidogrel and aspirin  · Patient was not taking atorvastatin as prescribed    Results from last 7 days   Lab Units 07/01/20  1624 07/01/20  1256 07/01/20  0722 07/01/20  0425 TROPONIN I ng/mL <0 02 <0 02 <0 02 <0 02       Aortic aneurysm [facility])  Assessment & Plan  · Aortic aneurysm case was discussed by ED with CTS  · Needs monitoring after discharge    Hypokalemia  Assessment & Plan  · Hypokalemia in the setting of furosemide use  · Continue to replete on furosemide    Results from last 7 days   Lab Units 07/02/20  0504 07/01/20  0425   POTASSIUM mmol/L 3 3* 3 1*       Gastroesophageal reflux disease  Assessment & Plan  · GERD continue pantoprazole    Anxiety and depression  Assessment & Plan  · Anxiety and depression  Continue lorazepam    COPD (chronic obstructive pulmonary disease) (HCC)  Assessment & Plan  · COPD without exacerbation  Continue Spiriva    Hyperlipidemia  Assessment & Plan  · Hyperlipidemia has not been taking atorvastatin  Advised to restart after discharge and prescription sent to pharmacy    Results from last 7 days   Lab Units 07/02/20  0504   TRIGLYCERIDES mg/dL 164*   CHOLESTEROL mg/dL 203*   LDL CALC mg/dL 142*   HDL mg/dL 28*       Hypertension  Assessment & Plan  · Essential hypertension states that recently been discontinued on metoprolol succinate, irbesartan, and amlodipine  · Continue furosemide with potassium supplementation    CONSULTING PROVIDERS   IP CONSULT TO CARDIOLOGY    PROCEDURES PERFORMED  NM myocardial perfusion SPECT  Date: 07/02/20  IMPRESSIONS: Abnormal study after pharmacologic vasodilation  There was a small amount of ischemia in the inferoseptal region  Overall left ventricular systolic function was preserved, but there were regional wall motion abnormalities in the inferoseptal wall  VAS MELINDA & waveform analysis multiple levels  Date 7/1/2020  Impression  RIGHT LOWER LIMB  Ankle/Brachial Index: 1 29, which is in the normal range  PPG/PVR Tracings are normal   Triphasic/Biphasic waveforms at the ankle  Metatarsal Pressure: 170 mm Hg  Great Toe Pressure: 123 mm Hg, within the healing range for a non-diabetic    LEFT LOWER LIMB  Ankle/Brachial Index: 1 31, which is in the normal range  PPG/PVR Tracings are normal   Triphasic/Biphasic waveforms at the ankle  Metatarsal Pressure: 230 mm Hg  Great Toe Pressure: 92 mm Hg, within the healing range for a non-diabetic  RADIOLOGY RESULTS  Xr Chest 1 View Portable  Result Date: 7/1/2020  Impression: No acute cardiopulmonary disease  Emphysema  Workstation performed: [id number]     Cta Dissection Protocol Chest Abdomen Pelvis W Wo Contrast  Result Date: 7/1/2020  Impression: 1  No CT evidence of aortic dissection  2   Ectasia of the ascending aorta with focal outpouching along the anterior, right lateral margin  In retrospect, this has not significantly changed from a prior CT lung screening performed February 2019  Findings likely represent a small saccular aneurysm  Recommend follow-up cardiothoracic surgery consultation  3   Moderate atherosclerotic disease of the abdominal aorta and its branch vessels  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  4   Colonic diverticulosis  5   Prostatomegaly with probable bladder outlet obstruction  Irregularity along the floor of the urinary bladder, likely due to enlarged prostate gland, however bladder neoplasm not excluded  Recommend follow-up urology consultation and cystoscopy    I personally discussed this study with Dr Shruti Jiménez on 7/1/2020 at 8:05 AM  Workstation performed: [id number]       LABS  Results from last 7 days   Lab Units 07/02/20  0504 07/01/20  0425   WBC Thousand/uL 7 71 10 57*   HEMOGLOBIN g/dL 14 1 14 0   HEMATOCRIT % 40 1 40 1   MCV fL 89 89   PLATELETS Thousands/uL 258 259   INR   --  0 93     Results from last 7 days   Lab Units 07/02/20  0504 07/01/20  0425   SODIUM mmol/L 138 138   POTASSIUM mmol/L 3 3* 3 1*   CHLORIDE mmol/L 102 101   CO2 mmol/L 25 28   BUN mg/dL 11 12   CREATININE mg/dL 1 03 1 16   CALCIUM mg/dL 8 3 8 6   ALBUMIN g/dL 3 3* 3 7   TOTAL BILIRUBIN mg/dL 0 40 0 33   ALK PHOS U/L 86 91   ALT U/L 19 21   AST U/L 15 17   EGFR ml/min/1 73sq m 76 66   GLUCOSE RANDOM mg/dL 97 105     Results from last 7 days   Lab Units 07/01/20  1624 07/01/20  1256 07/01/20  0722 07/01/20  0425   CK TOTAL U/L  --   --   --  80   TROPONIN I ng/mL <0 02 <0 02 <0 02 <0 02         Results from last 7 days   Lab Units 07/02/20  0504   TRIGLYCERIDES mg/dL 164*   CHOLESTEROL mg/dL 203*   LDL CALC mg/dL 142*   HDL mg/dL 28*       PHYSICAL EXAM:  Please see physical exam found on progress note dated from today  Planned Re-admission:  No  Discharge Disposition: Home/Self Care    Test Results Pending at Discharge:  None  Incidental findings:   1  Ectasia of the ascending aorta with focal outpouching along the anterior, right lateral margin  In retrospect, this has not significantly changed from a prior CT lung screening performed February 2019  Findings likely represent a small saccular aneurysm  Recommend follow-up cardiothoracic surgery consultation  2  Prostatomegaly with probable bladder outlet obstruction  Irregularity along the floor of the urinary bladder, likely due to enlarged prostate gland, however bladder neoplasm not excluded  Recommend follow-up urology consultation and cystoscopy  Medications   · Discharge Medication List: See after visit summary for reconciled discharge medications  Diet restrictions:  Low-sodium diet   Activity restrictions: No strenuous activity  Discharge Condition: stable    Outpatient Follow-Up and Discharge Instructions  See after visit summary section titled Discharge Instructions for information provided to patient and family  Code Status: Level 1 - Full Code  Discharge Statement   I spent 35 minutes discharging the patient  This time was spent on the day of discharge  Greater than 50% of total time was spent with the patient and / or family counseling and / or coordination of care      ** Please Note: This note has been constructed using a voice recognition system   **

## 2020-07-02 NOTE — ASSESSMENT & PLAN NOTE
· Hyperlipidemia has not been taking atorvastatin    Advised to restart after discharge and prescription sent to pharmacy    Results from last 7 days   Lab Units 07/02/20  0504   TRIGLYCERIDES mg/dL 164*   CHOLESTEROL mg/dL 203*   LDL CALC mg/dL 142*   HDL mg/dL 28*

## 2020-07-02 NOTE — ASSESSMENT & PLAN NOTE
· Chest pain with history of coronary artery disease status post CABG 2000 follows with Dr Mary Ellen Mims  · Has had worsening chest discomfort with activity  · Cardiology ordered nuclear stress test with small area of reversible ischemia which cardiology recommends restarting metoprolol succinate in the addition of isosorbide    Needs close follow-up with Dr Mary Ellen Mims  · Continue clopidogrel and aspirin  · Patient was not taking atorvastatin as prescribed    Results from last 7 days   Lab Units 07/01/20  1624 07/01/20  1256 07/01/20  0722 07/01/20  0425   TROPONIN I ng/mL <0 02 <0 02 <0 02 <0 02

## 2020-07-02 NOTE — INCIDENTAL FINDINGS
The following findings require follow up:  Radiographic finding   Findin  Ectasia of the ascending aorta with focal outpouching along the anterior, right lateral margin  In retrospect, this has not significantly changed from a prior CT lung screening performed 2019  Findings likely represent a small saccular aneurysm  Recommend follow-up cardiothoracic surgery consultation  2  Prostatomegaly with probable bladder outlet obstruction  Irregularity along the floor of the urinary bladder, likely due to enlarged prostate gland, however bladder neoplasm not excluded  Recommend follow-up urology consultation and cystoscopy      Follow up should be done within 4 week(s)    Please notify the following clinician to assist with the follow up:   Dr Flakita Jacques MD

## 2020-07-02 NOTE — PLAN OF CARE
Problem: Potential for Falls  Goal: Patient will remain free of falls  Description  INTERVENTIONS:  - Assess patient frequently for physical needs  -  Identify cognitive and physical deficits and behaviors that affect risk of falls    -  [place] fall precautions as indicated by assessment   - Educate patient/family on patient safety including physical limitations  - Instruct patient to call for assistance with activity based on assessment  - Modify environment to reduce risk of injury  - Consider OT/PT consult to assist with strengthening/mobility  Outcome: Progressing     Problem: PAIN - ADULT  Goal: Verbalizes/displays adequate comfort level or baseline comfort level  Description  Interventions:  - Encourage patient to monitor pain and request assistance  - Assess pain using appropriate pain scale  - Administer analgesics based on type and severity of pain and evaluate response  - Implement non-pharmacological measures as appropriate and evaluate response  - Consider cultural and social influences on pain and pain management  - Notify physician/advanced practitioner if interventions unsuccessful or patient reports new pain  Outcome: Progressing     Problem: INFECTION - ADULT  Goal: Absence or prevention of progression during hospitalization  Description  INTERVENTIONS:  - Assess and monitor for signs and symptoms of infection  - Monitor lab/diagnostic results  - Monitor all insertion sites, i e  indwelling lines, tubes, and drains  - Monitor endotracheal if appropriate and nasal secretions for changes in amount and color  - [place] appropriate cooling/warming therapies per order  - Administer medications as ordered  - Instruct and encourage patient and family to use good hand hygiene technique  - Identify and instruct in appropriate isolation precautions for identified infection/condition  Outcome: Progressing  Goal: Absence of fever/infection during neutropenic period  Description  INTERVENTIONS:  - Monitor WBC    Outcome: Progressing     Problem: SAFETY ADULT  Goal: Patient will remain free of falls  Description  INTERVENTIONS:  - Assess patient frequently for physical needs  -  Identify cognitive and physical deficits and behaviors that affect risk of falls    -  [place] fall precautions as indicated by assessment   - Educate patient/family on patient safety including physical limitations  - Instruct patient to call for assistance with activity based on assessment  - Modify environment to reduce risk of injury  - Consider OT/PT consult to assist with strengthening/mobility  Outcome: Progressing  Goal: Maintain or return to baseline ADL function  Description  INTERVENTIONS:  -  Assess patient's ability to carry out ADLs; assess patient's baseline for ADL function and identify physical deficits which impact ability to perform ADLs (bathing, care of mouth/teeth, toileting, grooming, dressing, etc )  - Assess/evaluate cause of self-care deficits   - Assess range of motion  - Assess patient's mobility; develop plan if impaired  - Assess patient's need for assistive devices and provide as appropriate  - Encourage maximum independence but intervene and supervise when necessary  - Involve family in performance of ADLs  - Assess for home care needs following discharge   - Consider OT consult to assist with ADL evaluation and planning for discharge  - Provide patient education as appropriate  Outcome: Progressing  Goal: Maintain or return mobility status to optimal level  Description  INTERVENTIONS:  - Assess patient's baseline mobility status (ambulation, transfers, stairs, etc )    - Identify cognitive and physical deficits and behaviors that affect mobility  - Identify mobility aids required to assist with transfers and/or ambulation (gait belt, sit-to-stand, lift, walker, cane, etc )  - [place] fall precautions as indicated by assessment  - Record patient progress and toleration of activity level on Mobility SBAR; progress patient to next Phase/Stage  - Instruct patient to call for assistance with activity based on assessment  - Consider rehabilitation consult to assist with strengthening/weightbearing, etc   Outcome: Progressing     Problem: DISCHARGE PLANNING  Goal: Discharge to home or other facility with appropriate resources  Description  INTERVENTIONS:  - Identify barriers to discharge w/patient and caregiver  - Arrange for needed discharge resources and transportation as appropriate  - Identify discharge learning needs (meds, wound care, etc )  - Arrange for interpretive services to assist at discharge as needed  - Refer to Case Management Department for coordinating discharge planning if the patient needs post-hospital services based on physician/advanced practitioner order or complex needs related to functional status, cognitive ability, or social support system  Outcome: Progressing     Problem: Knowledge Deficit  Goal: Patient/family/caregiver demonstrates understanding of disease process, treatment plan, medications, and discharge instructions  Description  Complete learning assessment and assess knowledge base    Interventions:  - Provide teaching at level of understanding  - Provide teaching via preferred learning methods  Outcome: Progressing

## 2020-07-02 NOTE — ASSESSMENT & PLAN NOTE
· Chest pain with history of coronary artery disease status post CABG 2000 follows with Dr Leila Guerra  · Has had worsening chest discomfort with activity  · Cardiology ordered nuclear stress test, results pending  · Continue clopidogrel and aspirin  · Patient was not taking atorvastatin as prescribed    Results from last 7 days   Lab Units 07/01/20  1624 07/01/20  1256 07/01/20  0722 07/01/20  0425   TROPONIN I ng/mL <0 02 <0 02 <0 02 <0 02

## 2020-07-02 NOTE — PLAN OF CARE
Problem: Potential for Falls  Goal: Patient will remain free of falls  Description  INTERVENTIONS:  - Assess patient frequently for physical needs  -  Identify cognitive and physical deficits and behaviors that affect risk of falls    -  [place] fall precautions as indicated by assessment   - Educate patient/family on patient safety including physical limitations  - Instruct patient to call for assistance with activity based on assessment  - Modify environment to reduce risk of injury  - Consider OT/PT consult to assist with strengthening/mobility  7/2/2020 1704 by Hanny Michel RN  Outcome: Adequate for Discharge  7/2/2020 1327 by Hanny Michel RN  Outcome: Progressing     Problem: PAIN - ADULT  Goal: Verbalizes/displays adequate comfort level or baseline comfort level  Description  Interventions:  - Encourage patient to monitor pain and request assistance  - Assess pain using appropriate pain scale  - Administer analgesics based on type and severity of pain and evaluate response  - Implement non-pharmacological measures as appropriate and evaluate response  - Consider cultural and social influences on pain and pain management  - Notify physician/advanced practitioner if interventions unsuccessful or patient reports new pain  7/2/2020 1704 by Hanny Michel RN  Outcome: Adequate for Discharge  7/2/2020 1327 by Hanny Michel RN  Outcome: Progressing     Problem: INFECTION - ADULT  Goal: Absence or prevention of progression during hospitalization  Description  INTERVENTIONS:  - Assess and monitor for signs and symptoms of infection  - Monitor lab/diagnostic results  - Monitor all insertion sites, i e  indwelling lines, tubes, and drains  - Monitor endotracheal if appropriate and nasal secretions for changes in amount and color  - [place] appropriate cooling/warming therapies per order  - Administer medications as ordered  - Instruct and encourage patient and family to use good hand hygiene technique  - Identify and instruct in appropriate isolation precautions for identified infection/condition  7/2/2020 1704 by Eliza Sanderson RN  Outcome: Adequate for Discharge  7/2/2020 1327 by Eliza Sanderson RN  Outcome: Progressing  Goal: Absence of fever/infection during neutropenic period  Description  INTERVENTIONS:  - Monitor WBC    7/2/2020 1704 by Eliza Sanderson RN  Outcome: Adequate for Discharge  7/2/2020 1327 by Eliza Sanderson RN  Outcome: Progressing     Problem: SAFETY ADULT  Goal: Patient will remain free of falls  Description  INTERVENTIONS:  - Assess patient frequently for physical needs  -  Identify cognitive and physical deficits and behaviors that affect risk of falls    -  [place] fall precautions as indicated by assessment   - Educate patient/family on patient safety including physical limitations  - Instruct patient to call for assistance with activity based on assessment  - Modify environment to reduce risk of injury  - Consider OT/PT consult to assist with strengthening/mobility  7/2/2020 1704 by Eliza Sanderson RN  Outcome: Adequate for Discharge  7/2/2020 1327 by Eliza Sanderson RN  Outcome: Progressing  Goal: Maintain or return to baseline ADL function  Description  INTERVENTIONS:  -  Assess patient's ability to carry out ADLs; assess patient's baseline for ADL function and identify physical deficits which impact ability to perform ADLs (bathing, care of mouth/teeth, toileting, grooming, dressing, etc )  - Assess/evaluate cause of self-care deficits   - Assess range of motion  - Assess patient's mobility; develop plan if impaired  - Assess patient's need for assistive devices and provide as appropriate  - Encourage maximum independence but intervene and supervise when necessary  - Involve family in performance of ADLs  - Assess for home care needs following discharge   - Consider OT consult to assist with ADL evaluation and planning for discharge  - Provide patient education as appropriate  7/2/2020 1704 by Penny Miller RN  Outcome: Adequate for Discharge  7/2/2020 1327 by Penny Miller RN  Outcome: Progressing  Goal: Maintain or return mobility status to optimal level  Description  INTERVENTIONS:  - Assess patient's baseline mobility status (ambulation, transfers, stairs, etc )    - Identify cognitive and physical deficits and behaviors that affect mobility  - Identify mobility aids required to assist with transfers and/or ambulation (gait belt, sit-to-stand, lift, walker, cane, etc )  - [place] fall precautions as indicated by assessment  - Record patient progress and toleration of activity level on Mobility SBAR; progress patient to next Phase/Stage  - Instruct patient to call for assistance with activity based on assessment  - Consider rehabilitation consult to assist with strengthening/weightbearing, etc   7/2/2020 1704 by Penny Miller RN  Outcome: Adequate for Discharge  7/2/2020 1327 by Penny Miller RN  Outcome: Progressing     Problem: DISCHARGE PLANNING  Goal: Discharge to home or other facility with appropriate resources  Description  INTERVENTIONS:  - Identify barriers to discharge w/patient and caregiver  - Arrange for needed discharge resources and transportation as appropriate  - Identify discharge learning needs (meds, wound care, etc )  - Arrange for interpretive services to assist at discharge as needed  - Refer to Case Management Department for coordinating discharge planning if the patient needs post-hospital services based on physician/advanced practitioner order or complex needs related to functional status, cognitive ability, or social support system  7/2/2020 1704 by Penny Miller RN  Outcome: Adequate for Discharge  7/2/2020 1327 by Penny Miller RN  Outcome: Progressing     Problem: Knowledge Deficit  Goal: Patient/family/caregiver demonstrates understanding of disease process, treatment plan, medications, and discharge instructions  Description  Complete learning assessment and assess knowledge base    Interventions:  - Provide teaching at level of understanding  - Provide teaching via preferred learning methods  7/2/2020 1704 by Aline Patel RN  Outcome: Adequate for Discharge  7/2/2020 1327 by Aline Patel RN  Outcome: Progressing

## 2020-07-06 ENCOUNTER — TELEPHONE (OUTPATIENT)
Dept: UROLOGY | Facility: MEDICAL CENTER | Age: 65
End: 2020-07-06

## 2020-07-06 LAB
CHEST PAIN STATEMENT: NORMAL
MAX DIASTOLIC BP: 80 MMHG
MAX HEART RATE: 122 BPM
MAX PREDICTED HEART RATE: 155 BPM
MAX. SYSTOLIC BP: 142 MMHG
PROTOCOL NAME: NORMAL
REASON FOR TERMINATION: NORMAL
TARGET HR FORMULA: NORMAL
TEST INDICATION: NORMAL
TIME IN EXERCISE PHASE: NORMAL

## 2020-07-06 NOTE — TELEPHONE ENCOUNTER
Post ED follow up  Patient request to call in the afternoon if not giving a call today      Complaint/diagnosis:prostatomegaly    Insurance:medicare    History of Cancer:no    Previous Urologist:no    Outside testing/where:no    Records requested/where: no    Preferred Location:[place]

## 2020-07-06 NOTE — TELEPHONE ENCOUNTER
Called [phone] and left message for patient stating Dr Jaci Severino has an opening on 08/31/2020 at 100 pm with Dr Jaci Severino at the [facility]  Asked patient to call office to confirm appointment

## 2020-07-07 ENCOUNTER — DOCUMENTATION (OUTPATIENT)
Dept: CARDIAC SURGERY | Facility: CLINIC | Age: 65
End: 2020-07-07

## 2020-07-07 NOTE — TELEPHONE ENCOUNTER
Called and spoke with patient  Informed patient that we have him scheduled for an appointment on 08/31/20 at 1:00pm with Dr Mansoor Hewitt  Patient stated that is too far out for him  He would like to be seen sooner  Patient stated that he will call elsewhere to see if they can get him in sooner

## 2020-07-07 NOTE — PROGRESS NOTES
Patient referred to our aortic clinic for recent findings on CTA demonstrating ascending aortic ectasia measuring 38 mm  Reviewed with Dr Rochelle Roth  In light of patients prior h/o CABG, this is not a significant finding and patient does not need to be seen for consultation, nor does he require any ongoing imaging for surveillance    Patient notifed

## 2020-07-17 ENCOUNTER — DOCUMENTATION (OUTPATIENT)
Dept: OTHER | Facility: HOSPITAL | Age: 65
End: 2020-07-17

## 2020-07-24 ENCOUNTER — OFFICE VISIT (OUTPATIENT)
Dept: OBGYN CLINIC | Facility: CLINIC | Age: 65
End: 2020-07-24
Payer: MEDICARE

## 2020-07-24 VITALS
TEMPERATURE: 98.2 F | SYSTOLIC BLOOD PRESSURE: 138 MMHG | BODY MASS INDEX: 30.92 KG/M2 | DIASTOLIC BLOOD PRESSURE: 72 MMHG | WEIGHT: 216 LBS | HEIGHT: 70 IN

## 2020-07-24 DIAGNOSIS — M25.832 ULNAR IMPACTION SYNDROME, LEFT: Primary | ICD-10-CM

## 2020-07-24 PROCEDURE — 99203 OFFICE O/P NEW LOW 30 MIN: CPT | Performed by: ORTHOPAEDIC SURGERY

## 2020-07-24 NOTE — PROGRESS NOTES
CHIEF COMPLAINT:  Chief Complaint   Patient presents with    Left Hand - Pain       SUBJECTIVE:  Michael Muhammad is a 72y o  year old male who presents left hand pain  Patient states that he fell on the outstretched hand about 2 months ago  He did go to the emergency room for evaluation and x-ray which demonstrate no acute fractures  He was instructed to follow up with Orthopedics however he continued to wait to see if it would progressively get better however it has not  He notes most of the pain at nighttime  He will note occasional pain into his fingertips as well  He denies any numbness or tingling  PAST MEDICAL HISTORY:  Past Medical History:   Diagnosis Date    [facility] 9/15/2018    CAD (coronary artery disease)     s/p CABG 2000    COPD [facility] obstructive [facility]) [facility]     [facility]    [facility]     Hyperlipidemia     Hypertension     Lexiscan nuclear stress test 03/19/2016    EF 74% Normal    TIA (transient ischemic attack) 09/13/2018       PAST SURGICAL HISTORY:  Past Surgical History:   Procedure Laterality Date    CARDIAC CATHETERIZATION  08/19/2015    LIMA occluded   No severe native lesions    COLONOSCOPY      CORONARY ARTERY BYPASS GRAFT  2000    HERNIA REPAIR      times 2       FAMILY HISTORY:  Family History   Problem Relation Age of Onset    Cancer Mother     Heart disease Mother     Cancer Father     Cancer Maternal Grandmother     Cancer Paternal Grandfather        SOCIAL HISTORY:  Social History     Tobacco Use    Smoking status: Current Every Day Smoker     Packs/day: 1 00     Years: 50 00     Pack years: 50 00     Types: Cigars, Pipe    Smokeless tobacco: Never Used   Substance Use Topics    Alcohol use: Not Currently    Drug use: Never       MEDICATIONS:    Current Outpatient Medications:     albuterol (2 5 mg/3 mL) 0 083 % nebulizer solution, Take 1 vial (2 5 mg total) by nebulization every 6 (six) hours as needed for wheezing or shortness of breath, Disp: 25 vial, Rfl: 2    albuterol (PROVENTIL HFA,VENTOLIN HFA) 90 mcg/act inhaler, Inhale 2 puffs every 4 (four) hours as needed for wheezing, Disp: 1 Inhaler, Rfl: 0    aluminum-magnesium hydroxide 200-200 MG/5ML suspension, Take 5 mL by mouth every 6 (six) hours as needed for heartburn, Disp: 355 mL, Rfl: 0    aspirin (ECOTRIN LOW STRENGTH) 81 mg EC tablet, Take 1 tablet (81 mg total) by mouth daily, Disp: 30 tablet, Rfl: 0    atorvastatin (LIPITOR) 40 mg tablet, Take 1 tablet (40 mg total) by mouth every evening, Disp: 90 tablet, Rfl: 0    calcium carbonate (TUMS EX) 750 mg chewable tablet, Chew 1 tablet (750 mg total) 3 (three) times a day as needed for indigestion or heartburn, Disp: 60 tablet, Rfl: 0    clopidogrel (PLAVIX) 75 mg tablet, Take 75 mg by mouth daily, Disp: , Rfl:     fluticasone (FLONASE) 50 mcg/act nasal spray, 1 spray into each nostril daily, Disp: 16 g, Rfl: 0    furosemide (LASIX) 40 mg tablet, Take 1 tablet (40 mg total) by mouth daily (Patient taking differently: Take 40 mg by mouth as needed ), Disp: 90 tablet, Rfl: 3    isosorbide mononitrate (IMDUR) 30 mg 24 hr tablet, Take 1 tablet (30 mg total) by mouth daily, Disp: 90 tablet, Rfl: 0    LORazepam (ATIVAN) 0 5 mg tablet, Take 1 tablet (0 5 mg total) by mouth 2 (two) times a day as needed for anxiety for up to 7 days, Disp: 14 tablet, Rfl: 0    meloxicam (MOBIC) 15 mg tablet, Take 15 mg by mouth daily as needed, Disp: , Rfl: 0    metoprolol succinate (TOPROL-XL) 25 mg 24 hr tablet, Take 1 tablet (25 mg total) by mouth daily, Disp: 90 tablet, Rfl: 0    nicotine (NICODERM CQ) 14 mg/24hr TD 24 hr patch, Place 1 patch on the skin daily, Disp: 28 patch, Rfl: 0    nitroglycerin (NITROSTAT) 0 4 mg SL tablet, Place 0 4 mg under the tongue every 5 (five) minutes as needed for chest pain, Disp: , Rfl:     pantoprazole (PROTONIX) 40 mg tablet, Take 1 tablet (40 mg total) by mouth daily, Disp: 30 tablet, Rfl: 0    potassium chloride (K-DUR) 10 mEq tablet, Take 20 mEq by mouth daily, Disp: , Rfl:     roflumilast (DALIRESP) 500 mcg tablet, Take 500 mcg by mouth daily, Disp: , Rfl:     solifenacin (VESICARE) 10 MG tablet, Take by mouth Daily, Disp: , Rfl:     tiotropium (SPIRIVA) 18 mcg inhalation capsule, Place 1 capsule (18 mcg total) into inhaler and inhale daily, Disp: 30 capsule, Rfl: 0    ALLERGIES:  Allergies   Allergen Reactions    Gabapentin Headache       REVIEW OF SYSTEMS:  Review of Systems  ROS:   General: no fever, no chills  HEENT:  No loss of hearing or eyesight problems  Eyes:  No red eyes  Respiratory:  No coughing, shortness of breath or wheezing  Cardiovascular:  No chest pain, no palpitations  GI:  Abdomen soft nontender, denies nausea  Endocrine:  No muscle weakness, no frequent urination, no excessive thirst  Urinary:  No dysuria, no incontinence  Musculoskeletal: see HPI and PE  SKIN:  No skin rash, no dry skin  Neurological:  No headaches, no confusion  Psychiatric:  No suicide thoughts, no anxiety, no depression  Review of all other systems is negative    VITALS:  Vitals:    07/24/20 1007   BP: 138/72   Temp: 98 2 °F (36 8 °C)       LABS:  HgA1c:   Lab Results   Component Value Date    HGBA1C 5 8 06/14/2019     BMP:   Lab Results   Component Value Date    CALCIUM 8 3 07/02/2020     06/22/2018    K 3 3 (L) 07/02/2020    CO2 25 07/02/2020     07/02/2020    BUN 11 07/02/2020    CREATININE 1 03 07/02/2020       _____________________________________________________  PHYSICAL EXAMINATION:  General: well developed and well nourished, alert, oriented times 3 and appears comfortable  Psychiatric: Normal  HEENT: Trachea Midline, No torticollis  Pulmonary: No audible wheezing or strider  Cardiovascular: No discernable arrhythmia   Skin: No masses, erythema, lacerations, fluctation, ulcerations  Neurovascular: Sensation Intact to the Median, Ulnar, Radial Nerve, Motor Intact to the Median, Ulnar, Radial Nerve and Pulses Intact    MUSCULOSKELETAL EXAMINATION:  Left wrist  No erythema edema or ecchymosis noted, skin is warm to touch  No tenderness to palpation over the wrist  Some discomfort is noted with wrist supination and ulnar deviation, none with pronation and ulnar deviation  DRUJ stable with testing  Patient is neurovascular intact    ___________________________________________________  STUDIES REVIEWED:  Images obtained on 07/02/2020 of the Left wrist 3 views demonstrate No acute fractures or dislocations appreciated  Slight ulnar positive variance appreciated  PROCEDURES PERFORMED:  Procedures  No Procedures performed today    _____________________________________________________  ASSESSMENT/PLAN:      Diagnoses and all orders for this visit:    Ulnar impaction syndrome, left  - patient was advised to take Tylenol and anti-inflammatories as needed for pain  - we did advise about a cortisone injection however he would like to hold off at this time  - he will follow up with us as needed      Follow Up:  Return if symptoms worsen or fail to improve      Work/school status:  No restrictions    To Do Next Visit:  Re-evaluation of current issue        Scribe Attestation    I,:   Yocsata Bond PA-C am acting as a scribe while in the presence of the attending physician :        I,:   Destini Dorman MD personally performed the services described in this documentation    as scribed in my presence :

## 2020-08-11 ENCOUNTER — OFFICE VISIT (OUTPATIENT)
Dept: CARDIOLOGY CLINIC | Facility: CLINIC | Age: 65
End: 2020-08-11
Payer: MEDICARE

## 2020-08-11 VITALS
SYSTOLIC BLOOD PRESSURE: 140 MMHG | WEIGHT: 222 LBS | DIASTOLIC BLOOD PRESSURE: 90 MMHG | HEART RATE: 90 BPM | HEIGHT: 70 IN | BODY MASS INDEX: 31.78 KG/M2

## 2020-08-11 DIAGNOSIS — E78.2 MIXED HYPERLIPIDEMIA: ICD-10-CM

## 2020-08-11 DIAGNOSIS — I25.10 CORONARY ARTERY DISEASE INVOLVING NATIVE CORONARY ARTERY OF NATIVE HEART WITHOUT ANGINA PECTORIS: Primary | ICD-10-CM

## 2020-08-11 DIAGNOSIS — F17.200 TOBACCO DEPENDENCE SYNDROME: ICD-10-CM

## 2020-08-11 PROBLEM — R07.89 OTHER CHEST PAIN: Status: ACTIVE | Noted: 2019-02-08

## 2020-08-11 PROCEDURE — 99214 OFFICE O/P EST MOD 30 MIN: CPT | Performed by: INTERNAL MEDICINE

## 2020-08-11 NOTE — PROGRESS NOTES
Patient ID: Ny Davalos is a 72 y o  male  Plan:      Hyperlipidemia  Tolerating statin tx  Coronary artery disease involving native coronary artery of native heart without angina pectoris  Cath 2015 with occluded LIMA but patent native vessels  Tobacco dependence syndrome  Cessation discussed  Follow up Plan: 1 year ecg and f/u      HPI:  Patient is seen in follow-up regarding the above  Since the last visit he was hospitalized at [facility]  There was atypical chest pain  A stress test revealed only a very small zone of ischemia  No chest pain that I would label as typical since then  No symptoms with effort  He remains short of breath with effort  He continues to smoke cigarettes and to be nondiscrete with diet  To reiterate, he had heart bypass surgery in the year 2000  In 2015 heart catheterization revealed an occluded mammary graft to the LAD but no severe native lesions  Most recent or relevant cardiac/vascular testing:    Myoview 07/02/2020:  Normal ejection fraction  Small zone of ischemia in the inferoseptum  Past Surgical History:   Procedure Laterality Date    CARDIAC CATHETERIZATION  08/19/2015    LIMA occluded  No severe native lesions    COLONOSCOPY      CORONARY ARTERY BYPASS GRAFT  2000    HERNIA REPAIR      times 2     CMP:   Lab Results   Component Value Date     06/22/2018    K 3 3 (L) 07/02/2020    K 3 4 (L) 06/22/2018     07/02/2020     06/22/2018    CO2 25 07/02/2020    CO2 24 06/22/2018    BUN 11 07/02/2020    BUN 14 06/22/2018    CREATININE 1 03 07/02/2020    CREATININE 0 86 06/22/2018    EGFR 76 07/02/2020       Lipid Profile:   Lab Results   Component Value Date    TRIG 164 (H) 07/02/2020    HDL 28 (L) 07/02/2020         Review of Systems   10  point ROS  was otherwise non pertinent or negative except as per HPI or as below     Gait: Normal         Objective:     /90   Pulse 90   Ht 5' 10" (3 328 m)   Wt 101 kg (222 lb)   BMI 31 85 kg/m²     PHYSICAL EXAM:      General:  Normal appearance in no distress  Eyes:  Anicteric  Oral mucosa:  Moist   Neck:  No JVD  Carotid upstrokes are brisk without bruits  No masses  Chest:  Clear to auscultation and percussion  Well-healed midline sternotomy scar  Cardiac:  Normal PMI  Normal S1 and S2  No murmur gallop or rub  Abdomen:  Soft and nontender  No palpable organomegaly or aortic enlargement  Extremities:  Trace pretibial edema  Musculoskeletal:  Symmetric  Vascular:  Femoral pulses are brisk without bruits  Popliteal pulses are intact bilaterally  Pedal pulses are intact  Neuro:  Grossly symmetric  Psych:  Alert and oriented x3          Current Outpatient Medications:     albuterol (2 5 mg/3 mL) 0 083 % nebulizer solution, Take 1 vial (2 5 mg total) by nebulization every 6 (six) hours as needed for wheezing or shortness of breath, Disp: 25 vial, Rfl: 2    albuterol (PROVENTIL HFA,VENTOLIN HFA) 90 mcg/act inhaler, Inhale 2 puffs every 4 (four) hours as needed for wheezing, Disp: 1 Inhaler, Rfl: 0    aluminum-magnesium hydroxide 200-200 MG/5ML suspension, Take 5 mL by mouth every 6 (six) hours as needed for heartburn, Disp: 355 mL, Rfl: 0    aspirin (ECOTRIN LOW STRENGTH) 81 mg EC tablet, Take 1 tablet (81 mg total) by mouth daily, Disp: 30 tablet, Rfl: 0    atorvastatin (LIPITOR) 40 mg tablet, Take 1 tablet (40 mg total) by mouth every evening, Disp: 90 tablet, Rfl: 0    calcium carbonate (TUMS EX) 750 mg chewable tablet, Chew 1 tablet (750 mg total) 3 (three) times a day as needed for indigestion or heartburn, Disp: 60 tablet, Rfl: 0    fluticasone (FLONASE) 50 mcg/act nasal spray, 1 spray into each nostril daily, Disp: 16 g, Rfl: 0    furosemide (LASIX) 40 mg tablet, Take 1 tablet (40 mg total) by mouth daily, Disp: 90 tablet, Rfl: 3    isosorbide mononitrate (IMDUR) 30 mg 24 hr tablet, Take 1 tablet (30 mg total) by mouth daily, Disp: 90 tablet, Rfl: 0    LORazepam (ATIVAN) 0 5 mg tablet, Take 1 tablet (0 5 mg total) by mouth 2 (two) times a day as needed for anxiety for up to 7 days, Disp: 14 tablet, Rfl: 0    meloxicam (MOBIC) 15 mg tablet, Take 15 mg by mouth daily as needed, Disp: , Rfl: 0    metoprolol succinate (TOPROL-XL) 25 mg 24 hr tablet, Take 1 tablet (25 mg total) by mouth daily, Disp: 90 tablet, Rfl: 0    nitroglycerin (NITROSTAT) 0 4 mg SL tablet, Place 0 4 mg under the tongue every 5 (five) minutes as needed for chest pain, Disp: , Rfl:     pantoprazole (PROTONIX) 40 mg tablet, Take 1 tablet (40 mg total) by mouth daily, Disp: 30 tablet, Rfl: 0    potassium chloride (K-DUR) 10 mEq tablet, Take 10 mEq by mouth daily , Disp: , Rfl:     roflumilast (DALIRESP) 500 mcg tablet, Take 500 mcg by mouth daily, Disp: , Rfl:     solifenacin (VESICARE) 10 MG tablet, Take by mouth Daily, Disp: , Rfl:     tiotropium (SPIRIVA) 18 mcg inhalation capsule, Place 1 capsule (18 mcg total) into inhaler and inhale daily, Disp: 30 capsule, Rfl: 0    nicotine (NICODERM CQ) 14 mg/24hr TD 24 hr patch, Place 1 patch on the skin daily (Patient not taking: Reported on 8/11/2020), Disp: 28 patch, Rfl: 0  Allergies   Allergen Reactions    Gabapentin Headache     Past Medical History:   Diagnosis Date    [facility]    CAD (coronary artery disease)     s/p CABG 2000    [facility]     Grand mal status ([facility] 3/24/2019    [facility]     Hyperlipidemia     Hypertension     Lexiscan nuclear stress test 03/19/2016    EF 74% Normal    TIA (transient ischemic attack) 09/13/2018           Social History     Tobacco Use   Smoking Status Current Every Day Smoker    Packs/day: 1 00    Years: 50 00    Pack years: 50 00    Types: Cigars, Pipe   Smokeless Tobacco Never Used

## 2020-08-18 ENCOUNTER — TRANSCRIBE ORDERS (OUTPATIENT)
Dept: ADMINISTRATIVE | Facility: HOSPITAL | Age: 65
End: 2020-08-18

## 2020-08-18 DIAGNOSIS — S93.402A SPRAIN OF LEFT ANKLE, UNSPECIFIED LIGAMENT, INITIAL ENCOUNTER: Primary | ICD-10-CM

## 2020-08-30 DIAGNOSIS — R60.0 LOWER EXTREMITY EDEMA: ICD-10-CM

## 2020-08-30 RX ORDER — FUROSEMIDE 40 MG/1
TABLET ORAL
Qty: 90 TABLET | Refills: 3 | Status: SHIPPED | OUTPATIENT
Start: 2020-08-30 | End: 2021-08-31 | Stop reason: SDUPTHER

## 2020-09-01 ENCOUNTER — HOSPITAL ENCOUNTER (OUTPATIENT)
Dept: MRI IMAGING | Facility: HOSPITAL | Age: 65
Discharge: HOME | End: 2020-09-01
Payer: MEDICARE

## 2020-09-01 DIAGNOSIS — S93.402A SPRAIN OF LEFT ANKLE, UNSPECIFIED LIGAMENT, INITIAL ENCOUNTER: ICD-10-CM

## 2020-09-01 PROCEDURE — G1004 CDSM NDSC: HCPCS

## 2020-09-01 PROCEDURE — 73721 MRI JNT OF LWR EXTRE W/O DYE: CPT

## 2020-09-23 ENCOUNTER — HOSPITAL ENCOUNTER (OUTPATIENT)
Facility: HOSPITAL | Age: 65
Setting detail: OBSERVATION
Discharge: HOME | End: 2020-09-23
Attending: EMERGENCY MEDICINE | Admitting: INTERNAL MEDICINE
Payer: MEDICARE

## 2020-09-23 ENCOUNTER — APPOINTMENT (EMERGENCY)
Dept: RADIOLOGY | Facility: HOSPITAL | Age: 65
End: 2020-09-23
Payer: MEDICARE

## 2020-09-23 VITALS
TEMPERATURE: 98.6 F | HEART RATE: 85 BPM | BODY MASS INDEX: 31.47 KG/M2 | RESPIRATION RATE: 18 BRPM | WEIGHT: 219.8 LBS | SYSTOLIC BLOOD PRESSURE: 105 MMHG | HEIGHT: 70 IN | OXYGEN SATURATION: 96 % | DIASTOLIC BLOOD PRESSURE: 55 MMHG

## 2020-09-23 DIAGNOSIS — E87.6 HYPOKALEMIA: ICD-10-CM

## 2020-09-23 DIAGNOSIS — R07.9 CHEST PAIN: Primary | ICD-10-CM

## 2020-09-23 PROBLEM — G89.29 CHRONIC PAIN OF LEFT ANKLE: Status: ACTIVE | Noted: 2020-09-08

## 2020-09-23 PROBLEM — M25.572 CHRONIC PAIN OF LEFT ANKLE: Status: ACTIVE | Noted: 2020-09-08

## 2020-09-23 LAB
ALBUMIN SERPL BCP-MCNC: 4.6 G/DL (ref 3.5–5.7)
ALP SERPL-CCNC: 73 U/L (ref 55–165)
ALT SERPL W P-5'-P-CCNC: 14 U/L (ref 7–52)
ANION GAP SERPL CALCULATED.3IONS-SCNC: 12 MMOL/L (ref 4–13)
ANION GAP SERPL CALCULATED.3IONS-SCNC: 9 MMOL/L (ref 4–13)
AST SERPL W P-5'-P-CCNC: 13 U/L (ref 13–39)
ATRIAL RATE: 109 BPM
ATRIAL RATE: 81 BPM
ATRIAL RATE: 83 BPM
ATRIAL RATE: 99 BPM
BASOPHILS # BLD AUTO: 0.1 THOUSANDS/ΜL (ref 0–0.1)
BASOPHILS NFR BLD AUTO: 1 % (ref 0–2)
BILIRUB SERPL-MCNC: 0.7 MG/DL (ref 0.2–1)
BNP SERPL-MCNC: 49 PG/ML (ref 1–100)
BUN SERPL-MCNC: 12 MG/DL (ref 7–25)
BUN SERPL-MCNC: 16 MG/DL (ref 7–25)
CALCIUM SERPL-MCNC: 9.1 MG/DL (ref 8.6–10.5)
CALCIUM SERPL-MCNC: 9.2 MG/DL (ref 8.6–10.5)
CHLORIDE SERPL-SCNC: 100 MMOL/L (ref 98–107)
CHLORIDE SERPL-SCNC: 98 MMOL/L (ref 98–107)
CO2 SERPL-SCNC: 25 MMOL/L (ref 21–31)
CO2 SERPL-SCNC: 25 MMOL/L (ref 21–31)
CREAT SERPL-MCNC: 1.03 MG/DL (ref 0.7–1.3)
CREAT SERPL-MCNC: 1.04 MG/DL (ref 0.7–1.3)
EOSINOPHIL # BLD AUTO: 0.1 THOUSAND/ΜL (ref 0–0.61)
EOSINOPHIL NFR BLD AUTO: 1 % (ref 0–5)
ERYTHROCYTE [DISTWIDTH] IN BLOOD BY AUTOMATED COUNT: 13.1 % (ref 11.5–14.5)
GFR SERPL CREATININE-BSD FRML MDRD: 75 ML/MIN/1.73SQ M
GFR SERPL CREATININE-BSD FRML MDRD: 76 ML/MIN/1.73SQ M
GLUCOSE SERPL-MCNC: 105 MG/DL (ref 65–99)
GLUCOSE SERPL-MCNC: 254 MG/DL (ref 65–99)
HCT VFR BLD AUTO: 41.6 % (ref 42–47)
HGB BLD-MCNC: 15.2 G/DL (ref 14–18)
LYMPHOCYTES # BLD AUTO: 1.6 THOUSANDS/ΜL (ref 0.6–4.47)
LYMPHOCYTES NFR BLD AUTO: 17 % (ref 21–51)
MCH RBC QN AUTO: 31.6 PG (ref 26–34)
MCHC RBC AUTO-ENTMCNC: 36.4 G/DL (ref 31–37)
MCV RBC AUTO: 87 FL (ref 81–99)
MONOCYTES # BLD AUTO: 0.4 THOUSAND/ΜL (ref 0.17–1.22)
MONOCYTES NFR BLD AUTO: 5 % (ref 2–12)
NEUTROPHILS # BLD AUTO: 7.2 THOUSANDS/ΜL (ref 1.4–6.5)
NEUTS SEG NFR BLD AUTO: 77 % (ref 42–75)
P AXIS: 33 DEGREES
P AXIS: 48 DEGREES
P AXIS: 51 DEGREES
PLATELET # BLD AUTO: 244 THOUSANDS/UL (ref 149–390)
PLATELET BLD QL SMEAR: ADEQUATE
PMV BLD AUTO: 7.3 FL (ref 8.6–11.7)
POTASSIUM SERPL-SCNC: 2.8 MMOL/L (ref 3.5–5.5)
POTASSIUM SERPL-SCNC: 4 MMOL/L (ref 3.5–5.5)
PR INTERVAL: 146 MS
PR INTERVAL: 150 MS
PR INTERVAL: 160 MS
PR INTERVAL: 164 MS
PROT SERPL-MCNC: 6.6 G/DL (ref 6.4–8.9)
QRS AXIS: 76 DEGREES
QRS AXIS: 87 DEGREES
QRS AXIS: 97 DEGREES
QRS AXIS: 98 DEGREES
QRSD INTERVAL: 72 MS
QRSD INTERVAL: 86 MS
QRSD INTERVAL: 92 MS
QRSD INTERVAL: 92 MS
QT INTERVAL: 348 MS
QT INTERVAL: 368 MS
QT INTERVAL: 404 MS
QT INTERVAL: 428 MS
QTC INTERVAL: 468 MS
QTC INTERVAL: 472 MS
QTC INTERVAL: 474 MS
QTC INTERVAL: 497 MS
RBC # BLD AUTO: 4.79 MILLION/UL (ref 4.3–5.9)
RBC MORPH BLD: NORMAL
SODIUM SERPL-SCNC: 134 MMOL/L (ref 134–143)
SODIUM SERPL-SCNC: 135 MMOL/L (ref 134–143)
T WAVE AXIS: 105 DEGREES
T WAVE AXIS: 64 DEGREES
T WAVE AXIS: 73 DEGREES
T WAVE AXIS: 78 DEGREES
TROPONIN I SERPL-MCNC: <0.03 NG/ML
VENTRICULAR RATE: 109 BPM
VENTRICULAR RATE: 81 BPM
VENTRICULAR RATE: 83 BPM
VENTRICULAR RATE: 99 BPM
WBC # BLD AUTO: 9.3 THOUSAND/UL (ref 4.8–10.8)

## 2020-09-23 PROCEDURE — 96374 THER/PROPH/DIAG INJ IV PUSH: CPT

## 2020-09-23 PROCEDURE — 36415 COLL VENOUS BLD VENIPUNCTURE: CPT | Performed by: EMERGENCY MEDICINE

## 2020-09-23 PROCEDURE — 83880 ASSAY OF NATRIURETIC PEPTIDE: CPT | Performed by: EMERGENCY MEDICINE

## 2020-09-23 PROCEDURE — 99285 EMERGENCY DEPT VISIT HI MDM: CPT | Performed by: EMERGENCY MEDICINE

## 2020-09-23 PROCEDURE — 93010 ELECTROCARDIOGRAM REPORT: CPT | Performed by: INTERNAL MEDICINE

## 2020-09-23 PROCEDURE — 85025 COMPLETE CBC W/AUTO DIFF WBC: CPT | Performed by: EMERGENCY MEDICINE

## 2020-09-23 PROCEDURE — 93005 ELECTROCARDIOGRAM TRACING: CPT

## 2020-09-23 PROCEDURE — 71045 X-RAY EXAM CHEST 1 VIEW: CPT

## 2020-09-23 PROCEDURE — 80053 COMPREHEN METABOLIC PANEL: CPT | Performed by: EMERGENCY MEDICINE

## 2020-09-23 PROCEDURE — 99214 OFFICE O/P EST MOD 30 MIN: CPT | Performed by: INTERNAL MEDICINE

## 2020-09-23 PROCEDURE — 80048 BASIC METABOLIC PNL TOTAL CA: CPT | Performed by: INTERNAL MEDICINE

## 2020-09-23 PROCEDURE — 99285 EMERGENCY DEPT VISIT HI MDM: CPT

## 2020-09-23 PROCEDURE — 96375 TX/PRO/DX INJ NEW DRUG ADDON: CPT

## 2020-09-23 PROCEDURE — 84484 ASSAY OF TROPONIN QUANT: CPT | Performed by: PHYSICIAN ASSISTANT

## 2020-09-23 PROCEDURE — 99236 HOSP IP/OBS SAME DATE HI 85: CPT | Performed by: INTERNAL MEDICINE

## 2020-09-23 PROCEDURE — 94640 AIRWAY INHALATION TREATMENT: CPT

## 2020-09-23 PROCEDURE — 84484 ASSAY OF TROPONIN QUANT: CPT | Performed by: EMERGENCY MEDICINE

## 2020-09-23 RX ORDER — IPRATROPIUM BROMIDE AND ALBUTEROL SULFATE 2.5; .5 MG/3ML; MG/3ML
3 SOLUTION RESPIRATORY (INHALATION) ONCE
Status: COMPLETED | OUTPATIENT
Start: 2020-09-23 | End: 2020-09-23

## 2020-09-23 RX ORDER — ASPIRIN 81 MG/1
81 TABLET ORAL DAILY
Status: DISCONTINUED | OUTPATIENT
Start: 2020-09-23 | End: 2020-09-23 | Stop reason: HOSPADM

## 2020-09-23 RX ORDER — OXYBUTYNIN CHLORIDE 5 MG/1
10 TABLET, EXTENDED RELEASE ORAL DAILY
Status: DISCONTINUED | OUTPATIENT
Start: 2020-09-23 | End: 2020-09-23 | Stop reason: HOSPADM

## 2020-09-23 RX ORDER — FUROSEMIDE 40 MG/1
40 TABLET ORAL DAILY
Status: DISCONTINUED | OUTPATIENT
Start: 2020-09-23 | End: 2020-09-23 | Stop reason: HOSPADM

## 2020-09-23 RX ORDER — METHYLPREDNISOLONE SODIUM SUCCINATE 125 MG/2ML
60 INJECTION, POWDER, LYOPHILIZED, FOR SOLUTION INTRAMUSCULAR; INTRAVENOUS ONCE
Status: COMPLETED | OUTPATIENT
Start: 2020-09-23 | End: 2020-09-23

## 2020-09-23 RX ORDER — HEPARIN SODIUM 5000 [USP'U]/ML
5000 INJECTION, SOLUTION INTRAVENOUS; SUBCUTANEOUS EVERY 8 HOURS SCHEDULED
Status: DISCONTINUED | OUTPATIENT
Start: 2020-09-23 | End: 2020-09-23 | Stop reason: HOSPADM

## 2020-09-23 RX ORDER — ATORVASTATIN CALCIUM 40 MG/1
40 TABLET, FILM COATED ORAL EVERY EVENING
Status: DISCONTINUED | OUTPATIENT
Start: 2020-09-23 | End: 2020-09-23 | Stop reason: HOSPADM

## 2020-09-23 RX ORDER — ONDANSETRON 2 MG/ML
4 INJECTION INTRAMUSCULAR; INTRAVENOUS EVERY 6 HOURS PRN
Status: DISCONTINUED | OUTPATIENT
Start: 2020-09-23 | End: 2020-09-23 | Stop reason: HOSPADM

## 2020-09-23 RX ORDER — LORAZEPAM 0.5 MG/1
0.5 TABLET ORAL 2 TIMES DAILY PRN
Status: DISCONTINUED | OUTPATIENT
Start: 2020-09-23 | End: 2020-09-23 | Stop reason: HOSPADM

## 2020-09-23 RX ORDER — NICOTINE 21 MG/24HR
1 PATCH, TRANSDERMAL 24 HOURS TRANSDERMAL DAILY
Status: DISCONTINUED | OUTPATIENT
Start: 2020-09-23 | End: 2020-09-23

## 2020-09-23 RX ORDER — ISOSORBIDE MONONITRATE 30 MG/1
30 TABLET, EXTENDED RELEASE ORAL DAILY
Status: DISCONTINUED | OUTPATIENT
Start: 2020-09-23 | End: 2020-09-23 | Stop reason: HOSPADM

## 2020-09-23 RX ORDER — METOPROLOL SUCCINATE 25 MG/1
25 TABLET, EXTENDED RELEASE ORAL DAILY
Status: DISCONTINUED | OUTPATIENT
Start: 2020-09-23 | End: 2020-09-23 | Stop reason: HOSPADM

## 2020-09-23 RX ORDER — FLUTICASONE PROPIONATE 50 MCG
1 SPRAY, SUSPENSION (ML) NASAL DAILY
Status: DISCONTINUED | OUTPATIENT
Start: 2020-09-23 | End: 2020-09-23 | Stop reason: HOSPADM

## 2020-09-23 RX ORDER — PANTOPRAZOLE SODIUM 40 MG/1
40 TABLET, DELAYED RELEASE ORAL
Status: DISCONTINUED | OUTPATIENT
Start: 2020-09-23 | End: 2020-09-23 | Stop reason: HOSPADM

## 2020-09-23 RX ORDER — CALCIUM CARBONATE 200(500)MG
750 TABLET,CHEWABLE ORAL 3 TIMES DAILY PRN
Status: DISCONTINUED | OUTPATIENT
Start: 2020-09-23 | End: 2020-09-23 | Stop reason: HOSPADM

## 2020-09-23 RX ORDER — ALBUTEROL SULFATE 90 UG/1
2 AEROSOL, METERED RESPIRATORY (INHALATION) EVERY 4 HOURS PRN
Status: DISCONTINUED | OUTPATIENT
Start: 2020-09-23 | End: 2020-09-23 | Stop reason: HOSPADM

## 2020-09-23 RX ORDER — NITROGLYCERIN 0.4 MG/1
0.4 TABLET SUBLINGUAL
Status: DISCONTINUED | OUTPATIENT
Start: 2020-09-23 | End: 2020-09-23 | Stop reason: HOSPADM

## 2020-09-23 RX ORDER — ASPIRIN 81 MG/1
81 TABLET, CHEWABLE ORAL ONCE
Status: COMPLETED | OUTPATIENT
Start: 2020-09-23 | End: 2020-09-23

## 2020-09-23 RX ORDER — NICOTINE 21 MG/24HR
1 PATCH, TRANSDERMAL 24 HOURS TRANSDERMAL DAILY
Status: DISCONTINUED | OUTPATIENT
Start: 2020-09-23 | End: 2020-09-23 | Stop reason: HOSPADM

## 2020-09-23 RX ORDER — POTASSIUM CHLORIDE 20 MEQ/1
40 TABLET, EXTENDED RELEASE ORAL ONCE
Status: COMPLETED | OUTPATIENT
Start: 2020-09-23 | End: 2020-09-23

## 2020-09-23 RX ORDER — POTASSIUM CHLORIDE 750 MG/1
20 TABLET, EXTENDED RELEASE ORAL DAILY
Status: DISCONTINUED | OUTPATIENT
Start: 2020-09-23 | End: 2020-09-23 | Stop reason: HOSPADM

## 2020-09-23 RX ORDER — POTASSIUM CHLORIDE 14.9 MG/ML
20 INJECTION INTRAVENOUS ONCE
Status: COMPLETED | OUTPATIENT
Start: 2020-09-23 | End: 2020-09-23

## 2020-09-23 RX ORDER — SODIUM CHLORIDE 9 MG/ML
3 INJECTION INTRAVENOUS
Status: DISCONTINUED | OUTPATIENT
Start: 2020-09-23 | End: 2020-09-23 | Stop reason: HOSPADM

## 2020-09-23 RX ADMIN — ISOSORBIDE MONONITRATE 30 MG: 30 TABLET, EXTENDED RELEASE ORAL at 08:10

## 2020-09-23 RX ADMIN — METOPROLOL SUCCINATE 25 MG: 25 TABLET, EXTENDED RELEASE ORAL at 08:10

## 2020-09-23 RX ADMIN — POTASSIUM CHLORIDE 40 MEQ: 1500 TABLET, EXTENDED RELEASE ORAL at 02:11

## 2020-09-23 RX ADMIN — METHYLPREDNISOLONE SODIUM SUCCINATE 60 MG: 125 INJECTION, POWDER, FOR SOLUTION INTRAMUSCULAR; INTRAVENOUS at 01:51

## 2020-09-23 RX ADMIN — TIOTROPIUM BROMIDE 18 MCG: 18 CAPSULE ORAL; RESPIRATORY (INHALATION) at 08:42

## 2020-09-23 RX ADMIN — POTASSIUM CHLORIDE 20 MEQ: 750 TABLET, EXTENDED RELEASE ORAL at 08:10

## 2020-09-23 RX ADMIN — OXYBUTYNIN CHLORIDE 10 MG: 5 TABLET, EXTENDED RELEASE ORAL at 08:10

## 2020-09-23 RX ADMIN — IPRATROPIUM BROMIDE AND ALBUTEROL SULFATE 3 ML: 2.5; .5 SOLUTION RESPIRATORY (INHALATION) at 01:52

## 2020-09-23 RX ADMIN — HEPARIN SODIUM 5000 UNITS: 5000 INJECTION INTRAVENOUS; SUBCUTANEOUS at 13:43

## 2020-09-23 RX ADMIN — LORAZEPAM 0.5 MG: 0.5 TABLET ORAL at 08:42

## 2020-09-23 RX ADMIN — FUROSEMIDE 40 MG: 40 TABLET ORAL at 08:11

## 2020-09-23 RX ADMIN — POTASSIUM CHLORIDE 20 MEQ: 14.9 INJECTION, SOLUTION INTRAVENOUS at 02:13

## 2020-09-23 RX ADMIN — PANTOPRAZOLE SODIUM 40 MG: 40 TABLET, DELAYED RELEASE ORAL at 05:26

## 2020-09-23 RX ADMIN — ASPIRIN 81 MG 81 MG: 81 TABLET ORAL at 02:20

## 2020-09-23 RX ADMIN — HEPARIN SODIUM 5000 UNITS: 5000 INJECTION INTRAVENOUS; SUBCUTANEOUS at 05:25

## 2020-10-02 ENCOUNTER — OFFICE VISIT (OUTPATIENT)
Dept: URGENT CARE | Facility: CLINIC | Age: 65
End: 2020-10-02
Payer: MEDICARE

## 2020-10-02 ENCOUNTER — LAB (OUTPATIENT)
Dept: LAB | Facility: CLINIC | Age: 65
End: 2020-10-02
Payer: MEDICARE

## 2020-10-02 ENCOUNTER — TRANSCRIBE ORDERS (OUTPATIENT)
Dept: LAB | Facility: CLINIC | Age: 65
End: 2020-10-02

## 2020-10-02 ENCOUNTER — APPOINTMENT (OUTPATIENT)
Dept: RADIOLOGY | Facility: CLINIC | Age: 65
End: 2020-10-02
Payer: MEDICARE

## 2020-10-02 VITALS
HEIGHT: 70 IN | BODY MASS INDEX: 31.7 KG/M2 | RESPIRATION RATE: 18 BRPM | SYSTOLIC BLOOD PRESSURE: 145 MMHG | DIASTOLIC BLOOD PRESSURE: 82 MMHG | WEIGHT: 221.4 LBS | HEART RATE: 88 BPM | OXYGEN SATURATION: 97 %

## 2020-10-02 DIAGNOSIS — R53.83 FATIGUE, UNSPECIFIED TYPE: ICD-10-CM

## 2020-10-02 DIAGNOSIS — E78.2 MIXED HYPERLIPIDEMIA: ICD-10-CM

## 2020-10-02 DIAGNOSIS — E11.9 DIABETES MELLITUS WITHOUT COMPLICATION (HCC): ICD-10-CM

## 2020-10-02 DIAGNOSIS — R05.9 COUGH: ICD-10-CM

## 2020-10-02 DIAGNOSIS — R10.9 FLANK PAIN: Primary | ICD-10-CM

## 2020-10-02 DIAGNOSIS — D64.9 ANEMIA, UNSPECIFIED TYPE: ICD-10-CM

## 2020-10-02 DIAGNOSIS — E78.2 MIXED HYPERLIPIDEMIA: Primary | ICD-10-CM

## 2020-10-02 LAB
25(OH)D3 SERPL-MCNC: 26 NG/ML (ref 30–100)
ALBUMIN SERPL BCP-MCNC: 3.8 G/DL (ref 3.5–5)
ALP SERPL-CCNC: 87 U/L (ref 46–116)
ALT SERPL W P-5'-P-CCNC: 26 U/L (ref 12–78)
ANION GAP SERPL CALCULATED.3IONS-SCNC: 4 MMOL/L (ref 4–13)
AST SERPL W P-5'-P-CCNC: 13 U/L (ref 5–45)
BASOPHILS # BLD AUTO: 0.07 THOUSANDS/ΜL (ref 0–0.1)
BASOPHILS NFR BLD AUTO: 1 % (ref 0–1)
BILIRUB SERPL-MCNC: 0.81 MG/DL (ref 0.2–1)
BUN SERPL-MCNC: 10 MG/DL (ref 5–25)
CALCIUM SERPL-MCNC: 8.9 MG/DL (ref 8.3–10.1)
CHLORIDE SERPL-SCNC: 106 MMOL/L (ref 100–108)
CHOLEST SERPL-MCNC: 245 MG/DL (ref 50–200)
CO2 SERPL-SCNC: 27 MMOL/L (ref 21–32)
CREAT SERPL-MCNC: 1.13 MG/DL (ref 0.6–1.3)
EOSINOPHIL # BLD AUTO: 0.29 THOUSAND/ΜL (ref 0–0.61)
EOSINOPHIL NFR BLD AUTO: 3 % (ref 0–6)
ERYTHROCYTE [DISTWIDTH] IN BLOOD BY AUTOMATED COUNT: 12.2 % (ref 11.6–15.1)
EST. AVERAGE GLUCOSE BLD GHB EST-MCNC: 108 MG/DL
FERRITIN SERPL-MCNC: 70 NG/ML (ref 8–388)
FOLATE SERPL-MCNC: 16.7 NG/ML (ref 3.1–17.5)
GFR SERPL CREATININE-BSD FRML MDRD: 68 ML/MIN/1.73SQ M
GLUCOSE P FAST SERPL-MCNC: 96 MG/DL (ref 65–99)
HBA1C MFR BLD: 5.4 %
HCT VFR BLD AUTO: 43 % (ref 36.5–49.3)
HDLC SERPL-MCNC: 37 MG/DL
HGB BLD-MCNC: 15.2 G/DL (ref 12–17)
IMM GRANULOCYTES # BLD AUTO: 0.03 THOUSAND/UL (ref 0–0.2)
IMM GRANULOCYTES NFR BLD AUTO: 0 % (ref 0–2)
IRON SERPL-MCNC: 115 UG/DL (ref 65–175)
LDLC SERPL CALC-MCNC: 165 MG/DL (ref 0–100)
LYMPHOCYTES # BLD AUTO: 2.34 THOUSANDS/ΜL (ref 0.6–4.47)
LYMPHOCYTES NFR BLD AUTO: 24 % (ref 14–44)
MCH RBC QN AUTO: 31.3 PG (ref 26.8–34.3)
MCHC RBC AUTO-ENTMCNC: 35.3 G/DL (ref 31.4–37.4)
MCV RBC AUTO: 89 FL (ref 82–98)
MONOCYTES # BLD AUTO: 0.64 THOUSAND/ΜL (ref 0.17–1.22)
MONOCYTES NFR BLD AUTO: 7 % (ref 4–12)
NEUTROPHILS # BLD AUTO: 6.21 THOUSANDS/ΜL (ref 1.85–7.62)
NEUTS SEG NFR BLD AUTO: 65 % (ref 43–75)
NONHDLC SERPL-MCNC: 208 MG/DL
NRBC BLD AUTO-RTO: 0 /100 WBCS
PLATELET # BLD AUTO: 248 THOUSANDS/UL (ref 149–390)
PMV BLD AUTO: 9.6 FL (ref 8.9–12.7)
POTASSIUM SERPL-SCNC: 3.7 MMOL/L (ref 3.5–5.3)
PROT SERPL-MCNC: 6.8 G/DL (ref 6.4–8.2)
RBC # BLD AUTO: 4.85 MILLION/UL (ref 3.88–5.62)
SL AMB  POCT GLUCOSE, UA: NEGATIVE
SL AMB LEUKOCYTE ESTERASE,UA: NEGATIVE
SL AMB POCT BILIRUBIN,UA: NEGATIVE
SL AMB POCT BLOOD,UA: NEGATIVE
SL AMB POCT CLARITY,UA: CLEAR
SL AMB POCT COLOR,UA: YELLOW
SL AMB POCT KETONES,UA: NEGATIVE
SL AMB POCT NITRITE,UA: NEGATIVE
SL AMB POCT PH,UA: 7
SL AMB POCT SPECIFIC GRAVITY,UA: 1
SL AMB POCT URINE PROTEIN: NEGATIVE
SL AMB POCT UROBILINOGEN: 0.2
SODIUM SERPL-SCNC: 137 MMOL/L (ref 136–145)
TIBC SERPL-MCNC: 277 UG/DL (ref 250–450)
TRIGL SERPL-MCNC: 214 MG/DL
VIT B12 SERPL-MCNC: 342 PG/ML (ref 100–900)
WBC # BLD AUTO: 9.58 THOUSAND/UL (ref 4.31–10.16)

## 2020-10-02 PROCEDURE — 85025 COMPLETE CBC W/AUTO DIFF WBC: CPT | Performed by: INTERNAL MEDICINE

## 2020-10-02 PROCEDURE — 80053 COMPREHEN METABOLIC PANEL: CPT

## 2020-10-02 PROCEDURE — 82607 VITAMIN B-12: CPT

## 2020-10-02 PROCEDURE — G0463 HOSPITAL OUTPT CLINIC VISIT: HCPCS | Performed by: PHYSICIAN ASSISTANT

## 2020-10-02 PROCEDURE — 82306 VITAMIN D 25 HYDROXY: CPT

## 2020-10-02 PROCEDURE — 82728 ASSAY OF FERRITIN: CPT

## 2020-10-02 PROCEDURE — 81002 URINALYSIS NONAUTO W/O SCOPE: CPT | Performed by: PHYSICIAN ASSISTANT

## 2020-10-02 PROCEDURE — 36415 COLL VENOUS BLD VENIPUNCTURE: CPT | Performed by: INTERNAL MEDICINE

## 2020-10-02 PROCEDURE — 99213 OFFICE O/P EST LOW 20 MIN: CPT | Performed by: PHYSICIAN ASSISTANT

## 2020-10-02 PROCEDURE — 83540 ASSAY OF IRON: CPT

## 2020-10-02 PROCEDURE — 83550 IRON BINDING TEST: CPT

## 2020-10-02 PROCEDURE — 86618 LYME DISEASE ANTIBODY: CPT

## 2020-10-02 PROCEDURE — 71046 X-RAY EXAM CHEST 2 VIEWS: CPT

## 2020-10-02 PROCEDURE — 82746 ASSAY OF FOLIC ACID SERUM: CPT

## 2020-10-02 PROCEDURE — 83036 HEMOGLOBIN GLYCOSYLATED A1C: CPT

## 2020-10-02 PROCEDURE — 80061 LIPID PANEL: CPT

## 2020-10-03 LAB — B BURGDOR IGG+IGM SER-ACNC: <0.91 ISR (ref 0–0.9)

## 2020-10-24 ENCOUNTER — APPOINTMENT (EMERGENCY)
Dept: RADIOLOGY | Facility: HOSPITAL | Age: 65
End: 2020-10-24
Payer: MEDICARE

## 2020-10-24 ENCOUNTER — HOSPITAL ENCOUNTER (EMERGENCY)
Facility: HOSPITAL | Age: 65
Discharge: HOME | End: 2020-10-25
Attending: EMERGENCY MEDICINE
Payer: MEDICARE

## 2020-10-24 ENCOUNTER — APPOINTMENT (EMERGENCY)
Dept: CT IMAGING | Facility: HOSPITAL | Age: 65
End: 2020-10-24
Payer: MEDICARE

## 2020-10-24 DIAGNOSIS — R93.89 ABNORMAL CT SCAN: ICD-10-CM

## 2020-10-24 DIAGNOSIS — R91.1 LUNG NODULE: ICD-10-CM

## 2020-10-24 DIAGNOSIS — I71.2 THORACIC AORTIC ANEURYSM (HCC): ICD-10-CM

## 2020-10-24 DIAGNOSIS — R10.9 ABDOMINAL PAIN: Primary | ICD-10-CM

## 2020-10-24 DIAGNOSIS — K42.9 UMBILICAL HERNIA: ICD-10-CM

## 2020-10-24 LAB
ALBUMIN SERPL BCP-MCNC: 4.1 G/DL (ref 3.5–5)
ALP SERPL-CCNC: 91 U/L (ref 46–116)
ALT SERPL W P-5'-P-CCNC: 24 U/L (ref 12–78)
ANION GAP SERPL CALCULATED.3IONS-SCNC: 6 MMOL/L (ref 4–13)
AST SERPL W P-5'-P-CCNC: 13 U/L (ref 5–45)
BASOPHILS # BLD AUTO: 0.04 THOUSANDS/ΜL (ref 0–0.1)
BASOPHILS NFR BLD AUTO: 1 % (ref 0–1)
BILIRUB SERPL-MCNC: 0.49 MG/DL (ref 0.2–1)
BUN SERPL-MCNC: 6 MG/DL (ref 5–25)
CALCIUM SERPL-MCNC: 9.1 MG/DL (ref 8.3–10.1)
CHLORIDE SERPL-SCNC: 100 MMOL/L (ref 100–108)
CO2 SERPL-SCNC: 30 MMOL/L (ref 21–32)
CREAT SERPL-MCNC: 0.98 MG/DL (ref 0.6–1.3)
EOSINOPHIL # BLD AUTO: 0.19 THOUSAND/ΜL (ref 0–0.61)
EOSINOPHIL NFR BLD AUTO: 2 % (ref 0–6)
ERYTHROCYTE [DISTWIDTH] IN BLOOD BY AUTOMATED COUNT: 11.7 % (ref 11.6–15.1)
GFR SERPL CREATININE-BSD FRML MDRD: 81 ML/MIN/1.73SQ M
GLUCOSE SERPL-MCNC: 108 MG/DL (ref 65–140)
HCT VFR BLD AUTO: 40.9 % (ref 36.5–49.3)
HGB BLD-MCNC: 14.9 G/DL (ref 12–17)
IMM GRANULOCYTES # BLD AUTO: 0.03 THOUSAND/UL (ref 0–0.2)
IMM GRANULOCYTES NFR BLD AUTO: 0 % (ref 0–2)
LIPASE SERPL-CCNC: 145 U/L (ref 73–393)
LYMPHOCYTES # BLD AUTO: 2.01 THOUSANDS/ΜL (ref 0.6–4.47)
LYMPHOCYTES NFR BLD AUTO: 26 % (ref 14–44)
MCH RBC QN AUTO: 30.5 PG (ref 26.8–34.3)
MCHC RBC AUTO-ENTMCNC: 36.4 G/DL (ref 31.4–37.4)
MCV RBC AUTO: 84 FL (ref 82–98)
MONOCYTES # BLD AUTO: 0.41 THOUSAND/ΜL (ref 0.17–1.22)
MONOCYTES NFR BLD AUTO: 5 % (ref 4–12)
NEUTROPHILS # BLD AUTO: 5.11 THOUSANDS/ΜL (ref 1.85–7.62)
NEUTS SEG NFR BLD AUTO: 66 % (ref 43–75)
NRBC BLD AUTO-RTO: 0 /100 WBCS
PLATELET # BLD AUTO: 235 THOUSANDS/UL (ref 149–390)
PMV BLD AUTO: 9 FL (ref 8.9–12.7)
POTASSIUM SERPL-SCNC: 3.6 MMOL/L (ref 3.5–5.3)
PROT SERPL-MCNC: 7.1 G/DL (ref 6.4–8.2)
RBC # BLD AUTO: 4.88 MILLION/UL (ref 3.88–5.62)
SODIUM SERPL-SCNC: 136 MMOL/L (ref 136–145)
TROPONIN I SERPL-MCNC: <0.02 NG/ML
WBC # BLD AUTO: 7.79 THOUSAND/UL (ref 4.31–10.16)

## 2020-10-24 PROCEDURE — 71275 CT ANGIOGRAPHY CHEST: CPT

## 2020-10-24 PROCEDURE — 85025 COMPLETE CBC W/AUTO DIFF WBC: CPT | Performed by: EMERGENCY MEDICINE

## 2020-10-24 PROCEDURE — G1004 CDSM NDSC: HCPCS

## 2020-10-24 PROCEDURE — 83690 ASSAY OF LIPASE: CPT | Performed by: EMERGENCY MEDICINE

## 2020-10-24 PROCEDURE — 93005 ELECTROCARDIOGRAM TRACING: CPT

## 2020-10-24 PROCEDURE — 71045 X-RAY EXAM CHEST 1 VIEW: CPT

## 2020-10-24 PROCEDURE — 80053 COMPREHEN METABOLIC PANEL: CPT | Performed by: EMERGENCY MEDICINE

## 2020-10-24 PROCEDURE — 84484 ASSAY OF TROPONIN QUANT: CPT | Performed by: EMERGENCY MEDICINE

## 2020-10-24 PROCEDURE — 36415 COLL VENOUS BLD VENIPUNCTURE: CPT

## 2020-10-24 PROCEDURE — 99285 EMERGENCY DEPT VISIT HI MDM: CPT

## 2020-10-24 PROCEDURE — 74177 CT ABD & PELVIS W/CONTRAST: CPT

## 2020-10-25 VITALS
HEART RATE: 82 BPM | RESPIRATION RATE: 17 BRPM | BODY MASS INDEX: 31.47 KG/M2 | OXYGEN SATURATION: 99 % | WEIGHT: 219.36 LBS | SYSTOLIC BLOOD PRESSURE: 160 MMHG | DIASTOLIC BLOOD PRESSURE: 88 MMHG | TEMPERATURE: 99.7 F

## 2020-10-25 LAB
ATRIAL RATE: 77 BPM
BACTERIA UR QL AUTO: ABNORMAL /HPF
BILIRUB UR QL STRIP: NEGATIVE
CLARITY UR: CLEAR
COLOR UR: YELLOW
GLUCOSE UR STRIP-MCNC: NEGATIVE MG/DL
HGB UR QL STRIP.AUTO: ABNORMAL
KETONES UR STRIP-MCNC: NEGATIVE MG/DL
LEUKOCYTE ESTERASE UR QL STRIP: NEGATIVE
NITRITE UR QL STRIP: NEGATIVE
NON-SQ EPI CELLS URNS QL MICRO: ABNORMAL /HPF
P AXIS: 50 DEGREES
PH UR STRIP.AUTO: 6.5 [PH]
PR INTERVAL: 148 MS
PROT UR STRIP-MCNC: NEGATIVE MG/DL
QRS AXIS: 86 DEGREES
QRSD INTERVAL: 92 MS
QT INTERVAL: 376 MS
QTC INTERVAL: 425 MS
RBC #/AREA URNS AUTO: ABNORMAL /HPF
SP GR UR STRIP.AUTO: 1.01 (ref 1–1.03)
T WAVE AXIS: 64 DEGREES
UROBILINOGEN UR QL STRIP.AUTO: 0.2 E.U./DL
VENTRICULAR RATE: 77 BPM
WBC #/AREA URNS AUTO: ABNORMAL /HPF

## 2020-10-25 PROCEDURE — 99285 EMERGENCY DEPT VISIT HI MDM: CPT | Performed by: EMERGENCY MEDICINE

## 2020-10-25 PROCEDURE — 93010 ELECTROCARDIOGRAM REPORT: CPT | Performed by: INTERNAL MEDICINE

## 2020-10-25 PROCEDURE — 81001 URINALYSIS AUTO W/SCOPE: CPT | Performed by: EMERGENCY MEDICINE

## 2020-10-25 RX ORDER — LORAZEPAM 0.5 MG/1
0.25 TABLET ORAL ONCE
Status: COMPLETED | OUTPATIENT
Start: 2020-10-25 | End: 2020-10-25

## 2020-10-25 RX ADMIN — LORAZEPAM 0.25 MG: 0.5 TABLET ORAL at 00:37

## 2020-10-25 RX ADMIN — IOHEXOL 100 ML: 350 INJECTION, SOLUTION INTRAVENOUS at 00:05

## 2020-11-11 ENCOUNTER — HOSPITAL ENCOUNTER (OUTPATIENT)
Dept: GASTROENTEROLOGY | Facility: HOSPITAL | Age: 65
Setting detail: OUTPATIENT SURGERY
Discharge: HOME | End: 2020-11-11
Attending: INTERNAL MEDICINE

## 2020-11-11 DIAGNOSIS — I25.10 CORONARY ARTERIOSCLEROSIS IN NATIVE ARTERY: ICD-10-CM

## 2020-11-11 RX ORDER — METOPROLOL SUCCINATE 25 MG/1
25 TABLET, EXTENDED RELEASE ORAL DAILY
Qty: 90 TABLET | Refills: 3 | Status: SHIPPED | OUTPATIENT
Start: 2020-11-11 | End: 2021-10-27

## 2020-11-11 RX ORDER — ISOSORBIDE MONONITRATE 30 MG/1
30 TABLET, EXTENDED RELEASE ORAL DAILY
Qty: 90 TABLET | Refills: 3 | Status: SHIPPED | OUTPATIENT
Start: 2020-11-11 | End: 2021-10-27

## 2020-12-09 ENCOUNTER — HOSPITAL ENCOUNTER (OUTPATIENT)
Dept: GASTROENTEROLOGY | Facility: HOSPITAL | Age: 65
Setting detail: OUTPATIENT SURGERY
Discharge: HOME | End: 2020-12-09
Attending: INTERNAL MEDICINE

## 2021-01-15 ENCOUNTER — TRANSCRIBE ORDERS (OUTPATIENT)
Dept: ADMINISTRATIVE | Facility: HOSPITAL | Age: 66
End: 2021-01-15

## 2021-01-15 DIAGNOSIS — R10.30 LOWER ABDOMINAL PAIN, UNSPECIFIED: Primary | ICD-10-CM

## 2021-01-19 ENCOUNTER — HOSPITAL ENCOUNTER (OUTPATIENT)
Dept: ULTRASOUND IMAGING | Facility: HOSPITAL | Age: 66
Discharge: HOME | End: 2021-01-19
Payer: MEDICARE

## 2021-01-19 DIAGNOSIS — I25.10 DISEASE OF CARDIOVASCULAR SYSTEM: ICD-10-CM

## 2021-01-19 DIAGNOSIS — R10.30 LOWER ABDOMINAL PAIN, UNSPECIFIED: ICD-10-CM

## 2021-01-19 PROCEDURE — 76700 US EXAM ABDOM COMPLETE: CPT

## 2021-01-19 PROCEDURE — 76856 US EXAM PELVIC COMPLETE: CPT

## 2021-02-04 ENCOUNTER — TRANSCRIBE ORDERS (OUTPATIENT)
Dept: ADMINISTRATIVE | Facility: HOSPITAL | Age: 66
End: 2021-02-04

## 2021-02-04 DIAGNOSIS — Q45.3 CONGENITAL ANOMALIES OF PANCREAS: Primary | ICD-10-CM

## 2021-02-11 ENCOUNTER — HOSPITAL ENCOUNTER (OUTPATIENT)
Dept: MRI IMAGING | Facility: HOSPITAL | Age: 66
Discharge: HOME | End: 2021-02-11
Payer: MEDICARE

## 2021-02-11 DIAGNOSIS — Q45.3 CONGENITAL ANOMALIES OF PANCREAS: ICD-10-CM

## 2021-02-11 PROCEDURE — G1004 CDSM NDSC: HCPCS

## 2021-02-11 PROCEDURE — 74183 MRI ABD W/O CNTR FLWD CNTR: CPT

## 2021-02-11 PROCEDURE — A9585 GADOBUTROL INJECTION: HCPCS | Performed by: RADIOLOGY

## 2021-02-11 RX ADMIN — GADOBUTROL 10 ML: 604.72 INJECTION INTRAVENOUS at 16:03

## 2021-03-12 ENCOUNTER — APPOINTMENT (EMERGENCY)
Dept: RADIOLOGY | Facility: HOSPITAL | Age: 66
End: 2021-03-12
Payer: MEDICARE

## 2021-03-12 ENCOUNTER — APPOINTMENT (EMERGENCY)
Dept: CT IMAGING | Facility: HOSPITAL | Age: 66
End: 2021-03-12
Payer: MEDICARE

## 2021-03-12 ENCOUNTER — HOSPITAL ENCOUNTER (EMERGENCY)
Facility: HOSPITAL | Age: 66
Discharge: HOME | End: 2021-03-12
Attending: EMERGENCY MEDICINE | Admitting: EMERGENCY MEDICINE
Payer: MEDICARE

## 2021-03-12 VITALS
HEIGHT: 70 IN | DIASTOLIC BLOOD PRESSURE: 101 MMHG | WEIGHT: 220 LBS | OXYGEN SATURATION: 99 % | TEMPERATURE: 96.9 F | RESPIRATION RATE: 20 BRPM | SYSTOLIC BLOOD PRESSURE: 131 MMHG | BODY MASS INDEX: 31.5 KG/M2 | HEART RATE: 58 BPM

## 2021-03-12 DIAGNOSIS — R41.840 CONCENTRATION DEFICIT: Primary | ICD-10-CM

## 2021-03-12 DIAGNOSIS — H53.9 VISUAL CHANGES: ICD-10-CM

## 2021-03-12 LAB
ALBUMIN SERPL BCP-MCNC: 4 G/DL (ref 3.5–5.7)
ALP SERPL-CCNC: 80 U/L (ref 55–165)
ALT SERPL W P-5'-P-CCNC: 14 U/L (ref 7–52)
ANION GAP SERPL CALCULATED.3IONS-SCNC: 5 MMOL/L (ref 4–13)
AST SERPL W P-5'-P-CCNC: 16 U/L (ref 13–39)
ATRIAL RATE: 74 BPM
BASOPHILS # BLD AUTO: 0.1 THOUSANDS/ΜL (ref 0–0.1)
BASOPHILS NFR BLD AUTO: 1 % (ref 0–2)
BILIRUB SERPL-MCNC: 0.5 MG/DL (ref 0.2–1)
BUN SERPL-MCNC: 8 MG/DL (ref 7–25)
CALCIUM SERPL-MCNC: 8.7 MG/DL (ref 8.6–10.5)
CHLORIDE SERPL-SCNC: 97 MMOL/L (ref 98–107)
CO2 SERPL-SCNC: 27 MMOL/L (ref 21–31)
CREAT SERPL-MCNC: 1.02 MG/DL (ref 0.7–1.3)
EOSINOPHIL # BLD AUTO: 0.3 THOUSAND/ΜL (ref 0–0.61)
EOSINOPHIL NFR BLD AUTO: 4 % (ref 0–5)
ERYTHROCYTE [DISTWIDTH] IN BLOOD BY AUTOMATED COUNT: 12.6 % (ref 11.5–14.5)
GFR SERPL CREATININE-BSD FRML MDRD: 76 ML/MIN/1.73SQ M
GLUCOSE SERPL-MCNC: 92 MG/DL (ref 65–99)
HCT VFR BLD AUTO: 38.1 % (ref 42–47)
HGB BLD-MCNC: 13.3 G/DL (ref 14–18)
LYMPHOCYTES # BLD AUTO: 2.8 THOUSANDS/ΜL (ref 0.6–4.47)
LYMPHOCYTES NFR BLD AUTO: 33 % (ref 21–51)
MCH RBC QN AUTO: 31 PG (ref 26–34)
MCHC RBC AUTO-ENTMCNC: 34.9 G/DL (ref 31–37)
MCV RBC AUTO: 89 FL (ref 81–99)
MONOCYTES # BLD AUTO: 0.5 THOUSAND/ΜL (ref 0.17–1.22)
MONOCYTES NFR BLD AUTO: 6 % (ref 2–12)
NEUTROPHILS # BLD AUTO: 4.7 THOUSANDS/ΜL (ref 1.4–6.5)
NEUTS SEG NFR BLD AUTO: 56 % (ref 42–75)
P AXIS: 55 DEGREES
PLATELET # BLD AUTO: 221 THOUSANDS/UL (ref 149–390)
PMV BLD AUTO: 7.1 FL (ref 8.6–11.7)
POTASSIUM SERPL-SCNC: 4 MMOL/L (ref 3.5–5.5)
PR INTERVAL: 164 MS
PROT SERPL-MCNC: 6 G/DL (ref 6.4–8.9)
QRS AXIS: 79 DEGREES
QRSD INTERVAL: 90 MS
QT INTERVAL: 420 MS
QTC INTERVAL: 466 MS
RBC # BLD AUTO: 4.29 MILLION/UL (ref 4.3–5.9)
SODIUM SERPL-SCNC: 129 MMOL/L (ref 134–143)
T WAVE AXIS: 81 DEGREES
TROPONIN I SERPL-MCNC: <0.03 NG/ML
VENTRICULAR RATE: 74 BPM
WBC # BLD AUTO: 8.4 THOUSAND/UL (ref 4.8–10.8)

## 2021-03-12 PROCEDURE — 71045 X-RAY EXAM CHEST 1 VIEW: CPT

## 2021-03-12 PROCEDURE — 99285 EMERGENCY DEPT VISIT HI MDM: CPT | Performed by: EMERGENCY MEDICINE

## 2021-03-12 PROCEDURE — 80053 COMPREHEN METABOLIC PANEL: CPT | Performed by: EMERGENCY MEDICINE

## 2021-03-12 PROCEDURE — 84484 ASSAY OF TROPONIN QUANT: CPT | Performed by: EMERGENCY MEDICINE

## 2021-03-12 PROCEDURE — 93010 ELECTROCARDIOGRAM REPORT: CPT | Performed by: INTERNAL MEDICINE

## 2021-03-12 PROCEDURE — 93005 ELECTROCARDIOGRAM TRACING: CPT

## 2021-03-12 PROCEDURE — 96360 HYDRATION IV INFUSION INIT: CPT

## 2021-03-12 PROCEDURE — G1004 CDSM NDSC: HCPCS

## 2021-03-12 PROCEDURE — 85025 COMPLETE CBC W/AUTO DIFF WBC: CPT | Performed by: EMERGENCY MEDICINE

## 2021-03-12 PROCEDURE — 70450 CT HEAD/BRAIN W/O DYE: CPT

## 2021-03-12 PROCEDURE — 36415 COLL VENOUS BLD VENIPUNCTURE: CPT | Performed by: EMERGENCY MEDICINE

## 2021-03-12 PROCEDURE — 99284 EMERGENCY DEPT VISIT MOD MDM: CPT

## 2021-03-12 RX ADMIN — SODIUM CHLORIDE 500 ML: 0.9 INJECTION, SOLUTION INTRAVENOUS at 17:08

## 2021-03-12 NOTE — DISCHARGE INSTRUCTIONS
Continue taking her aspirin  Please call the neurology office that has been given to you for an appointment  The scheduling department however may call you to set up an appointment  Return to the ER for any new, concerning, worsening issues

## 2021-03-12 NOTE — ED PROVIDER NOTES
History  Chief Complaint   Patient presents with    Arm Pain    Leg Pain     for past few days (3 days)    Visual Field Change     since end of october per patient     59-year-old male presents emergency room noting various complaints  He notes that for about a week, he has had some difficulty with concentration at times  He also states that when he tries to follow things with his eyes he ends up just staring straight ahead  He also states that he has some difficulty in moving his left arm and leg at times but that appears to be related to pain and currently the patient has no pain because he notes he took a cab to the ER instead of walking  The patient also notes that his stomach feels firm to him any as a patch of numbness on his back over the distal thoracic region  Patient states that symptoms have been ongoing for at least a week and he had a recent workup with his family doctor due to stomach issues and was found to have a duodenal ulcer  The patient denies any headache at this time and is here for evaluation  Prior to Admission Medications   Prescriptions Last Dose Informant Patient Reported? Taking?    LORazepam (ATIVAN) 0 5 mg tablet   No No   Sig: Take 1 tablet (0 5 mg total) by mouth 2 (two) times a day as needed for anxiety for up to 7 days   albuterol (2 5 mg/3 mL) 0 083 % nebulizer solution   No No   Sig: Take 1 vial (2 5 mg total) by nebulization every 6 (six) hours as needed for wheezing or shortness of breath   albuterol (PROVENTIL HFA,VENTOLIN HFA) 90 mcg/act inhaler   No No   Sig: Inhale 2 puffs every 4 (four) hours as needed for wheezing   aspirin (ECOTRIN LOW STRENGTH) 81 mg EC tablet   No No   Sig: Take 1 tablet (81 mg total) by mouth daily   atorvastatin (LIPITOR) 40 mg tablet   No No   Sig: Take 1 tablet (40 mg total) by mouth every evening   calcium carbonate (TUMS EX) 750 mg chewable tablet   No No   Sig: Chew 1 tablet (750 mg total) 3 (three) times a day as needed for indigestion or heartburn   fluticasone (FLONASE) 50 mcg/act nasal spray   No No   Si spray into each nostril daily   furosemide (LASIX) 40 mg tablet   No No   Sig: take 1 tablet by mouth once daily   isosorbide mononitrate (IMDUR) 30 mg 24 hr tablet   No No   Sig: Take 1 tablet (30 mg total) by mouth daily   meloxicam (MOBIC) 15 mg tablet   Yes No   Sig: Take 15 mg by mouth daily as needed   metoprolol succinate (TOPROL-XL) 25 mg 24 hr tablet   No No   Sig: Take 1 tablet (25 mg total) by mouth daily   nicotine (NICODERM CQ) 14 mg/24hr TD 24 hr patch   No No   Sig: Place 1 patch on the skin daily   Patient not taking: Reported on 2020   nitroglycerin (NITROSTAT) 0 4 mg SL tablet   Yes No   Sig: Place 0 4 mg under the tongue every 5 (five) minutes as needed for chest pain   pantoprazole (PROTONIX) 40 mg tablet   No No   Sig: Take 1 tablet (40 mg total) by mouth daily   potassium chloride (K-DUR) 10 mEq tablet   Yes No   Sig: Take 10 mEq by mouth daily    roflumilast (DALIRESP) 500 mcg tablet   Yes No   Sig: Take 500 mcg by mouth daily   solifenacin (VESICARE) 10 MG tablet   Yes No   Sig: Take by mouth Daily   tiotropium (SPIRIVA) 18 mcg inhalation capsule   No No   Sig: Place 1 capsule (18 mcg total) into inhaler and inhale daily      Facility-Administered Medications: None       Past Medical History:   Diagnosis Date    Acute right MCA stroke ([facility] ) 9/15/2018    CAD (coronary artery disease)     s/p CABG     [facility]     Grand mal status ([facility] ) 3/24/2019    [facility]     Hyperlipidemia     Hypertension     Lexiscan nuclear stress test 2016    EF 74% Normal    TIA (transient ischemic attack) 2018       Past Surgical History:   Procedure Laterality Date    CARDIAC CATHETERIZATION  2015    LIMA occluded   No severe native lesions    COLONOSCOPY      CORONARY ARTERY BYPASS GRAFT      HERNIA REPAIR      times 2       Family History   Problem Relation Age of Onset    Cancer Mother     Heart disease Mother     Cancer Father     Cancer Maternal Grandmother     Cancer Paternal Grandfather      I have reviewed and agree with the history as documented  E-Cigarette/Vaping    E-Cigarette Use Never User      E-Cigarette/Vaping Substances    Nicotine Yes     THC No     CBD No     Flavoring No     Other No     Unknown No      Social History     Tobacco Use    Smoking status: Former Smoker     Packs/day: 1 00     Years: 50 00     Pack years: 50 00     Types: Cigars, Pipe    Smokeless tobacco: Never Used   Substance Use Topics    Alcohol use: Not Currently    Drug use: Never       Review of Systems   Constitutional: Positive for activity change  HENT: Negative for trouble swallowing and voice change  Eyes: Positive for visual disturbance  Respiratory: Positive for chest tightness  Negative for shortness of breath  Cardiovascular: Negative for chest pain  Gastrointestinal: Positive for abdominal distention  Negative for abdominal pain and diarrhea  Musculoskeletal: Positive for myalgias  Skin: Negative for color change and rash  Neurological: Negative for dizziness and light-headedness  Patient feels that he has having issues with concentration   Psychiatric/Behavioral: Negative for agitation  Physical Exam  Physical Exam  Vitals signs and nursing note reviewed  Constitutional:       Appearance: He is well-developed  HENT:      Head: Normocephalic and atraumatic  Right Ear: External ear normal       Left Ear: External ear normal       Mouth/Throat:      Mouth: Mucous membranes are moist    Eyes:      Conjunctiva/sclera: Conjunctivae normal    Neck:      Musculoskeletal: Neck supple  Cardiovascular:      Rate and Rhythm: Normal rate and regular rhythm  Pulses: Normal pulses  Heart sounds: No murmur  Pulmonary:      Effort: Pulmonary effort is normal  No respiratory distress  Breath sounds: Normal breath sounds  Abdominal:      General: Bowel sounds are normal       Palpations: Abdomen is soft  There is no mass  Tenderness: There is no abdominal tenderness  Hernia: No hernia is present  Musculoskeletal:         General: No swelling, tenderness, deformity or signs of injury  Skin:     General: Skin is warm and dry  Capillary Refill: Capillary refill takes less than 2 seconds  Neurological:      General: No focal deficit present  Mental Status: He is alert and oriented to person, place, and time  Mental status is at baseline  Cranial Nerves: No cranial nerve deficit  Sensory: No sensory deficit     Psychiatric:         Mood and Affect: Mood normal          Vital Signs  ED Triage Vitals [03/12/21 1433]   Temperature Pulse Respirations Blood Pressure SpO2   (!) 96 9 °F (36 1 °C) 79 18 167/88 98 %      Temp Source Heart Rate Source Patient Position - Orthostatic VS BP Location FiO2 (%)   Temporal Monitor Sitting Left arm --      Pain Score       --           Vitals:    03/12/21 1433 03/12/21 1700 03/12/21 1750   BP: 167/88  (!) 131/101   Pulse: 79 (!) 51 58   Patient Position - Orthostatic VS: Sitting           Visual Acuity      ED Medications  Medications   sodium chloride 0 9 % bolus 500 mL (0 mL Intravenous Stopped 3/12/21 1800)       Diagnostic Studies  Results Reviewed     Procedure Component Value Units Date/Time    Comprehensive metabolic panel [[id number]]  (Abnormal) Collected: 03/12/21 1511    Lab Status: Final result Specimen: Blood from Arm, Right Updated: 03/12/21 1539     Sodium 129 mmol/L      Potassium 4 0 mmol/L      Chloride 97 mmol/L      CO2 27 mmol/L      ANION GAP 5 mmol/L      BUN 8 mg/dL      Creatinine 1 02 mg/dL      Glucose 92 mg/dL      Calcium 8 7 mg/dL      AST 16 U/L      ALT 14 U/L      Alkaline Phosphatase 80 U/L      Total Protein 6 0 g/dL      Albumin 4 0 g/dL      Total Bilirubin 0 50 mg/dL      eGFR 76 ml/min/1 73sq m Narrative:      National Kidney Disease Foundation guidelines for Chronic Kidney Disease (CKD):     Stage 1 with normal or high GFR (GFR > 90 mL/min/1 73 square meters)    Stage 2 Mild CKD (GFR = 60-89 mL/min/1 73 square meters)    Stage 3A Moderate CKD (GFR = 45-59 mL/min/1 73 square meters)    Stage 3B Moderate CKD (GFR = 30-44 mL/min/1 73 square meters)    Stage 4 Severe CKD (GFR = 15-29 mL/min/1 73 square meters)    Stage 5 End Stage CKD (GFR <15 mL/min/1 73 square meters)  Note: GFR calculation is accurate only with a steady state creatinine    Troponin I [[id number]]  (Normal) Collected: 03/12/21 1511    Lab Status: Final result Specimen: Blood from Arm, Right Updated: 03/12/21 1539     Troponin I <0 03 ng/mL     CBC and differential [[id number]]  (Abnormal) Collected: 03/12/21 1511    Lab Status: Final result Specimen: Blood from Arm, Right Updated: 03/12/21 1524     WBC 8 40 Thousand/uL      RBC 4 29 Million/uL      Hemoglobin 13 3 g/dL      Hematocrit 38 1 %      MCV 89 fL      MCH 31 0 pg      MCHC 34 9 g/dL      RDW 12 6 %      MPV 7 1 fL      Platelets 217 Thousands/uL      Neutrophils Relative 56 %      Lymphocytes Relative 33 %      Monocytes Relative 6 %      Eosinophils Relative 4 %      Basophils Relative 1 %      Neutrophils Absolute 4 70 Thousands/µL      Lymphocytes Absolute 2 80 Thousands/µL      Monocytes Absolute 0 50 Thousand/µL      Eosinophils Absolute 0 30 Thousand/µL      Basophils Absolute 0 10 Thousands/µL                  CT head without contrast   Final Result by Melissa Mackey MD (03/12 1618)   No acute intracranial abnormality  Workstation performed: [id number]      XR chest 1 view    (Results Pending)              Procedures  ECG 12 Lead Documentation Only    Date/Time: 3/12/2021 4:03 PM  Performed by: Natalie Gamino DO  Authorized by:  Natalie Gamino DO     ECG reviewed by me, the ED Provider: yes    Patient location:  ED  Comments:      EKG shows a sinus rhythm at 74 per with a normal axis there is no definitive acute ST or T-wave changes noted  ED Course  ED Course as of Mar 12 2030   Fri Mar 12, 2021   1620 CT scan resulted around 4:20 p m  noting no acute intracranial abnormalities  [id number] Patient does not appear to have any extraocular muscle abnormalities on my evaluation in the ER, and the patient is ambulatory without any difficulty  Discussed the case with Dr Denton Jovel, who believes that the patient can follow-up in the office for an outpatient eval       1650 The patient can also follow up with ophthalmologist for repeat evaluation of visual changes  SBIRT 22yo+      Most Recent Value   SBIRT (22 yo +)   In order to provide better care to our patients, we are screening all of our patients for alcohol and drug use  Would it be okay to ask you these screening questions? No Filed at: 03/12/2021 1513                    MDM    Disposition  Final diagnoses:   Concentration deficit   Visual changes     Time reflects when diagnosis was documented in both MDM as applicable and the Disposition within this note     Time User Action Codes Description Comment    3/12/2021  4:50 PM Marin Zuniga Add [R41 840] Concentration deficit     3/12/2021  4:50 PM Trace Gibson Add [H53 9] Visual changes       ED Disposition     ED Disposition Condition Date/Time Comment    Discharge Stable Fri Mar 12, 2021  4:50 PM [address] discharge to home/self care              Follow-up Information    None         Discharge Medication List as of 3/12/2021  4:54 PM      CONTINUE these medications which have NOT CHANGED    Details   albuterol (2 5 mg/3 mL) 0 083 % nebulizer solution Take 1 vial (2 5 mg total) by nebulization every 6 (six) hours as needed for wheezing or shortness of breath, Starting Tue 5/26/2020, Normal      albuterol (PROVENTIL HFA,VENTOLIN HFA) 90 mcg/act inhaler Inhale 2 puffs every 4 (four) hours as needed for wheezing, Starting Mon 9/17/2018, Print      aspirin (ECOTRIN LOW STRENGTH) 81 mg EC tablet Take 1 tablet (81 mg total) by mouth daily, Starting Mon 9/17/2018, Print      atorvastatin (LIPITOR) 40 mg tablet Take 1 tablet (40 mg total) by mouth every evening, Starting Thu 7/2/2020, Normal      calcium carbonate (TUMS EX) 750 mg chewable tablet Chew 1 tablet (750 mg total) 3 (three) times a day as needed for indigestion or heartburn, Starting Tue 7/30/2019, Print      fluticasone (FLONASE) 50 mcg/act nasal spray 1 spray into each nostril daily, Starting Mon 9/17/2018, Print      furosemide (LASIX) 40 mg tablet take 1 tablet by mouth once daily, Normal      isosorbide mononitrate (IMDUR) 30 mg 24 hr tablet Take 1 tablet (30 mg total) by mouth daily, Starting Wed 11/11/2020, Normal      LORazepam (ATIVAN) 0 5 mg tablet Take 1 tablet (0 5 mg total) by mouth 2 (two) times a day as needed for anxiety for up to 7 days, Starting Mon 9/17/2018, Print      meloxicam (MOBIC) 15 mg tablet Take 15 mg by mouth daily as needed, Starting Wed 10/30/2019, Historical Med      metoprolol succinate (TOPROL-XL) 25 mg 24 hr tablet Take 1 tablet (25 mg total) by mouth daily, Starting Wed 11/11/2020, Normal      nicotine (NICODERM CQ) 14 mg/24hr TD 24 hr patch Place 1 patch on the skin daily, Starting Fri 3/13/2020, Normal      nitroglycerin (NITROSTAT) 0 4 mg SL tablet Place 0 4 mg under the tongue every 5 (five) minutes as needed for chest pain, Historical Med      pantoprazole (PROTONIX) 40 mg tablet Take 1 tablet (40 mg total) by mouth daily, Starting Tue 7/30/2019, Print      potassium chloride (K-DUR) 10 mEq tablet Take 10 mEq by mouth daily , Historical Med      roflumilast (DALIRESP) 500 mcg tablet Take 500 mcg by mouth daily, Historical Med      solifenacin (VESICARE) 10 MG tablet Take by mouth Daily, Starting Tue 4/11/2017, Historical Med      tiotropium (SPIRIVA) 18 mcg inhalation capsule Place 1 capsule (18 mcg total) into inhaler and inhale daily, Starting Mon 9/17/2018, Print               PDMP Review     None          ED Provider  Electronically Signed by           Jason Moya DO  03/12/21 2030

## 2021-03-24 ENCOUNTER — ANESTHESIA (OUTPATIENT)
Dept: GASTROENTEROLOGY | Facility: HOSPITAL | Age: 66
End: 2021-03-24

## 2021-03-24 ENCOUNTER — HOSPITAL ENCOUNTER (OUTPATIENT)
Dept: GASTROENTEROLOGY | Facility: HOSPITAL | Age: 66
Setting detail: OUTPATIENT SURGERY
Discharge: HOME | End: 2021-03-24
Attending: INTERNAL MEDICINE | Admitting: INTERNAL MEDICINE
Payer: MEDICARE

## 2021-03-24 ENCOUNTER — ANESTHESIA EVENT (OUTPATIENT)
Dept: GASTROENTEROLOGY | Facility: HOSPITAL | Age: 66
End: 2021-03-24

## 2021-03-24 VITALS
TEMPERATURE: 97.2 F | HEART RATE: 75 BPM | OXYGEN SATURATION: 95 % | SYSTOLIC BLOOD PRESSURE: 131 MMHG | RESPIRATION RATE: 18 BRPM | WEIGHT: 215 LBS | HEIGHT: 70 IN | BODY MASS INDEX: 30.78 KG/M2 | DIASTOLIC BLOOD PRESSURE: 75 MMHG

## 2021-03-24 DIAGNOSIS — K92.1 MELENA: ICD-10-CM

## 2021-03-24 DIAGNOSIS — R63.4 ABNORMAL WEIGHT LOSS: ICD-10-CM

## 2021-03-24 DIAGNOSIS — R10.84 GENERALIZED ABDOMINAL PAIN: ICD-10-CM

## 2021-03-24 PROCEDURE — 88305 TISSUE EXAM BY PATHOLOGIST: CPT | Performed by: PATHOLOGY

## 2021-03-24 RX ORDER — LIDOCAINE HYDROCHLORIDE 10 MG/ML
INJECTION, SOLUTION EPIDURAL; INFILTRATION; INTRACAUDAL; PERINEURAL AS NEEDED
Status: DISCONTINUED | OUTPATIENT
Start: 2021-03-24 | End: 2021-03-24

## 2021-03-24 RX ORDER — SODIUM CHLORIDE, SODIUM LACTATE, POTASSIUM CHLORIDE, CALCIUM CHLORIDE 600; 310; 30; 20 MG/100ML; MG/100ML; MG/100ML; MG/100ML
125 INJECTION, SOLUTION INTRAVENOUS CONTINUOUS
Status: DISCONTINUED | OUTPATIENT
Start: 2021-03-24 | End: 2021-03-28 | Stop reason: HOSPADM

## 2021-03-24 RX ORDER — IPRATROPIUM BROMIDE AND ALBUTEROL SULFATE 2.5; .5 MG/3ML; MG/3ML
3 SOLUTION RESPIRATORY (INHALATION) ONCE
Status: COMPLETED | OUTPATIENT
Start: 2021-03-24 | End: 2021-03-24

## 2021-03-24 RX ORDER — PROPOFOL 10 MG/ML
INJECTION, EMULSION INTRAVENOUS AS NEEDED
Status: DISCONTINUED | OUTPATIENT
Start: 2021-03-24 | End: 2021-03-24

## 2021-03-24 RX ADMIN — PROPOFOL 30 MG: 10 INJECTION, EMULSION INTRAVENOUS at 10:24

## 2021-03-24 RX ADMIN — PROPOFOL 100 MG: 10 INJECTION, EMULSION INTRAVENOUS at 10:19

## 2021-03-24 RX ADMIN — SODIUM CHLORIDE, SODIUM LACTATE, POTASSIUM CHLORIDE, AND CALCIUM CHLORIDE 125 ML/HR: .6; .31; .03; .02 INJECTION, SOLUTION INTRAVENOUS at 09:07

## 2021-03-24 RX ADMIN — IPRATROPIUM BROMIDE AND ALBUTEROL SULFATE 3 ML: 2.5; .5 SOLUTION RESPIRATORY (INHALATION) at 09:03

## 2021-03-24 RX ADMIN — PROPOFOL 30 MG: 10 INJECTION, EMULSION INTRAVENOUS at 10:22

## 2021-03-24 RX ADMIN — LIDOCAINE HYDROCHLORIDE 50 MG: 10 INJECTION, SOLUTION EPIDURAL; INFILTRATION; INTRACAUDAL; PERINEURAL at 10:19

## 2021-03-24 NOTE — ANESTHESIA PREPROCEDURE EVALUATION
Procedure:  EGD    Relevant Problems   CARDIO   (+) Aortic aneurysm (HCC)   (+) Chest pain- none recently   (+) Coronary arteriosclerosis in native artery   (+) Coronary artery disease involving native coronary artery of native heart without angina pectoris   (+) Hyperlipidemia   (+) Hypertension      GI/HEPATIC   (+) Dysphagia   (+) Gastroesophageal reflux disease      MUSCULOSKELETAL   (+) Low back pain      NEURO/PSYCH   (+) Anxiety and depression   (+) Chronic pain of left ankle   (+) Grand mal status (HCC)   (+) History of CVA (cerebrovascular accident)      PULMONARY   (+) COPD (chronic obstructive pulmonary disease) ([facility] )- pt states sx are at baseline        Physical Exam    Airway    Mallampati score: II  TM Distance: >3 FB  Neck ROM: full     Dental   upper dentures and lower dentures,     Cardiovascular      Pulmonary      Other Findings        Anesthesia Plan  ASA Score- 3     Anesthesia Type- IV sedation with anesthesia with ASA Monitors  Additional Monitors:   Airway Plan:     Comment: Duoneb tx pre-procedure  Plan Factors-Exercise tolerance (METS): >4 METS  Chart reviewed  EKG reviewed  Existing labs reviewed  Patient summary reviewed  Patient is a current smoker  Patient instructed to abstain from smoking on day of procedure  Patient smoked on day of surgery  There is medical exclusion for perioperative obstructive sleep apnea risk education  Induction- intravenous  Postoperative Plan-     Informed Consent- Anesthetic plan and risks discussed with patient  I personally reviewed this patient with the CRNA  Discussed and agreed on the Anesthesia Plan with the CRNA  Lucius Gonsalves

## 2021-03-24 NOTE — DISCHARGE INSTRUCTIONS
Upper Endoscopy   WHAT YOU NEED TO KNOW:   An upper endoscopy is also called an upper gastrointestinal (GI) endoscopy, or an esophagogastroduodenoscopy (EGD)  You may feel bloated, gassy, or have some abdominal discomfort after your procedure  Your throat may be sore for 24 to 36 hours  You may burp or pass gas from air that is still inside your body  DISCHARGE INSTRUCTIONS:   Call 911 if:   · You have sudden chest pain or trouble breathing  Seek care immediately if:   · You feel dizzy or faint  · You have trouble swallowing  · You have severe throat pain  · Your bowel movements are very dark or black  · Your abdomen is hard and firm and you have severe pain  · You vomit blood  Contact your healthcare provider if:   · You feel full or bloated and cannot burp or pass gas  · You have not had a bowel movement for 3 days after your procedure  · You have neck pain  · You have a fever or chills  · You have nausea or are vomiting  · You have a rash or hives  · You have questions or concerns about your endoscopy  Relieve a sore throat:  Suck on throat lozenges or crushed ice  Gargle with a small amount of warm salt water  Mix 1 teaspoon of salt and 1 cup of warm water to make salt water  Relieve gas and discomfort from bloating:  Lie on your right side with a heating pad on your abdomen  Take short walks to help pass gas  Eat small meals until bloating is relieved  Rest after your procedure:  Do not drive or make important decisions until the day after your procedure  Return to your normal activity as directed  You can usually return to work the day after your procedure  Follow up with your healthcare provider as directed:  Write down your questions so you remember to ask them during your visits  © Copyright [address] Information is for End User's use only and may not be sold, redistributed or otherwise used for commercial purposes   All illustrations and images included in CareNotes® are the copyrighted property of A D A M , Inc  or Anna Oconnell   The above information is an  only  It is not intended as medical advice for individual conditions or treatments  Talk to your doctor, nurse or pharmacist before following any medical regimen to see if it is safe and effective for you  Hiatal Hernia   WHAT YOU NEED TO KNOW:   A hiatal hernia is a condition that causes part of your stomach to bulge through the hiatus (small opening) in your diaphragm  The part of the stomach may move up and down, or it may get trapped above the diaphragm  DISCHARGE INSTRUCTIONS:   Seek care immediately if:   · You have severe abdominal pain  · You try to vomit but nothing comes out (retching)  · You have severe chest pain and sudden trouble breathing  · Your bowel movements are black or bloody  · Your vomit looks like coffee grounds or has blood in it  Contact your healthcare provider if:   · Your symptoms are getting worse  · You have nausea, and you are vomiting  · You are losing weight without trying  · You have questions or concerns about your condition or care  Medicines:   · Medicines  may be given to relieve heartburn symptoms  These medicines help to decrease or block stomach acid  You may also be given medicines that help to tighten the esophageal sphincter  · Take your medicine as directed  Contact your healthcare provider if you think your medicine is not helping or if you have side effects  Tell him or her if you are allergic to any medicine  Keep a list of the medicines, vitamins, and herbs you take  Include the amounts, and when and why you take them  Bring the list or the pill bottles to follow-up visits  Carry your medicine list with you in case of an emergency  Follow up with your healthcare provider as directed:  Write down your questions so you remember to ask them during your visits    Self care: · Avoid foods that make your symptoms worse  These may include spicy foods, fruit juices, alcohol, caffeine, chocolate, and mint  · Eat several small meals during the day  Small meals give your stomach less food to digest     · Avoid lying down and bending forward after you eat  Do not eat meals 2 to 3 hours before bedtime  This decreases your risk for reflux  · Maintain a healthy weight  If you are overweight, weight loss may help relieve your symptoms  · Sleep with your head elevated  at least 6 inches  · Do not smoke  Smoking can increase your symptoms of heartburn  © Copyright [address] Information is for End User's use only and may not be sold, redistributed or otherwise used for commercial purposes  All illustrations and images included in CareNotes® are the copyrighted property of [employer]  or [address] [place], [place] [address]   The above information is an  only  It is not intended as medical advice for individual conditions or treatments  Talk to your doctor, nurse or pharmacist before following any medical regimen to see if it is safe and effective for you

## 2021-03-24 NOTE — INTERVAL H&P NOTE
H&P reviewed  After examining the patient I find no changes in the patients condition since the H&P had been written      Vitals:    03/24/21 0848   BP: 139/92   Pulse: 73   Resp: 18   Temp: 97 6 °F (36 4 °C)   SpO2: 97%

## 2021-03-24 NOTE — NURSING NOTE
This patient was unable to obtain a ride to his home in [facility]  With permission from my [address] took patient to his home on [id number] and accompanied him inside his residence  Patient very appreciative and is in stable conditon post EGD

## 2021-04-07 ENCOUNTER — IMMUNIZATIONS (OUTPATIENT)
Dept: FAMILY MEDICINE CLINIC | Facility: HOSPITAL | Age: 66
End: 2021-04-07

## 2021-04-07 DIAGNOSIS — Z23 ENCOUNTER FOR IMMUNIZATION: Primary | ICD-10-CM

## 2021-04-07 PROCEDURE — 91301 SARS-COV-2 / COVID-19 MRNA VACCINE (MODERNA) 100 MCG: CPT

## 2021-04-07 PROCEDURE — 0011A SARS-COV-2 / COVID-19 MRNA VACCINE (MODERNA) 100 MCG: CPT

## 2021-04-28 ENCOUNTER — APPOINTMENT (EMERGENCY)
Dept: CT IMAGING | Facility: HOSPITAL | Age: 66
End: 2021-04-28
Payer: COMMERCIAL

## 2021-04-28 ENCOUNTER — HOSPITAL ENCOUNTER (EMERGENCY)
Facility: HOSPITAL | Age: 66
Discharge: HOME | End: 2021-04-29
Attending: EMERGENCY MEDICINE | Admitting: EMERGENCY MEDICINE
Payer: COMMERCIAL

## 2021-04-28 DIAGNOSIS — F10.929 ALCOHOL INTOXICATION (HCC): Primary | ICD-10-CM

## 2021-04-28 DIAGNOSIS — S09.90XA HEAD INJURY: ICD-10-CM

## 2021-04-28 PROCEDURE — 99284 EMERGENCY DEPT VISIT MOD MDM: CPT | Performed by: EMERGENCY MEDICINE

## 2021-04-28 PROCEDURE — 70450 CT HEAD/BRAIN W/O DYE: CPT

## 2021-04-28 PROCEDURE — 99284 EMERGENCY DEPT VISIT MOD MDM: CPT

## 2021-04-28 PROCEDURE — 72125 CT NECK SPINE W/O DYE: CPT

## 2021-04-29 VITALS
TEMPERATURE: 97 F | RESPIRATION RATE: 16 BRPM | HEART RATE: 100 BPM | OXYGEN SATURATION: 94 % | WEIGHT: 215 LBS | SYSTOLIC BLOOD PRESSURE: 105 MMHG | BODY MASS INDEX: 30.85 KG/M2 | DIASTOLIC BLOOD PRESSURE: 70 MMHG

## 2021-04-29 NOTE — ED NOTES
Pt states he still does not have a ride  Was amb to br with steady gait       Kaylin Birmingham RN  37/41/47 9843

## 2021-04-29 NOTE — ED NOTES
Pt states he wants to go home, but does not have a ride home  I tried to explain to him that etoh in his system does not allow for him to leave safely by himself       Hector Still RN  67/18/21 9075

## 2021-04-29 NOTE — ED PROVIDER NOTES
History  Chief Complaint   Patient presents with    Fall    Alcohol Intoxication     Fall 2/2 gait instability 2/2 intox with etoh  Wanted to refuse ems, but I took medical command and he was slurring speech and unable to explain back rationale for transport and eval  Denies any injuries other than abraion to forehead  No neck pain  No loc  Initially did not know he was on ASA but at this time we are activating trauma eval           Prior to Admission Medications   Prescriptions Last Dose Informant Patient Reported? Taking?    LORazepam (ATIVAN) 0 5 mg tablet   No No   Sig: Take 1 tablet (0 5 mg total) by mouth 2 (two) times a day as needed for anxiety for up to 7 days   albuterol (2 5 mg/3 mL) 0 083 % nebulizer solution   No Yes   Sig: Take 1 vial (2 5 mg total) by nebulization every 6 (six) hours as needed for wheezing or shortness of breath   albuterol (PROVENTIL HFA,VENTOLIN HFA) 90 mcg/act inhaler   No Yes   Sig: Inhale 2 puffs every 4 (four) hours as needed for wheezing   aspirin (ECOTRIN LOW STRENGTH) 81 mg EC tablet   No Yes   Sig: Take 1 tablet (81 mg total) by mouth daily   atorvastatin (LIPITOR) 40 mg tablet   No Yes   Sig: Take 1 tablet (40 mg total) by mouth every evening   calcium carbonate (TUMS EX) 750 mg chewable tablet   No No   Sig: Chew 1 tablet (750 mg total) 3 (three) times a day as needed for indigestion or heartburn   fluticasone (FLONASE) 50 mcg/act nasal spray   No Yes   Si spray into each nostril daily   furosemide (LASIX) 40 mg tablet   No Yes   Sig: take 1 tablet by mouth once daily   isosorbide mononitrate (IMDUR) 30 mg 24 hr tablet   No Yes   Sig: Take 1 tablet (30 mg total) by mouth daily   metoprolol succinate (TOPROL-XL) 25 mg 24 hr tablet   No Yes   Sig: Take 1 tablet (25 mg total) by mouth daily   nicotine (NICODERM CQ) 14 mg/24hr TD 24 hr patch   No No   Sig: Place 1 patch on the skin daily   Patient not taking: Reported on 2020   nitroglycerin (NITROSTAT) 0 4 mg SL tablet   Yes No   Sig: Place 0 4 mg under the tongue every 5 (five) minutes as needed for chest pain   pantoprazole (PROTONIX) 40 mg tablet   No Yes   Sig: Take 1 tablet (40 mg total) by mouth daily   potassium chloride (K-DUR) 10 mEq tablet   Yes Yes   Sig: Take 10 mEq by mouth daily    roflumilast (DALIRESP) 500 mcg tablet   Yes Yes   Sig: Take 500 mcg by mouth daily   tiotropium (SPIRIVA) 18 mcg inhalation capsule   No Yes   Sig: Place 1 capsule (18 mcg total) into inhaler and inhale daily      Facility-Administered Medications: None       Past Medical History:   Diagnosis Date    Acute right MCA stroke ([facility] ) 9/15/2018    Arthritis     CAD (coronary artery disease)     s/p CABG 2000    COPD (chronic obstructive pulmonary disease) ([facility] )     GERD (gastroesophageal reflux disease)     Grand mal status ([facility] ) 3/24/2019    Heart attack ([facility] )     Heart failure ([facility] )     Hyperlipidemia     Hypertension     Lexiscan nuclear stress test 03/19/2016    EF 74% Normal    Myocardial infarction ([facility] )     Shortness of breath     Stroke ([facility] )     TIA (transient ischemic attack) 09/13/2018       Past Surgical History:   Procedure Laterality Date    CARDIAC CATHETERIZATION  08/19/2015    LIMA occluded  No severe native lesions    COLONOSCOPY      CORONARY ARTERY BYPASS GRAFT  2000    HERNIA REPAIR      umbilical hernia x2    TONSILLECTOMY         Family History   Problem Relation Age of Onset    Cancer Mother     Heart disease Mother     Cancer Father     Cancer Maternal Grandmother     Cancer Paternal Grandfather      I have reviewed and agree with the history as documented      E-Cigarette/Vaping    E-Cigarette Use Never User      E-Cigarette/Vaping Substances    Nicotine Yes     THC No     CBD No     Flavoring No     Other No     Unknown No      Social History     Tobacco Use    Smoking status: Current Every Day Smoker     Packs/day: 1 00     Years: 50 00     Pack years: 50 00     Types: Cigars, Pipe    Smokeless tobacco: Never Used    Tobacco comment: last cigarette was 0300 3/24/21   Substance Use Topics    Alcohol use: Yes     Frequency: Monthly or less    Drug use: Never       Review of Systems   Constitutional: Negative for fever  HENT: Negative for rhinorrhea  Eyes: Negative for visual disturbance  Respiratory: Negative for shortness of breath  Cardiovascular: Negative for chest pain  Gastrointestinal: Negative for abdominal pain, diarrhea and vomiting  Endocrine: Negative for polydipsia  Genitourinary: Negative for dysuria, frequency and hematuria  Musculoskeletal: Negative for neck stiffness  Skin: Negative for rash  Allergic/Immunologic: Negative for immunocompromised state  Neurological: Negative for speech difficulty, weakness and numbness  Psychiatric/Behavioral: Negative for suicidal ideas  Physical Exam  Physical Exam  Constitutional:       General: He is not in acute distress  Appearance: He is not diaphoretic  HENT:      Head: Normocephalic  Comments: Small abrasion to right forehead  No evidence fx to face/martita/vault     Nose: Nose normal    Eyes:      Conjunctiva/sclera: Conjunctivae normal    Neck:      Musculoskeletal: Normal range of motion and neck supple  Trachea: No tracheal deviation  Comments: Cervical spine nontender no deformity no instability  Cardiovascular:      Heart sounds: Normal heart sounds  Pulmonary:      Effort: Pulmonary effort is normal  No respiratory distress  Breath sounds: Normal breath sounds  No stridor  Chest:      Chest wall: No tenderness  Abdominal:      General: Bowel sounds are normal  There is no distension  Palpations: Abdomen is soft  Tenderness: There is no abdominal tenderness  There is no guarding or rebound  Genitourinary:     Comments: No Cost-Vertebral Angle Tenderness  Musculoskeletal:      Comments:    With exceptions above, thoracic, lumbar and sacral spine nontender, no deformity, no instability, no overlying skin changes  Pelvis stable, nontender  With exceptions above, all limbs nontender, no deformities, no instability, joints stable FROM, distal LT sensation intact, distal tendon and intrinsic motor function intact, distal pulses and perfusion intact  Skin:     Capillary Refill: Capillary refill takes less than 2 seconds  Neurological:      Mental Status: He is alert and oriented to person, place, and time  Sensory: No sensory deficit  Motor: No abnormal muscle tone  Comments: Oriented but intox         Vital Signs  ED Triage Vitals [04/28/21 2255]   Temperature Pulse Respirations Blood Pressure SpO2   (!) 97 °F (36 1 °C) 95 16 113/74 91 %      Temp src Heart Rate Source Patient Position - Orthostatic VS BP Location FiO2 (%)   -- -- -- -- --      Pain Score       1           Vitals:    04/29/21 0225 04/29/21 0230 04/29/21 0522 04/29/21 0604   BP:  98/53 109/72 105/70   Pulse: 95 93 103 100         Visual Acuity  Visual Acuity      Most Recent Value   L Pupil Size (mm)  3   R Pupil Size (mm)  3          ED Medications  Medications - No data to display    Diagnostic Studies  Results Reviewed     None                 CT cervical spine without contrast   Final Result by Venice Whitman DO (04/28 2344)      No cervical spine fracture or traumatic malalignment  Workstation performed: [id number]         CT head without contrast   Final Result by Venice Whitman DO (04/28 2339)      No acute intracranial abnormality  Workstation performed: [id number]                    Procedures  Procedures         ED Course                             SBIRT 20yo+      Most Recent Value   SBIRT (25 yo +)   In order to provide better care to our patients, we are screening all of our patients for alcohol and drug use  Would it be okay to ask you these screening questions?   Yes Filed at: 04/28/2021 2937   Initial Alcohol Screen: US AUDIT-C 1  How often do you have a drink containing alcohol? 1 Filed at: 04/28/2021 2303   2  How many drinks containing alcohol do you have on a typical day you are drinking? 3 Filed at: 04/28/2021 2303   3a  Male UNDER 65: How often do you have five or more drinks on one occasion? 1 Filed at: 04/28/2021 2303   3b  FEMALE Any Age, or MALE 65+: How often do you have 4 or more drinks on one occassion? 1 Filed at: 04/28/2021 2303   Audit-C Score  6 Filed at: 04/28/2021 2303   COLTEN: How many times in the past year have you    Used an illegal drug or used a prescription medication for non-medical reasons? Never Filed at: 04/28/2021 2303                    MDM  Number of Diagnoses or Management Options  Alcohol intoxication ([facility] ):   Head injury:   Diagnosis management comments: CT head/neck neg, DC with escort    TRAUMA ASSESSMENT NOTES (SUPPLEMENTAL TO H&P)  PRIMARY SURVEY  Airway: patent and speaking  Breathing: both lung fields CTA  Circulation: distal pulses intact x4 limbs  Disability: GCS = eye 4 voice 5 motor 6 = total 15 but intox and slurring words  Exposure: completed  Cervical Spine: imaged b/c intox - nontender, no deformities  Family History: Non-Contributory  Consultants: NONE  Direct to CT: Yes - as soon as we established he took aspirin and activated trauma eval  [facility] Transfer? No      Disposition  Final diagnoses:   Alcohol intoxication ([facility] )   Head injury     Time reflects when diagnosis was documented in both MDM as applicable and the Disposition within this note     Time User Action Codes Description Comment    4/28/2021 11:59 PM Gatito Marsh [F10 [id number]] Alcohol intoxication ([facility] )     4/28/2021 11:59 PM [address] [[id number]] Head injury       ED Disposition     ED Disposition Condition Date/Time Comment    Discharge Stable Wed Apr 28, 2021 11:59 PM [address] discharge to home/self care              Follow-up Information     Follow up With Specialties Details Why Contact Info Red Houston MD Internal Medicine Schedule an appointment as soon as possible for a visit in 2 days  [address] [facility]  [address] [address]  [phone]            Discharge Medication List as of 4/29/2021  5:16 AM      CONTINUE these medications which have NOT CHANGED    Details   albuterol (2 5 mg/3 mL) 0 083 % nebulizer solution Take 1 vial (2 5 mg total) by nebulization every 6 (six) hours as needed for wheezing or shortness of breath, Starting Tue 5/26/2020, Normal      albuterol (PROVENTIL HFA,VENTOLIN HFA) 90 mcg/act inhaler Inhale 2 puffs every 4 (four) hours as needed for wheezing, Starting Mon 9/17/2018, Print      aspirin (ECOTRIN LOW STRENGTH) 81 mg EC tablet Take 1 tablet (81 mg total) by mouth daily, Starting Mon 9/17/2018, Print      atorvastatin (LIPITOR) 40 mg tablet Take 1 tablet (40 mg total) by mouth every evening, Starting Thu 7/2/2020, Normal      fluticasone (FLONASE) 50 mcg/act nasal spray 1 spray into each nostril daily, Starting Mon 9/17/2018, Print      furosemide (LASIX) 40 mg tablet take 1 tablet by mouth once daily, Normal      isosorbide mononitrate (IMDUR) 30 mg 24 hr tablet Take 1 tablet (30 mg total) by mouth daily, Starting Wed 11/11/2020, Normal      metoprolol succinate (TOPROL-XL) 25 mg 24 hr tablet Take 1 tablet (25 mg total) by mouth daily, Starting Wed 11/11/2020, Normal      pantoprazole (PROTONIX) 40 mg tablet Take 1 tablet (40 mg total) by mouth daily, Starting Tue 7/30/2019, Print      potassium chloride (K-DUR) 10 mEq tablet Take 10 mEq by mouth daily , Historical Med      roflumilast (DALIRESP) 500 mcg tablet Take 500 mcg by mouth daily, Historical Med      tiotropium (SPIRIVA) 18 mcg inhalation capsule Place 1 capsule (18 mcg total) into inhaler and inhale daily, Starting Mon 9/17/2018, Print      calcium carbonate (TUMS EX) 750 mg chewable tablet Chew 1 tablet (750 mg total) 3 (three) times a day as needed for indigestion or heartburn, Starting Tue 7/30/2019, Print      LORazepam (ATIVAN) 0 5 mg tablet Take 1 tablet (0 5 mg total) by mouth 2 (two) times a day as needed for anxiety for up to 7 days, Starting Mon 9/17/2018, Print      nicotine (NICODERM CQ) 14 mg/24hr TD 24 hr patch Place 1 patch on the skin daily, Starting Fri 3/13/2020, Normal      nitroglycerin (NITROSTAT) 0 4 mg SL tablet Place 0 4 mg under the tongue every 5 (five) minutes as needed for chest pain, Historical Med           No discharge procedures on file      PDMP Review     None          ED Provider  Electronically Signed by           Tomas Cabot, MD  04/29/21 6668

## 2021-05-07 ENCOUNTER — IMMUNIZATIONS (OUTPATIENT)
Dept: FAMILY MEDICINE CLINIC | Facility: HOSPITAL | Age: 66
End: 2021-05-07

## 2021-05-07 DIAGNOSIS — Z23 ENCOUNTER FOR IMMUNIZATION: Primary | ICD-10-CM

## 2021-05-07 PROCEDURE — 91301 SARS-COV-2 / COVID-19 MRNA VACCINE (MODERNA) 100 MCG: CPT

## 2021-05-07 PROCEDURE — 0012A SARS-COV-2 / COVID-19 MRNA VACCINE (MODERNA) 100 MCG: CPT

## 2021-05-28 ENCOUNTER — OFFICE VISIT (OUTPATIENT)
Dept: CARDIOLOGY CLINIC | Facility: CLINIC | Age: 66
End: 2021-05-28
Payer: COMMERCIAL

## 2021-05-28 VITALS
BODY MASS INDEX: 30.92 KG/M2 | WEIGHT: 216 LBS | HEIGHT: 70 IN | DIASTOLIC BLOOD PRESSURE: 100 MMHG | SYSTOLIC BLOOD PRESSURE: 152 MMHG | HEART RATE: 86 BPM

## 2021-05-28 DIAGNOSIS — I25.119 CORONARY ARTERY DISEASE INVOLVING NATIVE CORONARY ARTERY OF NATIVE HEART WITH ANGINA PECTORIS (HCC): ICD-10-CM

## 2021-05-28 DIAGNOSIS — I25.10 CORONARY ARTERIOSCLEROSIS IN NATIVE ARTERY: ICD-10-CM

## 2021-05-28 DIAGNOSIS — R07.89 OTHER CHEST PAIN: Primary | ICD-10-CM

## 2021-05-28 DIAGNOSIS — I71.2 THORACIC AORTIC ANEURYSM, WITHOUT RUPTURE (HCC): ICD-10-CM

## 2021-05-28 DIAGNOSIS — I10 ESSENTIAL HYPERTENSION: ICD-10-CM

## 2021-05-28 DIAGNOSIS — I25.10 CORONARY ARTERY DISEASE INVOLVING NATIVE CORONARY ARTERY OF NATIVE HEART WITHOUT ANGINA PECTORIS: ICD-10-CM

## 2021-05-28 PROCEDURE — 99214 OFFICE O/P EST MOD 30 MIN: CPT | Performed by: PHYSICIAN ASSISTANT

## 2021-05-28 PROCEDURE — 93000 ELECTROCARDIOGRAM COMPLETE: CPT | Performed by: PHYSICIAN ASSISTANT

## 2021-05-28 RX ORDER — LISINOPRIL 10 MG/1
10 TABLET ORAL DAILY
Qty: 30 TABLET | Refills: 3 | Status: SHIPPED | OUTPATIENT
Start: 2021-05-28 | End: 2021-06-14

## 2021-05-28 NOTE — PATIENT INSTRUCTIONS
Add lisinopril 10 mg mg daily      Nuclear stress test    Blood work in 10 days    Return in one month

## 2021-05-28 NOTE — ASSESSMENT & PLAN NOTE
Cath 2015 with occluded LIMA but patent native vessels     Now with chest pain   Will check a nuclear non-walking stress test   Patient cannot walk on a treadmill due to a torn meniscus

## 2021-05-28 NOTE — PROGRESS NOTES
[facility]   Outpatient Note  Surthi Gaytan  1955  [id number]  [facility], [facility] [address] [address] [phone]    Sruthi Gaytan is a 77 y o  male    Assessment and Plan:   Coronary arteriosclerosis in native artery  Cath 2015 with occluded LIMA but patent native vessels  Now with chest pain   Will check a nuclear non-walking stress test   Patient cannot walk on a treadmill due to a torn meniscus     Hyperlipidemia  On atorvastatin and tolerating     Chest pain  Chest pain that is aggravated with movement of the upper body and left arm  See additional comments     Hypertension  Not optimally controlled   Will start at Lisinopril 10 mg and check a chem-7 in ten days          Additional Plan:   Medications as detailed above  Pertinent testing orders as outlined  Available lab and test results are reviewed with the patient and any additional required labs are ordered as noted  Return visit will be in one month or earlier if there are problems  The patient is encouraged to call in the meantime if there are questions or concerns  Subjective: The patient is here for a routine visit regarding CAD with prior CABG and stenting, HTN and HLD  He is complaining today of chest pain that is aggravated by movement of the upper body and the left arm  His BP is not controlled today  He describes the chest pain as a sharp pain  He has a torn meniscus and is not able to walk very far  From a cardiac perspective, he is without complaints of exertional dyspnea  He has no palpitations syncope or near syncope, and denies edema orthopnea or PND  He does not complain of TIA or CVA symptoms  He denies any exertional neck, jaw, arm or back pain           Social History  Social History     Tobacco Use   Smoking Status Current Every Day Smoker    Packs/day: 1 00    Years: 50 00    Pack years: 50 00    Types: Cigars, Pipe   Smokeless Tobacco Never Used   Tobacco Comment    last cigarette was 0300 3/24/21   ,   Social History     Substance and Sexual Activity   Alcohol Use Yes    Frequency: Monthly or less   ,   Social History     Substance and Sexual Activity   Drug Use Never     Family History   Problem Relation Age of Onset    Cancer Mother     Heart disease Mother     Cancer Father     Cancer Maternal Grandmother     Cancer Paternal Grandfather        Medical and Surgical History  Past Medical History:   Diagnosis Date    Acute right MCA stroke ([facility] ) 9/15/2018    Arthritis     CAD (coronary artery disease)     s/p CABG 2000    COPD (chronic obstructive pulmonary disease) ([facility] )     GERD (gastroesophageal reflux disease)     Grand mal status ([facility] ) 3/24/2019    Heart attack ([facility] )     Heart failure ([facility] )     Hyperlipidemia     Hypertension     Lexiscan nuclear stress test 03/19/2016    EF 74% Normal    Myocardial infarction ([facility] )     Shortness of breath     Stroke ([facility] )     TIA (transient ischemic attack) 09/13/2018     Past Surgical History:   Procedure Laterality Date    CARDIAC CATHETERIZATION  08/19/2015    LIMA occluded   No severe native lesions    COLONOSCOPY      CORONARY ARTERY BYPASS GRAFT  2000    HERNIA REPAIR      umbilical hernia x2    TONSILLECTOMY           Current Outpatient Medications:     albuterol (2 5 mg/3 mL) 0 083 % nebulizer solution, Take 1 vial (2 5 mg total) by nebulization every 6 (six) hours as needed for wheezing or shortness of breath, Disp: 25 vial, Rfl: 2    albuterol (PROVENTIL HFA,VENTOLIN HFA) 90 mcg/act inhaler, Inhale 2 puffs every 4 (four) hours as needed for wheezing, Disp: 1 Inhaler, Rfl: 0    aspirin (ECOTRIN LOW STRENGTH) 81 mg EC tablet, Take 1 tablet (81 mg total) by mouth daily, Disp: 30 tablet, Rfl: 0    atorvastatin (LIPITOR) 40 mg tablet, Take 1 tablet (40 mg total) by mouth every evening, Disp: 90 tablet, Rfl: 0    calcium carbonate (TUMS EX) 750 mg chewable tablet, Chew 1 tablet (750 mg total) 3 (three) times a day as needed for indigestion or heartburn, Disp: 60 tablet, Rfl: 0    fluticasone (FLONASE) 50 mcg/act nasal spray, 1 spray into each nostril daily, Disp: 16 g, Rfl: 0    furosemide (LASIX) 40 mg tablet, take 1 tablet by mouth once daily, Disp: 90 tablet, Rfl: 3    isosorbide mononitrate (IMDUR) 30 mg 24 hr tablet, Take 1 tablet (30 mg total) by mouth daily, Disp: 90 tablet, Rfl: 3    MELATONIN PO, Take by mouth daily at bedtime, Disp: , Rfl:     metoprolol succinate (TOPROL-XL) 25 mg 24 hr tablet, Take 1 tablet (25 mg total) by mouth daily, Disp: 90 tablet, Rfl: 3    nitroglycerin (NITROSTAT) 0 4 mg SL tablet, Place 0 4 mg under the tongue every 5 (five) minutes as needed for chest pain, Disp: , Rfl:     pantoprazole (PROTONIX) 40 mg tablet, Take 1 tablet (40 mg total) by mouth daily, Disp: 30 tablet, Rfl: 0    potassium chloride (K-DUR) 10 mEq tablet, Take 10 mEq by mouth daily , Disp: , Rfl:     roflumilast (DALIRESP) 500 mcg tablet, Take 500 mcg by mouth daily, Disp: , Rfl:     tiotropium (SPIRIVA) 18 mcg inhalation capsule, Place 1 capsule (18 mcg total) into inhaler and inhale daily, Disp: 30 capsule, Rfl: 0    lisinopril (ZESTRIL) 10 mg tablet, Take 1 tablet (10 mg total) by mouth daily, Disp: 30 tablet, Rfl: 3    LORazepam (ATIVAN) 0 5 mg tablet, Take 1 tablet (0 5 mg total) by mouth 2 (two) times a day as needed for anxiety for up to 7 days (Patient not taking: Reported on 5/28/2021), Disp: 14 tablet, Rfl: 0    nicotine (NICODERM CQ) 14 mg/24hr TD 24 hr patch, Place 1 patch on the skin daily (Patient not taking: Reported on 8/11/2020), Disp: 28 patch, Rfl: 0  Allergies   Allergen Reactions    Gabapentin Headache       Review of Systems   Constitution: Negative  HENT: Negative  Eyes: Negative  Cardiovascular: Positive for chest pain   Negative for claudication, cyanosis, dyspnea on exertion, irregular heartbeat, leg swelling, near-syncope, orthopnea, palpitations and paroxysmal nocturnal dyspnea  Respiratory: Negative for cough, hemoptysis, shortness of breath, sleep disturbances due to breathing, snoring, sputum production and wheezing  Endocrine: Negative  Hematologic/Lymphatic: Negative  Skin: Negative  Musculoskeletal: Positive for joint pain  Gastrointestinal: Negative  Genitourinary: Negative  Neurological: Negative  Psychiatric/Behavioral: Negative  Allergic/Immunologic: Negative  Objective:   /100   Pulse 86   Ht 5' 10" (1 778 m)   Wt 98 kg (216 lb)   BMI 30 99 kg/m²   Physical Exam    Lab Review:   No results found for: CHOL  Lab Results   Component Value Date    HDL 37 (L) 10/02/2020     Lab Results   Component Value Date    LDLCALC 165 (H) 10/02/2020     Lab Results   Component Value Date    TRIG 214 (H) 10/02/2020     Results Reviewed     None        Results Reviewed     None        Results Reviewed     None          Recent Cardiovascular Testing:   Myoview 07/02/2020:  Normal ejection fraction  Small zone of ischemia in the inferoseptum      ECG Review:   NSR, NS ST changes

## 2021-05-28 NOTE — ASSESSMENT & PLAN NOTE
Chest pain that is aggravated with movement of the upper body and left arm     See additional comments

## 2021-06-11 ENCOUNTER — TELEPHONE (OUTPATIENT)
Dept: CARDIOLOGY CLINIC | Facility: CLINIC | Age: 66
End: 2021-06-11

## 2021-06-11 DIAGNOSIS — I10 ESSENTIAL HYPERTENSION: Primary | ICD-10-CM

## 2021-06-11 NOTE — TELEPHONE ENCOUNTER
Génesis Clark stopped taking his lisinopril because it is giving him a really bad headache  He tried cutting it in half but still he gets the headache  Can you prescribe something else?

## 2021-06-14 RX ORDER — LOSARTAN POTASSIUM 25 MG/1
25 TABLET ORAL DAILY
Qty: 30 TABLET | Refills: 3 | Status: SHIPPED | OUTPATIENT
Start: 2021-06-14 | End: 2021-07-15 | Stop reason: SINTOL

## 2021-06-29 ENCOUNTER — HOSPITAL ENCOUNTER (OUTPATIENT)
Dept: NUCLEAR MEDICINE | Facility: HOSPITAL | Age: 66
Discharge: HOME | End: 2021-06-29
Payer: COMMERCIAL

## 2021-06-29 ENCOUNTER — HOSPITAL ENCOUNTER (OUTPATIENT)
Dept: NON INVASIVE DIAGNOSTICS | Facility: HOSPITAL | Age: 66
Discharge: HOME | End: 2021-06-29
Payer: COMMERCIAL

## 2021-06-29 DIAGNOSIS — I25.10 CORONARY ARTERIOSCLEROSIS IN NATIVE ARTERY: ICD-10-CM

## 2021-06-29 DIAGNOSIS — R07.89 OTHER CHEST PAIN: ICD-10-CM

## 2021-06-29 DIAGNOSIS — I25.10 CORONARY ARTERY DISEASE INVOLVING NATIVE CORONARY ARTERY OF NATIVE HEART WITHOUT ANGINA PECTORIS: ICD-10-CM

## 2021-06-29 DIAGNOSIS — I10 ESSENTIAL HYPERTENSION: ICD-10-CM

## 2021-06-29 PROCEDURE — 78452 HT MUSCLE IMAGE SPECT MULT: CPT | Performed by: INTERNAL MEDICINE

## 2021-06-29 PROCEDURE — 93017 CV STRESS TEST TRACING ONLY: CPT

## 2021-06-29 PROCEDURE — 78452 HT MUSCLE IMAGE SPECT MULT: CPT

## 2021-06-29 PROCEDURE — 93018 CV STRESS TEST I&R ONLY: CPT | Performed by: INTERNAL MEDICINE

## 2021-06-29 PROCEDURE — 93016 CV STRESS TEST SUPVJ ONLY: CPT | Performed by: INTERNAL MEDICINE

## 2021-06-29 PROCEDURE — A9502 TC99M TETROFOSMIN: HCPCS

## 2021-06-29 RX ADMIN — REGADENOSON 0.4 MG: 0.08 INJECTION, SOLUTION INTRAVENOUS at 11:00

## 2021-07-02 LAB
ARRHY DURING EX: NORMAL
CHEST PAIN STATEMENT: NORMAL
ECG INTERP BEFORE EX: NORMAL
MAX DIASTOLIC BP: 96 MMHG
MAX HEART RATE: 106 BPM
MAX PREDICTED HEART RATE: 154 BPM
MAX. SYSTOLIC BP: 170 MMHG
PROTOCOL NAME: NORMAL
REASON FOR TERMINATION: NORMAL
TARGET HR FORMULA: NORMAL
TEST INDICATION: NORMAL
TIME IN EXERCISE PHASE: NORMAL

## 2021-07-12 ENCOUNTER — APPOINTMENT (OUTPATIENT)
Dept: LAB | Facility: HOSPITAL | Age: 66
End: 2021-07-12
Payer: COMMERCIAL

## 2021-07-12 LAB
ANION GAP SERPL CALCULATED.3IONS-SCNC: 8 MMOL/L (ref 4–13)
BUN SERPL-MCNC: 12 MG/DL (ref 7–25)
CALCIUM SERPL-MCNC: 9 MG/DL (ref 8.6–10.5)
CHLORIDE SERPL-SCNC: 106 MMOL/L (ref 98–107)
CO2 SERPL-SCNC: 26 MMOL/L (ref 21–31)
CREAT SERPL-MCNC: 0.71 MG/DL (ref 0.7–1.3)
GFR SERPL CREATININE-BSD FRML MDRD: 98 ML/MIN/1.73SQ M
GLUCOSE P FAST SERPL-MCNC: 98 MG/DL (ref 65–99)
POTASSIUM SERPL-SCNC: 3.7 MMOL/L (ref 3.5–5.5)
SODIUM SERPL-SCNC: 140 MMOL/L (ref 134–143)

## 2021-07-12 PROCEDURE — 80048 BASIC METABOLIC PNL TOTAL CA: CPT | Performed by: PHYSICIAN ASSISTANT

## 2021-07-12 PROCEDURE — 36415 COLL VENOUS BLD VENIPUNCTURE: CPT | Performed by: PHYSICIAN ASSISTANT

## 2021-07-15 ENCOUNTER — OFFICE VISIT (OUTPATIENT)
Dept: CARDIOLOGY CLINIC | Facility: CLINIC | Age: 66
End: 2021-07-15
Payer: MEDICARE

## 2021-07-15 VITALS
DIASTOLIC BLOOD PRESSURE: 100 MMHG | SYSTOLIC BLOOD PRESSURE: 160 MMHG | WEIGHT: 216 LBS | BODY MASS INDEX: 30.92 KG/M2 | HEIGHT: 70 IN | HEART RATE: 90 BPM

## 2021-07-15 DIAGNOSIS — I25.119 CORONARY ARTERY DISEASE INVOLVING NATIVE CORONARY ARTERY OF NATIVE HEART WITH ANGINA PECTORIS (HCC): ICD-10-CM

## 2021-07-15 DIAGNOSIS — I10 ESSENTIAL HYPERTENSION: Primary | ICD-10-CM

## 2021-07-15 PROCEDURE — 99214 OFFICE O/P EST MOD 30 MIN: CPT | Performed by: NURSE PRACTITIONER

## 2021-07-15 PROCEDURE — 1124F ACP DISCUSS-NO DSCNMKR DOCD: CPT | Performed by: NURSE PRACTITIONER

## 2021-07-15 NOTE — ASSESSMENT & PLAN NOTE
Blood pressure elevated initially but improved later in the visit   Would continue to monitor for now  He was recently started on lisinopril and then changed to losartan which he subsequently discontinued due to side effects of lightheadedness

## 2021-07-15 NOTE — PROGRESS NOTES
Patient ID: Doug Estrada is a 77 y o  male  Plan:      Hypertension   Blood pressure elevated initially but improved later in the visit   Would continue to monitor for now  He was recently started on lisinopril and then changed to losartan which he subsequently discontinued due to side effects of lightheadedness  Coronary artery disease involving native coronary artery of native heart with angina pectoris [facility])  Cath 2015 with occluded LIMA but patent native vessels  Continue aspirin, statin, beta-blocker    Other chest pain  Underwent stress imaging without significant change from prior and no concerning ischemia  The test was felt to be low risk  Continue isosorbide 30 mg daily       Follow up Plan/Summary Comments:    Tricia Wright continues to occasionally experience chest discomfort which is likely related to chronic ischemic heart disease  His nuclear stress imaging has remained stable and is low risk  He may have some improvement in his symptoms with better blood pressure control, however he has been intolerant of low-dose lisinopril and losartan  I recommended follow-up with his PCP as well as his pulmonologist for improved control of his COPD  Follow-up in 3 months, sooner if needed  HPI:  Tricia Wright presents to the office today for a follow-up visit  He was evaluated in May with a report of chest pain and underwent stress imaging  Results are noted below  He continues to experience similar symptoms of chest discomfort  Sometimes it is exacerbated by activity and other times it is not  He continues to experience exertional dyspnea when walking up an incline and carrying groceries  He attributes this more so to his COPD  His biggest concern is that he is not sleeping well and does not have a good appetite  He denies any palpitations, lightheadedness, orthopnea, nocturnal dyspnea, syncope        Review of Systems   10  point ROS  was otherwise non pertinent or negative except as per HPI or as below  Gait: Normal      Most recent or relevant cardiac/vascular testing:    Nuclear Stress 6/29/2021 Normal LV function, very mild ischemia  Low risk scan    Myoview 07/02/2020:  Normal ejection fraction  Small zone of ischemia in the inferoseptum  Objective:     /100   Pulse 90   Ht 5' 10" (1 778 m)   Wt 98 kg (216 lb)   BMI 30 99 kg/m²     PHYSICAL EXAM:   BP recheck 140/92  General:  Normal appearance, no acute distress  Eyes:  Anicteric  Oral mucosa:   Wearing a mask  Neck:  No JVD  Carotid upstrokes are brisk without bruits  No masses  Chest:  Clear to auscultation   Cardiac:  No palpable PMI  Normal S1 and S2  No murmur gallop or rub  Abdomen:  Soft and nontender  No palpable organomegaly or aortic enlargement  Extremities:  No peripheral edema  Musculoskeletal:  Symmetric  Vascular:  Pedal pulses are intact  Neuro:  Grossly symmetric  Psych:  Alert and oriented x3      Allergies   Allergen Reactions    Gabapentin Headache       Current Outpatient Medications:     albuterol (2 5 mg/3 mL) 0 083 % nebulizer solution, Take 1 vial (2 5 mg total) by nebulization every 6 (six) hours as needed for wheezing or shortness of breath, Disp: 25 vial, Rfl: 2    albuterol (PROVENTIL HFA,VENTOLIN HFA) 90 mcg/act inhaler, Inhale 2 puffs every 4 (four) hours as needed for wheezing, Disp: 1 Inhaler, Rfl: 0    aspirin (ECOTRIN LOW STRENGTH) 81 mg EC tablet, Take 1 tablet (81 mg total) by mouth daily, Disp: 30 tablet, Rfl: 0    atorvastatin (LIPITOR) 40 mg tablet, Take 1 tablet (40 mg total) by mouth every evening, Disp: 90 tablet, Rfl: 0    calcium carbonate (TUMS EX) 750 mg chewable tablet, Chew 1 tablet (750 mg total) 3 (three) times a day as needed for indigestion or heartburn, Disp: 60 tablet, Rfl: 0    fluticasone (FLONASE) 50 mcg/act nasal spray, 1 spray into each nostril daily, Disp: 16 g, Rfl: 0    furosemide (LASIX) 40 mg tablet, take 1 tablet by mouth once daily, Disp: 90 tablet, Rfl: 3    isosorbide mononitrate (IMDUR) 30 mg 24 hr tablet, Take 1 tablet (30 mg total) by mouth daily, Disp: 90 tablet, Rfl: 3    MELATONIN PO, Take by mouth daily at bedtime, Disp: , Rfl:     metoprolol succinate (TOPROL-XL) 25 mg 24 hr tablet, Take 1 tablet (25 mg total) by mouth daily, Disp: 90 tablet, Rfl: 3    nitroglycerin (NITROSTAT) 0 4 mg SL tablet, Place 0 4 mg under the tongue every 5 (five) minutes as needed for chest pain, Disp: , Rfl:     pantoprazole (PROTONIX) 40 mg tablet, Take 1 tablet (40 mg total) by mouth daily, Disp: 30 tablet, Rfl: 0    Potassium Chloride ER 20 MEQ TBCR, Take 20 mEq by mouth daily , Disp: , Rfl:     roflumilast (DALIRESP) 500 mcg tablet, Take 500 mcg by mouth daily, Disp: , Rfl:     tiotropium (SPIRIVA) 18 mcg inhalation capsule, Place 1 capsule (18 mcg total) into inhaler and inhale daily, Disp: 30 capsule, Rfl: 0    LORazepam (ATIVAN) 0 5 mg tablet, Take 1 tablet (0 5 mg total) by mouth 2 (two) times a day as needed for anxiety for up to 7 days (Patient not taking: Reported on 5/28/2021), Disp: 14 tablet, Rfl: 0    nicotine (NICODERM CQ) 14 mg/24hr TD 24 hr patch, Place 1 patch on the skin daily (Patient not taking: Reported on 8/11/2020), Disp: 28 patch, Rfl: 0  Past Medical History:   Diagnosis Date    Acute right MCA stroke ([facility] [address] ) 9/15/2018    Arthritis     CAD (coronary artery disease)     s/p CABG 2000    COPD (chronic obstructive pulmonary disease) (HCC)     GERD (gastroesophageal reflux disease)     Grand mal status ([facility] [address] ) 3/24/2019    Heart attack ([facility] [address] )     Heart failure ([facility] [address] )     Hyperlipidemia     Hypertension     Lexiscan nuclear stress test 03/19/2016    EF 74% Normal    Myocardial infarction (HCC)     Shortness of breath     Stroke (HCC)     TIA (transient ischemic attack) 09/13/2018     Past Surgical History:   Procedure Laterality Date    CARDIAC CATHETERIZATION  08/19/2015    LIMA occluded   No severe native lesions    COLONOSCOPY      CORONARY ARTERY BYPASS GRAFT  2000    HERNIA REPAIR      umbilical hernia x2    TONSILLECTOMY         CMP:   Lab Results   Component Value Date     06/22/2018    K 3 7 07/12/2021    K 3 4 (L) 06/22/2018     07/12/2021     06/22/2018    CO2 26 07/12/2021    CO2 24 06/22/2018    BUN 12 07/12/2021    BUN 14 06/22/2018    CREATININE 0 71 07/12/2021    CREATININE 0 86 06/22/2018    EGFR 98 07/12/2021     Lipid Profile:    Lab Results   Component Value Date    TRIG 214 (H) 10/02/2020    HDL 37 (L) 10/02/2020         Social History     Tobacco Use   Smoking Status Current Every Day Smoker    Packs/day: 1 00    Years: 50 00    Pack years: 50 00    Types: Cigars, Pipe   Smokeless Tobacco Never Used   Tobacco Comment    last cigarette was 0300 3/24/21

## 2021-07-15 NOTE — ASSESSMENT & PLAN NOTE
Underwent stress imaging without significant change from prior and no concerning ischemia  The test was felt to be low risk    Continue isosorbide 30 mg daily

## 2021-08-29 DIAGNOSIS — R60.0 LOWER EXTREMITY EDEMA: ICD-10-CM

## 2021-08-31 RX ORDER — FUROSEMIDE 40 MG/1
TABLET ORAL
Qty: 90 TABLET | Refills: 3 | OUTPATIENT
Start: 2021-08-31

## 2021-08-31 RX ORDER — FUROSEMIDE 40 MG/1
40 TABLET ORAL DAILY
Qty: 90 TABLET | Refills: 3 | Status: SHIPPED | OUTPATIENT
Start: 2021-08-31

## 2021-09-13 ENCOUNTER — HOSPITAL ENCOUNTER (OUTPATIENT)
Dept: RADIOLOGY | Facility: HOSPITAL | Age: 66
Discharge: HOME | End: 2021-09-13
Payer: COMMERCIAL

## 2021-09-13 ENCOUNTER — APPOINTMENT (OUTPATIENT)
Dept: LAB | Facility: HOSPITAL | Age: 66
End: 2021-09-13
Payer: COMMERCIAL

## 2021-09-13 DIAGNOSIS — E78.2 MIXED HYPERLIPIDEMIA: ICD-10-CM

## 2021-09-13 DIAGNOSIS — M54.6 PAIN IN THORACIC SPINE: ICD-10-CM

## 2021-09-13 LAB
25(OH)D3 SERPL-MCNC: 45.8 NG/ML (ref 30–100)
ALBUMIN SERPL BCP-MCNC: 4.3 G/DL (ref 3.5–5.7)
ALP SERPL-CCNC: 89 U/L (ref 55–165)
ALT SERPL W P-5'-P-CCNC: 13 U/L (ref 7–52)
ANION GAP SERPL CALCULATED.3IONS-SCNC: 8 MMOL/L (ref 4–13)
AST SERPL W P-5'-P-CCNC: 12 U/L (ref 13–39)
BILIRUB SERPL-MCNC: 0.5 MG/DL (ref 0.2–1)
BUN SERPL-MCNC: 11 MG/DL (ref 7–25)
CALCIUM SERPL-MCNC: 9 MG/DL (ref 8.6–10.5)
CHLORIDE SERPL-SCNC: 97 MMOL/L (ref 98–107)
CHOLEST SERPL-MCNC: 134 MG/DL (ref 0–200)
CO2 SERPL-SCNC: 28 MMOL/L (ref 21–31)
CREAT SERPL-MCNC: 1.03 MG/DL (ref 0.7–1.3)
ERYTHROCYTE [DISTWIDTH] IN BLOOD BY AUTOMATED COUNT: 12.9 % (ref 11.5–14.5)
EST. AVERAGE GLUCOSE BLD GHB EST-MCNC: 111 MG/DL
GFR SERPL CREATININE-BSD FRML MDRD: 75 ML/MIN/1.73SQ M
GLUCOSE P FAST SERPL-MCNC: 93 MG/DL (ref 65–99)
HBA1C MFR BLD: 5.5 %
HCT VFR BLD AUTO: 39.5 % (ref 42–47)
HDLC SERPL-MCNC: 35 MG/DL
HGB BLD-MCNC: 13.9 G/DL (ref 14–18)
LDLC SERPL CALC-MCNC: 74 MG/DL (ref 0–100)
MCH RBC QN AUTO: 30.6 PG (ref 26–34)
MCHC RBC AUTO-ENTMCNC: 35.2 G/DL (ref 31–37)
MCV RBC AUTO: 87 FL (ref 81–99)
NONHDLC SERPL-MCNC: 99 MG/DL
PLATELET # BLD AUTO: 292 THOUSANDS/UL (ref 149–390)
PMV BLD AUTO: 7.2 FL (ref 8.6–11.7)
POTASSIUM SERPL-SCNC: 3.7 MMOL/L (ref 3.5–5.5)
PROT SERPL-MCNC: 6.2 G/DL (ref 6.4–8.9)
RBC # BLD AUTO: 4.54 MILLION/UL (ref 4.3–5.9)
SODIUM SERPL-SCNC: 133 MMOL/L (ref 134–143)
T4 FREE SERPL-MCNC: 1.15 NG/DL (ref 0.76–1.46)
TRIGL SERPL-MCNC: 124 MG/DL (ref 44–166)
TSH SERPL DL<=0.05 MIU/L-ACNC: 0.88 UIU/ML (ref 0.45–5.33)
VIT B12 SERPL-MCNC: 295 PG/ML (ref 100–900)
WBC # BLD AUTO: 10.2 THOUSAND/UL (ref 4.8–10.8)

## 2021-09-13 PROCEDURE — 83036 HEMOGLOBIN GLYCOSYLATED A1C: CPT

## 2021-09-13 PROCEDURE — 72072 X-RAY EXAM THORAC SPINE 3VWS: CPT

## 2021-09-13 PROCEDURE — 84443 ASSAY THYROID STIM HORMONE: CPT

## 2021-09-13 PROCEDURE — 82306 VITAMIN D 25 HYDROXY: CPT

## 2021-09-13 PROCEDURE — 72110 X-RAY EXAM L-2 SPINE 4/>VWS: CPT

## 2021-09-13 PROCEDURE — 85027 COMPLETE CBC AUTOMATED: CPT

## 2021-09-13 PROCEDURE — 84439 ASSAY OF FREE THYROXINE: CPT

## 2021-09-13 PROCEDURE — 80053 COMPREHEN METABOLIC PANEL: CPT

## 2021-09-13 PROCEDURE — 82607 VITAMIN B-12: CPT

## 2021-09-13 PROCEDURE — 36415 COLL VENOUS BLD VENIPUNCTURE: CPT

## 2021-09-13 PROCEDURE — 80061 LIPID PANEL: CPT

## 2021-09-21 ENCOUNTER — HOSPITAL ENCOUNTER (OUTPATIENT)
Dept: NON INVASIVE DIAGNOSTICS | Facility: HOSPITAL | Age: 66
Discharge: HOME | End: 2021-09-21
Payer: COMMERCIAL

## 2021-09-21 ENCOUNTER — APPOINTMENT (EMERGENCY)
Dept: CT IMAGING | Facility: HOSPITAL | Age: 66
End: 2021-09-21
Payer: COMMERCIAL

## 2021-09-21 ENCOUNTER — HOSPITAL ENCOUNTER (OUTPATIENT)
Facility: HOSPITAL | Age: 66
Setting detail: OBSERVATION
Discharge: HOME | End: 2021-09-22
Attending: EMERGENCY MEDICINE | Admitting: INTERNAL MEDICINE
Payer: COMMERCIAL

## 2021-09-21 ENCOUNTER — HOSPITAL ENCOUNTER (OUTPATIENT)
Dept: CT IMAGING | Facility: HOSPITAL | Age: 66
Discharge: HOME | End: 2021-09-21
Payer: COMMERCIAL

## 2021-09-21 DIAGNOSIS — J44.9 CHRONIC OBSTRUCTIVE PULMONARY DISEASE, UNSPECIFIED COPD TYPE (HCC): ICD-10-CM

## 2021-09-21 DIAGNOSIS — F32.A ANXIETY AND DEPRESSION: ICD-10-CM

## 2021-09-21 DIAGNOSIS — F41.9 ANXIETY AND DEPRESSION: ICD-10-CM

## 2021-09-21 DIAGNOSIS — I73.9 PERIPHERAL VASCULAR DISEASE, UNSPECIFIED (HCC): ICD-10-CM

## 2021-09-21 DIAGNOSIS — I20.0 UNSTABLE ANGINA PECTORIS (HCC): Primary | ICD-10-CM

## 2021-09-21 LAB
ALBUMIN SERPL BCP-MCNC: 4.2 G/DL (ref 3.5–5.7)
ALP SERPL-CCNC: 85 U/L (ref 55–165)
ALT SERPL W P-5'-P-CCNC: 13 U/L (ref 7–52)
ANION GAP SERPL CALCULATED.3IONS-SCNC: 7 MMOL/L (ref 4–13)
AST SERPL W P-5'-P-CCNC: 12 U/L (ref 13–39)
BASOPHILS # BLD AUTO: 0.1 THOUSANDS/ΜL (ref 0–0.1)
BASOPHILS NFR BLD AUTO: 1 % (ref 0–2)
BILIRUB SERPL-MCNC: 0.5 MG/DL (ref 0.2–1)
BNP SERPL-MCNC: 68 PG/ML (ref 1–100)
BUN SERPL-MCNC: 10 MG/DL (ref 7–25)
CALCIUM SERPL-MCNC: 9.2 MG/DL (ref 8.6–10.5)
CHLORIDE SERPL-SCNC: 100 MMOL/L (ref 98–107)
CO2 SERPL-SCNC: 30 MMOL/L (ref 21–31)
CREAT SERPL-MCNC: 1.01 MG/DL (ref 0.7–1.3)
EOSINOPHIL # BLD AUTO: 0.2 THOUSAND/ΜL (ref 0–0.61)
EOSINOPHIL NFR BLD AUTO: 2 % (ref 0–5)
ERYTHROCYTE [DISTWIDTH] IN BLOOD BY AUTOMATED COUNT: 13 % (ref 11.5–14.5)
GFR SERPL CREATININE-BSD FRML MDRD: 77 ML/MIN/1.73SQ M
GLUCOSE SERPL-MCNC: 103 MG/DL (ref 65–99)
HCT VFR BLD AUTO: 39.3 % (ref 42–47)
HGB BLD-MCNC: 13.7 G/DL (ref 14–18)
LYMPHOCYTES # BLD AUTO: 1.9 THOUSANDS/ΜL (ref 0.6–4.47)
LYMPHOCYTES NFR BLD AUTO: 21 % (ref 21–51)
MCH RBC QN AUTO: 30.6 PG (ref 26–34)
MCHC RBC AUTO-ENTMCNC: 34.8 G/DL (ref 31–37)
MCV RBC AUTO: 88 FL (ref 81–99)
MONOCYTES # BLD AUTO: 0.5 THOUSAND/ΜL (ref 0.17–1.22)
MONOCYTES NFR BLD AUTO: 5 % (ref 2–12)
NEUTROPHILS # BLD AUTO: 6.4 THOUSANDS/ΜL (ref 1.4–6.5)
NEUTS SEG NFR BLD AUTO: 71 % (ref 42–75)
PLATELET # BLD AUTO: 267 THOUSANDS/UL (ref 149–390)
PLATELET # BLD AUTO: 267 THOUSANDS/UL (ref 149–390)
PMV BLD AUTO: 6.7 FL (ref 8.6–11.7)
POTASSIUM SERPL-SCNC: 3.8 MMOL/L (ref 3.5–5.5)
PROT SERPL-MCNC: 6.5 G/DL (ref 6.4–8.9)
RBC # BLD AUTO: 4.47 MILLION/UL (ref 4.3–5.9)
SODIUM SERPL-SCNC: 137 MMOL/L (ref 134–143)
TROPONIN I SERPL-MCNC: <0.03 NG/ML
WBC # BLD AUTO: 9.1 THOUSAND/UL (ref 4.8–10.8)

## 2021-09-21 PROCEDURE — 74174 CTA ABD&PLVS W/CONTRAST: CPT

## 2021-09-21 PROCEDURE — 99285 EMERGENCY DEPT VISIT HI MDM: CPT | Performed by: EMERGENCY MEDICINE

## 2021-09-21 PROCEDURE — 36415 COLL VENOUS BLD VENIPUNCTURE: CPT | Performed by: EMERGENCY MEDICINE

## 2021-09-21 PROCEDURE — 99285 EMERGENCY DEPT VISIT HI MDM: CPT

## 2021-09-21 PROCEDURE — 85025 COMPLETE CBC W/AUTO DIFF WBC: CPT | Performed by: EMERGENCY MEDICINE

## 2021-09-21 PROCEDURE — 84484 ASSAY OF TROPONIN QUANT: CPT | Performed by: EMERGENCY MEDICINE

## 2021-09-21 PROCEDURE — 80053 COMPREHEN METABOLIC PANEL: CPT | Performed by: EMERGENCY MEDICINE

## 2021-09-21 PROCEDURE — 84484 ASSAY OF TROPONIN QUANT: CPT | Performed by: INTERNAL MEDICINE

## 2021-09-21 PROCEDURE — 93005 ELECTROCARDIOGRAM TRACING: CPT

## 2021-09-21 PROCEDURE — 83880 ASSAY OF NATRIURETIC PEPTIDE: CPT | Performed by: PHYSICIAN ASSISTANT

## 2021-09-21 PROCEDURE — 94640 AIRWAY INHALATION TREATMENT: CPT

## 2021-09-21 PROCEDURE — 85049 AUTOMATED PLATELET COUNT: CPT | Performed by: INTERNAL MEDICINE

## 2021-09-21 PROCEDURE — 93923 UPR/LXTR ART STDY 3+ LVLS: CPT

## 2021-09-21 PROCEDURE — G1004 CDSM NDSC: HCPCS

## 2021-09-21 PROCEDURE — 71275 CT ANGIOGRAPHY CHEST: CPT

## 2021-09-21 PROCEDURE — 99220 PR INITIAL OBSERVATION CARE/DAY 70 MINUTES: CPT | Performed by: INTERNAL MEDICINE

## 2021-09-21 PROCEDURE — 93923 UPR/LXTR ART STDY 3+ LVLS: CPT | Performed by: SURGERY

## 2021-09-21 PROCEDURE — 71271 CT THORAX LUNG CANCER SCR C-: CPT

## 2021-09-21 RX ORDER — ALBUTEROL SULFATE 90 UG/1
2 AEROSOL, METERED RESPIRATORY (INHALATION) EVERY 4 HOURS PRN
Status: DISCONTINUED | OUTPATIENT
Start: 2021-09-21 | End: 2021-09-22 | Stop reason: HOSPADM

## 2021-09-21 RX ORDER — PANTOPRAZOLE SODIUM 40 MG/1
40 TABLET, DELAYED RELEASE ORAL DAILY
Status: DISCONTINUED | OUTPATIENT
Start: 2021-09-22 | End: 2021-09-22 | Stop reason: HOSPADM

## 2021-09-21 RX ORDER — ASPIRIN 81 MG/1
81 TABLET ORAL DAILY
Status: DISCONTINUED | OUTPATIENT
Start: 2021-09-22 | End: 2021-09-22 | Stop reason: HOSPADM

## 2021-09-21 RX ORDER — ASPIRIN 81 MG/1
243 TABLET ORAL ONCE
Status: COMPLETED | OUTPATIENT
Start: 2021-09-21 | End: 2021-09-21

## 2021-09-21 RX ORDER — IPRATROPIUM BROMIDE AND ALBUTEROL SULFATE 2.5; .5 MG/3ML; MG/3ML
3 SOLUTION RESPIRATORY (INHALATION) ONCE
Status: COMPLETED | OUTPATIENT
Start: 2021-09-21 | End: 2021-09-21

## 2021-09-21 RX ORDER — LANOLIN ALCOHOL/MO/W.PET/CERES
3 CREAM (GRAM) TOPICAL
Status: DISCONTINUED | OUTPATIENT
Start: 2021-09-21 | End: 2021-09-22 | Stop reason: HOSPADM

## 2021-09-21 RX ORDER — ALBUTEROL SULFATE 2.5 MG/3ML
2.5 SOLUTION RESPIRATORY (INHALATION) EVERY 6 HOURS PRN
Status: DISCONTINUED | OUTPATIENT
Start: 2021-09-21 | End: 2021-09-21

## 2021-09-21 RX ORDER — METOPROLOL SUCCINATE 25 MG/1
25 TABLET, EXTENDED RELEASE ORAL DAILY
Status: DISCONTINUED | OUTPATIENT
Start: 2021-09-22 | End: 2021-09-22 | Stop reason: HOSPADM

## 2021-09-21 RX ORDER — BUTALBITAL, ACETAMINOPHEN AND CAFFEINE 50; 325; 40 MG/1; MG/1; MG/1
1 TABLET ORAL EVERY 4 HOURS PRN
Status: DISCONTINUED | OUTPATIENT
Start: 2021-09-21 | End: 2021-09-22 | Stop reason: HOSPADM

## 2021-09-21 RX ORDER — NICOTINE 21 MG/24HR
1 PATCH, TRANSDERMAL 24 HOURS TRANSDERMAL DAILY
Status: DISCONTINUED | OUTPATIENT
Start: 2021-09-22 | End: 2021-09-22 | Stop reason: HOSPADM

## 2021-09-21 RX ORDER — ACETAMINOPHEN 325 MG/1
975 TABLET ORAL ONCE
Status: COMPLETED | OUTPATIENT
Start: 2021-09-21 | End: 2021-09-21

## 2021-09-21 RX ORDER — ISOSORBIDE MONONITRATE 30 MG/1
30 TABLET, EXTENDED RELEASE ORAL DAILY
Status: DISCONTINUED | OUTPATIENT
Start: 2021-09-22 | End: 2021-09-22 | Stop reason: HOSPADM

## 2021-09-21 RX ORDER — ATORVASTATIN CALCIUM 40 MG/1
40 TABLET, FILM COATED ORAL EVERY EVENING
Status: DISCONTINUED | OUTPATIENT
Start: 2021-09-21 | End: 2021-09-22 | Stop reason: HOSPADM

## 2021-09-21 RX ORDER — FLUTICASONE PROPIONATE 50 MCG
1 SPRAY, SUSPENSION (ML) NASAL DAILY
Status: DISCONTINUED | OUTPATIENT
Start: 2021-09-22 | End: 2021-09-22 | Stop reason: HOSPADM

## 2021-09-21 RX ORDER — FUROSEMIDE 40 MG/1
40 TABLET ORAL DAILY
Status: DISCONTINUED | OUTPATIENT
Start: 2021-09-22 | End: 2021-09-22 | Stop reason: HOSPADM

## 2021-09-21 RX ADMIN — ATORVASTATIN CALCIUM 40 MG: 40 TABLET, FILM COATED ORAL at 19:08

## 2021-09-21 RX ADMIN — ACETAMINOPHEN 975 MG: 325 TABLET ORAL at 18:32

## 2021-09-21 RX ADMIN — ASPIRIN 243 MG: 81 TABLET, COATED ORAL at 17:59

## 2021-09-21 RX ADMIN — IOHEXOL 100 ML: 350 INJECTION, SOLUTION INTRAVENOUS at 17:14

## 2021-09-21 RX ADMIN — MELATONIN 3 MG: at 21:43

## 2021-09-21 RX ADMIN — IPRATROPIUM BROMIDE AND ALBUTEROL SULFATE 3 ML: 2.5; .5 SOLUTION RESPIRATORY (INHALATION) at 15:22

## 2021-09-21 NOTE — ASSESSMENT & PLAN NOTE
Patient presents with complaints of chest pain  History of coronary artery disease status post CABG in 2000  Normal stress test in June of 2021 with intact LV function and no signs of ischemia  Troponin I within normal limits  EKG with no signs of ischemia    · Troponin I x3 occurrences  · Serial EKGs  · Monitor on telemetry  · Cardiac diet  · Admit to observation

## 2021-09-21 NOTE — H&P
[facility]  H&P- Harish Whitethorn 1955, 77 y o  male MRN: [id number]  Unit/Bed#: ED 04 Encounter: [id number]  Primary Care Provider: Laurent Ho MD   Date and time admitted to hospital: 9/21/2021  2:58 PM    * Chest pain  Assessment & Plan  Patient presents with complaints of chest pain  History of coronary artery disease status post CABG in 2000  Normal stress test in June of 2021 with intact LV function and no signs of ischemia  Troponin I within normal limits  EKG with no signs of ischemia  · Troponin I x3 occurrences  · Serial EKGs  · Monitor on telemetry  · Cardiac diet  · Admit to observation      Hypertension  Assessment & Plan  Blood pressure is elevated on presentation with systolics in the 365M-200C  · Continue home loop diuretic and beta-blocker as previously prescribed     COPD (chronic obstructive pulmonary disease) (HCC)  Assessment & Plan  Stable  Present on admission  Does not appear to be in acute exacerbation  · Albuterol inhaler/nebulizer as needed  · Fluticasone  · Spiriva  · Monitor pulse ox with goal > 88%    Hyperlipidemia  Assessment & Plan  Stable  Present on admission  · Lipitor 40 mg PO daily    Coronary artery disease involving native coronary artery of native heart with angina pectoris [facility])  Assessment & Plan  CABG in 2000  Normal stress test in June of 2021  Follows closely with Cardiology and has been continued on medical management  · Aspirin 81 mg daily  · Lipitor 40 mg daily  · Imdur 30 mg daily  · Metoprolol succinate 25 mg daily        VTE Prophylaxis:  Lovenox  Code Status: Level 1 Full Code     Anticipated Length of Stay:  Patient will be admitted on an Observation basis with an anticipated length of stay of  < 2 midnights  Justification for Hospital Stay: ACS rule out    Total Time for Visit, including Counseling / Coordination of Care: 60 minutes    Greater than 50% of this total time spent on direct patient counseling and coordination of care  Chief Complaint:   Chest pain    History of Present Illness:    Js Wiseman is a 77 y o  male with a past medical history significant for COPD, coronary artery disease status post CABG in 2000, hypertension who presents with complaints of chest pain  The patient has undergone normal stress test in June of 2021 with intact LV function and no signs of ischemia  In the ED, initial troponin I within normal limits  EKG with no signs of active ischemia  Hemodynamically stable saturating well on room air  The patient was admitted to the general medicine service for further evaluation and treatment of ACS rule out  Review of Systems:    Review of Systems   Constitutional: Negative for chills and fever  HENT: Negative for ear pain and sore throat  Eyes: Negative for pain and visual disturbance  Respiratory: Negative for cough and shortness of breath  Cardiovascular: Positive for chest pain  Negative for palpitations  Gastrointestinal: Negative for abdominal pain and vomiting  Genitourinary: Negative for dysuria and hematuria  Musculoskeletal: Negative for arthralgias and back pain  Skin: Negative for color change and rash  Neurological: Negative for seizures and syncope  All other systems reviewed and are negative        Past Medical and Surgical History:     Past Medical History:   Diagnosis Date    Acute right MCA stroke ([address] ) 9/15/2018    Arthritis     CAD (coronary artery disease)     s/p CABG 2000    [facility]     GERD (gastroesophageal reflux disease)     Grand mal status ([address] ) 3/24/2019    Heart attack ([address] )     Heart failure ([address] )     Hyperlipidemia     Hypertension     Lexiscan nuclear stress test 03/19/2016    EF 74% Normal    Myocardial infarction ([address] )     Shortness of breath     Stroke ([address] )     TIA (transient ischemic attack) 09/13/2018       Past Surgical History:   Procedure Laterality Date    CARDIAC CATHETERIZATION  08/19/2015    LIMA occluded  No severe native lesions    COLONOSCOPY      CORONARY ARTERY BYPASS GRAFT  2000    HERNIA REPAIR      umbilical hernia x2    TONSILLECTOMY         Meds/Allergies:    Prior to Admission medications    Medication Sig Start Date End Date Taking?  Authorizing Provider   albuterol (2 5 mg/3 mL) 0 083 % nebulizer solution Take 1 vial (2 5 mg total) by nebulization every 6 (six) hours as needed for wheezing or shortness of breath 5/26/20   Carlos Lopez DO   albuterol (PROVENTIL HFA,VENTOLIN HFA) 90 mcg/act inhaler Inhale 2 puffs every 4 (four) hours as needed for wheezing 9/17/18   Kapil Khan MD   aspirin (ECOTRIN LOW STRENGTH) 81 mg EC tablet Take 1 tablet (81 mg total) by mouth daily 9/17/18   Kapil Khan MD   atorvastatin (LIPITOR) 40 mg tablet Take 1 tablet (40 mg total) by mouth every evening 7/2/20   Hattie Hirsch DO   calcium carbonate (TUMS EX) 750 mg chewable tablet Chew 1 tablet (750 mg total) 3 (three) times a day as needed for indigestion or heartburn 7/30/19   Nitin Reveles MD   fluticasone [facility]) 50 mcg/act nasal spray 1 spray into each nostril daily 9/17/18   Kapil Khan MD   furosemide (LASIX) 40 mg tablet Take 1 tablet (40 mg total) by mouth daily 8/31/21   ADRIANA Bartholomew   isosorbide mononitrate (IMDUR) 30 mg 24 hr tablet Take 1 tablet (30 mg total) by mouth daily 11/11/20   Shay Adams MD   LORazepam (ATIVAN) 0 5 mg tablet Take 1 tablet (0 5 mg total) by mouth 2 (two) times a day as needed for anxiety for up to 7 days  Patient not taking: Reported on 5/28/2021 9/17/18   Kapil Khan MD   MELATONIN PO Take by mouth daily at bedtime    Historical Provider, MD   metoprolol succinate (TOPROL-XL) 25 mg 24 hr tablet Take 1 tablet (25 mg total) by mouth daily 11/11/20   Shay Adams MD   nicotine (NICODERM CQ) 14 mg/24hr TD 24 hr patch Place 1 patch on the skin daily  Patient not taking: Reported on 8/11/2020 3/13/20 Ciro Reece MD   nitroglycerin (NITROSTAT) 0 4 mg SL tablet Place 0 4 mg under the tongue every 5 (five) minutes as needed for chest pain    Historical Provider, MD   pantoprazole (PROTONIX) 40 mg tablet Take 1 tablet (40 mg total) by mouth daily 7/30/19   Jyotsna Edouard MD   Potassium Chloride ER 20 MEQ TBCR Take 20 mEq by mouth daily     Historical Provider, MD   roflumilast (DALIRESP) 500 mcg tablet Take 500 mcg by mouth daily    Historical Provider, MD   tiotropium (SPIRIVA) 18 mcg inhalation capsule Place 1 capsule (18 mcg total) into inhaler and inhale daily 9/17/18   Evelin Cross MD       Allergies: Allergies   Allergen Reactions    Gabapentin Headache       Social History:     Marital Status: Single   Substance Use History:   Social History     Substance and Sexual Activity   Alcohol Use Yes    Comment: Socially     Social History     Tobacco Use   Smoking Status Current Every Day Smoker    Packs/day: 1 00    Years: 50 00    Pack years: 50 00    Types: Cigars, Pipe   Smokeless Tobacco Never Used   Tobacco Comment    last cigarette was 0300 3/24/21     Social History     Substance and Sexual Activity   Drug Use Never       Family History:    non-contributory    Physical Exam:     Vitals:   Blood Pressure: (!) 172/77 (09/21/21 1800)  Pulse: 78 (09/21/21 1800)  Temperature: 97 8 °F (36 6 °C) (09/21/21 1455)  Temp Source: Tympanic (09/21/21 1455)  Respirations: 20 (09/21/21 1630)  Height: 5' 11" (180 3 cm) (09/21/21 1455)  Weight - Scale: 95 8 kg (211 lb 1 6 oz) (09/21/21 1455)  SpO2: 97 % (09/21/21 1800)    Physical Exam  Vitals and nursing note reviewed  Constitutional:       Appearance: He is well-developed  HENT:      Head: Normocephalic and atraumatic  Eyes:      Conjunctiva/sclera: Conjunctivae normal    Cardiovascular:      Rate and Rhythm: Normal rate and regular rhythm  Heart sounds: No murmur heard  Pulmonary:      Effort: Pulmonary effort is normal  No respiratory distress  Breath sounds: Normal breath sounds  Abdominal:      Palpations: Abdomen is soft  Tenderness: There is no abdominal tenderness  Musculoskeletal:      Cervical back: Neck supple  Skin:     General: Skin is warm and dry  Neurological:      Mental Status: He is alert  Additional Data:     Lab Results: I have reviewed pertinent results     Results from last 7 days   Lab Units 09/21/21  1516   WBC Thousand/uL 9 10   HEMOGLOBIN g/dL 13 7*   HEMATOCRIT % 39 3*   PLATELETS Thousands/uL 267   NEUTROS PCT % 71   LYMPHS PCT % 21   MONOS PCT % 5   EOS PCT % 2     Results from last 7 days   Lab Units 09/21/21  1516   SODIUM mmol/L 137   POTASSIUM mmol/L 3 8   CHLORIDE mmol/L 100   CO2 mmol/L 30   BUN mg/dL 10   CREATININE mg/dL 1 01   ANION GAP mmol/L 7   CALCIUM mg/dL 9 2   ALBUMIN g/dL 4 2   TOTAL BILIRUBIN mg/dL 0 50   ALK PHOS U/L 85   ALT U/L 13   AST U/L 12*   GLUCOSE RANDOM mg/dL 103*                       Imaging: I have reviewed pertinent imaging     CTA dissection protocol chest/abdomen/pelvis   Final Result by Mirtha Hazel MD (09/21 1824)      1  No aortic dissection or intramural hematoma  2   Unchanged ascending aortic ectasia measuring 3 8 cm with stable focal outpouching along the right anterior wall  3   No acute abnormalities in the chest, abdomen or pelvis  Workstation performed: [id number]             EKG, Pathology, and Other Studies Reviewed on Admission:   · EKG: Reviewed     Allscripts / Epic Records Reviewed    ** Please Note: This note has been constructed using a voice recognition system   **

## 2021-09-21 NOTE — ED PROVIDER NOTES
History  Chief Complaint   Patient presents with    Chest Pain     Intermittent 8/10 sharp left sided chest pain, worse on exertion, not relieved with rest, hx of bypass     Patient is a 60-year-old male with history of CAD with CABG and stents in place that presents for evaluation of chest pain  Patient says over the past week he has had exertional substernal chest pain radiating to left-sided chest left arm  Chest pain is nonpleuritic nonpositional   He does endorse some exertional dyspnea as well  He also endorses diaphoresis  He denies nausea or vomiting at this time  He has increased his baby aspirin dose to a full aspirin over the past several days to help him with the symptoms  He denies recent infectious symptoms including cough, rhinorrhea, congestion, fevers, chills, rigors  Patient denies abdominal pain, urinary or bowel symptoms  This is similar to previous anginal symptoms  Prior to Admission Medications   Prescriptions Last Dose Informant Patient Reported? Taking?    LORazepam (ATIVAN) 0 5 mg tablet   No No   Sig: Take 1 tablet (0 5 mg total) by mouth 2 (two) times a day as needed for anxiety for up to 7 days   Patient not taking: Reported on 5/28/2021   MELATONIN PO   Yes No   Sig: Take by mouth daily at bedtime   Potassium Chloride ER 20 MEQ TBCR   Yes No   Sig: Take 20 mEq by mouth daily    albuterol (2 5 mg/3 mL) 0 083 % nebulizer solution   No No   Sig: Take 1 vial (2 5 mg total) by nebulization every 6 (six) hours as needed for wheezing or shortness of breath   albuterol (PROVENTIL HFA,VENTOLIN HFA) 90 mcg/act inhaler   No No   Sig: Inhale 2 puffs every 4 (four) hours as needed for wheezing   aspirin (ECOTRIN LOW STRENGTH) 81 mg EC tablet   No No   Sig: Take 1 tablet (81 mg total) by mouth daily   atorvastatin (LIPITOR) 40 mg tablet   No No   Sig: Take 1 tablet (40 mg total) by mouth every evening   calcium carbonate (TUMS EX) 750 mg chewable tablet   No No   Sig: Chew 1 tablet (750 mg total) 3 (three) times a day as needed for indigestion or heartburn   fluticasone (FLONASE) 50 mcg/act nasal spray   No No   Si spray into each nostril daily   furosemide (LASIX) 40 mg tablet   No No   Sig: Take 1 tablet (40 mg total) by mouth daily   isosorbide mononitrate (IMDUR) 30 mg 24 hr tablet   No No   Sig: Take 1 tablet (30 mg total) by mouth daily   metoprolol succinate (TOPROL-XL) 25 mg 24 hr tablet   No No   Sig: Take 1 tablet (25 mg total) by mouth daily   nicotine (NICODERM CQ) 14 mg/24hr TD 24 hr patch   No No   Sig: Place 1 patch on the skin daily   Patient not taking: Reported on 2020   nitroglycerin (NITROSTAT) 0 4 mg SL tablet   Yes No   Sig: Place 0 4 mg under the tongue every 5 (five) minutes as needed for chest pain   pantoprazole (PROTONIX) 40 mg tablet   No No   Sig: Take 1 tablet (40 mg total) by mouth daily   roflumilast (DALIRESP) 500 mcg tablet   Yes No   Sig: Take 500 mcg by mouth daily   tiotropium (SPIRIVA) 18 mcg inhalation capsule   No No   Sig: Place 1 capsule (18 mcg total) into inhaler and inhale daily      Facility-Administered Medications: None       Past Medical History:   Diagnosis Date    Acute right MCA stroke ([facility] ) 9/15/2018    Arthritis     CAD (coronary artery disease)     s/p CABG     COPD (chronic obstructive pulmonary disease) (HCC)     GERD (gastroesophageal reflux disease)     Grand mal status ([facility] [address] ) 3/24/2019    Heart attack ([facility] [address] )     Heart failure ([facility] [address] )     Hyperlipidemia     Hypertension     Lexiscan nuclear stress test 2016    EF 74% Normal    Myocardial infarction (HCC)     Shortness of breath     Stroke ([facility] [address] )     TIA (transient ischemic attack) 2018       Past Surgical History:   Procedure Laterality Date    CARDIAC CATHETERIZATION  2015    LIMA occluded   No severe native lesions    COLONOSCOPY      CORONARY ARTERY BYPASS GRAFT      HERNIA REPAIR      umbilical hernia x2    TONSILLECTOMY Family History   Problem Relation Age of Onset    Cancer Mother     Heart disease Mother     Cancer Father     Cancer Maternal Grandmother     Cancer Paternal Grandfather      I have reviewed and agree with the history as documented  E-Cigarette/Vaping    E-Cigarette Use Never User      E-Cigarette/Vaping Substances    Nicotine Yes     THC No     CBD No     Flavoring No     Other No     Unknown No      Social History     Tobacco Use    Smoking status: Current Every Day Smoker     Packs/day: 1 00     Years: 50 00     Pack years: 50 00     Types: Cigars, Pipe    Smokeless tobacco: Never Used    Tobacco comment: last cigarette was 0300 3/24/21   Vaping Use    Vaping Use: Never used   Substance Use Topics    Alcohol use: Yes     Comment: Socially    Drug use: Never       Review of Systems   Constitutional: Positive for diaphoresis  Negative for fever  HENT: Negative for sore throat  Eyes: Negative for photophobia  Respiratory: Positive for shortness of breath  Cardiovascular: Positive for chest pain  Gastrointestinal: Negative for abdominal pain  Genitourinary: Negative for dysuria  Musculoskeletal: Negative for back pain  Skin: Negative for rash  Neurological: Negative for light-headedness  Hematological: Negative for adenopathy  Psychiatric/Behavioral: Negative for agitation  All other systems reviewed and are negative  Physical Exam  Physical Exam  Vitals reviewed  Constitutional:       General: He is not in acute distress  Appearance: He is well-developed  HENT:      Head: Normocephalic  Eyes:      Pupils: Pupils are equal, round, and reactive to light  Cardiovascular:      Rate and Rhythm: Normal rate and regular rhythm  Heart sounds: Normal heart sounds  No murmur heard  No friction rub  No gallop  Pulmonary:      Effort: Pulmonary effort is normal       Breath sounds: Normal breath sounds     Abdominal:      General: Bowel sounds are normal  There is no distension  Palpations: Abdomen is soft  Tenderness: There is no abdominal tenderness  There is no guarding  Musculoskeletal:         General: Normal range of motion  Cervical back: Normal range of motion and neck supple  Skin:     Capillary Refill: Capillary refill takes less than 2 seconds  Neurological:      Mental Status: He is alert and oriented to person, place, and time  Cranial Nerves: No cranial nerve deficit  Sensory: No sensory deficit  Motor: No abnormal muscle tone  Psychiatric:         Behavior: Behavior normal          Thought Content:  Thought content normal          Judgment: Judgment normal          Vital Signs  ED Triage Vitals [09/21/21 1455]   Temperature Pulse Respirations Blood Pressure SpO2   97 8 °F (36 6 °C) 80 16 166/90 98 %      Temp Source Heart Rate Source Patient Position - Orthostatic VS BP Location FiO2 (%)   Tympanic Monitor Sitting Left arm --      Pain Score       8           Vitals:    09/21/21 1800 09/21/21 1900 09/21/21 2100 09/21/21 2300   BP: (!) 172/77 154/85 123/74 166/74   Pulse: 78 58 (!) 52 (!) 50   Patient Position - Orthostatic VS:  Lying           Visual Acuity  Visual Acuity      Most Recent Value   L Pupil Size (mm)  2   R Pupil Size (mm)  2   L Pupil Shape  Round   R Pupil Shape  Round          ED Medications  Medications   albuterol (PROVENTIL HFA,VENTOLIN HFA) inhaler 2 puff (has no administration in time range)   aspirin (ECOTRIN LOW STRENGTH) EC tablet 81 mg (has no administration in time range)   atorvastatin (LIPITOR) tablet 40 mg (40 mg Oral Given 9/21/21 1908)   fluticasone (FLONASE) 50 mcg/act nasal spray 1 spray (has no administration in time range)   furosemide (LASIX) tablet 40 mg (has no administration in time range)   isosorbide mononitrate (IMDUR) 24 hr tablet 30 mg (has no administration in time range)   melatonin tablet 3 mg (3 mg Oral Given 9/21/21 2143)   metoprolol succinate (TOPROL-XL) 24 hr tablet 25 mg (has no administration in time range)   pantoprazole (PROTONIX) EC tablet 40 mg (has no administration in time range)   tiotropium (SPIRIVA) capsule for inhaler 18 mcg (has no administration in time range)   roflumilast (DALIRESP) tablet 500 mcg (has no administration in time range)   nicotine (NICODERM CQ) 21 mg/24 hr TD 24 hr patch 1 patch (has no administration in time range)   enoxaparin (LOVENOX) subcutaneous injection 40 mg (has no administration in time range)   butalbital-acetaminophen-caffeine (FIORICET,ESGIC) -40 mg per tablet 1 tablet (has no administration in time range)   ipratropium-albuterol (DUO-NEB) 0 5-2 5 mg/3 mL inhalation solution 3 mL (3 mL Nebulization Given 9/21/21 1522)   iohexol (OMNIPAQUE) 350 MG/ML injection (SINGLE-DOSE) 100 mL (100 mL Intravenous Given 9/21/21 1714)   aspirin (ECOTRIN LOW STRENGTH) EC tablet 243 mg (243 mg Oral Given 9/21/21 1759)   acetaminophen (TYLENOL) tablet 975 mg (975 mg Oral Given 9/21/21 1832)       Diagnostic Studies  Results Reviewed     Procedure Component Value Units Date/Time    Troponin I [[id number]]     Lab Status: No result Specimen: Blood     Troponin I [[id number]]  (Normal) Collected: 09/21/21 2146    Lab Status: Final result Specimen: Blood from Arm, Right Updated: 09/21/21 2213     Troponin I <0 03 ng/mL     B-Type Natriuretic Peptide(BNP) CA, ,  Campuses Only [[id number]]  (Normal) Collected: 09/21/21 1516    Lab Status: Final result Specimen: Blood from Arm, Right Updated: 09/21/21 1920     BNP 68 pg/mL     Troponin I [[id number]]  (Normal) Collected: 09/21/21 1845    Lab Status: Final result Specimen: Blood from Arm, Right Updated: 09/21/21 1908     Troponin I <0 03 ng/mL     Platelet count [[id number]]  (Normal) Collected: 09/21/21 1845    Lab Status: Final result Specimen: Blood from Hand, Right Updated: 09/21/21 1851     Platelets 221 Thousands/uL     Comprehensive metabolic panel [[id number]]  (Abnormal) Collected: 09/21/21 1516    Lab Status: Final result Specimen: Blood from Arm, Right Updated: 09/21/21 1548     Sodium 137 mmol/L      Potassium 3 8 mmol/L      Chloride 100 mmol/L      CO2 30 mmol/L      ANION GAP 7 mmol/L      BUN 10 mg/dL      Creatinine 1 01 mg/dL      Glucose 103 mg/dL      Calcium 9 2 mg/dL      AST 12 U/L      ALT 13 U/L      Alkaline Phosphatase 85 U/L      Total Protein 6 5 g/dL      Albumin 4 2 g/dL      Total Bilirubin 0 50 mg/dL      eGFR 77 ml/min/1 73sq m     Narrative:      National Kidney Disease Foundation guidelines for Chronic Kidney Disease (CKD):     Stage 1 with normal or high GFR (GFR > 90 mL/min/1 73 square meters)    Stage 2 Mild CKD (GFR = 60-89 mL/min/1 73 square meters)    Stage 3A Moderate CKD (GFR = 45-59 mL/min/1 73 square meters)    Stage 3B Moderate CKD (GFR = 30-44 mL/min/1 73 square meters)    Stage 4 Severe CKD (GFR = 15-29 mL/min/1 73 square meters)    Stage 5 End Stage CKD (GFR <15 mL/min/1 73 square meters)  Note: GFR calculation is accurate only with a steady state creatinine    Troponin I [[id number]]  (Normal) Collected: 09/21/21 1516    Lab Status: Final result Specimen: Blood from Arm, Right Updated: 09/21/21 1539     Troponin I <0 03 ng/mL     CBC and differential [[id number]]  (Abnormal) Collected: 09/21/21 1516    Lab Status: Final result Specimen: Blood from Arm, Right Updated: 09/21/21 1524     WBC 9 10 Thousand/uL      RBC 4 47 Million/uL      Hemoglobin 13 7 g/dL      Hematocrit 39 3 %      MCV 88 fL      MCH 30 6 pg      MCHC 34 8 g/dL      RDW 13 0 %      MPV 6 7 fL      Platelets 493 Thousands/uL      Neutrophils Relative 71 %      Lymphocytes Relative 21 %      Monocytes Relative 5 %      Eosinophils Relative 2 %      Basophils Relative 1 %      Neutrophils Absolute 6 40 Thousands/µL      Lymphocytes Absolute 1 90 Thousands/µL      Monocytes Absolute 0 50 Thousand/µL      Eosinophils Absolute 0 20 Thousand/µL      Basophils Absolute 0 10 Thousands/µL CTA dissection protocol chest/abdomen/pelvis   Final Result by Nichole Laws MD (09/21 1824)      1  No aortic dissection or intramural hematoma  2   Unchanged ascending aortic ectasia measuring 3 8 cm with stable focal outpouching along the right anterior wall  3   No acute abnormalities in the chest, abdomen or pelvis  Workstation performed: [id number]                    Procedures  ECG 12 Lead Documentation Only    Date/Time: 9/21/2021 11:57 PM  Performed by: Dung Shore MD  Authorized by: Dung Shore MD     ECG reviewed by me, the ED Provider: yes    Patient location:  ED  Previous ECG:     Previous ECG:  Unavailable    Comparison to cardiac monitor: Yes    Interpretation:     Interpretation: normal    Rate:     ECG rate assessment: bradycardic    Rhythm:     Rhythm: sinus rhythm    Ectopy:     Ectopy: none    QRS:     QRS axis:  Normal    QRS intervals:  Normal  Conduction:     Conduction: normal    ST segments:     ST segments:  Normal  T waves:     T waves: normal               ED Course  ED Course as of Sep 22 0059   Tue Sep 21, 2021   1826 CTA dissection protocol chest/abdomen/pelvis             HEART Risk Score      Most Recent Value   Heart Score Risk Calculator   History  1 Filed at: 09/21/2021 1611   ECG  0 Filed at: 09/21/2021 1611   Age  2 Filed at: 09/21/2021 1611   Risk Factors  2 Filed at: 09/21/2021 1611   Troponin  0 Filed at: 09/21/2021 1611   HEART Score  5 Filed at: 09/21/2021 1611                      SBIRT 22yo+      Most Recent Value   SBIRT (23 yo +)   In order to provide better care to our patients, we are screening all of our patients for alcohol and drug use  Would it be okay to ask you these screening questions? Yes Filed at: 09/21/2021 7210   Initial Alcohol Screen: US AUDIT-C    1  How often do you have a drink containing alcohol?  0 Filed at: 09/21/2021 1517   2   How many drinks containing alcohol do you have on a typical day you are drinking? 0 Filed at: 09/21/2021 1517   3a  Male UNDER 65: How often do you have five or more drinks on one occasion? 0 Filed at: 09/21/2021 1517   3b  FEMALE Any Age, or MALE 65+: How often do you have 4 or more drinks on one occassion? 0 Filed at: 09/21/2021 1517   Audit-C Score  0 Filed at: 09/21/2021 1517   COLTEN: How many times in the past year have you    Used an illegal drug or used a prescription medication for non-medical reasons? Never Filed at: 09/21/2021 1517                    [facility]  Number of Diagnoses or Management Options  Unstable angina pectoris [facility])  Diagnosis management comments: Patient is a 78-year-old male with known CAD that presents with worsening exertional chest pain consistent with unstable angina  Patient initial workup done EKG and troponin were negative  Known history of aortic aneurysm, CTA negative  Patient will be admitted for further workup and management of ACS without  Disposition  Final diagnoses:   Unstable angina pectoris ([address] )     Time reflects when diagnosis was documented in both MDM as applicable and the Disposition within this note     Time User Action Codes Description Comment    9/21/2021  6:32 PM Madisyn Perez Add [I20 0] Unstable angina pectoris [facility])       ED Disposition     ED Disposition Condition Date/Time Comment    Admit Stable Tue Sep 21, 2021  6:33 PM Case was discussed with TANIA and the patient's admission status was agreed to be Admission Status: observation status to the service of Dr Edwina Herring   Follow-up Information    None         Patient's Medications   Discharge Prescriptions    No medications on file     No discharge procedures on file      PDMP Review     None          ED Provider  Electronically Signed by           Papo Walker MD  09/22/21 0105

## 2021-09-21 NOTE — ASSESSMENT & PLAN NOTE
CABG in 2000  Normal stress test in June of 2021  Follows closely with Cardiology and has been continued on medical management    · Aspirin 81 mg daily  · Lipitor 40 mg daily  · Imdur 30 mg daily  · Metoprolol succinate 25 mg daily

## 2021-09-21 NOTE — ASSESSMENT & PLAN NOTE
Blood pressure is elevated on presentation with systolics in the 103A-398P    · Continue home loop diuretic and beta-blocker as previously prescribed

## 2021-09-21 NOTE — ASSESSMENT & PLAN NOTE
Stable  Present on admission  Does not appear to be in acute exacerbation    · Albuterol inhaler/nebulizer as needed  · Fluticasone  · Spiriva  · Monitor pulse ox with goal > 88%

## 2021-09-22 VITALS
DIASTOLIC BLOOD PRESSURE: 96 MMHG | RESPIRATION RATE: 22 BRPM | WEIGHT: 211.1 LBS | BODY MASS INDEX: 29.55 KG/M2 | HEART RATE: 63 BPM | OXYGEN SATURATION: 94 % | TEMPERATURE: 96.6 F | HEIGHT: 71 IN | SYSTOLIC BLOOD PRESSURE: 160 MMHG

## 2021-09-22 LAB
ANION GAP SERPL CALCULATED.3IONS-SCNC: 10 MMOL/L (ref 4–13)
BASOPHILS # BLD AUTO: 0.1 THOUSANDS/ΜL (ref 0–0.1)
BASOPHILS NFR BLD AUTO: 1 % (ref 0–2)
BUN SERPL-MCNC: 11 MG/DL (ref 7–25)
CALCIUM SERPL-MCNC: 8.9 MG/DL (ref 8.6–10.5)
CHLORIDE SERPL-SCNC: 100 MMOL/L (ref 98–107)
CO2 SERPL-SCNC: 26 MMOL/L (ref 21–31)
CREAT SERPL-MCNC: 0.94 MG/DL (ref 0.7–1.3)
EOSINOPHIL # BLD AUTO: 0.3 THOUSAND/ΜL (ref 0–0.61)
EOSINOPHIL NFR BLD AUTO: 3 % (ref 0–5)
ERYTHROCYTE [DISTWIDTH] IN BLOOD BY AUTOMATED COUNT: 12.9 % (ref 11.5–14.5)
GFR SERPL CREATININE-BSD FRML MDRD: 84 ML/MIN/1.73SQ M
GLUCOSE P FAST SERPL-MCNC: 87 MG/DL (ref 65–99)
GLUCOSE SERPL-MCNC: 87 MG/DL (ref 65–99)
HCT VFR BLD AUTO: 37.2 % (ref 42–47)
HGB BLD-MCNC: 13.2 G/DL (ref 14–18)
LYMPHOCYTES # BLD AUTO: 2.4 THOUSANDS/ΜL (ref 0.6–4.47)
LYMPHOCYTES NFR BLD AUTO: 30 % (ref 21–51)
MAGNESIUM SERPL-MCNC: 1.9 MG/DL (ref 1.9–2.7)
MCH RBC QN AUTO: 30.9 PG (ref 26–34)
MCHC RBC AUTO-ENTMCNC: 35.4 G/DL (ref 31–37)
MCV RBC AUTO: 87 FL (ref 81–99)
MONOCYTES # BLD AUTO: 0.5 THOUSAND/ΜL (ref 0.17–1.22)
MONOCYTES NFR BLD AUTO: 6 % (ref 2–12)
NEUTROPHILS # BLD AUTO: 4.8 THOUSANDS/ΜL (ref 1.4–6.5)
NEUTS SEG NFR BLD AUTO: 60 % (ref 42–75)
PHOSPHATE SERPL-MCNC: 4.5 MG/DL (ref 3–5.5)
PLATELET # BLD AUTO: 248 THOUSANDS/UL (ref 149–390)
PMV BLD AUTO: 7.2 FL (ref 8.6–11.7)
POTASSIUM SERPL-SCNC: 3.3 MMOL/L (ref 3.5–5.5)
RBC # BLD AUTO: 4.27 MILLION/UL (ref 4.3–5.9)
SODIUM SERPL-SCNC: 136 MMOL/L (ref 134–143)
TROPONIN I SERPL-MCNC: <0.03 NG/ML
WBC # BLD AUTO: 8 THOUSAND/UL (ref 4.8–10.8)

## 2021-09-22 PROCEDURE — 83735 ASSAY OF MAGNESIUM: CPT | Performed by: INTERNAL MEDICINE

## 2021-09-22 PROCEDURE — 93005 ELECTROCARDIOGRAM TRACING: CPT

## 2021-09-22 PROCEDURE — 85025 COMPLETE CBC W/AUTO DIFF WBC: CPT | Performed by: INTERNAL MEDICINE

## 2021-09-22 PROCEDURE — 99217 PR OBSERVATION CARE DISCHARGE MANAGEMENT: CPT | Performed by: PHYSICIAN ASSISTANT

## 2021-09-22 PROCEDURE — 36415 COLL VENOUS BLD VENIPUNCTURE: CPT | Performed by: INTERNAL MEDICINE

## 2021-09-22 PROCEDURE — 80048 BASIC METABOLIC PNL TOTAL CA: CPT | Performed by: INTERNAL MEDICINE

## 2021-09-22 PROCEDURE — 84100 ASSAY OF PHOSPHORUS: CPT | Performed by: INTERNAL MEDICINE

## 2021-09-22 PROCEDURE — 84484 ASSAY OF TROPONIN QUANT: CPT | Performed by: INTERNAL MEDICINE

## 2021-09-22 RX ORDER — LORAZEPAM 0.5 MG/1
0.5 TABLET ORAL DAILY PRN
Qty: 10 TABLET | Refills: 0 | Status: SHIPPED | OUTPATIENT
Start: 2021-09-22 | End: 2022-04-20 | Stop reason: SDUPTHER

## 2021-09-22 RX ORDER — POTASSIUM CHLORIDE 20 MEQ/1
20 TABLET, EXTENDED RELEASE ORAL ONCE
Status: COMPLETED | OUTPATIENT
Start: 2021-09-22 | End: 2021-09-22

## 2021-09-22 RX ORDER — ALPRAZOLAM 0.25 MG/1
0.25 TABLET ORAL ONCE
Status: COMPLETED | OUTPATIENT
Start: 2021-09-22 | End: 2021-09-22

## 2021-09-22 RX ADMIN — PANTOPRAZOLE SODIUM 40 MG: 40 TABLET, DELAYED RELEASE ORAL at 10:33

## 2021-09-22 RX ADMIN — FUROSEMIDE 40 MG: 40 TABLET ORAL at 10:33

## 2021-09-22 RX ADMIN — METOPROLOL SUCCINATE 25 MG: 25 TABLET, EXTENDED RELEASE ORAL at 10:33

## 2021-09-22 RX ADMIN — ENOXAPARIN SODIUM 40 MG: 100 INJECTION SUBCUTANEOUS at 10:35

## 2021-09-22 RX ADMIN — POTASSIUM CHLORIDE 20 MEQ: 1500 TABLET, EXTENDED RELEASE ORAL at 07:45

## 2021-09-22 RX ADMIN — BUTALBITAL, ACETAMINOPHEN AND CAFFEINE 1 TABLET: 50; 325; 40 TABLET ORAL at 01:41

## 2021-09-22 RX ADMIN — ISOSORBIDE MONONITRATE 30 MG: 30 TABLET, EXTENDED RELEASE ORAL at 10:33

## 2021-09-22 RX ADMIN — ALPRAZOLAM 0.25 MG: 0.25 TABLET ORAL at 10:33

## 2021-09-22 RX ADMIN — ASPIRIN 81 MG: 81 TABLET, COATED ORAL at 10:33

## 2021-09-22 NOTE — ASSESSMENT & PLAN NOTE
Stable  Present on admission  Does not appear to be in acute exacerbation    · Albuterol inhaler/nebulizer as needed  · Fluticasone  · Spiriva

## 2021-09-22 NOTE — DISCHARGE SUMMARY
[facility]  Discharge- Ynes Agosto 1955, 77 y o  male MRN: [id number]  Unit/Bed#: -02 Encounter: [id number]  Primary Care Provider: Robbie Chavarria MD   Date and time admitted to hospital: 9/21/2021  2:58 PM    * Chest pain  Assessment & Plan  Patient presents with complaints of chest pain  History of coronary artery disease status post CABG in 2000  Normal stress test in June of 2021 with intact LV function and no signs of ischemia  Troponin I within normal limits  EKG with no signs of ischemia  · Troponin I x3 negative  · No events on telemetry  · The patient denied any chest pain on the day of discharge  · Close outpatient follow-up with Cardiology for possible need for medication adjustments  COPD (chronic obstructive pulmonary disease) ([facility])  Assessment & Plan  Stable  Present on admission  Does not appear to be in acute exacerbation  · Albuterol inhaler/nebulizer as needed  · Fluticasone  · Spiriva    Coronary artery disease involving native coronary artery of native heart with angina pectoris [facility])  Assessment & Plan  CABG in 2000  Normal stress test in June of 2021  Follows closely with Cardiology and has been continued on medical management  · Aspirin 81 mg daily  · Lipitor 40 mg daily  · Imdur 30 mg daily  · Metoprolol succinate 25 mg daily      Hyperlipidemia  Assessment & Plan  Stable  Present on admission  · Lipitor 40 mg PO daily    Hypertension  Assessment & Plan  Blood pressure is elevated on presentation with systolics in the 019M-940B    · Continue home loop diuretic and beta-blocker as previously prescribed   · Outpatient follow-up with PCP      Medical Problems     Resolved Problems  Date Reviewed: 9/22/2021    None              Discharging Physician / Practitioner: Amanda Franco PA-C  PCP: Robbie Chavarria MD  Admission Date:   Admission Orders (From admission, onward)     Ordered        09/21/21 1834  Place in Observation  Once Discharge Date: 09/22/21    Consultations During Hospital Stay:  · none    Procedures Performed:   · none    Significant Findings / Test Results:   CTA dissection protocol chest/abdomen/pelvis    Result Date: 9/21/2021  · Impression: 1  No aortic dissection or intramural hematoma  2   Unchanged ascending aortic ectasia measuring 3 8 cm with stable focal outpouching along the right anterior wall  3   No acute abnormalities in the chest, abdomen or pelvis  Workstation performed: [id number]   · Troponin <0 03    Incidental Findings:   · none     Test Results Pending at Discharge (will require follow up):   · none     Outpatient Tests Requested:  · none    Complications:  none    Reason for Admission: chest pain    Hospital Course:   Fernando Pederson is a 77 y o  male patient who originally presented to the hospital on 9/21/2021 due to chest pain  The patient has a past medical history significant for COPD, coronary artery disease status post CABG in 2000, hypertension who presents with complaints of chest pain  The patient has undergone normal stress test in June of 2021 with intact LV function and no signs of ischemia  In the ED, initial troponin I within normal limits  EKG with no signs of active ischemia  Hemodynamically stable saturating well on room air  The patient was admitted to the general medicine service for further evaluation and treatment of ACS rule out  Please see above list of diagnoses and related plan for additional information       Condition at Discharge: good    Discharge Day Visit / Exam:   Subjective:    Vitals: Blood Pressure: 165/78 (09/22/21 0803)  Pulse: 60 (09/22/21 0803)  Temperature: (!) 96 9 °F (36 1 °C) (09/22/21 0803)  Temp Source: Temporal (09/22/21 0803)  Respirations: 20 (09/22/21 0803)  Height: 5' 11" (180 3 cm) (09/21/21 1455)  Weight - Scale: 95 8 kg (211 lb 1 6 oz) (09/21/21 1455)  SpO2: 98 % (09/22/21 0803)  Exam:   Physical Exam  Vitals and nursing note reviewed  Constitutional:       Appearance: Normal appearance  HENT:      Head: Normocephalic  Cardiovascular:      Rate and Rhythm: Normal rate and regular rhythm  Pulmonary:      Effort: No respiratory distress  Breath sounds: Normal breath sounds  No wheezing, rhonchi or rales  Abdominal:      General: Bowel sounds are normal  There is no distension  Palpations: Abdomen is soft  Tenderness: There is no abdominal tenderness  Skin:     General: Skin is warm and dry  Neurological:      General: No focal deficit present  Mental Status: He is alert and oriented to person, place, and time  Discussion with Family: Patient declined call to   Discharge instructions/Information to patient and family:   See after visit summary for information provided to patient and family  Provisions for Follow-Up Care:  See after visit summary for information related to follow-up care and any pertinent home health orders  Disposition:   Home    Planned Readmission: no     Discharge Statement:  I spent 25 minutes discharging the patient  This time was spent on the day of discharge  I had direct contact with the patient on the day of discharge  Greater than 50% of the total time was spent examining patient, answering all patient questions, arranging and discussing plan of care with patient as well as directly providing post-discharge instructions  Additional time then spent on discharge activities  Discharge Medications:  See after visit summary for reconciled discharge medications provided to patient and/or family        **Please Note: This note may have been constructed using a voice recognition system**

## 2021-09-22 NOTE — UTILIZATION REVIEW
Initial Clinical Review    Admission: Date/Time/Statement:   Admission Orders (From admission, onward)     Ordered        09/21/21 1834  Place in Observation  Once                   Orders Placed This Encounter   Procedures    Place in Observation     Standing Status:   Standing     Number of Occurrences:   1     Order Specific Question:   Level of Care     Answer:   Med Surg [16]     ED Arrival Information     Expected Arrival Acuity    - 9/21/2021 14:28 Emergent         Means of arrival Escorted by Service Admission type    [facility] Emergency         Arrival complaint    chest pain, x1 wk         Chief Complaint   Patient presents with    Chest Pain     Intermittent 8/10 sharp left sided chest pain, worse on exertion, not relieved with rest, hx of bypass       Initial Presentation: 76 yo male to ED from home admitted observation d/t  * Chest pain  Assessment & Plan  Patient presents with complaints of chest pain  History of coronary artery disease status post CABG in 2000  Normal stress test in June of 2021 with intact LV function and no signs of ischemia  Troponin I within normal limits  EKG with no signs of ischemia  § Troponin I x3 occurrences  § Serial EKGs  § Monitor on telemetry  § Cardiac diet  § Admit to observation       Hypertension  Assessment & Plan  Blood pressure is elevated on presentation with systolics in the 113N-931P  § Continue home loop diuretic and beta-blocker as previously prescribed      COPD (chronic obstructive pulmonary disease) ([facility])  Assessment & Plan  Stable  Present on admission  Does not appear to be in acute exacerbation  § Albuterol inhaler/nebulizer as needed  § Fluticasone  § Spiriva  § Monitor pulse ox with goal > 88%     Hyperlipidemia  Assessment & Plan  Stable  Present on admission  § Lipitor 40 mg PO daily     Coronary artery disease involving native coronary artery of native heart with angina pectoris [facility])  Assessment & Plan  CABG in 2000  Normal stress test in June of 2021  Follows closely with Cardiology and has been continued on medical management    § Aspirin 81 mg daily  § Lipitor 40 mg daily  § Imdur 30 mg daily  § Metoprolol succinate 25 mg daily               ED Triage Vitals [09/21/21 1455]   Temperature Pulse Respirations Blood Pressure SpO2   97 8 °F (36 6 °C) 80 16 166/90 98 %      Temp Source Heart Rate Source Patient Position - Orthostatic VS BP Location FiO2 (%)   Tympanic Monitor Sitting Left arm --      Pain Score       8          Wt Readings from Last 1 Encounters:   09/21/21 95 8 kg (211 lb 1 6 oz)     Additional Vital Signs:   09/22/21 0803  96 9 °F (36 1 °C)Abnormal   60  20  165/78  112  98 %  None (Room air)  Lying   09/22/21 0228  97 9 °F (36 6 °C)  66  22  118/78  --  97 %  None (Room air)  Lying   09/22/21 0100  --  68  21  140/67  96  --  --  --   09/21/21 2300  --  50Abnormal   16  166/74  107  97 %  None (Room air)  --   09/21/21 2100  --  52Abnormal   17  123/74  94  97 %  --  --   09/21/21 1900  --  58  20  154/85  112  97 %  None (Room air)  Lying   09/21/21 1800  --  78  --  172/77Abnormal   110  97 %  --  --   09/21/21 1630  --  65  20  109/58  78  97 %  None (Room air)  Lying   09/21/21 1455  97 8 °F (36 6 °C)  80  16  166/90  --  98 %  None (Room air)  Sitting       Pertinent Labs/Diagnostic Test Results:       Results from last 7 days   Lab Units 09/22/21  0511 09/21/21  1845 09/21/21  1516   WBC Thousand/uL 8 00  --  9 10   HEMOGLOBIN g/dL 13 2*  --  13 7*   HEMATOCRIT % 37 2*  --  39 3*   PLATELETS Thousands/uL 248 267 267   NEUTROS ABS Thousands/µL 4 80  --  6 40         Results from last 7 days   Lab Units 09/22/21  0511 09/21/21  1516   SODIUM mmol/L 136 137   POTASSIUM mmol/L 3 3* 3 8   CHLORIDE mmol/L 100 100   CO2 mmol/L 26 30   ANION GAP mmol/L 10 7   BUN mg/dL 11 10   CREATININE mg/dL 0 94 1 01   EGFR ml/min/1 73sq m 84 77   CALCIUM mg/dL 8 9 9 2   MAGNESIUM mg/dL 1 9  --    PHOSPHORUS mg/dL 4 5  -- Results from last 7 days   Lab Units 09/21/21  1516   AST U/L 12*   ALT U/L 13   ALK PHOS U/L 85   TOTAL PROTEIN g/dL 6 5   ALBUMIN g/dL 4 2   TOTAL BILIRUBIN mg/dL 0 50         Results from last 7 days   Lab Units 09/22/21  0511 09/21/21  1516   GLUCOSE RANDOM mg/dL 87 103*       Results from last 7 days   Lab Units 09/22/21  0138 09/21/21  2146 09/21/21  1845 09/21/21  1516   TROPONIN I ng/mL <0 03 <0 03 <0 03 <0 03       Results from last 7 days   Lab Units 09/21/21  1516   BNP pg/mL 68     9/21 CTA CHEST/ABD/PELVIS:1   No aortic dissection or intramural hematoma      2  Unchanged ascending aortic ectasia measuring 3 8 cm with stable focal outpouching along the right anterior wall      3  No acute abnormalities in the chest, abdomen or pelvis    9/21 CT LUNG:PENDING    9/21 VAS MELINDA:  RIGHT LOWER LIMB  Ankle/Brachial Index: 1 17  which is in the normal range  PPG/PVR Tracings are normal   Triphasic waveforms at the ankle  Metatarsal Pressure 181 mmHg  Great Toe Pressure: 144 mmHg, within the healing range  LEFT LOWER LIMB  Ankle/Brachial Index: 1 17  which is in the normal range  PPG/PVR Tracings are normal   Triphasic waveforms at the ankle  Metatarsal Pressure 189 mmHg  Great Toe Pressure: 136 mmHg, within the healing range    9/21 EKG:  ECG reviewed by me, the ED Provider: yes     Patient location:  ED   Previous ECG:     Previous ECG:  Unavailable     Comparison to cardiac monitor:  Yes     Interpretation:     Interpretation: normal     Rate:     ECG rate assessment: bradycardic     Rhythm:     Rhythm: sinus rhythm     Ectopy:     Ectopy: none     QRS:     QRS axis:  Normal     QRS intervals:  Normal   Conduction:     Conduction: normal     ST segments:     ST segments:  Normal   T waves:     T waves: normal        ED Treatment:   Medication Administration from 09/21/2021 1428 to 09/22/2021 0221       Date/Time Order Dose Route Action Action by Comments     09/21/2021 1522 ipratropium-albuterol (DUO-NEB) 0 5-2 5 mg/3 mL inhalation solution 3 mL 3 mL Nebulization Given                  09/21/2021 1759 aspirin (ECOTRIN LOW STRENGTH) EC tablet 243 mg 243 mg Oral Given       09/21/2021 1832 acetaminophen (TYLENOL) tablet 975 mg 975 mg Oral Given       09/21/2021 1908 atorvastatin (LIPITOR) tablet 40 mg 40 mg Oral Given       09/21/2021 2143 melatonin tablet 3 mg 3 mg Oral Given       09/22/2021 0141 butalbital-acetaminophen-caffeine ([facility]) -40 mg per tablet 1 tablet 1 tablet Oral Given          Past Medical History:   Diagnosis Date    [facility] right MCA stroke ([facility] ) 9/15/2018    Arthritis     CAD (coronary artery disease)     s/p CABG 2000    [facility]     GERD (gastroesophageal reflux disease)     [facility] 3/24/2019    Heart attack [facility] )     Heart failure [facility] )     Hyperlipidemia     Hypertension     Lexiscan nuclear stress test 03/19/2016    EF 74% Normal    Myocardial infarction [facility])     Shortness of breath     Stroke [facility] )     TIA (transient ischemic attack) 09/13/2018     Present on Admission:   Chest pain   Hypertension   Hyperlipidemia   [facility]   Coronary artery disease involving native coronary artery of native heart with angina pectoris ([facility])      Admitting Diagnosis: Unstable angina pectoris ([facility] ) [I20 0]  Chest pain [R07 9]  Age/Sex: 77 y o  male  Admission Orders:  Scheduled Medications:  ALPRAZolam, 0 25 mg, Oral, Once  aspirin, 81 mg, Oral, Daily  atorvastatin, 40 mg, Oral, QPM  enoxaparin, 40 mg, Subcutaneous, Daily  fluticasone, 1 spray, Nasal, Daily  furosemide, 40 mg, Oral, Daily  isosorbide mononitrate, 30 mg, Oral, Daily  melatonin, 3 mg, Oral, HS  metoprolol succinate, 25 mg, Oral, Daily  nicotine, 1 patch, Transdermal, Daily  pantoprazole, 40 mg, Oral, Daily  roflumilast, 500 mcg, Oral, Daily  tiotropium, 18 mcg, Inhalation, Daily      PRN Meds:  albuterol, 2 puff, Inhalation, Q4H PRN  butalbital-acetaminophen-caffeine, 1 tablet, Oral, Q4H PRN 9/22 X 1      SCD  OOB  TELE  CARDIAC DIET    Network Utilization Review Department  ATTENTION: Please call with any questions or concerns to [phone] and carefully listen to the prompts so that you are directed to the right person  All voicemails are confidential   Severo Sanchez all requests for admission clinical reviews, approved or denied determinations and any other requests to dedicated fax number below belonging to the campus where the patient is receiving treatment   List of dedicated fax numbers for the Facilities:  [address] DENIALS (Administrative/Medical Necessity) [phone]   [address] (Maternity/NICU/Pediatrics) [phone]   [address] [place] [phone] [place] [address] [address] [place] [address] [phone]

## 2021-09-22 NOTE — NURSING NOTE
Patient discharged to home in stable condition  All discharge instructions explained to patient prior to him leaving the medical/surgical floor with patient verbally stating understanding of same  Patient chose to walk to the hospital exit instead of accepting the wheelchair that was offered and was escorted there by the RN  RN sat with patient outside the hospital until the [address] bus arrived to transport the patient to home

## 2021-09-22 NOTE — ASSESSMENT & PLAN NOTE
Blood pressure is elevated on presentation with systolics in the 900C-559G    · Continue home loop diuretic and beta-blocker as previously prescribed   · Outpatient follow-up with PCP

## 2021-09-22 NOTE — ASSESSMENT & PLAN NOTE
Patient presents with complaints of chest pain  History of coronary artery disease status post CABG in 2000  Normal stress test in June of 2021 with intact LV function and no signs of ischemia  Troponin I within normal limits  EKG with no signs of ischemia  · Troponin I x3 negative  · No events on telemetry  · The patient denied any chest pain on the day of discharge  · Close outpatient follow-up with Cardiology for possible need for medication adjustments

## 2021-09-23 LAB
ATRIAL RATE: 42 BPM
ATRIAL RATE: 51 BPM
ATRIAL RATE: 54 BPM
P AXIS: 41 DEGREES
P AXIS: 49 DEGREES
P AXIS: 54 DEGREES
PR INTERVAL: 134 MS
PR INTERVAL: 152 MS
PR INTERVAL: 156 MS
QRS AXIS: 67 DEGREES
QRS AXIS: 69 DEGREES
QRS AXIS: 89 DEGREES
QRSD INTERVAL: 88 MS
QRSD INTERVAL: 90 MS
QRSD INTERVAL: 90 MS
QT INTERVAL: 416 MS
QT INTERVAL: 462 MS
QT INTERVAL: 492 MS
QTC INTERVAL: 394 MS
QTC INTERVAL: 410 MS
QTC INTERVAL: 425 MS
T WAVE AXIS: 64 DEGREES
T WAVE AXIS: 71 DEGREES
T WAVE AXIS: 76 DEGREES
VENTRICULAR RATE: 42 BPM
VENTRICULAR RATE: 51 BPM
VENTRICULAR RATE: 54 BPM

## 2021-09-23 PROCEDURE — 93010 ELECTROCARDIOGRAM REPORT: CPT | Performed by: INTERNAL MEDICINE

## 2021-10-27 DIAGNOSIS — I25.10 CORONARY ARTERIOSCLEROSIS IN NATIVE ARTERY: ICD-10-CM

## 2021-10-27 RX ORDER — METOPROLOL SUCCINATE 25 MG/1
TABLET, EXTENDED RELEASE ORAL
Qty: 90 TABLET | Refills: 3 | Status: SHIPPED | OUTPATIENT
Start: 2021-10-27 | End: 2021-11-30 | Stop reason: SDUPTHER

## 2021-10-27 RX ORDER — ISOSORBIDE MONONITRATE 30 MG/1
TABLET, EXTENDED RELEASE ORAL
Qty: 90 TABLET | Refills: 3 | Status: SHIPPED | OUTPATIENT
Start: 2021-10-27 | End: 2022-03-09 | Stop reason: DRUGHIGH

## 2021-11-30 ENCOUNTER — OFFICE VISIT (OUTPATIENT)
Dept: CARDIOLOGY CLINIC | Facility: CLINIC | Age: 66
End: 2021-11-30
Payer: COMMERCIAL

## 2021-11-30 ENCOUNTER — PREP FOR PROCEDURE (OUTPATIENT)
Dept: CARDIOLOGY CLINIC | Facility: CLINIC | Age: 66
End: 2021-11-30

## 2021-11-30 ENCOUNTER — TELEPHONE (OUTPATIENT)
Dept: CARDIOLOGY CLINIC | Facility: CLINIC | Age: 66
End: 2021-11-30

## 2021-11-30 VITALS
WEIGHT: 216 LBS | BODY MASS INDEX: 30.24 KG/M2 | HEART RATE: 97 BPM | HEIGHT: 71 IN | SYSTOLIC BLOOD PRESSURE: 130 MMHG | DIASTOLIC BLOOD PRESSURE: 80 MMHG

## 2021-11-30 DIAGNOSIS — F17.200 TOBACCO DEPENDENCE SYNDROME: ICD-10-CM

## 2021-11-30 DIAGNOSIS — R06.00 DYSPNEA ON EXERTION: ICD-10-CM

## 2021-11-30 DIAGNOSIS — I25.10 CORONARY ARTERIOSCLEROSIS IN NATIVE ARTERY: ICD-10-CM

## 2021-11-30 DIAGNOSIS — E78.2 MIXED HYPERLIPIDEMIA: ICD-10-CM

## 2021-11-30 DIAGNOSIS — R07.89 OTHER CHEST PAIN: Primary | ICD-10-CM

## 2021-11-30 DIAGNOSIS — I25.119 CORONARY ARTERY DISEASE INVOLVING NATIVE CORONARY ARTERY OF NATIVE HEART WITH ANGINA PECTORIS (HCC): ICD-10-CM

## 2021-11-30 DIAGNOSIS — I10 PRIMARY HYPERTENSION: ICD-10-CM

## 2021-11-30 PROCEDURE — 93000 ELECTROCARDIOGRAM COMPLETE: CPT | Performed by: INTERNAL MEDICINE

## 2021-11-30 PROCEDURE — 99214 OFFICE O/P EST MOD 30 MIN: CPT | Performed by: INTERNAL MEDICINE

## 2021-11-30 RX ORDER — METOPROLOL SUCCINATE 25 MG/1
25 TABLET, EXTENDED RELEASE ORAL 2 TIMES DAILY
Qty: 90 TABLET | Refills: 3 | Status: SHIPPED | OUTPATIENT
Start: 2021-11-30

## 2021-11-30 RX ORDER — DULOXETIN HYDROCHLORIDE 60 MG/1
60 CAPSULE, DELAYED RELEASE ORAL DAILY
COMMUNITY
Start: 2021-09-19 | End: 2022-04-20

## 2021-12-01 ENCOUNTER — APPOINTMENT (EMERGENCY)
Dept: RADIOLOGY | Facility: HOSPITAL | Age: 66
End: 2021-12-01
Payer: COMMERCIAL

## 2021-12-01 ENCOUNTER — HOSPITAL ENCOUNTER (EMERGENCY)
Facility: HOSPITAL | Age: 66
End: 2021-12-02
Attending: EMERGENCY MEDICINE | Admitting: EMERGENCY MEDICINE
Payer: COMMERCIAL

## 2021-12-01 DIAGNOSIS — R07.9 CHEST PAIN, UNSPECIFIED TYPE: Primary | ICD-10-CM

## 2021-12-01 LAB
ALBUMIN SERPL BCP-MCNC: 4 G/DL (ref 3.5–5)
ALP SERPL-CCNC: 86 U/L (ref 34–104)
ALT SERPL W P-5'-P-CCNC: 9 U/L (ref 7–52)
ANION GAP SERPL CALCULATED.3IONS-SCNC: 9 MMOL/L (ref 4–13)
APTT PPP: 25 SECONDS (ref 23–37)
AST SERPL W P-5'-P-CCNC: 11 U/L (ref 13–39)
BASOPHILS # BLD AUTO: 0.07 THOUSANDS/ΜL (ref 0–0.1)
BASOPHILS NFR BLD AUTO: 1 % (ref 0–1)
BILIRUB SERPL-MCNC: 0.35 MG/DL (ref 0.2–1)
BUN SERPL-MCNC: 15 MG/DL (ref 5–25)
CALCIUM SERPL-MCNC: 9 MG/DL (ref 8.4–10.2)
CARDIAC TROPONIN I PNL SERPL HS: 5 NG/L
CHLORIDE SERPL-SCNC: 101 MMOL/L (ref 96–108)
CO2 SERPL-SCNC: 26 MMOL/L (ref 21–32)
CREAT SERPL-MCNC: 1.37 MG/DL (ref 0.6–1.3)
EOSINOPHIL # BLD AUTO: 0.35 THOUSAND/ΜL (ref 0–0.61)
EOSINOPHIL NFR BLD AUTO: 4 % (ref 0–6)
ERYTHROCYTE [DISTWIDTH] IN BLOOD BY AUTOMATED COUNT: 12.5 % (ref 11.6–15.1)
GFR SERPL CREATININE-BSD FRML MDRD: 53 ML/MIN/1.73SQ M
GLUCOSE SERPL-MCNC: 111 MG/DL (ref 65–140)
HCT VFR BLD AUTO: 37.1 % (ref 36.5–49.3)
HGB BLD-MCNC: 12.8 G/DL (ref 12–17)
IMM GRANULOCYTES # BLD AUTO: 0.03 THOUSAND/UL (ref 0–0.2)
IMM GRANULOCYTES NFR BLD AUTO: 0 % (ref 0–2)
INR PPP: 0.99 (ref 0.84–1.19)
LYMPHOCYTES # BLD AUTO: 2.72 THOUSANDS/ΜL (ref 0.6–4.47)
LYMPHOCYTES NFR BLD AUTO: 29 % (ref 14–44)
MCH RBC QN AUTO: 30.6 PG (ref 26.8–34.3)
MCHC RBC AUTO-ENTMCNC: 34.5 G/DL (ref 31.4–37.4)
MCV RBC AUTO: 89 FL (ref 82–98)
MONOCYTES # BLD AUTO: 0.6 THOUSAND/ΜL (ref 0.17–1.22)
MONOCYTES NFR BLD AUTO: 6 % (ref 4–12)
NEUTROPHILS # BLD AUTO: 5.74 THOUSANDS/ΜL (ref 1.85–7.62)
NEUTS SEG NFR BLD AUTO: 60 % (ref 43–75)
NRBC BLD AUTO-RTO: 0 /100 WBCS
PLATELET # BLD AUTO: 234 THOUSANDS/UL (ref 149–390)
PMV BLD AUTO: 8.8 FL (ref 8.9–12.7)
POTASSIUM SERPL-SCNC: 3.3 MMOL/L (ref 3.5–5.3)
PROT SERPL-MCNC: 6 G/DL (ref 6.4–8.4)
PROTHROMBIN TIME: 13 SECONDS (ref 11.6–14.5)
RBC # BLD AUTO: 4.18 MILLION/UL (ref 3.88–5.62)
SODIUM SERPL-SCNC: 136 MMOL/L (ref 135–147)
WBC # BLD AUTO: 9.51 THOUSAND/UL (ref 4.31–10.16)

## 2021-12-01 PROCEDURE — 85610 PROTHROMBIN TIME: CPT | Performed by: EMERGENCY MEDICINE

## 2021-12-01 PROCEDURE — 85730 THROMBOPLASTIN TIME PARTIAL: CPT | Performed by: EMERGENCY MEDICINE

## 2021-12-01 PROCEDURE — 99285 EMERGENCY DEPT VISIT HI MDM: CPT | Performed by: EMERGENCY MEDICINE

## 2021-12-01 PROCEDURE — 71045 X-RAY EXAM CHEST 1 VIEW: CPT

## 2021-12-01 PROCEDURE — 93005 ELECTROCARDIOGRAM TRACING: CPT

## 2021-12-01 PROCEDURE — 80053 COMPREHEN METABOLIC PANEL: CPT | Performed by: EMERGENCY MEDICINE

## 2021-12-01 PROCEDURE — 99285 EMERGENCY DEPT VISIT HI MDM: CPT

## 2021-12-01 PROCEDURE — 94640 AIRWAY INHALATION TREATMENT: CPT

## 2021-12-01 PROCEDURE — 84484 ASSAY OF TROPONIN QUANT: CPT | Performed by: EMERGENCY MEDICINE

## 2021-12-01 PROCEDURE — 36415 COLL VENOUS BLD VENIPUNCTURE: CPT | Performed by: EMERGENCY MEDICINE

## 2021-12-01 PROCEDURE — 85025 COMPLETE CBC W/AUTO DIFF WBC: CPT | Performed by: EMERGENCY MEDICINE

## 2021-12-01 RX ORDER — SODIUM CHLORIDE 9 MG/ML
50 INJECTION, SOLUTION INTRAVENOUS CONTINUOUS
Status: DISCONTINUED | OUTPATIENT
Start: 2021-12-01 | End: 2021-12-02 | Stop reason: HOSPADM

## 2021-12-01 RX ORDER — FENTANYL CITRATE 50 UG/ML
25 INJECTION, SOLUTION INTRAMUSCULAR; INTRAVENOUS ONCE
Status: COMPLETED | OUTPATIENT
Start: 2021-12-02 | End: 2021-12-02

## 2021-12-01 RX ORDER — POTASSIUM CHLORIDE 20 MEQ/1
40 TABLET, EXTENDED RELEASE ORAL ONCE
Status: COMPLETED | OUTPATIENT
Start: 2021-12-02 | End: 2021-12-02

## 2021-12-01 RX ORDER — SODIUM CHLORIDE 9 MG/ML
3 INJECTION INTRAVENOUS
Status: DISCONTINUED | OUTPATIENT
Start: 2021-12-01 | End: 2021-12-02 | Stop reason: HOSPADM

## 2021-12-02 ENCOUNTER — HOSPITAL ENCOUNTER (INPATIENT)
Facility: HOSPITAL | Age: 66
LOS: 2 days | Discharge: HOME | DRG: 287 | End: 2021-12-04
Attending: STUDENT IN AN ORGANIZED HEALTH CARE EDUCATION/TRAINING PROGRAM | Admitting: HOSPITALIST
Payer: COMMERCIAL

## 2021-12-02 VITALS
TEMPERATURE: 98 F | WEIGHT: 217 LBS | BODY MASS INDEX: 30.38 KG/M2 | OXYGEN SATURATION: 97 % | RESPIRATION RATE: 18 BRPM | HEART RATE: 81 BPM | HEIGHT: 71 IN | DIASTOLIC BLOOD PRESSURE: 66 MMHG | SYSTOLIC BLOOD PRESSURE: 127 MMHG

## 2021-12-02 DIAGNOSIS — R07.89 OTHER CHEST PAIN: ICD-10-CM

## 2021-12-02 DIAGNOSIS — F17.200 TOBACCO DEPENDENCE SYNDROME: ICD-10-CM

## 2021-12-02 DIAGNOSIS — R06.00 DYSPNEA ON EXERTION: ICD-10-CM

## 2021-12-02 DIAGNOSIS — I25.10 CORONARY ARTERIOSCLEROSIS IN NATIVE ARTERY: Primary | ICD-10-CM

## 2021-12-02 DIAGNOSIS — I25.119 CORONARY ARTERY DISEASE INVOLVING NATIVE CORONARY ARTERY OF NATIVE HEART WITH ANGINA PECTORIS (HCC): ICD-10-CM

## 2021-12-02 DIAGNOSIS — E78.2 MIXED HYPERLIPIDEMIA: ICD-10-CM

## 2021-12-02 DIAGNOSIS — J44.9 CHRONIC OBSTRUCTIVE PULMONARY DISEASE, UNSPECIFIED COPD TYPE (HCC): ICD-10-CM

## 2021-12-02 LAB
2HR DELTA HS TROPONIN: 0 NG/L
4HR DELTA HS TROPONIN: 0 NG/L
ATRIAL RATE: 64 BPM
ATRIAL RATE: 71 BPM
ATRIAL RATE: 82 BPM
CARDIAC TROPONIN I PNL SERPL HS: 5 NG/L
CARDIAC TROPONIN I PNL SERPL HS: 5 NG/L
P AXIS: 51 DEGREES
P AXIS: 67 DEGREES
P AXIS: 67 DEGREES
PR INTERVAL: 150 MS
PR INTERVAL: 158 MS
PR INTERVAL: 160 MS
QRS AXIS: 79 DEGREES
QRS AXIS: 82 DEGREES
QRS AXIS: 87 DEGREES
QRSD INTERVAL: 88 MS
QRSD INTERVAL: 90 MS
QRSD INTERVAL: 90 MS
QT INTERVAL: 398 MS
QT INTERVAL: 438 MS
QT INTERVAL: 440 MS
QTC INTERVAL: 453 MS
QTC INTERVAL: 464 MS
QTC INTERVAL: 475 MS
T WAVE AXIS: 77 DEGREES
T WAVE AXIS: 78 DEGREES
T WAVE AXIS: 84 DEGREES
VENTRICULAR RATE: 64 BPM
VENTRICULAR RATE: 71 BPM
VENTRICULAR RATE: 82 BPM

## 2021-12-02 PROCEDURE — 99223 1ST HOSP IP/OBS HIGH 75: CPT | Performed by: PHYSICIAN ASSISTANT

## 2021-12-02 PROCEDURE — 96376 TX/PRO/DX INJ SAME DRUG ADON: CPT

## 2021-12-02 PROCEDURE — 93010 ELECTROCARDIOGRAM REPORT: CPT | Performed by: INTERNAL MEDICINE

## 2021-12-02 PROCEDURE — 96374 THER/PROPH/DIAG INJ IV PUSH: CPT

## 2021-12-02 PROCEDURE — 84484 ASSAY OF TROPONIN QUANT: CPT | Performed by: EMERGENCY MEDICINE

## 2021-12-02 PROCEDURE — 99243 OFF/OP CNSLTJ NEW/EST LOW 30: CPT | Performed by: INTERNAL MEDICINE

## 2021-12-02 PROCEDURE — 93005 ELECTROCARDIOGRAM TRACING: CPT

## 2021-12-02 PROCEDURE — 96361 HYDRATE IV INFUSION ADD-ON: CPT

## 2021-12-02 PROCEDURE — 36415 COLL VENOUS BLD VENIPUNCTURE: CPT | Performed by: EMERGENCY MEDICINE

## 2021-12-02 PROCEDURE — 96375 TX/PRO/DX INJ NEW DRUG ADDON: CPT

## 2021-12-02 PROCEDURE — G0379 DIRECT REFER HOSPITAL OBSERV: HCPCS

## 2021-12-02 RX ORDER — LORAZEPAM 2 MG/ML
0.5 INJECTION INTRAMUSCULAR ONCE
Status: COMPLETED | OUTPATIENT
Start: 2021-12-02 | End: 2021-12-02

## 2021-12-02 RX ORDER — LORAZEPAM 0.5 MG/1
0.5 TABLET ORAL DAILY PRN
Status: DISCONTINUED | OUTPATIENT
Start: 2021-12-02 | End: 2021-12-02 | Stop reason: HOSPADM

## 2021-12-02 RX ORDER — FENTANYL CITRATE 50 UG/ML
25 INJECTION, SOLUTION INTRAMUSCULAR; INTRAVENOUS ONCE
Status: COMPLETED | OUTPATIENT
Start: 2021-12-02 | End: 2021-12-02

## 2021-12-02 RX ORDER — LORAZEPAM 0.5 MG/1
0.5 TABLET ORAL EVERY 8 HOURS PRN
Status: DISCONTINUED | OUTPATIENT
Start: 2021-12-02 | End: 2021-12-04 | Stop reason: HOSPADM

## 2021-12-02 RX ORDER — NITROGLYCERIN 0.4 MG/1
0.4 TABLET SUBLINGUAL
Status: DISCONTINUED | OUTPATIENT
Start: 2021-12-02 | End: 2021-12-04 | Stop reason: HOSPADM

## 2021-12-02 RX ORDER — ALBUTEROL SULFATE 90 UG/1
2 AEROSOL, METERED RESPIRATORY (INHALATION) EVERY 4 HOURS PRN
Status: DISCONTINUED | OUTPATIENT
Start: 2021-12-02 | End: 2021-12-04 | Stop reason: HOSPADM

## 2021-12-02 RX ORDER — CALCIUM CARBONATE 200(500)MG
1000 TABLET,CHEWABLE ORAL DAILY PRN
Status: DISCONTINUED | OUTPATIENT
Start: 2021-12-02 | End: 2021-12-04 | Stop reason: HOSPADM

## 2021-12-02 RX ORDER — ACETAMINOPHEN 325 MG/1
650 TABLET ORAL EVERY 4 HOURS PRN
Status: DISCONTINUED | OUTPATIENT
Start: 2021-12-02 | End: 2021-12-04 | Stop reason: HOSPADM

## 2021-12-02 RX ORDER — FLUTICASONE PROPIONATE 50 MCG
1 SPRAY, SUSPENSION (ML) NASAL DAILY
Status: DISCONTINUED | OUTPATIENT
Start: 2021-12-03 | End: 2021-12-04 | Stop reason: HOSPADM

## 2021-12-02 RX ORDER — ATORVASTATIN CALCIUM 40 MG/1
40 TABLET, FILM COATED ORAL
Status: DISCONTINUED | OUTPATIENT
Start: 2021-12-02 | End: 2021-12-02 | Stop reason: HOSPADM

## 2021-12-02 RX ORDER — PANTOPRAZOLE SODIUM 40 MG/1
40 TABLET, DELAYED RELEASE ORAL
Status: DISCONTINUED | OUTPATIENT
Start: 2021-12-03 | End: 2021-12-02 | Stop reason: HOSPADM

## 2021-12-02 RX ORDER — METOPROLOL SUCCINATE 50 MG/1
25 TABLET, EXTENDED RELEASE ORAL 2 TIMES DAILY
Status: DISCONTINUED | OUTPATIENT
Start: 2021-12-02 | End: 2021-12-02 | Stop reason: HOSPADM

## 2021-12-02 RX ORDER — LANOLIN ALCOHOL/MO/W.PET/CERES
3 CREAM (GRAM) TOPICAL
Status: DISCONTINUED | OUTPATIENT
Start: 2021-12-02 | End: 2021-12-04 | Stop reason: HOSPADM

## 2021-12-02 RX ORDER — ALBUTEROL SULFATE 90 UG/1
2 AEROSOL, METERED RESPIRATORY (INHALATION) ONCE
Status: COMPLETED | OUTPATIENT
Start: 2021-12-02 | End: 2021-12-02

## 2021-12-02 RX ORDER — ATORVASTATIN CALCIUM 40 MG/1
40 TABLET, FILM COATED ORAL EVERY EVENING
Status: DISCONTINUED | OUTPATIENT
Start: 2021-12-02 | End: 2021-12-04 | Stop reason: HOSPADM

## 2021-12-02 RX ORDER — ASPIRIN 81 MG/1
81 TABLET ORAL DAILY
Status: DISCONTINUED | OUTPATIENT
Start: 2021-12-02 | End: 2021-12-02 | Stop reason: HOSPADM

## 2021-12-02 RX ORDER — IPRATROPIUM BROMIDE AND ALBUTEROL SULFATE 2.5; .5 MG/3ML; MG/3ML
3 SOLUTION RESPIRATORY (INHALATION)
Status: DISCONTINUED | OUTPATIENT
Start: 2021-12-02 | End: 2021-12-02

## 2021-12-02 RX ORDER — ALBUTEROL SULFATE 90 UG/1
2 AEROSOL, METERED RESPIRATORY (INHALATION) EVERY 4 HOURS PRN
Status: DISCONTINUED | OUTPATIENT
Start: 2021-12-02 | End: 2021-12-02 | Stop reason: HOSPADM

## 2021-12-02 RX ORDER — ISOSORBIDE MONONITRATE 30 MG/1
30 TABLET, EXTENDED RELEASE ORAL DAILY
Status: DISCONTINUED | OUTPATIENT
Start: 2021-12-02 | End: 2021-12-02 | Stop reason: HOSPADM

## 2021-12-02 RX ORDER — HEPARIN SODIUM 5000 [USP'U]/ML
5000 INJECTION, SOLUTION INTRAVENOUS; SUBCUTANEOUS EVERY 8 HOURS SCHEDULED
Status: DISCONTINUED | OUTPATIENT
Start: 2021-12-02 | End: 2021-12-04 | Stop reason: HOSPADM

## 2021-12-02 RX ORDER — NICOTINE 21 MG/24HR
1 PATCH, TRANSDERMAL 24 HOURS TRANSDERMAL DAILY
Status: DISCONTINUED | OUTPATIENT
Start: 2021-12-03 | End: 2021-12-04 | Stop reason: HOSPADM

## 2021-12-02 RX ORDER — ASPIRIN 81 MG/1
81 TABLET ORAL DAILY
Refills: 0 | Status: DISCONTINUED | OUTPATIENT
Start: 2021-12-03 | End: 2021-12-04 | Stop reason: HOSPADM

## 2021-12-02 RX ORDER — DULOXETIN HYDROCHLORIDE 30 MG/1
60 CAPSULE, DELAYED RELEASE ORAL DAILY
Status: DISCONTINUED | OUTPATIENT
Start: 2021-12-02 | End: 2021-12-02 | Stop reason: HOSPADM

## 2021-12-02 RX ORDER — METOPROLOL SUCCINATE 25 MG/1
25 TABLET, EXTENDED RELEASE ORAL 2 TIMES DAILY
Status: DISCONTINUED | OUTPATIENT
Start: 2021-12-02 | End: 2021-12-03

## 2021-12-02 RX ORDER — PANTOPRAZOLE SODIUM 40 MG/1
40 TABLET, DELAYED RELEASE ORAL
Status: DISCONTINUED | OUTPATIENT
Start: 2021-12-03 | End: 2021-12-04 | Stop reason: HOSPADM

## 2021-12-02 RX ORDER — ISOSORBIDE MONONITRATE 30 MG/1
30 TABLET, EXTENDED RELEASE ORAL DAILY
Status: DISCONTINUED | OUTPATIENT
Start: 2021-12-03 | End: 2021-12-03

## 2021-12-02 RX ORDER — ONDANSETRON 2 MG/ML
4 INJECTION INTRAMUSCULAR; INTRAVENOUS EVERY 6 HOURS PRN
Status: DISCONTINUED | OUTPATIENT
Start: 2021-12-02 | End: 2021-12-04 | Stop reason: HOSPADM

## 2021-12-02 RX ADMIN — LORAZEPAM 0.5 MG: 0.5 TABLET ORAL at 22:30

## 2021-12-02 RX ADMIN — LORAZEPAM 0.5 MG: 2 INJECTION INTRAMUSCULAR; INTRAVENOUS at 03:15

## 2021-12-02 RX ADMIN — HEPARIN SODIUM 5000 UNITS: 5000 INJECTION INTRAVENOUS; SUBCUTANEOUS at 21:00

## 2021-12-02 RX ADMIN — IPRATROPIUM BROMIDE AND ALBUTEROL SULFATE 3 ML: 2.5; .5 SOLUTION RESPIRATORY (INHALATION) at 03:14

## 2021-12-02 RX ADMIN — ATORVASTATIN CALCIUM 40 MG: 40 TABLET, FILM COATED ORAL at 20:59

## 2021-12-02 RX ADMIN — SODIUM CHLORIDE 50 ML/HR: 0.9 INJECTION, SOLUTION INTRAVENOUS at 04:52

## 2021-12-02 RX ADMIN — FENTANYL CITRATE 25 MCG: 50 INJECTION INTRAMUSCULAR; INTRAVENOUS at 11:32

## 2021-12-02 RX ADMIN — ALBUTEROL SULFATE 2 PUFF: 90 AEROSOL, METERED RESPIRATORY (INHALATION) at 16:17

## 2021-12-02 RX ADMIN — ISOSORBIDE MONONITRATE 30 MG: 30 TABLET, EXTENDED RELEASE ORAL at 16:14

## 2021-12-02 RX ADMIN — POTASSIUM CHLORIDE 40 MEQ: 1500 TABLET, EXTENDED RELEASE ORAL at 01:22

## 2021-12-02 RX ADMIN — LORAZEPAM 0.5 MG: 0.5 TABLET ORAL at 16:14

## 2021-12-02 RX ADMIN — ATORVASTATIN CALCIUM 40 MG: 40 TABLET, FILM COATED ORAL at 16:14

## 2021-12-02 RX ADMIN — ALBUTEROL SULFATE 2 PUFF: 90 AEROSOL, METERED RESPIRATORY (INHALATION) at 22:31

## 2021-12-02 RX ADMIN — SODIUM CHLORIDE 250 ML: 0.9 INJECTION, SOLUTION INTRAVENOUS at 01:22

## 2021-12-02 RX ADMIN — FENTANYL CITRATE 25 MCG: 50 INJECTION INTRAMUSCULAR; INTRAVENOUS at 01:22

## 2021-12-02 RX ADMIN — ASPIRIN 81 MG: 81 TABLET, COATED ORAL at 16:14

## 2021-12-02 RX ADMIN — METOPROLOL SUCCINATE 25 MG: 25 TABLET, EXTENDED RELEASE ORAL at 20:59

## 2021-12-03 ENCOUNTER — APPOINTMENT (INPATIENT)
Dept: NON INVASIVE DIAGNOSTICS | Facility: HOSPITAL | Age: 66
DRG: 287 | End: 2021-12-03
Payer: COMMERCIAL

## 2021-12-03 LAB
ANION GAP SERPL CALCULATED.3IONS-SCNC: 11 MMOL/L (ref 4–13)
AORTIC ROOT: 3.8 CM
AORTIC VALVE MEAN VELOCITY: 7.8 M/S
AV LVOT MEAN GRADIENT: 3 MMHG
AV LVOT PEAK GRADIENT: 6 MMHG
AV MEAN GRADIENT: 3 MMHG
AV PEAK GRADIENT: 7 MMHG
BUN SERPL-MCNC: 13 MG/DL (ref 5–25)
CALCIUM SERPL-MCNC: 8.3 MG/DL (ref 8.3–10.1)
CHLORIDE SERPL-SCNC: 107 MMOL/L (ref 100–108)
CO2 SERPL-SCNC: 23 MMOL/L (ref 21–32)
CREAT SERPL-MCNC: 1.15 MG/DL (ref 0.6–1.3)
DOP CALC AO VTI: 26.63 CM
DOP CALC LVOT PEAK VEL VTI: 23.58 CM
DOP CALC LVOT PEAK VEL: 1.21 M/S
E WAVE DECELERATION TIME: 171 MS
ERYTHROCYTE [DISTWIDTH] IN BLOOD BY AUTOMATED COUNT: 12.4 % (ref 11.6–15.1)
FRACTIONAL SHORTENING: 31 % (ref 28–44)
GFR SERPL CREATININE-BSD FRML MDRD: 66 ML/MIN/1.73SQ M
GLUCOSE SERPL-MCNC: 110 MG/DL (ref 65–140)
HCT VFR BLD AUTO: 38.5 % (ref 36.5–49.3)
HGB BLD-MCNC: 13.4 G/DL (ref 12–17)
INTERVENTRICULAR SEPTUM IN DIASTOLE (PARASTERNAL SHORT AXIS VIEW): 1.2 CM
LAAS-AP4: 22.1 CM2
LEFT ATRIUM SIZE: 5 CM
LEFT INTERNAL DIMENSION IN SYSTOLE: 3.4 CM (ref 2.1–4)
LEFT VENTRICULAR INTERNAL DIMENSION IN DIASTOLE: 4.9 CM (ref 6.16–9.17)
LEFT VENTRICULAR POSTERIOR WALL IN END DIASTOLE: 1.1 CM
LEFT VENTRICULAR STROKE VOLUME: 58 ML
MCH RBC QN AUTO: 31.5 PG (ref 26.8–34.3)
MCHC RBC AUTO-ENTMCNC: 34.8 G/DL (ref 31.4–37.4)
MCV RBC AUTO: 91 FL (ref 82–98)
MV E'TISSUE VEL-LAT: 9 CM/S
MV E'TISSUE VEL-SEP: 7 CM/S
MV PEAK A VEL: 0.74 M/S
MV PEAK E VEL: 93 CM/S
MV STENOSIS PRESSURE HALF TIME: 0 MS
NT-PROBNP SERPL-MCNC: 315 PG/ML
PLATELET # BLD AUTO: 224 THOUSANDS/UL (ref 149–390)
PMV BLD AUTO: 9.2 FL (ref 8.9–12.7)
POTASSIUM SERPL-SCNC: 4 MMOL/L (ref 3.5–5.3)
RBC # BLD AUTO: 4.25 MILLION/UL (ref 3.88–5.62)
RIGHT ATRIAL 2D VOLUME: 67 ML
RIGHT ATRIUM AREA SYSTOLE A4C: 22.4 CM2
RIGHT VENTRICLE ID DIMENSION: 2.2 CM
RV PSP: 21 MMHG
SL CV LV EF: 64
SL CV PED ECHO LEFT VENTRICLE DIASTOLIC VOLUME (MOD BIPLANE) 2D: 97 ML
SL CV PED ECHO LEFT VENTRICLE SYSTOLIC VOLUME (MOD BIPLANE) 2D: 39 ML
SODIUM SERPL-SCNC: 141 MMOL/L (ref 136–145)
TRICUSPID VALVE PEAK REGURGITATION VELOCITY: 1.82 M/S
TRICUSPID VALVE S': 63 CM/S
TV PEAK GRADIENT: 13 MMHG
WBC # BLD AUTO: 7.54 THOUSAND/UL (ref 4.31–10.16)
Z-SCORE OF LEFT VENTRICULAR DIMENSION IN END SYSTOLE: -4.27

## 2021-12-03 PROCEDURE — 93455 CORONARY ART/GRFT ANGIO S&I: CPT | Performed by: INTERNAL MEDICINE

## 2021-12-03 PROCEDURE — 93306 TTE W/DOPPLER COMPLETE: CPT

## 2021-12-03 PROCEDURE — 99152 MOD SED SAME PHYS/QHP 5/>YRS: CPT | Performed by: INTERNAL MEDICINE

## 2021-12-03 PROCEDURE — C1769 GUIDE WIRE: HCPCS | Performed by: INTERNAL MEDICINE

## 2021-12-03 PROCEDURE — 99255 IP/OBS CONSLTJ NEW/EST HI 80: CPT | Performed by: INTERNAL MEDICINE

## 2021-12-03 PROCEDURE — B2121ZZ FLUOROSCOPY OF SINGLE CORONARY ARTERY BYPASS GRAFT USING LOW OSMOLAR CONTRAST: ICD-10-PCS | Performed by: INTERNAL MEDICINE

## 2021-12-03 PROCEDURE — C1760 CLOSURE DEV, VASC: HCPCS | Performed by: INTERNAL MEDICINE

## 2021-12-03 PROCEDURE — 80048 BASIC METABOLIC PNL TOTAL CA: CPT | Performed by: PHYSICIAN ASSISTANT

## 2021-12-03 PROCEDURE — B2111ZZ FLUOROSCOPY OF MULTIPLE CORONARY ARTERIES USING LOW OSMOLAR CONTRAST: ICD-10-PCS | Performed by: INTERNAL MEDICINE

## 2021-12-03 PROCEDURE — C1894 INTRO/SHEATH, NON-LASER: HCPCS | Performed by: INTERNAL MEDICINE

## 2021-12-03 PROCEDURE — 83880 ASSAY OF NATRIURETIC PEPTIDE: CPT | Performed by: INTERNAL MEDICINE

## 2021-12-03 PROCEDURE — 93306 TTE W/DOPPLER COMPLETE: CPT | Performed by: INTERNAL MEDICINE

## 2021-12-03 PROCEDURE — 85027 COMPLETE CBC AUTOMATED: CPT | Performed by: PHYSICIAN ASSISTANT

## 2021-12-03 PROCEDURE — 99153 MOD SED SAME PHYS/QHP EA: CPT | Performed by: INTERNAL MEDICINE

## 2021-12-03 PROCEDURE — 99232 SBSQ HOSP IP/OBS MODERATE 35: CPT | Performed by: HOSPITALIST

## 2021-12-03 DEVICE — ANGIO-SEAL VIP VASCULAR CLOSURE DEVICE
Type: IMPLANTABLE DEVICE | Status: FUNCTIONAL
Brand: ANGIO-SEAL

## 2021-12-03 RX ORDER — ASPIRIN 81 MG/1
243 TABLET, CHEWABLE ORAL ONCE
Status: COMPLETED | OUTPATIENT
Start: 2021-12-03 | End: 2021-12-03

## 2021-12-03 RX ORDER — METOPROLOL SUCCINATE 25 MG/1
25 TABLET, EXTENDED RELEASE ORAL DAILY
Status: DISCONTINUED | OUTPATIENT
Start: 2021-12-04 | End: 2021-12-04 | Stop reason: HOSPADM

## 2021-12-03 RX ORDER — MIDAZOLAM HYDROCHLORIDE 2 MG/2ML
INJECTION, SOLUTION INTRAMUSCULAR; INTRAVENOUS AS NEEDED
Status: DISCONTINUED | OUTPATIENT
Start: 2021-12-03 | End: 2021-12-03 | Stop reason: HOSPADM

## 2021-12-03 RX ORDER — DILTIAZEM HYDROCHLORIDE 120 MG/1
120 CAPSULE, COATED, EXTENDED RELEASE ORAL DAILY
Status: DISCONTINUED | OUTPATIENT
Start: 2021-12-03 | End: 2021-12-04

## 2021-12-03 RX ORDER — METHYLPREDNISOLONE SODIUM SUCCINATE 125 MG/2ML
60 INJECTION, POWDER, LYOPHILIZED, FOR SOLUTION INTRAMUSCULAR; INTRAVENOUS EVERY 6 HOURS SCHEDULED
Status: DISCONTINUED | OUTPATIENT
Start: 2021-12-03 | End: 2021-12-04 | Stop reason: HOSPADM

## 2021-12-03 RX ORDER — HYDROCODONE BITARTRATE AND ACETAMINOPHEN 5; 325 MG/1; MG/1
1 TABLET ORAL EVERY 6 HOURS PRN
Status: DISCONTINUED | OUTPATIENT
Start: 2021-12-03 | End: 2021-12-04 | Stop reason: HOSPADM

## 2021-12-03 RX ORDER — FENTANYL CITRATE 50 UG/ML
INJECTION, SOLUTION INTRAMUSCULAR; INTRAVENOUS AS NEEDED
Status: DISCONTINUED | OUTPATIENT
Start: 2021-12-03 | End: 2021-12-03 | Stop reason: HOSPADM

## 2021-12-03 RX ORDER — SODIUM CHLORIDE 9 MG/ML
100 INJECTION, SOLUTION INTRAVENOUS CONTINUOUS
Status: DISPENSED | OUTPATIENT
Start: 2021-12-03 | End: 2021-12-03

## 2021-12-03 RX ORDER — LIDOCAINE HYDROCHLORIDE 10 MG/ML
INJECTION, SOLUTION EPIDURAL; INFILTRATION; INTRACAUDAL; PERINEURAL AS NEEDED
Status: DISCONTINUED | OUTPATIENT
Start: 2021-12-03 | End: 2021-12-03 | Stop reason: HOSPADM

## 2021-12-03 RX ORDER — SODIUM CHLORIDE 9 MG/ML
100 INJECTION, SOLUTION INTRAVENOUS CONTINUOUS
Status: DISCONTINUED | OUTPATIENT
Start: 2021-12-03 | End: 2021-12-03

## 2021-12-03 RX ADMIN — HYDROCODONE BITARTRATE AND ACETAMINOPHEN 1 TABLET: 5; 325 TABLET ORAL at 14:37

## 2021-12-03 RX ADMIN — LORAZEPAM 0.5 MG: 0.5 TABLET ORAL at 19:50

## 2021-12-03 RX ADMIN — ASPIRIN 81 MG: 81 TABLET, COATED ORAL at 08:09

## 2021-12-03 RX ADMIN — HEPARIN SODIUM 5000 UNITS: 5000 INJECTION INTRAVENOUS; SUBCUTANEOUS at 21:25

## 2021-12-03 RX ADMIN — METHYLPREDNISOLONE SODIUM SUCCINATE 60 MG: 125 INJECTION, POWDER, FOR SOLUTION INTRAMUSCULAR; INTRAVENOUS at 18:41

## 2021-12-03 RX ADMIN — ASPIRIN 81 MG CHEWABLE TABLET 243 MG: 81 TABLET CHEWABLE at 09:21

## 2021-12-03 RX ADMIN — PREDNISONE 50 MG: 20 TABLET ORAL at 16:24

## 2021-12-03 RX ADMIN — HEPARIN SODIUM 5000 UNITS: 5000 INJECTION INTRAVENOUS; SUBCUTANEOUS at 14:41

## 2021-12-03 RX ADMIN — TIOTROPIUM BROMIDE 18 MCG: 18 CAPSULE ORAL; RESPIRATORY (INHALATION) at 09:28

## 2021-12-03 RX ADMIN — SODIUM CHLORIDE 100 ML/HR: 0.9 INJECTION, SOLUTION INTRAVENOUS at 09:21

## 2021-12-03 RX ADMIN — PANTOPRAZOLE SODIUM 40 MG: 40 TABLET, DELAYED RELEASE ORAL at 05:34

## 2021-12-03 RX ADMIN — PERFLUTREN 0.4 ML/MIN: 6.52 INJECTION, SUSPENSION INTRAVENOUS at 08:48

## 2021-12-03 RX ADMIN — ATORVASTATIN CALCIUM 40 MG: 40 TABLET, FILM COATED ORAL at 17:19

## 2021-12-03 RX ADMIN — ISOSORBIDE MONONITRATE 30 MG: 30 TABLET, EXTENDED RELEASE ORAL at 08:09

## 2021-12-03 RX ADMIN — DILTIAZEM HYDROCHLORIDE 120 MG: 120 CAPSULE, COATED, EXTENDED RELEASE ORAL at 16:24

## 2021-12-03 RX ADMIN — FLUTICASONE PROPIONATE 1 SPRAY: 50 SPRAY, METERED NASAL at 09:28

## 2021-12-03 RX ADMIN — METHYLPREDNISOLONE SODIUM SUCCINATE 60 MG: 125 INJECTION, POWDER, FOR SOLUTION INTRAMUSCULAR; INTRAVENOUS at 23:20

## 2021-12-03 RX ADMIN — HEPARIN SODIUM 5000 UNITS: 5000 INJECTION INTRAVENOUS; SUBCUTANEOUS at 05:34

## 2021-12-03 RX ADMIN — METOPROLOL SUCCINATE 25 MG: 25 TABLET, EXTENDED RELEASE ORAL at 08:09

## 2021-12-04 VITALS
TEMPERATURE: 97.8 F | HEART RATE: 96 BPM | BODY MASS INDEX: 30.35 KG/M2 | DIASTOLIC BLOOD PRESSURE: 100 MMHG | HEIGHT: 70 IN | RESPIRATION RATE: 18 BRPM | WEIGHT: 212 LBS | SYSTOLIC BLOOD PRESSURE: 162 MMHG | OXYGEN SATURATION: 94 %

## 2021-12-04 LAB
CHOLEST SERPL-MCNC: 202 MG/DL
HDLC SERPL-MCNC: 38 MG/DL
LDLC SERPL CALC-MCNC: 145 MG/DL (ref 0–100)
TRIGL SERPL-MCNC: 94 MG/DL

## 2021-12-04 PROCEDURE — 99239 HOSP IP/OBS DSCHRG MGMT >30: CPT | Performed by: HOSPITALIST

## 2021-12-04 PROCEDURE — 80061 LIPID PANEL: CPT | Performed by: NURSE PRACTITIONER

## 2021-12-04 PROCEDURE — 99232 SBSQ HOSP IP/OBS MODERATE 35: CPT | Performed by: INTERNAL MEDICINE

## 2021-12-04 RX ORDER — DILTIAZEM HYDROCHLORIDE 180 MG/1
180 CAPSULE, COATED, EXTENDED RELEASE ORAL DAILY
Status: DISCONTINUED | OUTPATIENT
Start: 2021-12-05 | End: 2021-12-04 | Stop reason: HOSPADM

## 2021-12-04 RX ORDER — DILTIAZEM HYDROCHLORIDE 180 MG/1
180 CAPSULE, COATED, EXTENDED RELEASE ORAL DAILY
Qty: 30 CAPSULE | Refills: 0 | Status: SHIPPED | OUTPATIENT
Start: 2021-12-05 | End: 2022-01-04 | Stop reason: SDUPTHER

## 2021-12-04 RX ORDER — PREDNISONE 10 MG/1
TABLET ORAL
Qty: 42 TABLET | Refills: 0 | Status: SHIPPED | OUTPATIENT
Start: 2021-12-04 | End: 2022-04-20

## 2021-12-04 RX ORDER — PREDNISONE 10 MG/1
TABLET ORAL
Qty: 42 TABLET | Refills: 0 | Status: SHIPPED | OUTPATIENT
Start: 2021-12-04 | End: 2021-12-04 | Stop reason: SDUPTHER

## 2021-12-04 RX ORDER — DOXYCYCLINE HYCLATE 100 MG/1
100 TABLET, DELAYED RELEASE ORAL 2 TIMES DAILY
Qty: 14 TABLET | Refills: 0 | Status: SHIPPED | OUTPATIENT
Start: 2021-12-04 | End: 2021-12-11

## 2021-12-04 RX ORDER — DILTIAZEM HYDROCHLORIDE 120 MG/1
120 CAPSULE, COATED, EXTENDED RELEASE ORAL DAILY
Qty: 30 CAPSULE | Refills: 0 | Status: SHIPPED | OUTPATIENT
Start: 2021-12-05 | End: 2021-12-04 | Stop reason: HOSPADM

## 2021-12-04 RX ADMIN — FLUTICASONE PROPIONATE 1 SPRAY: 50 SPRAY, METERED NASAL at 08:29

## 2021-12-04 RX ADMIN — LORAZEPAM 0.5 MG: 0.5 TABLET ORAL at 06:06

## 2021-12-04 RX ADMIN — HEPARIN SODIUM 5000 UNITS: 5000 INJECTION INTRAVENOUS; SUBCUTANEOUS at 05:46

## 2021-12-04 RX ADMIN — ASPIRIN 81 MG: 81 TABLET, COATED ORAL at 08:29

## 2021-12-04 RX ADMIN — PANTOPRAZOLE SODIUM 40 MG: 40 TABLET, DELAYED RELEASE ORAL at 05:47

## 2021-12-04 RX ADMIN — TIOTROPIUM BROMIDE 18 MCG: 18 CAPSULE ORAL; RESPIRATORY (INHALATION) at 08:29

## 2021-12-04 RX ADMIN — METHYLPREDNISOLONE SODIUM SUCCINATE 60 MG: 125 INJECTION, POWDER, FOR SOLUTION INTRAMUSCULAR; INTRAVENOUS at 05:47

## 2021-12-04 RX ADMIN — METOPROLOL SUCCINATE 25 MG: 25 TABLET, EXTENDED RELEASE ORAL at 08:29

## 2021-12-04 RX ADMIN — DILTIAZEM HYDROCHLORIDE 120 MG: 120 CAPSULE, COATED, EXTENDED RELEASE ORAL at 08:29

## 2021-12-21 ENCOUNTER — OFFICE VISIT (OUTPATIENT)
Dept: CARDIOLOGY CLINIC | Facility: CLINIC | Age: 66
End: 2021-12-21
Payer: COMMERCIAL

## 2021-12-21 VITALS
SYSTOLIC BLOOD PRESSURE: 146 MMHG | HEIGHT: 70 IN | WEIGHT: 215 LBS | DIASTOLIC BLOOD PRESSURE: 92 MMHG | HEART RATE: 120 BPM | BODY MASS INDEX: 30.78 KG/M2

## 2021-12-21 DIAGNOSIS — I71.2 THORACIC AORTIC ANEURYSM WITHOUT RUPTURE (HCC): ICD-10-CM

## 2021-12-21 DIAGNOSIS — R00.0 TACHYCARDIA: Primary | ICD-10-CM

## 2021-12-21 DIAGNOSIS — F17.200 TOBACCO DEPENDENCE SYNDROME: ICD-10-CM

## 2021-12-21 DIAGNOSIS — I25.119 CORONARY ARTERY DISEASE INVOLVING NATIVE CORONARY ARTERY OF NATIVE HEART WITH ANGINA PECTORIS (HCC): ICD-10-CM

## 2021-12-21 DIAGNOSIS — R07.9 CHEST PAIN, UNSPECIFIED TYPE: ICD-10-CM

## 2021-12-21 DIAGNOSIS — I25.9 CHRONIC ISCHEMIC HEART DISEASE: ICD-10-CM

## 2021-12-21 DIAGNOSIS — I10 PRIMARY HYPERTENSION: ICD-10-CM

## 2021-12-21 PROCEDURE — 93000 ELECTROCARDIOGRAM COMPLETE: CPT | Performed by: INTERNAL MEDICINE

## 2021-12-21 PROCEDURE — 99214 OFFICE O/P EST MOD 30 MIN: CPT | Performed by: INTERNAL MEDICINE

## 2022-01-04 ENCOUNTER — APPOINTMENT (EMERGENCY)
Dept: CT IMAGING | Facility: HOSPITAL | Age: 67
End: 2022-01-04
Payer: MEDICARE

## 2022-01-04 ENCOUNTER — HOSPITAL ENCOUNTER (EMERGENCY)
Facility: HOSPITAL | Age: 67
Discharge: HOME | End: 2022-01-04
Attending: EMERGENCY MEDICINE
Payer: MEDICARE

## 2022-01-04 VITALS
TEMPERATURE: 97.9 F | BODY MASS INDEX: 30.85 KG/M2 | HEART RATE: 89 BPM | OXYGEN SATURATION: 98 % | RESPIRATION RATE: 20 BRPM | SYSTOLIC BLOOD PRESSURE: 176 MMHG | HEIGHT: 70 IN | DIASTOLIC BLOOD PRESSURE: 98 MMHG

## 2022-01-04 DIAGNOSIS — S06.0X0A CONCUSSION WITHOUT LOSS OF CONSCIOUSNESS, INITIAL ENCOUNTER: ICD-10-CM

## 2022-01-04 DIAGNOSIS — H18.892 CORNEAL IRRITATION OF LEFT EYE: ICD-10-CM

## 2022-01-04 DIAGNOSIS — I25.10 CORONARY ARTERIOSCLEROSIS IN NATIVE ARTERY: ICD-10-CM

## 2022-01-04 DIAGNOSIS — S09.90XA INJURY OF HEAD, INITIAL ENCOUNTER: Primary | ICD-10-CM

## 2022-01-04 PROCEDURE — 99284 EMERGENCY DEPT VISIT MOD MDM: CPT | Performed by: EMERGENCY MEDICINE

## 2022-01-04 PROCEDURE — 70450 CT HEAD/BRAIN W/O DYE: CPT

## 2022-01-04 PROCEDURE — 99283 EMERGENCY DEPT VISIT LOW MDM: CPT

## 2022-01-04 PROCEDURE — 72125 CT NECK SPINE W/O DYE: CPT

## 2022-01-04 RX ORDER — ACETAMINOPHEN 325 MG/1
650 TABLET ORAL ONCE
Status: COMPLETED | OUTPATIENT
Start: 2022-01-04 | End: 2022-01-04

## 2022-01-04 RX ORDER — PURIFIED WATER 986 MG/ML
SOLUTION OPHTHALMIC ONCE
Status: COMPLETED | OUTPATIENT
Start: 2022-01-04 | End: 2022-01-04

## 2022-01-04 RX ORDER — NAPROXEN 500 MG/1
500 TABLET ORAL 2 TIMES DAILY WITH MEALS
Qty: 20 TABLET | Refills: 0 | Status: SHIPPED | OUTPATIENT
Start: 2022-01-04 | End: 2022-01-14

## 2022-01-04 RX ADMIN — IBUPROFEN 600 MG: 200 TABLET, FILM COATED ORAL at 09:05

## 2022-01-04 RX ADMIN — PURIFIED WATER 1 DROP: 986 SOLUTION OPHTHALMIC at 09:08

## 2022-01-04 RX ADMIN — ACETAMINOPHEN 650 MG: 325 TABLET ORAL at 09:05

## 2022-01-04 NOTE — ED PROVIDER NOTES
Emergency Department Trauma Note  Mary Ellen Campos 77 y o  male MRN: [id number]  Unit/Bed#: Z1 H2/Z1 H2 Encounter: [id number]      Trauma Alert: Trauma Acuity: Trauma Evaluation  Model of Arrival:   via   POV  Trauma Team: Current Providers  Attending Provider: Hailey Ospina DO  Registered Nurse: Ciera Jo RN  Consultants: None      History of Present Illness     Chief Complaint:   Chief Complaint   Patient presents with    Head Injury     struck head 9 days ago on doorway  Dizzy Headache since then After striking head got off of laddera nd struck head on wall  Takes aspirin  Neck pain     HPI:  Mary Ellen Campos is a 77 y o  male who presents with complaint of occipital and bi-temple headache since striking his head twice 9 days ago  He states he stood on a chair to hang something and struck his head on the underside of the dorsal   He got off the chair and felt lightheaded/dizzy, causing him to stumble and strike his head again on a wall  He denies loss of consciousness in either case  He states he has hit his head off and has had several concussions but none have felt like this, especially 9 days after injury  His occipital pain is dull and constant  His bi-temple pain is sharp and episodic  He also complains of central and left-sided neck pain radiating into his left arm and numbness along his left thoracic spine  He did take OTC medication for the pain and states it did not help  He thinks he was taking ibuprofen but states he only took 1 tablet that he thought was 500 mg  It is unclear if he was taking 200 mg ibuprofen or a 500 mg Tylenol  He last took this dose last night  He does also take 81 mg of aspirin daily  He denies other anticoagulant or antiplatelet medications  He was made a trauma evaluation upon arrival and placed in a cervical collar      Mechanism:Details of Incident: stood on chair and struck head on door sill Injury Date: 12/26/21   Injury Occurence Location - Specify [place]      Denies CP, SOB, abdominal pain, n/v  12 system ROS o/w negative  Review of Systems   Constitutional: Negative for chills, diaphoresis and fever  HENT: Negative for rhinorrhea, sore throat and trouble swallowing  Respiratory: Negative for cough, chest tightness, shortness of breath and wheezing  Cardiovascular: Negative for chest pain, palpitations and leg swelling  Gastrointestinal: Negative for abdominal distention, abdominal pain, constipation, diarrhea, nausea and vomiting  Genitourinary: Negative for difficulty urinating, dysuria, flank pain, frequency, hematuria and urgency  Musculoskeletal: Negative for arthralgias, back pain, myalgias, neck pain and neck stiffness  Skin: Negative for pallor, rash and wound  Neurological: Positive for light-headedness and headaches  Negative for dizziness, syncope, speech difficulty and weakness  Hematological: Negative for adenopathy  Psychiatric/Behavioral: Negative for behavioral problems, confusion, sleep disturbance and suicidal ideas  The patient is not nervous/anxious  All other systems reviewed and are negative        Historical Information     Immunizations:   Immunization History   Administered Date(s) Administered    COVID-19 MODERNA VACC 0 5 ML IM 04/07/2021, 05/07/2021    INFLUENZA 11/19/2010, 11/18/2011, 09/16/2014, 09/20/2015, 11/06/2016, 02/14/2018, 11/07/2018    Influenza, injectable, quadrivalent, preservative free 0 5 mL 11/07/2018, 09/11/2019    Influenza, seasonal, injectable 11/19/2010, 11/18/2011    Pneumococcal Conjugate 13-Valent 11/14/2015    Pneumococcal Polysaccharide PPV23 01/01/2010, 02/14/2018    Tdap 01/11/2011    Tuberculin Skin Test-PPD Intradermal 01/31/2011    Zoster 12/03/2015       Past Medical History:   Diagnosis Date    [facility] right MCA stroke [facility] ) 9/15/2018    Arthritis     CAD (coronary artery disease)     s/p CABG 2000    COPD [facility]ch[facility] obstructive [facility]) [facility]     GERD (gastroesophageal reflux disease)     Grand mal status ([facility] [facility] ) 3/24/2019    Heart attack ([facility]     Heart failure ([facility] )     Hyperlipidemia     Hypertension     Lexiscan nuclear stress test 03/19/2016    EF 74% Normal    Myocardial infarction (HCC)     Shortness of breath     Stroke (HCC)     TIA (transient ischemic attack) 09/13/2018       Family History   Problem Relation Age of Onset    Cancer Mother     Heart disease Mother     Cancer Father     Cancer Maternal Grandmother     Cancer Paternal Grandfather      Past Surgical History:   Procedure Laterality Date    CARDIAC CATHETERIZATION  08/19/2015    LIMA occluded  No severe native lesions    CARDIAC CATHETERIZATION N/A 12/3/2021    Procedure: Cardiac Coronary Angiogram;  Surgeon: Britney Dumont MD;  Location: [facility]; Service: Cardiology    CARDIAC CATHETERIZATION  12/3/2021    Procedure: Cardiac catheterization;  Surgeon: Britney Dumont MD;  [facility]: [facility]; Service: Cardiology    COLONOSCOPY      CORONARY ARTERY BYPASS GRAFT  2000    HERNIA REPAIR      umbilical hernia x2    TONSILLECTOMY       Social History     Tobacco Use    Smoking status: Current Every Day Smoker     Packs/day: 1 00     Years: 50 00     Pack years: 50 00     Types: Cigars, Pipe    Smokeless tobacco: Never Used    Tobacco comment: last cigarette was 0300 3/24/21   Vaping Use    Vaping Use: Never used   Substance Use Topics    Alcohol use: Yes     Comment: Socially    Drug use: Never     E-Cigarette/Vaping    E-Cigarette Use Never User      E-Cigarette/Vaping Substances    Nicotine Yes     THC No     CBD No     Flavoring No     Other No     Unknown No        Family History: non-contributory    Meds/Allergies   Prior to Admission Medications   Prescriptions Last Dose Informant Patient Reported? Taking?    DULoxetine (CYMBALTA) 60 mg delayed release capsule   Yes No   Sig: Take 60 mg by mouth daily     Patient not taking: Reported on 2021    LORazepam (ATIVAN) 0 5 mg tablet   No No   Sig: Take 1 tablet (0 5 mg total) by mouth daily as needed for anxiety   Patient not taking: Reported on 2021    MELATONIN PO   Yes No   Sig: Take by mouth daily at bedtime   Patient not taking: Reported on 2021    Potassium Chloride ER 20 MEQ TBCR   Yes No   Sig: Take 20 mEq by mouth daily    albuterol (2 5 mg/3 mL) 0 083 % nebulizer solution   No No   Sig: Take 1 vial (2 5 mg total) by nebulization every 6 (six) hours as needed for wheezing or shortness of breath   albuterol (PROVENTIL HFA,VENTOLIN HFA) 90 mcg/act inhaler   No No   Sig: Inhale 2 puffs every 4 (four) hours as needed for wheezing   aspirin (ECOTRIN LOW STRENGTH) 81 mg EC tablet   No No   Sig: Take 1 tablet (81 mg total) by mouth daily   atorvastatin (LIPITOR) 40 mg tablet   No No   Sig: Take 1 tablet (40 mg total) by mouth every evening   calcium carbonate (TUMS EX) 750 mg chewable tablet   No No   Sig: Chew 1 tablet (750 mg total) 3 (three) times a day as needed for indigestion or heartburn   Patient not taking: Reported on 2021    diltiazem (CARDIZEM CD) 180 mg 24 hr capsule   No No   Sig: Take 1 capsule (180 mg total) by mouth daily   fluticasone (FLONASE) 50 mcg/act nasal spray   No No   Si spray into each nostril daily   furosemide (LASIX) 40 mg tablet   No No   Sig: Take 1 tablet (40 mg total) by mouth daily   isosorbide mononitrate (IMDUR) 30 mg 24 hr tablet   No No   Sig: take 1 tablet by mouth once daily   metoprolol succinate (TOPROL-XL) 25 mg 24 hr tablet   No No   Sig: Take 1 tablet (25 mg total) by mouth 2 (two) times a day   nicotine (NICODERM CQ) 14 mg/24hr TD 24 hr patch   No No   Sig: Place 1 patch on the skin daily   Patient not taking: Reported on 2020   nitroglycerin (NITROSTAT) 0 4 mg SL tablet   Yes No   Sig: Place 0 4 mg under the tongue every 5 (five) minutes as needed for chest pain   pantoprazole (PROTONIX) 40 mg tablet   No No   Sig: Take 1 tablet (40 mg total) by mouth daily   predniSONE 10 mg tablet   No No   Si tabs daily for 2 days, then 5 tabs for 2 days then 4 tabs for 2 days then 3 tabs for 2 days then 2 tab for 2 days 1 tab for 2 days  then stop   Patient not taking: Reported on 2021    roflumilast (DALIRESP) 500 mcg tablet   Yes No   Sig: Take 500 mcg by mouth daily   tiotropium (SPIRIVA) 18 mcg inhalation capsule   No No   Sig: Place 1 capsule (18 mcg total) into inhaler and inhale daily      Facility-Administered Medications: None       Allergies   Allergen Reactions    Gabapentin Headache       PHYSICAL EXAM    PE limited by: nothing    Objective   Vitals:   First set: Temperature: 97 9 °F (36 6 °C) (22 0756)  Pulse: 89 (22 0754)  Respirations: 20 (22 0754)  Blood Pressure: (!) 163/117 (22 0754)  SpO2: 98 % (22 075)    Primary Survey:   (A) Airway:  Intact, patent, clear  (B) Breathing:  CTA  (C) Circulation: Pulses:   normal  (D) Disabliity:  GCS Total:  15  (E) Expose:  Completed    Secondary Survey: (Click on Physical Exam tab above)  Physical Exam  Vitals reviewed  Constitutional:       General: He is not in acute distress  Appearance: He is well-developed  He is not ill-appearing, toxic-appearing or diaphoretic  HENT:      Head: Normocephalic and atraumatic  Right Ear: Tympanic membrane, ear canal and external ear normal       Left Ear: Tympanic membrane, ear canal and external ear normal       Nose: Nose normal       Mouth/Throat:      Mouth: Mucous membranes are moist       Pharynx: Oropharynx is clear  No oropharyngeal exudate  Eyes:      General: No scleral icterus  Conjunctiva/sclera: Conjunctivae normal       Pupils: Pupils are equal, round, and reactive to light  Neck:      Comments: No midline cervical TTP, stepoff or deformity  Cardiovascular:      Rate and Rhythm: Normal rate and regular rhythm  Heart sounds:  No murmur heard  Pulmonary:      Effort: Pulmonary effort is normal  No respiratory distress  Breath sounds: Normal breath sounds  Chest:      Chest wall: No tenderness  Abdominal:      General: Bowel sounds are normal       Palpations: Abdomen is soft  Tenderness: There is no abdominal tenderness  There is no right CVA tenderness or left CVA tenderness  Musculoskeletal:         General: No tenderness or deformity  Normal range of motion  Cervical back: Normal range of motion and neck supple  Tenderness: Minimally, midline and left lateral       Right lower leg: No edema  Left lower leg: No edema  Comments: No midline vert TTP, no crepitus or step-offs  Skin:     General: Skin is warm and dry  Findings: No bruising  Neurological:      General: No focal deficit present  Mental Status: He is alert and oriented to person, place, and time  Cranial Nerves: No cranial nerve deficit  Sensory: No sensory deficit  Motor: No weakness or abnormal muscle tone  Deep Tendon Reflexes: Reflexes are normal and symmetric  Psychiatric:         Mood and Affect: Mood normal          Behavior: Behavior normal          Thought Content: Thought content normal          Cervical spine cleared by clinical criteria?  No (imaging required)      Invasive Devices  Report    None                 Lab Results:   Results Reviewed     None                 Imaging Studies:   Direct to CT: No  TRAUMA - CT head wo contrast   Final Result by Lesley Duarte MD (01/04 4032)      No acute intracranial hemorrhage seen   No mass effect or midline shift seen                  Workstation performed: [id number]         TRAUMA - CT spine cervical wo contrast   Final Result by Lesley Duarte MD (01/04 0644)      No acute compression collapse of the vertebra      Multilevel degenerative changes      Mild asymmetric enlargement of the right laryngeal ventricle and mild asymmetric thickening of the right focal cord, consider nonemergent ENT evaluation to evaluate for right vocal cord palsy             Workstation performed: [id number]               Procedures  Procedures         ED Course  ED Course as of 01/04/22 0900   Tue Jan 04, 2022   8633 Results reviewed with patient  Feels no different  All questions answered to the patient's satisfaction  Recommend continued supportive treatment at home and follow up with PCP  MDM  Number of Diagnoses or Management Options  Diagnosis management comments: DDx: Fall with persistent head and neck pain - contusion, musculoskeletal sprain/strain, concussion, possible ICH, doubt fracture  A/P: Will check CT head and C-spine, treat symptoms, reevaluate for further work up and disposition  Amount and/or Complexity of Data Reviewed  Tests in the radiology section of CPT®: ordered and reviewed  Review and summarize past medical records: yes            Disposition  [facility] Transfer: No  Final diagnoses:   Injury of head, initial encounter   Concussion without loss of consciousness, initial encounter   Corneal irritation of left eye     Time reflects when diagnosis was documented in both MDM as applicable and the Disposition within this note     Time User Action Codes Description Comment    1/4/2022  8:58 AM Basil Heading Add [S09 90XA] Injury of head, initial encounter     1/4/2022  8:58  [facility] [S06 0X0A] Concussion without loss of consciousness, initial encounter     1/4/2022  8:58 AM Basil Heading Add [E15 476] Corneal irritation of left eye       ED Disposition     ED Disposition Condition Date/Time Comment    Discharge Stable Tue Jan 4, 2022  8:58 AM [facility] discharge to home/self care              Follow-up Information     Follow up With Specialties Details Why Contact Info    Bill Cabrera MD Internal Medicine Go to  if symptoms do not improve [address]  [address] [facility]  [facility]  [phone] Patient's Medications   Discharge Prescriptions    NAPROXEN (NAPROSYN) 500 MG TABLET    Take 1 tablet (500 mg total) by mouth 2 (two) times a day with meals for 10 days       Start Date: 1/4/2022  End Date: 1/14/2022       Order Dose: 500 mg       Quantity: 20 tablet    Refills: 0         PDMP Review     None          ED Provider  Electronically Signed by         Gay Weeks DO  01/04/22 0901

## 2022-01-04 NOTE — TELEPHONE ENCOUNTER
Tram LUNA  [facility]  Diltiazem 180mg  1 tab QD   30 with 5 refills     [facility] [address] [place]     Dr Noman Foster

## 2022-01-05 RX ORDER — DILTIAZEM HYDROCHLORIDE 180 MG/1
180 CAPSULE, COATED, EXTENDED RELEASE ORAL DAILY
Qty: 30 CAPSULE | Refills: 5 | Status: SHIPPED | OUTPATIENT
Start: 2022-01-05 | End: 2022-06-27

## 2022-02-03 ENCOUNTER — TELEPHONE (OUTPATIENT)
Dept: PULMONOLOGY | Facility: CLINIC | Age: 67
End: 2022-02-03

## 2022-02-05 ENCOUNTER — TELEPHONE (OUTPATIENT)
Dept: OTHER | Facility: OTHER | Age: 67
End: 2022-02-05

## 2022-02-05 NOTE — TELEPHONE ENCOUNTER
Patient called and cancel his appointment and will call back the office to reschedule his appointment

## 2022-02-15 ENCOUNTER — OFFICE VISIT (OUTPATIENT)
Dept: URGENT CARE | Facility: CLINIC | Age: 67
End: 2022-02-15
Payer: MEDICARE

## 2022-02-15 ENCOUNTER — APPOINTMENT (EMERGENCY)
Dept: CT IMAGING | Facility: HOSPITAL | Age: 67
End: 2022-02-15
Payer: MEDICARE

## 2022-02-15 ENCOUNTER — HOSPITAL ENCOUNTER (EMERGENCY)
Facility: HOSPITAL | Age: 67
Discharge: HOME | End: 2022-02-15
Attending: FAMILY MEDICINE | Admitting: FAMILY MEDICINE
Payer: MEDICARE

## 2022-02-15 VITALS
RESPIRATION RATE: 18 BRPM | SYSTOLIC BLOOD PRESSURE: 163 MMHG | OXYGEN SATURATION: 96 % | HEART RATE: 82 BPM | DIASTOLIC BLOOD PRESSURE: 103 MMHG | TEMPERATURE: 97.4 F

## 2022-02-15 VITALS
BODY MASS INDEX: 31 KG/M2 | RESPIRATION RATE: 17 BRPM | WEIGHT: 216.05 LBS | TEMPERATURE: 97.3 F | OXYGEN SATURATION: 99 % | HEART RATE: 98 BPM | DIASTOLIC BLOOD PRESSURE: 94 MMHG | SYSTOLIC BLOOD PRESSURE: 159 MMHG

## 2022-02-15 DIAGNOSIS — R31.9 HEMATURIA, UNSPECIFIED TYPE: ICD-10-CM

## 2022-02-15 DIAGNOSIS — I74.5 OCCLUSION OF RIGHT ILIAC ARTERY (HCC): Primary | ICD-10-CM

## 2022-02-15 DIAGNOSIS — R10.9 ACUTE LEFT FLANK PAIN: ICD-10-CM

## 2022-02-15 DIAGNOSIS — R33.9 URINARY RETENTION: ICD-10-CM

## 2022-02-15 DIAGNOSIS — R10.10 UPPER ABDOMINAL PAIN: Primary | ICD-10-CM

## 2022-02-15 DIAGNOSIS — M79.89 RIGHT LEG SWELLING: ICD-10-CM

## 2022-02-15 LAB
2HR DELTA HS TROPONIN: 0 NG/L
ALBUMIN SERPL BCP-MCNC: 4 G/DL (ref 3.5–5)
ALP SERPL-CCNC: 75 U/L (ref 34–104)
ALT SERPL W P-5'-P-CCNC: 6 U/L (ref 7–52)
ANION GAP SERPL CALCULATED.3IONS-SCNC: 5 MMOL/L (ref 4–13)
AST SERPL W P-5'-P-CCNC: 12 U/L (ref 13–39)
ATRIAL RATE: 44 BPM
ATRIAL RATE: 56 BPM
BACTERIA UR QL AUTO: NORMAL /HPF
BASOPHILS # BLD AUTO: 0.07 THOUSANDS/ΜL (ref 0–0.1)
BASOPHILS NFR BLD AUTO: 1 % (ref 0–1)
BILIRUB SERPL-MCNC: 0.46 MG/DL (ref 0.2–1)
BILIRUB UR QL STRIP: NEGATIVE
BNP SERPL-MCNC: 34 PG/ML (ref 1–100)
BUN SERPL-MCNC: 9 MG/DL (ref 5–25)
CALCIUM SERPL-MCNC: 8.6 MG/DL (ref 8.4–10.2)
CARDIAC TROPONIN I PNL SERPL HS: 6 NG/L
CARDIAC TROPONIN I PNL SERPL HS: 6 NG/L
CHLORIDE SERPL-SCNC: 100 MMOL/L (ref 96–108)
CLARITY UR: CLEAR
CO2 SERPL-SCNC: 27 MMOL/L (ref 21–32)
COLOR UR: YELLOW
CREAT SERPL-MCNC: 1.28 MG/DL (ref 0.6–1.3)
EOSINOPHIL # BLD AUTO: 0.3 THOUSAND/ΜL (ref 0–0.61)
EOSINOPHIL NFR BLD AUTO: 3 % (ref 0–6)
ERYTHROCYTE [DISTWIDTH] IN BLOOD BY AUTOMATED COUNT: 12.4 % (ref 11.6–15.1)
GFR SERPL CREATININE-BSD FRML MDRD: 57 ML/MIN/1.73SQ M
GLUCOSE SERPL-MCNC: 93 MG/DL (ref 65–140)
GLUCOSE UR STRIP-MCNC: NEGATIVE MG/DL
HCT VFR BLD AUTO: 39.3 % (ref 36.5–49.3)
HGB BLD-MCNC: 13.5 G/DL (ref 12–17)
HGB UR QL STRIP.AUTO: ABNORMAL
IMM GRANULOCYTES # BLD AUTO: 0.03 THOUSAND/UL (ref 0–0.2)
IMM GRANULOCYTES NFR BLD AUTO: 0 % (ref 0–2)
KETONES UR STRIP-MCNC: NEGATIVE MG/DL
LEUKOCYTE ESTERASE UR QL STRIP: NEGATIVE
LIPASE SERPL-CCNC: 29 U/L (ref 11–82)
LYMPHOCYTES # BLD AUTO: 3.06 THOUSANDS/ΜL (ref 0.6–4.47)
LYMPHOCYTES NFR BLD AUTO: 35 % (ref 14–44)
MAGNESIUM SERPL-MCNC: 1.9 MG/DL (ref 1.9–2.7)
MCH RBC QN AUTO: 30.4 PG (ref 26.8–34.3)
MCHC RBC AUTO-ENTMCNC: 34.4 G/DL (ref 31.4–37.4)
MCV RBC AUTO: 89 FL (ref 82–98)
MONOCYTES # BLD AUTO: 0.51 THOUSAND/ΜL (ref 0.17–1.22)
MONOCYTES NFR BLD AUTO: 6 % (ref 4–12)
NEUTROPHILS # BLD AUTO: 4.75 THOUSANDS/ΜL (ref 1.85–7.62)
NEUTS SEG NFR BLD AUTO: 55 % (ref 43–75)
NITRITE UR QL STRIP: NEGATIVE
NON-SQ EPI CELLS URNS QL MICRO: NORMAL /HPF
NRBC BLD AUTO-RTO: 0 /100 WBCS
P AXIS: 45 DEGREES
P AXIS: 46 DEGREES
PH UR STRIP.AUTO: 6.5 [PH]
PLATELET # BLD AUTO: 249 THOUSANDS/UL (ref 149–390)
PMV BLD AUTO: 8.9 FL (ref 8.9–12.7)
POTASSIUM SERPL-SCNC: 4.2 MMOL/L (ref 3.5–5.3)
PR INTERVAL: 162 MS
PR INTERVAL: 166 MS
PROT SERPL-MCNC: 6.5 G/DL (ref 6.4–8.4)
PROT UR STRIP-MCNC: NEGATIVE MG/DL
QRS AXIS: 82 DEGREES
QRS AXIS: 92 DEGREES
QRSD INTERVAL: 94 MS
QRSD INTERVAL: 94 MS
QT INTERVAL: 446 MS
QT INTERVAL: 464 MS
QTC INTERVAL: 396 MS
QTC INTERVAL: 430 MS
RBC # BLD AUTO: 4.44 MILLION/UL (ref 3.88–5.62)
RBC #/AREA URNS AUTO: NORMAL /HPF
SL AMB  POCT GLUCOSE, UA: ABNORMAL
SL AMB LEUKOCYTE ESTERASE,UA: ABNORMAL
SL AMB POCT BILIRUBIN,UA: ABNORMAL
SL AMB POCT BLOOD,UA: ABNORMAL
SL AMB POCT CLARITY,UA: CLEAR
SL AMB POCT COLOR,UA: YELLOW
SL AMB POCT KETONES,UA: ABNORMAL
SL AMB POCT NITRITE,UA: ABNORMAL
SL AMB POCT PH,UA: 6
SL AMB POCT SPECIFIC GRAVITY,UA: 1
SL AMB POCT URINE PROTEIN: ABNORMAL
SL AMB POCT UROBILINOGEN: 0.2
SODIUM SERPL-SCNC: 132 MMOL/L (ref 135–147)
SP GR UR STRIP.AUTO: <=1.005 (ref 1–1.03)
T WAVE AXIS: 71 DEGREES
T WAVE AXIS: 80 DEGREES
UROBILINOGEN UR QL STRIP.AUTO: 0.2 E.U./DL
VENTRICULAR RATE: 44 BPM
VENTRICULAR RATE: 56 BPM
WBC # BLD AUTO: 8.72 THOUSAND/UL (ref 4.31–10.16)
WBC #/AREA URNS AUTO: NORMAL /HPF

## 2022-02-15 PROCEDURE — G0463 HOSPITAL OUTPT CLINIC VISIT: HCPCS

## 2022-02-15 PROCEDURE — 85025 COMPLETE CBC W/AUTO DIFF WBC: CPT | Performed by: PHYSICIAN ASSISTANT

## 2022-02-15 PROCEDURE — 80053 COMPREHEN METABOLIC PANEL: CPT | Performed by: PHYSICIAN ASSISTANT

## 2022-02-15 PROCEDURE — 96360 HYDRATION IV INFUSION INIT: CPT

## 2022-02-15 PROCEDURE — 36415 COLL VENOUS BLD VENIPUNCTURE: CPT | Performed by: PHYSICIAN ASSISTANT

## 2022-02-15 PROCEDURE — 93005 ELECTROCARDIOGRAM TRACING: CPT

## 2022-02-15 PROCEDURE — 83735 ASSAY OF MAGNESIUM: CPT | Performed by: PHYSICIAN ASSISTANT

## 2022-02-15 PROCEDURE — 99285 EMERGENCY DEPT VISIT HI MDM: CPT

## 2022-02-15 PROCEDURE — 99284 EMERGENCY DEPT VISIT MOD MDM: CPT

## 2022-02-15 PROCEDURE — 71275 CT ANGIOGRAPHY CHEST: CPT

## 2022-02-15 PROCEDURE — 84484 ASSAY OF TROPONIN QUANT: CPT | Performed by: PHYSICIAN ASSISTANT

## 2022-02-15 PROCEDURE — 93010 ELECTROCARDIOGRAM REPORT: CPT | Performed by: INTERNAL MEDICINE

## 2022-02-15 PROCEDURE — 99213 OFFICE O/P EST LOW 20 MIN: CPT

## 2022-02-15 PROCEDURE — 87086 URINE CULTURE/COLONY COUNT: CPT

## 2022-02-15 PROCEDURE — 51798 US URINE CAPACITY MEASURE: CPT

## 2022-02-15 PROCEDURE — 83690 ASSAY OF LIPASE: CPT | Performed by: PHYSICIAN ASSISTANT

## 2022-02-15 PROCEDURE — 81001 URINALYSIS AUTO W/SCOPE: CPT | Performed by: PHYSICIAN ASSISTANT

## 2022-02-15 PROCEDURE — 99285 EMERGENCY DEPT VISIT HI MDM: CPT | Performed by: PHYSICIAN ASSISTANT

## 2022-02-15 PROCEDURE — 81002 URINALYSIS NONAUTO W/O SCOPE: CPT

## 2022-02-15 PROCEDURE — 83880 ASSAY OF NATRIURETIC PEPTIDE: CPT | Performed by: PHYSICIAN ASSISTANT

## 2022-02-15 PROCEDURE — 74174 CTA ABD&PLVS W/CONTRAST: CPT

## 2022-02-15 PROCEDURE — 96361 HYDRATE IV INFUSION ADD-ON: CPT

## 2022-02-15 RX ORDER — TAMSULOSIN HYDROCHLORIDE 0.4 MG/1
0.4 CAPSULE ORAL ONCE
Status: COMPLETED | OUTPATIENT
Start: 2022-02-15 | End: 2022-02-15

## 2022-02-15 RX ORDER — TAMSULOSIN HYDROCHLORIDE 0.4 MG/1
0.4 CAPSULE ORAL
Qty: 30 CAPSULE | Refills: 0 | Status: SHIPPED | OUTPATIENT
Start: 2022-02-15 | End: 2022-08-08 | Stop reason: SDUPTHER

## 2022-02-15 RX ORDER — SODIUM CHLORIDE 9 MG/ML
3 INJECTION INTRAVENOUS
Status: DISCONTINUED | OUTPATIENT
Start: 2022-02-15 | End: 2022-02-15 | Stop reason: HOSPADM

## 2022-02-15 RX ADMIN — IOHEXOL 100 ML: 350 INJECTION, SOLUTION INTRAVENOUS at 12:26

## 2022-02-15 RX ADMIN — SODIUM CHLORIDE 1000 ML: 0.9 INJECTION, SOLUTION INTRAVENOUS at 12:15

## 2022-02-15 RX ADMIN — TAMSULOSIN HYDROCHLORIDE 0.4 MG: 0.4 CAPSULE ORAL at 16:42

## 2022-02-15 NOTE — CONSULTS
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, stable  Patient currently denying symptoms at this time    Plan:  Chronic occlusion based on previous scan  Likely no surgical intervention at this time  Stable for discharge from a vascular surgical standpoint with outpatient follow up with vascular surgery   To be discussed with Dr Dahl     History of Present Illness   Chief Complaint:  Abdominal pain     HPI:  Sagrario Matamoros is a 77 y o  male with past medical history significant for CAD with history of MI and status post CABG, COPD, GERD, HLD, HTN, CVA/TIA who initially presented to [facility] ED 02/15/2022 via EMS from urgent care with complaints of abdominal pain  Vascular surgery consulted for CTA finding of greater than 50% narrowing of proximal SMA and complete occlusion of right internal iliac artery, stable  Patient currently denying symptoms at this time but notes that in the past few weeks has had right leg pain with walking  Pain subsides with rest   Denies symptoms at rest   Denies chest pain, palpitations, shortness of breath, calf tenderness  Denies nausea, vomiting  Denies fever, chills  Past surgical history significant for cardiac catheterization x2, CABG x2, umbilical hernia repair x2, tonsillectomy  Denies reaction anesthesia  Review of Systems   Constitutional: Negative for activity change, appetite change, chills and fever  HENT: Negative  Respiratory: Negative for cough, chest tightness and shortness of breath  Cardiovascular: Positive for chest pain and leg swelling  Negative for palpitations  Gastrointestinal: Positive for abdominal pain  Negative for constipation, diarrhea, nausea and vomiting  Endocrine: Negative for polydipsia, polyphagia and polyuria  Genitourinary: Positive for difficulty urinating and flank pain  Negative for frequency and urgency  Musculoskeletal: Negative for arthralgias and myalgias  Skin: Negative for color change, rash and wound  Neurological: Negative for dizziness, weakness, light-headedness, numbness and headaches  Historical Information   Past Medical History:   Diagnosis Date    Acute right MCA stroke ([facility] [address] ) 9/15/2018    Arthritis     CAD (coronary artery disease)     s/p CABG 2000    [facility]     GERD (gastroesophageal reflux disease)     Grand mal status ([facility] [address] ) 3/24/2019    [facility]     [facility]     Hyperlipidemia     Hypertension     Lexiscan nuclear stress test 03/19/2016    EF 74% Normal    [facility]     Shortness of breath     St[facility]     TIA (transient ischemic attack) 09/13/2018     Past Surgical History:   Procedure Laterality Date    CARDIAC CATHETERIZATION  08/19/2015    LIMA occluded  No severe native lesions    CARDIAC CATHETERIZATION N/A 12/3/2021    Procedure: Cardiac Coronary Angiogram;  Surgeon: Josh Hancock MD;  [facility]: [facility]; Service: Cardiology    CARDIAC CATHETERIZATION  12/3/2021    Procedure: Cardiac catheterization;  Surgeon: Josh Hancock MD;  [facility]: [facility];   Service: Cardiology    COLONOSCOPY      CORONARY ARTERY BYPASS GRAFT  2000    HERNIA REPAIR      umbilical hernia x2    TONSILLECTOMY       Social History   Social History     Substance and Sexual Activity   Alcohol Use Yes    Comment: Socially     Social History     Substance and Sexual Activity   Drug Use Never     E-Cigarette/Vaping    E-Cigarette Use Never User      E-Cigarette/Vaping Substances    Nicotine Yes     THC No     CBD No     Flavoring No     Other No     Unknown No      Social History     Tobacco Use   Smoking Status Current Every Day Smoker    Packs/day: 1 00    Years: 50 00    Pack years: 50 00    Types: Cigars, Pipe   Smokeless Tobacco Never Used   Tobacco Comment    last cigarette was 0300 3/24/21     Family History:   Family History   Problem Relation Age of Onset    Cancer Mother     Heart disease Mother     Cancer Father     Cancer Maternal Grandmother     Cancer Paternal Grandfather        Meds/Allergies   all current active meds have been reviewed and current meds:   Current Facility-Administered Medications   Medication Dose Route Frequency    sodium chloride (PF) 0 9 % injection 3 mL  3 mL Intravenous Q1H PRN     Allergies   Allergen Reactions    Gabapentin Headache       Objective   First Vitals:   Blood Pressure: 158/95 (02/15/22 1032)  Pulse: 79 (02/15/22 1032)  Temperature: (!) 97 3 °F (36 3 °C) (02/15/22 1032)  Temp Source: Tympanic (02/15/22 1032)  Respirations: 18 (02/15/22 1032)  Weight - Scale: 98 kg (216 lb 0 8 oz) (02/15/22 1032)    Current Vitals:   Blood Pressure: 158/95 (02/15/22 1032)  Pulse: 79 (02/15/22 1032)  Temperature: (!) 97 3 °F (36 3 °C) (02/15/22 1032)  Temp Source: Tympanic (02/15/22 1032)  Respirations: 18 (02/15/22 1032)  Weight - Scale: 98 kg (216 lb 0 8 oz) (02/15/22 1032)    No intake or output data in the 24 hours ending 02/15/22 1454    Invasive Devices  Report    Peripheral Intravenous Line            Peripheral IV 02/15/22 Right;Upper;Ventral (anterior) Arm <1 day                Physical Exam  Vitals and nursing note reviewed  Constitutional:       General: He is not in acute distress  Appearance: Normal appearance  He is not ill-appearing or toxic-appearing  HENT:      Head: Normocephalic and atraumatic  Cardiovascular:      Rate and Rhythm: Normal rate and regular rhythm  Pulses: Normal pulses  Heart sounds: Normal heart sounds  No murmur heard  No friction rub  No gallop      Pulmonary:      Effort: Pulmonary effort is normal  No respiratory distress  Breath sounds: Normal breath sounds  No wheezing, rhonchi or rales  Abdominal:      General: Abdomen is flat  Bowel sounds are normal  There is no distension  Palpations: Abdomen is soft  Tenderness: There is no abdominal tenderness  There is no guarding or rebound  Musculoskeletal:         General: Swelling (Mild, right lower extremity) present  Normal range of motion  Skin:     General: Skin is warm and dry  Capillary Refill: Capillary refill takes less than 2 seconds  Neurological:      General: No focal deficit present  Mental Status: He is alert and oriented to person, place, and time  Psychiatric:         Mood and Affect: Mood normal          Behavior: Behavior normal        Lab Results:   I have personally reviewed pertinent lab results      CBC:   Lab Results   Component Value Date    WBC 8 72 02/15/2022    HGB 13 5 02/15/2022    HCT 39 3 02/15/2022    MCV 89 02/15/2022     02/15/2022    MCH 30 4 02/15/2022    MCHC 34 4 02/15/2022    RDW 12 4 02/15/2022    MPV 8 9 02/15/2022    NRBC 0 02/15/2022   CMP:   Lab Results   Component Value Date    SODIUM 132 (L) 02/15/2022    K 4 2 02/15/2022     02/15/2022    CO2 27 02/15/2022    BUN 9 02/15/2022    CREATININE 1 28 02/15/2022    CALCIUM 8 6 02/15/2022    AST 12 (L) 02/15/2022    ALT 6 (L) 02/15/2022    ALKPHOS 75 02/15/2022    EGFR 57 02/15/2022     Urinalysis:   Lab Results   Component Value Date    COLORU Yellow 02/15/2022    COLORU yellow 02/15/2022    CLARITYU Clear 02/15/2022    CLARITYU clear 02/15/2022    SPECGRAV <=1 005 (L) 02/15/2022    PHUR 6 5 02/15/2022    LEUKOCYTESUR Negative 02/15/2022    LEUKOCYTESUR small 02/15/2022    NITRITE Negative 02/15/2022    NITRITE neg 02/15/2022    GLUCOSEU Negative 02/15/2022    GLUCOSEU neg 02/15/2022    KETONESU Negative 02/15/2022    KETONESU neg 02/15/2022    BILIRUBINUR Negative 02/15/2022    BILIRUBINUR neg 02/15/2022    BLOODU Trace-Intact (A) 02/15/2022 BLOODU small 02/15/2022     Lipase:   Lab Results   Component Value Date    LIPASE 29 02/15/2022     Imaging: I have personally reviewed pertinent reports  CTA 02/15/2022:  No acute aortic dissection  No change in a fusiform ectasia of the ascending aorta measuring proximately 4 cm with superimposed small focal outpouching  Greater than 50% narrowing of the proximal SMA and 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, stable  No acute abnormality in the chest, abdomen, or pelvis  EKG, Pathology, and Other Studies: I have personally reviewed pertinent reports  Code Status: Prior  Advance Directive and Living Will:      Power of :    POLST:      Counseling / Coordination of Care  Total floor / unit time spent today 20 minutes  Greater than 50% of total time was spent with the patient and / or family counseling and / or coordination of care  A description of the counseling / coordination of care:  Reviewing pertinent diagnostic imaging and laboratory studies, evaluation of the patient       Alicja Linda PA-C

## 2022-02-15 NOTE — PROGRESS NOTES
[facility]        NAME: Angélica Lee is a 77 y o  male  : 1955    MRN: [id number]  DATE: February 15, 2022  TIME: 10:11 AM      Assessment and Plan     Upper abdominal pain [R10 10]  1  Upper abdominal pain  Transfer to other facility   2  Acute left flank pain  POCT urine dip    Urine culture    Transfer to other facility   3  Right leg swelling  Transfer to other facility   4  Hematuria, unspecified type       POCT urine dip shows small amount of leukocytes and small amount of blood  Will send urine culture  Will hold on antibiotics at this time d/t patient going to ER for further evaluation  Patient agreeable to go to the ER for further evaluation given hx of ascent aortic ectasia and presence of abd pain with unilateral leg swelling  Patient states he does not have a ride to the ER  Aware he may be billed for ambulance ride to ER  EMS activated at [phone]  EKG arrived 0957- bedside report given to [facility]  Patient Instructions     Go to the ER for further evaluation  Chief Complaint     Chief Complaint   Patient presents with    Back Pain     Pt c/o back pain and fatigue for over a week  History of Present Illness     Patient is a 60-year-old male who presents with upper abdominal pain and left back pain  States the pain radiates from his stomach around to his back  States he has been drinking more but is not peeing as much  Reports feeling bloated  States he has been getting a sharp pain in his stomach when he lies flat, feels like it is a paper cut on the inside  States he was told his has a cyst on one of kidneys and was told not to worry about it  Reports intermittent chills and fatigue  Reports intermittent pain in his temporal areas  Patient reports cough and shortness of breath, reports COPD history  States he still does smoke  States he was suppose to get a heart cath in January but was told they were unable to d/t it being too severe   Patient also states he had pain in his right lower leg a few days ago while ambulating  States he started with right lower extremity swelling a few days ago  States he does take a water pill, but it is never this bad  States he did not take his medication this morning  States he normally does not wake up until 1 pm  Patient does take aspirin daily  States he only slept around 2 hours due to not feeling good and having to get a ride to the urgent care this morning  Patient arrived via ride share  Denies N/V/D  Denies blood in stool  Denies CP or palpitations  Denies fever  Denies dizziness or weakness  Denies numbness or tingling  Denies calf pain  Denies abdominal trauma  Denies hx of DVT's  Denies DM history  Review of Systems     Review of Systems   Constitutional: Positive for chills (intermittent) and fatigue  Negative for fever  Respiratory: Positive for cough (copd hx, states cough alternates betwen dry and productive) and shortness of breath (normally gets it every now and then)  Cardiovascular: Positive for leg swelling (reports right lower extremity swelling for a few days)  Negative for chest pain and palpitations  Gastrointestinal: Positive for abdominal pain (epigastric area  )  Negative for diarrhea, nausea and vomiting  Genitourinary: Positive for decreased urine volume and flank pain (left flank)  Neurological: Positive for headaches (states he has pain in his temporal area, unable to pinpoint when it started)  Negative for dizziness, tremors, seizures, syncope, speech difficulty, weakness, light-headedness and numbness  All other systems reviewed and are negative          Current Medications       Current Outpatient Medications:     albuterol (2 5 mg/3 mL) 0 083 % nebulizer solution, Take 1 vial (2 5 mg total) by nebulization every 6 (six) hours as needed for wheezing or shortness of breath, Disp: 25 vial, Rfl: 2    albuterol (PROVENTIL HFA,VENTOLIN HFA) 90 mcg/act inhaler, Inhale 2 puffs every 4 (four) hours as needed for wheezing, Disp: 1 Inhaler, Rfl: 0    aspirin (ECOTRIN LOW STRENGTH) 81 mg EC tablet, Take 1 tablet (81 mg total) by mouth daily, Disp: 30 tablet, Rfl: 0    atorvastatin (LIPITOR) 40 mg tablet, Take 1 tablet (40 mg total) by mouth every evening, Disp: 90 tablet, Rfl: 0    calcium carbonate (TUMS EX) 750 mg chewable tablet, Chew 1 tablet (750 mg total) 3 (three) times a day as needed for indigestion or heartburn (Patient not taking: Reported on 12/2/2021 ), Disp: 60 tablet, Rfl: 0    diltiazem (CARDIZEM CD) 180 mg 24 hr capsule, Take 1 capsule (180 mg total) by mouth daily, Disp: 30 capsule, Rfl: 5    DULoxetine (CYMBALTA) 60 mg delayed release capsule, Take 60 mg by mouth daily   (Patient not taking: Reported on 12/2/2021 ), Disp: , Rfl:     fluticasone (FLONASE) 50 mcg/act nasal spray, 1 spray into each nostril daily, Disp: 16 g, Rfl: 0    furosemide (LASIX) 40 mg tablet, Take 1 tablet (40 mg total) by mouth daily, Disp: 90 tablet, Rfl: 3    isosorbide mononitrate (IMDUR) 30 mg 24 hr tablet, take 1 tablet by mouth once daily, Disp: 90 tablet, Rfl: 3    LORazepam (ATIVAN) 0 5 mg tablet, Take 1 tablet (0 5 mg total) by mouth daily as needed for anxiety (Patient not taking: Reported on 12/2/2021 ), Disp: 10 tablet, Rfl: 0    MELATONIN PO, Take by mouth daily at bedtime (Patient not taking: Reported on 12/2/2021 ), Disp: , Rfl:     metoprolol succinate (TOPROL-XL) 25 mg 24 hr tablet, Take 1 tablet (25 mg total) by mouth 2 (two) times a day, Disp: 90 tablet, Rfl: 3    naproxen (NAPROSYN) 500 mg tablet, Take 1 tablet (500 mg total) by mouth 2 (two) times a day with meals for 10 days, Disp: 20 tablet, Rfl: 0    nicotine (NICODERM CQ) 14 mg/24hr TD 24 hr patch, Place 1 patch on the skin daily (Patient not taking: Reported on 8/11/2020), Disp: 28 patch, Rfl: 0    nitroglycerin (NITROSTAT) 0 4 mg SL tablet, Place 0 4 mg under the tongue every 5 (five) minutes as needed for chest pain, Disp: , Rfl:     pantoprazole (PROTONIX) 40 mg tablet, Take 1 tablet (40 mg total) by mouth daily, Disp: 30 tablet, Rfl: 0    Potassium Chloride ER 20 MEQ TBCR, Take 20 mEq by mouth daily , Disp: , Rfl:     predniSONE 10 mg tablet, 6 tabs daily for 2 days, then 5 tabs for 2 days then 4 tabs for 2 days then 3 tabs for 2 days then 2 tab for 2 days 1 tab for 2 days  then stop (Patient not taking: Reported on 12/21/2021 ), Disp: 42 tablet, Rfl: 0    roflumilast (DALIRESP) 500 mcg tablet, Take 500 mcg by mouth daily, Disp: , Rfl:     tiotropium (SPIRIVA) 18 mcg inhalation capsule, Place 1 capsule (18 mcg total) into inhaler and inhale daily, Disp: 30 capsule, Rfl: 0    Current Allergies     Allergies as of 02/15/2022 - Reviewed 02/15/2022   Allergen Reaction Noted    Gabapentin Headache               The following portions of the patient's history were reviewed and updated as appropriate: allergies, current medications, past family history, past medical history, past social history, past surgical history and problem list      Past Medical History:   Diagnosis Date    Acute right MCA stroke ([facility] ) 9/15/2018    Arthritis     CAD (coronary artery disease)     s/p CABG 2000    COPD (chronic obstructive pulmonary disease) ([address] )     GERD (gastroesophageal reflux disease)     Grand mal status ([facility] ) 3/24/2019    Heart attack ([address] )     Heart failure ([address] )     Hyperlipidemia     Hypertension     Lexiscan nuclear stress test 03/19/2016    EF 74% Normal    Myocardial infarction ([address] )     Shortness of breath     Stroke ([facility] )     TIA (transient ischemic attack) 09/13/2018       Past Surgical History:   Procedure Laterality Date    CARDIAC CATHETERIZATION  08/19/2015    LIMA occluded  No severe native lesions    CARDIAC CATHETERIZATION N/A 12/3/2021    Procedure: Cardiac Coronary Angiogram;  Surgeon: Asya Barba MD;  Location: [facility];   Service: Cardiology    CARDIAC CATHETERIZATION  12/3/2021 Procedure: Cardiac catheterization;  Surgeon: Musa Purcell MD;  Location: [facility]; Service: Cardiology    COLONOSCOPY      CORONARY ARTERY BYPASS GRAFT  2000    HERNIA REPAIR      umbilical hernia x2    TONSILLECTOMY         Family History   Problem Relation Age of Onset    Cancer Mother     Heart disease Mother     Cancer Father     Cancer Maternal Grandmother     Cancer Paternal Grandfather          Medications have been verified  Objective     BP (!) 163/103 (BP Location: Right arm)   Pulse 82   Temp (!) 97 4 °F (36 3 °C)   Resp 18   SpO2 96%   No LMP for male patient  Physical Exam     Physical Exam  Vitals and nursing note reviewed  Constitutional:       General: He is awake  He is not in acute distress  Appearance: Normal appearance  He is not ill-appearing, toxic-appearing or diaphoretic  HENT:      Head: Normocephalic  Right Ear: Tympanic membrane, ear canal and external ear normal  Tympanic membrane is not injected or erythematous  Left Ear: Tympanic membrane, ear canal and external ear normal  Tympanic membrane is not injected or erythematous  Nose: No mucosal edema, congestion or rhinorrhea  Right Sinus: No maxillary sinus tenderness or frontal sinus tenderness  Left Sinus: No maxillary sinus tenderness or frontal sinus tenderness  Mouth/Throat:      Lips: Pink  Mouth: Mucous membranes are dry  Pharynx: Oropharynx is clear  Cardiovascular:      Rate and Rhythm: Normal rate  Pulses: Normal pulses  Heart sounds: Normal heart sounds, S1 normal and S2 normal  No murmur heard  Pulmonary:      Effort: Pulmonary effort is normal  No tachypnea or respiratory distress  Breath sounds: Normal air entry  No decreased air movement  Wheezing (expiratory wheezing throughout ) present  Abdominal:      General: Bowel sounds are normal  There is distension  There is no abdominal bruit   There are no signs of injury  Palpations: Abdomen is soft  There is no pulsatile mass  Tenderness: There is abdominal tenderness in the epigastric area, left upper quadrant and left lower quadrant  There is no right CVA tenderness or left CVA tenderness  Musculoskeletal:      Cervical back: Neck supple  Right lower le+ Edema present  Left lower leg: No edema  Lymphadenopathy:      Cervical: No cervical adenopathy  Neurological:      Mental Status: He is alert  Psychiatric:         Mood and Affect: Mood normal          Behavior: Behavior normal  Behavior is cooperative  Thought Content:  Thought content normal          Judgment: Judgment normal

## 2022-02-15 NOTE — DISCHARGE INSTRUCTIONS
Start taking Flomax with dinner  Follow-up with urology, vascular surgery in regards to the occlusion of your right iliac artery and with family medicine  Return to the ER with any significant change or worsening of your symptoms

## 2022-02-15 NOTE — ED PROVIDER NOTES
History  Chief Complaint   Patient presents with    Abdominal Pain    Flank Pain     59-year-old male history of hypertension, hyperlipidemia, CAD, prior right MCA stroke, thoracic aortic aneurysm without rupture presents via EMS from urgent care complaining of abdominal pain  Patient notes that his abdominal pain is a intermittent cramping sensation in the center of his abdomen  Patient also notes he does intermittently have some back pain associated with the abdominal pain  States that approximately 2 weeks ago he had a ripping sensation in the right side of his chest that he describes as a paper cut like feeling that resolved spontaneously after being present for approximately 1 hour  He also notes that a few days later he had an abrupt onset of severe left-sided flank pain that he states felt like someone was punching him in the back  Patient states he has had decreased urinary output recently  Denies any known history of nephrolithiasis but does note that there was blood in his urine at urgent care  Denies any pain at this time  Prior to Admission Medications   Prescriptions Last Dose Informant Patient Reported? Taking?    DULoxetine (CYMBALTA) 60 mg delayed release capsule   Yes No   Sig: Take 60 mg by mouth daily     Patient not taking: Reported on 12/2/2021    LORazepam (ATIVAN) 0 5 mg tablet   No No   Sig: Take 1 tablet (0 5 mg total) by mouth daily as needed for anxiety   Patient not taking: Reported on 12/2/2021    MELATONIN PO   Yes No   Sig: Take by mouth daily at bedtime   Patient not taking: Reported on 12/2/2021    Potassium Chloride ER 20 MEQ TBCR   Yes No   Sig: Take 20 mEq by mouth daily    albuterol (2 5 mg/3 mL) 0 083 % nebulizer solution   No No   Sig: Take 1 vial (2 5 mg total) by nebulization every 6 (six) hours as needed for wheezing or shortness of breath   albuterol (PROVENTIL HFA,VENTOLIN HFA) 90 mcg/act inhaler   No No   Sig: Inhale 2 puffs every 4 (four) hours as needed for wheezing   aspirin (ECOTRIN LOW STRENGTH) 81 mg EC tablet   No No   Sig: Take 1 tablet (81 mg total) by mouth daily   atorvastatin (LIPITOR) 40 mg tablet   No No   Sig: Take 1 tablet (40 mg total) by mouth every evening   calcium carbonate (TUMS EX) 750 mg chewable tablet   No No   Sig: Chew 1 tablet (750 mg total) 3 (three) times a day as needed for indigestion or heartburn   Patient not taking: Reported on 2021    diltiazem (CARDIZEM CD) 180 mg 24 hr capsule   No No   Sig: Take 1 capsule (180 mg total) by mouth daily   fluticasone (FLONASE) 50 mcg/act nasal spray   No No   Si spray into each nostril daily   furosemide (LASIX) 40 mg tablet   No No   Sig: Take 1 tablet (40 mg total) by mouth daily   isosorbide mononitrate (IMDUR) 30 mg 24 hr tablet   No No   Sig: take 1 tablet by mouth once daily   metoprolol succinate (TOPROL-XL) 25 mg 24 hr tablet   No No   Sig: Take 1 tablet (25 mg total) by mouth 2 (two) times a day   naproxen (NAPROSYN) 500 mg tablet   No No   Sig: Take 1 tablet (500 mg total) by mouth 2 (two) times a day with meals for 10 days   nicotine (NICODERM CQ) 14 mg/24hr TD 24 hr patch   No No   Sig: Place 1 patch on the skin daily   Patient not taking: Reported on 2020   nitroglycerin (NITROSTAT) 0 4 mg SL tablet   Yes No   Sig: Place 0 4 mg under the tongue every 5 (five) minutes as needed for chest pain   pantoprazole (PROTONIX) 40 mg tablet   No No   Sig: Take 1 tablet (40 mg total) by mouth daily   predniSONE 10 mg tablet   No No   Si tabs daily for 2 days, then 5 tabs for 2 days then 4 tabs for 2 days then 3 tabs for 2 days then 2 tab for 2 days 1 tab for 2 days  then stop   Patient not taking: Reported on 2021    roflumilast (DALIRESP) 500 mcg tablet   Yes No   Sig: Take 500 mcg by mouth daily   tiotropium (SPIRIVA) 18 mcg inhalation capsule   No No   Sig: Place 1 capsule (18 mcg total) into inhaler and inhale daily      Facility-Administered Medications: None Past Medical History:   Diagnosis Date    Acute right MCA stroke ([facility] ) 9/15/2018    Arthritis     CAD (coronary artery disease)     s/p CABG 2000    COPD (chronic obstructive pulmonary disease) (HCC)     GERD (gastroesophageal reflux disease)     Grand mal status ([facility] ) 3/24/2019    [facility]     Heart failure ([facility]     Hyperlipidemia     Hypertension     Lexiscan nuclear stress test 03/19/2016    EF 74% Normal    Myocardial infarction ([facility]     Shortness of breath     Stroke ([facility]     TIA (transient ischemic attack) 09/13/2018       Past Surgical History:   Procedure Laterality Date    CARDIAC CATHETERIZATION  08/19/2015    LIMA occluded  No severe native lesions    CARDIAC CATHETERIZATION N/A 12/3/2021    Procedure: Cardiac Coronary Angiogram;  Surgeon: Ozzie Roa MD;  Location: [facility] CARD[facility]; Service: Cardiology    CARDIAC CATHETERIZATION  12/3/2021    Procedure: Cardiac catheterization;  Surgeon: Ozzie Roa MD;  Location: [facility]; Service: Cardiology    COLONOSCOPY      CORONARY ARTERY BYPASS GRAFT  2000    HERNIA REPAIR      umbilical hernia x2    TONSILLECTOMY         Family History   Problem Relation Age of Onset    Cancer Mother     Heart disease Mother     Cancer Father     Cancer Maternal Grandmother     Cancer Paternal Grandfather      I have reviewed and agree with the history as documented      E-Cigarette/Vaping    E-Cigarette Use Never User      E-Cigarette/Vaping Substances    Nicotine Yes     THC No     CBD No     Flavoring No     Other No     Unknown No      Social History     Tobacco Use    Smoking status: Current Every Day Smoker     Packs/day: 1 00     Years: 50 00     Pack years: 50 00     Types: Cigars, Pipe    Smokeless tobacco: Never Used    Tobacco comment: last cigarette was 0300 3/24/21   Vaping Use    Vaping Use: Never used   Substance Use Topics    Alcohol use: Yes     Comment: Socially    Drug use: Never       Review of Systems   Constitutional: Negative for chills, fatigue and fever  HENT: Negative for congestion and sore throat  Eyes: Negative for pain  Respiratory: Negative for cough, chest tightness, shortness of breath and wheezing  Cardiovascular: Positive for chest pain  Negative for palpitations and leg swelling  Gastrointestinal: Positive for abdominal pain  Negative for constipation, diarrhea, nausea and vomiting  Endocrine: Negative for polyuria  Genitourinary: Positive for decreased urine volume, difficulty urinating and flank pain  Negative for dysuria  Musculoskeletal: Negative for arthralgias, back pain, myalgias and neck pain  Skin: Negative for rash  Neurological: Negative for dizziness, syncope, light-headedness and headaches  All other systems reviewed and are negative  Physical Exam  Physical Exam  Vitals reviewed  Constitutional:       Appearance: Normal appearance  He is well-developed  HENT:      Head: Normocephalic and atraumatic  Mouth/Throat:      Mouth: Mucous membranes are moist    Eyes:      Conjunctiva/sclera: Conjunctivae normal    Cardiovascular:      Rate and Rhythm: Normal rate and regular rhythm  Pulses: Normal pulses  Heart sounds: Normal heart sounds  Comments: 2+ bilateral DP pulses  Pulmonary:      Effort: Pulmonary effort is normal       Breath sounds: Normal breath sounds  Abdominal:      General: Bowel sounds are normal       Palpations: Abdomen is soft  Tenderness: There is no abdominal tenderness  Musculoskeletal:         General: Normal range of motion  Cervical back: Normal range of motion  Skin:     General: Skin is warm and dry  Capillary Refill: Capillary refill takes less than 2 seconds  Neurological:      General: No focal deficit present  Mental Status: He is alert and oriented to person, place, and time     Psychiatric:         Mood and Affect: Mood normal          Behavior: Behavior normal          Vital Signs  ED Triage Vitals   Temperature Pulse Respirations Blood Pressure SpO2   02/15/22 1032 02/15/22 1032 02/15/22 1032 02/15/22 1032 02/15/22 1642   (!) 97 3 °F (36 3 °C) 79 18 158/95 99 %      Temp Source Heart Rate Source Patient Position - Orthostatic VS BP Location FiO2 (%)   02/15/22 1032 02/15/22 1032 02/15/22 1642 02/15/22 1032 --   Tympanic Monitor Sitting Right arm       Pain Score       02/15/22 1032       6           Vitals:    02/15/22 1032 02/15/22 1642   BP: 158/95 159/94   Pulse: 79 98   Patient Position - Orthostatic VS:  Sitting         Visual Acuity      ED Medications  Medications   sodium chloride (PF) 0 9 % injection 3 mL (has no administration in time range)   sodium chloride 0 9 % bolus 1,000 mL (0 mL Intravenous Stopped 2/15/22 1642)   iohexol (OMNIPAQUE) 350 MG/ML injection (SINGLE-DOSE) 100 mL (100 mL Intravenous Given 2/15/22 1226)   tamsulosin (FLOMAX) capsule 0 4 mg (0 4 mg Oral Given 2/15/22 1642)       Diagnostic Studies  Results Reviewed     Procedure Component Value Units Date/Time    HS Troponin I 2hr [[id number]]  (Normal) Collected: 02/15/22 1354    Lab Status: Final result Specimen: Blood from Arm, Right Updated: 02/15/22 1429     hs TnI 2hr 6 ng/L      Delta 2hr hsTnI 0 ng/L     Urine Microscopic [[id number]]  (Normal) Collected: 02/15/22 1257    Lab Status: Final result Specimen: Urine, Clean Catch Updated: 02/15/22 1320     RBC, UA 0-1 /hpf      WBC, UA 0-1 /hpf      Epithelial Cells Occasional /hpf      Bacteria, UA None Seen /hpf     UA w Reflex to Microscopic w Reflex to Culture [[id number]]  (Abnormal) Collected: 02/15/22 1257    Lab Status: Final result Specimen: Urine, Clean Catch Updated: 02/15/22 1307     Color, UA Yellow     Clarity, UA Clear     Specific Gravity, UA <=1 005     pH, UA 6 5     Leukocytes, UA Negative     Nitrite, UA Negative     Protein, UA Negative mg/dl      Glucose, UA Negative mg/dl      Ketones, UA Negative mg/dl Urobilinogen, UA 0 2 E U /dl      Bilirubin, UA Negative     Blood, UA Trace-Intact    HS Troponin 0hr (reflex protocol) [[id number]]  (Normal) Collected: 02/15/22 1152    Lab Status: Final result Specimen: Blood from Arm, Right Updated: 02/15/22 1221     hs TnI 0hr 6 ng/L     B-Type Natriuretic Peptide(BNP) CA, GH, EA Campuses Only [[id number]]  (Normal) Collected: 02/15/22 1152    Lab Status: Final result Specimen: Blood from Arm, Right Updated: 02/15/22 1220     BNP 34 pg/mL     Lipase [[id number]]  (Normal) Collected: 02/15/22 1152    Lab Status: Final result Specimen: Blood from Arm, Right Updated: 02/15/22 1217     Lipase 29 u/L     Comprehensive metabolic panel [[id number]]  (Abnormal) Collected: 02/15/22 1152    Lab Status: Final result Specimen: Blood from Arm, Right Updated: 02/15/22 1217     Sodium 132 mmol/L      Potassium 4 2 mmol/L      Chloride 100 mmol/L      CO2 27 mmol/L      ANION GAP 5 mmol/L      BUN 9 mg/dL      Creatinine 1 28 mg/dL      Glucose 93 mg/dL      Calcium 8 6 mg/dL      AST 12 U/L      ALT 6 U/L      Alkaline Phosphatase 75 U/L      Total Protein 6 5 g/dL      Albumin 4 0 g/dL      Total Bilirubin 0 46 mg/dL      eGFR 57 ml/min/1 73sq m     Narrative:      [place] guidelines for Chronic Kidney Disease (CKD):     Stage 1 with normal or high GFR (GFR > 90 mL/min/1 73 square meters)    Stage 2 Mild CKD (GFR = 60-89 mL/min/1 73 square meters)    Stage 3A Moderate CKD (GFR = 45-59 mL/min/1 73 square meters)    Stage 3B Moderate CKD (GFR = 30-44 mL/min/1 73 square meters)    Stage 4 Severe CKD (GFR = 15-29 mL/min/1 73 square meters)    Stage 5 End Stage CKD (GFR <15 mL/min/1 73 square meters)  Note: GFR calculation is accurate only with a steady state creatinine    Magnesium [[id number]]  (Normal) Collected: 02/15/22 1152    Lab Status: Final result Specimen: Blood from Arm, Right Updated: 02/15/22 1217     Magnesium 1 9 mg/dL     CBC and differential [[id number]] Collected: 02/15/22 1152    Lab Status: Final result Specimen: Blood from Arm, Right Updated: 02/15/22 1207     WBC 8 72 Thousand/uL      RBC 4 44 Million/uL      Hemoglobin 13 5 g/dL      Hematocrit 39 3 %      MCV 89 fL      MCH 30 4 pg      MCHC 34 4 g/dL      RDW 12 4 %      MPV 8 9 fL      Platelets 657 Thousands/uL      nRBC 0 /100 WBCs      Neutrophils Relative 55 %      Immat GRANS % 0 %      Lymphocytes Relative 35 %      Monocytes Relative 6 %      Eosinophils Relative 3 %      Basophils Relative 1 %      Neutrophils Absolute 4 75 Thousands/µL      Immature Grans Absolute 0 03 Thousand/uL      Lymphocytes Absolute 3 06 Thousands/µL      Monocytes Absolute 0 51 Thousand/µL      Eosinophils Absolute 0 30 Thousand/µL      Basophils Absolute 0 07 Thousands/µL                  CTA dissection protocol chest/abdomen/pelvis   Final Result by Leanna Valencia MD (02/15 1332)      1  No acute aortic dissection  2   No change in a fusiform ectasia of the ascending aorta measuring proximately 4 cm with superimposed small focal outpouching  3   Greater than 50% narrowing of the proximal SMA and 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, stable  4   No acute abnormality in the chest, abdomen, or pelvis  Workstation performed: [id number]                    Procedures  US Guided Peripheral IV    Date/Time: 2/15/2022 12:19 PM  Performed by: Letitia Hernandez PA-C  Authorized by:  Letitia Hernandez PA-C     Patient location:  ED  Indications:     Indications: difficulty obtaining IV access    Procedure details:     Patient evaluated for contraindications to access (i e  fistula, thrombosis, etc): Yes      Standard clean technique used for ultrasound access: Yes      Location:  Right arm    Catheter size:  20 gauge    Number of attempts:  1    Successful placement: yes    Post-procedure details:     Post-procedure:  Dressing applied    Assessment: free fluid flow Post-procedure complications: none      Patient tolerance of procedure: Tolerated well, no immediate complications    ECG 12 Lead Documentation Only    Date/Time: 2/15/2022 5:40 PM  Performed by: Celia Bernabe PA-C  Authorized by: Celia Bernabe PA-C     ECG reviewed by me, the ED Provider: yes    Patient location:  ED  Previous ECG:     Previous ECG:  Compared to current    Similarity:  No change    Comparison to cardiac monitor: Yes    Interpretation:     Interpretation: normal    Rate:     ECG rate:  44    ECG rate assessment: normal    Rhythm:     Rhythm: sinus rhythm    Ectopy:     Ectopy: none    QRS:     QRS axis:  Normal  Conduction:     Conduction: normal    ST segments:     ST segments:  Normal  T waves:     T waves: normal    Comments:      No evidence of acute cardiac ischemia             ED Course  ED Course as of 02/15/22 1744   Tue Feb 15, 2022   1348 365mL PVR   1518 CTA dissection protocol chest/abdomen/pelvis  Result noted  R iliac occlusion noted  Apparently stable from prior however patient reports recent angioplasty in the R groin done "2 months ago down at [facility], I think it was a [facility]" however there are no notes present in the patient chart  Awaiting vascular surgery input at this time                               SBIRT 20yo+      Most Recent Value   SBIRT (23 yo +)    In order to provide better care to our patients, we are screening all of our patients for alcohol and drug use  Would it be okay to ask you these screening questions? Yes Filed at: 02/15/2022 1154   Initial Alcohol Screen: US AUDIT-C     1  How often do you have a drink containing alcohol? 1 Filed at: 02/15/2022 1154   2  How many drinks containing alcohol do you have on a typical day you are drinking? 0 Filed at: 02/15/2022 1154   3a  Male UNDER 65: How often do you have five or more drinks on one occasion? 0 Filed at: 02/15/2022 1154   3b  FEMALE Any Age, or MALE 65+:  How often do you have 4 or more drinks on one occassion? 0 Filed at: 02/15/2022 1156   Audit-C Score 1 Filed at: 02/15/2022 1157   COLTEN: How many times in the past year have you    Used an illegal drug or used a prescription medication for non-medical reasons? Never Filed at: 02/15/2022 1154                    MDM  Number of Diagnoses or Management Options  Occlusion of right iliac artery ([address] )  Urinary retention  Diagnosis management comments: Patient presented with multiple complaints of abdominal pain, flank and chest pain that has been intermittent and present for the past few weeks  Given patient's history of thoracic aortic aneurysm, aortic dissection was considered  CT negative for dissection  Patient also noted some right-sided chest pain that resolved spontaneously  EKG shows no signs of acute ischemia  Delta troponin of 0  Per [facility] ACS guidelines, acute MI is ruled out  Patient also complained of urinary retention  Postvoid residual was confirmed twice on 2 separate occasions to be approximately 365 mL  Discussed this with Dr Connie Boo of Urology that states that Mejía catheter may be placed  Patient adamantly refuses Mejía catheter  Was previously on Flomax  Will restart Flomax and refer to Urology  Right iliac artery occlusion noted  Stable from prior as confirmed by reading radiologist   Discussed this with Ariel Meng of vascular surgery  Patient has strong DP pulses  Nonischemic appearing limb  Foot is warm  No overlying skin changes  Ambulatory referral made to Urology, vascular surgery and Family Medicine  I personally had a lengthy discussion with the patient in regards to specific signs and symptoms to watch out for that warrant prompt return to the ED  Patient was given specific recommendations for symptomatic treatment and follow-up recommendations  The patient expressed their understanding, all questions were answered and they were agreeable to plan and very thankful for care          Disposition  Final diagnoses:   Occlusion of right iliac artery (HCC)   Urinary retention     Time reflects when diagnosis was documented in both MDM as applicable and the Disposition within this note     Time User Action Codes Description Comment    2/15/2022  2:33 PM [employer] [I74 5] Occlusion of right iliac artery ([address] )     2/15/2022  4:29 PM [employer] Add [R33 9] Urinary retention     2/15/2022  4:31 PM [employer] Add [F17 200] Tobacco dependence syndrome     2/15/2022  4:31 PM [employer] [T22 941] Tobacco dependence syndrome       ED Disposition     ED Disposition Condition Date/Time Comment    Discharge Stable Tue Feb 15, 2022  4:29 PM [facility]aila [facility] discharge to home/self care              Follow-up Information     Follow up With Specialties Details Why Amarilys Ware MD Internal Medicine Schedule an appointment as soon as possible for a visit   [address]  [address] [place] [address]  [phone]            Discharge Medication List as of 2/15/2022  4:32 PM      START taking these medications    Details   tamsulosin (FLOMAX) 0 4 mg Take 1 capsule (0 4 mg total) by mouth daily with dinner, Starting Tue 2/15/2022, Until Thu 3/17/2022, Normal         CONTINUE these medications which have NOT CHANGED    Details   albuterol (2 5 mg/3 mL) 0 083 % nebulizer solution Take 1 vial (2 5 mg total) by nebulization every 6 (six) hours as needed for wheezing or shortness of breath, Starting Tue 5/26/2020, Normal      albuterol (PROVENTIL HFA,VENTOLIN HFA) 90 mcg/act inhaler Inhale 2 puffs every 4 (four) hours as needed for wheezing, Starting Mon 9/17/2018, Print      aspirin (ECOTRIN LOW STRENGTH) 81 mg EC tablet Take 1 tablet (81 mg total) by mouth daily, Starting Mon 9/17/2018, Print      atorvastatin (LIPITOR) 40 mg tablet Take 1 tablet (40 mg total) by mouth every evening, Starting Thu 7/2/2020, Normal      calcium carbonate (TUMS EX) 750 mg chewable tablet Chew 1 tablet (750 mg total) 3 (three) times a day as needed for indigestion or heartburn, Starting Tue 7/30/2019, Print      diltiazem (CARDIZEM CD) 180 mg 24 hr capsule Take 1 capsule (180 mg total) by mouth daily, Starting Wed 1/5/2022, Normal      DULoxetine (CYMBALTA) 60 mg delayed release capsule Take 60 mg by mouth daily  , Starting Sun 9/19/2021, Historical Med      fluticasone (FLONASE) 50 mcg/act nasal spray 1 spray into each nostril daily, Starting Mon 9/17/2018, Print      furosemide (LASIX) 40 mg tablet Take 1 tablet (40 mg total) by mouth daily, Starting Tue 8/31/2021, Normal      isosorbide mononitrate (IMDUR) 30 mg 24 hr tablet take 1 tablet by mouth once daily, Normal      LORazepam (ATIVAN) 0 5 mg tablet Take 1 tablet (0 5 mg total) by mouth daily as needed for anxiety, Starting Wed 9/22/2021, Normal      MELATONIN PO Take by mouth daily at bedtime, Historical Med      metoprolol succinate (TOPROL-XL) 25 mg 24 hr tablet Take 1 tablet (25 mg total) by mouth 2 (two) times a day, Starting Tue 11/30/2021, Normal      naproxen (NAPROSYN) 500 mg tablet Take 1 tablet (500 mg total) by mouth 2 (two) times a day with meals for 10 days, Starting Tue 1/4/2022, Until Fri 1/14/2022, Normal      nicotine (NICODERM CQ) 14 mg/24hr TD 24 hr patch Place 1 patch on the skin daily, Starting Fri 3/13/2020, Normal      nitroglycerin (NITROSTAT) 0 4 mg SL tablet Place 0 4 mg under the tongue every 5 (five) minutes as needed for chest pain, Historical Med      pantoprazole (PROTONIX) 40 mg tablet Take 1 tablet (40 mg total) by mouth daily, Starting Tue 7/30/2019, Print      Potassium Chloride ER 20 MEQ TBCR Take 20 mEq by mouth daily , Historical Med      predniSONE 10 mg tablet 6 tabs daily for 2 days, then 5 tabs for 2 days then 4 tabs for 2 days then 3 tabs for 2 days then 2 tab for 2 days 1 tab for 2 days  then stop, Normal      roflumilast (DALIRESP) 500 mcg tablet Take 500 mcg by mouth daily, Historical Med      tiotropium [facility]) 18 mcg inhalation capsule Place 1 capsule (18 mcg total) into inhaler and inhale daily, Starting Mon 9/17/2018, Print                 PDMP Review     None          ED Provider  Electronically Signed by           Kelsey Abreu PA-C  02/15/22 1042

## 2022-02-16 LAB — BACTERIA UR CULT: NORMAL

## 2022-03-01 ENCOUNTER — CONSULT (OUTPATIENT)
Dept: VASCULAR SURGERY | Facility: CLINIC | Age: 67
End: 2022-03-01
Payer: MEDICARE

## 2022-03-01 VITALS
OXYGEN SATURATION: 96 % | WEIGHT: 214.5 LBS | TEMPERATURE: 97.8 F | BODY MASS INDEX: 30.71 KG/M2 | SYSTOLIC BLOOD PRESSURE: 122 MMHG | HEART RATE: 94 BPM | HEIGHT: 70 IN | DIASTOLIC BLOOD PRESSURE: 90 MMHG

## 2022-03-01 DIAGNOSIS — I72.3 ANEURYSM OF RIGHT INTERNAL ILIAC ARTERY (HCC): ICD-10-CM

## 2022-03-01 DIAGNOSIS — K55.1 SUPERIOR MESENTERIC ARTERY STENOSIS (HCC): Primary | ICD-10-CM

## 2022-03-01 DIAGNOSIS — I74.5 OCCLUSION OF RIGHT ILIAC ARTERY (HCC): ICD-10-CM

## 2022-03-01 DIAGNOSIS — F17.200 TOBACCO DEPENDENCE SYNDROME: ICD-10-CM

## 2022-03-01 DIAGNOSIS — K21.9 GASTROESOPHAGEAL REFLUX DISEASE, UNSPECIFIED WHETHER ESOPHAGITIS PRESENT: ICD-10-CM

## 2022-03-01 DIAGNOSIS — G62.9 PERIPHERAL POLYNEUROPATHY: ICD-10-CM

## 2022-03-01 PROCEDURE — 99204 OFFICE O/P NEW MOD 45 MIN: CPT | Performed by: SURGERY

## 2022-03-01 NOTE — PROGRESS NOTES
Assessment/Plan:    Pt is a 76 yo M w/ GERD, dysphagia, COPD, HTN, CAD, tobacco abuse, HLD, ED, anxiety, MDD, referred for incidental findings of SMA stenosis and R IIA occlusion    Superior mesenteric artery stenosis (HCC)  -     VAS celiac and/or mesenteric duplex; complete study; Future  -reviewed CT w/ contrast which shows about 60% stenosis of the SMA w/ widely patent celiac and RAQUEL; also reviewed CT from 7/20 which shows the same stenosis, about 50% at that time  -patient reports numerous GI symptoms, which are not typical for mesenteric ischemia; this would be very unusual at this level of stenosis with patent celiac and RAQUEL; his weight loss is 8 lbs in 1 year and remains obese  -I do not think this is the cause of his symptoms  -will continue surveillance on a yearly basis with ultrasound    Occlusion of right internal iliac artery ([facility] [address] )  -     Ambulatory Referral to Vascular Surgery  -reviewed CT scan which shows occlusion of the origin of the R IIA    This was also present on prior CT from 7/20 and is unchanged; there is reconstitution of the vessel distally via collaterals or back-flow  -there is no indication for treatment or monitoring of this    Peripheral neuropathy  -has typical symptoms of peripheral neuropathy; have recommended he discuss this with his PCP to start medical management for this    Tobacco dependence syndrome  -current 1PPD  -discussed relationship between smoking and arterial disease and need for cessation; patient says he called the stop smoking hotline and was supposed to get back to them but never did    Gastroesophageal reflux disease, unspecified whether esophagitis present  -he has numerous GI symptoms and I have suggested that he make an appoitnment to see GI regarding these; sounds most consistent with GERD and possibly some motility issues; per pt, was recommended to have colonoscopy at last visit but refused this test    Medications  -continue ASA daily  -he is supposed to be taking statin but isn't; recommended he restart this        Subjective:      Patient ID: Joe Soliman is a 77 y o  male  Pt is new here to our office  He was referred here by Dayron Coon PA-C  Pt was seen at the [facility] ED on 2/15/2022  He had a CTA disection protocol Chest/ABD/Pelvis done 2/15/2022  Patient c/o coldness in his feet  He has swelling of the right foot and ankle  He c/o pain in his right calf and behind his right knee when he walks several blocks  Pt is taking ASA 81 mg and Atorvastatin  Pt is a current smoker  HPI:    Patient referred for vascular findings on recent CT scan  Patient went to ER recently for back pain which he reports was diagnosed with urinary retention  He complains of burning of the feet at night that wakes him from sleep  He complains of pain when he walks  This is in the feet as well as the calves and thighs  This is worse in the right leg  The feet feel better when walking and the legs feel better when resting  He complains of abdominal fullness/distention  This occurs after eating  He can eat a banana without issue but sausage or pork chops cause symptoms  He has occasional diarrhea  Denies N/V  Notes 8 lbs of weight loss since last year  He drinks lots of soda  Takes ASA daily  He is Rx a statin but hasn't been taking it  No reason for this  Current 1PPD smoker      The following portions of the patient's history were reviewed and updated as appropriate: allergies, current medications, past family history, past medical history, past social history, past surgical history and problem list     Review of Systems   Constitutional: Positive for chills and fatigue  HENT: Positive for sore throat  Eyes: Positive for itching  Respiratory: Positive for shortness of breath  Cardiovascular: Positive for leg swelling (right leg)  Gastrointestinal: Positive for abdominal pain  Endocrine: Negative      Genitourinary: Positive for difficulty urinating  Musculoskeletal: Positive for back pain and neck pain  Skin: Negative  Allergic/Immunologic: Negative  Neurological: Positive for numbness and headaches  Hematological: Negative  Psychiatric/Behavioral:        Depression         Objective:      /90 (BP Location: Left arm, Patient Position: Sitting, Cuff Size: Large)   Pulse 94   Temp 97 8 °F (36 6 °C) (Skin)   Ht 5' 10" (1 778 m)   Wt 97 3 kg (214 lb 8 oz)   SpO2 96%   BMI 30 78 kg/m²          Physical Exam  Vitals and nursing note reviewed  Constitutional:       Appearance: He is well-developed  HENT:      Head: Normocephalic and atraumatic  Eyes:      Conjunctiva/sclera: Conjunctivae normal    Cardiovascular:      Rate and Rhythm: Normal rate and regular rhythm  Pulses:           Radial pulses are 2+ on the right side and 2+ on the left side  Dorsalis pedis pulses are 2+ on the right side and 0 on the left side  Posterior tibial pulses are 2+ on the right side and 2+ on the left side  Heart sounds: Normal heart sounds  No murmur heard  Comments: No carotid bruits B  Pulmonary:      Effort: Pulmonary effort is normal  No respiratory distress  Breath sounds: Wheezing present  No rales  Abdominal:      General: There is no distension  Palpations: Abdomen is soft  Tenderness: There is no abdominal tenderness  There is no rebound  Musculoskeletal:         General: Normal range of motion  Cervical back: Normal range of motion and neck supple  Right lower le+ Edema present  Left lower le+ Edema present  Skin:     General: Skin is warm and dry  Neurological:      Mental Status: He is alert and oriented to person, place, and time  Psychiatric:         Behavior: Behavior normal            I have reviewed and made appropriate changes to the review of systems input by the medical assistant      Vitals:    22 1516   BP: 122/90   BP Location: Left arm   Patient Position: Sitting   Cuff Size: Large   Pulse: 94   Temp: 97 8 °F (36 6 °C)   TempSrc: Skin   SpO2: 96%   Weight: 97 3 kg (214 lb 8 oz)   Height: 5' 10" (1 778 m)       Patient Active Problem List   Diagnosis    Hypertension    Hyperlipidemia    COPD (chronic obstructive pulmonary disease) (HCC)    Coronary artery disease involving native coronary artery of native heart with angina pectoris ([facility] [address] )    History of CVA (cerebrovascular accident)    Anxiety and depression    Chest pain    Diverticular disease    Chronic constipation    Diverticulitis of small intestine    Dyspeptic diarrhea    Dysphagia    ED (erectile dysfunction) of organic origin    Gastroesophageal reflux disease    Grand mal status ([facility] [address] )    Umbilical hernia    Insomnia    Tobacco dependence syndrome    Overweight    Urinary incontinence    Vitamin B deficiency    Low back pain    Seasonal allergic rhinitis    Coronary arteriosclerosis in native artery    Orthostatic hypotension    Lower extremity edema    Benign neoplasm of colon    Chronic ischemic heart disease    Hypokalemia    Aortic aneurysm (HCC)    Chronic pain of left ankle    VALDEZ (dyspnea on exertion)    Elevated brain natriuretic peptide (BNP) level       Past Surgical History:   Procedure Laterality Date    CARDIAC CATHETERIZATION  08/19/2015    LIMA occluded  No severe native lesions    CARDIAC CATHETERIZATION N/A 12/3/2021    Procedure: Cardiac Coronary Angiogram;  Surgeon: Cristhian Webb MD;  [facility]: [facility]; Service: Cardiology    CARDIAC CATHETERIZATION  12/3/2021    Procedure: Cardiac catheterization;  Surgeon: Cristhian Webb MD;  [facility]: [facility];   Service: Cardiology    COLONOSCOPY      CORONARY ARTERY BYPASS GRAFT  2000    HERNIA REPAIR      umbilical hernia x2    TONSILLECTOMY         Family History   Problem Relation Age of Onset    Cancer Mother     Heart disease Mother     Cancer Father     Cancer Maternal Grandmother     Cancer Paternal Grandfather        Social History     Socioeconomic History    Marital status: Single     Spouse name: Not on file    Number of children: Not on file    Years of education: Not on file    Highest education level: Not on file   Occupational History    Not on file   Tobacco Use    Smoking status: Current Every Day Smoker     Packs/day: 1 00     Years: 50 00     Pack years: 50 00     Types: Cigars, Pipe    Smokeless tobacco: Never Used    Tobacco comment: last cigarette was 0300 3/24/21   Vaping Use    Vaping Use: Never used   Substance and Sexual Activity    Alcohol use: Yes     Comment: Socially    Drug use: Never    Sexual activity: Not Currently   Other Topics Concern    Not on file   Social History Narrative    Not on file     Social Determinants of Health     Financial Resource Strain: Not on file   Food Insecurity: Not on file   Transportation Needs: Not on file   Physical Activity: Not on file   Stress: Not on file   Social Connections: Not on file   Intimate Partner Violence: Not on file   Housing Stability: Not on file       Allergies   Allergen Reactions    Gabapentin Headache         Current Outpatient Medications:     albuterol (2 5 mg/3 mL) 0 083 % nebulizer solution, Take 1 vial (2 5 mg total) by nebulization every 6 (six) hours as needed for wheezing or shortness of breath, Disp: 25 vial, Rfl: 2    albuterol (PROVENTIL HFA,VENTOLIN HFA) 90 mcg/act inhaler, Inhale 2 puffs every 4 (four) hours as needed for wheezing, Disp: 1 Inhaler, Rfl: 0    aspirin (ECOTRIN LOW STRENGTH) 81 mg EC tablet, Take 1 tablet (81 mg total) by mouth daily, Disp: 30 tablet, Rfl: 0    atorvastatin (LIPITOR) 40 mg tablet, Take 1 tablet (40 mg total) by mouth every evening, Disp: 90 tablet, Rfl: 0    calcium carbonate (TUMS EX) 750 mg chewable tablet, Chew 1 tablet (750 mg total) 3 (three) times a day as needed for indigestion or heartburn, Disp: 60 tablet, Rfl: 0    diltiazem (CARDIZEM CD) 180 mg 24 hr capsule, Take 1 capsule (180 mg total) by mouth daily, Disp: 30 capsule, Rfl: 5    fluticasone (FLONASE) 50 mcg/act nasal spray, 1 spray into each nostril daily, Disp: 16 g, Rfl: 0    furosemide (LASIX) 40 mg tablet, Take 1 tablet (40 mg total) by mouth daily, Disp: 90 tablet, Rfl: 3    isosorbide mononitrate (IMDUR) 30 mg 24 hr tablet, take 1 tablet by mouth once daily, Disp: 90 tablet, Rfl: 3    metoprolol succinate (TOPROL-XL) 25 mg 24 hr tablet, Take 1 tablet (25 mg total) by mouth 2 (two) times a day, Disp: 90 tablet, Rfl: 3    pantoprazole (PROTONIX) 40 mg tablet, Take 1 tablet (40 mg total) by mouth daily, Disp: 30 tablet, Rfl: 0    Potassium Chloride ER 20 MEQ TBCR, Take 20 mEq by mouth daily , Disp: , Rfl:     roflumilast (DALIRESP) 500 mcg tablet, Take 500 mcg by mouth daily, Disp: , Rfl:     tamsulosin (FLOMAX) 0 4 mg, Take 1 capsule (0 4 mg total) by mouth daily with dinner, Disp: 30 capsule, Rfl: 0    tiotropium (SPIRIVA) 18 mcg inhalation capsule, Place 1 capsule (18 mcg total) into inhaler and inhale daily, Disp: 30 capsule, Rfl: 0    DULoxetine (CYMBALTA) 60 mg delayed release capsule, Take 60 mg by mouth daily   (Patient not taking: Reported on 12/2/2021 ), Disp: , Rfl:     LORazepam (ATIVAN) 0 5 mg tablet, Take 1 tablet (0 5 mg total) by mouth daily as needed for anxiety (Patient not taking: Reported on 12/2/2021 ), Disp: 10 tablet, Rfl: 0    MELATONIN PO, Take by mouth daily at bedtime (Patient not taking: Reported on 12/2/2021 ), Disp: , Rfl:     naproxen (NAPROSYN) 500 mg tablet, Take 1 tablet (500 mg total) by mouth 2 (two) times a day with meals for 10 days, Disp: 20 tablet, Rfl: 0    nicotine (NICODERM CQ) 14 mg/24hr TD 24 hr patch, Place 1 patch on the skin daily (Patient not taking: Reported on 8/11/2020), Disp: 28 patch, Rfl: 0    nitroglycerin (NITROSTAT) 0 4 mg SL tablet, Place 0 4 mg under the tongue every 5 (five) minutes as needed for chest pain (Patient not taking: Reported on 3/1/2022 ), Disp: , Rfl:     predniSONE 10 mg tablet, 6 tabs daily for 2 days, then 5 tabs for 2 days then 4 tabs for 2 days then 3 tabs for 2 days then 2 tab for 2 days 1 tab for 2 days  then stop (Patient not taking: Reported on 12/21/2021 ), Disp: 42 tablet, Rfl: 0

## 2022-03-01 NOTE — PATIENT INSTRUCTIONS
1) GI symptoms  -I recommend that you talk to your GI doctor about your abdominal/digestion symptoms    2) Neuropathy  -the symptoms in your feet sounds like peripheral neuropathy and there are medications for this  -please talk to your primary doctor about this    3) SMA stenosis  -you have a narrowing of one of the arteries that goes to the intestines  -I do not think this is causing your symptoms  -we are going to monitor this with a yearly ultrasound    3) Right internal iliac artery  -this artery goes to the pelvis and is blocked  -this is not the cause of your symptoms  -this does not require treatment or monitoring    4) Medications  -please continue taking your aspirin  -please restart your statin; this is important for anyone who has blockages in the blood vessels    5) Smoking  -it is incredibly important that you stop smoking; this is the cause of the blockages in the arteries

## 2022-03-09 ENCOUNTER — OFFICE VISIT (OUTPATIENT)
Dept: CARDIOLOGY CLINIC | Facility: CLINIC | Age: 67
End: 2022-03-09
Payer: MEDICARE

## 2022-03-09 VITALS
HEART RATE: 88 BPM | DIASTOLIC BLOOD PRESSURE: 90 MMHG | WEIGHT: 214 LBS | HEIGHT: 70 IN | SYSTOLIC BLOOD PRESSURE: 120 MMHG | BODY MASS INDEX: 30.64 KG/M2

## 2022-03-09 DIAGNOSIS — E78.2 MIXED HYPERLIPIDEMIA: ICD-10-CM

## 2022-03-09 DIAGNOSIS — I10 PRIMARY HYPERTENSION: ICD-10-CM

## 2022-03-09 DIAGNOSIS — I25.10 CORONARY ARTERIOSCLEROSIS IN NATIVE ARTERY: ICD-10-CM

## 2022-03-09 DIAGNOSIS — I25.119 CORONARY ARTERY DISEASE INVOLVING NATIVE CORONARY ARTERY OF NATIVE HEART WITH ANGINA PECTORIS (HCC): Primary | ICD-10-CM

## 2022-03-09 PROCEDURE — 99214 OFFICE O/P EST MOD 30 MIN: CPT | Performed by: INTERNAL MEDICINE

## 2022-03-09 RX ORDER — ISOSORBIDE MONONITRATE 60 MG/1
60 TABLET, EXTENDED RELEASE ORAL DAILY
Qty: 90 TABLET | Refills: 5 | Status: SHIPPED | OUTPATIENT
Start: 2022-03-09 | End: 2022-03-23 | Stop reason: SDUPTHER

## 2022-03-09 NOTE — PROGRESS NOTES
Patient ID: Ro Tovar is a 79 y o  male  Plan:      Coronary artery disease involving native coronary artery of native heart with angina pectoris ([address] )  Cath 2015 with occluded LIMA but patent native vessels  Continue aspirin, statin, beta-blocker    Hyperlipidemia  Tolerating statin tx  Hypertension  Well controlled  Follow up Plan/Other summary comments:  1 year ecg and f/u visit  In the meantime teasing up the dose of Imdur for better anginal control  HPI: Patient seen in f/u regarding the above issues  There is occasional angina but no change in pattern from last year  To reiterate, he had heart bypass surgery in the year 2000  In 2015 heart catheterization revealed an occluded mammary graft to the LAD but no severe native lesions  Most recent or relevant cardiac/vascular testing:    Myoview 07/02/2020:  Normal ejection fraction  Small zone of ischemia in the inferoseptum  Past Surgical History:   Procedure Laterality Date    CARDIAC CATHETERIZATION  08/19/2015    LIMA occluded  No severe native lesions    CARDIAC CATHETERIZATION N/A 12/3/2021    Procedure: Cardiac Coronary Angiogram;  Surgeon: Lori Guzmán MD;  Location: [facility]; Service: Cardiology    CARDIAC CATHETERIZATION  12/3/2021    Procedure: Cardiac catheterization;  Surgeon: Lori Guzmán MD;  Location: [facility];   Service: Cardiology    COLONOSCOPY      CORONARY ARTERY BYPASS GRAFT  2000    HERNIA REPAIR      umbilical hernia x2    TONSILLECTOMY       CMP:   Lab Results   Component Value Date     06/22/2018    K 4 2 02/15/2022    K 3 4 (L) 06/22/2018     02/15/2022     06/22/2018    CO2 27 02/15/2022    CO2 24 06/22/2018    BUN 9 02/15/2022    BUN 14 06/22/2018    CREATININE 1 28 02/15/2022    CREATININE 0 86 06/22/2018    EGFR 57 02/15/2022       Lipid Profile:   Lab Results   Component Value Date    TRIG 94 12/04/2021    HDL 38 (L) 12/04/2021 Review of Systems   10  point ROS  was otherwise non pertinent or negative except as per HPI or as below  Gait: Normal          Objective:     /90   Pulse 88   Ht 5' 10" (1 778 m)   Wt 97 1 kg (214 lb)   BMI 30 71 kg/m²     PHYSICAL EXAM:      General:  Normal appearance in no distress  Eyes:  Anicteric  Oral mucosa:  Moist   Neck:  No JVD  Carotid upstrokes are brisk without bruits  No masses  Chest:  Clear to auscultation and percussion  Well-healed midline sternotomy scar  Cardiac:  Normal PMI  Normal S1 and S2  No murmur gallop or rub  Abdomen:  Soft and nontender  No palpable organomegaly or aortic enlargement  Extremities:  Trace pretibial edema  Musculoskeletal:  Symmetric  Vascular:  Femoral, popliteal and pedal pulses are diminished  Neuro:  Grossly symmetric  Psych:  Alert and oriented x3            Current Outpatient Medications:     albuterol (2 5 mg/3 mL) 0 083 % nebulizer solution, Take 1 vial (2 5 mg total) by nebulization every 6 (six) hours as needed for wheezing or shortness of breath, Disp: 25 vial, Rfl: 2    albuterol (PROVENTIL HFA,VENTOLIN HFA) 90 mcg/act inhaler, Inhale 2 puffs every 4 (four) hours as needed for wheezing, Disp: 1 Inhaler, Rfl: 0    aspirin (ECOTRIN LOW STRENGTH) 81 mg EC tablet, Take 1 tablet (81 mg total) by mouth daily, Disp: 30 tablet, Rfl: 0    atorvastatin (LIPITOR) 40 mg tablet, Take 1 tablet (40 mg total) by mouth every evening, Disp: 90 tablet, Rfl: 0    calcium carbonate (TUMS EX) 750 mg chewable tablet, Chew 1 tablet (750 mg total) 3 (three) times a day as needed for indigestion or heartburn, Disp: 60 tablet, Rfl: 0    diltiazem (CARDIZEM CD) 180 mg 24 hr capsule, Take 1 capsule (180 mg total) by mouth daily, Disp: 30 capsule, Rfl: 5    fluticasone (FLONASE) 50 mcg/act nasal spray, 1 spray into each nostril daily, Disp: 16 g, Rfl: 0    furosemide (LASIX) 40 mg tablet, Take 1 tablet (40 mg total) by mouth daily, Disp: 90 tablet, Rfl: 3    metoprolol succinate (TOPROL-XL) 25 mg 24 hr tablet, Take 1 tablet (25 mg total) by mouth 2 (two) times a day, Disp: 90 tablet, Rfl: 3    pantoprazole (PROTONIX) 40 mg tablet, Take 1 tablet (40 mg total) by mouth daily, Disp: 30 tablet, Rfl: 0    Potassium Chloride ER 20 MEQ TBCR, Take 20 mEq by mouth daily , Disp: , Rfl:     roflumilast (DALIRESP) 500 mcg tablet, Take 500 mcg by mouth daily, Disp: , Rfl:     tamsulosin (FLOMAX) 0 4 mg, Take 1 capsule (0 4 mg total) by mouth daily with dinner, Disp: 30 capsule, Rfl: 0    tiotropium (SPIRIVA) 18 mcg inhalation capsule, Place 1 capsule (18 mcg total) into inhaler and inhale daily, Disp: 30 capsule, Rfl: 0    DULoxetine (CYMBALTA) 60 mg delayed release capsule, Take 60 mg by mouth daily   (Patient not taking: Reported on 12/2/2021 ), Disp: , Rfl:     isosorbide mononitrate (IMDUR) 60 mg 24 hr tablet, Take 1 tablet (60 mg total) by mouth daily, Disp: 90 tablet, Rfl: 5    LORazepam (ATIVAN) 0 5 mg tablet, Take 1 tablet (0 5 mg total) by mouth daily as needed for anxiety (Patient not taking: Reported on 12/2/2021 ), Disp: 10 tablet, Rfl: 0    MELATONIN PO, Take by mouth daily at bedtime (Patient not taking: Reported on 12/2/2021 ), Disp: , Rfl:     naproxen (NAPROSYN) 500 mg tablet, Take 1 tablet (500 mg total) by mouth 2 (two) times a day with meals for 10 days, Disp: 20 tablet, Rfl: 0    nicotine (NICODERM CQ) 14 mg/24hr TD 24 hr patch, Place 1 patch on the skin daily (Patient not taking: Reported on 8/11/2020), Disp: 28 patch, Rfl: 0    nitroglycerin (NITROSTAT) 0 4 mg SL tablet, Place 0 4 mg under the tongue every 5 (five) minutes as needed for chest pain (Patient not taking: Reported on 3/1/2022 ), Disp: , Rfl:     predniSONE 10 mg tablet, 6 tabs daily for 2 days, then 5 tabs for 2 days then 4 tabs for 2 days then 3 tabs for 2 days then 2 tab for 2 days 1 tab for 2 days  then stop (Patient not taking: Reported on 12/21/2021 ), Disp: 42 tablet, Rfl: 0  Allergies   Allergen Reactions    Gabapentin Headache     Past Medical History:   Diagnosis Date    Acute right MCA stroke ([facility] ) 9/15/2018    Arthritis     CAD (coronary artery disease)     s/p CABG 2000    COPD [facility]ch[facility] obstructive [facility]) [facility]     GERD (gastroesophageal reflux disease)     Grand mal status ([place] [address] ) 3/24/2019    Heart attack ([place] [address] )     Heart failure ([place] [address] )     Hyperlipidemia     Hypertension     Lexiscan nuclear stress test 03/19/2016    EF 74% Normal    Myocardial infarction ([facility]     Shortness of breath     Stroke ([place] [address] )     TIA (transient ischemic attack) 09/13/2018           Social History     Tobacco Use   Smoking Status Current Every Day Smoker    Packs/day: 1 00    Years: 50 00    Pack years: 50 00    Types: Cigars, Pipe   Smokeless Tobacco Never Used   Tobacco Comment    last cigarette was 0300 3/24/21

## 2022-03-17 ENCOUNTER — APPOINTMENT (OUTPATIENT)
Dept: LAB | Facility: CLINIC | Age: 67
End: 2022-03-17
Payer: MEDICARE

## 2022-03-17 DIAGNOSIS — Z12.5 SPECIAL SCREENING FOR MALIGNANT NEOPLASM OF PROSTATE: ICD-10-CM

## 2022-03-17 DIAGNOSIS — R39.12 POOR URINARY STREAM: ICD-10-CM

## 2022-03-17 LAB — PSA SERPL-MCNC: 0.3 NG/ML (ref 0–4)

## 2022-03-17 PROCEDURE — 87086 URINE CULTURE/COLONY COUNT: CPT

## 2022-03-17 PROCEDURE — G0103 PSA SCREENING: HCPCS

## 2022-03-17 PROCEDURE — 36415 COLL VENOUS BLD VENIPUNCTURE: CPT

## 2022-03-18 LAB — BACTERIA UR CULT: NORMAL

## 2022-03-23 DIAGNOSIS — I25.10 CORONARY ARTERIOSCLEROSIS IN NATIVE ARTERY: ICD-10-CM

## 2022-03-23 NOTE — TELEPHONE ENCOUNTER
Patient called  He cannot tolerate the 60 mg of isosorbide and did try cutting the 60 mg in half to equal 30 mg, however, he was told by the pharmacist that he shouldn't do that and he does not have a pill cutter  He wants to go back on Isosorbide 30 mg daily and requested a new prescription

## 2022-03-24 RX ORDER — ISOSORBIDE MONONITRATE 30 MG/1
30 TABLET, EXTENDED RELEASE ORAL DAILY
Qty: 90 TABLET | Refills: 3 | OUTPATIENT
Start: 2022-03-24

## 2022-04-20 ENCOUNTER — OFFICE VISIT (OUTPATIENT)
Dept: INTERNAL MEDICINE CLINIC | Facility: CLINIC | Age: 67
End: 2022-04-20
Payer: MEDICARE

## 2022-04-20 VITALS
DIASTOLIC BLOOD PRESSURE: 68 MMHG | OXYGEN SATURATION: 96 % | WEIGHT: 218.25 LBS | HEART RATE: 72 BPM | SYSTOLIC BLOOD PRESSURE: 146 MMHG | BODY MASS INDEX: 31.25 KG/M2 | HEIGHT: 70 IN | TEMPERATURE: 97.6 F

## 2022-04-20 DIAGNOSIS — F51.01 PRIMARY INSOMNIA: Primary | ICD-10-CM

## 2022-04-20 DIAGNOSIS — K59.09 CHRONIC CONSTIPATION: ICD-10-CM

## 2022-04-20 DIAGNOSIS — F41.9 ANXIETY AND DEPRESSION: ICD-10-CM

## 2022-04-20 DIAGNOSIS — F32.A ANXIETY AND DEPRESSION: ICD-10-CM

## 2022-04-20 DIAGNOSIS — I10 PRIMARY HYPERTENSION: ICD-10-CM

## 2022-04-20 PROBLEM — K42.9 UMBILICAL HERNIA: Status: RESOLVED | Noted: 2019-03-24 | Resolved: 2022-04-20

## 2022-04-20 PROBLEM — R13.10 DYSPHAGIA: Status: RESOLVED | Noted: 2019-03-24 | Resolved: 2022-04-20

## 2022-04-20 PROBLEM — R07.9 CHEST PAIN: Status: RESOLVED | Noted: 2019-02-08 | Resolved: 2022-04-20

## 2022-04-20 PROBLEM — E87.6 HYPOKALEMIA: Status: RESOLVED | Noted: 2020-03-11 | Resolved: 2022-04-20

## 2022-04-20 PROBLEM — E53.9 VITAMIN B DEFICIENCY: Status: RESOLVED | Noted: 2019-03-24 | Resolved: 2022-04-20

## 2022-04-20 PROBLEM — G89.29 CHRONIC PAIN OF LEFT ANKLE: Status: RESOLVED | Noted: 2020-09-08 | Resolved: 2022-04-20

## 2022-04-20 PROBLEM — R32 URINARY INCONTINENCE: Status: RESOLVED | Noted: 2019-03-24 | Resolved: 2022-04-20

## 2022-04-20 PROBLEM — I95.1 ORTHOSTATIC HYPOTENSION: Status: RESOLVED | Noted: 2019-03-24 | Resolved: 2022-04-20

## 2022-04-20 PROBLEM — Z86.73 HISTORY OF CVA (CEREBROVASCULAR ACCIDENT): Status: RESOLVED | Noted: 2018-09-15 | Resolved: 2022-04-20

## 2022-04-20 PROBLEM — K52.9 DYSPEPTIC DIARRHEA: Status: RESOLVED | Noted: 2019-03-24 | Resolved: 2022-04-20

## 2022-04-20 PROBLEM — M25.572 CHRONIC PAIN OF LEFT ANKLE: Status: RESOLVED | Noted: 2020-09-08 | Resolved: 2022-04-20

## 2022-04-20 PROBLEM — K57.12 DIVERTICULITIS OF SMALL INTESTINE: Status: RESOLVED | Noted: 2019-03-24 | Resolved: 2022-04-20

## 2022-04-20 PROCEDURE — 99204 OFFICE O/P NEW MOD 45 MIN: CPT | Performed by: PHYSICIAN ASSISTANT

## 2022-04-20 RX ORDER — TRAZODONE HYDROCHLORIDE 50 MG/1
50-100 TABLET ORAL
Qty: 60 TABLET | Refills: 0 | Status: SHIPPED | OUTPATIENT
Start: 2022-04-20 | End: 2022-05-16

## 2022-04-20 RX ORDER — ESCITALOPRAM OXALATE 10 MG/1
10 TABLET ORAL DAILY
Qty: 90 TABLET | Refills: 3 | Status: SHIPPED | OUTPATIENT
Start: 2022-04-20 | End: 2022-06-30

## 2022-04-20 RX ORDER — LORAZEPAM 0.5 MG/1
0.5 TABLET ORAL DAILY PRN
Qty: 10 TABLET | Refills: 0 | Status: SHIPPED | OUTPATIENT
Start: 2022-04-20 | End: 2022-06-30

## 2022-04-20 RX ORDER — LINACLOTIDE 145 UG/1
1 CAPSULE, GELATIN COATED ORAL DAILY
Qty: 30 CAPSULE | Refills: 2 | Status: SHIPPED | OUTPATIENT
Start: 2022-04-20

## 2022-04-20 RX ORDER — CYCLOSPORINE 0.5 MG/ML
EMULSION OPHTHALMIC
COMMUNITY
Start: 2022-04-12

## 2022-04-20 NOTE — ASSESSMENT & PLAN NOTE
Start lexapro and restart ativan  Directions for use and possible side effects discussed and patient verbalized understanding of these  Patient was informed that this medicine has an abuse potential and may be habit forming  We discussed that this may also cause drowsiness  The medication should not be combined with alcohol  The patient was informed not to operate machinery while taking this medication and should not drive until they know how they respond to the medication  The patient verbalized understanding of this  Narcotic agreement reviewed and signed

## 2022-04-20 NOTE — PROGRESS NOTES
Assessment/Plan:  Problem List Items Addressed This Visit        Digestive    Chronic constipation     Start linzess  Directions for use and possible side effects discussed and patient verbalized understanding of these  Recheck 1 month         Relevant Medications    linaCLOtide (Linzess) 145 MCG CAPS       Cardiovascular and Mediastinum    Hypertension       Other    Anxiety and depression     Start lexapro and restart ativan  Directions for use and possible side effects discussed and patient verbalized understanding of these  Patient was informed that this medicine has an abuse potential and may be habit forming  We discussed that this may also cause drowsiness  The medication should not be combined with alcohol  The patient was informed not to operate machinery while taking this medication and should not drive until they know how they respond to the medication  The patient verbalized understanding of this  Narcotic agreement reviewed and signed  Relevant Medications    traZODone (DESYREL) 50 mg tablet    LORazepam (ATIVAN) 0 5 mg tablet    escitalopram (Lexapro) 10 mg tablet    Insomnia - Primary     Start trazodone  Directions for use and possible side effects discussed and patient verbalized understanding of these  Relevant Medications    traZODone (DESYREL) 50 mg tablet           Diagnoses and all orders for this visit:    Primary insomnia  -     traZODone (DESYREL) 50 mg tablet; Take 1-2 tablets ( mg total) by mouth daily at bedtime    Chronic constipation  -     linaCLOtide (Linzess) 145 MCG CAPS; Take 1 capsule (145 mcg total) by mouth in the morning    Anxiety and depression  -     LORazepam (ATIVAN) 0 5 mg tablet; Take 1 tablet (0 5 mg total) by mouth daily as needed for anxiety  -     escitalopram (Lexapro) 10 mg tablet;  Take 1 tablet (10 mg total) by mouth daily    Primary hypertension    Other orders  -     Restasis 0 05 % ophthalmic emulsion; instill 1 drop INTO AFFECTED EYE(S) every 12 hours        Insomnia  Start trazodone  Directions for use and possible side effects discussed and patient verbalized understanding of these  Anxiety and depression  Start lexapro and restart ativan  Directions for use and possible side effects discussed and patient verbalized understanding of these  Patient was informed that this medicine has an abuse potential and may be habit forming  We discussed that this may also cause drowsiness  The medication should not be combined with alcohol  The patient was informed not to operate machinery while taking this medication and should not drive until they know how they respond to the medication  The patient verbalized understanding of this  Narcotic agreement reviewed and signed  Chronic constipation  Start linzess  Directions for use and possible side effects discussed and patient verbalized understanding of these  Recheck 1 month      Subjective:      Patient ID: Dann Orta is a 79 y o  male  Pt presents to [facility] care  PMHx significant for HTN, hyperlipidemia, TIA, GERD, BPH, COPD, CAD, and aortic aneurysm  PSurgHX includes cardiac cath, CABG, tonsillectomy, hernia repair and CRC screening  Medications noted in the chart  He is a current 1ppd smoker x 50 years without current desire to quit  He denies alcohol or drug use  He is on disability  He is single  His parents are , mother had heart disease, father had brain aneurysm and throat CA  He is up to date with labs and covid vaccinations  HE complains of insomnia  He has trouble falling asleep and staying asleep  He tried melatonin without benefit  He also notes constipation with going several days between bowel movements  No blood in stools  He notes generalized abdominal pain as well  Denies nausea or vomiting  He has tried miralax and prunes with some improvement  Lastly he desires something to help with anxiety and depression  He tried cymbalta without benefit   He tried ativan which did help  He denies SI/HI  The following portions of the patient's history were reviewed and updated as appropriate:   He has a past medical history of Acute right MCA stroke ([facility] ) (9/15/2018), Arthritis, CAD (coronary artery disease), COPD (chronic obstructive pulmonary disease) ([facility] ), GERD (gastroesophageal reflux disease), Grand mal status ([facility] ) (3/24/2019), Heart attack ([place] [address] ), Heart failure ([place] [address] ), Hyperlipidemia, Hypertension, [facility] nuclear stress test (03/19/2016), Myocardial infarction [facility]), Shortness of breath, Stroke ([place] [address] ), and TIA (transient ischemic attack) (09/13/2018)  ,  does not have any pertinent problems on file  ,   has a past surgical history that includes Coronary artery bypass graft (2000); Cardiac catheterization (08/19/2015); Colonoscopy; Tonsillectomy; Hernia repair; Cardiac catheterization (N/A, 12/3/2021); and Cardiac catheterization (12/3/2021)  ,  family history includes Aneurysm in his father; Cancer in his father, maternal grandmother, and paternal grandfather; Heart disease in his mother  ,   reports that he has been smoking cigars, pipe, and cigarettes  He has a 50 00 pack-year smoking history  He has never used smokeless tobacco  He reports current alcohol use  He reports that he does not use drugs  ,  is allergic to gabapentin     Current Outpatient Medications   Medication Sig Dispense Refill    albuterol (2 5 mg/3 mL) 0 083 % nebulizer solution Take 1 vial (2 5 mg total) by nebulization every 6 (six) hours as needed for wheezing or shortness of breath 25 vial 2    albuterol (PROVENTIL HFA,VENTOLIN HFA) 90 mcg/act inhaler Inhale 2 puffs every 4 (four) hours as needed for wheezing 1 Inhaler 0    aspirin (ECOTRIN LOW STRENGTH) 81 mg EC tablet Take 1 tablet (81 mg total) by mouth daily 30 tablet 0    atorvastatin (LIPITOR) 40 mg tablet Take 1 tablet (40 mg total) by mouth every evening 90 tablet 0    calcium carbonate (TUMS EX) 750 mg chewable tablet Chew 1 tablet (750 mg total) 3 (three) times a day as needed for indigestion or heartburn 60 tablet 0    diltiazem (CARDIZEM CD) 180 mg 24 hr capsule Take 1 capsule (180 mg total) by mouth daily 30 capsule 5    fluticasone (FLONASE) 50 mcg/act nasal spray 1 spray into each nostril daily 16 g 0    furosemide (LASIX) 40 mg tablet Take 1 tablet (40 mg total) by mouth daily 90 tablet 3    isosorbide mononitrate (IMDUR) 30 mg 24 hr tablet Take 1 tablet (30 mg total) by mouth daily 90 tablet 3    metoprolol succinate (TOPROL-XL) 25 mg 24 hr tablet Take 1 tablet (25 mg total) by mouth 2 (two) times a day (Patient taking differently: Take 25 mg by mouth daily  ) 90 tablet 3    nitroglycerin (NITROSTAT) 0 4 mg SL tablet Place 0 4 mg under the tongue every 5 (five) minutes as needed for chest pain        pantoprazole (PROTONIX) 40 mg tablet Take 1 tablet (40 mg total) by mouth daily 30 tablet 0    Potassium Chloride ER 20 MEQ TBCR Take 20 mEq by mouth daily       roflumilast (DALIRESP) 500 mcg tablet Take 500 mcg by mouth daily      tiotropium (SPIRIVA) 18 mcg inhalation capsule Place 1 capsule (18 mcg total) into inhaler and inhale daily 30 capsule 0    escitalopram (Lexapro) 10 mg tablet Take 1 tablet (10 mg total) by mouth daily 90 tablet 3    linaCLOtide (Linzess) 145 MCG CAPS Take 1 capsule (145 mcg total) by mouth in the morning 30 capsule 2    LORazepam (ATIVAN) 0 5 mg tablet Take 1 tablet (0 5 mg total) by mouth daily as needed for anxiety 10 tablet 0    MELATONIN PO Take by mouth daily at bedtime (Patient not taking: Reported on 12/2/2021 )      naproxen (NAPROSYN) 500 mg tablet Take 1 tablet (500 mg total) by mouth 2 (two) times a day with meals for 10 days 20 tablet 0    nicotine (NICODERM CQ) 14 mg/24hr TD 24 hr patch Place 1 patch on the skin daily (Patient not taking: Reported on 8/11/2020) 28 patch 0    Restasis 0 05 % ophthalmic emulsion instill 1 drop INTO AFFECTED EYE(S) every 12 hours      tamsulosin (FLOMAX) 0 4 mg Take 1 capsule (0 4 mg total) by mouth daily with dinner 30 capsule 0    traZODone (DESYREL) 50 mg tablet Take 1-2 tablets ( mg total) by mouth daily at bedtime 60 tablet 0     No current facility-administered medications for this visit  Review of Systems   Constitutional: Negative for chills and fever  HENT: Negative for congestion, ear pain, hearing loss, postnasal drip, rhinorrhea, sinus pressure, sinus pain, sore throat and trouble swallowing  Eyes: Negative for pain and visual disturbance  Respiratory: Negative for cough, chest tightness, shortness of breath and wheezing  Cardiovascular: Negative  Negative for chest pain, palpitations and leg swelling  Gastrointestinal: Positive for abdominal pain and constipation  Negative for blood in stool, diarrhea, nausea and vomiting  Endocrine: Negative for cold intolerance, heat intolerance, polydipsia, polyphagia and polyuria  Genitourinary: Negative for difficulty urinating, dysuria, flank pain and urgency  Musculoskeletal: Negative for arthralgias, back pain, gait problem and myalgias  Skin: Negative for rash  Allergic/Immunologic: Negative  Neurological: Negative for dizziness, weakness, light-headedness and headaches  Hematological: Negative  Psychiatric/Behavioral: Positive for dysphoric mood and sleep disturbance  Negative for behavioral problems  The patient is not nervous/anxious  PHQ-2/9 Depression Screening            Objective:  Vitals:    04/20/22 1351   BP: 146/68   BP Location: Left arm   Patient Position: Sitting   Pulse: 72   Temp: 97 6 °F (36 4 °C)   SpO2: 96%   Weight: 99 kg (218 lb 4 oz)   Height: 5' 10" (1 778 m)     Body mass index is 31 32 kg/m²  Physical Exam  Constitutional:       General: He is not in acute distress  Appearance: He is well-developed  He is not diaphoretic  HENT:      Head: Normocephalic and atraumatic        Right Ear: External ear normal  Left Ear: External ear normal       Nose: Nose normal       Mouth/Throat:      Pharynx: No oropharyngeal exudate  Eyes:      General: No scleral icterus  Right eye: No discharge  Left eye: No discharge  Conjunctiva/sclera: Conjunctivae normal       Pupils: Pupils are equal, round, and reactive to light  Neck:      Thyroid: No thyromegaly  Cardiovascular:      Rate and Rhythm: Normal rate and regular rhythm  Heart sounds: Normal heart sounds  No murmur heard  No friction rub  No gallop  Pulmonary:      Effort: Pulmonary effort is normal  No respiratory distress  Breath sounds: Normal breath sounds  No wheezing or rales  Abdominal:      General: Bowel sounds are normal  There is no distension  Palpations: Abdomen is soft  Tenderness: There is no abdominal tenderness  Musculoskeletal:         General: No tenderness or deformity  Normal range of motion  Cervical back: Normal range of motion and neck supple  Skin:     General: Skin is warm and dry  Neurological:      Mental Status: He is alert and oriented to person, place, and time  Cranial Nerves: No cranial nerve deficit  Psychiatric:         Mood and Affect: Mood is anxious and depressed  Behavior: Behavior normal          Thought Content: Thought content normal          Judgment: Judgment normal        BMI Counseling: Body mass index is 31 32 kg/m²  The BMI is above normal  Nutrition recommendations include decreasing portion sizes, consuming healthier snacks and limiting drinks that contain sugar  Rationale for BMI follow-up plan is due to patient being overweight or obese  Tobacco Cessation Counseling: Tobacco cessation counseling was provided   The patient is sincerely urged to quit consumption of tobacco  He is not ready to quit tobacco

## 2022-04-20 NOTE — ASSESSMENT & PLAN NOTE
Start linzess  Directions for use and possible side effects discussed and patient verbalized understanding of these    Recheck 1 month

## 2022-05-04 ENCOUNTER — RA CDI HCC (OUTPATIENT)
Dept: OTHER | Facility: HOSPITAL | Age: 67
End: 2022-05-04

## 2022-05-04 NOTE — PROGRESS NOTES
E11 [id number], I13 0  [facility]  coding opportunities          Chart Reviewed number of suggestions sent to Provider: 2     Patients Insurance     Medicare Insurance: [employer]

## 2022-05-11 ENCOUNTER — TELEPHONE (OUTPATIENT)
Dept: INTERNAL MEDICINE CLINIC | Facility: CLINIC | Age: 67
End: 2022-05-11

## 2022-05-11 DIAGNOSIS — K44.9 HIATAL HERNIA: ICD-10-CM

## 2022-05-11 RX ORDER — PANTOPRAZOLE SODIUM 40 MG/1
TABLET, DELAYED RELEASE ORAL
Qty: 90 TABLET | Refills: 0 | Status: SHIPPED | OUTPATIENT
Start: 2022-05-11 | End: 2022-08-06

## 2022-05-11 NOTE — TELEPHONE ENCOUNTER
Pt needs pantoprazole (PROTONIX) 40 mg tablet refilled, 90 day supply, send to [facility] on [facility]

## 2022-05-14 DIAGNOSIS — F51.01 PRIMARY INSOMNIA: ICD-10-CM

## 2022-05-16 RX ORDER — TRAZODONE HYDROCHLORIDE 50 MG/1
TABLET ORAL
Qty: 60 TABLET | Refills: 0 | Status: SHIPPED | OUTPATIENT
Start: 2022-05-16 | End: 2022-05-25

## 2022-05-25 ENCOUNTER — OFFICE VISIT (OUTPATIENT)
Dept: INTERNAL MEDICINE CLINIC | Facility: CLINIC | Age: 67
End: 2022-05-25
Payer: MEDICARE

## 2022-05-25 VITALS
SYSTOLIC BLOOD PRESSURE: 132 MMHG | BODY MASS INDEX: 31.68 KG/M2 | HEART RATE: 78 BPM | WEIGHT: 221.25 LBS | OXYGEN SATURATION: 96 % | TEMPERATURE: 97.8 F | DIASTOLIC BLOOD PRESSURE: 74 MMHG | HEIGHT: 70 IN

## 2022-05-25 DIAGNOSIS — F51.01 PRIMARY INSOMNIA: ICD-10-CM

## 2022-05-25 DIAGNOSIS — Z12.11 SCREENING FOR COLON CANCER: ICD-10-CM

## 2022-05-25 DIAGNOSIS — K59.09 CHRONIC CONSTIPATION: Primary | ICD-10-CM

## 2022-05-25 DIAGNOSIS — Z00.00 MEDICARE ANNUAL WELLNESS VISIT, SUBSEQUENT: ICD-10-CM

## 2022-05-25 PROCEDURE — G0439 PPPS, SUBSEQ VISIT: HCPCS | Performed by: PHYSICIAN ASSISTANT

## 2022-05-25 PROCEDURE — 99214 OFFICE O/P EST MOD 30 MIN: CPT | Performed by: PHYSICIAN ASSISTANT

## 2022-05-25 RX ORDER — ESZOPICLONE 3 MG/1
3 TABLET, FILM COATED ORAL
Qty: 30 TABLET | Refills: 2 | Status: SHIPPED | OUTPATIENT
Start: 2022-05-25 | End: 2022-06-30

## 2022-05-25 RX ORDER — LINACLOTIDE 290 UG/1
1 CAPSULE, GELATIN COATED ORAL DAILY
Qty: 30 CAPSULE | Refills: 2 | Status: SHIPPED | OUTPATIENT
Start: 2022-05-25 | End: 2022-06-30

## 2022-05-25 NOTE — PROGRESS NOTES
Assessment and Plan:     Problem List Items Addressed This Visit        Digestive    Chronic constipation - Primary     Keep upcoming GI appt  Increase linzess to 290mcg  Relevant Medications    linaCLOtide (Linzess) 290 MCG CAPS       Other    Insomnia     Will try lunesta in place of trazodone  Directions for use and possible side effects discussed and patient verbalized understanding of these  Relevant Medications    eszopiclone (LUNESTA) 3 MG tablet    Screening for colon cancer      Other Visit Diagnoses     Medicare annual wellness visit, subsequent              Falls Plan of Care: balance, strength, and gait training instructions were provided  Preventive health issues were discussed with patient, and age appropriate screening tests were ordered as noted in patient's After Visit Summary  Personalized health advice and appropriate referrals for health education or preventive services given if needed, as noted in patient's After Visit Summary       History of Present Illness:     Patient presents for Medicare Annual Wellness visit    Patient Care Team:  Cristal Diaz PA-C as PCP - General (Internal Medicine)  Ale Vinson MD as PCP - [address] [place] [address] ([facility])  Janette Taylor MD     Problem List:     Patient Active Problem List   Diagnosis    Hypertension    Hyperlipidemia    COPD [facility]chronic obstructive pulmonary disease) [facility] [facility]    Coronary artery disease involving native coronary artery of native heart with angina pectoris ([facility] [address] [facility] )    Anxiety and depression    Diverticular disease    Chronic constipation    ED (erectile dysfunction) of organic origin    Gastroesophageal reflux disease    Grand mal status ([facility] [facility] )    Insomnia    Tobacco dependence syndrome    Overweight    Seasonal allergic rhinitis    Lower extremity edema    Benign neoplasm of colon    Aortic aneurysm ([facility] [facility]    Superior mesenteric artery stenosis (HCC)    Occlusion of right internal iliac artery [facility])    Peripheral neuropathy    Screening for colon cancer      Past Medical and Surgical History:     Past Medical History:   Diagnosis Date    Acute right MCA stroke ([facility] [address] ) 9/15/2018    Arthritis     CAD (coronary artery disease)     s/p CABG 2000    COPD (chronic obstructive pulmonary disease) (HCC)     GERD (gastroesophageal reflux disease)     Grand mal status ([facility] ) 3/24/2019    Heart attack ([facility] [facility] )     Heart failure ([facility] [facility] )     Hyperlipidemia     Hypertension     Lexiscan nuclear stress test 03/19/2016    EF 74% Normal    Myocardial infarction ([facility] [address] )     Shortness of breath     Stroke ([facility] [address] )     TIA (transient ischemic attack) 09/13/2018     Past Surgical History:   Procedure Laterality Date    CARDIAC CATHETERIZATION  08/19/2015    LIMA occluded  No severe native lesions    CARDIAC CATHETERIZATION N/A 12/3/2021    Procedure: Cardiac Coronary Angiogram;  Surgeon: Chula Bruner MD;  Location: [facility]; Service: Cardiology    CARDIAC CATHETERIZATION  12/3/2021    Procedure: Cardiac catheterization;  Surgeon: Chula Bruner MD;  Location: [facility];   Service: Cardiology    COLONOSCOPY      CORONARY ARTERY BYPASS GRAFT  2000    HERNIA REPAIR      umbilical hernia x2    TONSILLECTOMY        Family History:     Family History   Problem Relation Age of Onset    Heart disease Mother     Cancer Father     Aneurysm Father     Cancer Maternal Grandmother     Cancer Paternal Grandfather       Social History:     Social History     Socioeconomic History    Marital status: Single     Spouse name: None    Number of children: None    Years of education: None    Highest education level: None   Occupational History    None   Tobacco Use    Smoking status: Current Every Day Smoker     Packs/day: 1 00     Years: 50 00     Pack years: 50 00     Types: Cigars, Pipe, Cigarettes    Smokeless tobacco: Never Used   Vaping Use    Vaping Use: Never used   Substance and Sexual Activity    Alcohol use: Yes     Comment: Socially    Drug use: Never    Sexual activity: Not Currently   Other Topics Concern    None   Social History Narrative    None     Social Determinants of Health     Financial Resource Strain: Not on file   Food Insecurity: Not on file   Transportation Needs: Not on file   Physical Activity: Not on file   Stress: Not on file   Social Connections: Not on file   Intimate Partner Violence: Not on file   Housing Stability: Not on file      Medications and Allergies:     Current Outpatient Medications   Medication Sig Dispense Refill    albuterol (2 5 mg/3 mL) 0 083 % nebulizer solution Take 1 vial (2 5 mg total) by nebulization every 6 (six) hours as needed for wheezing or shortness of breath 25 vial 2    albuterol (PROVENTIL HFA,VENTOLIN HFA) 90 mcg/act inhaler Inhale 2 puffs every 4 (four) hours as needed for wheezing 1 Inhaler 0    aspirin (ECOTRIN LOW STRENGTH) 81 mg EC tablet Take 1 tablet (81 mg total) by mouth daily 30 tablet 0    atorvastatin (LIPITOR) 40 mg tablet Take 1 tablet (40 mg total) by mouth every evening 90 tablet 0    calcium carbonate (TUMS EX) 750 mg chewable tablet Chew 1 tablet (750 mg total) 3 (three) times a day as needed for indigestion or heartburn 60 tablet 0    diltiazem (CARDIZEM CD) 180 mg 24 hr capsule Take 1 capsule (180 mg total) by mouth daily 30 capsule 5    eszopiclone (LUNESTA) 3 MG tablet Take 1 tablet (3 mg total) by mouth daily at bedtime as needed for sleep Take immediately before bedtime 30 tablet 2    fluticasone (FLONASE) 50 mcg/act nasal spray 1 spray into each nostril daily 16 g 0    furosemide (LASIX) 40 mg tablet Take 1 tablet (40 mg total) by mouth daily 90 tablet 3    isosorbide mononitrate (IMDUR) 30 mg 24 hr tablet Take 1 tablet (30 mg total) by mouth daily 90 tablet 3    linaCLOtide (Linzess) 145 MCG CAPS Take 1 capsule (145 mcg total) by mouth in the morning 30 capsule 2    linaCLOtide (Linzess) 290 MCG CAPS Take 1 capsule by mouth in the morning 30 capsule 2    LORazepam (ATIVAN) 0 5 mg tablet Take 1 tablet (0 5 mg total) by mouth daily as needed for anxiety 10 tablet 0    metoprolol succinate (TOPROL-XL) 25 mg 24 hr tablet Take 1 tablet (25 mg total) by mouth 2 (two) times a day (Patient taking differently: Take 25 mg by mouth daily) 90 tablet 3    nitroglycerin (NITROSTAT) 0 4 mg SL tablet Place 0 4 mg under the tongue every 5 (five) minutes as needed for chest pain        pantoprazole (PROTONIX) 40 mg tablet swallow 1 tablet once daily 90 tablet 0    Potassium Chloride ER 20 MEQ TBCR Take 20 mEq by mouth daily       Restasis 0 05 % ophthalmic emulsion instill 1 drop INTO AFFECTED EYE(S) every 12 hours      tiotropium (SPIRIVA) 18 mcg inhalation capsule Place 1 capsule (18 mcg total) into inhaler and inhale daily 30 capsule 0    escitalopram (Lexapro) 10 mg tablet Take 1 tablet (10 mg total) by mouth daily (Patient not taking: Reported on 5/25/2022) 90 tablet 3    MELATONIN PO Take by mouth daily at bedtime (Patient not taking: Reported on 5/25/2022)      naproxen (NAPROSYN) 500 mg tablet Take 1 tablet (500 mg total) by mouth 2 (two) times a day with meals for 10 days 20 tablet 0    nicotine (NICODERM CQ) 14 mg/24hr TD 24 hr patch Place 1 patch on the skin daily (Patient not taking: No sig reported) 28 patch 0    roflumilast (DALIRESP) 500 mcg tablet Take 500 mcg by mouth daily (Patient not taking: Reported on 5/25/2022)      tamsulosin (FLOMAX) 0 4 mg Take 1 capsule (0 4 mg total) by mouth daily with dinner 30 capsule 0     No current facility-administered medications for this visit       Allergies   Allergen Reactions    Gabapentin Headache      Immunizations:     Immunization History   Administered Date(s) Administered    COVID-19 MODERNA VACC 0 5 ML IM 04/07/2021, 05/07/2021, 12/10/2021    INFLUENZA 11/19/2010, 11/18/2011, 09/16/2014, 09/20/2015, 11/06/2016, 02/14/2018, 11/07/2018    Influenza Quadrivalent 3 years and older 11/07/2018    Influenza, injectable, quadrivalent, preservative free 0 5 mL 11/07/2018, 09/11/2019    Influenza, seasonal, injectable 11/19/2010, 11/18/2011    Pneumococcal Conjugate 13-Valent 11/14/2015    Pneumococcal Polysaccharide PPV23 01/01/2010, 02/14/2018    Tdap 01/11/2011    Tuberculin Skin Test-PPD Intradermal 01/31/2011    Zoster 12/03/2015      Health Maintenance:         Topic Date Due    Hepatitis C Screening  Never done    Colorectal Cancer Screening  Never done    Lung Cancer Screening  02/15/2023         Topic Date Due    DTaP,Tdap,and Td Vaccines (2 - Td or Tdap) 01/11/2021    COVID-19 Vaccine (4 - Booster for Moderna series) 04/10/2022      Medicare Health Risk Assessment:     /74 (BP Location: Left arm, Patient Position: Sitting)   Pulse 78   Temp 97 8 °F (36 6 °C)   Ht 5' 10" (1 778 m)   Wt 100 kg (221 lb 4 oz)   SpO2 96%   BMI 31 75 kg/m²      Artie Lock is here for his Subsequent Wellness visit  Last Medicare Wellness visit information reviewed, patient interviewed and updates made to the record today  Health Risk Assessment:   Patient rates overall health as fair  Patient feels that their physical health rating is much worse  Patient is dissatisfied with their life  Eyesight was rated as slightly worse  Hearing was rated as slightly worse  Patient feels that their emotional and mental health rating is much worse  Patients states they are sometimes angry  Patient states they are often unusually tired/fatigued  Pain experienced in the last 7 days has been a lot  Patient's pain rating has been 6/10  Patient states that he has experienced no weight loss or gain in last 6 months  Depression Screening:   PHQ-9 Score: 18      Fall Risk Screening:    In the past year, patient has experienced: history of falling in past year    Number of falls: 1  Injured during fall?: Yes    Feels unsteady when standing or walking?: Yes    Worried about falling?: Yes      Home Safety:  Patient has trouble with stairs inside or outside of their home  Patient has working smoke alarms and has no working carbon monoxide detector  Home safety hazards include: poor household lighting  Nutrition:   Current diet is Regular  Medications:   Patient is currently taking over-the-counter supplements  OTC medications include: see medication list  Patient is able to manage medications  Activities of Daily Living (ADLs)/Instrumental Activities of Daily Living (IADLs):   Walk and transfer into and out of bed and chair?: Yes  Dress and groom yourself?: Yes    Bathe or shower yourself?: Yes    Feed yourself?  Yes  Do your laundry/housekeeping?: Yes  Manage your money, pay your bills and track your expenses?: Yes  Make your own meals?: Yes    Do your own shopping?: Yes    Previous Hospitalizations:   Any hospitalizations or ED visits within the last 12 months?: Yes    How many hospitalizations have you had in the last year?: 3-4    Advance Care Planning:   Living will: Yes    Durable POA for healthcare: No    Advanced directive: Yes      Cognitive Screening:   Provider or family/friend/caregiver concerned regarding cognition?: No    PREVENTIVE SCREENINGS      Cardiovascular Screening:    General: Screening Not Indicated and History Lipid Disorder      Diabetes Screening:     General: Screening Current      Prostate Cancer Screening:    General: Screening Current      Abdominal Aortic Aneurysm (AAA) Screening:    Risk factors include: age between 73-69 yo and tobacco use        Lung Cancer Screening:     General: Screening Current      Hepatitis C Screening:    General: Screening Not Indicated    Screening, Brief Intervention, and Referral to Treatment (SBIRT)    Screening    Typical number of drinks in a week: 0    Single Item Drug Screening:  How often have you used an illegal drug (including marijuana) or a prescription medication for non-medical reasons in the past year? never    Single Item Drug Screen Score: 0  Interpretation: Negative screen for possible drug use disorder      Candida Loja PA-C

## 2022-05-25 NOTE — PATIENT INSTRUCTIONS
Medicare Preventive Visit Patient Instructions  Thank you for completing your Welcome to Medicare Visit or Medicare Annual Wellness Visit today  Your next wellness visit will be due in one year (5/26/2023)  The screening/preventive services that you may require over the next 5-10 years are detailed below  Some tests may not apply to you based off risk factors and/or age  Screening tests ordered at today's visit but not completed yet may show as past due  Also, please note that scanned in results may not display below  Preventive Screenings:  Service Recommendations Previous Testing/Comments   Colorectal Cancer Screening  · Colonoscopy    · Fecal Occult Blood Test (FOBT)/Fecal Immunochemical Test (FIT)  · Fecal DNA/Cologuard Test  · Flexible Sigmoidoscopy Age: 54-65 years old   Colonoscopy: every 10 years (May be performed more frequently if at higher risk)  OR  FOBT/FIT: every 1 year  OR  Cologuard: every 3 years  OR  Sigmoidoscopy: every 5 years  Screening may be recommended earlier than age 48 if at higher risk for colorectal cancer  Also, an individualized decision between you and your healthcare provider will decide whether screening between the ages of 74-80 would be appropriate  Colonoscopy: Not on file  FOBT/FIT: Not on file  Cologuard: Not on file  Sigmoidoscopy: Not on file          Prostate Cancer Screening Individualized decision between patient and health care provider in men between ages of 53-78   Medicare will cover every 12 months beginning on the day after your 50th birthday PSA: 0 3 ng/mL     Screening Current     Hepatitis C Screening Once for adults born between [facility]  More frequently in patients at high risk for Hepatitis C Hep C Antibody: Not on file        Diabetes Screening 1-2 times per year if you're at risk for diabetes or have pre-diabetes Fasting glucose: 87 mg/dL   A1C: 5 5 %    Screening Current   Cholesterol Screening Once every 5 years if you don't have a lipid disorder  May order more often based on risk factors  Lipid panel: 12/04/2021    Screening Not Indicated  History Lipid Disorder      Other Preventive Screenings Covered by Medicare:  1  Abdominal Aortic Aneurysm (AAA) Screening: covered once if your at risk  You're considered to be at risk if you have a family history of AAA or a male between the age of 73-68 who smoking at least 100 cigarettes in your lifetime  2  Lung Cancer Screening: covers low dose CT scan once per year if you meet all of the following conditions: (1) Age 50-69; (2) No signs or symptoms of lung cancer; (3) Current smoker or have quit smoking within the last 15 years; (4) You have a tobacco smoking history of at least 30 pack years (packs per day x number of years you smoked); (5) You get a written order from a healthcare provider  3  Glaucoma Screening: covered annually if you're considered high risk: (1) You have diabetes OR (2) Family history of glaucoma OR (3)  aged 48 and older OR (3)  American aged 72 and older  3  Osteoporosis Screening: covered every 2 years if you meet one of the following conditions: (1) Have a vertebral abnormality; (2) On glucocorticoid therapy for more than 3 months; (3) Have primary hyperparathyroidism; (4) On osteoporosis medications and need to assess response to drug therapy  5  HIV Screening: covered annually if you're between the age of 12-76  Also covered annually if you are younger than 13 and older than 72 with risk factors for HIV infection  For pregnant patients, it is covered up to 3 times per pregnancy      Immunizations:  Immunization Recommendations   Influenza Vaccine Annual influenza vaccination during flu season is recommended for all persons aged >= 6 months who do not have contraindications   Pneumococcal Vaccine (Prevnar and Pneumovax)  * Prevnar = PCV13  * Pneumovax = PPSV23 Adults 25-60 years old: 1-3 doses may be recommended based on certain risk factors  Adults 65+ years old: Prevnar (PCV13) vaccine recommended followed by Pneumovax (PPSV23) vaccine  If already received PPSV23 since turning 65, then PCV13 recommended at least one year after PPSV23 dose  Hepatitis B Vaccine 3 dose series if at intermediate or high risk (ex: diabetes, end stage renal disease, liver disease)   Tetanus (Td) Vaccine - COST NOT COVERED BY MEDICARE PART B Following completion of primary series, a booster dose should be given every 10 years to maintain immunity against tetanus  Td may also be given as tetanus wound prophylaxis  Tdap Vaccine - COST NOT COVERED BY MEDICARE PART B Recommended at least once for all adults  For pregnant patients, recommended with each pregnancy  Shingles Vaccine (Shingrix) - COST NOT COVERED BY MEDICARE PART B  2 shot series recommended in those aged 48 and above     Health Maintenance Due:      Topic Date Due    Hepatitis C Screening  Never done    Colorectal Cancer Screening  Never done    Lung Cancer Screening  02/15/2023     Immunizations Due:      Topic Date Due    DTaP,Tdap,and Td Vaccines (2 - Td or Tdap) 01/11/2021    COVID-19 Vaccine (4 - Booster for Garrett Grout series) 04/10/2022     Advance Directives   What are advance directives? Advance directives are legal documents that state your wishes and plans for medical care  These plans are made ahead of time in case you lose your ability to make decisions for yourself  Advance directives can apply to any medical decision, such as the treatments you want, and if you want to donate organs  What are the types of advance directives? There are many types of advance directives, and each state has rules about how to use them  You may choose a combination of any of the following:  · Living will: This is a written record of the treatment you want  You can also choose which treatments you do not want, which to limit, and which to stop at a certain time  This includes surgery, medicine, IV fluid, and tube feedings  · Durable power of  for healthcare [facility]): This is a written record that states who you want to make healthcare choices for you when you are unable to make them for yourself  This person, called a proxy, is usually a family member or a friend  You may choose more than 1 proxy  · Do not resuscitate (DNR) order:  A DNR order is used in case your heart stops beating or you stop breathing  It is a request not to have certain forms of treatment, such as CPR  A DNR order may be included in other types of advance directives  · Medical directive: This covers the care that you want if you are in a coma, near death, or unable to make decisions for yourself  You can list the treatments you want for each condition  Treatment may include pain medicine, surgery, blood transfusions, dialysis, IV or tube feedings, and a ventilator (breathing machine)  · Values history: This document has questions about your views, beliefs, and how you feel and think about life  This information can help others choose the care that you would choose  Why are advance directives important? An advance directive helps you control your care  Although spoken wishes may be used, it is better to have your wishes written down  Spoken wishes can be misunderstood, or not followed  Treatments may be given even if you do not want them  An advance directive may make it easier for your family to make difficult choices about your care  Fall Prevention    Fall prevention  includes ways to make your home and other areas safer  It also includes ways you can move more carefully to prevent a fall  Health conditions that cause changes in your blood pressure, vision, or muscle strength and coordination may increase your risk for falls  Medicines may also increase your risk for falls if they make you dizzy, weak, or sleepy  Fall prevention tips:   · Stand or sit up slowly  · Use assistive devices as directed      · Wear shoes that fit well and have soles that   · Wear a personal alarm  · Stay active  · Manage your medical conditions  Home Safety Tips:  · Add items to prevent falls in the bathroom  · Keep paths clear  · Install bright lights in your home  · Keep items you use often on shelves within reach  · Paint or place reflective tape on the edges of your stairs  Cigarette Smoking and Your Health   Risks to your health if you smoke:  Nicotine and other chemicals found in tobacco damage every cell in your body  Even if you are a light smoker, you have an increased risk for cancer, heart disease, and lung disease  If you are pregnant or have diabetes, smoking increases your risk for complications  Benefits to your health if you stop smoking:   · You decrease respiratory symptoms such as coughing, wheezing, and shortness of breath  · You reduce your risk for cancers of the lung, mouth, throat, kidney, bladder, pancreas, stomach, and cervix  If you already have cancer, you increase the benefits of chemotherapy  You also reduce your risk for cancer returning or a second cancer from developing  · You reduce your risk for heart disease, blood clots, heart attack, and stroke  · You reduce your risk for lung infections, and diseases such as pneumonia, asthma, chronic bronchitis, and emphysema  · Your circulation improves  More oxygen can be delivered to your body  If you have diabetes, you lower your risk for complications, such as kidney, artery, and eye diseases  You also lower your risk for nerve damage  Nerve damage can lead to amputations, poor vision, and blindness  · You improve your body's ability to heal and to fight infections  For more information and support to stop smoking:   · [employer]  Phone: 3- 979 - 564-5189  Web Address: www BERD  Weight Management   Why it is important to manage your weight:  Being overweight increases your risk of health conditions such as heart disease, high blood pressure, type 2 diabetes, and certain types of cancer  It can also increase your risk for osteoarthritis, sleep apnea, and other respiratory problems  Aim for a slow, steady weight loss  Even a small amount of weight loss can lower your risk of health problems  How to lose weight safely:  A safe and healthy way to lose weight is to eat fewer calories and get regular exercise  You can lose up about 1 pound a week by decreasing the number of calories you eat by 500 calories each day  Healthy meal plan for weight management:  A healthy meal plan includes a variety of foods, contains fewer calories, and helps you stay healthy  A healthy meal plan includes the following:  · Eat whole-grain foods more often  A healthy meal plan should contain fiber  Fiber is the part of grains, fruits, and vegetables that is not broken down by your body  Whole-grain foods are healthy and provide extra fiber in your diet  Some examples of whole-grain foods are whole-wheat breads and pastas, oatmeal, brown rice, and bulgur  · Eat a variety of vegetables every day  Include dark, leafy greens such as spinach, kale, jeanie greens, and mustard greens  Eat yellow and orange vegetables such as carrots, sweet potatoes, and winter squash  · Eat a variety of fruits every day  Choose fresh or canned fruit (canned in its own juice or light syrup) instead of juice  Fruit juice has very little or no fiber  · Eat low-fat dairy foods  Drink fat-free (skim) milk or 1% milk  Eat fat-free yogurt and low-fat cottage cheese  Try low-fat cheeses such as mozzarella and other reduced-fat cheeses  · Choose meat and other protein foods that are low in fat  Choose beans or other legumes such as split peas or lentils  Choose fish, skinless poultry (chicken or turkey), or lean cuts of red meat (beef or pork)  Before you cook meat or poultry, cut off any visible fat  · Use less fat and oil  Try baking foods instead of frying them   Add less fat, such as margarine, sour cream, regular salad dressing and mayonnaise to foods  Eat fewer high-fat foods  Some examples of high-fat foods include french fries, doughnuts, ice cream, and cakes  · Eat fewer sweets  Limit foods and drinks that are high in sugar  This includes candy, cookies, regular soda, and sweetened drinks  Exercise:  Exercise at least 30 minutes per day on most days of the week  Some examples of exercise include walking, biking, dancing, and swimming  You can also fit in more physical activity by taking the stairs instead of the elevator or parking farther away from stores  Ask your healthcare provider about the best exercise plan for you  © Copyright [employer] 2018 Information is for End User's use only and may not be sold, redistributed or otherwise used for commercial purposes   All illustrations and images included in CareNotes® are the copyrighted property of A D A M , Inc  or [address] [place] [address]

## 2022-05-26 ENCOUNTER — TELEPHONE (OUTPATIENT)
Dept: INTERNAL MEDICINE CLINIC | Facility: CLINIC | Age: 67
End: 2022-05-26

## 2022-05-26 PROBLEM — Z12.11 SCREENING FOR COLON CANCER: Status: ACTIVE | Noted: 2022-05-26

## 2022-05-26 NOTE — ASSESSMENT & PLAN NOTE
Will try lunesta in place of trazodone  Directions for use and possible side effects discussed and patient verbalized understanding of these

## 2022-05-26 NOTE — PROGRESS NOTES
Assessment/Plan:  Problem List Items Addressed This Visit        Digestive    Chronic constipation - Primary     Keep upcoming GI appt  Increase linzess to 290mcg  Relevant Medications    linaCLOtide (Linzess) 290 MCG CAPS       Other    Insomnia     Will try lunesta in place of trazodone  Directions for use and possible side effects discussed and patient verbalized understanding of these  Relevant Medications    eszopiclone (LUNESTA) 3 MG tablet    Screening for colon cancer      Other Visit Diagnoses     Medicare annual wellness visit, subsequent               Diagnoses and all orders for this visit:    Chronic constipation  -     linaCLOtide (Linzess) 290 MCG CAPS; Take 1 capsule by mouth in the morning    Medicare annual wellness visit, subsequent    Screening for colon cancer  -     Cancel: Ambulatory referral for colonoscopy; Future    Primary insomnia  -     eszopiclone (LUNESTA) 3 MG tablet; Take 1 tablet (3 mg total) by mouth daily at bedtime as needed for sleep Take immediately before bedtime      Insomnia  Will try lunesta in place of trazodone  Directions for use and possible side effects discussed and patient verbalized understanding of these  Chronic constipation  Keep upcoming GI appt  Increase linzess to 290mcg  Subjective:      Patient ID: Bernarda Boston is a 79 y o  male  Pt presents for routine visit  He is up to date with covid vaccination, labs and lung screenign CT  He is due for CRC screening and this is scheduled  He notes he got a headache from trazodone and would like to try an alternative  He also tried inzess for his constipation which did help somewhat but he feels he would benefit from a higher dose  He also did not like the lexapro but does not wish to try an alternative at this time         The following portions of the patient's history were reviewed and updated as appropriate:   He has a past medical history of Acute right MCA stroke ([facility] ) (9/15/2018), Arthritis, CAD (coronary artery disease), COPD (chronic obstructive pulmonary disease) ([place] [address] ), GERD (gastroesophageal reflux disease), Grand mal status ([facility] ) (3/24/2019), Heart attack ([place] [address] ), Heart failure ([place] [address] ), Hyperlipidemia, Hypertension, [place] nuclear stress test (03/19/2016), Myocardial infarction [facility]), Shortness of breath, Stroke ([place] [address] ), and TIA (transient ischemic attack) (09/13/2018)  ,  does not have any pertinent problems on file  ,   has a past surgical history that includes Coronary artery bypass graft (2000); Cardiac catheterization (08/19/2015); Colonoscopy; Tonsillectomy; Hernia repair; Cardiac catheterization (N/A, 12/3/2021); and Cardiac catheterization (12/3/2021)  ,  family history includes Aneurysm in his father; Cancer in his father, maternal grandmother, and paternal grandfather; Heart disease in his mother  ,   reports that he has been smoking cigars, pipe, and cigarettes  He has a 50 00 pack-year smoking history  He has never used smokeless tobacco  He reports current alcohol use  He reports that he does not use drugs  ,  is allergic to gabapentin     Current Outpatient Medications   Medication Sig Dispense Refill    albuterol (2 5 mg/3 mL) 0 083 % nebulizer solution Take 1 vial (2 5 mg total) by nebulization every 6 (six) hours as needed for wheezing or shortness of breath 25 vial 2    albuterol (PROVENTIL HFA,VENTOLIN HFA) 90 mcg/act inhaler Inhale 2 puffs every 4 (four) hours as needed for wheezing 1 Inhaler 0    aspirin (ECOTRIN LOW STRENGTH) 81 mg EC tablet Take 1 tablet (81 mg total) by mouth daily 30 tablet 0    atorvastatin (LIPITOR) 40 mg tablet Take 1 tablet (40 mg total) by mouth every evening 90 tablet 0    calcium carbonate (TUMS EX) 750 mg chewable tablet Chew 1 tablet (750 mg total) 3 (three) times a day as needed for indigestion or heartburn 60 tablet 0    diltiazem (CARDIZEM CD) 180 mg 24 hr capsule Take 1 capsule (180 mg total) by mouth daily 30 capsule 5    eszopiclone (LUNESTA) 3 MG tablet Take 1 tablet (3 mg total) by mouth daily at bedtime as needed for sleep Take immediately before bedtime 30 tablet 2    fluticasone (FLONASE) 50 mcg/act nasal spray 1 spray into each nostril daily 16 g 0    furosemide (LASIX) 40 mg tablet Take 1 tablet (40 mg total) by mouth daily 90 tablet 3    isosorbide mononitrate (IMDUR) 30 mg 24 hr tablet Take 1 tablet (30 mg total) by mouth daily 90 tablet 3    linaCLOtide (Linzess) 145 MCG CAPS Take 1 capsule (145 mcg total) by mouth in the morning 30 capsule 2    linaCLOtide (Linzess) 290 MCG CAPS Take 1 capsule by mouth in the morning 30 capsule 2    LORazepam (ATIVAN) 0 5 mg tablet Take 1 tablet (0 5 mg total) by mouth daily as needed for anxiety 10 tablet 0    metoprolol succinate (TOPROL-XL) 25 mg 24 hr tablet Take 1 tablet (25 mg total) by mouth 2 (two) times a day (Patient taking differently: Take 25 mg by mouth daily) 90 tablet 3    nitroglycerin (NITROSTAT) 0 4 mg SL tablet Place 0 4 mg under the tongue every 5 (five) minutes as needed for chest pain        pantoprazole (PROTONIX) 40 mg tablet swallow 1 tablet once daily 90 tablet 0    Potassium Chloride ER 20 MEQ TBCR Take 20 mEq by mouth daily       Restasis 0 05 % ophthalmic emulsion instill 1 drop INTO AFFECTED EYE(S) every 12 hours      tiotropium (SPIRIVA) 18 mcg inhalation capsule Place 1 capsule (18 mcg total) into inhaler and inhale daily 30 capsule 0    escitalopram (Lexapro) 10 mg tablet Take 1 tablet (10 mg total) by mouth daily (Patient not taking: Reported on 5/25/2022) 90 tablet 3    MELATONIN PO Take by mouth daily at bedtime (Patient not taking: Reported on 5/25/2022)      naproxen (NAPROSYN) 500 mg tablet Take 1 tablet (500 mg total) by mouth 2 (two) times a day with meals for 10 days 20 tablet 0    nicotine (NICODERM CQ) 14 mg/24hr TD 24 hr patch Place 1 patch on the skin daily (Patient not taking: No sig reported) 28 patch 0    roflumilast (DALIRESP) 500 mcg tablet Take 500 mcg by mouth daily (Patient not taking: Reported on 5/25/2022)      tamsulosin (FLOMAX) 0 4 mg Take 1 capsule (0 4 mg total) by mouth daily with dinner 30 capsule 0     No current facility-administered medications for this visit  Review of Systems   Constitutional: Negative for chills and fever  HENT: Negative for congestion, ear pain, hearing loss, postnasal drip, rhinorrhea, sinus pressure, sinus pain, sore throat and trouble swallowing  Eyes: Negative for pain and visual disturbance  Respiratory: Negative for cough, chest tightness, shortness of breath and wheezing  Cardiovascular: Negative  Negative for chest pain, palpitations and leg swelling  Gastrointestinal: Negative for abdominal pain, blood in stool, constipation, diarrhea, nausea and vomiting  Endocrine: Negative for cold intolerance, heat intolerance, polydipsia, polyphagia and polyuria  Genitourinary: Negative for difficulty urinating, dysuria, flank pain and urgency  Musculoskeletal: Negative for arthralgias, back pain, gait problem and myalgias  Skin: Negative for rash  Allergic/Immunologic: Negative  Neurological: Negative for dizziness, weakness, light-headedness and headaches  Hematological: Negative  Psychiatric/Behavioral: Negative for behavioral problems, dysphoric mood and sleep disturbance  The patient is not nervous/anxious            PHQ-2/9 Depression Screening    Little interest or pleasure in doing things: 1 - several days  Feeling down, depressed, or hopeless: 3 - nearly every day  Trouble falling or staying asleep, or sleeping too much: 3 - nearly every day  Feeling tired or having little energy: 3 - nearly every day  Poor appetite or overeating: 3 - nearly every day  Feeling bad about yourself - or that you are a failure or have let yourself or your family down: 3 - nearly every day  Trouble concentrating on things, such as reading the newspaper or watching television: 1 - several days  Moving or speaking so slowly that other people could have noticed  Or the opposite - being so fidgety or restless that you have been moving around a lot more than usual: 1 - several days  Thoughts that you would be better off dead, or of hurting yourself in some way: 0 - not at all  PHQ-9 Score: 18   PHQ-9 Interpretation: Moderately severe depression           Objective:  Vitals:    05/25/22 1322   BP: 132/74   BP Location: Left arm   Patient Position: Sitting   Pulse: 78   Temp: 97 8 °F (36 6 °C)   SpO2: 96%   Weight: 100 kg (221 lb 4 oz)   Height: 5' 10" (1 778 m)     Body mass index is 31 75 kg/m²  Physical Exam  Constitutional:       General: He is not in acute distress  Appearance: He is well-developed  He is not diaphoretic  HENT:      Head: Normocephalic and atraumatic  Right Ear: External ear normal       Left Ear: External ear normal       Nose: Nose normal       Mouth/Throat:      Pharynx: No oropharyngeal exudate  Eyes:      General: No scleral icterus  Right eye: No discharge  Left eye: No discharge  Conjunctiva/sclera: Conjunctivae normal       Pupils: Pupils are equal, round, and reactive to light  Neck:      Thyroid: No thyromegaly  Cardiovascular:      Rate and Rhythm: Normal rate and regular rhythm  Heart sounds: Normal heart sounds  No murmur heard  No friction rub  No gallop  Pulmonary:      Effort: Pulmonary effort is normal  No respiratory distress  Breath sounds: Normal breath sounds  No wheezing or rales  Abdominal:      General: Bowel sounds are normal  There is no distension  Palpations: Abdomen is soft  Tenderness: There is no abdominal tenderness  Musculoskeletal:         General: No tenderness or deformity  Normal range of motion  Cervical back: Normal range of motion and neck supple  Skin:     General: Skin is warm and dry     Neurological:      Mental Status: He is alert and oriented to person, place, and time  Cranial Nerves: No cranial nerve deficit  Psychiatric:         Behavior: Behavior normal          Thought Content: Thought content normal          Judgment: Judgment normal          Falls Plan of Care: balance, strength, and gait training instructions were provided

## 2022-05-27 ENCOUNTER — TELEPHONE (OUTPATIENT)
Dept: INTERNAL MEDICINE CLINIC | Facility: CLINIC | Age: 67
End: 2022-05-27

## 2022-05-27 DIAGNOSIS — F51.01 PRIMARY INSOMNIA: Primary | ICD-10-CM

## 2022-05-27 RX ORDER — ZOLPIDEM TARTRATE 5 MG/1
5 TABLET ORAL
Qty: 30 TABLET | Refills: 0 | Status: SHIPPED | OUTPATIENT
Start: 2022-05-27 | End: 2022-06-30 | Stop reason: SDUPTHER

## 2022-05-27 NOTE — TELEPHONE ENCOUNTER
Pt called requesting a different sleep aid medication be sent since we did not hear back from insurance PA for Lunesta  Pt asked if Ambien can be sent to pharmacy?

## 2022-06-08 DIAGNOSIS — J44.9 CHRONIC OBSTRUCTIVE PULMONARY DISEASE, UNSPECIFIED COPD TYPE (HCC): ICD-10-CM

## 2022-06-12 DIAGNOSIS — J44.9 CHRONIC OBSTRUCTIVE PULMONARY DISEASE, UNSPECIFIED COPD TYPE (HCC): ICD-10-CM

## 2022-06-13 RX ORDER — TIOTROPIUM BROMIDE 18 UG/1
CAPSULE ORAL; RESPIRATORY (INHALATION)
Qty: 30 CAPSULE | Refills: 0 | Status: SHIPPED | OUTPATIENT
Start: 2022-06-13 | End: 2022-07-06

## 2022-06-27 DIAGNOSIS — I25.10 CORONARY ARTERIOSCLEROSIS IN NATIVE ARTERY: ICD-10-CM

## 2022-06-27 RX ORDER — DILTIAZEM HYDROCHLORIDE 180 MG/1
CAPSULE, COATED, EXTENDED RELEASE ORAL
Qty: 30 CAPSULE | Refills: 5 | Status: SHIPPED | OUTPATIENT
Start: 2022-06-27

## 2022-06-30 ENCOUNTER — APPOINTMENT (OUTPATIENT)
Dept: LAB | Facility: CLINIC | Age: 67
End: 2022-06-30
Payer: COMMERCIAL

## 2022-06-30 ENCOUNTER — OFFICE VISIT (OUTPATIENT)
Dept: INTERNAL MEDICINE CLINIC | Facility: CLINIC | Age: 67
End: 2022-06-30
Payer: COMMERCIAL

## 2022-06-30 VITALS
WEIGHT: 220.25 LBS | HEIGHT: 70 IN | OXYGEN SATURATION: 97 % | BODY MASS INDEX: 31.53 KG/M2 | DIASTOLIC BLOOD PRESSURE: 76 MMHG | TEMPERATURE: 98.4 F | SYSTOLIC BLOOD PRESSURE: 148 MMHG | HEART RATE: 84 BPM

## 2022-06-30 DIAGNOSIS — K55.1 SUPERIOR MESENTERIC ARTERY STENOSIS (HCC): ICD-10-CM

## 2022-06-30 DIAGNOSIS — E78.2 MIXED HYPERLIPIDEMIA: ICD-10-CM

## 2022-06-30 DIAGNOSIS — F33.9 DEPRESSION, RECURRENT (HCC): ICD-10-CM

## 2022-06-30 DIAGNOSIS — Z13.220 SCREENING, LIPID: ICD-10-CM

## 2022-06-30 DIAGNOSIS — N18.30 STAGE 3 CHRONIC KIDNEY DISEASE, UNSPECIFIED WHETHER STAGE 3A OR 3B CKD (HCC): ICD-10-CM

## 2022-06-30 DIAGNOSIS — I13.0 HYPERTENSIVE HEART AND RENAL DISEASE WITH CHF (HCC): ICD-10-CM

## 2022-06-30 DIAGNOSIS — I71.2 THORACIC AORTIC ANEURYSM WITHOUT RUPTURE (HCC): ICD-10-CM

## 2022-06-30 DIAGNOSIS — F10.920 ALCOHOLIC INTOXICATION WITHOUT COMPLICATION (HCC): ICD-10-CM

## 2022-06-30 DIAGNOSIS — R73.01 ELEVATED FASTING BLOOD SUGAR: ICD-10-CM

## 2022-06-30 DIAGNOSIS — E55.9 VITAMIN D DEFICIENCY: ICD-10-CM

## 2022-06-30 DIAGNOSIS — E11.22 TYPE 2 DIABETES MELLITUS WITH DIABETIC CHRONIC KIDNEY DISEASE, UNSPECIFIED CKD STAGE, UNSPECIFIED WHETHER LONG TERM INSULIN USE (HCC): ICD-10-CM

## 2022-06-30 DIAGNOSIS — Z13.0 SCREENING FOR DEFICIENCY ANEMIA: ICD-10-CM

## 2022-06-30 DIAGNOSIS — G62.9 PERIPHERAL POLYNEUROPATHY: Primary | ICD-10-CM

## 2022-06-30 DIAGNOSIS — Z13.1 SCREENING FOR DIABETES MELLITUS: ICD-10-CM

## 2022-06-30 DIAGNOSIS — F51.01 PRIMARY INSOMNIA: ICD-10-CM

## 2022-06-30 DIAGNOSIS — G40.401: ICD-10-CM

## 2022-06-30 DIAGNOSIS — Z13.29 SCREENING FOR THYROID DISORDER: ICD-10-CM

## 2022-06-30 LAB
25(OH)D3 SERPL-MCNC: 45.2 NG/ML (ref 30–100)
ALBUMIN SERPL BCP-MCNC: 3.6 G/DL (ref 3.5–5)
ALP SERPL-CCNC: 105 U/L (ref 46–116)
ALT SERPL W P-5'-P-CCNC: 17 U/L (ref 12–78)
ANION GAP SERPL CALCULATED.3IONS-SCNC: 7 MMOL/L (ref 4–13)
AST SERPL W P-5'-P-CCNC: 10 U/L (ref 5–45)
BASOPHILS # BLD AUTO: 0.06 THOUSANDS/ΜL (ref 0–0.1)
BASOPHILS NFR BLD AUTO: 1 % (ref 0–1)
BILIRUB SERPL-MCNC: 0.35 MG/DL (ref 0.2–1)
BUN SERPL-MCNC: 12 MG/DL (ref 5–25)
CALCIUM SERPL-MCNC: 9.2 MG/DL (ref 8.3–10.1)
CHLORIDE SERPL-SCNC: 103 MMOL/L (ref 100–108)
CHOLEST SERPL-MCNC: 177 MG/DL
CO2 SERPL-SCNC: 29 MMOL/L (ref 21–32)
CREAT SERPL-MCNC: 1.2 MG/DL (ref 0.6–1.3)
EOSINOPHIL # BLD AUTO: 0.38 THOUSAND/ΜL (ref 0–0.61)
EOSINOPHIL NFR BLD AUTO: 4 % (ref 0–6)
ERYTHROCYTE [DISTWIDTH] IN BLOOD BY AUTOMATED COUNT: 12.2 % (ref 11.6–15.1)
GFR SERPL CREATININE-BSD FRML MDRD: 62 ML/MIN/1.73SQ M
GLUCOSE P FAST SERPL-MCNC: 89 MG/DL (ref 65–99)
HCT VFR BLD AUTO: 39.5 % (ref 36.5–49.3)
HDLC SERPL-MCNC: 33 MG/DL
HGB BLD-MCNC: 13.9 G/DL (ref 12–17)
IMM GRANULOCYTES # BLD AUTO: 0.03 THOUSAND/UL (ref 0–0.2)
IMM GRANULOCYTES NFR BLD AUTO: 0 % (ref 0–2)
LDLC SERPL CALC-MCNC: 103 MG/DL (ref 0–100)
LYMPHOCYTES # BLD AUTO: 3.04 THOUSANDS/ΜL (ref 0.6–4.47)
LYMPHOCYTES NFR BLD AUTO: 31 % (ref 14–44)
MCH RBC QN AUTO: 30.8 PG (ref 26.8–34.3)
MCHC RBC AUTO-ENTMCNC: 35.2 G/DL (ref 31.4–37.4)
MCV RBC AUTO: 88 FL (ref 82–98)
MONOCYTES # BLD AUTO: 0.61 THOUSAND/ΜL (ref 0.17–1.22)
MONOCYTES NFR BLD AUTO: 6 % (ref 4–12)
NEUTROPHILS # BLD AUTO: 5.72 THOUSANDS/ΜL (ref 1.85–7.62)
NEUTS SEG NFR BLD AUTO: 58 % (ref 43–75)
NONHDLC SERPL-MCNC: 144 MG/DL
NRBC BLD AUTO-RTO: 0 /100 WBCS
PLATELET # BLD AUTO: 269 THOUSANDS/UL (ref 149–390)
PMV BLD AUTO: 9.3 FL (ref 8.9–12.7)
POTASSIUM SERPL-SCNC: 3.5 MMOL/L (ref 3.5–5.3)
PROT SERPL-MCNC: 6.6 G/DL (ref 6.4–8.2)
RBC # BLD AUTO: 4.51 MILLION/UL (ref 3.88–5.62)
SL AMB POCT HEMOGLOBIN AIC: 5.6 (ref ?–6.5)
SODIUM SERPL-SCNC: 139 MMOL/L (ref 136–145)
TRIGL SERPL-MCNC: 204 MG/DL
TSH SERPL DL<=0.05 MIU/L-ACNC: 2.32 UIU/ML (ref 0.45–4.5)
WBC # BLD AUTO: 9.84 THOUSAND/UL (ref 4.31–10.16)

## 2022-06-30 PROCEDURE — 83036 HEMOGLOBIN GLYCOSYLATED A1C: CPT | Performed by: PHYSICIAN ASSISTANT

## 2022-06-30 PROCEDURE — 82306 VITAMIN D 25 HYDROXY: CPT

## 2022-06-30 PROCEDURE — 84443 ASSAY THYROID STIM HORMONE: CPT

## 2022-06-30 PROCEDURE — 80061 LIPID PANEL: CPT

## 2022-06-30 PROCEDURE — 80053 COMPREHEN METABOLIC PANEL: CPT

## 2022-06-30 PROCEDURE — 99214 OFFICE O/P EST MOD 30 MIN: CPT | Performed by: PHYSICIAN ASSISTANT

## 2022-06-30 PROCEDURE — 85025 COMPLETE CBC W/AUTO DIFF WBC: CPT

## 2022-06-30 PROCEDURE — 36415 COLL VENOUS BLD VENIPUNCTURE: CPT

## 2022-06-30 RX ORDER — PREGABALIN 75 MG/1
75 CAPSULE ORAL 2 TIMES DAILY
Qty: 60 CAPSULE | Refills: 2 | Status: SHIPPED | OUTPATIENT
Start: 2022-06-30

## 2022-06-30 RX ORDER — ZOLPIDEM TARTRATE 10 MG/1
10 TABLET ORAL
Qty: 30 TABLET | Refills: 0 | Status: SHIPPED | OUTPATIENT
Start: 2022-06-30

## 2022-06-30 NOTE — PROGRESS NOTES
Assessment/Plan:  Problem List Items Addressed This Visit        Digestive    Superior mesenteric artery stenosis (HCC)       Endocrine    RESOLVED: Type 2 diabetes mellitus with diabetic chronic kidney disease, unspecified CKD stage, unspecified whether long term insulin use ([facility] )       Cardiovascular and Mediastinum    Aortic aneurysm (HCC)     Update CTA as pt is having generalized abdominal pain  Encouraged to quit smoking           Relevant Orders    CTA dissection protocol chest/abdomen/pelvis    Hypertensive heart and renal disease with CHF ([facility] )       Nervous and Auditory    Grand mal status ([facility] )    Relevant Medications    pregabalin (LYRICA) 75 mg capsule    Peripheral neuropathy - Primary     Start lyrica  Directions for use and possible side effects discussed and patient verbalized understanding of these  Relevant Medications    pregabalin (LYRICA) 75 mg capsule       Genitourinary    Stage 3 chronic kidney disease, unspecified whether stage 3a or 3b CKD (HCC)       Other    Hyperlipidemia    Insomnia     Continue ambien but increase to 10mg            Relevant Medications    zolpidem (AMBIEN) 10 mg tablet    Alcoholic intoxication without complication (HCC)    Depression, recurrent (HCC)    Relevant Medications    zolpidem (AMBIEN) 10 mg tablet      Other Visit Diagnoses     Vitamin D deficiency        Relevant Orders    Vitamin D 25 hydroxy    Screening for thyroid disorder        Relevant Orders    TSH, 3rd generation    Screening, lipid        Relevant Orders    Lipid panel    Screening for diabetes mellitus        Relevant Orders    Comprehensive metabolic panel    Screening for deficiency anemia        Relevant Orders    CBC and differential    Elevated fasting blood sugar        Relevant Orders    POCT hemoglobin A1c (Completed)           Diagnoses and all orders for this visit:    Peripheral polyneuropathy  -     pregabalin (LYRICA) 75 mg capsule;  Take 1 capsule (75 mg total) by mouth 2 (two) times a day    Superior mesenteric artery stenosis (HCC)    Thoracic aortic aneurysm without rupture ([place] [address] )  -     CTA dissection protocol chest/abdomen/pelvis; Future    Mixed hyperlipidemia    Vitamin D deficiency  -     Vitamin D 25 hydroxy; Future    Screening for thyroid disorder  -     TSH, 3rd generation; Future    Screening, lipid  -     Lipid panel; Future    Screening for diabetes mellitus  -     Comprehensive metabolic panel; Future    Screening for deficiency anemia  -     CBC and differential; Future    Hypertensive heart and renal disease with CHF (HCC)    Alcoholic intoxication without complication (HCC)    Depression, recurrent ([place] [address] )    Type 2 diabetes mellitus with diabetic chronic kidney disease, unspecified CKD stage, unspecified whether long term insulin use (HCC)    Stage 3 chronic kidney disease, unspecified whether stage 3a or 3b CKD ([place] [address] )    Grand mal status ([place] [address] )    Primary insomnia  -     zolpidem (AMBIEN) 10 mg tablet; Take 1 tablet (10 mg total) by mouth daily at bedtime as needed for sleep    Elevated fasting blood sugar  -     POCT hemoglobin A1c      Aortic aneurysm (HCC)  Update CTA as pt is having generalized abdominal pain  Encouraged to quit smoking    Peripheral neuropathy  Start lyrica  Directions for use and possible side effects discussed and patient verbalized understanding of these  Insomnia  Continue ambien but increase to 10mg       Subjective:      Patient ID: Kerry Almeida is a 79 y o  male  Pt presents for routine visit  He is up to date with labs and CRC Screening  He relates issues with numbness, tingling and burning in his lower extremities  He is allergic to gabapentin and failed cymbalta  He also notes constipation but has been poorly compliant with linzess which does help when he takes it  He notes increased abdominal pain and feels like something is twisting in his stomach  He has a known AAA last checked 6 months ago   He also complains of ongoing insomnia  He failed trazodone and lunesta  He is on [place] but feels that a higher dose would be beneficial      The following portions of the patient's history were reviewed and updated as appropriate:   He has a past medical history of Acute right MCA stroke ([facility] ) (9/15/2018), Arthritis, CAD (coronary artery disease), COPD (chronic obstructive pulmonary disease) ([facility] ), GERD (gastroesophageal reflux disease), Grand mal status ([facility] ) (3/24/2019), Heart attack ([place] [address] ), Heart failure ([place] [address] ), Hyperlipidemia, Hypertension, [place] [facility] nuclear stress test (03/19/2016), Myocardial infarction [facility]), Shortness of breath, Stroke ([place] [address] ), and TIA (transient ischemic attack) (09/13/2018)  ,  does not have any pertinent problems on file  ,   has a past surgical history that includes Coronary artery bypass graft (2000); Cardiac catheterization (08/19/2015); Colonoscopy; Tonsillectomy; Hernia repair; Cardiac catheterization (N/A, 12/3/2021); and Cardiac catheterization (12/3/2021)  ,  family history includes Aneurysm in his father; Cancer in his father, maternal grandmother, and paternal grandfather; Heart disease in his mother  ,   reports that he has been smoking cigars, pipe, and cigarettes  He has a 50 00 pack-year smoking history  He has never used smokeless tobacco  He reports current alcohol use  He reports that he does not use drugs  ,  is allergic to gabapentin     Current Outpatient Medications   Medication Sig Dispense Refill    albuterol (2 5 mg/3 mL) 0 083 % nebulizer solution Take 1 vial (2 5 mg total) by nebulization every 6 (six) hours as needed for wheezing or shortness of breath 25 vial 2    aspirin (ECOTRIN LOW STRENGTH) 81 mg EC tablet Take 1 tablet (81 mg total) by mouth daily 30 tablet 0    atorvastatin (LIPITOR) 40 mg tablet Take 1 tablet (40 mg total) by mouth every evening 90 tablet 0    calcium carbonate (TUMS EX) 750 mg chewable tablet Chew 1 tablet (750 mg total) 3 (three) times a day as needed for indigestion or heartburn 60 tablet 0    diltiazem (CARDIZEM CD) 180 mg 24 hr capsule take 1 capsule by mouth daily 30 capsule 5    fluticasone (FLONASE) 50 mcg/act nasal spray 1 spray into each nostril daily 16 g 0    furosemide (LASIX) 40 mg tablet Take 1 tablet (40 mg total) by mouth daily 90 tablet 3    isosorbide mononitrate (IMDUR) 30 mg 24 hr tablet Take 1 tablet (30 mg total) by mouth daily 90 tablet 3    linaCLOtide (Linzess) 145 MCG CAPS Take 1 capsule (145 mcg total) by mouth in the morning 30 capsule 2    metoprolol succinate (TOPROL-XL) 25 mg 24 hr tablet Take 1 tablet (25 mg total) by mouth 2 (two) times a day (Patient taking differently: Take 25 mg by mouth daily) 90 tablet 3    nitroglycerin (NITROSTAT) 0 4 mg SL tablet Place 0 4 mg under the tongue every 5 (five) minutes as needed for chest pain        pantoprazole (PROTONIX) 40 mg tablet swallow 1 tablet once daily 90 tablet 0    Potassium Chloride ER 20 MEQ TBCR Take 20 mEq by mouth daily       pregabalin (LYRICA) 75 mg capsule Take 1 capsule (75 mg total) by mouth 2 (two) times a day 60 capsule 2    Restasis 0 05 % ophthalmic emulsion instill 1 drop INTO AFFECTED EYE(S) every 12 hours      Spiriva HandiHaler 18 MCG inhalation capsule inhale the contents of one capsule in the handihaler once daily 30 capsule 0    Ventolin  (90 Base) MCG/ACT inhaler inhale 2 puffs by mouth and INTO THE LUNGS every 6 hours if needed for shortness of breath 54 g 0    zolpidem (AMBIEN) 10 mg tablet Take 1 tablet (10 mg total) by mouth daily at bedtime as needed for sleep 30 tablet 0    MELATONIN PO Take by mouth daily at bedtime (Patient not taking: No sig reported)      naproxen (NAPROSYN) 500 mg tablet Take 1 tablet (500 mg total) by mouth 2 (two) times a day with meals for 10 days 20 tablet 0    roflumilast (DALIRESP) 500 mcg tablet Take 500 mcg by mouth daily (Patient not taking: No sig reported)      tamsulosin (FLOMAX) 0 4 mg Take 1 capsule (0 4 mg total) by mouth daily with dinner 30 capsule 0     No current facility-administered medications for this visit  Review of Systems   Constitutional: Negative for chills and fever  HENT: Negative for congestion, ear pain, hearing loss, postnasal drip, rhinorrhea, sinus pressure, sinus pain, sore throat and trouble swallowing  Eyes: Negative for pain and visual disturbance  Respiratory: Negative for cough, chest tightness, shortness of breath and wheezing  Cardiovascular: Negative  Negative for chest pain, palpitations and leg swelling  Gastrointestinal: Positive for abdominal pain and constipation  Negative for blood in stool, diarrhea, nausea and vomiting  Endocrine: Negative for cold intolerance, heat intolerance, polydipsia, polyphagia and polyuria  Genitourinary: Negative for difficulty urinating, dysuria, flank pain and urgency  Musculoskeletal: Positive for back pain and gait problem  Negative for arthralgias and myalgias  Skin: Negative for rash  Allergic/Immunologic: Negative  Neurological: Positive for numbness  Negative for dizziness, weakness, light-headedness and headaches  Hematological: Negative  Psychiatric/Behavioral: Positive for sleep disturbance  Negative for behavioral problems and dysphoric mood  The patient is not nervous/anxious  PHQ-2/9 Depression Screening            Objective:  Vitals:    06/30/22 0942   BP: 148/76   BP Location: Left arm   Patient Position: Sitting   Pulse: 84   Temp: 98 4 °F (36 9 °C)   SpO2: 97%   Weight: 99 9 kg (220 lb 4 oz)   Height: 5' 10" (1 778 m)     Body mass index is 31 6 kg/m²  Physical Exam  Constitutional:       General: He is not in acute distress  Appearance: He is well-developed  He is not diaphoretic  HENT:      Head: Normocephalic and atraumatic  Right Ear: External ear normal       Left Ear: External ear normal       Nose: Nose normal       Mouth/Throat:      Pharynx: No oropharyngeal exudate  Eyes:      General: No scleral icterus  Right eye: No discharge  Left eye: No discharge  Conjunctiva/sclera: Conjunctivae normal       Pupils: Pupils are equal, round, and reactive to light  Neck:      Thyroid: No thyromegaly  Cardiovascular:      Rate and Rhythm: Normal rate and regular rhythm  Heart sounds: Normal heart sounds  No murmur heard  No friction rub  No gallop  Pulmonary:      Effort: Pulmonary effort is normal  No respiratory distress  Breath sounds: Decreased breath sounds present  No wheezing or rales  Abdominal:      General: Bowel sounds are normal  There is no distension  Palpations: Abdomen is soft  Tenderness: There is abdominal tenderness  Musculoskeletal:         General: No tenderness or deformity  Normal range of motion  Cervical back: Normal range of motion and neck supple  Skin:     General: Skin is warm and dry  Neurological:      Mental Status: He is alert and oriented to person, place, and time  Cranial Nerves: No cranial nerve deficit  Sensory: Sensory deficit present  Psychiatric:         Behavior: Behavior normal          Thought Content:  Thought content normal          Judgment: Judgment normal

## 2022-06-30 NOTE — ASSESSMENT & PLAN NOTE
Start lyrica  Directions for use and possible side effects discussed and patient verbalized understanding of these

## 2022-07-06 DIAGNOSIS — I63.511 ACUTE RIGHT MCA STROKE (HCC): ICD-10-CM

## 2022-07-06 DIAGNOSIS — J44.9 CHRONIC OBSTRUCTIVE PULMONARY DISEASE, UNSPECIFIED COPD TYPE (HCC): ICD-10-CM

## 2022-07-06 RX ORDER — ATORVASTATIN CALCIUM 40 MG/1
40 TABLET, FILM COATED ORAL EVERY EVENING
Qty: 90 TABLET | Refills: 3 | Status: SHIPPED | OUTPATIENT
Start: 2022-07-06

## 2022-07-06 RX ORDER — TIOTROPIUM BROMIDE 18 UG/1
CAPSULE ORAL; RESPIRATORY (INHALATION)
Qty: 30 CAPSULE | Refills: 0 | Status: SHIPPED | OUTPATIENT
Start: 2022-07-06 | End: 2022-08-10

## 2022-07-07 ENCOUNTER — APPOINTMENT (EMERGENCY)
Dept: NON INVASIVE DIAGNOSTICS | Facility: HOSPITAL | Age: 67
End: 2022-07-07
Payer: COMMERCIAL

## 2022-07-07 ENCOUNTER — HOSPITAL ENCOUNTER (OUTPATIENT)
Dept: CT IMAGING | Facility: HOSPITAL | Age: 67
Discharge: HOME | End: 2022-07-07
Payer: COMMERCIAL

## 2022-07-07 ENCOUNTER — HOSPITAL ENCOUNTER (EMERGENCY)
Facility: HOSPITAL | Age: 67
Discharge: HOME | End: 2022-07-07
Attending: EMERGENCY MEDICINE | Admitting: EMERGENCY MEDICINE
Payer: COMMERCIAL

## 2022-07-07 VITALS
BODY MASS INDEX: 31.5 KG/M2 | WEIGHT: 220 LBS | DIASTOLIC BLOOD PRESSURE: 62 MMHG | HEIGHT: 70 IN | TEMPERATURE: 97.6 F | OXYGEN SATURATION: 96 % | SYSTOLIC BLOOD PRESSURE: 116 MMHG | HEART RATE: 84 BPM | RESPIRATION RATE: 18 BRPM

## 2022-07-07 DIAGNOSIS — I80.9 SUPERFICIAL THROMBOPHLEBITIS: Primary | ICD-10-CM

## 2022-07-07 DIAGNOSIS — I71.20 THORACIC AORTIC ANEURYSM WITHOUT RUPTURE: ICD-10-CM

## 2022-07-07 PROCEDURE — 74174 CTA ABD&PLVS W/CONTRAST: CPT

## 2022-07-07 PROCEDURE — 99282 EMERGENCY DEPT VISIT SF MDM: CPT

## 2022-07-07 PROCEDURE — 71275 CT ANGIOGRAPHY CHEST: CPT

## 2022-07-07 PROCEDURE — 93971 EXTREMITY STUDY: CPT

## 2022-07-07 PROCEDURE — 93971 EXTREMITY STUDY: CPT | Performed by: SURGERY

## 2022-07-07 PROCEDURE — 96372 THER/PROPH/DIAG INJ SC/IM: CPT

## 2022-07-07 PROCEDURE — 99284 EMERGENCY DEPT VISIT MOD MDM: CPT | Performed by: EMERGENCY MEDICINE

## 2022-07-07 PROCEDURE — G1004 CDSM NDSC: HCPCS

## 2022-07-07 RX ORDER — KETOROLAC TROMETHAMINE 30 MG/ML
30 INJECTION, SOLUTION INTRAMUSCULAR; INTRAVENOUS ONCE
Status: COMPLETED | OUTPATIENT
Start: 2022-07-07 | End: 2022-07-07

## 2022-07-07 RX ORDER — ACETAMINOPHEN 325 MG/1
975 TABLET ORAL ONCE
Status: COMPLETED | OUTPATIENT
Start: 2022-07-07 | End: 2022-07-07

## 2022-07-07 RX ADMIN — IOHEXOL 100 ML: 350 INJECTION, SOLUTION INTRAVENOUS at 13:25

## 2022-07-07 RX ADMIN — KETOROLAC TROMETHAMINE 30 MG: 30 INJECTION, SOLUTION INTRAMUSCULAR at 15:52

## 2022-07-07 RX ADMIN — ACETAMINOPHEN 975 MG: 325 TABLET ORAL at 15:52

## 2022-07-09 NOTE — ED PROVIDER NOTES
History  Chief Complaint   Patient presents with    Insect Bite     Patient states he was bitten by an insect or spider on the left inner thigh two days ago     Patient is a 59-year-old male without pertinent medical history that presents for evaluation of left leg pain  Patient says that he feels like he may have been bit from insect 2 days ago  He says several hours after this he began have some left posterior thigh pain  He denies any overlying skin changes  Pain is mild, intermittent, sharp/stabbing, intermittently radiating to her left calf  No history of PE or DVTs  Patient is not on anticoagulation  He denies any directed to the area  He has been ambulatory despite his symptoms  He has not taken anything for the pain  He denies headache, nausea vomiting, neck pain, chest pain dyspnea abdominal pain otherwise  Prior to Admission Medications   Prescriptions Last Dose Informant Patient Reported? Taking?    MELATONIN PO   Yes No   Sig: Take by mouth daily at bedtime   Patient not taking: No sig reported   Potassium Chloride ER 20 MEQ TBCR  Self Yes No   Sig: Take 20 mEq by mouth daily    Restasis 0 05 % ophthalmic emulsion   Yes No   Sig: instill 1 drop INTO AFFECTED EYE(S) every 12 hours   Spiriva HandiHaler 18 MCG inhalation capsule   No No   Sig: inhale THE CONTENTS OF ONE CAPSULE IN THE HANDIHALER once daily   Ventolin  (90 Base) MCG/ACT inhaler   No No   Sig: inhale 2 puffs by mouth and INTO THE LUNGS every 6 hours if needed for shortness of breath   albuterol (2 5 mg/3 mL) 0 083 % nebulizer solution  Self No No   Sig: Take 1 vial (2 5 mg total) by nebulization every 6 (six) hours as needed for wheezing or shortness of breath   aspirin (ECOTRIN LOW STRENGTH) 81 mg EC tablet  Self No No   Sig: Take 1 tablet (81 mg total) by mouth daily   atorvastatin (LIPITOR) 40 mg tablet   No No   Sig: Take 1 tablet (40 mg total) by mouth every evening   calcium carbonate (TUMS EX) 750 mg chewable tablet  Self No No   Sig: Chew 1 tablet (750 mg total) 3 (three) times a day as needed for indigestion or heartburn   diltiazem (CARDIZEM CD) 180 mg 24 hr capsule   No No   Sig: take 1 capsule by mouth daily   fluticasone (FLONASE) 50 mcg/act nasal spray  Self No No   Si spray into each nostril daily   furosemide (LASIX) 40 mg tablet  Self No No   Sig: Take 1 tablet (40 mg total) by mouth daily   isosorbide mononitrate (IMDUR) 30 mg 24 hr tablet   No No   Sig: Take 1 tablet (30 mg total) by mouth daily   linaCLOtide (Linzess) 145 MCG CAPS   No No   Sig: Take 1 capsule (145 mcg total) by mouth in the morning   metoprolol succinate (TOPROL-XL) 25 mg 24 hr tablet   No No   Sig: Take 1 tablet (25 mg total) by mouth 2 (two) times a day   Patient taking differently: Take 25 mg by mouth daily   naproxen (NAPROSYN) 500 mg tablet   No No   Sig: Take 1 tablet (500 mg total) by mouth 2 (two) times a day with meals for 10 days   nitroglycerin (NITROSTAT) 0 4 mg SL tablet  Self Yes No   Sig: Place 0 4 mg under the tongue every 5 (five) minutes as needed for chest pain     pantoprazole (PROTONIX) 40 mg tablet   No No   Sig: swallow 1 tablet once daily   pregabalin (LYRICA) 75 mg capsule   No No   Sig: Take 1 capsule (75 mg total) by mouth 2 (two) times a day   roflumilast (DALIRESP) 500 mcg tablet   Yes No   Sig: Take 500 mcg by mouth daily   Patient not taking: No sig reported   tamsulosin (FLOMAX) 0 4 mg  Self No No   Sig: Take 1 capsule (0 4 mg total) by mouth daily with dinner   zolpidem (AMBIEN) 10 mg tablet   No No   Sig: Take 1 tablet (10 mg total) by mouth daily at bedtime as needed for sleep      Facility-Administered Medications: None       Past Medical History:   Diagnosis Date    Acute right MCA stroke ([place] [address] ) 9/15/2018    Arthritis     CAD (coronary artery disease)     s/p CABG     [facility]) [facility]     GERD (gastroesophageal reflux disease)     Grand mal status ([place] [address] ) 3/24/2019    [facility] )     Heart failure ([facility] )     Hyperlipidemia     Hypertension     Lexiscan nuclear stress test 03/19/2016    EF 74% Normal    Myocardial infarction (HCC)     Shortness of breath     Stroke [facility])     TIA (transient ischemic attack) 09/13/2018       Past Surgical History:   Procedure Laterality Date    CARDIAC CATHETERIZATION  08/19/2015    LIMA occluded  No severe native lesions    CARDIAC CATHETERIZATION N/A 12/3/2021    Procedure: Cardiac Coronary Angiogram;  Surgeon: Roxy Gerardo MD;  Location: [facility]; Service: Cardiology    CARDIAC CATHETERIZATION  12/3/2021    Procedure: Cardiac catheterization;  Surgeon: Roxy Gerardo MD;  Location: [facility]; Service: Cardiology    COLONOSCOPY      CORONARY ARTERY BYPASS GRAFT  2000    HERNIA REPAIR      umbilical hernia x2    TONSILLECTOMY         Family History   Problem Relation Age of Onset    Heart disease Mother     Cancer Father     Aneurysm Father     Cancer Maternal Grandmother     Cancer Paternal Grandfather      I have reviewed and agree with the history as documented  E-Cigarette/Vaping    E-Cigarette Use Never User      E-Cigarette/Vaping Substances    Nicotine Yes     THC No     CBD No     Flavoring No     Other No     Unknown No      Social History     Tobacco Use    Smoking status: Current Every Day Smoker     Packs/day: 1 00     Years: 50 00     Pack years: 50 00     Types: Cigars, Pipe, Cigarettes    Smokeless tobacco: Never Used   Vaping Use    Vaping Use: Never used   Substance Use Topics    Alcohol use: Yes     Comment: Socially    Drug use: Never       Review of Systems   Constitutional: Negative for fever  HENT: Negative for sore throat  Eyes: Negative for photophobia  Respiratory: Negative for shortness of breath  Cardiovascular: Negative for chest pain  Gastrointestinal: Negative for abdominal pain  Genitourinary: Negative for dysuria  Musculoskeletal: Negative for back pain  Left leg pain   Skin: Negative for rash  Neurological: Negative for light-headedness  Hematological: Negative for adenopathy  Psychiatric/Behavioral: Negative for agitation  All other systems reviewed and are negative  Physical Exam  Physical Exam  Vitals reviewed  Constitutional:       General: He is not in acute distress  Appearance: He is well-developed  HENT:      Head: Normocephalic  Eyes:      Pupils: Pupils are equal, round, and reactive to light  Cardiovascular:      Rate and Rhythm: Normal rate and regular rhythm  Heart sounds: Normal heart sounds  No murmur heard  No friction rub  No gallop  Pulmonary:      Effort: Pulmonary effort is normal       Breath sounds: Normal breath sounds  Abdominal:      General: Bowel sounds are normal  There is no distension  Palpations: Abdomen is soft  Tenderness: There is no abdominal tenderness  There is no guarding  Musculoskeletal:         General: Normal range of motion  Cervical back: Normal range of motion and neck supple  Comments: Patient with mild tenderness of posterior left thigh  No overlying skin changes or swelling noted  Distally neurovascular intact  Bedside ultrasound does reveal not compressible superficial vein in this area   Skin:     Capillary Refill: Capillary refill takes less than 2 seconds  Neurological:      Mental Status: He is alert and oriented to person, place, and time  Cranial Nerves: No cranial nerve deficit  Sensory: No sensory deficit  Motor: No abnormal muscle tone  Psychiatric:         Behavior: Behavior normal          Thought Content:  Thought content normal          Judgment: Judgment normal          Vital Signs  ED Triage Vitals [07/07/22 1435]   Temperature Pulse Respirations Blood Pressure SpO2   97 6 °F (36 4 °C) 84 18 116/62 96 %      Temp Source Heart Rate Source Patient Position - Orthostatic VS BP Location FiO2 (%)   Temporal Monitor Sitting Right arm --      Pain Score       3           Vitals:    07/07/22 1435   BP: 116/62   Pulse: 84   Patient Position - Orthostatic VS: Sitting         Visual Acuity      ED Medications  Medications   ketorolac (TORADOL) injection 30 mg (30 mg Intramuscular Given 7/7/22 1552)   acetaminophen (TYLENOL) tablet 975 mg (975 mg Oral Given 7/7/22 1552)       Diagnostic Studies  Results Reviewed     None                 VAS lower limb venous duplex study, unilateral/limited   Final Result by Adam Nevarez MD (07/07 2239)                 Procedures  Procedures         ED Course                                             MDM  Number of Diagnoses or Management Options  Superficial thrombophlebitis  Diagnosis management comments: Patient is a 80-year-old male who presents for evaluation of left leg pain  Duplex shows superficial thrombophlebitis  No bony tenderness full range of motion  Patient he was advised on supportive measures and strict return precautions  Disposition  Final diagnoses:   Superficial thrombophlebitis     Time reflects when diagnosis was documented in both MDM as applicable and the Disposition within this note     Time User Action Codes Description Comment    7/7/2022  3:43 PM Adis Senior Add [I80 9] Superficial thrombophlebitis       ED Disposition     ED Disposition   Discharge    Condition   Stable    Date/Time   Thu Jul 7, 2022  3:43 PM    Comment   Poonam [facility] discharge to home/self care                 Follow-up Information     Follow up With Specialties Details Why Contact Info Additional [address] Emergency Department Emergency Medicine  If symptoms worsen [address]  [place] [id number]  [phone] [facility] Emergency Department, [address], [address] [facility], [address] [facility]          Discharge Medication List as of 7/7/2022  3:44 PM      CONTINUE these medications which have NOT CHANGED    Details   atorvastatin (LIPITOR) 40 mg tablet Take 1 tablet (40 mg total) by mouth every evening, Starting Wed 7/6/2022, Normal      albuterol (2 5 mg/3 mL) 0 083 % nebulizer solution Take 1 vial (2 5 mg total) by nebulization every 6 (six) hours as needed for wheezing or shortness of breath, Starting Tue 5/26/2020, Normal      aspirin (ECOTRIN LOW STRENGTH) 81 mg EC tablet Take 1 tablet (81 mg total) by mouth daily, Starting Mon 9/17/2018, Print      calcium carbonate (TUMS EX) 750 mg chewable tablet Chew 1 tablet (750 mg total) 3 (three) times a day as needed for indigestion or heartburn, Starting Tue 7/30/2019, Print      diltiazem (CARDIZEM CD) 180 mg 24 hr capsule take 1 capsule by mouth daily, Normal      fluticasone (FLONASE) 50 mcg/act nasal spray 1 spray into each nostril daily, Starting Mon 9/17/2018, Print      furosemide (LASIX) 40 mg tablet Take 1 tablet (40 mg total) by mouth daily, Starting Tue 8/31/2021, Normal      isosorbide mononitrate (IMDUR) 30 mg 24 hr tablet Take 1 tablet (30 mg total) by mouth daily, Starting Thu 3/24/2022, Phone In      linaCLOtide (Linzess) 145 MCG CAPS Take 1 capsule (145 mcg total) by mouth in the morning, Starting Wed 4/20/2022, Normal      MELATONIN PO Take by mouth daily at bedtime, Historical Med      metoprolol succinate (TOPROL-XL) 25 mg 24 hr tablet Take 1 tablet (25 mg total) by mouth 2 (two) times a day, Starting Tue 11/30/2021, Normal      naproxen (NAPROSYN) 500 mg tablet Take 1 tablet (500 mg total) by mouth 2 (two) times a day with meals for 10 days, Starting Tue 1/4/2022, Until Fri 1/14/2022, Normal      nitroglycerin (NITROSTAT) 0 4 mg SL tablet Place 0 4 mg under the tongue every 5 (five) minutes as needed for chest pain  , Historical Med      pantoprazole (PROTONIX) 40 mg tablet swallow 1 tablet once daily, Normal      Potassium Chloride ER 20 MEQ TBCR Take 20 mEq by mouth daily , Historical Med      pregabalin (LYRICA) 75 mg capsule Take 1 capsule (75 mg total) by mouth 2 (two) times a day, Starting Thu 6/30/2022, Normal      Restasis 0 05 % ophthalmic emulsion instill 1 drop INTO AFFECTED EYE(S) every 12 hours, Historical Med      roflumilast (DALIRESP) 500 mcg tablet Take 500 mcg by mouth daily, Historical Med      Spiriva HandiHaler 18 MCG inhalation capsule inhale THE CONTENTS OF ONE CAPSULE IN THE HANDIHALER once daily, Normal      tamsulosin (FLOMAX) 0 4 mg Take 1 capsule (0 4 mg total) by mouth daily with dinner, Starting Tue 2/15/2022, Until Thu 3/17/2022, Normal      Ventolin  (90 Base) MCG/ACT inhaler inhale 2 puffs by mouth and INTO THE LUNGS every 6 hours if needed for shortness of breath, Normal      zolpidem (AMBIEN) 10 mg tablet Take 1 tablet (10 mg total) by mouth daily at bedtime as needed for sleep, Starting Thu 6/30/2022, Normal             No discharge procedures on file      PDMP Review     None          ED Provider  Electronically Signed by           Flaco Samano MD  07/09/22 2170

## 2022-07-26 ENCOUNTER — APPOINTMENT (OUTPATIENT)
Dept: RADIOLOGY | Facility: CLINIC | Age: 67
End: 2022-07-26
Payer: COMMERCIAL

## 2022-07-26 ENCOUNTER — APPOINTMENT (OUTPATIENT)
Dept: LAB | Facility: CLINIC | Age: 67
End: 2022-07-26
Payer: COMMERCIAL

## 2022-07-26 ENCOUNTER — OFFICE VISIT (OUTPATIENT)
Dept: INTERNAL MEDICINE CLINIC | Facility: CLINIC | Age: 67
End: 2022-07-26
Payer: COMMERCIAL

## 2022-07-26 VITALS
SYSTOLIC BLOOD PRESSURE: 144 MMHG | HEART RATE: 85 BPM | DIASTOLIC BLOOD PRESSURE: 66 MMHG | WEIGHT: 222.25 LBS | TEMPERATURE: 98.7 F | BODY MASS INDEX: 31.82 KG/M2 | OXYGEN SATURATION: 97 % | HEIGHT: 70 IN

## 2022-07-26 DIAGNOSIS — R10.9 ACUTE LEFT FLANK PAIN: ICD-10-CM

## 2022-07-26 DIAGNOSIS — Z13.220 SCREENING FOR HYPERLIPIDEMIA: ICD-10-CM

## 2022-07-26 DIAGNOSIS — I13.0 HYPERTENSIVE HEART AND RENAL DISEASE WITH CHF (HCC): ICD-10-CM

## 2022-07-26 DIAGNOSIS — R05.1 ACUTE COUGH: ICD-10-CM

## 2022-07-26 DIAGNOSIS — Z13.1 SCREENING FOR DIABETES MELLITUS: ICD-10-CM

## 2022-07-26 DIAGNOSIS — M54.50 ACUTE LEFT-SIDED LOW BACK PAIN WITHOUT SCIATICA: ICD-10-CM

## 2022-07-26 DIAGNOSIS — M54.50 ACUTE LEFT-SIDED LOW BACK PAIN WITHOUT SCIATICA: Primary | ICD-10-CM

## 2022-07-26 DIAGNOSIS — G40.401: ICD-10-CM

## 2022-07-26 DIAGNOSIS — N18.30 STAGE 3 CHRONIC KIDNEY DISEASE, UNSPECIFIED WHETHER STAGE 3A OR 3B CKD (HCC): ICD-10-CM

## 2022-07-26 LAB
ALBUMIN SERPL BCP-MCNC: 3.7 G/DL (ref 3.5–5)
ALP SERPL-CCNC: 115 U/L (ref 46–116)
ALT SERPL W P-5'-P-CCNC: 17 U/L (ref 12–78)
ANION GAP SERPL CALCULATED.3IONS-SCNC: 3 MMOL/L (ref 4–13)
AST SERPL W P-5'-P-CCNC: 12 U/L (ref 5–45)
BASOPHILS # BLD AUTO: 0.07 THOUSANDS/ΜL (ref 0–0.1)
BASOPHILS NFR BLD AUTO: 1 % (ref 0–1)
BILIRUB SERPL-MCNC: 0.53 MG/DL (ref 0.2–1)
BUN SERPL-MCNC: 9 MG/DL (ref 5–25)
CALCIUM SERPL-MCNC: 9 MG/DL (ref 8.3–10.1)
CHLORIDE SERPL-SCNC: 107 MMOL/L (ref 96–108)
CHOLEST SERPL-MCNC: 133 MG/DL
CO2 SERPL-SCNC: 26 MMOL/L (ref 21–32)
CREAT SERPL-MCNC: 1.3 MG/DL (ref 0.6–1.3)
EOSINOPHIL # BLD AUTO: 0.38 THOUSAND/ΜL (ref 0–0.61)
EOSINOPHIL NFR BLD AUTO: 5 % (ref 0–6)
ERYTHROCYTE [DISTWIDTH] IN BLOOD BY AUTOMATED COUNT: 13.1 % (ref 11.6–15.1)
GFR SERPL CREATININE-BSD FRML MDRD: 56 ML/MIN/1.73SQ M
GLUCOSE P FAST SERPL-MCNC: 85 MG/DL (ref 65–99)
HCT VFR BLD AUTO: 39.8 % (ref 36.5–49.3)
HDLC SERPL-MCNC: 32 MG/DL
HGB BLD-MCNC: 13.6 G/DL (ref 12–17)
IMM GRANULOCYTES # BLD AUTO: 0.03 THOUSAND/UL (ref 0–0.2)
IMM GRANULOCYTES NFR BLD AUTO: 0 % (ref 0–2)
LDLC SERPL CALC-MCNC: 77 MG/DL (ref 0–100)
LYMPHOCYTES # BLD AUTO: 2.07 THOUSANDS/ΜL (ref 0.6–4.47)
LYMPHOCYTES NFR BLD AUTO: 28 % (ref 14–44)
MCH RBC QN AUTO: 30.8 PG (ref 26.8–34.3)
MCHC RBC AUTO-ENTMCNC: 34.2 G/DL (ref 31.4–37.4)
MCV RBC AUTO: 90 FL (ref 82–98)
MONOCYTES # BLD AUTO: 0.46 THOUSAND/ΜL (ref 0.17–1.22)
MONOCYTES NFR BLD AUTO: 6 % (ref 4–12)
NEUTROPHILS # BLD AUTO: 4.48 THOUSANDS/ΜL (ref 1.85–7.62)
NEUTS SEG NFR BLD AUTO: 60 % (ref 43–75)
NONHDLC SERPL-MCNC: 101 MG/DL
NRBC BLD AUTO-RTO: 0 /100 WBCS
NT-PROBNP SERPL-MCNC: 104 PG/ML
PLATELET # BLD AUTO: 266 THOUSANDS/UL (ref 149–390)
PMV BLD AUTO: 9.3 FL (ref 8.9–12.7)
POTASSIUM SERPL-SCNC: 3.7 MMOL/L (ref 3.5–5.3)
PROT SERPL-MCNC: 7 G/DL (ref 6.4–8.4)
RBC # BLD AUTO: 4.41 MILLION/UL (ref 3.88–5.62)
SODIUM SERPL-SCNC: 136 MMOL/L (ref 135–147)
TRIGL SERPL-MCNC: 120 MG/DL
WBC # BLD AUTO: 7.49 THOUSAND/UL (ref 4.31–10.16)

## 2022-07-26 PROCEDURE — 36415 COLL VENOUS BLD VENIPUNCTURE: CPT

## 2022-07-26 PROCEDURE — 99214 OFFICE O/P EST MOD 30 MIN: CPT | Performed by: NURSE PRACTITIONER

## 2022-07-26 PROCEDURE — 83036 HEMOGLOBIN GLYCOSYLATED A1C: CPT

## 2022-07-26 PROCEDURE — 85025 COMPLETE CBC W/AUTO DIFF WBC: CPT

## 2022-07-26 PROCEDURE — 71046 X-RAY EXAM CHEST 2 VIEWS: CPT

## 2022-07-26 PROCEDURE — 80061 LIPID PANEL: CPT

## 2022-07-26 PROCEDURE — 83880 ASSAY OF NATRIURETIC PEPTIDE: CPT

## 2022-07-26 PROCEDURE — 80053 COMPREHEN METABOLIC PANEL: CPT

## 2022-07-26 PROCEDURE — 74018 RADEX ABDOMEN 1 VIEW: CPT

## 2022-07-26 NOTE — PROGRESS NOTES
Assessment/Plan:     Problem List Items Addressed This Visit        Cardiovascular and Mediastinum    Hypertensive heart and renal disease with CHF ([facility] [address] )     Wt Readings from Last 3 Encounters:   07/26/22 101 kg (222 lb 4 oz)   07/07/22 99 8 kg (220 lb)   06/30/22 99 9 kg (220 lb 4 oz)     Will order BNP             Relevant Orders    NT-BNP PRO       Nervous and Auditory    Grand mal status ([facility] [address] )       Genitourinary    Stage 3 chronic kidney disease, unspecified whether stage 3a or 3b CKD ([facility] [address] )       Other    Acute left-sided low back pain without sciatica - Primary     Causing him to have left hip, knee, and ankle pain  This has caused him to have his left leg give out on him  Will send him for a lumbar spine xray         Relevant Orders    CBC and differential    Comprehensive metabolic panel    Acute left flank pain     With pain that radiates around and down into his groin  Will send him for a KUB stat  And he may possibly need to have a CT Renal study  Relevant Orders    XR abdomen 1 view kub    Screening for diabetes mellitus    Relevant Orders    HEMOGLOBIN A1C W/ EAG ESTIMATION    Screening for hyperlipidemia    Relevant Orders    Lipid panel    Acute cough     With positive sputum production  Will order an CXR PA and Lat         Relevant Orders    XR chest pa & lateral          Subjective:      Patient ID: Sagrario Matamoros is a 79 y o  male  The patient is here with complaints that For 5days pt has mid back pain down to lower back with left leg numbnes, dizziness and sob pt smoke a pk of cigs a day         The following portions of the patient's history were reviewed and updated as appropriate:     Past Medical History:  He has a past medical history of Acute right MCA stroke ([facility] [address] ) (9/15/2018), Arthritis, CAD (coronary artery disease), COPD (chronic obstructive pulmonary disease) ([facility] [address] ), GERD (gastroesophageal reflux disease), Grand mal status ([facility] [address] ) (3/24/2019), Heart attack ([address] ), Heart failure ([facility] ), Hyperlipidemia, Hypertension, Moira Bigger nuclear stress test (03/19/2016), Myocardial infarction [facility]), Shortness of breath, Stroke ([facility] ), and TIA (transient ischemic attack) (09/13/2018)  ,  _______________________________________________________________________  Medical Problems:  does not have any pertinent problems on file ,  _______________________________________________________________________  Past Surgical History:   has a past surgical history that includes Coronary artery bypass graft (2000); Cardiac catheterization (08/19/2015); Colonoscopy; Tonsillectomy; Hernia repair; Cardiac catheterization (N/A, 12/3/2021); and Cardiac catheterization (12/3/2021)  ,  _______________________________________________________________________  Family History:  family history includes Aneurysm in his father; Cancer in his father, maternal grandmother, and paternal grandfather; Heart disease in his mother ,  _______________________________________________________________________  Social History:   reports that he has been smoking cigars, pipe, and cigarettes  He has a 50 00 pack-year smoking history  He has never used smokeless tobacco  He reports current alcohol use  He reports that he does not use drugs  ,  _______________________________________________________________________  Allergies:  is allergic to gabapentin     _______________________________________________________________________  Current Outpatient Medications   Medication Sig Dispense Refill    albuterol (2 5 mg/3 mL) 0 083 % nebulizer solution Take 1 vial (2 5 mg total) by nebulization every 6 (six) hours as needed for wheezing or shortness of breath 25 vial 2    aspirin (ECOTRIN LOW STRENGTH) 81 mg EC tablet Take 1 tablet (81 mg total) by mouth daily 30 tablet 0    atorvastatin (LIPITOR) 40 mg tablet Take 1 tablet (40 mg total) by mouth every evening 90 tablet 3    calcium carbonate (TUMS EX) 750 mg chewable tablet Chew 1 tablet (750 mg total) 3 (three) times a day as needed for indigestion or heartburn 60 tablet 0    diltiazem (CARDIZEM CD) 180 mg 24 hr capsule take 1 capsule by mouth daily 30 capsule 5    fluticasone (FLONASE) 50 mcg/act nasal spray 1 spray into each nostril daily 16 g 0    furosemide (LASIX) 40 mg tablet Take 1 tablet (40 mg total) by mouth daily 90 tablet 3    isosorbide mononitrate (IMDUR) 30 mg 24 hr tablet Take 1 tablet (30 mg total) by mouth daily 90 tablet 3    linaCLOtide (Linzess) 145 MCG CAPS Take 1 capsule (145 mcg total) by mouth in the morning 30 capsule 2    metoprolol succinate (TOPROL-XL) 25 mg 24 hr tablet Take 1 tablet (25 mg total) by mouth 2 (two) times a day (Patient taking differently: Take 25 mg by mouth daily) 90 tablet 3    nitroglycerin (NITROSTAT) 0 4 mg SL tablet Place 0 4 mg under the tongue every 5 (five) minutes as needed for chest pain        pantoprazole (PROTONIX) 40 mg tablet swallow 1 tablet once daily 90 tablet 0    Potassium Chloride ER 20 MEQ TBCR Take 20 mEq by mouth daily       pregabalin (LYRICA) 75 mg capsule Take 1 capsule (75 mg total) by mouth 2 (two) times a day 60 capsule 2    Spiriva HandiHaler 18 MCG inhalation capsule inhale THE CONTENTS OF ONE CAPSULE IN THE HANDIHALER once daily 30 capsule 0    tamsulosin (FLOMAX) 0 4 mg Take 1 capsule (0 4 mg total) by mouth daily with dinner 30 capsule 0    Ventolin  (90 Base) MCG/ACT inhaler inhale 2 puffs by mouth and INTO THE LUNGS every 6 hours if needed for shortness of breath 54 g 0    zolpidem (AMBIEN) 10 mg tablet Take 1 tablet (10 mg total) by mouth daily at bedtime as needed for sleep 30 tablet 0    MELATONIN PO Take by mouth daily at bedtime (Patient not taking: Reported on 7/26/2022)      naproxen (NAPROSYN) 500 mg tablet Take 1 tablet (500 mg total) by mouth 2 (two) times a day with meals for 10 days 20 tablet 0    Restasis 0 05 % ophthalmic emulsion instill 1 drop INTO AFFECTED EYE(S) every 12 hours      roflumilast (DALIRESP) 500 mcg tablet Take 500 mcg by mouth daily (Patient not taking: No sig reported)       No current facility-administered medications for this visit      _______________________________________________________________________      Review of Systems   Constitutional: Negative for activity change, chills, fatigue and fever  HENT: Negative for rhinorrhea and sore throat  Eyes: Negative for pain  Respiratory: Positive for cough  Negative for shortness of breath  Cardiovascular: Positive for chest pain  Negative for palpitations and leg swelling  Gastrointestinal: Positive for constipation  Negative for abdominal pain, diarrhea, nausea and vomiting  Genitourinary: Positive for flank pain  Negative for difficulty urinating, frequency and urgency  Musculoskeletal: Positive for arthralgias, back pain, gait problem, joint swelling and myalgias  Skin: Negative for color change  Neurological: Negative for dizziness, weakness, light-headedness and headaches  Psychiatric/Behavioral: Negative for sleep disturbance  The patient is not nervous/anxious  All other systems reviewed and are negative  Objective:  Vitals:    07/26/22 1059   BP: 144/66   Pulse: 85   Temp: 98 7 °F (37 1 °C)   SpO2: 97%   Weight: 101 kg (222 lb 4 oz)   Height: 5' 10" (1 778 m)     Body mass index is 31 89 kg/m²  Physical Exam  Vitals reviewed  Constitutional:       General: He is awake  Appearance: Normal appearance  He is well-developed and normal weight  HENT:      Head: Normocephalic and atraumatic  Nose: Nose normal       Mouth/Throat:      Mouth: Mucous membranes are moist    Eyes:      Extraocular Movements: Extraocular movements intact  Cardiovascular:      Rate and Rhythm: Normal rate and regular rhythm  Pulses: Normal pulses  Heart sounds: Normal heart sounds     Pulmonary:      Effort: Pulmonary effort is normal       Breath sounds: Examination of the left-upper field reveals rhonchi  Examination of the left-middle field reveals rhonchi  Examination of the left-lower field reveals rhonchi  Rhonchi present  Comments: Location of left flank pain  Abdominal:      General: Bowel sounds are normal       Palpations: Abdomen is soft  Musculoskeletal:         General: Normal range of motion  Cervical back: Normal range of motion  Right lower leg: No edema  Left lower leg: No edema  Skin:     General: Skin is warm and dry  Neurological:      Mental Status: He is alert and oriented to person, place, and time  Psychiatric:         Attention and Perception: Attention normal          Mood and Affect: Mood normal          Speech: Speech normal          Behavior: Behavior normal  Behavior is cooperative

## 2022-07-26 NOTE — ASSESSMENT & PLAN NOTE
· With pain that radiates around and down into his groin  · Will send him for a KUB stat  · And he may possibly need to have a CT Renal study

## 2022-07-26 NOTE — PATIENT INSTRUCTIONS
Problem List Items Addressed This Visit          Cardiovascular and Mediastinum    Hypertensive heart and renal disease with CHF ([facility] )     Wt Readings from Last 3 Encounters:   07/26/22 101 kg (222 lb 4 oz)   07/07/22 99 8 kg (220 lb)   06/30/22 99 9 kg (220 lb 4 oz)   Will order BNP             Relevant Orders    NT-BNP PRO       Nervous and Auditory    Grand mal status ([facility] )       Genitourinary    Stage 3 chronic kidney disease, unspecified whether stage 3a or 3b CKD ([facility] )       Other    Acute left-sided low back pain without sciatica - Primary     Causing him to have left hip, knee, and ankle pain  This has caused him to have his left leg give out on him  Will send him for a lumbar spine xray           Relevant Orders    CBC and differential    Comprehensive metabolic panel    Acute left flank pain     With pain that radiates around and down into his groin  Will send him for a KUB stat  And he may possibly need to have a CT Renal study             Relevant Orders    XR abdomen 1 view kub    Screening for diabetes mellitus    Relevant Orders    HEMOGLOBIN A1C W/ EAG ESTIMATION    Screening for hyperlipidemia    Relevant Orders    Lipid panel    Acute cough     With positive sputum production  Will order an CXR PA and Lat           Relevant Orders    XR chest pa & lateral

## 2022-07-26 NOTE — ASSESSMENT & PLAN NOTE
· Causing him to have left hip, knee, and ankle pain  · This has caused him to have his left leg give out on him     · Will send him for a lumbar spine xray

## 2022-07-26 NOTE — ASSESSMENT & PLAN NOTE
Wt Readings from Last 3 Encounters:   07/26/22 101 kg (222 lb 4 oz)   07/07/22 99 8 kg (220 lb)   06/30/22 99 9 kg (220 lb 4 oz)     · Will order BNP

## 2022-07-27 LAB
EST. AVERAGE GLUCOSE BLD GHB EST-MCNC: 117 MG/DL
HBA1C MFR BLD: 5.7 %

## 2022-07-27 PROCEDURE — 3044F HG A1C LEVEL LT 7.0%: CPT | Performed by: PHYSICIAN ASSISTANT

## 2022-07-29 DIAGNOSIS — M54.50 ACUTE LEFT-SIDED LOW BACK PAIN WITHOUT SCIATICA: ICD-10-CM

## 2022-07-29 DIAGNOSIS — K59.09 CHRONIC CONSTIPATION: Primary | ICD-10-CM

## 2022-07-29 RX ORDER — DOCUSATE SODIUM 100 MG/1
100 CAPSULE, LIQUID FILLED ORAL 2 TIMES DAILY
Qty: 60 CAPSULE | Refills: 0 | Status: SHIPPED | OUTPATIENT
Start: 2022-07-29 | End: 2022-10-19

## 2022-07-29 RX ORDER — AZITHROMYCIN 250 MG/1
TABLET, FILM COATED ORAL
Qty: 6 TABLET | Refills: 0 | Status: SHIPPED | OUTPATIENT
Start: 2022-07-29 | End: 2022-08-03

## 2022-07-29 RX ORDER — PREDNISONE 10 MG/1
TABLET ORAL
Qty: 18 TABLET | Refills: 0 | Status: SHIPPED | OUTPATIENT
Start: 2022-07-29 | End: 2022-08-07

## 2022-08-01 DIAGNOSIS — N39.9 URINARY PROBLEM IN MALE: Primary | ICD-10-CM

## 2022-08-06 DIAGNOSIS — K44.9 HIATAL HERNIA: ICD-10-CM

## 2022-08-06 RX ORDER — PANTOPRAZOLE SODIUM 40 MG/1
TABLET, DELAYED RELEASE ORAL
Qty: 90 TABLET | Refills: 0 | Status: SHIPPED | OUTPATIENT
Start: 2022-08-06

## 2022-08-08 ENCOUNTER — OFFICE VISIT (OUTPATIENT)
Dept: UROLOGY | Facility: CLINIC | Age: 67
End: 2022-08-08
Payer: COMMERCIAL

## 2022-08-08 VITALS
WEIGHT: 223 LBS | DIASTOLIC BLOOD PRESSURE: 80 MMHG | BODY MASS INDEX: 31.92 KG/M2 | SYSTOLIC BLOOD PRESSURE: 124 MMHG | HEIGHT: 70 IN

## 2022-08-08 DIAGNOSIS — R33.8 BENIGN PROSTATIC HYPERPLASIA WITH URINARY RETENTION: Primary | ICD-10-CM

## 2022-08-08 DIAGNOSIS — R33.9 URINARY RETENTION: ICD-10-CM

## 2022-08-08 DIAGNOSIS — K59.09 CHRONIC CONSTIPATION: ICD-10-CM

## 2022-08-08 DIAGNOSIS — K59.00 CONSTIPATION, UNSPECIFIED CONSTIPATION TYPE: ICD-10-CM

## 2022-08-08 DIAGNOSIS — N40.1 BENIGN PROSTATIC HYPERPLASIA WITH URINARY RETENTION: Primary | ICD-10-CM

## 2022-08-08 DIAGNOSIS — N39.9 URINARY PROBLEM IN MALE: ICD-10-CM

## 2022-08-08 PROBLEM — N40.0 BPH (BENIGN PROSTATIC HYPERPLASIA): Status: ACTIVE | Noted: 2022-08-08

## 2022-08-08 LAB
BACTERIA UR QL AUTO: NORMAL /HPF
BILIRUB UR QL STRIP: NEGATIVE
CLARITY UR: CLEAR
COLOR UR: NORMAL
GLUCOSE UR STRIP-MCNC: NEGATIVE MG/DL
HGB UR QL STRIP.AUTO: NEGATIVE
KETONES UR STRIP-MCNC: NEGATIVE MG/DL
LEUKOCYTE ESTERASE UR QL STRIP: NEGATIVE
NITRITE UR QL STRIP: NEGATIVE
NON-SQ EPI CELLS URNS QL MICRO: NORMAL /HPF
PH UR STRIP.AUTO: 6 [PH]
PROT UR STRIP-MCNC: NEGATIVE MG/DL
RBC #/AREA URNS AUTO: NORMAL /HPF
SP GR UR STRIP.AUTO: 1.01 (ref 1–1.03)
UROBILINOGEN UR STRIP-ACNC: <2 MG/DL
WBC #/AREA URNS AUTO: NORMAL /HPF

## 2022-08-08 PROCEDURE — 81001 URINALYSIS AUTO W/SCOPE: CPT | Performed by: NURSE PRACTITIONER

## 2022-08-08 PROCEDURE — 99203 OFFICE O/P NEW LOW 30 MIN: CPT | Performed by: NURSE PRACTITIONER

## 2022-08-08 PROCEDURE — 51701 INSERT BLADDER CATHETER: CPT | Performed by: NURSE PRACTITIONER

## 2022-08-08 PROCEDURE — 87086 URINE CULTURE/COLONY COUNT: CPT | Performed by: NURSE PRACTITIONER

## 2022-08-08 PROCEDURE — 3074F SYST BP LT 130 MM HG: CPT | Performed by: NURSE PRACTITIONER

## 2022-08-08 PROCEDURE — 1160F RVW MEDS BY RX/DR IN RCRD: CPT | Performed by: NURSE PRACTITIONER

## 2022-08-08 PROCEDURE — 3079F DIAST BP 80-89 MM HG: CPT | Performed by: NURSE PRACTITIONER

## 2022-08-08 RX ORDER — POLYETHYLENE GLYCOL 3350 17 G/17G
17 POWDER, FOR SOLUTION ORAL DAILY
Qty: 10 EACH | Refills: 0 | Status: SHIPPED | OUTPATIENT
Start: 2022-08-08 | End: 2022-10-19

## 2022-08-08 RX ORDER — TAMSULOSIN HYDROCHLORIDE 0.4 MG/1
0.4 CAPSULE ORAL 2 TIMES DAILY
Qty: 60 CAPSULE | Refills: 12 | Status: SHIPPED | OUTPATIENT
Start: 2022-08-08 | End: 2022-09-29

## 2022-08-08 NOTE — ASSESSMENT & PLAN NOTE
· Straight catheterization 400 mL  · Increase Flomax to 0 4 mg b i d    · Avoid bladder irritants:  Mainly soda  · Increased water intake  · Schedule ultrasound kidney and bladder with PVR  · Schedule cystoscopy

## 2022-08-08 NOTE — PROGRESS NOTES
Universal Protocol:  Procedure performed by: Arleth Lizama  Consent: Verbal consent not obtained  Written consent not obtained  Risks and benefits: risks, benefits and alternatives were discussed  Consent given by: patient  Time out: Immediately prior to procedure a "time out" was called to verify the correct patient, procedure, equipment, support staff and site/side marked as required  Patient understanding: patient states understanding of the procedure being performed  Patient consent: the patient's understanding of the procedure matches consent given  Patient identity confirmed: verbally with patient      Bladder catheterization    Date/Time: 8/8/2022 10:45 AM  Performed by: ADRIANA Khalil  Authorized by: ADRIANA Khalil     Patient location:  Bedside  Consent:     Consent given by:  Patient  Universal protocol:     Procedure explained and questions answered to patient or proxy's satisfaction: yes      Immediately prior to procedure a time out was called: yes      Patient identity confirmed:  Verbally with patient  Pre-procedure details:     Procedure purpose:  Therapeutic    Preparation: Patient was prepped and draped in usual sterile fashion    Anesthesia (see MAR for exact dosages): Anesthesia method:  Topical application    Topical anesthetic:  Lidocaine gel  Procedure details:     Bladder irrigation: no      Catheter insertion:  Non-indwelling    Approach: natural orifice      Catheter type:  Latex    Catheter size:  16 Fr    Number of attempts:  1    Successful placement: yes      Urine characteristics:  Dark, yellow and clear (400 mL)    Provider performed due to:  Nurse unavailable  Post-procedure details:     Patient tolerance of procedure: Tolerated well, no immediate complications        Assessment and plan:     BPH (benign prostatic hyperplasia)  · Straight catheterization 400 mL  · Increase Flomax to 0 4 mg b i d    · Avoid bladder irritants:  Mainly soda  · Increased water intake  · Schedule ultrasound kidney and bladder with P[facility]  · Schedule cystoscopy    Urinary retention  · MiraLax p r n  constipation  · Straight cath 400 mL  · Declined Mejía catheter placement or CIC  · Increase Flomax to 0 4 mg b i d   · Schedule cystoscopy    Chronic constipation  · Continue Linzess  · MiraLax p r n  ordered  · Follow-up with ADRIANA Serna    History of Present Illness     Kiki Duran is a 79 y o  new patient who presents for evaluation  He states he is "upset" because he thought he was getting a cystoscopy today  He has not been urinating per his report  This started happening the past few days  "they should have done a scope, I don't know why anyone is playing games with me"  He is refusing a catheter in the office today  He is agreeable to a straight cath  "I want to see a urologist, why did I bypass him"  He reports constipation, had a bowel movement last night  He has not been eating a lot lately  He takes linzess and colace  He takes flomax 0 4 mg daily  He urinated last evening and only urinates "a cup of urine"  He drinks soda (2 liters a day), tea (3-4 cups), water (2-3 cups)  He has an apartment without air conditioning  I recommend he avoid bladder irritants and increase his water intake  I also recommended he treat his constipation which may be a factor in his urinary retention  He states "I was told I had an enlarged prostate  I don't want to end up with a bladder problem  I will go to the  Hospital if I have to, I want an appointment for the camera, I am not going to wait on you guys  this has been going on for a few days now, this is BS"    Had prior cystoscopy 12-15 years ago  Recently saw all Dr Angelia Tobin in [facility]   Difficulty with urination after rectal exam,      Laboratory     Lab Results   Component Value Date    BUN 9 07/26/2022    CREATININE 1 30 07/26/2022       No components found for: GFR    Lab Results   Component Value Date    CALCIUM 9 0 07/26/2022  06/22/2018    K 3 7 07/26/2022    CO2 26 07/26/2022     07/26/2022       Lab Results   Component Value Date    WBC 7 49 07/26/2022    HGB 13 6 07/26/2022    HCT 39 8 07/26/2022    MCV 90 07/26/2022     07/26/2022       Lab Results   Component Value Date    PSA 0 3 03/17/2022       No results found for this or any previous visit (from the past 1 hour(s))  @RESULT(URINEMICROSCOPIC)@    @RESULT(URINECULTURE)@    Radiology       Review of Systems     Review of Systems   Constitutional: Negative for activity change, appetite change, chills, fatigue, fever and unexpected weight change  HENT: Negative for facial swelling  Eyes: Negative for discharge  Respiratory: Negative  Negative for cough and shortness of breath  Cardiovascular: Negative for chest pain and leg swelling  Gastrointestinal: Positive for constipation  Negative for abdominal distention, abdominal pain, diarrhea, nausea and vomiting  Endocrine: Negative  Genitourinary: Positive for difficulty urinating  Negative for decreased urine volume, dysuria, enuresis, flank pain, frequency, genital sores, hematuria, scrotal swelling, testicular pain and urgency  Musculoskeletal: Negative for back pain and myalgias  Skin: Negative for pallor and rash  Allergic/Immunologic: Negative  Negative for immunocompromised state  Neurological: Negative for facial asymmetry and speech difficulty  Psychiatric/Behavioral: Positive for agitation  Negative for confusion  Allergies     Allergies   Allergen Reactions    Gabapentin Headache       Physical Exam     Physical Exam  Vitals reviewed  Constitutional:       General: He is not in acute distress  Appearance: Normal appearance  He is obese  He is not ill-appearing, toxic-appearing or diaphoretic  HENT:      Head: Normocephalic and atraumatic  Eyes:      General: No scleral icterus  Cardiovascular:      Rate and Rhythm: Normal rate     Pulmonary:      Effort: Pulmonary effort is normal  No respiratory distress  Abdominal:      General: Abdomen is flat  There is no distension  Palpations: Abdomen is soft  Tenderness: There is no abdominal tenderness  There is no guarding or rebound  Genitourinary:     Penis: Circumcised  No hypospadias, erythema, tenderness, discharge, swelling or lesions  Comments: Prostate smooth nontender without appreciable nodules or indurations approximately 40 g  Musculoskeletal:         General: No swelling  Cervical back: Normal range of motion  Right lower leg: Edema present  Left lower leg: Edema present  Skin:     General: Skin is warm and dry  Coloration: Skin is not jaundiced or pale  Findings: No rash  Neurological:      General: No focal deficit present  Mental Status: He is alert and oriented to person, place, and time  Gait: Gait normal    Psychiatric:         Mood and Affect: Affect is angry  Behavior: Behavior is cooperative  Thought Content:  Thought content normal          Judgment: Judgment normal          Vital Signs     Vitals:    08/08/22 0949   BP: 124/80   Weight: 101 kg (223 lb)   Height: 5' 10" (1 778 m)       Current Medications       Current Outpatient Medications:     albuterol (2 5 mg/3 mL) 0 083 % nebulizer solution, Take 1 vial (2 5 mg total) by nebulization every 6 (six) hours as needed for wheezing or shortness of breath, Disp: 25 vial, Rfl: 2    aspirin (ECOTRIN LOW STRENGTH) 81 mg EC tablet, Take 1 tablet (81 mg total) by mouth daily, Disp: 30 tablet, Rfl: 0    atorvastatin (LIPITOR) 40 mg tablet, Take 1 tablet (40 mg total) by mouth every evening, Disp: 90 tablet, Rfl: 3    calcium carbonate (TUMS EX) 750 mg chewable tablet, Chew 1 tablet (750 mg total) 3 (three) times a day as needed for indigestion or heartburn, Disp: 60 tablet, Rfl: 0    diltiazem (CARDIZEM CD) 180 mg 24 hr capsule, take 1 capsule by mouth daily, Disp: 30 capsule, Rfl: 5    docusate sodium (COLACE) 100 mg capsule, Take 1 capsule (100 mg total) by mouth 2 (two) times a day, Disp: 60 capsule, Rfl: 0    fluticasone (FLONASE) 50 mcg/act nasal spray, 1 spray into each nostril daily, Disp: 16 g, Rfl: 0    furosemide (LASIX) 40 mg tablet, Take 1 tablet (40 mg total) by mouth daily, Disp: 90 tablet, Rfl: 3    isosorbide mononitrate (IMDUR) 30 mg 24 hr tablet, Take 1 tablet (30 mg total) by mouth daily, Disp: 90 tablet, Rfl: 3    linaCLOtide (Linzess) 145 MCG CAPS, Take 1 capsule (145 mcg total) by mouth in the morning, Disp: 30 capsule, Rfl: 2    MELATONIN PO, Take by mouth daily at bedtime, Disp: , Rfl:     metoprolol succinate (TOPROL-XL) 25 mg 24 hr tablet, Take 1 tablet (25 mg total) by mouth 2 (two) times a day (Patient taking differently: Take 25 mg by mouth daily), Disp: 90 tablet, Rfl: 3    nitroglycerin (NITROSTAT) 0 4 mg SL tablet, Place 0 4 mg under the tongue every 5 (five) minutes as needed for chest pain  , Disp: , Rfl:     pantoprazole (PROTONIX) 40 mg tablet, take 1 tablet by mouth once daily, Disp: 90 tablet, Rfl: 0    polyethylene glycol (MIRALAX) 17 g packet, Take 17 g by mouth daily, Disp: 10 each, Rfl: 0    Potassium Chloride ER 20 MEQ TBCR, Take 20 mEq by mouth daily , Disp: , Rfl:     pregabalin (LYRICA) 75 mg capsule, Take 1 capsule (75 mg total) by mouth 2 (two) times a day, Disp: 60 capsule, Rfl: 2    Restasis 0 05 % ophthalmic emulsion, instill 1 drop INTO AFFECTED EYE(S) every 12 hours, Disp: , Rfl:     roflumilast (DALIRESP) 500 mcg tablet, Take 500 mcg by mouth daily, Disp: , Rfl:     Spiriva HandiHaler 18 MCG inhalation capsule, inhale THE CONTENTS OF ONE CAPSULE IN THE HANDIHALER once daily, Disp: 30 capsule, Rfl: 0    tamsulosin (FLOMAX) 0 4 mg, Take 1 capsule (0 4 mg total) by mouth 2 (two) times a day, Disp: 60 capsule, Rfl: 12    Ventolin  (90 Base) MCG/ACT inhaler, inhale 2 puffs by mouth and INTO THE LUNGS every 6 hours if needed for shortness of breath, Disp: 54 g, Rfl: 0    zolpidem (AMBIEN) 10 mg tablet, Take 1 tablet (10 mg total) by mouth daily at bedtime as needed for sleep, Disp: 30 tablet, Rfl: 0    naproxen (NAPROSYN) 500 mg tablet, Take 1 tablet (500 mg total) by mouth 2 (two) times a day with meals for 10 days, Disp: 20 tablet, Rfl: 0    Active Problems     Patient Active Problem List   Diagnosis    Hypertension    Hyperlipidemia    COPD (chronic obstructive pulmonary disease) ([place] [address] )    Coronary artery disease involving native coronary artery of native heart with angina pectoris (HCC)    Anxiety and depression    Diverticular disease    Chronic constipation    ED (erectile dysfunction) of organic origin    Gastroesophageal reflux disease    Grand mal status (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)    Stage 3 chronic kidney disease, unspecified whether stage 3a or 3b CKD ([place] [address] )    Acute left-sided low back pain without sciatica    Acute left flank pain    Screening for diabetes mellitus    Screening for hyperlipidemia    Acute cough    BPH (benign prostatic hyperplasia)    Urinary retention       Past Medical History     Past Medical History:   Diagnosis Date    Acute right MCA stroke ([place] [address] ) 9/15/2018    Arthritis     CAD (coronary artery disease)     s/p CABG 2000    COPD (chronic obstructive pulmonary disease) (HCC)     GERD (gastroesophageal reflux disease)     Grand mal status ([place] [address] ) 3/24/2019    Heart attack ([place] [address] )     Heart failure (HCC)     Hyperlipidemia     Hypertension     Lexiscan nuclear stress test 03/19/2016    EF 74% Normal    Myocardial infarction (HCC)     Shortness of breath     Stroke [facility])     TIA (transient ischemic attack) 09/13/2018       Surgical History     Past Surgical History:   Procedure Laterality Date    CARDIAC CATHETERIZATION  08/19/2015    LIMA occluded  No severe native lesions    CARDIAC CATHETERIZATION N/A 12/3/2021    Procedure: Cardiac Coronary Angiogram;  Surgeon: Ethan Vallejo MD;  Location: [facility]; Service: Cardiology    CARDIAC CATHETERIZATION  12/3/2021    Procedure: Cardiac catheterization;  Surgeon: Ethan Vallejo MD;  [facility]: [facility]; Service: Cardiology    COLONOSCOPY      CORONARY ARTERY BYPASS GRAFT  2000    HERNIA REPAIR      umbilical hernia x2    TONSILLECTOMY         Family History     Family History   Problem Relation Age of Onset    Heart disease Mother     Cancer Father     Aneurysm Father     Cancer Maternal Grandmother     Cancer Paternal Grandfather        Social History     Social History     Social History     Tobacco Use   Smoking Status Current Every Day Smoker    Packs/day: 1 00    Years: 50 00    Pack years: 50 00    Types: Cigars, Pipe, Cigarettes   Smokeless Tobacco Never Used       Past Surgical History:   Procedure Laterality Date    CARDIAC CATHETERIZATION  08/19/2015    LIMA occluded  No severe native lesions    CARDIAC CATHETERIZATION N/A 12/3/2021    Procedure: Cardiac Coronary Angiogram;  Surgeon: Ethan Vallejo MD;  [facility]: [facility]; Service: Cardiology    CARDIAC CATHETERIZATION  12/3/2021    Procedure: Cardiac catheterization;  Surgeon: Ethan Vallejo MD;  Location: [facility];   Service: Cardiology    COLONOSCOPY      CORONARY ARTERY BYPASS GRAFT  2000    HERNIA REPAIR      umbilical hernia x2    TONSILLECTOMY           The following portions of the patient's history were reviewed and updated as appropriate: allergies, current medications, past family history, past medical history, past social history, past surgical history and problem list    Please note :  Voice dictation software has been used to create this document  There may be inadvertent transcription errors      [id number] [address] [place]

## 2022-08-08 NOTE — ASSESSMENT & PLAN NOTE
· MiraLax p r n  constipation  · Straight cath 400 mL  · Declined Mejía catheter placement or CIC  · Increase Flomax to 0 4 mg b i d   · Schedule cystoscopy

## 2022-08-08 NOTE — PATIENT INSTRUCTIONS
BLADDER HEALTH    WHAT IS CONSIDERED NORMAL? The average bladder can hold about 2 cups of urine before it needs to be emptied  The normal range of voiding urine is 6 to 8 times during a 24 hour period  As we get older, our bladder capacity can get smaller and we may need to pass urine more frequently but usually not more than every 2 hours  Urine should flow easily without discomfort in a good, steady stream until the bladder is empty  No pushing or straining is necessary to empty the bladder  An urge is a signal that you feel as the bladder stretches to fill with urine  Urges can be felt even if the bladder is not full  Urges are not commands to go to the toilet, merely a signal and can be controlled  WHAT ARE GOOD BLADDER HABITS? Take your time when emptying your bladder  Dont strain or push to empty your bladder  Make sure you empty your bladder completely each time you pass urine  Do not rush the process  Consistently ignoring the urge to go (waiting more than 4 hours between toileting) or urinating too infrequently may be convenient but not healthy for your bladder  Avoid going to the toilet just in case or more often than every 2 hours  It is usually not necessary to go when you feel the first urge  Try to go only when your bladder is full  Urgency and frequency of urination can be improved by retraining the bladder and spacing your fluid intake throughout the day  Practice good toilet habits  Dont let your bladder control your life  TIPS TO MAINTAIN GOOD BLADDER HABITS  Maintain a good fluid intake  Depending on your body size and environment, drink 6 -8 cups (8 oz each) of fluid per day unless otherwise advised by your doctor  Not enough fluid creates a foul odor and dark color of the urine  Limit the amount of caffeine (coffee, cola, chocolate or tea) and citrus foods that you consume as these foods can be associated with increased sensation of urinary urgency and frequency  Limit the amount of alcohol you drink  Alcohol increases urine production and makes it difficult for the brain to coordinate bladder control  Avoid constipation by maintaining a balanced diet of dietary fiber  Cigarette smoking is also irritating to the bladder surface and is associated with bladder cancer  In addition, the coughing associated with smoking may lead to increased incontinent episodes because of the increased pressure  HOW DIET CAN AFFECT YOUR BLADDER  Although there is no particular "diet" that can cure bladder control, there are certain dietary suggestions you can use to help control the problem  There are 2 points to consider when evaluating how your habits and diet may affect your bladder:    Foods and fluids can irritate the bladder  Some foods and beverages are thought to contribute to bladder leakage and irritability  However their effect on the bladder is not completely understood and you may want to see if eliminating one or all of these items improves your bladder control  If you are unable to give them up completely, it is recommended that you use the following items in moderation:  Acidic beverages and foods (orange juice, grapefruit juice, lemonade etc)  Alcoholic beverages  Vinegar  Coffee (regular and decaf)  Tea (regular and decaf)  Caffeinated beverages  Carbonated beverages          Drinking enough and the right kinds of fluids  Many people with bladder control issues decrease their intake of liquids in hope that they will need to urinate less frequently or have less urinary leakage  You should not restrict fluids to control your bladder  While a decrease in liquid intake does result in a decrease in the volume of urine, the smaller amount of urine may be more highly concentrated  Highly concentrated, dark yellow urine is irritating to the bladder surface and may actually cause you to go to the bathroom more frequently        It also encourages the growth of bacteria, which may lead to infections resulting in incontinence  Substitutions for Bladder Irritants: water is always the best beverage choice  Grape and apple juice are thirst quenchers are good selections and are not as irritating to the bladder  Low acid fruits:  Pears, apricots, papaya, watermelon  For coffee drinkers: KAVA®, Postum®, Pavel®, Kaffree Bhakti®  For tea drinkers:  non-citrus or herbal and sun brewed tea  Enlarged Prostate (BPH)   WHAT YOU NEED TO KNOW:   What do I need to know about an enlarged prostate (BPH)? An enlarged prostate (BPH) is a common condition in older adults  BPH develops because the number of prostate cells increases (hyperplasia) or the cells get bigger (hypertrophy)  The prostate wraps around the urethra  An enlarged prostate can press on the urethra  This may cause problems with storing urine or emptying your bladder completely  What are the signs and symptoms of BPH? Urinating 8 or more times each day    A feeling of not fully emptying your bladder when you urinate    An urgent need to urinate that you could not put off, or urinating again within 2 hours    Being woken from sleep because you needed to urinate    Trouble starting your urine flow, or a need to push or strain to get it to start    Urine that stops and starts several times when you urinate    A weak urine stream, or dribbling after you urinate    How is BPH diagnosed? A digital rectal exam  is used to check the size of your prostate  Your healthcare provider will insert a gloved finger into your rectum  The provider will be able to feel your prostate  The size of your prostate may be checked with ultrasound pictures  Urine tests  may show infection, or strength and amount of urine flow  You may need to record how often and how much you urinate for 24 hours  Blood tests  may show kidney problems or your PSA level  PSA is a substance produced by your prostate   PSA levels increase when you have BPH  A postvoid residual volume test  is used to measure the amount of urine left in your bladder after you urinate  How is BPH treated? Watchful waiting  means you do not receive treatment right away  Your signs and symptoms will be monitored over time to see if they get worse  You may be asked to keep a record and bring it to follow-up visits  This record may include when you urinate, how easy or difficult it was, and any changes in urination  Medicines  may be used to relax the muscles in your prostate and bladder  This may help you urinate more easily  You may also need medicine that helps shrink, or slow the growth of your prostate  A procedure  may be used to place a stent into your urethra to hold it open  A stent is a short, tiny mesh tube  A prostatic urethral lift, or UroLift, may be used to hold the prostate away from the urethra  This makes the urethra wider so urine can pass through more easily  Surgery  may be used to relieve your symptoms if other treatments do not work  Extra tissue that is causing your symptoms may be destroyed  All or part of your prostate may be removed during another type of surgery  What can I do to manage my symptoms? Urinate on a regular schedule  This will train your bladder to hold urine longer  A larger amount of urine may make it easier to urinate  Drink less liquid during the day  Do not have liquid for several hours before you go to bed at night  Do not drink large amounts of any liquid at one time  Limit alcohol and caffeine  These can irritate your bladder and make your symptoms worse  Eat less salt  Salt can cause fluid buildup and make it harder to urinate  Examples of salty foods are chips, cured meats, and canned soups  Do not use table salt  Elevate your legs if you have swelling  Elevate (raise) your legs above the level of your heart  This can relieve swelling caused by fluid buildup   You may not have to get up in the night to urinate  Exercise regularly  Exercise can help improve your symptoms  Ask your healthcare provider what a healthy weight for you is  Aim to get at least 30 minutes of exercise on most days of the week  When should I call my doctor? You see blood in your urine  You are not able to urinate  Your bladder feels very full and painful  You have new or worsening symptoms  You have a fever  You have questions or concerns about your condition or care  CARE AGREEMENT:   You have the right to help plan your care  Learn about your health condition and how it may be treated  Discuss treatment options with your healthcare providers to decide what care you want to receive  You always have the right to refuse treatment  The above information is an  only  It is not intended as medical advice for individual conditions or treatments  Talk to your doctor, nurse or pharmacist before following any medical regimen to see if it is safe and effective for you  © Copyright [employer] 2022 Information is for End User's use only and may not be sold, redistributed or otherwise used for commercial purposes   All illustrations and images included in CareNotes® are the copyrighted property of A D A M , Inc  or [address] [place] [address]

## 2022-08-09 ENCOUNTER — HOSPITAL ENCOUNTER (OUTPATIENT)
Dept: ULTRASOUND IMAGING | Facility: HOSPITAL | Age: 67
Discharge: HOME | End: 2022-08-09
Payer: COMMERCIAL

## 2022-08-09 DIAGNOSIS — N40.1 BENIGN PROSTATIC HYPERPLASIA WITH URINARY RETENTION: ICD-10-CM

## 2022-08-09 DIAGNOSIS — R33.8 BENIGN PROSTATIC HYPERPLASIA WITH URINARY RETENTION: ICD-10-CM

## 2022-08-09 DIAGNOSIS — N39.9 URINARY PROBLEM IN MALE: ICD-10-CM

## 2022-08-09 LAB — BACTERIA UR CULT: NORMAL

## 2022-08-09 PROCEDURE — 76770 US EXAM ABDO BACK WALL COMP: CPT

## 2022-08-09 NOTE — RESULT ENCOUNTER NOTE
Please let patient know his urine testing was unremarkable    There was no blood or signs of infection

## 2022-08-10 ENCOUNTER — TELEPHONE (OUTPATIENT)
Dept: UROLOGY | Facility: AMBULATORY SURGERY CENTER | Age: 67
End: 2022-08-10

## 2022-08-10 DIAGNOSIS — J44.9 CHRONIC OBSTRUCTIVE PULMONARY DISEASE, UNSPECIFIED COPD TYPE (HCC): ICD-10-CM

## 2022-08-10 RX ORDER — TIOTROPIUM BROMIDE 18 UG/1
CAPSULE ORAL; RESPIRATORY (INHALATION)
Qty: 30 CAPSULE | Refills: 0 | Status: SHIPPED | OUTPATIENT
Start: 2022-08-10 | End: 2022-09-04

## 2022-08-10 NOTE — TELEPHONE ENCOUNTER
LM per communication consent that urine testing was negative for blood or infection  Advised to call back with any questions

## 2022-08-10 NOTE — TELEPHONE ENCOUNTER
----- Message from [address] [place] [address] sent at 8/9/2022  4:25 PM EDT -----  Please let patient know his urine testing was unremarkable    There was no blood or signs of infection

## 2022-08-15 NOTE — RESULT ENCOUNTER NOTE
Please let patient was ultrasound kidney and bladder revealed a simple cyst and some mild urinary retention otherwise under

## 2022-08-16 ENCOUNTER — TELEPHONE (OUTPATIENT)
Dept: UROLOGY | Facility: CLINIC | Age: 67
End: 2022-08-16

## 2022-08-16 NOTE — TELEPHONE ENCOUNTER
Called and left voicemail message for patient per communication consent with US kidney and bladder results and upcoming appointment date and time  Advised patient to contact the office with any questions

## 2022-08-17 NOTE — TELEPHONE ENCOUNTER
Patient called and stated that the bus company will be sending over a form to be completed and faxed back tot he bus company when completed  [facility] [place]  [phone] phone     Patient does not know the fax number       If any questions, the patient can be reached at [phone]

## 2022-08-23 NOTE — TELEPHONE ENCOUNTER
Spoke with Macho Morin at [address] to let him know that we haven't received any form for patients [address] transportation  Macho Morin stated that it was faxed but the number he gave was not any of the correct numbers to be faxed to  Macho Morin stated that even if he would fax the form right over to me he would not be able to provide transportation for tomorrows visit  Transportation needs to be arranged before 2:00 for next day transport and would have to be either a 10:00 or 11:00 due to the amount of vehicles they have  Macho Morin stated that he would fax form to me personally and I should contact with new appointment information and he will arrange transportation for patient  Once form is completed it should be faxed to his attention at [phone]

## 2022-08-26 DIAGNOSIS — R60.0 LOWER EXTREMITY EDEMA: ICD-10-CM

## 2022-08-26 RX ORDER — FUROSEMIDE 40 MG/1
TABLET ORAL
Qty: 90 TABLET | Refills: 3 | Status: SHIPPED | OUTPATIENT
Start: 2022-08-26

## 2022-09-01 NOTE — TELEPHONE ENCOUNTER
Received call from patient earlier this morning from patient stating that he received a call from [employer] transport last evening that they are short staffed and would not be able to provide transportation for patient for his appointment today  I explained to patient that our office would call him back to reschedule his appointment

## 2022-09-03 DIAGNOSIS — J44.9 CHRONIC OBSTRUCTIVE PULMONARY DISEASE, UNSPECIFIED COPD TYPE (HCC): ICD-10-CM

## 2022-09-04 RX ORDER — TIOTROPIUM BROMIDE 18 UG/1
CAPSULE ORAL; RESPIRATORY (INHALATION)
Qty: 30 CAPSULE | Refills: 0 | Status: SHIPPED | OUTPATIENT
Start: 2022-09-04 | End: 2022-09-30

## 2022-09-08 NOTE — TELEPHONE ENCOUNTER
Please apologize to patient for delay in giving him the results    Message was sent on 08/15 to let him know the results which revealed a simple cyst, this is benign and that he does not empty his bladder completely his PVR was 145 mL

## 2022-09-08 NOTE — TELEPHONE ENCOUNTER
Left message for patient to call back in regards to his upcoming appointment and if he needs transportation set up

## 2022-09-21 NOTE — TELEPHONE ENCOUNTER
2nd message left for patient to call back and confirm appointment and also if transportation is needed

## 2022-09-27 NOTE — TELEPHONE ENCOUNTER
Unable to reach patient, letter sent with appointment information and to let us know if he will need to apply for [facility]

## 2022-09-29 ENCOUNTER — TELEPHONE (OUTPATIENT)
Dept: INTERNAL MEDICINE CLINIC | Facility: CLINIC | Age: 67
End: 2022-09-29

## 2022-09-29 ENCOUNTER — OFFICE VISIT (OUTPATIENT)
Dept: URGENT CARE | Facility: CLINIC | Age: 67
End: 2022-09-29
Payer: COMMERCIAL

## 2022-09-29 VITALS
OXYGEN SATURATION: 94 % | TEMPERATURE: 98.7 F | HEART RATE: 77 BPM | DIASTOLIC BLOOD PRESSURE: 85 MMHG | HEIGHT: 71 IN | RESPIRATION RATE: 18 BRPM | SYSTOLIC BLOOD PRESSURE: 148 MMHG | BODY MASS INDEX: 30.8 KG/M2 | WEIGHT: 220 LBS

## 2022-09-29 DIAGNOSIS — J01.40 ACUTE NON-RECURRENT PANSINUSITIS: Primary | ICD-10-CM

## 2022-09-29 PROCEDURE — G0463 HOSPITAL OUTPT CLINIC VISIT: HCPCS

## 2022-09-29 PROCEDURE — 99213 OFFICE O/P EST LOW 20 MIN: CPT

## 2022-09-29 RX ORDER — FLUTICASONE PROPIONATE 50 MCG
1 SPRAY, SUSPENSION (ML) NASAL DAILY
Qty: 9.9 ML | Refills: 0 | Status: SHIPPED | OUTPATIENT
Start: 2022-09-29

## 2022-09-29 RX ORDER — AMOXICILLIN AND CLAVULANATE POTASSIUM 875; 125 MG/1; MG/1
1 TABLET, FILM COATED ORAL EVERY 12 HOURS SCHEDULED
Qty: 14 TABLET | Refills: 0 | Status: SHIPPED | OUTPATIENT
Start: 2022-09-29 | End: 2022-10-06

## 2022-09-29 NOTE — TELEPHONE ENCOUNTER
PT aware  I gave him an appt card for his appt on 10/3 and told him to make sure he keeps this appt  He said he doesn't know if he can wait that long, I did advise him if that was the case he can go to the [facility] to be seen right away

## 2022-09-29 NOTE — PROGRESS NOTES
[facility]        NAME: Shelia Bishop is a 79 y o  male  : 1955    MRN: [id number]  DATE: 2022  TIME: 11:58 AM    Assessment and Plan   Acute non-recurrent pansinusitis [J01 40]  1  Acute non-recurrent pansinusitis  amoxicillin-clavulanate (AUGMENTIN) 875-125 mg per tablet    fluticasone (FLONASE) 50 mcg/act nasal spray         Patient Instructions     Patient Instructions     Take antibiotic as directed  Use flonase nasal spray and saline nasal rinse  Tylenol as needed  Cool mist humidifier  If you develop any new or concerning symptoms, please return or proceed to ER  Follow up with PCP in 3-5 days  Sinusitis   WHAT YOU NEED TO KNOW:   Sinusitis is inflammation or infection of your sinuses  Sinusitis is most often caused by a virus  Acute sinusitis may last up to 12 weeks  Chronic sinusitis lasts longer than 12 weeks  Recurrent sinusitis means you have 4 or more infections in 1 year  DISCHARGE INSTRUCTIONS:   Return to the emergency department if:   · You have trouble breathing or wheezing that is getting worse  · You have a stiff neck, a fever, or a bad headache  · You cannot open your eye  · Your eyeball bulges out or you cannot move your eye  · You are more sleepy than normal, or you notice changes in your ability to think, move, or talk  · You have swelling of your forehead or scalp  Call your doctor if:   · You have vision changes, such as double vision  · Your eye and eyelid are red, swollen, and painful  · Your symptoms do not improve or go away after 10 days  · You have nausea and are vomiting  · Your nose is bleeding  · You have questions or concerns about your condition or care  Medicines: Your symptoms may go away on their own  Your healthcare provider may recommend watchful waiting for up to 10 days before starting antibiotics  You may need any of the following:  · Acetaminophen  decreases pain and fever   It is available without a doctor's order  Ask how much to take and how often to take it  Follow directions  Read the labels of all other medicines you are using to see if they also contain acetaminophen, or ask your doctor or pharmacist  Acetaminophen can cause liver damage if not taken correctly  Do not use more than 4 grams (4,000 milligrams) total of acetaminophen in one day  · NSAIDs , such as ibuprofen, help decrease swelling, pain, and fever  This medicine is available with or without a doctor's order  NSAIDs can cause stomach bleeding or kidney problems in certain people  If you take blood thinner medicine, always ask your healthcare provider if NSAIDs are safe for you  Always read the medicine label and follow directions  · Nasal steroid sprays  may help decrease inflammation in your nose and sinuses  · Decongestants  help reduce swelling and drain mucus in the nose and sinuses  They may help you breathe easier  · Antihistamines  help dry mucus in the nose and relieve sneezing  · Antibiotics  help treat or prevent a bacterial infection  · Take your medicine as directed  Contact your healthcare provider if you think your medicine is not helping or if you have side effects  Tell him or her if you are allergic to any medicine  Keep a list of the medicines, vitamins, and herbs you take  Include the amounts, and when and why you take them  Bring the list or the pill bottles to follow-up visits  Carry your medicine list with you in case of an emergency  Self-care:   · Rinse your sinuses as directed  Use a sinus rinse device to rinse your nasal passages with a saline (salt water) solution or distilled water  Do not use tap water  This will help thin the mucus in your nose and rinse away pollen and dirt  It will also help reduce swelling so you can breathe normally  · Use a humidifier  to increase air moisture in your home   This may make it easier for you to breathe and help decrease your cough      · Sleep with your head elevated  Place an extra pillow under your head before you go to sleep to help your sinuses drain  · Drink liquids as directed  Ask your healthcare provider how much liquid to drink each day and which liquids are best for you  Liquids will thin the mucus in your nose and help it drain  Avoid drinks that contain alcohol or caffeine  · Do not smoke, and avoid secondhand smoke  Nicotine and other chemicals in cigarettes and cigars can make your symptoms worse  Ask your healthcare provider for information if you currently smoke and need help to quit  E-cigarettes or smokeless tobacco still contain nicotine  Talk to your healthcare provider before you use these products  Prevent the spread of germs:   · Wash your hands often with soap and water  Wash your hands after you use the bathroom, change a child's diaper, or sneeze  Wash your hands before you prepare or eat food  · Stay away from people who are sick  Some germs spread easily and quickly through contact  Follow up with your doctor as directed: You may be referred to an ear, nose, and throat specialist  Write down your questions so you remember to ask them during your visits  © Copyright [employer] 2022 Information is for End User's use only and may not be sold, redistributed or otherwise used for commercial purposes  All illustrations and images included in CareNotes® are the copyrighted property of A D A M , Inc  or [address] [place], [place] [address]   The above information is an  only  It is not intended as medical advice for individual conditions or treatments  Talk to your doctor, nurse or pharmacist before following any medical regimen to see if it is safe and effective for you  Chief Complaint     Chief Complaint   Patient presents with    Eye Pain     Patient c/o BL eye pain, BL ears ringing, neck pain, difficulty sleeping, and feeling tired x 10 days            History of Present Illness     Ro Tovar is a 79 y o  male presenting to the office today for facial pain, fatigue, rhinorrhea, cough, and tinnitus x 10 days  Also had b/l eye redness, itching, and crusting that started 2 days ago  Denies fevers, chills, SOB, chest pain, abdominal pain, N/V/D  He has tried nothing for his symptoms  Sick contacts include: none    Review of Systems     Review of Systems   Constitutional: Positive for chills and fatigue  Negative for fever  HENT: Positive for ear pain, rhinorrhea, sinus pain and tinnitus  Negative for congestion and sore throat  Eyes: Positive for discharge and redness  Respiratory: Positive for cough  Negative for chest tightness, shortness of breath and wheezing  Cardiovascular: Negative for chest pain and palpitations  Gastrointestinal: Negative for abdominal pain, diarrhea, nausea and vomiting  Genitourinary: Negative for decreased urine volume  Musculoskeletal: Negative for arthralgias and myalgias  Skin: Negative for color change and rash  Neurological: Negative for weakness, light-headedness and headaches         Current Medications       Current Outpatient Medications:     albuterol (2 5 mg/3 mL) 0 083 % nebulizer solution, Take 1 vial (2 5 mg total) by nebulization every 6 (six) hours as needed for wheezing or shortness of breath, Disp: 25 vial, Rfl: 2    amoxicillin-clavulanate (AUGMENTIN) 875-125 mg per tablet, Take 1 tablet by mouth every 12 (twelve) hours for 7 days, Disp: 14 tablet, Rfl: 0    aspirin (ECOTRIN LOW STRENGTH) 81 mg EC tablet, Take 1 tablet (81 mg total) by mouth daily, Disp: 30 tablet, Rfl: 0    atorvastatin (LIPITOR) 40 mg tablet, Take 1 tablet (40 mg total) by mouth every evening, Disp: 90 tablet, Rfl: 3    calcium carbonate (TUMS EX) 750 mg chewable tablet, Chew 1 tablet (750 mg total) 3 (three) times a day as needed for indigestion or heartburn, Disp: 60 tablet, Rfl: 0    diltiazem (CARDIZEM CD) 180 mg 24 hr capsule, take 1 capsule by mouth daily, Disp: 30 capsule, Rfl: 5    docusate sodium (COLACE) 100 mg capsule, Take 1 capsule (100 mg total) by mouth 2 (two) times a day, Disp: 60 capsule, Rfl: 0    fluticasone (FLONASE) 50 mcg/act nasal spray, 1 spray into each nostril daily, Disp: 16 g, Rfl: 0    fluticasone (FLONASE) 50 mcg/act nasal spray, 1 spray into each nostril daily, Disp: 9 9 mL, Rfl: 0    furosemide (LASIX) 40 mg tablet, take 1 tablet by mouth once daily, Disp: 90 tablet, Rfl: 3    isosorbide mononitrate (IMDUR) 30 mg 24 hr tablet, Take 1 tablet (30 mg total) by mouth daily, Disp: 90 tablet, Rfl: 3    linaCLOtide (Linzess) 145 MCG CAPS, Take 1 capsule (145 mcg total) by mouth in the morning, Disp: 30 capsule, Rfl: 2    Linzess 290 MCG CAPS, take 1 capsule by mouth AT LEAST 30 MINUTES BEFORE FIRST MEAL OF THE DAY, Disp: , Rfl:     MELATONIN PO, Take by mouth daily at bedtime, Disp: , Rfl:     metoprolol succinate (TOPROL-XL) 25 mg 24 hr tablet, Take 1 tablet (25 mg total) by mouth 2 (two) times a day (Patient taking differently: Take 25 mg by mouth daily), Disp: 90 tablet, Rfl: 3    pantoprazole (PROTONIX) 40 mg tablet, take 1 tablet by mouth once daily, Disp: 90 tablet, Rfl: 0    polyethylene glycol (MIRALAX) 17 g packet, Take 17 g by mouth daily, Disp: 10 each, Rfl: 0    Potassium Chloride ER 20 MEQ TBCR, Take 20 mEq by mouth daily , Disp: , Rfl:     pregabalin (LYRICA) 75 mg capsule, Take 1 capsule (75 mg total) by mouth 2 (two) times a day, Disp: 60 capsule, Rfl: 2    Restasis 0 05 % ophthalmic emulsion, instill 1 drop INTO AFFECTED EYE(S) every 12 hours, Disp: , Rfl:     roflumilast (DALIRESP) 500 mcg tablet, Take 500 mcg by mouth daily, Disp: , Rfl:     Spiriva HandiHaler 18 MCG inhalation capsule, inhale THE CONTENTS OF ONE CAPSULE IN THE HANDIHALER once daily, Disp: 30 capsule, Rfl: 0    tamsulosin (FLOMAX) 0 4 mg, Take 1 capsule (0 4 mg total) by mouth 2 (two) times a day, Disp: 60 capsule, Rfl: 12    Ventolin  (90 Base) MCG/ACT inhaler, inhale 2 puffs by mouth and INTO THE LUNGS every 6 hours if needed for shortness of breath, Disp: 54 g, Rfl: 0    zolpidem (AMBIEN) 10 mg tablet, Take 1 tablet (10 mg total) by mouth daily at bedtime as needed for sleep, Disp: 30 tablet, Rfl: 0    naproxen (NAPROSYN) 500 mg tablet, Take 1 tablet (500 mg total) by mouth 2 (two) times a day with meals for 10 days (Patient not taking: Reported on 9/29/2022), Disp: 20 tablet, Rfl: 0    nitroglycerin (NITROSTAT) 0 4 mg SL tablet, Place 0 4 mg under the tongue every 5 (five) minutes as needed for chest pain   (Patient not taking: Reported on 9/29/2022), Disp: , Rfl:     Current Allergies     Allergies as of 09/29/2022 - Reviewed 09/29/2022   Allergen Reaction Noted    Gabapentin Headache             The following portions of the patient's history were reviewed and updated as appropriate: allergies, current medications, past family history, past medical history, past social history, past surgical history and problem list      Past Medical History:   Diagnosis Date    Acute right MCA stroke ([facility] ) 9/15/2018    Arthritis     CAD (coronary artery disease)     s/p CABG 2000    COPD (chronic obstructive pulmonary disease) ([facility] )     GERD (gastroesophageal reflux disease)     Grand mal status ([facility] ) 3/24/2019    Heart attack ([facility] )     Heart failure ([facility] )     Hyperlipidemia     Hypertension     Lexiscan nuclear stress test 03/19/2016    EF 74% Normal    Myocardial infarction ([facility] )     Shortness of breath     Stroke ([facility] )     TIA (transient ischemic attack) 09/13/2018       Past Surgical History:   Procedure Laterality Date    CARDIAC CATHETERIZATION  08/19/2015    LIMA occluded  No severe native lesions    CARDIAC CATHETERIZATION N/A 12/3/2021    Procedure: Cardiac Coronary Angiogram;  Surgeon: Asya Barba MD;  Location: [facility];   Service: Cardiology    CARDIAC CATHETERIZATION  12/3/2021 Procedure: Cardiac catheterization;  Surgeon: Sameera Medina MD;  Location: [facility]; Service: Cardiology    COLONOSCOPY      CORONARY ARTERY BYPASS GRAFT  2000    HERNIA REPAIR      umbilical hernia x2    TONSILLECTOMY         Family History   Problem Relation Age of Onset    Heart disease Mother     Cancer Father     Aneurysm Father     Cancer Maternal Grandmother     Cancer Paternal Grandfather        Medications have been verified  Objective     /85   Pulse 77   Temp 98 7 °F (37 1 °C) (Temporal)   Resp 18   Ht 5' 11" (1 803 m)   Wt 99 8 kg (220 lb)   SpO2 94%   BMI 30 68 kg/m²   No LMP for male patient  Physical Exam     Physical Exam  Vitals and nursing note reviewed  Constitutional:       General: He is not in acute distress  Appearance: Normal appearance  HENT:      Head: Normocephalic and atraumatic  Right Ear: Ear canal normal  No tenderness  Left Ear: Tympanic membrane and ear canal normal  No tenderness  Ears:      Comments: R TM with small amount of yellow fluid  No bulging or erythema  Good COL and visualization of bony landmarks  Nose: Congestion present  No rhinorrhea  Right Sinus: Maxillary sinus tenderness and frontal sinus tenderness present  Left Sinus: Maxillary sinus tenderness and frontal sinus tenderness present  Mouth/Throat:      Mouth: Mucous membranes are moist       Pharynx: Uvula midline  Posterior oropharyngeal erythema present  No oropharyngeal exudate  Eyes:      General: Lids are normal       Conjunctiva/sclera:      Right eye: Right conjunctiva is injected  No exudate  Left eye: Left conjunctiva is injected  No exudate  Pupils: Pupils are equal, round, and reactive to light  Cardiovascular:      Rate and Rhythm: Normal rate and regular rhythm  Heart sounds: Normal heart sounds  Pulmonary:      Effort: Pulmonary effort is normal       Breath sounds: Normal breath sounds   No wheezing, rhonchi or rales  Musculoskeletal:      Cervical back: Full passive range of motion without pain, normal range of motion and neck supple  Lymphadenopathy:      Cervical: No cervical adenopathy  Skin:     General: Skin is warm and dry  Neurological:      Mental Status: He is alert  Psychiatric:         Behavior: Behavior is cooperative

## 2022-09-29 NOTE — PATIENT INSTRUCTIONS
Take antibiotic as directed  Use flonase nasal spray and saline nasal rinse  Tylenol as needed  Cool mist humidifier  If you develop any new or concerning symptoms, please return or proceed to ER  Follow up with PCP in 3-5 days  Sinusitis   WHAT YOU NEED TO KNOW:   Sinusitis is inflammation or infection of your sinuses  Sinusitis is most often caused by a virus  Acute sinusitis may last up to 12 weeks  Chronic sinusitis lasts longer than 12 weeks  Recurrent sinusitis means you have 4 or more infections in 1 year  DISCHARGE INSTRUCTIONS:   Return to the emergency department if:   You have trouble breathing or wheezing that is getting worse  You have a stiff neck, a fever, or a bad headache  You cannot open your eye  Your eyeball bulges out or you cannot move your eye  You are more sleepy than normal, or you notice changes in your ability to think, move, or talk  You have swelling of your forehead or scalp  Call your doctor if:   You have vision changes, such as double vision  Your eye and eyelid are red, swollen, and painful  Your symptoms do not improve or go away after 10 days  You have nausea and are vomiting  Your nose is bleeding  You have questions or concerns about your condition or care  Medicines: Your symptoms may go away on their own  Your healthcare provider may recommend watchful waiting for up to 10 days before starting antibiotics  You may need any of the following:  Acetaminophen  decreases pain and fever  It is available without a doctor's order  Ask how much to take and how often to take it  Follow directions  Read the labels of all other medicines you are using to see if they also contain acetaminophen, or ask your doctor or pharmacist  Acetaminophen can cause liver damage if not taken correctly  Do not use more than 4 grams (4,000 milligrams) total of acetaminophen in one day       NSAIDs , such as ibuprofen, help decrease swelling, pain, and fever  This medicine is available with or without a doctor's order  NSAIDs can cause stomach bleeding or kidney problems in certain people  If you take blood thinner medicine, always ask your healthcare provider if NSAIDs are safe for you  Always read the medicine label and follow directions  Nasal steroid sprays  may help decrease inflammation in your nose and sinuses  Decongestants  help reduce swelling and drain mucus in the nose and sinuses  They may help you breathe easier  Antihistamines  help dry mucus in the nose and relieve sneezing  Antibiotics  help treat or prevent a bacterial infection  Take your medicine as directed  Contact your healthcare provider if you think your medicine is not helping or if you have side effects  Tell him or her if you are allergic to any medicine  Keep a list of the medicines, vitamins, and herbs you take  Include the amounts, and when and why you take them  Bring the list or the pill bottles to follow-up visits  Carry your medicine list with you in case of an emergency  Self-care:   Rinse your sinuses as directed  Use a sinus rinse device to rinse your nasal passages with a saline (salt water) solution or distilled water  Do not use tap water  This will help thin the mucus in your nose and rinse away pollen and dirt  It will also help reduce swelling so you can breathe normally  Use a humidifier  to increase air moisture in your home  This may make it easier for you to breathe and help decrease your cough  Sleep with your head elevated  Place an extra pillow under your head before you go to sleep to help your sinuses drain  Drink liquids as directed  Ask your healthcare provider how much liquid to drink each day and which liquids are best for you  Liquids will thin the mucus in your nose and help it drain  Avoid drinks that contain alcohol or caffeine  Do not smoke, and avoid secondhand smoke    Nicotine and other chemicals in cigarettes and cigars can make your symptoms worse  Ask your healthcare provider for information if you currently smoke and need help to quit  E-cigarettes or smokeless tobacco still contain nicotine  Talk to your healthcare provider before you use these products  Prevent the spread of germs:   Wash your hands often with soap and water  Wash your hands after you use the bathroom, change a child's diaper, or sneeze  Wash your hands before you prepare or eat food  Stay away from people who are sick  Some germs spread easily and quickly through contact  Follow up with your doctor as directed: You may be referred to an ear, nose, and throat specialist  Write down your questions so you remember to ask them during your visits  © Copyright [employer] 2022 Information is for End User's use only and may not be sold, redistributed or otherwise used for commercial purposes  All illustrations and images included in CareNotes® are the copyrighted property of A D A M [employer] Inc  or Anna Oconnell   The above information is an  only  It is not intended as medical advice for individual conditions or treatments  Talk to your doctor, nurse or pharmacist before following any medical regimen to see if it is safe and effective for you

## 2022-09-29 NOTE — TELEPHONE ENCOUNTER
Pt should make and keep appt, cancelled his last 4-5 visits   He will need exam to better understand his symptoms

## 2022-09-29 NOTE — TELEPHONE ENCOUNTER
PT came to my window, stating for the past week and a half he has been experiencing constant ringing in his ears, inability to sleep, pressure behind his eyes, fatigue, weakness, headache  PT doesn't know what to do  He asked me to send you a message to see what you recommend  Please advise

## 2022-09-30 DIAGNOSIS — J44.9 CHRONIC OBSTRUCTIVE PULMONARY DISEASE, UNSPECIFIED COPD TYPE (HCC): ICD-10-CM

## 2022-09-30 RX ORDER — TIOTROPIUM BROMIDE 18 UG/1
CAPSULE ORAL; RESPIRATORY (INHALATION)
Qty: 30 CAPSULE | Refills: 0 | Status: SHIPPED | OUTPATIENT
Start: 2022-09-30

## 2022-10-11 PROBLEM — Z13.1 SCREENING FOR DIABETES MELLITUS: Status: RESOLVED | Noted: 2022-07-26 | Resolved: 2022-10-11

## 2022-10-11 PROBLEM — Z12.11 SCREENING FOR COLON CANCER: Status: RESOLVED | Noted: 2022-05-26 | Resolved: 2022-10-11

## 2022-10-11 PROBLEM — R05.1 ACUTE COUGH: Status: RESOLVED | Noted: 2022-07-26 | Resolved: 2022-10-11

## 2022-10-11 PROBLEM — Z13.220 SCREENING FOR HYPERLIPIDEMIA: Status: RESOLVED | Noted: 2022-07-26 | Resolved: 2022-10-11

## 2022-10-19 ENCOUNTER — OFFICE VISIT (OUTPATIENT)
Dept: INTERNAL MEDICINE CLINIC | Facility: CLINIC | Age: 67
End: 2022-10-19
Payer: COMMERCIAL

## 2022-10-19 ENCOUNTER — APPOINTMENT (OUTPATIENT)
Dept: RADIOLOGY | Facility: CLINIC | Age: 67
End: 2022-10-19
Payer: COMMERCIAL

## 2022-10-19 VITALS
BODY MASS INDEX: 31.98 KG/M2 | TEMPERATURE: 98.3 F | DIASTOLIC BLOOD PRESSURE: 78 MMHG | OXYGEN SATURATION: 96 % | SYSTOLIC BLOOD PRESSURE: 140 MMHG | WEIGHT: 223.38 LBS | HEIGHT: 70 IN | HEART RATE: 79 BPM

## 2022-10-19 DIAGNOSIS — R63.0 DECREASED APPETITE: ICD-10-CM

## 2022-10-19 DIAGNOSIS — M25.552 LEFT HIP PAIN: ICD-10-CM

## 2022-10-19 DIAGNOSIS — G62.9 PERIPHERAL POLYNEUROPATHY: ICD-10-CM

## 2022-10-19 DIAGNOSIS — Z23 NEED FOR VACCINATION: ICD-10-CM

## 2022-10-19 DIAGNOSIS — J30.2 SEASONAL ALLERGIES: Primary | ICD-10-CM

## 2022-10-19 PROCEDURE — G0008 ADMIN INFLUENZA VIRUS VAC: HCPCS | Performed by: PHYSICIAN ASSISTANT

## 2022-10-19 PROCEDURE — 99214 OFFICE O/P EST MOD 30 MIN: CPT | Performed by: PHYSICIAN ASSISTANT

## 2022-10-19 PROCEDURE — 90662 IIV NO PRSV INCREASED AG IM: CPT | Performed by: PHYSICIAN ASSISTANT

## 2022-10-19 PROCEDURE — 73502 X-RAY EXAM HIP UNI 2-3 VIEWS: CPT

## 2022-10-19 RX ORDER — MONTELUKAST SODIUM 10 MG/1
10 TABLET ORAL
Qty: 30 TABLET | Refills: 5 | Status: SHIPPED | OUTPATIENT
Start: 2022-10-19

## 2022-10-19 RX ORDER — AZELASTINE 1 MG/ML
1 SPRAY, METERED NASAL 2 TIMES DAILY
Qty: 1 ML | Refills: 2 | Status: SHIPPED | OUTPATIENT
Start: 2022-10-19

## 2022-10-19 RX ORDER — PREGABALIN 150 MG/1
150 CAPSULE ORAL 2 TIMES DAILY
Qty: 60 CAPSULE | Refills: 2 | Status: SHIPPED | OUTPATIENT
Start: 2022-10-19

## 2022-10-20 DIAGNOSIS — F51.01 PRIMARY INSOMNIA: Primary | ICD-10-CM

## 2022-10-20 PROBLEM — R10.9 ACUTE LEFT FLANK PAIN: Status: RESOLVED | Noted: 2022-07-26 | Resolved: 2022-10-20

## 2022-10-20 PROBLEM — M54.50 ACUTE LEFT-SIDED LOW BACK PAIN WITHOUT SCIATICA: Status: RESOLVED | Noted: 2022-07-26 | Resolved: 2022-10-20

## 2022-10-20 PROBLEM — R63.0 DECREASED APPETITE: Status: ACTIVE | Noted: 2022-10-20

## 2022-10-20 PROBLEM — M25.552 LEFT HIP PAIN: Status: ACTIVE | Noted: 2022-10-20

## 2022-10-20 RX ORDER — ESZOPICLONE 3 MG/1
3 TABLET, FILM COATED ORAL
Qty: 30 TABLET | Refills: 5 | Status: SHIPPED | OUTPATIENT
Start: 2022-10-20 | End: 2022-10-26

## 2022-10-20 RX ORDER — ESZOPICLONE 3 MG/1
3 TABLET, FILM COATED ORAL
COMMUNITY
End: 2022-10-20 | Stop reason: SDUPTHER

## 2022-10-20 RX ORDER — ESZOPICLONE 2 MG/1
2 TABLET, FILM COATED ORAL
COMMUNITY
End: 2022-10-20 | Stop reason: CLARIF

## 2022-10-20 NOTE — TELEPHONE ENCOUNTER
Pt called and does need transportation for his appointment on 10/27/22    Pt can be reached at [phone]  He needs to know as soon as possible

## 2022-10-20 NOTE — PROGRESS NOTES
Name: Fabian Isaac      : 1955      MRN: [id number]  Encounter Provider: Kathe Jin PA-C  Encounter Date: 10/19/2022   Encounter department: [address] [place] [address]  Seasonal allergies  Assessment & Plan:  Start singular and Astelin  Directions for use and possible side effects discussed and patient verbalized understanding of these  Orders:  -     azelastine (ASTELIN) 0 1 % nasal spray; 1 spray into each nostril 2 (two) times a day Use in each nostril as directed  -     montelukast (SINGULAIR) 10 mg tablet; Take 1 tablet (10 mg total) by mouth daily at bedtime    2  Peripheral polyneuropathy  Assessment & Plan:  Continue lyrica but increase dose  Directions for use and possible side effects discussed and patient verbalized understanding of these  Orders:  -     pregabalin (LYRICA) 150 mg capsule; Take 1 capsule (150 mg total) by mouth 2 (two) times a day    3  Left hip pain  Assessment & Plan: Will get xray to better evaluate  Orders:  -     XR hip/pelv 2-3 vws left if performed; Future; Expected date: 10/19/2022    4  Decreased appetite  Assessment & Plan: Will refer to GI for further evaluation and treatment  Weight has been stable    Orders:  -     Ambulatory Referral to Gastroenterology; Future    5  Need for vaccination  -     influenza vaccine, high-dose, PF 0 7 mL (FLUZONE HIGH-DOSE)           Subjective      Pt presents for routine visit  He is noting ongoing issues with his ears feeling blocked with occasional dizziness  He notes nasal congestion and post nasal drip as well  He desires flu shot today  Labs are up to date  BP is stable  He also notes ongoing issues with chronic musculoskeletal and neuropathic pain  He was started on lyrica and tolerates this well but has not had much relief   He notes a few weeks ago he stood up and felt like his hip either 'popped in place or popped out of place '  He also notes issues with chronic loss of appetite  Denies abdominal pain or diarrhea  He does get constipation and is prescribed linzess for this  He is agreeable to seeing GI for further evaluaiton  Review of Systems   Constitutional: Positive for appetite change  Negative for chills and fever  HENT: Positive for congestion, postnasal drip and rhinorrhea  Negative for ear pain, hearing loss, sinus pressure, sinus pain, sore throat and trouble swallowing  Eyes: Negative for pain and visual disturbance  Respiratory: Negative for cough, chest tightness, shortness of breath and wheezing  Cardiovascular: Negative  Negative for chest pain, palpitations and leg swelling  Gastrointestinal: Positive for constipation  Negative for abdominal pain, blood in stool, diarrhea, nausea and vomiting  Endocrine: Negative for cold intolerance, heat intolerance, polydipsia, polyphagia and polyuria  Genitourinary: Negative for difficulty urinating, dysuria, flank pain and urgency  Musculoskeletal: Positive for arthralgias  Negative for back pain, gait problem and myalgias  Skin: Negative for rash  Allergic/Immunologic: Negative  Neurological: Positive for numbness  Negative for dizziness, weakness, light-headedness and headaches  Hematological: Negative  Psychiatric/Behavioral: Negative for behavioral problems, dysphoric mood and sleep disturbance  The patient is not nervous/anxious          Current Outpatient Medications on File Prior to Visit   Medication Sig   • albuterol (2 5 mg/3 mL) 0 083 % nebulizer solution Take 1 vial (2 5 mg total) by nebulization every 6 (six) hours as needed for wheezing or shortness of breath   • aspirin (ECOTRIN LOW STRENGTH) 81 mg EC tablet Take 1 tablet (81 mg total) by mouth daily   • atorvastatin (LIPITOR) 40 mg tablet Take 1 tablet (40 mg total) by mouth every evening   • diltiazem (CARDIZEM CD) 180 mg 24 hr capsule take 1 capsule by mouth daily   • fluticasone (FLONASE) 50 mcg/act nasal spray 1 spray into each nostril daily   • furosemide (LASIX) 40 mg tablet take 1 tablet by mouth once daily   • isosorbide mononitrate (IMDUR) 30 mg 24 hr tablet Take 1 tablet (30 mg total) by mouth daily   • linaCLOtide (Linzess) 145 MCG CAPS Take 1 capsule (145 mcg total) by mouth in the morning   • Linzess 290 MCG CAPS take 1 capsule by mouth AT LEAST 30 MINUTES BEFORE FIRST MEAL OF THE DAY   • metoprolol succinate (TOPROL-XL) 25 mg 24 hr tablet Take 1 tablet (25 mg total) by mouth 2 (two) times a day (Patient taking differently: Take 25 mg by mouth daily)   • pantoprazole (PROTONIX) 40 mg tablet take 1 tablet by mouth once daily   • Potassium Chloride ER 20 MEQ TBCR Take 20 mEq by mouth daily    • Restasis 0 05 % ophthalmic emulsion instill 1 drop INTO AFFECTED EYE(S) every 12 hours   • Spiriva HandiHaler 18 MCG inhalation capsule inhale THE CONTENTS OF ONE CAPSULE IN THE HANDIHALER once daily   • Ventolin  (90 Base) MCG/ACT inhaler inhale 2 puffs by mouth and INTO THE LUNGS every 6 hours if needed for shortness of breath   • tamsulosin (FLOMAX) 0 4 mg Take 1 capsule (0 4 mg total) by mouth 2 (two) times a day       Objective     /78 (BP Location: Left arm, Patient Position: Sitting)   Pulse 79   Temp 98 3 °F (36 8 °C)   Ht 5' 10" (1 778 m)   Wt 101 kg (223 lb 6 oz)   SpO2 96%   BMI 32 05 kg/m²     Physical Exam  Constitutional:       General: He is not in acute distress  Appearance: He is well-developed  He is not diaphoretic  HENT:      Head: Normocephalic and atraumatic  Right Ear: External ear normal       Left Ear: External ear normal       Nose: Mucosal edema, congestion and rhinorrhea present  Mouth/Throat:      Pharynx: No oropharyngeal exudate  Eyes:      General: No scleral icterus  Right eye: No discharge  Left eye: No discharge  Conjunctiva/sclera: Conjunctivae normal       Pupils: Pupils are equal, round, and reactive to light     Neck:      Thyroid: No thyromegaly  Cardiovascular:      Rate and Rhythm: Normal rate and regular rhythm  Heart sounds: Normal heart sounds  No murmur heard  No friction rub  No gallop  Pulmonary:      Effort: Pulmonary effort is normal  No respiratory distress  Breath sounds: Normal breath sounds  No wheezing or rales  Abdominal:      General: Bowel sounds are normal  There is no distension  Palpations: Abdomen is soft  Tenderness: There is no abdominal tenderness  Musculoskeletal:         General: No deformity  Cervical back: Normal range of motion and neck supple  Lumbar back: Spasms present  Decreased range of motion  Left hip: Tenderness present  Decreased range of motion  Skin:     General: Skin is warm and dry  Neurological:      Mental Status: He is alert and oriented to person, place, and time  Cranial Nerves: No cranial nerve deficit  Psychiatric:         Behavior: Behavior normal          Thought Content:  Thought content normal          Judgment: Judgment normal        Candida Loja PA-C

## 2022-10-20 NOTE — ASSESSMENT & PLAN NOTE
Continue lyrica but increase dose  Directions for use and possible side effects discussed and patient verbalized understanding of these

## 2022-10-20 NOTE — TELEPHONE ENCOUNTER
Pt called states he needs refill on lunesta  He states he cant take the Ambien  Also wants to know if the lunesta and Singulair are safe to take together?

## 2022-10-20 NOTE — ASSESSMENT & PLAN NOTE
Start singular and Astelin  Directions for use and possible side effects discussed and patient verbalized understanding of these

## 2022-10-25 ENCOUNTER — TELEPHONE (OUTPATIENT)
Dept: UROLOGY | Facility: CLINIC | Age: 67
End: 2022-10-25

## 2022-10-25 NOTE — TELEPHONE ENCOUNTER
LVM to notify patient that his appt  on 10/26/22 is being canceled because Dr Ann Wright is out of the office due to an emergency

## 2022-10-25 NOTE — TELEPHONE ENCOUNTER
Pt called and stated r/s cysto appt @ [facility]    Pt call [phone]

## 2022-10-25 NOTE — TELEPHONE ENCOUNTER
Pt called and wanted to rescheduled the cancelled appointment with Dr Kimo Pulido,     Please review and advice     Pt can be reached at [phone]

## 2022-10-26 ENCOUNTER — TELEPHONE (OUTPATIENT)
Dept: INTERNAL MEDICINE CLINIC | Facility: CLINIC | Age: 67
End: 2022-10-26

## 2022-10-26 DIAGNOSIS — F51.01 PRIMARY INSOMNIA: Primary | ICD-10-CM

## 2022-10-26 RX ORDER — MIRTAZAPINE 15 MG/1
15 TABLET, FILM COATED ORAL
Qty: 30 TABLET | Refills: 5 | Status: SHIPPED | OUTPATIENT
Start: 2022-10-26

## 2022-10-26 NOTE — TELEPHONE ENCOUNTER
Spoke to patient and he is scheduled for Cystoscopy with Dr Ann Wright at [facility] on 10/31/22 with a 1:00 arrival

## 2022-10-26 NOTE — TELEPHONE ENCOUNTER
Pt called and would like you  To prescribe something else instead of the lunesta  He doesn't want to wait for the prior auth to be reviewed and would like something asap

## 2022-10-31 ENCOUNTER — PROCEDURE VISIT (OUTPATIENT)
Dept: UROLOGY | Facility: CLINIC | Age: 67
End: 2022-10-31

## 2022-10-31 VITALS
BODY MASS INDEX: 32.35 KG/M2 | HEIGHT: 70 IN | DIASTOLIC BLOOD PRESSURE: 76 MMHG | WEIGHT: 226 LBS | SYSTOLIC BLOOD PRESSURE: 140 MMHG | HEART RATE: 84 BPM

## 2022-10-31 DIAGNOSIS — Z12.5 SCREENING FOR PROSTATE CANCER: Primary | ICD-10-CM

## 2022-10-31 NOTE — PROGRESS NOTES
Cystoscopy     Date/Time 10/31/2022 1:34 PM     Performed by  Carlos Hobbs MD     Authorized by ADRIANA Bailey      Universal Protocol:  Boby Tavares called at: 10/31/2022 1:34 PM       Procedure Details:  Procedure type: cystoscopy    Patient tolerance: Patient tolerated the procedure well with no immediate complications    Additional Procedure Details:   Cystoscopy procedure note:    Risk and benefits of flexible cystoscopy were discussed  Informed consent was obtained  Urine dip was adequate for cystoscopy  The patient was placed in the supine position  His genitalia was prepped with Betadine and draped in a sterile fashion  Viscous 2% lidocaine jelly was instilled into the urethra and allowed dwell time for local anesthesia  Flexible cystoscopy was then performed using a 16F scope  The distal urethra and prostatic urethra were evaluated and were normal in course and caliber  Prostatic urethra was short in length  Once inside the bladder, it was carefully inspected in a 360 degree fashion  There was no evidence of mucosal abnormalities, lesions, diverticula or stones  Both ureteral orifices in their orthotopic location were visualized with clear efflux of urine  Retroflexion for evaluation of the bladder neck demonstrated a small amount of nodularity on the right lateral bladder neck that was not well appreciated in the intraurethral portion  Overall this was a negative cystoscopy    Minimal bladder outlet obstruction

## 2022-10-31 NOTE — PROGRESS NOTES
UROLOGY PROCEDURE NOTE     CHIEF COMPLAINT   Rani Fuller is a 79 y o  male with a complaint of   Chief Complaint   Patient presents with   • Cystoscopy       History of Present Illness:     79 y o  male seen in August   Patient presented initially with ongoing constipation and difficulty with urination  Patient drinks copious amounts of soda and tea  Patient previously saw an outside urologist in [facility]  Had prior cystoscopy 12-15 years ago  Patient was started on Flomax which was then doubled to twice a day  Patient follows with GI and is on Linzess and MiraLax p r n  Has undergone colonoscopy polyp removal   Now with better constipation control, bowel movement every 1-2 days and Flomax, patient feels urine has improved  Presents today for outlet evaluation  Of note, patient is a 1 pack-a-day smoker for the last 50 years  No gross hematuria reported  Lab Results   Component Value Date    PSA 0 3 03/17/2022       Past Medical History:     Past Medical History:   Diagnosis Date   • Acute right MCA stroke ([facility] ) 9/15/2018   • Arthritis    • CAD (coronary artery disease)     s/p CABG 2000   • COPD (chronic obstructive pulmonary disease) ([facility])    • GERD (gastroesophageal reflux disease)    • Grand mal status ([facility] ) 3/24/2019   • Heart attack ([facility] )    • Heart failure ([facility] )    • Hyperlipidemia    • Hypertension    • Lexiscan nuclear stress test 03/19/2016    EF 74% Normal   • Myocardial infarction [facility])    • Shortness of breath    • Stroke [facility])    • TIA (transient ischemic attack) 09/13/2018       PAST SURGICAL HISTORY:     Past Surgical History:   Procedure Laterality Date   • CARDIAC CATHETERIZATION  08/19/2015    LIMA occluded  No severe native lesions   • CARDIAC CATHETERIZATION N/A 12/03/2021    Procedure: Cardiac Coronary Angiogram;  Surgeon: Rehan Palm MD;  Location: [facility] CARDIAC CATH LAB;   Service: Cardiology   • CARDIAC CATHETERIZATION  12/03/2021    Procedure: Cardiac catheterization; Surgeon: Mika Linares MD;  Location: [facility] CARDIAC [facility]ATH [facility];   Service: Cardiology   • COLONOSCOPY     • CORONARY ARTERY BYPASS GRAFT  2000   • CYSTOSCOPY  10/31/2022    [facility]   • HERNIA REPAIR      umbilical hernia x2   • TONSILLECTOMY         CURRENT MEDICATIONS:     Current Outpatient Medications   Medication Sig Dispense Refill   • albuterol (2 5 mg/3 mL) 0 083 % nebulizer solution Take 1 vial (2 5 mg total) by nebulization every 6 (six) hours as needed for wheezing or shortness of breath 25 vial 2   • aspirin (ECOTRIN LOW STRENGTH) 81 mg EC tablet Take 1 tablet (81 mg total) by mouth daily 30 tablet 0   • atorvastatin (LIPITOR) 40 mg tablet Take 1 tablet (40 mg total) by mouth every evening 90 tablet 3   • azelastine (ASTELIN) 0 1 % nasal spray 1 spray into each nostril 2 (two) times a day Use in each nostril as directed 1 mL 2   • diltiazem (CARDIZEM CD) 180 mg 24 hr capsule take 1 capsule by mouth daily 30 capsule 5   • fluticasone (FLONASE) 50 mcg/act nasal spray 1 spray into each nostril daily 9 9 mL 0   • furosemide (LASIX) 40 mg tablet take 1 tablet by mouth once daily 90 tablet 3   • isosorbide mononitrate (IMDUR) 30 mg 24 hr tablet Take 1 tablet (30 mg total) by mouth daily 90 tablet 3   • linaCLOtide (Linzess) 145 MCG CAPS Take 1 capsule (145 mcg total) by mouth in the morning 30 capsule 2   • Linzess 290 MCG CAPS take 1 capsule by mouth AT LEAST 30 MINUTES BEFORE FIRST MEAL OF THE DAY     • metoprolol succinate (TOPROL-XL) 25 mg 24 hr tablet take 1 tablet by mouth twice a day 180 tablet 3   • mirtazapine (REMERON) 15 mg tablet Take 1 tablet (15 mg total) by mouth daily at bedtime 30 tablet 5   • montelukast (SINGULAIR) 10 mg tablet Take 1 tablet (10 mg total) by mouth daily at bedtime 30 tablet 5   • pantoprazole (PROTONIX) 40 mg tablet take 1 tablet by mouth once daily 90 tablet 0   • Potassium Chloride ER 20 MEQ TBCR Take 20 mEq by mouth daily      • pregabalin (LYRICA) 150 mg capsule Take 1 capsule (150 mg total) by mouth 2 (two) times a day 60 capsule 2   • Restasis 0 05 % ophthalmic emulsion instill 1 drop INTO AFFECTED EYE(S) every 12 hours     • Spiriva HandiHaler 18 MCG inhalation capsule inhale THE CONTENTS OF ONE CAPSULE IN THE HANDIHALER once daily 30 capsule 0   • tamsulosin (FLOMAX) 0 4 mg Take 1 capsule (0 4 mg total) by mouth 2 (two) times a day 60 capsule 12   • Ventolin  (90 Base) MCG/ACT inhaler inhale 2 puffs by mouth and INTO THE LUNGS every 6 hours if needed for shortness of breath 54 g 0     No current facility-administered medications for this visit  ALLERGIES:     Allergies   Allergen Reactions   • Gabapentin Headache       SOCIAL HISTORY:     Social History     Socioeconomic History   • Marital status: Single     Spouse name: None   • Number of children: None   • Years of education: None   • Highest education level: None   Occupational History   • None   Tobacco Use   • Smoking status: Current Every Day Smoker     Packs/day: 1 00     Years: 50 00     Pack years: 50 00     Types: Cigarettes   • Smokeless tobacco: Never Used   Vaping Use   • Vaping Use: Never used   Substance and Sexual Activity   • Alcohol use: Yes     Comment: Socially   • Drug use: Never   • Sexual activity: Not Currently   Other Topics Concern   • None   Social History Narrative   • None     Social Determinants of Health     Financial Resource Strain: Not on file   Food Insecurity: Not on file   Transportation Needs: Not on file   Physical Activity: Not on file   Stress: Not on file   Social Connections: Not on file   Intimate Partner Violence: Not on file   Housing Stability: Not on file       SOCIAL HISTORY:     Family History   Problem Relation Age of Onset   • Heart disease Mother    • Cancer Father    • Aneurysm Father    • Cancer Maternal Grandmother    • Cancer Paternal Grandfather        REVIEW OF SYSTEMS:     Review of Systems   Constitutional: Negative  Respiratory: Negative  Cardiovascular: Negative  Gastrointestinal: Positive for constipation  Genitourinary: Positive for difficulty urinating  Musculoskeletal: Negative  Skin: Negative  Psychiatric/Behavioral: Negative  PHYSICAL EXAM:     Ht 5' 10" (1 778 m)   Wt 103 kg (226 lb)   BMI 32 43 kg/m²     General:  Healthy appearing male in no acute distress  They have a normal affect  There is not appear to be any gross neurologic defects or abnormalities  HEENT:  Normocephalic, atraumatic  Neck is supple without any palpable lymphadenopathy  Cardiovascular:  Patient has normal palpable distal radial pulses  There is no significant peripheral edema  No JVD is noted  Respiratory:  Patient has unlabored respirations  There is no audible wheeze or rhonchi  Abdomen:  Abdomen is without surgical scars  Abdomen is soft and nontender  There is no tympany  Inguinal and umbilical hernia are not appreciated  Genitourinary: no penile lesions or discharge, no testicular masses or tenderness, no hernias, prostate smooth, large amount of hard constipated stool in the rectal vault, external hemorrhoids noted    Musculoskeletal:  Patient does not have significant CVA tenderness in the  flank with palpation or percussion  They full range of motion in all 4 extremities  Strength in all 4 extremities appears congruent  Patient is able to ambulate without assistance or difficulty  Dermatologic:  Patient has no skin abnormalities or rashes        LABS:     CBC:   Lab Results   Component Value Date    WBC 7 49 07/26/2022    HGB 13 6 07/26/2022    HCT 39 8 07/26/2022    MCV 90 07/26/2022     07/26/2022       BMP:   Lab Results   Component Value Date    CALCIUM 9 0 07/26/2022     06/22/2018    K 3 7 07/26/2022    CO2 26 07/26/2022     07/26/2022    BUN 9 07/26/2022    CREATININE 1 30 07/26/2022     Urine Culture No Growth <1000 cfu/mL                  Specimen Collected: 08/08/22 11:16 IMAGIN/9/22  RENAL ULTRASOUND     INDICATION:   N39 9: Disorder of urinary system, unspecified  N40 1: Benign prostatic hyperplasia with lower urinary tract symptoms  R33 8: Other retention of urine      COMPARISON: None     TECHNIQUE:   Ultrasound of the retroperitoneum was performed with a curvilinear transducer utilizing volumetric sweeps and still imaging techniques       FINDINGS:     KIDNEYS:  Symmetric and normal size  Right kidney:  11 2 x 5 6 x 4 8 cm  Volume 158 2 mL  Left kidney:  12 0 x 5 6 x 5 3 cm  Volume 186 3 mL     Right kidney  Normal echogenicity and contour  No mass is identified  Anechoic simple cyst measuring 2 4 x 1 9 x 1 9 cm, 1 3 x 1 3 x 1 1 cm   No hydronephrosis  No shadowing calculi  No perinephric fluid collections      Left kidney  Normal echogenicity and contour  No mass is identified  Anechoic cyst measuring 5 4 x 2 6 x 4 1 cm, 12 x 9 x 11 mm and 2 0 x 1 2 x 1 3 cm  No hydronephrosis  No shadowing calculi  No perinephric fluid collections      URETERS:  Nonvisualized      BLADDER:   Normally distended  No focal thickening or mass lesions  Bilateral ureteral jets detected  Prevoid bladder volume of 360 mL and post void volume of 145 2 mL  Prostate measures 2 2 x 2 5 x 1 9 cm (PROSTATE MEASUREMENT - 7g)     IMPRESSION:     Bilateral simple renal cysts      Post void bladder volume of 145 mL    PROCEDURE:     SEE NOTE    ASSESSMENT:     79 y o  male with significant chronic constipation and urinary symptom    PLAN:     Patient's prostate is short in its length, there is a small area of nodularity on the right lateral bladder neck  The prostate does not appear significantly obstructed  Patient has benefited from constipation control and Flomax twice daily  Will continue this treatment  Patient will return in August for updated prostate cancer screening which will continue yearly until age 79

## 2022-11-02 DIAGNOSIS — J44.9 CHRONIC OBSTRUCTIVE PULMONARY DISEASE, UNSPECIFIED COPD TYPE (HCC): ICD-10-CM

## 2022-11-02 RX ORDER — TIOTROPIUM BROMIDE 18 UG/1
CAPSULE ORAL; RESPIRATORY (INHALATION)
Qty: 30 CAPSULE | Refills: 0 | Status: SHIPPED | OUTPATIENT
Start: 2022-11-02

## 2022-11-03 DIAGNOSIS — K44.9 HIATAL HERNIA: ICD-10-CM

## 2022-11-03 RX ORDER — PANTOPRAZOLE SODIUM 40 MG/1
TABLET, DELAYED RELEASE ORAL
Qty: 90 TABLET | Refills: 0 | Status: SHIPPED | OUTPATIENT
Start: 2022-11-03

## 2022-12-04 DIAGNOSIS — J44.9 CHRONIC OBSTRUCTIVE PULMONARY DISEASE, UNSPECIFIED COPD TYPE (HCC): ICD-10-CM

## 2022-12-04 RX ORDER — TIOTROPIUM BROMIDE 18 UG/1
CAPSULE ORAL; RESPIRATORY (INHALATION)
Qty: 30 CAPSULE | Refills: 0 | Status: SHIPPED | OUTPATIENT
Start: 2022-12-04

## 2022-12-08 ENCOUNTER — TELEPHONE (OUTPATIENT)
Dept: GASTROENTEROLOGY | Facility: CLINIC | Age: 67
End: 2022-12-08

## 2022-12-08 NOTE — TELEPHONE ENCOUNTER
Patients GI provider:  Dr Adams Deluca    Number to return call: [phone]    Reason for call: Pt calling to reschedule his cancelled appt  He does not have a car, the [place] office is the closest office  Nothing available before February       Scheduled procedure/appointment date if applicable: N/A

## 2023-01-06 DIAGNOSIS — I25.10 CORONARY ARTERIOSCLEROSIS IN NATIVE ARTERY: ICD-10-CM

## 2023-01-06 RX ORDER — DILTIAZEM HYDROCHLORIDE 180 MG/1
CAPSULE, COATED, EXTENDED RELEASE ORAL
Qty: 30 CAPSULE | Refills: 5 | Status: SHIPPED | OUTPATIENT
Start: 2023-01-06

## 2023-01-07 DIAGNOSIS — J44.9 CHRONIC OBSTRUCTIVE PULMONARY DISEASE, UNSPECIFIED COPD TYPE (HCC): ICD-10-CM

## 2023-01-07 RX ORDER — TIOTROPIUM BROMIDE 18 UG/1
CAPSULE ORAL; RESPIRATORY (INHALATION)
Qty: 30 CAPSULE | Refills: 0 | Status: SHIPPED | OUTPATIENT
Start: 2023-01-07

## 2023-01-09 ENCOUNTER — RA CDI HCC (OUTPATIENT)
Dept: OTHER | Facility: HOSPITAL | Age: 68
End: 2023-01-09

## 2023-01-09 NOTE — PROGRESS NOTES
[facility]  coding opportunities          Chart Reviewed number of suggestions sent to Provider: 2  K55 1  I71 9     Patients Insurance     Medicare Insurance: [facility] Advantage

## 2023-01-19 ENCOUNTER — APPOINTMENT (OUTPATIENT)
Dept: RADIOLOGY | Facility: CLINIC | Age: 68
End: 2023-01-19

## 2023-01-19 ENCOUNTER — OFFICE VISIT (OUTPATIENT)
Dept: INTERNAL MEDICINE CLINIC | Facility: CLINIC | Age: 68
End: 2023-01-19

## 2023-01-19 VITALS
TEMPERATURE: 97.9 F | BODY MASS INDEX: 31.69 KG/M2 | DIASTOLIC BLOOD PRESSURE: 62 MMHG | SYSTOLIC BLOOD PRESSURE: 128 MMHG | OXYGEN SATURATION: 97 % | HEIGHT: 70 IN | HEART RATE: 81 BPM | WEIGHT: 221.38 LBS

## 2023-01-19 DIAGNOSIS — I25.119 CORONARY ARTERY DISEASE INVOLVING NATIVE CORONARY ARTERY OF NATIVE HEART WITH ANGINA PECTORIS (HCC): ICD-10-CM

## 2023-01-19 DIAGNOSIS — I74.5 OCCLUSION OF RIGHT ILIAC ARTERY (HCC): ICD-10-CM

## 2023-01-19 DIAGNOSIS — J30.2 SEASONAL ALLERGIES: ICD-10-CM

## 2023-01-19 DIAGNOSIS — I13.0 HYPERTENSIVE HEART AND RENAL DISEASE WITH CHF (HCC): ICD-10-CM

## 2023-01-19 DIAGNOSIS — J44.9 CHRONIC OBSTRUCTIVE PULMONARY DISEASE, UNSPECIFIED COPD TYPE (HCC): Primary | ICD-10-CM

## 2023-01-19 DIAGNOSIS — G89.29 CHRONIC MIDLINE LOW BACK PAIN WITHOUT SCIATICA: ICD-10-CM

## 2023-01-19 DIAGNOSIS — F33.9 DEPRESSION, RECURRENT (HCC): ICD-10-CM

## 2023-01-19 DIAGNOSIS — M54.50 CHRONIC MIDLINE LOW BACK PAIN WITHOUT SCIATICA: ICD-10-CM

## 2023-01-19 DIAGNOSIS — G62.9 PERIPHERAL POLYNEUROPATHY: ICD-10-CM

## 2023-01-19 DIAGNOSIS — N18.30 STAGE 3 CHRONIC KIDNEY DISEASE, UNSPECIFIED WHETHER STAGE 3A OR 3B CKD (HCC): ICD-10-CM

## 2023-01-19 PROBLEM — E11.9 TYPE 2 DIABETES MELLITUS WITHOUT COMPLICATION, WITHOUT LONG-TERM CURRENT USE OF INSULIN (HCC): Status: ACTIVE | Noted: 2023-01-19

## 2023-01-19 PROBLEM — E11.9 TYPE 2 DIABETES MELLITUS WITHOUT COMPLICATION, WITHOUT LONG-TERM CURRENT USE OF INSULIN (HCC): Status: RESOLVED | Noted: 2023-01-19 | Resolved: 2023-01-19

## 2023-01-19 RX ORDER — ALBUTEROL SULFATE 90 UG/1
2 AEROSOL, METERED RESPIRATORY (INHALATION) EVERY 6 HOURS PRN
Qty: 54 G | Refills: 1 | Status: SHIPPED | OUTPATIENT
Start: 2023-01-19

## 2023-01-19 RX ORDER — AMITRIPTYLINE HYDROCHLORIDE 50 MG/1
50 TABLET, FILM COATED ORAL
Qty: 30 TABLET | Refills: 2 | Status: SHIPPED | OUTPATIENT
Start: 2023-01-19

## 2023-01-20 PROBLEM — R63.0 DECREASED APPETITE: Status: RESOLVED | Noted: 2022-10-20 | Resolved: 2023-01-20

## 2023-01-20 NOTE — ASSESSMENT & PLAN NOTE
Could not tolerate gabapentin  Stopped lyrica  Will get xray lumbar spine  Start elavil  Directions for use and possible side effects discussed and patient verbalized understanding of these

## 2023-01-20 NOTE — PROGRESS NOTES
Name: Dusty Garcia      : 1955      MRN: [id number]  Encounter Provider: Rehana Naik PA-C  Encounter Date: 2023   Encounter department: [address] [place] [address]     1  Chronic obstructive pulmonary disease, unspecified COPD type (HCC)  -     albuterol (Ventolin HFA) 90 mcg/act inhaler; Inhale 2 puffs every 6 (six) hours as needed for shortness of breath    2  Occlusion of right iliac artery ([facility])    3  Hypertensive heart and renal disease with CHF ([facility] [address] )    4  Depression, recurrent ([facility] )    5  Coronary artery disease involving native coronary artery of native heart with angina pectoris ([facility])    6  Stage 3 chronic kidney disease, unspecified whether stage 3a or 3b CKD ([facility] )    7  Chronic midline low back pain without sciatica  -     XR spine lumbar minimum 4 views non injury; Future; Expected date: 2023    8  Seasonal allergies  Assessment & Plan:  ENt referral provided  Continue astelin in the meantime    Orders:  -     Ambulatory Referral to Otolaryngology; Future    9  Peripheral polyneuropathy  Assessment & Plan:  Could not tolerate gabapentin  Stopped lyrica  Will get xray lumbar spine  Start elavil  Directions for use and possible side effects discussed and patient verbalized understanding of these  Orders:  -     amitriptyline (ELAVIL) 50 mg tablet; Take 1 tablet (50 mg total) by mouth daily at bedtime      BMI Counseling: Body mass index is 31 76 kg/m²  The BMI is above normal  Nutrition recommendations include decreasing portion sizes, consuming healthier snacks and limiting drinks that contain sugar  Rationale for BMI follow-up plan is due to patient being overweight or obese  Falls Plan of Care: balance, strength, and gait training instructions were provided  Subjective      Pt presents for routine visit  He states he has not been feeling well  He notes ongoing chronic rhinitis and is frustrated with this and desires seeing ENT   Tamy has helped but not enough  He is up to date with labs, AWV, and lung screening CT  He also notes ongoing issues with constipation  He states linzess works when he takes it but he has been using it on a prn basis  He has an appt with GI scheduled for next month  He also notes issues with numbness and tingling in his legs  He stopped lyrica  He could not tolerate gabapentin  He is agreeable to trying elavil and getting an xray of his back  Review of Systems   Constitutional: Negative for chills and fever  HENT: Positive for congestion, postnasal drip, rhinorrhea and sinus pressure  Negative for ear pain, hearing loss, sinus pain, sore throat and trouble swallowing  Eyes: Negative for pain and visual disturbance  Respiratory: Negative for cough, chest tightness, shortness of breath and wheezing  Cardiovascular: Negative  Negative for chest pain, palpitations and leg swelling  Gastrointestinal: Positive for constipation  Negative for abdominal pain, blood in stool, diarrhea, nausea and vomiting  Endocrine: Negative for cold intolerance, heat intolerance, polydipsia, polyphagia and polyuria  Genitourinary: Negative for difficulty urinating, dysuria, flank pain and urgency  Musculoskeletal: Positive for arthralgias  Negative for back pain, gait problem and myalgias  Skin: Negative for rash  Allergic/Immunologic: Negative  Neurological: Positive for numbness  Negative for dizziness, weakness, light-headedness and headaches  Hematological: Negative  Psychiatric/Behavioral: Positive for dysphoric mood  Negative for behavioral problems and sleep disturbance  The patient is not nervous/anxious          Current Outpatient Medications on File Prior to Visit   Medication Sig   • albuterol (2 5 mg/3 mL) 0 083 % nebulizer solution Take 1 vial (2 5 mg total) by nebulization every 6 (six) hours as needed for wheezing or shortness of breath   • aspirin (ECOTRIN LOW STRENGTH) 81 mg EC tablet Take 1 tablet (81 mg total) by mouth daily   • atorvastatin (LIPITOR) 40 mg tablet Take 1 tablet (40 mg total) by mouth every evening   • azelastine (ASTELIN) 0 1 % nasal spray 1 spray into each nostril 2 (two) times a day Use in each nostril as directed   • diltiazem (CARDIZEM CD) 180 mg 24 hr capsule take 1 capsule by mouth daily   • fluticasone (FLONASE) 50 mcg/act nasal spray 1 spray into each nostril daily   • furosemide (LASIX) 40 mg tablet take 1 tablet by mouth once daily   • isosorbide mononitrate (IMDUR) 30 mg 24 hr tablet Take 1 tablet (30 mg total) by mouth daily   • linaCLOtide (Linzess) 145 MCG CAPS Take 1 capsule (145 mcg total) by mouth in the morning   • Linzess 290 MCG CAPS take 1 capsule by mouth AT LEAST 30 MINUTES BEFORE FIRST MEAL OF THE DAY   • metoprolol succinate (TOPROL-XL) 25 mg 24 hr tablet take 1 tablet by mouth twice a day   • pantoprazole (PROTONIX) 40 mg tablet take 1 tablet by mouth once daily   • Potassium Chloride ER 20 MEQ TBCR Take 20 mEq by mouth daily    • Restasis 0 05 % ophthalmic emulsion instill 1 drop INTO AFFECTED EYE(S) every 12 hours   • Spiriva HandiHaler 18 MCG inhalation capsule inhale THE CONTENTS OF ONE CAPSULE IN THE HANDIHALER once daily   • tamsulosin (FLOMAX) 0 4 mg Take 1 capsule (0 4 mg total) by mouth 2 (two) times a day       Objective     /62 (BP Location: Left arm, Patient Position: Sitting)   Pulse 81   Temp 97 9 °F (36 6 °C)   Ht 5' 10" (1 778 m)   Wt 100 kg (221 lb 6 oz)   SpO2 97%   BMI 31 76 kg/m²     Physical Exam  Vitals and nursing note reviewed  Constitutional:       General: He is not in acute distress  Appearance: He is well-developed  He is not diaphoretic  HENT:      Head: Normocephalic and atraumatic  Right Ear: External ear normal       Left Ear: External ear normal       Nose: Congestion and rhinorrhea present  Mouth/Throat:      Pharynx: No oropharyngeal exudate  Eyes:      General: No scleral icterus  Right eye: No discharge  Left eye: No discharge  Conjunctiva/sclera: Conjunctivae normal       Pupils: Pupils are equal, round, and reactive to light  Neck:      Thyroid: No thyromegaly  Cardiovascular:      Rate and Rhythm: Normal rate and regular rhythm  Heart sounds: Normal heart sounds  No murmur heard  No friction rub  No gallop  Pulmonary:      Effort: Pulmonary effort is normal  No respiratory distress  Breath sounds: Normal breath sounds  No wheezing or rales  Abdominal:      General: Bowel sounds are normal  There is no distension  Palpations: Abdomen is soft  Tenderness: There is no abdominal tenderness  Musculoskeletal:         General: No tenderness or deformity  Normal range of motion  Cervical back: Normal range of motion and neck supple  Skin:     General: Skin is warm and dry  Neurological:      Mental Status: He is alert and oriented to person, place, and time  Cranial Nerves: No cranial nerve deficit  Sensory: Sensory deficit present  Psychiatric:         Behavior: Behavior normal          Thought Content:  Thought content normal          Judgment: Judgment normal        Candida Loja PA-C

## 2023-01-26 DIAGNOSIS — M54.50 CHRONIC MIDLINE LOW BACK PAIN WITHOUT SCIATICA: Primary | ICD-10-CM

## 2023-01-26 DIAGNOSIS — G89.29 CHRONIC MIDLINE LOW BACK PAIN WITHOUT SCIATICA: Primary | ICD-10-CM

## 2023-02-02 DIAGNOSIS — K44.9 HIATAL HERNIA: ICD-10-CM

## 2023-02-02 RX ORDER — PANTOPRAZOLE SODIUM 40 MG/1
TABLET, DELAYED RELEASE ORAL
Qty: 90 TABLET | Refills: 0 | Status: SHIPPED | OUTPATIENT
Start: 2023-02-02

## 2023-02-10 DIAGNOSIS — J44.9 CHRONIC OBSTRUCTIVE PULMONARY DISEASE, UNSPECIFIED COPD TYPE (HCC): ICD-10-CM

## 2023-02-10 RX ORDER — TIOTROPIUM BROMIDE 18 UG/1
CAPSULE ORAL; RESPIRATORY (INHALATION)
Qty: 30 CAPSULE | Refills: 0 | Status: SHIPPED | OUTPATIENT
Start: 2023-02-10

## 2023-03-01 ENCOUNTER — TELEPHONE (OUTPATIENT)
Dept: GASTROENTEROLOGY | Facility: CLINIC | Age: 68
End: 2023-03-01

## 2023-03-01 ENCOUNTER — HOSPITAL ENCOUNTER (OUTPATIENT)
Dept: NON INVASIVE DIAGNOSTICS | Facility: HOSPITAL | Age: 68
Discharge: HOME | End: 2023-03-01
Attending: SURGERY

## 2023-03-01 DIAGNOSIS — K55.1 SUPERIOR MESENTERIC ARTERY STENOSIS (HCC): ICD-10-CM

## 2023-03-01 NOTE — TELEPHONE ENCOUNTER
Pt left voicemail  Pt's voicemail talked about a past appt with GI on 2/15 and a appt at the hospital on 3/1  I called the pt back and left a voicemail to clarify that he has a VASC study on 3/1 at the [facility] that he should still attend if able  I also notified him that his GI appt on 2/15 was r/s to 4/27

## 2023-03-06 DIAGNOSIS — K55.1 SUPERIOR MESENTERIC ARTERY STENOSIS (HCC): Primary | ICD-10-CM

## 2023-03-07 DIAGNOSIS — I25.10 CORONARY ARTERIOSCLEROSIS IN NATIVE ARTERY: ICD-10-CM

## 2023-03-07 RX ORDER — ISOSORBIDE MONONITRATE 30 MG/1
30 TABLET, EXTENDED RELEASE ORAL DAILY
Qty: 90 TABLET | Refills: 3 | Status: SHIPPED | OUTPATIENT
Start: 2023-03-07

## 2023-03-09 ENCOUNTER — OFFICE VISIT (OUTPATIENT)
Dept: CARDIOLOGY CLINIC | Facility: CLINIC | Age: 68
End: 2023-03-09

## 2023-03-09 VITALS
DIASTOLIC BLOOD PRESSURE: 92 MMHG | HEIGHT: 70 IN | SYSTOLIC BLOOD PRESSURE: 126 MMHG | HEART RATE: 76 BPM | BODY MASS INDEX: 32.21 KG/M2 | WEIGHT: 225 LBS

## 2023-03-09 DIAGNOSIS — I10 PRIMARY HYPERTENSION: ICD-10-CM

## 2023-03-09 DIAGNOSIS — E78.2 MIXED HYPERLIPIDEMIA: ICD-10-CM

## 2023-03-09 DIAGNOSIS — I25.119 CORONARY ARTERY DISEASE INVOLVING NATIVE CORONARY ARTERY OF NATIVE HEART WITH ANGINA PECTORIS (HCC): Primary | ICD-10-CM

## 2023-03-09 RX ORDER — POTASSIUM CHLORIDE 1500 MG/1
20 TABLET, FILM COATED, EXTENDED RELEASE ORAL DAILY
Qty: 30 TABLET | Refills: 11 | Status: SHIPPED | OUTPATIENT
Start: 2023-03-09

## 2023-03-09 NOTE — PROGRESS NOTES
Patient ID: Anand Suarez is a 76 y o  male  Plan:      Hypertension  Blood pressure well controlled  Continue Cardizem 180 mg daily, metoprolol 25 mg daily    Coronary artery disease involving native coronary artery of native heart with angina pectoris ([facility] [address] )  Cath 2015 with occluded LIMA but patent native vessels  Continue aspirin, statin, beta-blocker    Hyperlipidemia  Continue Lipitor 40 mg daily       Follow up Plan/Summary Comments:  Nathalie Auguste denies any symptoms suggestive of angina  Blood pressure appears well controlled  He has been taking some over-the-counter potassium supplements because his prescription ran out  I advised him to discontinue the over-the-counter supplements and have provided a prescription with refills for KCl 20 mEq  I have asked for him to have a BMP done to assess renal function and electrolytes  No medication changes were made today  No indication for cardiac testing  I will follow-up by phone with the results of his laboratory studies and any medication adjustments if necessary  We discussed the importance of smoking cessation  Follow-up in 1 year or sooner if needed  HPI:  I had the pleasure of seeing Nathalie Auguste in the office today for routine follow-up visit  He continues to have difficulty with sleeping, has not been eating much, and does not like being alone  He reports 2 random episodes where it felt like there was more air in the L side of his chest   Occurred when he was laying in bed without other associated symptoms  It lasted a few minutes and resolved spontaneously  He walks up 13 steps each day to get to his apartment without any reproduction of symptoms  He does have some exertional dyspnea which is unchanged  This is attributed to his smoking which he unfortunately continues to do  Review of Systems   10  point ROS  was otherwise non pertinent or negative except as per HPI or as below     Gait: Normal      Most recent or relevant cardiac/vascular testing:    Myoview 07/02/2020:  Normal ejection fraction  Small zone of ischemia in the inferoseptum  Cardiac catheterization 12/3/2021  LAD: Large vessel with moderate diffuse disease, 30 to 40%  Faint L to R collaterals in the distal RCA system  Cx: Large vessel with mild diffuse disease  RCA: Large vessel, mid to distal RCA was previously stented, now chronically occluded and not amenable to PCI  LIMA graft to mid LAD, small, nonfunctional  Medical management advised    Echo 12/3/2021  EF 64%, normal wall motion  Mildly dilated aortic root and left atrium    Results for orders placed or performed in visit on 03/09/23   POCT ECG    Impression    NSR, rightward axis           Objective:     /92   Pulse 76   Ht 5' 10" (1 778 m)   Wt 102 kg (225 lb)   BMI 32 28 kg/m²     PHYSICAL EXAM:    General:  Normal appearance, no acute distress  Eyes:  Anicteric  Oral mucosa:  Moist   Neck:  No JVD  Carotid upstrokes are brisk without bruits  No masses  Chest:  Clear to auscultation   Cardiac:  No palpable PMI  Normal S1 and S2  No murmur gallop or rub  Abdomen:  Soft and nontender  No palpable organomegaly or aortic enlargement  Extremities:  No peripheral edema  Musculoskeletal:  Symmetric  Vascular: Pedal pulses are intact  Neuro:  Grossly symmetric  Psych:  Alert and oriented x3      Allergies   Allergen Reactions   • Gabapentin Headache       Current Outpatient Medications:   •  albuterol (2 5 mg/3 mL) 0 083 % nebulizer solution, Take 1 vial (2 5 mg total) by nebulization every 6 (six) hours as needed for wheezing or shortness of breath, Disp: 25 vial, Rfl: 2  •  albuterol (Ventolin HFA) 90 mcg/act inhaler, Inhale 2 puffs every 6 (six) hours as needed for shortness of breath, Disp: 54 g, Rfl: 1  •  amitriptyline (ELAVIL) 50 mg tablet, Take 1 tablet (50 mg total) by mouth daily at bedtime, Disp: 30 tablet, Rfl: 2  •  aspirin (ECOTRIN LOW STRENGTH) 81 mg EC tablet, Take 1 tablet (81 mg total) by mouth daily, Disp: 30 tablet, Rfl: 0  •  atorvastatin (LIPITOR) 40 mg tablet, Take 1 tablet (40 mg total) by mouth every evening, Disp: 90 tablet, Rfl: 3  •  azelastine (ASTELIN) 0 1 % nasal spray, 1 spray into each nostril 2 (two) times a day Use in each nostril as directed, Disp: 1 mL, Rfl: 2  •  diltiazem (CARDIZEM CD) 180 mg 24 hr capsule, take 1 capsule by mouth daily, Disp: 30 capsule, Rfl: 5  •  fluticasone (FLONASE) 50 mcg/act nasal spray, 1 spray into each nostril daily, Disp: 9 9 mL, Rfl: 0  •  furosemide (LASIX) 40 mg tablet, take 1 tablet by mouth once daily, Disp: 90 tablet, Rfl: 3  •  isosorbide mononitrate (IMDUR) 30 mg 24 hr tablet, Take 1 tablet (30 mg total) by mouth daily, Disp: 90 tablet, Rfl: 3  •  Linzess 290 MCG CAPS, take 1 capsule by mouth AT LEAST 30 MINUTES BEFORE FIRST MEAL OF THE DAY, Disp: , Rfl:   •  metoprolol succinate (TOPROL-XL) 25 mg 24 hr tablet, take 1 tablet by mouth twice a day, Disp: 180 tablet, Rfl: 3  •  pantoprazole (PROTONIX) 40 mg tablet, take 1 tablet by mouth once daily, Disp: 90 tablet, Rfl: 0  •  Potassium Chloride ER 20 MEQ TBCR, Take 1 tablet (20 mEq total) by mouth daily, Disp: 30 tablet, Rfl: 11  •  Restasis 0 05 % ophthalmic emulsion, instill 1 drop INTO AFFECTED EYE(S) every 12 hours, Disp: , Rfl:   •  Spiriva HandiHaler 18 MCG inhalation capsule, inhale THE CONTENTS OF ONE CAPSULE IN THE HANDIHALER once daily, Disp: 30 capsule, Rfl: 0  •  tamsulosin (FLOMAX) 0 4 mg, Take 1 capsule (0 4 mg total) by mouth 2 (two) times a day, Disp: 60 capsule, Rfl: 12  •  linaCLOtide (Linzess) 145 MCG CAPS, Take 1 capsule (145 mcg total) by mouth in the morning (Patient not taking: Reported on 3/9/2023), Disp: 30 capsule, Rfl: 2  Past Medical History:   Diagnosis Date   • Acute right MCA stroke ([facility] [address] ) 9/15/2018   • Arthritis    • CAD (coronary artery disease)     s/p CABG 2000   • COPD (chronic obstructive pulmonary disease) (HCC)    • GERD (gastroesophageal reflux disease)    • Grand mal status ([facility] [address] ) 3/24/2019   • Heart attack ([facility] [address] )    • Heart failure ([facility] [address] )    • Hyperlipidemia    • Hypertension    • Lexiscan nuclear stress test 03/19/2016    EF 74% Normal   • Myocardial infarction [facility])    • Shortness of breath    • Stroke [facility])    • TIA (transient ischemic attack) 09/13/2018     Past Surgical History:   Procedure Laterality Date   • CARDIAC CATHETERIZATION  08/19/2015    LIMA occluded  No severe native lesions   • CARDIAC CATHETERIZATION N/A 12/03/2021    Procedure: Cardiac Coronary Angiogram;  Surgeon: Hattie Weeks MD;  Location: [facility] CARDIAC CATH LAB; Service: Cardiology   • CARDIAC CATHETERIZATION  12/03/2021    Procedure: Cardiac catheterization;  Surgeon: Hattie Weeks MD;  Location: [facility] CARDIAC CATH LAB;   Service: Cardiology   • COLONOSCOPY     • CORONARY ARTERY BYPASS GRAFT  2000   • CYSTOSCOPY  10/31/2022    [facility]   • HERNIA REPAIR      umbilical hernia x2   • TONSILLECTOMY         CMP:   Lab Results   Component Value Date     06/22/2018    K 3 7 07/26/2022    K 3 4 (L) 06/22/2018     07/26/2022     06/22/2018    CO2 26 07/26/2022    CO2 24 06/22/2018    BUN 9 07/26/2022    BUN 14 06/22/2018    CREATININE 1 30 07/26/2022    CREATININE 0 86 06/22/2018    EGFR 56 07/26/2022     Lipid Profile:    Lab Results   Component Value Date    TRIG 120 07/26/2022    HDL 32 (L) 07/26/2022         Social History     Tobacco Use   Smoking Status Every Day   • Packs/day: 1 00   • Years: 50 00   • Pack years: 50 00   • Types: Cigarettes   Smokeless Tobacco Never

## 2023-03-13 ENCOUNTER — TELEPHONE (OUTPATIENT)
Dept: VASCULAR SURGERY | Facility: CLINIC | Age: 68
End: 2023-03-13

## 2023-03-13 NOTE — TELEPHONE ENCOUNTER
Attempted to contact patient to schedule appointment(s) listed below  Requested patient call [phone] option 3 to schedule appointment(s)  Patient's appointment(s) are due now      Dopplers  [] Abdominal Aorta Iliac (AOIL)  [] Carotid (CV)   [x] Celiac and/or Mesenteric 3/1/22  [] Endovascular Aortic Repair (EVAR)   [] Hemodialysis Access (HD)   [] Lower Limb Arterial (JATINDER)  [] Lower Limb Venous (LEV)  [] Lower Limb Venous Duplex with Reflux (LEVDR)  [] Renal Artery  [] Upper Limb Arterial (UEA)    [] Upper Limb Venous (UEV)              [] MELINDA and Waveform analysis     Advanced Imaging   [] CTA head/neck    [] CTA abdomen    [] CTA abdomen & pelvis    [] CT abdomen with/ without contrast  [] CT abdomen with contrast  [] CT abdomen without contrast    [] CT abdomen & pelvis with/ without contrast  [] CT abdomen & pelvis with contrast  [] CT abdomen & pelvis without contrast    Office Visit   [] New patient, patient last seen over 3 years ago  [x] Risk factor modification (RFM) saw LMD 3/1/22 in [place]  [] Follow up   [] Lost to follow up ([facility])

## 2023-03-16 ENCOUNTER — APPOINTMENT (OUTPATIENT)
Dept: LAB | Facility: CLINIC | Age: 68
End: 2023-03-16

## 2023-03-16 ENCOUNTER — TELEPHONE (OUTPATIENT)
Dept: INTERNAL MEDICINE CLINIC | Facility: CLINIC | Age: 68
End: 2023-03-16

## 2023-03-16 DIAGNOSIS — Z12.5 ENCOUNTER FOR SCREENING FOR MALIGNANT NEOPLASM OF PROSTATE: ICD-10-CM

## 2023-03-16 LAB
ANION GAP SERPL CALCULATED.3IONS-SCNC: 3 MMOL/L (ref 4–13)
BUN SERPL-MCNC: 6 MG/DL (ref 5–25)
CALCIUM SERPL-MCNC: 9.3 MG/DL (ref 8.3–10.1)
CHLORIDE SERPL-SCNC: 103 MMOL/L (ref 96–108)
CO2 SERPL-SCNC: 28 MMOL/L (ref 21–32)
CREAT SERPL-MCNC: 1.03 MG/DL (ref 0.6–1.3)
GFR SERPL CREATININE-BSD FRML MDRD: 74 ML/MIN/1.73SQ M
GLUCOSE P FAST SERPL-MCNC: 99 MG/DL (ref 65–99)
POTASSIUM SERPL-SCNC: 4.5 MMOL/L (ref 3.5–5.3)
SODIUM SERPL-SCNC: 134 MMOL/L (ref 135–147)

## 2023-03-16 NOTE — TELEPHONE ENCOUNTER
Pt stopped at office today asking if you received results from this vascular study  It was ordered by Dr Dahl  I told him she should contact you with results

## 2023-03-18 DIAGNOSIS — J44.9 CHRONIC OBSTRUCTIVE PULMONARY DISEASE, UNSPECIFIED COPD TYPE (HCC): ICD-10-CM

## 2023-03-18 RX ORDER — TIOTROPIUM BROMIDE 18 UG/1
18 CAPSULE ORAL; RESPIRATORY (INHALATION) DAILY
Qty: 30 CAPSULE | Refills: 2 | Status: SHIPPED | OUTPATIENT
Start: 2023-03-18

## 2023-03-22 ENCOUNTER — APPOINTMENT (OUTPATIENT)
Dept: LAB | Facility: CLINIC | Age: 68
End: 2023-03-22

## 2023-03-22 LAB — PSA SERPL-MCNC: 0.2 NG/ML (ref 0–4)

## 2023-03-25 NOTE — SOCIAL WORK
CM met with pt at bedside to plan for d/c  Pt lives alone in an apartment with a flight of steps to enter; no DME, no VNA  His PCP is Dr Car Scott  He reports he has needed some psych care in the past and he has a  through [facility], Heena Palacios [phone]  He requested CM call him to notify [facility] that he is in the hospital   CM called and [facility] was already aware pt is in the hospital     CM following for d/c needs 
Pt medical cleared for discharge today  Gave pt the IMM form  PT recommendation is outpt PT/OT  Gave pt SL green sheet with therapy locations  RN is aware pt will need a script for outpt PT/OT 
TC to pt's ICM worker Trinity Mcgee and he can  pt at 1400 today  RN is aware of this 
no

## 2023-03-28 ENCOUNTER — CONSULT (OUTPATIENT)
Dept: PAIN MEDICINE | Facility: CLINIC | Age: 68
End: 2023-03-28

## 2023-03-28 ENCOUNTER — TELEPHONE (OUTPATIENT)
Dept: INTERNAL MEDICINE CLINIC | Facility: CLINIC | Age: 68
End: 2023-03-28

## 2023-03-28 ENCOUNTER — APPOINTMENT (OUTPATIENT)
Dept: RADIOLOGY | Facility: CLINIC | Age: 68
End: 2023-03-28

## 2023-03-28 VITALS
HEIGHT: 70 IN | WEIGHT: 225 LBS | BODY MASS INDEX: 32.21 KG/M2 | HEART RATE: 70 BPM | SYSTOLIC BLOOD PRESSURE: 135 MMHG | DIASTOLIC BLOOD PRESSURE: 89 MMHG

## 2023-03-28 DIAGNOSIS — M54.9 MID BACK PAIN: ICD-10-CM

## 2023-03-28 DIAGNOSIS — G62.9 PERIPHERAL POLYNEUROPATHY: ICD-10-CM

## 2023-03-28 DIAGNOSIS — G89.29 CHRONIC BILATERAL LOW BACK PAIN WITHOUT SCIATICA: Primary | ICD-10-CM

## 2023-03-28 DIAGNOSIS — M54.50 CHRONIC BILATERAL LOW BACK PAIN WITHOUT SCIATICA: Primary | ICD-10-CM

## 2023-03-28 RX ORDER — PREGABALIN 150 MG/1
150 CAPSULE ORAL 2 TIMES DAILY
Qty: 60 CAPSULE | Refills: 2 | Status: SHIPPED | OUTPATIENT
Start: 2023-03-28

## 2023-03-28 NOTE — TELEPHONE ENCOUNTER
Pt said he realizes that the Gabapentin was helping him  He would like to start taking it again          [address] [place]

## 2023-03-28 NOTE — PROGRESS NOTES
"Assessment:  1  Chronic bilateral low back pain without sciatica    2  Mid back pain      Portions of the record may have been created with voice recognition software  Occasional wrong word or \"sound a like\" substitutions may have occurred due to the inherent limitations of voice recognition software  Read the chart carefully and recognize, using context, where substitutions have occurred  Contact me with any questions  Plan:  [id number] male with complicated medical history including COPD, CAD, CHF, CKD stage III, anxiety/depression, history of CVA referred by ADRIANA Berumen for initial evaluation of chronic mid and low back pain of several years duration without inciting event  Patient notes pain is worse with all activity, better with rest   He denies radicular pain, new or progressive weakness, saddle anesthesia, bowel/bladder incontinence  He does note numbness/tingling in bilateral feet/calf in non dermatomal or peripheral nerve distribution of several years duration  Patient is reporting numerous other diffuse complaints including insomnia, chronic stomach discomfort, sinus issues, decreased appetite  Lumbar spine x-ray from January 2023 reviewed and shows moderate multilevel degenerative changes  Mid/low back pain symptoms likely due to myofascial pain, DDD  Suspect PAD contributing to calf and feet symptoms in the setting of  smoking history  1   Obtain thoracic spine x-ray due to complaint of mid back pain, pain to palpation over lower thoracic spinous processes  2   Recommend course of physical therapy for core/back strengthening, LE flexibility  Patient is hesitant as he notes \"exercise will not help him\" but notes he will consider it  3   Would avoid NSAIDs due to current CKD, CAD, GERD, concurrent ASA use  Continue judicious use of OTC Tylenol as needed for symptom management  Discussed trial of Lidoderm patch for symptom management, patient defers    4   Advised patient to " follow-up with PCP regarding his other complaints including chronic stomach discomfort, decreased appetite, insomnia, sinus issues  5  Encouraged smoking cessation  6  Follow-up in 2 to 3 months or as needed  History of Present Illness: The patient is a 76 y o  male who presents for consultation in regards to Shoulder Pain and Back Pain  Symptoms have been present for over 2 years  Symptoms began without any precipitating injury or trauma  Pain is reported to be 5 on the numeric rating scale  Symptoms are felt nearly constantly and worst in the afternoon, evening  Symptoms are characterized as burning, dull/aching and numbing  He denies new or progressive weakness, saddle anesthesia, bowel/bladder incontinence  Aggravating factors include standing, bending and walking  Relieving factors include lying down  No change in symptoms with bowel movements  He was previously taking Lyrica for his symptoms which was discontinued due to lack of relief  He is currently taking Elavil 50 mg daily, denies relief in symptoms with this  Not been to physical therapy for current symptoms  He reports a prolonged smoking history, currently 1 PPD  Review of Systems:    Review of Systems      Past Medical History:   Diagnosis Date   • Acute right MCA stroke ([facility] ) 9/15/2018   • Arthritis    • CAD (coronary artery disease)     s/p CABG 2000   • COPD (chronic obstructive pulmonary disease) ([facility])    • GERD (gastroesophageal reflux disease)    • Grand mal status ([facility] ) 3/24/2019   • Heart attack ([facility] )    • Heart failure ([facility] )    • Hyperlipidemia    • Hypertension    • Lexiscan nuclear stress test 03/19/2016    EF 74% Normal   • Myocardial infarction [facility])    • Shortness of breath    • Stroke [facility])    • TIA (transient ischemic attack) 09/13/2018       Past Surgical History:   Procedure Laterality Date   • CARDIAC CATHETERIZATION  08/19/2015    LIMA occluded   No severe native lesions   • CARDIAC CATHETERIZATION N/A 12/03/2021    Procedure: Cardiac Coronary Angiogram;  Surgeon: Jodie Siu MD;  Location: [facility]; Service: Cardiology   • CARDIAC CATHETERIZATION  12/03/2021    Procedure: Cardiac catheterization;  Surgeon: Jodie Siu MD;  Location: [facility];   Service: Cardiology   • COLONOSCOPY     • CORONARY ARTERY BYPASS GRAFT  2000   • CYSTOSCOPY  10/31/2022    [facility]   • HERNIA REPAIR      umbilical hernia x2   • TONSILLECTOMY         Family History   Problem Relation Age of Onset   • Heart disease Mother    • Cancer Father    • Aneurysm Father    • Cancer Maternal Grandmother    • Cancer Paternal Grandfather        Social History     Occupational History   • Not on file   Tobacco Use   • Smoking status: Every Day     Packs/day: 1 00     Years: 50 00     Pack years: 50 00     Types: Cigarettes   • Smokeless tobacco: Never   Vaping Use   • Vaping Use: Never used   Substance and Sexual Activity   • Alcohol use: Yes     Comment: Socially   • Drug use: Never   • Sexual activity: Not Currently         Current Outpatient Medications:   •  albuterol (2 5 mg/3 mL) 0 083 % nebulizer solution, Take 1 vial (2 5 mg total) by nebulization every 6 (six) hours as needed for wheezing or shortness of breath, Disp: 25 vial, Rfl: 2  •  albuterol (Ventolin HFA) 90 mcg/act inhaler, Inhale 2 puffs every 6 (six) hours as needed for shortness of breath, Disp: 54 g, Rfl: 1  •  amitriptyline (ELAVIL) 50 mg tablet, Take 1 tablet (50 mg total) by mouth daily at bedtime, Disp: 30 tablet, Rfl: 2  •  aspirin (ECOTRIN LOW STRENGTH) 81 mg EC tablet, Take 1 tablet (81 mg total) by mouth daily, Disp: 30 tablet, Rfl: 0  •  atorvastatin (LIPITOR) 40 mg tablet, Take 1 tablet (40 mg total) by mouth every evening, Disp: 90 tablet, Rfl: 3  •  azelastine (ASTELIN) 0 1 % nasal spray, 1 spray into each nostril 2 (two) times a day Use in each nostril as directed, Disp: 1 mL, Rfl: 2  •  diltiazem (CARDIZEM CD) 180 mg 24 hr capsule, "take 1 capsule by mouth daily, Disp: 30 capsule, Rfl: 5  •  fluticasone (FLONASE) 50 mcg/act nasal spray, 1 spray into each nostril daily, Disp: 9 9 mL, Rfl: 0  •  furosemide (LASIX) 40 mg tablet, take 1 tablet by mouth once daily, Disp: 90 tablet, Rfl: 3  •  isosorbide mononitrate (IMDUR) 30 mg 24 hr tablet, Take 1 tablet (30 mg total) by mouth daily, Disp: 90 tablet, Rfl: 3  •  Linzess 290 MCG CAPS, take 1 capsule by mouth AT LEAST 30 MINUTES BEFORE FIRST MEAL OF THE DAY, Disp: , Rfl:   •  metoprolol succinate (TOPROL-XL) 25 mg 24 hr tablet, take 1 tablet by mouth twice a day, Disp: 180 tablet, Rfl: 3  •  pantoprazole (PROTONIX) 40 mg tablet, take 1 tablet by mouth once daily, Disp: 90 tablet, Rfl: 0  •  Potassium Chloride ER 20 MEQ TBCR, Take 1 tablet (20 mEq total) by mouth daily, Disp: 30 tablet, Rfl: 11  •  Restasis 0 05 % ophthalmic emulsion, instill 1 drop INTO AFFECTED EYE(S) every 12 hours, Disp: , Rfl:   •  tiotropium (Spiriva HandiHaler) 18 mcg inhalation capsule, Place 1 capsule (18 mcg total) into inhaler and inhale daily In the Handhaler, Disp: 30 capsule, Rfl: 2  •  linaCLOtide (Linzess) 145 MCG CAPS, Take 1 capsule (145 mcg total) by mouth in the morning (Patient not taking: Reported on 3/9/2023), Disp: 30 capsule, Rfl: 2  •  tamsulosin (FLOMAX) 0 4 mg, Take 1 capsule (0 4 mg total) by mouth 2 (two) times a day, Disp: 60 capsule, Rfl: 12    Allergies   Allergen Reactions   • Gabapentin Headache       Physical Exam:    /89 (BP Location: Left arm, Patient Position: Sitting, Cuff Size: Large)   Pulse 70   Ht 5' 10\" (1 778 m)   Wt 102 kg (225 lb)   BMI 32 28 kg/m²     Constitutional: normal, well developed, well nourished, alert, in no distress and non-toxic and no overt pain behavior    Eyes: anicteric  HEENT: grossly intact  Neck: supple, symmetric, trachea midline and no masses   Pulmonary:even and unlabored  Cardiovascular:No edema or pitting edema present  Skin: decreased hair growth over " distal BLE with scaly appearance of skin  Psychiatric:Mood and affect appropriate  Neurologic:Cranial Nerves II-XII grossly intact  Musculoskeletal:normal gait, pain to palpation over lower thoracic spinous processes, limited lumbar ROM in all directions, grossly intact strength and sensation to light touch over BLE    Imaging  LUMBAR SPINE     INDICATION:   M54 50: Low back pain, unspecified  G89 29: Other chronic pain      COMPARISON:  9/13/2021     VIEWS:  XR SPINE LUMBAR MINIMUM 4 VIEWS NON INJURY  Images: 5     FINDINGS:     There are 5 non rib bearing lumbar vertebral bodies       There is no evidence of acute fracture or destructive osseous lesion      Minimal dextroconvex curvature of the lumbar spine, stable       Moderate multilevel degenerative changes involving discs and facet joints most pronounced in the lower lumbar spine      The pedicles appear intact  No pars defects  SI joints appear normal      There are atherosclerotic calcifications   Soft tissues are otherwise unremarkable      IMPRESSION:     No acute osseous abnormality        Degenerative changes as described           Orders Placed This Encounter   Procedures   • X-ray thoracic spine 3 views   • Ambulatory referral to Physical Therapy

## 2023-04-03 ENCOUNTER — TELEPHONE (OUTPATIENT)
Dept: PAIN MEDICINE | Facility: CLINIC | Age: 68
End: 2023-04-03

## 2023-04-03 NOTE — TELEPHONE ENCOUNTER
----- Message from Gabi Collins MD sent at 4/3/2023  8:33 AM EDT -----  Thoracic spine xray shows age related degenerative changes

## 2023-04-19 PROBLEM — F10.920 ALCOHOLIC INTOXICATION WITHOUT COMPLICATION (HCC): Status: RESOLVED | Noted: 2022-06-30 | Resolved: 2023-04-19

## 2023-04-19 PROBLEM — D22.9 SUSPICIOUS NEVUS: Status: ACTIVE | Noted: 2023-04-19

## 2023-04-25 ENCOUNTER — APPOINTMENT (EMERGENCY)
Dept: CT IMAGING | Facility: HOSPITAL | Age: 68
End: 2023-04-25

## 2023-04-25 ENCOUNTER — APPOINTMENT (EMERGENCY)
Dept: RADIOLOGY | Facility: HOSPITAL | Age: 68
End: 2023-04-25

## 2023-04-25 ENCOUNTER — APPOINTMENT (OUTPATIENT)
Dept: PHYSICAL THERAPY | Facility: CLINIC | Age: 68
End: 2023-04-25

## 2023-04-25 ENCOUNTER — HOSPITAL ENCOUNTER (EMERGENCY)
Facility: HOSPITAL | Age: 68
Discharge: HOME | End: 2023-04-25
Attending: EMERGENCY MEDICINE

## 2023-04-25 VITALS
WEIGHT: 229.28 LBS | BODY MASS INDEX: 32.9 KG/M2 | OXYGEN SATURATION: 96 % | SYSTOLIC BLOOD PRESSURE: 135 MMHG | RESPIRATION RATE: 18 BRPM | DIASTOLIC BLOOD PRESSURE: 78 MMHG | TEMPERATURE: 97.3 F | HEART RATE: 76 BPM
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
## 2023-04-25 DIAGNOSIS — S06.0XAA CONCUSSION: Primary | ICD-10-CM

## 2023-04-25 LAB
ABO GROUP BLD: NORMAL
ALBUMIN SERPL BCP-MCNC: 4.2 G/DL (ref 3.5–5)
ALP SERPL-CCNC: 118 U/L (ref 34–104)
ALT SERPL W P-5'-P-CCNC: 9 U/L (ref 7–52)
ANION GAP SERPL CALCULATED.3IONS-SCNC: 7 MMOL/L (ref 4–13)
AST SERPL W P-5'-P-CCNC: 11 U/L (ref 13–39)
ATRIAL RATE: 77 BPM
BASOPHILS # BLD AUTO: 0.08 THOUSANDS/ΜL (ref 0–0.1)
BASOPHILS NFR BLD AUTO: 1 % (ref 0–1)
BILIRUB SERPL-MCNC: 0.36 MG/DL (ref 0.2–1)
BLD GP AB SCN SERPL QL: NEGATIVE
BUN SERPL-MCNC: 12 MG/DL (ref 5–25)
CALCIUM SERPL-MCNC: 9 MG/DL (ref 8.4–10.2)
CHLORIDE SERPL-SCNC: 100 MMOL/L (ref 96–108)
CO2 SERPL-SCNC: 28 MMOL/L (ref 21–32)
CREAT SERPL-MCNC: 1.23 MG/DL (ref 0.6–1.3)
EOSINOPHIL # BLD AUTO: 0.4 THOUSAND/ΜL (ref 0–0.61)
EOSINOPHIL NFR BLD AUTO: 4 % (ref 0–6)
ERYTHROCYTE [DISTWIDTH] IN BLOOD BY AUTOMATED COUNT: 12.6 % (ref 11.6–15.1)
GFR SERPL CREATININE-BSD FRML MDRD: 59 ML/MIN/1.73SQ M
GLUCOSE SERPL-MCNC: 101 MG/DL (ref 65–140)
HCT VFR BLD AUTO: 39.4 % (ref 36.5–49.3)
HGB BLD-MCNC: 13.4 G/DL (ref 12–17)
IMM GRANULOCYTES # BLD AUTO: 0.04 THOUSAND/UL (ref 0–0.2)
IMM GRANULOCYTES NFR BLD AUTO: 0 % (ref 0–2)
LYMPHOCYTES # BLD AUTO: 3.1 THOUSANDS/ΜL (ref 0.6–4.47)
LYMPHOCYTES NFR BLD AUTO: 33 % (ref 14–44)
MCH RBC QN AUTO: 31.2 PG (ref 26.8–34.3)
MCHC RBC AUTO-ENTMCNC: 34 G/DL (ref 31.4–37.4)
MCV RBC AUTO: 92 FL (ref 82–98)
MONOCYTES # BLD AUTO: 0.69 THOUSAND/ΜL (ref 0.17–1.22)
MONOCYTES NFR BLD AUTO: 7 % (ref 4–12)
NEUTROPHILS # BLD AUTO: 5.24 THOUSANDS/ΜL (ref 1.85–7.62)
NEUTS SEG NFR BLD AUTO: 55 % (ref 43–75)
NRBC BLD AUTO-RTO: 0 /100 WBCS
P AXIS: 54 DEGREES
PLATELET # BLD AUTO: 253 THOUSANDS/UL (ref 149–390)
PMV BLD AUTO: 8.9 FL (ref 8.9–12.7)
POTASSIUM SERPL-SCNC: 3.7 MMOL/L (ref 3.5–5.3)
PR INTERVAL: 168 MS
PROT SERPL-MCNC: 6.5 G/DL (ref 6.4–8.4)
QRS AXIS: 90 DEGREES
QRSD INTERVAL: 90 MS
QT INTERVAL: 404 MS
QTC INTERVAL: 457 MS
RBC # BLD AUTO: 4.29 MILLION/UL (ref 3.88–5.62)
RH BLD: POSITIVE
SODIUM SERPL-SCNC: 135 MMOL/L (ref 135–147)
SPECIMEN EXPIRATION DATE: NORMAL
T WAVE AXIS: 70 DEGREES
VENTRICULAR RATE: 77 BPM
WBC # BLD AUTO: 9.55 THOUSAND/UL (ref 4.31–10.16)

## 2023-04-25 RX ORDER — IPRATROPIUM BROMIDE AND ALBUTEROL SULFATE 2.5; .5 MG/3ML; MG/3ML
3 SOLUTION RESPIRATORY (INHALATION) ONCE
Status: COMPLETED | OUTPATIENT
Start: 2023-04-25 | End: 2023-04-25

## 2023-04-25 RX ADMIN — IPRATROPIUM BROMIDE AND ALBUTEROL SULFATE 3 ML: .5; 3 SOLUTION RESPIRATORY (INHALATION) at 08:58

## 2023-04-25 RX ADMIN — IOHEXOL 100 ML: 350 INJECTION, SOLUTION INTRAVENOUS at 08:02

## 2023-04-25 NOTE — ED NOTES
Ambulation trial completed, patient ambulatory around department with steady gait noted  Provider made aware       Lalo Boateng, [facility]  04/25/23 [id number]

## 2023-04-25 NOTE — ED PROVIDER NOTES
History  Chief Complaint   Patient presents with   • Trauma     Patient is a 40-year-old male with history of TIA, COPD that presents for evaluation after a fall  Patient says about 5 nights ago he rolled out of bed and fell hitting his head on his bedside table  He says since that time he has had some mild issues with short-term memory  He also feels slightly unsteady on his feet since this fall  He also complains of some mild neck pain that is since resolved  Patient otherwise denies chest pain or dyspnea but does endorse some mild diffuse abdominal pain since the fall  He is on 81 mg aspirin, for this reason trauma evaluation was called  Patient does not take anything for his pain  Symptoms have been improving  He denies nausea vomiting Alterman status or focal neurologic deficit  He is still been ambulatory despite his symptoms  Prior to Admission Medications   Prescriptions Last Dose Informant Patient Reported? Taking?    Linzess 290 MCG CAPS   Yes No   Sig: take 1 capsule by mouth AT LEAST 30 MINUTES BEFORE FIRST MEAL OF THE DAY   Potassium Chloride ER 20 MEQ TBCR   No No   Sig: Take 1 tablet (20 mEq total) by mouth daily   Restasis 0 05 % ophthalmic emulsion   Yes No   Sig: instill 1 drop INTO AFFECTED EYE(S) every 12 hours   albuterol (2 5 mg/3 mL) 0 083 % nebulizer solution   No No   Sig: Take 1 vial (2 5 mg total) by nebulization every 6 (six) hours as needed for wheezing or shortness of breath   albuterol (Ventolin HFA) 90 mcg/act inhaler   No No   Sig: Inhale 2 puffs every 6 (six) hours as needed for shortness of breath   amitriptyline (ELAVIL) 50 mg tablet   No No   Sig: take 1 tablet by mouth daily at bedtime   aspirin (ECOTRIN LOW STRENGTH) 81 mg EC tablet   No No   Sig: Take 1 tablet (81 mg total) by mouth daily   azelastine (ASTELIN) 0 1 % nasal spray   No No   Si spray into each nostril 2 (two) times a day Use in each nostril as directed   diltiazem (CARDIZEM CD) 180 mg 24 hr capsule   No No   Sig: take 1 capsule by mouth daily   fluticasone (FLONASE) 50 mcg/act nasal spray   No No   Si spray into each nostril daily   furosemide (LASIX) 40 mg tablet   No No   Sig: take 1 tablet by mouth once daily   isosorbide mononitrate (IMDUR) 30 mg 24 hr tablet   No No   Sig: Take 1 tablet (30 mg total) by mouth daily   metoprolol succinate (TOPROL-XL) 25 mg 24 hr tablet   No No   Sig: take 1 tablet by mouth twice a day   pantoprazole (PROTONIX) 40 mg tablet   No No   Sig: take 1 tablet by mouth once daily   pregabalin (LYRICA) 150 mg capsule   No No   Sig: Take 1 capsule (150 mg total) by mouth 2 (two) times a day   tamsulosin (FLOMAX) 0 4 mg   No No   Sig: Take 1 capsule (0 4 mg total) by mouth 2 (two) times a day   tiotropium (Spiriva HandiHaler) 18 mcg inhalation capsule   No No   Sig: Place 1 capsule (18 mcg total) into inhaler and inhale daily In the [facility]      Facility-Administered Medications: None       Past Medical History:   Diagnosis Date   • Acute right MCA stroke ([facility] [address] ) 9/15/2018   • Arthritis    • CAD (coronary artery disease)     s/p CABG    • COPD (chronic obstructive pulmonary disease) ([facility])    • GERD (gastroesophageal reflux disease)    • Grand mal status ([facility] [address] ) 3/24/2019   • Heart attack ([facility] [address] )    • Heart failure ([facility] [address] )    • Hyperlipidemia    • Hypertension    • Lexiscan nuclear stress test 2016    EF 74% Normal   • Myocardial infarction [facility])    • Shortness of breath    • Stroke [facility])    • TIA (transient ischemic attack) 2018       Past Surgical History:   Procedure Laterality Date   • CARDIAC CATHETERIZATION  2015    LIMA occluded  No severe native lesions   • CARDIAC CATHETERIZATION N/A 2021    Procedure: Cardiac Coronary Angiogram;  Surgeon: Janae Davis MD;  Location: [facility] CARDIAC [facility]ATH [facility];   Service: Cardiology   • CARDIAC CATHETERIZATION  2021    Procedure: Cardiac catheterization;  Surgeon: Janae Davis MD;  Location: [facility]ARDI[facility]; Service: Cardiology   • COLONOSCOPY     • CORONARY ARTERY BYPASS GRAFT  2000   • CYSTOSCOPY  10/31/2022    [facility]   • HERNIA REPAIR      umbilical hernia x2   • TONSILLECTOMY         Family History   Problem Relation Age of Onset   • Heart disease Mother    • Cancer Father    • Aneurysm Father    • Cancer Maternal Grandmother    • Cancer Paternal Grandfather      I have reviewed and agree with the history as documented  E-Cigarette/Vaping   • E-Cigarette Use Never User      E-Cigarette/Vaping Substances   • Nicotine Yes    • THC No    • CBD No    • Flavoring No    • Other No    • Unknown No      Social History     Tobacco Use   • Smoking status: Every Day     Packs/day: 1 00     Years: 50 00     Pack years: 50 00     Types: Cigarettes     Start date: 1970   • Smokeless tobacco: Never   Vaping Use   • Vaping Use: Never used   Substance Use Topics   • Alcohol use: Yes     Comment: Socially   • Drug use: Never       Review of Systems   Constitutional: Negative for fever  HENT: Negative for sore throat  Eyes: Negative for photophobia  Respiratory: Negative for shortness of breath  Cardiovascular: Negative for chest pain  Gastrointestinal: Negative for abdominal pain  Genitourinary: Negative for dysuria  Musculoskeletal: Positive for neck pain  Negative for back pain  Skin: Negative for rash  Neurological: Positive for headaches  Negative for light-headedness  Hematological: Negative for adenopathy  Psychiatric/Behavioral: Negative for agitation  All other systems reviewed and are negative  Physical Exam  Physical Exam  Vitals reviewed  Constitutional:       General: He is not in acute distress  Appearance: He is well-developed  HENT:      Head: Normocephalic  Eyes:      Pupils: Pupils are equal, round, and reactive to light  Cardiovascular:      Rate and Rhythm: Normal rate and regular rhythm  Heart sounds: Normal heart sounds  No murmur heard      No friction rub  No gallop  Pulmonary:      Effort: Pulmonary effort is normal       Comments: Faint wheezing heard throughout, no increased work of breathing  Abdominal:      General: Bowel sounds are normal  There is no distension  Palpations: Abdomen is soft  Tenderness: There is abdominal tenderness  There is no guarding  Comments: Minimal diffuse abdominal tenderness without ecchymosis or external signs of trauma  Musculoskeletal:         General: Normal range of motion  Cervical back: Normal range of motion and neck supple  Comments: No midline cervical tenderness   Skin:     Capillary Refill: Capillary refill takes less than 2 seconds  Neurological:      Mental Status: He is alert and oriented to person, place, and time  Cranial Nerves: No cranial nerve deficit  Sensory: No sensory deficit  Motor: No abnormal muscle tone  Comments: Alert oriented x3  GCS 15  Cranial nerves 2-12 intact  Strength 5/5 in all 4 extremities  Sensation intact throughout  Psychiatric:         Behavior: Behavior normal          Thought Content:  Thought content normal          Judgment: Judgment normal          Vital Signs  ED Triage Vitals   Temperature Pulse Respirations Blood Pressure SpO2   04/25/23 0738 04/25/23 0738 04/25/23 0738 04/25/23 0738 04/25/23 0738   (!) 97 3 °F (36 3 °C) 75 16 148/88 96 %      Temp Source Heart Rate Source Patient Position - Orthostatic VS BP Location FiO2 (%)   04/25/23 0738 04/25/23 0738 04/25/23 0914 04/25/23 0914 --   Tympanic Monitor Lying Left arm       Pain Score       04/25/23 0738       4           Vitals:    04/25/23 0808 04/25/23 0838 04/25/23 0905 04/25/23 0914   BP: 135/87 137/89 131/78 135/78   Pulse: 74 72 75 76   Patient Position - Orthostatic VS:    Lying         Visual Acuity  Visual Acuity    Flowsheet Row Most Recent Value   L Pupil Size (mm) 3   R Pupil Size (mm) 3          ED Medications  Medications   iohexol (OMNIPAQUE) 350 MG/ML injection (SINGLE-DOSE) 100 mL (100 mL Intravenous Given 4/25/23 0802)   ipratropium-albuterol (DUO-NEB) 0 5-2 5 mg/3 mL inhalation solution 3 mL (3 mL Nebulization Given 4/25/23 0858)       Diagnostic Studies  Results Reviewed     Procedure Component Value Units Date/Time    Comprehensive metabolic panel [[id number]]  (Abnormal) Collected: 04/25/23 0745    Lab Status: Final result Specimen: Blood from Arm, Left Updated: 04/25/23 0808     Sodium 135 mmol/L      Potassium 3 7 mmol/L      Chloride 100 mmol/L      CO2 28 mmol/L      ANION GAP 7 mmol/L      BUN 12 mg/dL      Creatinine 1 23 mg/dL      Glucose 101 mg/dL      Calcium 9 0 mg/dL      AST 11 U/L      ALT 9 U/L      Alkaline Phosphatase 118 U/L      Total Protein 6 5 g/dL      Albumin 4 2 g/dL      Total Bilirubin 0 36 mg/dL      eGFR 59 ml/min/1 73sq m     Narrative:      [facility] guidelines for Chronic Kidney Disease (CKD):   •  Stage 1 with normal or high GFR (GFR > 90 mL/min/1 73 square meters)  •  Stage 2 Mild CKD (GFR = 60-89 mL/min/1 73 square meters)  •  Stage 3A Moderate CKD (GFR = 45-59 mL/min/1 73 square meters)  •  Stage 3B Moderate CKD (GFR = 30-44 mL/min/1 73 square meters)  •  Stage 4 Severe CKD (GFR = 15-29 mL/min/1 73 square meters)  •  Stage 5 End Stage CKD (GFR <15 mL/min/1 73 square meters)  Note: GFR calculation is accurate only with a steady state creatinine    CBC and differential [[id number]] Collected: 04/25/23 0745    Lab Status: Final result Specimen: Blood from Arm, Left Updated: 04/25/23 0751     WBC 9 55 Thousand/uL      RBC 4 29 Million/uL      Hemoglobin 13 4 g/dL      Hematocrit 39 4 %      MCV 92 fL      MCH 31 2 pg      MCHC 34 0 g/dL      RDW 12 6 %      MPV 8 9 fL      Platelets 303 Thousands/uL      nRBC 0 /100 WBCs      Neutrophils Relative 55 %      Immat GRANS % 0 %      Lymphocytes Relative 33 %      Monocytes Relative 7 %      Eosinophils Relative 4 %      Basophils Relative 1 % Neutrophils Absolute 5 24 Thousands/µL      Immature Grans Absolute 0 04 Thousand/uL      Lymphocytes Absolute 3 10 Thousands/µL      Monocytes Absolute 0 69 Thousand/µL      Eosinophils Absolute 0 40 Thousand/µL      Basophils Absolute 0 08 Thousands/µL     UA w Reflex to Microscopic w Reflex to Culture [[id number]]     Lab Status: No result Specimen: Urine                  TRAUMA - CT chest abdomen pelvis w contrast   Final Result by Jennifer Espino MD (04/25 5911)      No acute traumatic abnormality  Chronic left-sided rib fractures  Stable 7 mm right upper lobe pulmonary nodule  Workstation performed: [id number]         XR Trauma chest portable   Final Result by Jennifer Espino MD (66/13 1927)      No acute cardiopulmonary disease  Workstation performed: [id number]         TRAUMA - CT spine cervical wo contrast   Final Result by Jennifer Espino MD (22/25 2)      No cervical spine fracture or traumatic malalignment  Workstation performed: [id number]         TRAUMA - CT head wo contrast   Final Result by Jennifer Espino MD (57/76 4584)      No acute intracranial abnormality                    Workstation performed: [id number]                    Procedures  ECG 12 Lead Documentation Only    Date/Time: 4/25/2023 10:09 AM  Performed by: Manda Brown MD  Authorized by: Manda Brown MD     ECG reviewed by me, the ED Provider: yes    Patient location:  ED  Previous ECG:     Previous ECG:  Unavailable    Comparison to cardiac monitor: Yes    Interpretation:     Interpretation: normal    Rate:     ECG rate assessment: normal    Rhythm:     Rhythm: sinus rhythm    Ectopy:     Ectopy: none    QRS:     QRS axis:  Right    QRS intervals:  Normal  Conduction:     Conduction: normal    ST segments:     ST segments:  Normal  T waves:     T waves: normal    POC FAST US    Date/Time: 4/25/2023 10:09 AM  Performed by: Manda Brown "MD  Authorized by: Lonnie Rivera MD     Patient location:  ED  Other Assisting Provider: No    Procedure details:     Exam Type:  Diagnostic    Indications: blunt abdominal trauma      Assess for:  Intra-abdominal fluid and pericardial effusion    Technique: FAST      Views obtained:  Heart - Pericardial sac, RUQ - Garcia's Pouch, LUQ - Splenorenal space and Suprapubic - Pouch of Niels    Image quality: diagnostic      Image availability:  Images available in PACS  FAST Findings:     RUQ (Hepatorenal) free fluid: absent      LUQ (Splenorenal) free fluid: absent      Suprapubic free fluid: absent      Cardiac wall motion: identified      Pericardial effusion: absent    Interpretation:     Impressions: negative               ED Course         CRAFFT    Flowsheet Row Most Recent Value   CRAFFT Initial Screen: During the past 12 months, did you:    1  Drink any alcohol (more than a few sips)? No Filed at: 04/25/2023 0747   2  Smoke any marijuana or hashish No Filed at: 04/25/2023 0747   3  Use anything else to get high? (\"anything else\" includes illegal drugs, over the counter and prescription drugs, and things that you sniff or 'bella')? No Filed at: 04/25/2023 0747                            SBIRT 20yo+    Flowsheet Row Most Recent Value   Initial Alcohol Screen: US AUDIT-C     1  How often do you have a drink containing alcohol? 0 Filed at: 04/25/2023 0747   2  How many drinks containing alcohol do you have on a typical day you are drinking? 0 Filed at: 04/25/2023 0747   3a  Male UNDER 65: How often do you have five or more drinks on one occasion? 0 Filed at: 04/25/2023 0747   3b  FEMALE Any Age, or MALE 65+: How often do you have 4 or more drinks on one occassion? 0 Filed at: 04/25/2023 0747   Audit-C Score 0 Filed at: 04/25/2023 2520   COLTEN: How many times in the past year have you    Used an illegal drug or used a prescription medication for non-medical reasons?  Never Filed at: 04/25/2023 0747    " Medical Decision Making  Patient is a 20-year-old male that presents for evaluation of short-term memory issues and imbalance with headache after trauma  Concern for intracranial hemorrhage, neck fracture, intra-abdominal trauma  However CT imaging including CT head, C-spine and chest and the pelvis was negative  Blood work occluding CBC and CMP also unremarkable  Patient did have some faint wheezing on exam with known history of COPD but denies any dyspnea  Given breathing treatment to help with this  Portable chest x-ray with no obvious abnormality on my read  Patient to be discharged with follow-up to concussion clinic with instructions to use Tylenol as needed for symptoms  Amount and/or Complexity of Data Reviewed  Labs: ordered  Radiology: ordered and independent interpretation performed  Details: Portable chest x-ray clear  ECG/medicine tests: ordered and independent interpretation performed  Risk  Prescription drug management  Disposition  Final diagnoses:   Concussion   Fall, initial encounter     Time reflects when diagnosis was documented in both MDM as applicable and the Disposition within this note     Time User Action Codes Description Comment    4/25/2023  9:03 AM Laci Gómez Add [S06  0XAA] Concussion     4/25/2023  9:03 AM Ildefonso Garcia Add [C56  Brent Arrington, initial encounter       ED Disposition     ED Disposition   Discharge    Condition   Stable    Date/Time   Tue Apr 25, 2023  9:03 AM    Comment   [facility] discharge to home/self care                 Follow-up Information     Follow up With Specialties Details Why Contact Info Additional [address] Emergency Department Emergency Medicine  If symptoms worsen [address] [place] [id number]  [facility] Emergency Department, [address] [facility], [place], [address]          Discharge Medication List as of 4/25/2023  9:04 AM      CONTINUE these medications which have NOT CHANGED    Details   albuterol (2 5 mg/3 mL) 0 083 % nebulizer solution Take 1 vial (2 5 mg total) by nebulization every 6 (six) hours as needed for wheezing or shortness of breath, Starting Tue 5/26/2020, Normal      albuterol (Ventolin HFA) 90 mcg/act inhaler Inhale 2 puffs every 6 (six) hours as needed for shortness of breath, Starting Thu 1/19/2023, Normal      amitriptyline (ELAVIL) 50 mg tablet take 1 tablet by mouth daily at bedtime, Normal      aspirin (ECOTRIN LOW STRENGTH) 81 mg EC tablet Take 1 tablet (81 mg total) by mouth daily, Starting Mon 9/17/2018, Print      azelastine (ASTELIN) 0 1 % nasal spray 1 spray into each nostril 2 (two) times a day Use in each nostril as directed, Starting Wed 10/19/2022, Normal      diltiazem (CARDIZEM CD) 180 mg 24 hr capsule take 1 capsule by mouth daily, Normal      fluticasone (FLONASE) 50 mcg/act nasal spray 1 spray into each nostril daily, Starting Thu 9/29/2022, Normal      furosemide (LASIX) 40 mg tablet take 1 tablet by mouth once daily, Normal      isosorbide mononitrate (IMDUR) 30 mg 24 hr tablet Take 1 tablet (30 mg total) by mouth daily, Starting Tue 3/7/2023, Normal      Linzess 290 MCG CAPS take 1 capsule by mouth AT LEAST 30 MINUTES BEFORE FIRST MEAL OF THE DAY, Historical Med      metoprolol succinate (TOPROL-XL) 25 mg 24 hr tablet take 1 tablet by mouth twice a day, Normal      pantoprazole (PROTONIX) 40 mg tablet take 1 tablet by mouth once daily, Normal      Potassium Chloride ER 20 MEQ TBCR Take 1 tablet (20 mEq total) by mouth daily, Starting Thu 3/9/2023, Normal      pregabalin (LYRICA) 150 mg capsule Take 1 capsule (150 mg total) by mouth 2 (two) times a day, Starting Tue 3/28/2023, Normal      Restasis 0 05 % ophthalmic emulsion instill 1 drop INTO AFFECTED EYE(S) every 12 hours, Historical Med      tamsulosin (FLOMAX) 0 4 mg Take 1 capsule (0 4 mg total) by mouth 2 (two) times a day, Starting Mon 8/8/2022, Until Wed 4/19/2023, Normal      tiotropium (Spiriva HandiHaler) 18 mcg inhalation capsule Place 1 capsule (18 mcg total) into inhaler and inhale daily In the [facility], Starting Sat 3/18/2023, Normal                 PDMP Review     None          ED Provider  Electronically Signed by           Sumeet Gaytan MD  04/25/23 1010

## 2023-04-26 ENCOUNTER — VBI (OUTPATIENT)
Dept: INTERNAL MEDICINE CLINIC | Facility: CLINIC | Age: 68
End: 2023-04-26

## 2023-04-26 ENCOUNTER — APPOINTMENT (OUTPATIENT)
Dept: PHYSICAL THERAPY | Facility: CLINIC | Age: 68
End: 2023-04-26

## 2023-04-26 NOTE — TELEPHONE ENCOUNTER
Eusebia Grayson    ED Visit Information     Ed visit date: 4/25/23  Diagnosis Description:   Concussion   In Network? Yes [facility]  Discharge status: Home  Discharged with meds ? No  Number of ED visits to date: 1  ED Severity:NA     Outreach Information    Outreach successful: No 2  Date letter mailed:n/a  Date Finalized:4/27/23    Care Coordination    Follow up appointment with pcp: no no f/u  Transportation issues ? No    Value Consolidated Lasha    Outreach type: 7 Day Outreach  Attempt 1 - 04/26/2023 09:38 AM EDT by Margarita Gill 04/26/2023 09:38 AM EDT by Margarita Gill  Outgoing [place] (Self) [phone] (VCHMNW)[phone] (Mobile) [phone] (Mobile) Remove - Left Message    Attempt 2 04/27/2023 10:00 AM EDT by Margarita Gill 04/27/2023 10:00 AM EDT by Margarita Gill  [facility] [place] (Self) [phone] (AAPXLV)[phone] (Mobile) [phone] (Mobile) Remove - Left Message    Unable to speak with patient, left message 2x regarding ED Visit  Unable to send mychart letter as it is not activated

## 2023-04-30 DIAGNOSIS — K44.9 HIATAL HERNIA: ICD-10-CM

## 2023-04-30 RX ORDER — PANTOPRAZOLE SODIUM 40 MG/1
TABLET, DELAYED RELEASE ORAL
Qty: 90 TABLET | Refills: 0 | Status: SHIPPED | OUTPATIENT
Start: 2023-04-30

## 2023-05-02 ENCOUNTER — OFFICE VISIT (OUTPATIENT)
Dept: VASCULAR SURGERY | Facility: CLINIC | Age: 68
End: 2023-05-02

## 2023-05-02 VITALS
WEIGHT: 231.8 LBS | BODY MASS INDEX: 33.18 KG/M2 | SYSTOLIC BLOOD PRESSURE: 130 MMHG | TEMPERATURE: 97.7 F | HEIGHT: 70 IN | DIASTOLIC BLOOD PRESSURE: 82 MMHG | HEART RATE: 75 BPM | OXYGEN SATURATION: 96 %

## 2023-05-02 DIAGNOSIS — I70.212 ATHEROSCLER OF NATIVE ARTERY OF LEFT LEG WITH INTERMIT CLAUDICATION (HCC): ICD-10-CM

## 2023-05-02 DIAGNOSIS — E78.2 MIXED HYPERLIPIDEMIA: ICD-10-CM

## 2023-05-02 DIAGNOSIS — N18.30 STAGE 3 CHRONIC KIDNEY DISEASE, UNSPECIFIED WHETHER STAGE 3A OR 3B CKD (HCC): ICD-10-CM

## 2023-05-02 DIAGNOSIS — K55.1 SUPERIOR MESENTERIC ARTERY STENOSIS (HCC): Primary | ICD-10-CM

## 2023-05-02 PROBLEM — Z72.0 TOBACCO ABUSE: Status: ACTIVE | Noted: 2019-03-24

## 2023-05-02 NOTE — PATIENT INSTRUCTIONS
1) GI symptoms  -I recommend that you talk to your GI doctor about your abdominal/digestion symptoms    2) SMA stenosis  -you have a narrowing of one of the arteries that goes to the intestines  -I do not think this is causing your symptoms  -we are going to monitor this with a yearly ultrasound    3) PAD  -I suspect you have some blockages in the arteries in your left leg due to smoking  -we will evaluate this with ultrasound next year  -if this gets worse, I could do an angiogram procedure, but not if you are still smoking  -if you get wounds on your feet, please come back for an appointment right away    3) Medications  -please continue taking your aspirin  -I recommend you take a statin medication for your arteries; if atorvastatin didn't agree with you, we could try another    4) Smoking  -it is incredibly important that you stop smoking; this is the cause of the blockages in the arteries

## 2023-05-02 NOTE — PROGRESS NOTES
Assessment/Plan:    Pt is a 78 yo M w/ GERD, dysphagia, COPD, HTN, CAD, tobacco abuse, HLD, ED, anxiety, MDD, referred for incidental findings of SMA stenosis and R IIA occlusion    Superior mesenteric artery stenosis (HCC)  -     VAS celiac and/or mesenteric duplex; complete study; Future  -reviewed recent CT w/ contrast which shows about 60% stenosis of the SMA w/ widely patent celiac and RAQUEL; stenosis associated with soft plaque  -patient reports numerous GI symptoms, which are not typical for mesenteric ischemia; this would be very unusual at this level of stenosis with patent celiac and RAQUEL; he has gained about 15lbs since last year  -recommended further GI workup    He hasn't seen them since last year and didn't get the recommended testing (sees OSH)  -will continue surveillance on a yearly basis with ultrasound    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)  -     VAS lower limb arterial duplex, complete bilateral; Future  -LLE claudication and nonpalp pulses  -will get LEADs next year for baseline  -he is still able to walk as needed, sxs not lifelimiting and given continued smoking, would not offer angiogram at this time unless he developed rest pain or wounds    Peripheral neuropathy  -has typical symptoms of peripheral neuropathy; have recommended he discuss this with his PCP to start medical management for this    Tobacco abuse  -current 1PPD  -discussed relationship between smoking and arterial disease and need for cessation; he didn't like that the smoking cessation program called him at 8am and never went to an appt; refusing further assistance    Stage 3 chronic kidney disease, unspecified whether stage 3a or 3b CKD ([facility] [address] )    Mixed hyperlipidemia  Medications  -continue ASA daily  -stopped his statin for vague symptoms; unclear if related; offered to trial different statin; patient refused; reports he wants to try garlic supplement although we discussed that this would not "have beneficial effects on the arteries    Subjective:      Patient ID: Zehra Donis is a 76 y o  male  Patient presents today for yearly review of Celiac Mesentric Doppler done 3/1/23  Pt reports constipation he had for years and abdominal pain on and off, no appetitive, and a burning in his bilateral feet    Pt is taking ASA 81 mg  Pt is a current smoker PPD  HPI:    Patient returns for [facility] for mesenteric stenosis    He complains of neck pain, back pain, constipation, peripheral neuropathy  He is having trouble sleeping  He has some abdominal pain associated with constipation  He reports a poor appetite  He eats late in the day  At last visit, I recommended GI f/u  He reports getting EGD (I can see this 3/21) and then going to someone for colonoscopy and f/u but I cannot find this in our system  He has a card from [facility])  He hasn't been back since last year  He reports taking Linzess for his GI issues  Doesn't use any other stool softeners  He has some tightness in the stomach after eating  Denies N/V  He reports trouble with balance and reports a fall  He complains of pain in the feet when he walks  He also has some tightness in the calves when he walks, L>R  His legs \"feel like lead\"  He can walk 3/4 mile and then has to sit and rest     Takes ASA daily  He is not taking his statin anymore (atorvastatin)  He \"just does feel good on this\"  Current 1PPD smoker      The following portions of the patient's history were reviewed and updated as appropriate: allergies, current medications, past family history, past medical history, past social history, past surgical history and problem list     Review of Systems   Constitutional: Negative  HENT: Negative  Eyes: Negative  Respiratory: Positive for cough and shortness of breath  Cardiovascular: Negative  Gastrointestinal: Positive for abdominal distention, abdominal pain and constipation   " "  Endocrine: Negative  Genitourinary: Negative  Musculoskeletal: Positive for back pain, gait problem, joint swelling and neck stiffness  Skin: Negative  Negative for wound  Allergic/Immunologic: Negative  Neurological: Positive for numbness (peripheral)  Hematological: Bruises/bleeds easily  Psychiatric/Behavioral: Positive for sleep disturbance  Objective:      /82 (BP Location: Left arm, Patient Position: Sitting, Cuff Size: Standard)   Pulse 75   Temp 97 7 °F (36 5 °C) (Temporal)   Ht 5' 10\" (1 778 m)   Wt 105 kg (231 lb 12 8 oz)   SpO2 96%   BMI 33 26 kg/m²          Physical Exam  Vitals and nursing note reviewed  Constitutional:       Appearance: He is well-developed  HENT:      Head: Normocephalic and atraumatic  Eyes:      Conjunctiva/sclera: Conjunctivae normal    Cardiovascular:      Rate and Rhythm: Normal rate and regular rhythm  Pulses:           Radial pulses are 2+ on the right side and 2+ on the left side  Dorsalis pedis pulses are 2+ on the right side and 0 on the left side  Posterior tibial pulses are 2+ on the right side and 0 on the left side  Heart sounds: Normal heart sounds  No murmur heard  Comments: No carotid bruits B  Pulmonary:      Effort: Pulmonary effort is normal  No respiratory distress  Breath sounds: Wheezing present  No rales  Abdominal:      General: There is no distension  Palpations: Abdomen is soft  Tenderness: There is no abdominal tenderness  Comments: Obese abdomen   Musculoskeletal:         General: Normal range of motion  Cervical back: Normal range of motion and neck supple  Right lower le+ Edema present  Left lower le+ Edema present  Skin:     General: Skin is warm and dry  Neurological:      Mental Status: He is alert and oriented to person, place, and time     Psychiatric:         Behavior: Behavior normal            I have reviewed and made " "appropriate changes to the review of systems input by the medical assistant  Vitals:    05/02/23 1033   BP: 130/82   BP Location: Left arm   Patient Position: Sitting   Cuff Size: Standard   Pulse: 75   Temp: 97 7 °F (36 5 °C)   TempSrc: Temporal   SpO2: 96%   Weight: 105 kg (231 lb 12 8 oz)   Height: 5' 10\" (1 778 m)       Patient Active Problem List   Diagnosis    Hypertension    Hyperlipidemia    COPD [facility] )    Coronary artery disease involving native coronary artery of native heart with angina pectoris (HCC)    Anxiety and depression    Diverticular disease    Chronic constipation    ED (erectile dysfunction) of organic origin    Gastroesophageal reflux disease    Grand mal status ([facility] )    Insomnia    Tobacco dependence syndrome    Overweight    Seasonal allergies    Lower extremity edema    Benign neoplasm of colon    Aortic aneurysm (HCC)    Superior mesenteric artery stenosis (HCC)    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)    Peripheral neuropathy    Hypertensive heart and renal disease with CHF (HCC)    Depression, recurrent (HCC)    Stage 3 chronic kidney disease, unspecified whether stage 3a or 3b CKD ([place] [address] )    BPH (benign prostatic hyperplasia)    Urinary retention    Left hip pain    Suspicious nevus       Past Surgical History:   Procedure Laterality Date    CARDIAC CATHETERIZATION  08/19/2015    LIMA occluded  No severe native lesions    CARDIAC CATHETERIZATION N/A 12/03/2021    Procedure: Cardiac Coronary Angiogram;  Surgeon: Ellyn Jauregui MD;  Location: [facility]; Service: Cardiology    CARDIAC CATHETERIZATION  12/03/2021    Procedure: Cardiac catheterization;  Surgeon: Ellyn Jauregui MD;  Location: [facility];   Service: Cardiology    COLONOSCOPY      CORONARY ARTERY BYPASS GRAFT  2000    CYSTOSCOPY  10/31/2022    Marlen    HERNIA REPAIR      umbilical hernia x2    TONSILLECTOMY   " Family History   Problem Relation Age of Onset    Heart disease Mother     Cancer Father     Aneurysm Father     Cancer Maternal Grandmother     Cancer Paternal Grandfather        Social History     Socioeconomic History    Marital status: Single     Spouse name: Not on file    Number of children: Not on file    Years of education: Not on file    Highest education level: Not on file   Occupational History    Not on file   Tobacco Use    Smoking status: Every Day     Packs/day: 1 00     Years: 50 00     Pack years: 50 00     Types: Cigarettes     Start date: 1970    Smokeless tobacco: Never   Vaping Use    Vaping Use: Never used   Substance and Sexual Activity    Alcohol use: Yes     Comment: Socially    Drug use: Never    Sexual activity: Not Currently   Other Topics Concern    Not on file   Social History Narrative    Not on file     Social Determinants of Health     Financial Resource Strain: Not on file   Food Insecurity: Not on file   Transportation Needs: Not on file   Physical Activity: Not on file   Stress: Not on file   Social Connections: Not on file   Intimate Partner Violence: Not on file   Housing Stability: Not on file       Allergies   Allergen Reactions    Gabapentin Headache         Current Outpatient Medications:     albuterol (2 5 mg/3 mL) 0 083 % nebulizer solution, Take 1 vial (2 5 mg total) by nebulization every 6 (six) hours as needed for wheezing or shortness of breath, Disp: 25 vial, Rfl: 2    albuterol (Ventolin HFA) 90 mcg/act inhaler, Inhale 2 puffs every 6 (six) hours as needed for shortness of breath, Disp: 54 g, Rfl: 1    amitriptyline (ELAVIL) 50 mg tablet, take 1 tablet by mouth daily at bedtime, Disp: 30 tablet, Rfl: 2    aspirin (ECOTRIN LOW STRENGTH) 81 mg EC tablet, Take 1 tablet (81 mg total) by mouth daily, Disp: 30 tablet, Rfl: 0    azelastine (ASTELIN) 0 1 % nasal spray, 1 spray into each nostril 2 (two) times a day Use in each nostril as directed, Disp: 1 mL, Rfl: 2    diltiazem (CARDIZEM CD) 180 mg 24 hr capsule, take 1 capsule by mouth daily, Disp: 30 capsule, Rfl: 5    fluticasone (FLONASE) 50 mcg/act nasal spray, 1 spray into each nostril daily, Disp: 9 9 mL, Rfl: 0    furosemide (LASIX) 40 mg tablet, take 1 tablet by mouth once daily, Disp: 90 tablet, Rfl: 3    isosorbide mononitrate (IMDUR) 30 mg 24 hr tablet, Take 1 tablet (30 mg total) by mouth daily, Disp: 90 tablet, Rfl: 3    Linzess 290 MCG CAPS, take 1 capsule by mouth AT LEAST 30 MINUTES BEFORE FIRST MEAL OF THE DAY, Disp: , Rfl:     metoprolol succinate (TOPROL-XL) 25 mg 24 hr tablet, take 1 tablet by mouth twice a day, Disp: 180 tablet, Rfl: 3    pantoprazole (PROTONIX) 40 mg tablet, take 1 tablet by mouth once daily, Disp: 90 tablet, Rfl: 0    Potassium Chloride ER 20 MEQ TBCR, Take 1 tablet (20 mEq total) by mouth daily, Disp: 30 tablet, Rfl: 11    pregabalin (LYRICA) 150 mg capsule, Take 1 capsule (150 mg total) by mouth 2 (two) times a day, Disp: 60 capsule, Rfl: 2    Restasis 0 05 % ophthalmic emulsion, instill 1 drop INTO AFFECTED EYE(S) every 12 hours, Disp: , Rfl:     tiotropium (Spiriva HandiHaler) 18 mcg inhalation capsule, Place 1 capsule (18 mcg total) into inhaler and inhale daily In the Handhaler, Disp: 30 capsule, Rfl: 2    tamsulosin (FLOMAX) 0 4 mg, Take 1 capsule (0 4 mg total) by mouth 2 (two) times a day, Disp: 60 capsule, Rfl: 12

## 2023-05-04 DIAGNOSIS — I70.212 ATHEROSCLER OF NATIVE ARTERY OF LEFT LEG WITH INTERMIT CLAUDICATION (HCC): Primary | ICD-10-CM

## 2023-05-08 ENCOUNTER — TELEPHONE (OUTPATIENT)
Dept: CARDIOLOGY CLINIC | Facility: CLINIC | Age: 68
End: 2023-05-08

## 2023-05-08 NOTE — TELEPHONE ENCOUNTER
"Patient called  He wants to know if you can prescribe something different than Atorvastatin 40 mg? He states that \"it just doesn't agree with me\"  Please advise    "

## 2023-05-12 ENCOUNTER — HOSPITAL ENCOUNTER (OUTPATIENT)
Dept: NON INVASIVE DIAGNOSTICS | Facility: HOSPITAL | Age: 68
Discharge: HOME | End: 2023-05-12
Attending: SURGERY

## 2023-05-12 DIAGNOSIS — I70.212 ATHEROSCLER OF NATIVE ARTERY OF LEFT LEG WITH INTERMIT CLAUDICATION (HCC): ICD-10-CM

## 2023-05-15 ENCOUNTER — TELEPHONE (OUTPATIENT)
Dept: VASCULAR SURGERY | Facility: CLINIC | Age: 68
End: 2023-05-15

## 2023-05-15 NOTE — TELEPHONE ENCOUNTER
Pt had JATINDER 5/12/23  Letter in [facility] states that pt has OV pending  Pt does not have OV pending  Routed to triage to review and advise if pt needs OV, or if not, when a repeat JATINDER should be done  Pt had OV with provider 5/2/23

## 2023-05-15 NOTE — TELEPHONE ENCOUNTER
Chart reviewed    Repeat JATINDER in 1 year with return OV at that time (along with mesenteric duplex)

## 2023-05-15 NOTE — TELEPHONE ENCOUNTER
----- Message from Rehana Russell sent at 5/15/2023 10:41 AM EDT -----  No letter required    OV pending    Call patient for OV

## 2023-05-16 NOTE — UTILIZATION REVIEW
Initial Clinical Review    Admission: Date/Time/Statement: Admission Orders (From admission, onward)     Ordered        07/01/20 0908  Place in Observation (expected length of stay for this patient is less than two midnights)  Once                   Orders Placed This Encounter   Procedures    Place in Observation (expected length of stay for this patient is less than two midnights)     Standing Status:   Standing     Number of Occurrences:   1     Order Specific Question:   Admitting Physician     Answer:   [id number]     Order Specific Question:   Level of Care     Answer:   Med Surg [16]     ED Arrival Information     Expected Arrival Acuity Means of Arrival Escorted By Service Admission Type    - 7/1/2020 04:16 Urgent Ambulance [address] [facility] General Medicine Urgent    Arrival Complaint    chest pain        Chief Complaint   Patient presents with    Chest Pain     Pt reports having on and off chest pain for the past week  Pt states he woke up this morning with more "chest tightness"  Pt reports taking 6 asprin today  Assessment/Plan: 71 yo taina presents to ED from home via EMS with intermittent CP x 1 week that radiates into both sides of his neck ,worse with exertion and has SOB becoming worse  Actually started with CP since the New Year and was to have Stress Test done but then COVID hit  He woke up yesterday with severe midline substernal chest pain that radiated to the back  ,recurred intermittently throughout the day  K 3 1, EKG NSR w/o ischemic changes, CXR neg  See CTA below   Admitted to OBS with:    Coronary arteriosclerosis in native artery  Assessment & Plan  · Chest pain with history of coronary artery disease status post CABG 2000 follows with Dr Orly Ramirez  · Has had worsening chest discomfort with activity  · Trend cardiac enzymes and monitor on telemetry but have cardiology evaluate  · Continue clopidogrel and aspirin  · Patient was not taking atorvastatin as prescribed           Results from last 7 days   Lab Units 07/01/20  0722 07/01/20  0425   TROPONIN I ng/mL <0 02 <0 02         Hypokalemia  Assessment & Plan  · Hypokalemia in the setting of furosemide use  · Replete and recheck in a m           Results from last 7 days   Lab Units 07/01/20  0425   POTASSIUM mmol/L 3 1*         Gastroesophageal reflux disease  Assessment & Plan  · GERD continue pantoprazole     Anxiety and depression  Assessment & Plan  · Anxiety and depression  Continue lorazepam     COPD (chronic obstructive pulmonary disease) ([facility])  Assessment & Plan  · COPD without exacerbation  Continue Spiriva and Daliresp      Hypertension  Assessment & Plan  · Essential hypertension states that recently been discontinued on metoprolol succinate, irbesartan, and amlodipine  · Continue furosemide with potassium supplementation  Anticipated Length of Stay:  Patient will be admitted on an Observation basis with an anticipated length of stay of  less than 2 midnights             ED Triage Vitals   Temperature Pulse Respirations Blood Pressure SpO2   07/01/20 0422 07/01/20 0418 07/01/20 0418 07/01/20 0418 07/01/20 0418   97 9 °F (36 6 °C) 87 18 153/100 96 %      Temp Source Heart Rate Source Patient Position - Orthostatic VS BP Location FiO2 (%)   07/01/20 0422 07/01/20 0418 07/01/20 0418 07/01/20 0418 --   Temporal Monitor Lying Left arm       Pain Score       07/01/20 0418       No Pain        Wt Readings from Last 1 Encounters:   07/01/20 98 3 kg (216 lb 11 4 oz)     Additional Vital Signs:   07/02/20 0700  98 9 °F (37 2 °C)  80  18  155/91 95 % None (Room air) Lying   07/02/20 0517  --  72  --  144/87 -- -- Lying   07/01/20 2247  97 9 °F (36 6 °C)  83  18  140/89 94 % None (Room air) Lying   07/01/20 2100  --  --  --  -- -- None (Room air) --   07/01/20 1932  97 6 °F (36 4 °C)  74  18  126/70 96 % None (Room air) Lying   07/01/20 1508  97 1 °F (36 2 °C)Abnormal   91  18  158/94 94 % None (Room air) Lying 07/01/20 1032  97 5 °F (36 4 °C)  78  18  122/79 93 % None (Room air) Lying   07/01/20 0911  --  77  17  136/78 97 % None (Room air) Lying   07/01/20 0750  --  81  17  137/76 96 % None (Room air) Lying   07/01/20 0638  --  68  20  137/90 98 % None (Room air) Lying   07/01/20 0512  --  84  18  135/67 97 % None (Room air) Lying       Pertinent Labs/Diagnostic Test Results:   Results from last 7 days   Lab Units 07/01/20  0437   SARS-COV-2  Negative     Results from last 7 days   Lab Units 07/02/20  0504 07/01/20  0425   WBC Thousand/uL 7 71 10 57*   HEMOGLOBIN g/dL 14 1 14 0   HEMATOCRIT % 40 1 40 1   PLATELETS Thousands/uL 258 259   NEUTROS ABS Thousands/µL  --  6 90     Results from last 7 days   Lab Units 07/02/20  0504 07/01/20  0425   SODIUM mmol/L 138 138   POTASSIUM mmol/L 3 3* 3 1*   CHLORIDE mmol/L 102 101   CO2 mmol/L 25 28   ANION GAP mmol/L 11 9   BUN mg/dL 11 12   CREATININE mg/dL 1 03 1 16   EGFR ml/min/1 73sq m 76 66   CALCIUM mg/dL 8 3 8 6   MAGNESIUM mg/dL  --  1 9     Results from last 7 days   Lab Units 07/02/20  0504 07/01/20  0425   AST U/L 15 17   ALT U/L 19 21   ALK PHOS U/L 86 91   TOTAL PROTEIN g/dL 6 1* 6 7   ALBUMIN g/dL 3 3* 3 7   TOTAL BILIRUBIN mg/dL 0 40 0 33     Results from last 7 days   Lab Units 07/02/20  0504 07/01/20  0425   GLUCOSE RANDOM mg/dL 97 105     Results from last 7 days   Lab Units 07/01/20  0425   CK TOTAL U/L 80     Results from last 7 days   Lab Units 07/01/20  1624 07/01/20  1256 07/01/20  0722 07/01/20  0425   TROPONIN I ng/mL <0 02 <0 02 <0 02 <0 02     Results from last 7 days   Lab Units 07/01/20  0425   PROTIME seconds 12 6   INR  0 93   PTT seconds 25     7/1 CTA C/A/AP:1   No CT evidence of aortic dissection    2  Raymundo Divine of the ascending aorta with focal outpouching along the anterior, right lateral margin   In retrospect, this has not significantly changed from a prior CT lung screening performed February 2019   Findings likely represent a small saccular aneurysm   Recommend follow-up cardiothoracic surgery consultation  3   Moderate atherosclerotic disease of the abdominal aorta and its branch vessels   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  4   Colonic diverticulosis  5   Prostatomegaly with probable bladder outlet obstruction   Irregularity along the floor of the urinary bladder, likely due to enlarged prostate gland, however bladder neoplasm not excluded   Recommend follow-up urology consultation and cystoscopy    7/1 CXR:No acute cardiopulmonary disease   Emphysema  7/1 EKG: NSR    ED Treatment:   Medication Administration from 07/01/2020 0416 to 07/01/2020 1021       Date/Time Order Dose Route Action     07/01/2020 0511 potassium chloride (K-DUR,KLOR-CON) CR tablet 40 mEq 40 mEq Oral Given     07/01/2020 0511 LORazepam (ATIVAN) tablet 0 5 mg 0 5 mg Oral Given     07/01/2020 0914 tiotropium (SPIRIVA) capsule for inhaler 18 mcg 18 mcg Inhalation Given     07/01/2020 0914 ipratropium-albuterol (DUO-NEB) 0 5-2 5 mg/3 mL inhalation solution 3 mL 3 mL Nebulization Given        Past Medical History:   Diagnosis Date    Acute right MCA stroke ([facility] [address] ) 9/15/2018    CAD (coronary artery disease)     s/p CABG 2000    COPD (chronic obstructive pulmonary disease) ([facility] [address] )     Grand mal status ([facility] [address] ) 3/24/2019    Heart attack ([facility] [address] )     Hyperlipidemia     Hypertension     Lexiscan nuclear stress test 03/19/2016    EF 74% Normal    TIA (transient ischemic attack) 09/13/2018     Present on Admission:   Anxiety and depression   COPD (chronic obstructive pulmonary disease) ([facility] [address] )   Gastroesophageal reflux disease   Hypertension   Hyperlipidemia   Hypokalemia   Coronary arteriosclerosis in native artery      Admitting Diagnosis: COPD (chronic obstructive pulmonary disease) ([facility]) [J44 9]  Hypokalemia [E87 6]  Chest pain [R07 9]  CAD (coronary artery disease) [I25 10]  Chronic anticoagulation [Z79 01]  Tobacco use [Z72 0]  H/O heart artery stent [Z95 5]  Intravenous infiltration, initial encounter [T80  1XXA]  Chest pain, unspecified type [R07 9]  Age/Sex: 72 y o  male  Admission Orders:  Scheduled Medications:  Medications:  aspirin 81 mg Oral Daily   atorvastatin 40 mg Oral QPM   clopidogrel 75 mg Oral Daily   docusate sodium 100 mg Oral BID   fluticasone 1 spray Nasal Daily   furosemide 40 mg Oral Daily   heparin (porcine) 5,000 Units Subcutaneous Q8H [address]   pantoprazole 40 mg Oral Daily   potassium chloride 40 mEq Oral Daily   tiotropium 18 mcg Inhalation Daily     Continuous IV Infusions:     PRN Meds:  acetaminophen 650 mg Oral Q6H PRN   albuterol 2 puff Inhalation Q4H PRN   LORazepam 0 5 mg Oral BID PRN   morphine injection 2 mg Intravenous Q4H PRN   nitroglycerin 0 4 mg Sublingual Q5 Min PRN   ondansetron 4 mg Intravenous Q6H PRN   oxyCODONE 5 mg Oral Q4H PRN     TELE  Neurovascular  Checks q4h  IP CONSULT TO CARDIOLOGY    Network Utilization Review Department  [email or web address]  ATTENTION: Please call with any questions or concerns to [phone] and carefully listen to the prompts so that you are directed to the right person  All voicemails are confidential   Rony Crump all requests for admission clinical reviews, approved or denied determinations and any other requests to dedicated fax number below belonging to the campus where the patient is receiving treatment   List of dedicated fax numbers for the Facilities:  FACILITY NAME UR FAX NUMBER   ADMISSION DENIALS (Administrative/Medical Necessity) [phone]   PARENT CHILD HEALTH (Maternity/NICU/Pediatrics) [phone]   [facility] [phone]   [facility] [address] [place] [phone]   [facility] [place] [phone]   [address] [facility] [address] [facility] [phone] Rifampin Pregnancy And Lactation Text: This medication is Pregnancy Category C and it isn't know if it is safe during pregnancy. It is also excreted in breast milk and should not be used if you are breast feeding.

## 2023-05-18 ENCOUNTER — TELEPHONE (OUTPATIENT)
Dept: INTERNAL MEDICINE CLINIC | Facility: CLINIC | Age: 68
End: 2023-05-18

## 2023-05-18 PROBLEM — N18.30 STAGE 3 CHRONIC KIDNEY DISEASE, UNSPECIFIED WHETHER STAGE 3A OR 3B CKD (HCC): Status: RESOLVED | Noted: 2022-06-30 | Resolved: 2023-05-18

## 2023-05-18 NOTE — TELEPHONE ENCOUNTER
Patient called and was told by vascular dr that PCP placed Dx of stage 3 kidney disease  Patient is concerned about this and would like to know if he should be doing something about this? Please advise  Thank you

## 2023-06-07 ENCOUNTER — OFFICE VISIT (OUTPATIENT)
Dept: INTERNAL MEDICINE CLINIC | Facility: CLINIC | Age: 68
End: 2023-06-07
Payer: COMMERCIAL

## 2023-06-07 VITALS
WEIGHT: 226.5 LBS | OXYGEN SATURATION: 96 % | BODY MASS INDEX: 32.43 KG/M2 | TEMPERATURE: 100.9 F | HEART RATE: 99 BPM | DIASTOLIC BLOOD PRESSURE: 64 MMHG | SYSTOLIC BLOOD PRESSURE: 128 MMHG | HEIGHT: 70 IN

## 2023-06-07 DIAGNOSIS — M25.562 ARTHRALGIA OF LEFT LOWER LEG: Primary | ICD-10-CM

## 2023-06-07 DIAGNOSIS — R68.89 FLU-LIKE SYMPTOMS: ICD-10-CM

## 2023-06-07 DIAGNOSIS — Z00.00 MEDICARE ANNUAL WELLNESS VISIT, SUBSEQUENT: ICD-10-CM

## 2023-06-07 DIAGNOSIS — Z11.52 ENCOUNTER FOR SCREENING FOR COVID-19: ICD-10-CM

## 2023-06-07 LAB
SARS-COV-2 AG UPPER RESP QL IA: NEGATIVE
VALID CONTROL: NORMAL

## 2023-06-07 PROCEDURE — 87811 SARS-COV-2 COVID19 W/OPTIC: CPT | Performed by: PHYSICIAN ASSISTANT

## 2023-06-07 PROCEDURE — 99214 OFFICE O/P EST MOD 30 MIN: CPT | Performed by: PHYSICIAN ASSISTANT

## 2023-06-07 NOTE — PROGRESS NOTES
Assessment and Plan:     Problem List Items Addressed This Visit        Other    Arthralgia of left lower leg - Primary    Flu-like symptoms     Pt is feeling generally unwell  Concern for infection or dvt given extremity symptoms  Offered venous duplex  Covid negative  Pt states he would prefer to be evaluated in the ER and will be going there after appt        Other Visit Diagnoses     Medicare annual wellness visit, subsequent        Encounter for screening for COVID-19        Relevant Orders    POCT Rapid Covid Ag (Completed)          Tobacco Cessation Counseling: Tobacco cessation counseling was provided  The patient is sincerely urged to quit consumption of tobacco  He is not ready to quit tobacco        Preventive health issues were discussed with patient, and age appropriate screening tests were ordered as noted in patient's After Visit Summary  Personalized health advice and appropriate referrals for health education or preventive services given if needed, as noted in patient's After Visit Summary  History of Present Illness:     Patient presents for a Medicare Wellness Visit    Pt presents for evaluation of left lower leg pain, swelling, and warmth x 2 days  He has a hx of PAD  No personal history of DVT/PE  He is also noting generalized feeling unwell  He has a hard time quantifying his symptoms beyond that  He states he feels tired and weak  He is a 50 pack year smoker  He notes flu like symptoms  Covid rapid antigen negative in office  Patient Care Team:  Ras Jordan PA-C as PCP - General (Internal Medicine)  Gilbert Hummel MD as PCP - [address] [place], [place] [address]Th [facility] ([facility])  MD Ulysses Bar DO (Gastroenterology)  Irina Gonzalez PA-C as Physician Assistant (Physician Assistant)  Serjio Dahl MD (Vascular Surgery)     Review of Systems:     Review of Systems   Constitutional: Positive for fatigue and fever  Negative for chills     HENT: Negative for congestion, ear pain, hearing loss, postnasal drip, rhinorrhea, sinus pressure, sinus pain, sore throat and trouble swallowing  Eyes: Negative for pain and visual disturbance  Respiratory: Negative for cough, chest tightness, shortness of breath and wheezing  Cardiovascular: Positive for leg swelling  Negative for chest pain and palpitations  Gastrointestinal: Negative for abdominal pain, blood in stool, constipation, diarrhea, nausea and vomiting  Endocrine: Negative for cold intolerance, heat intolerance, polydipsia, polyphagia and polyuria  Genitourinary: Negative for difficulty urinating, dysuria, flank pain and urgency  Musculoskeletal: Positive for myalgias  Negative for arthralgias, back pain and gait problem  Skin: Negative for rash  Allergic/Immunologic: Negative  Neurological: Negative for dizziness, weakness, light-headedness and headaches  Hematological: Negative  Psychiatric/Behavioral: Negative for behavioral problems, dysphoric mood and sleep disturbance  The patient is not nervous/anxious           Problem List:     Patient Active Problem List   Diagnosis   • Hypertension   • Hyperlipidemia   • COPD (chronic obstructive pulmonary disease) ([place] [id number] )   • Coronary artery disease involving native coronary artery of native heart with angina pectoris ([facility])   • Anxiety and depression   • Diverticular disease   • Chronic constipation   • ED (erectile dysfunction) of organic origin   • Gastroesophageal reflux disease   • Grand mal status ([place] [address] )   • Insomnia   • Tobacco abuse   • Overweight   • Seasonal allergies   • Lower extremity edema   • Benign neoplasm of colon   • Aortic aneurysm ([facility])   • Superior mesenteric artery stenosis ([facility])   • Occlusion of right internal iliac artery ([facility])   • Peripheral neuropathy   • Hypertensive heart and renal disease with CHF ([place] [address] )   • Depression, recurrent ([facility])   • BPH (benign prostatic hyperplasia)   • Urinary retention   • Left hip pain   • Suspicious nevus   • 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 ([facility])   • Arthralgia of left lower leg   • Flu-like symptoms      Past Medical and Surgical History:     Past Medical History:   Diagnosis Date   • Acute right MCA stroke ([facility] ) 9/15/2018   • Arthritis    • CAD (coronary artery disease)     s/p CABG 2000   • COPD (chronic obstructive pulmonary disease) ([facility])    • GERD (gastroesophageal reflux disease)    • Grand mal status ([facility] ) 3/24/2019   • Heart attack ([facility] )    • Heart failure ([facility] )    • Hyperlipidemia    • Hypertension    • Lexiscan nuclear stress test 03/19/2016    EF 74% Normal   • Myocardial infarction [facility])    • Shortness of breath    • Stroke [facility])    • TIA (transient ischemic attack) 09/13/2018     Past Surgical History:   Procedure Laterality Date   • CARDIAC CATHETERIZATION  08/19/2015    LIMA occluded  No severe native lesions   • CARDIAC CATHETERIZATION N/A 12/03/2021    Procedure: Cardiac Coronary Angiogram;  Surgeon: Luis Miguel Camacho MD;  Location: [facility]; Service: Cardiology   • CARDIAC CATHETERIZATION  12/03/2021    Procedure: Cardiac catheterization;  Surgeon: Luis Miguel Camacho MD;  Location: [facility];   Service: Cardiology   • COLONOSCOPY     • CORONARY ARTERY BYPASS GRAFT  2000   • CYSTOSCOPY  10/31/2022    [facility]   • HERNIA REPAIR      umbilical hernia x2   • TONSILLECTOMY        Family History:     Family History   Problem Relation Age of Onset   • Heart disease Mother    • Cancer Father    • Aneurysm Father    • Cancer Maternal Grandmother    • Cancer Paternal Grandfather       Social History:     Social History     Socioeconomic History   • Marital status: Single     Spouse name: None   • Number of children: None   • Years of education: None   • Highest education level: None   Occupational History   • None   Tobacco Use   • Smoking status: Every Day     Packs/day: 1 00     Years: 50 00     Total pack years: 50 00     Types: Cigarettes     Start date: 1970   • Smokeless tobacco: Never   Vaping Use   • Vaping Use: Never used   Substance and Sexual Activity   • Alcohol use: Yes     Comment: Socially   • Drug use: Never   • Sexual activity: Not Currently   Other Topics Concern   • None   Social History Narrative   • None     Social Determinants of Health     Financial Resource Strain: Low Risk  (6/7/2023)    Overall Financial Resource Strain (CARDIA)    • Difficulty of Paying Living Expenses: Not very hard   Food Insecurity: Not on file   Transportation Needs: No Transportation Needs (6/7/2023)    PRAPARE - Transportation    • Lack of Transportation (Medical): No    • Lack of Transportation (Non-Medical): No   Physical Activity: Not on file   Stress: Not on file   Social Connections: Not on file   Intimate Partner Violence: Not on file   Housing Stability: Not on file      Medications and Allergies:     No current facility-administered medications for this visit       Current Outpatient Medications   Medication Sig Dispense Refill   • albuterol (2 5 mg/3 mL) 0 083 % nebulizer solution Take 1 vial (2 5 mg total) by nebulization every 6 (six) hours as needed for wheezing or shortness of breath 25 vial 2   • albuterol (Ventolin HFA) 90 mcg/act inhaler Inhale 2 puffs every 6 (six) hours as needed for shortness of breath 54 g 1   • amitriptyline (ELAVIL) 50 mg tablet take 1 tablet by mouth daily at bedtime 30 tablet 2   • aspirin (ECOTRIN LOW STRENGTH) 81 mg EC tablet Take 1 tablet (81 mg total) by mouth daily 30 tablet 0   • azelastine (ASTELIN) 0 1 % nasal spray 1 spray into each nostril 2 (two) times a day Use in each nostril as directed 1 mL 2   • diltiazem (CARDIZEM CD) 180 mg 24 hr capsule take 1 capsule by mouth daily 30 capsule 5   • fluticasone (FLONASE) 50 mcg/act nasal spray 1 spray into each nostril daily 9 9 mL 0   • furosemide (LASIX) 40 mg tablet take 1 tablet by mouth once daily 90 tablet 3   • isosorbide mononitrate (IMDUR) 30 mg 24 hr tablet Take 1 tablet (30 mg total) by mouth daily 90 tablet 3   • Linzess 290 MCG CAPS take 1 capsule by mouth AT LEAST 30 MINUTES BEFORE FIRST MEAL OF THE DAY     • metoprolol succinate (TOPROL-XL) 25 mg 24 hr tablet take 1 tablet by mouth twice a day 180 tablet 3   • pantoprazole (PROTONIX) 40 mg tablet take 1 tablet by mouth once daily 90 tablet 0   • Potassium Chloride ER 20 MEQ TBCR Take 1 tablet (20 mEq total) by mouth daily 30 tablet 11   • pregabalin (LYRICA) 150 mg capsule Take 1 capsule (150 mg total) by mouth 2 (two) times a day 60 capsule 2   • Restasis 0 05 % ophthalmic emulsion instill 1 drop INTO AFFECTED EYE(S) every 12 hours     • tiotropium (Spiriva HandiHaler) 18 mcg inhalation capsule Place 1 capsule (18 mcg total) into inhaler and inhale daily In the Handhaler 30 capsule 2   • tamsulosin (FLOMAX) 0 4 mg Take 1 capsule (0 4 mg total) by mouth 2 (two) times a day 60 capsule 12     Allergies   Allergen Reactions   • Gabapentin Headache      Immunizations:     Immunization History   Administered Date(s) Administered   • COVID-19 MODERNA VACC 0 5 ML IM 04/07/2021, 05/07/2021, 12/10/2021   • INFLUENZA 11/19/2010, 11/18/2011, 09/16/2014, 09/20/2015, 11/06/2016, 02/14/2018, 11/07/2018, 10/19/2022   • Influenza Quadrivalent 3 years and older 11/07/2018   • Influenza, high dose seasonal 0 7 mL 10/19/2022   • Influenza, injectable, quadrivalent, preservative free 0 5 mL 11/07/2018, 09/11/2019   • Influenza, seasonal, injectable 11/19/2010, 11/18/2011   • Pneumococcal Conjugate 13-Valent 11/14/2015   • Pneumococcal Polysaccharide PPV23 01/01/2010, 02/14/2018   • Tdap 01/11/2011   • Tuberculin Skin Test-PPD Intradermal 01/31/2011   • Zoster 12/03/2015   • influenza, injectable, quadrivalent 11/07/2018      Health Maintenance:         Topic Date Due   • Hepatitis C Screening  Never done   • Colorectal Cancer Screening  Never done   • Lung Cancer Screening  04/25/2024         Topic Date Due   • COVID-19 Vaccine (4 - Kennedi Minors series) 02/04/2022   • Pneumococcal Vaccine: 65+ Years (3 - PPSV23 if available, else PCV20) 02/14/2023      Medicare Screening Tests and Risk Assessments:     Genna Poon is here for his Subsequent Wellness visit  Last Medicare Wellness visit information reviewed, patient interviewed and updates made to the record today  Health Risk Assessment:   Patient rates overall health as poor  Patient feels that their physical health rating is much worse  Patient is very dissatisfied with their life  Eyesight was rated as slightly worse  Hearing was rated as slightly worse  Patient feels that their emotional and mental health rating is much worse  Patients states they are never, rarely angry  Patient states they are often unusually tired/fatigued  Pain experienced in the last 7 days has been a lot  Patient's pain rating has been 10/10  Patient states that he has experienced no weight loss or gain in last 6 months  Depression Screening:   PHQ-9 Score: 16      Fall Risk Screening: In the past year, patient has experienced: history of falling in past year    Number of falls: 2 or more  Injured during fall?: No    Feels unsteady when standing or walking?: Yes    Worried about falling?: Yes      Home Safety:  Patient has trouble with stairs inside or outside of their home  Patient has working smoke alarms and has no working carbon monoxide detector  Home safety hazards include: poor household lighting  Nutrition:   Current diet is Regular  Medications:   Patient is currently taking over-the-counter supplements  OTC medications include: see medication list  Patient is able to manage medications  Activities of Daily Living (ADLs)/Instrumental Activities of Daily Living (IADLs):   Walk and transfer into and out of bed and chair?: Yes  Dress and groom yourself?: Yes    Bathe or shower yourself?: Yes    Feed yourself?  Yes  Do your laundry/housekeeping?: Yes  Manage your money, pay your bills and track your expenses?: "Yes  Make your own meals?: Yes    Do your own shopping?: Yes    Previous Hospitalizations:   Any hospitalizations or ED visits within the last 12 months?: Yes    How many hospitalizations have you had in the last year?: 1-2    Advance Care Planning:   Living will: No    Durable POA for healthcare: No    Advanced directive: No    Five wishes given: Yes      Cognitive Screening:   Provider or family/friend/caregiver concerned regarding cognition?: No    PREVENTIVE SCREENINGS      Cardiovascular Screening:    General: Screening Not Indicated and History Lipid Disorder      Diabetes Screening:     General: Screening Not Indicated and History Diabetes      Colorectal Cancer Screening:       Due for: Colonoscopy - High Risk      Prostate Cancer Screening:    General: Screening Current      Osteoporosis Screening:    General: Screening Not Indicated      Abdominal Aortic Aneurysm (AAA) Screening:    Risk factors include: age between 73-67 yo and tobacco use        Lung Cancer Screening:     General: Screening Current      Hepatitis C Screening:    General: Patient Declines    Screening, Brief Intervention, and Referral to Treatment (SBIRT)    Screening  Typical number of drinks in a day: 0  Typical number of drinks in a week: 0  Interpretation: Low risk drinking behavior  No results found  Physical Exam:     /64 (BP Location: Left arm, Patient Position: Sitting)   Pulse 99   Temp (!) 100 9 °F (38 3 °C)   Ht 5' 10\" (1 778 m)   Wt 103 kg (226 lb 8 oz)   SpO2 96%   BMI 32 50 kg/m²     Physical Exam  Vitals and nursing note reviewed  Constitutional:       General: He is not in acute distress  Appearance: He is well-developed  HENT:      Head: Normocephalic and atraumatic  Eyes:      Conjunctiva/sclera: Conjunctivae normal    Cardiovascular:      Rate and Rhythm: Normal rate and regular rhythm  Heart sounds: No murmur heard    Pulmonary:      Effort: Pulmonary effort is normal  No respiratory " distress  Breath sounds: Normal breath sounds  Abdominal:      Palpations: Abdomen is soft  Tenderness: There is no abdominal tenderness  Musculoskeletal:         General: No swelling  Cervical back: Neck supple  Legs:    Skin:     General: Skin is warm and dry  Capillary Refill: Capillary refill takes less than 2 seconds  Neurological:      Mental Status: He is alert     Psychiatric:         Mood and Affect: Mood normal           Candida Loja PA-C

## 2023-06-07 NOTE — PATIENT INSTRUCTIONS
Medicare Preventive Visit Patient Instructions  Thank you for completing your Welcome to Medicare Visit or Medicare Annual Wellness Visit today  Your next wellness visit will be due in one year (6/7/2024)  The screening/preventive services that you may require over the next 5-10 years are detailed below  Some tests may not apply to you based off risk factors and/or age  Screening tests ordered at today's visit but not completed yet may show as past due  Also, please note that scanned in results may not display below  Preventive Screenings:  Service Recommendations Previous Testing/Comments   Colorectal Cancer Screening  · Colonoscopy    · Fecal Occult Blood Test (FOBT)/Fecal Immunochemical Test (FIT)  · Fecal DNA/Cologuard Test  · Flexible Sigmoidoscopy Age: 39-70 years old   Colonoscopy: every 10 years (May be performed more frequently if at higher risk)  OR  FOBT/FIT: every 1 year  OR  Cologuard: every 3 years  OR  Sigmoidoscopy: every 5 years  Screening may be recommended earlier than age 39 if at higher risk for colorectal cancer  Also, an individualized decision between you and your healthcare provider will decide whether screening between the ages of 74-80 would be appropriate   Colonoscopy: Not on file  FOBT/FIT: Not on file  Cologuard: Not on file  Sigmoidoscopy: Not on file          Prostate Cancer Screening Individualized decision between patient and health care provider in men between ages of 53-78   Medicare will cover every 12 months beginning on the day after your 50th birthday PSA: 0 2 ng/mL     Screening Current     Hepatitis C Screening Once for adults born between 1945 and 1965  More frequently in patients at high risk for Hepatitis C Hep C Antibody: Not on file        Diabetes Screening 1-2 times per year if you're at risk for diabetes or have pre-diabetes Fasting glucose: 99 mg/dL (3/16/2023)  A1C: 5 7 % (7/26/2022)  Screening Not Indicated  History Diabetes   Cholesterol Screening Once every 5 years if you don't have a lipid disorder  May order more often based on risk factors  Lipid panel: 07/26/2022  Screening Not Indicated  History Lipid Disorder      Other Preventive Screenings Covered by Medicare:  1  Abdominal Aortic Aneurysm (AAA) Screening: covered once if your at risk  You're considered to be at risk if you have a family history of AAA or a male between the age of 73-68 who smoking at least 100 cigarettes in your lifetime  2  Lung Cancer Screening: covers low dose CT scan once per year if you meet all of the following conditions: (1) Age 50-69; (2) No signs or symptoms of lung cancer; (3) Current smoker or have quit smoking within the last 15 years; (4) You have a tobacco smoking history of at least 20 pack years (packs per day x number of years you smoked); (5) You get a written order from a healthcare provider  3  Glaucoma Screening: covered annually if you're considered high risk: (1) You have diabetes OR (2) Family history of glaucoma OR (3)  aged 48 and older OR (3)  American aged 72 and older  3  Osteoporosis Screening: covered every 2 years if you meet one of the following conditions: (1) Have a vertebral abnormality; (2) On glucocorticoid therapy for more than 3 months; (3) Have primary hyperparathyroidism; (4) On osteoporosis medications and need to assess response to drug therapy  5  HIV Screening: covered annually if you're between the age of 12-76  Also covered annually if you are younger than 13 and older than 72 with risk factors for HIV infection  For pregnant patients, it is covered up to 3 times per pregnancy      Immunizations:  Immunization Recommendations   Influenza Vaccine Annual influenza vaccination during flu season is recommended for all persons aged >= 6 months who do not have contraindications   Pneumococcal Vaccine   * Pneumococcal conjugate vaccine = PCV13 (Prevnar 13), PCV15 (Vaxneuvance), PCV20 (Prevnar 20)  * Pneumococcal polysaccharide vaccine = PPSV23 (Pneumovax) Adults 2364 years old: 1-3 doses may be recommended based on certain risk factors  Adults 72 years old: 1-2 doses may be recommended based off what pneumonia vaccine you previously received   Hepatitis B Vaccine 3 dose series if at intermediate or high risk (ex: diabetes, end stage renal disease, liver disease)   Tetanus (Td) Vaccine - COST NOT COVERED BY MEDICARE PART B Following completion of primary series, a booster dose should be given every 10 years to maintain immunity against tetanus  Td may also be given as tetanus wound prophylaxis  Tdap Vaccine - COST NOT COVERED BY MEDICARE PART B Recommended at least once for all adults  For pregnant patients, recommended with each pregnancy  Shingles Vaccine (Shingrix) - COST NOT COVERED BY MEDICARE PART B  2 shot series recommended in those aged 48 and above     Health Maintenance Due:      Topic Date Due   • Hepatitis C Screening  Never done   • Colorectal Cancer Screening  Never done   • Lung Cancer Screening  04/25/2024     Immunizations Due:      Topic Date Due   • COVID-19 Vaccine (4 - Moderna series) 02/04/2022   • Pneumococcal Vaccine: 65+ Years (3 - PPSV23 if available, else PCV20) 02/14/2023     Advance Directives   What are advance directives? Advance directives are legal documents that state your wishes and plans for medical care  These plans are made ahead of time in case you lose your ability to make decisions for yourself  Advance directives can apply to any medical decision, such as the treatments you want, and if you want to donate organs  What are the types of advance directives? There are many types of advance directives, and each state has rules about how to use them  You may choose a combination of any of the following:  · Living will: This is a written record of the treatment you want  You can also choose which treatments you do not want, which to limit, and which to stop at a certain time   This includes surgery, medicine, IV fluid, and tube feedings  · Durable power of  for healthcare [facility]): This is a written record that states who you want to make healthcare choices for you when you are unable to make them for yourself  This person, called a proxy, is usually a family member or a friend  You may choose more than 1 proxy  · Do not resuscitate (DNR) order:  A DNR order is used in case your heart stops beating or you stop breathing  It is a request not to have certain forms of treatment, such as CPR  A DNR order may be included in other types of advance directives  · Medical directive: This covers the care that you want if you are in a coma, near death, or unable to make decisions for yourself  You can list the treatments you want for each condition  Treatment may include pain medicine, surgery, blood transfusions, dialysis, IV or tube feedings, and a ventilator (breathing machine)  · Values history: This document has questions about your views, beliefs, and how you feel and think about life  This information can help others choose the care that you would choose  Why are advance directives important? An advance directive helps you control your care  Although spoken wishes may be used, it is better to have your wishes written down  Spoken wishes can be misunderstood, or not followed  Treatments may be given even if you do not want them  An advance directive may make it easier for your family to make difficult choices about your care  Fall Prevention    Fall prevention  includes ways to make your home and other areas safer  It also includes ways you can move more carefully to prevent a fall  Health conditions that cause changes in your blood pressure, vision, or muscle strength and coordination may increase your risk for falls  Medicines may also increase your risk for falls if they make you dizzy, weak, or sleepy  Fall prevention tips:   · Stand or sit up slowly      · Use assistive devices as directed  · Wear shoes that fit well and have soles that   · Wear a personal alarm  · Stay active  · Manage your medical conditions  Home Safety Tips:  · Add items to prevent falls in the bathroom  · Keep paths clear  · Install bright lights in your home  · Keep items you use often on shelves within reach  · Paint or place reflective tape on the edges of your stairs  Cigarette Smoking and Your Health   Risks to your health if you smoke:  Nicotine and other chemicals found in tobacco damage every cell in your body  Even if you are a light smoker, you have an increased risk for cancer, heart disease, and lung disease  If you are pregnant or have diabetes, smoking increases your risk for complications  Benefits to your health if you stop smoking:   · You decrease respiratory symptoms such as coughing, wheezing, and shortness of breath  · You reduce your risk for cancers of the lung, mouth, throat, kidney, bladder, pancreas, stomach, and cervix  If you already have cancer, you increase the benefits of chemotherapy  You also reduce your risk for cancer returning or a second cancer from developing  · You reduce your risk for heart disease, blood clots, heart attack, and stroke  · You reduce your risk for lung infections, and diseases such as pneumonia, asthma, chronic bronchitis, and emphysema  · Your circulation improves  More oxygen can be delivered to your body  If you have diabetes, you lower your risk for complications, such as kidney, artery, and eye diseases  You also lower your risk for nerve damage  Nerve damage can lead to amputations, poor vision, and blindness  · You improve your body's ability to heal and to fight infections  For more information and support to stop smoking:   · [employer]  Phone: 6- 295 - [phone]  Web Address: [employer]  Weight Management   Why it is important to manage your weight:  Being overweight increases your risk of health conditions such as heart disease, high blood pressure, type 2 diabetes, and certain types of cancer  It can also increase your risk for osteoarthritis, sleep apnea, and other respiratory problems  Aim for a slow, steady weight loss  Even a small amount of weight loss can lower your risk of health problems  How to lose weight safely:  A safe and healthy way to lose weight is to eat fewer calories and get regular exercise  You can lose up about 1 pound a week by decreasing the number of calories you eat by 500 calories each day  Healthy meal plan for weight management:  A healthy meal plan includes a variety of foods, contains fewer calories, and helps you stay healthy  A healthy meal plan includes the following:  · Eat whole-grain foods more often  A healthy meal plan should contain fiber  Fiber is the part of grains, fruits, and vegetables that is not broken down by your body  Whole-grain foods are healthy and provide extra fiber in your diet  Some examples of whole-grain foods are whole-wheat breads and pastas, oatmeal, brown rice, and bulgur  · Eat a variety of vegetables every day  Include dark, leafy greens such as spinach, kale, jeanie greens, and mustard greens  Eat yellow and orange vegetables such as carrots, sweet potatoes, and winter squash  · Eat a variety of fruits every day  Choose fresh or canned fruit (canned in its own juice or light syrup) instead of juice  Fruit juice has very little or no fiber  · Eat low-fat dairy foods  Drink fat-free (skim) milk or 1% milk  Eat fat-free yogurt and low-fat cottage cheese  Try low-fat cheeses such as mozzarella and other reduced-fat cheeses  · Choose meat and other protein foods that are low in fat  Choose beans or other legumes such as split peas or lentils  Choose fish, skinless poultry (chicken or turkey), or lean cuts of red meat (beef or pork)  Before you cook meat or poultry, cut off any visible fat  · Use less fat and oil    Try baking foods instead of frying them  Add less fat, such as margarine, sour cream, regular salad dressing and mayonnaise to foods  Eat fewer high-fat foods  Some examples of high-fat foods include french fries, doughnuts, ice cream, and cakes  · Eat fewer sweets  Limit foods and drinks that are high in sugar  This includes candy, cookies, regular soda, and sweetened drinks  Exercise:  Exercise at least 30 minutes per day on most days of the week  Some examples of exercise include walking, biking, dancing, and swimming  You can also fit in more physical activity by taking the stairs instead of the elevator or parking farther away from stores  Ask your healthcare provider about the best exercise plan for you  © Copyright [employer] 2018 Information is for End User's use only and may not be sold, redistributed or otherwise used for commercial purposes   All illustrations and images included in CareNotes® are the copyrighted property of [employer]  or [address] [place] [address]

## 2023-06-08 ENCOUNTER — HOSPITAL ENCOUNTER (OUTPATIENT)
Facility: HOSPITAL | Age: 68
Setting detail: OBSERVATION
Discharge: HOME | End: 2023-06-09
Attending: EMERGENCY MEDICINE | Admitting: INTERNAL MEDICINE
Payer: COMMERCIAL

## 2023-06-08 ENCOUNTER — APPOINTMENT (EMERGENCY)
Dept: NON INVASIVE DIAGNOSTICS | Facility: HOSPITAL | Age: 68
End: 2023-06-08
Payer: COMMERCIAL

## 2023-06-08 ENCOUNTER — APPOINTMENT (EMERGENCY)
Dept: RADIOLOGY | Facility: HOSPITAL | Age: 68
End: 2023-06-08
Payer: COMMERCIAL

## 2023-06-08 DIAGNOSIS — I73.9 CLAUDICATION (HCC): ICD-10-CM

## 2023-06-08 DIAGNOSIS — K59.09 CHRONIC CONSTIPATION: ICD-10-CM

## 2023-06-08 DIAGNOSIS — M79.605 LEFT LEG PAIN: ICD-10-CM

## 2023-06-08 DIAGNOSIS — R26.2 AMBULATORY DYSFUNCTION: ICD-10-CM

## 2023-06-08 DIAGNOSIS — I70.212 ATHEROSCLER OF NATIVE ARTERY OF LEFT LEG WITH INTERMIT CLAUDICATION (HCC): Primary | ICD-10-CM

## 2023-06-08 DIAGNOSIS — I82.409 DVT (DEEP VENOUS THROMBOSIS) (HCC): ICD-10-CM

## 2023-06-08 PROBLEM — M25.562 ARTHRALGIA OF LEFT LOWER LEG: Status: ACTIVE | Noted: 2023-06-08

## 2023-06-08 PROBLEM — R68.89 FLU-LIKE SYMPTOMS: Status: ACTIVE | Noted: 2023-06-08

## 2023-06-08 LAB
ANION GAP SERPL CALCULATED.3IONS-SCNC: 9 MMOL/L (ref 4–13)
APTT PPP: 26 SECONDS (ref 23–37)
BASOPHILS # BLD AUTO: 0.07 THOUSANDS/ÂΜL (ref 0–0.1)
BASOPHILS NFR BLD AUTO: 1 % (ref 0–1)
BUN SERPL-MCNC: 13 MG/DL (ref 5–25)
CALCIUM SERPL-MCNC: 8.5 MG/DL (ref 8.4–10.2)
CHLORIDE SERPL-SCNC: 99 MMOL/L (ref 96–108)
CO2 SERPL-SCNC: 25 MMOL/L (ref 21–32)
CREAT SERPL-MCNC: 1.34 MG/DL (ref 0.6–1.3)
EOSINOPHIL # BLD AUTO: 0.44 THOUSAND/ÂΜL (ref 0–0.61)
EOSINOPHIL NFR BLD AUTO: 4 % (ref 0–6)
ERYTHROCYTE [DISTWIDTH] IN BLOOD BY AUTOMATED COUNT: 12.5 % (ref 11.6–15.1)
FLUAV RNA RESP QL NAA+PROBE: NEGATIVE
FLUBV RNA RESP QL NAA+PROBE: NEGATIVE
GFR SERPL CREATININE-BSD FRML MDRD: 54 ML/MIN/1.73SQ M
GLUCOSE SERPL-MCNC: 110 MG/DL (ref 65–140)
HCT VFR BLD AUTO: 38 % (ref 36.5–49.3)
HGB BLD-MCNC: 12.9 G/DL (ref 12–17)
IMM GRANULOCYTES # BLD AUTO: 0.07 THOUSAND/UL (ref 0–0.2)
IMM GRANULOCYTES NFR BLD AUTO: 1 % (ref 0–2)
INR PPP: 1.07 (ref 0.84–1.19)
LYMPHOCYTES # BLD AUTO: 1.79 THOUSANDS/ÂΜL (ref 0.6–4.47)
LYMPHOCYTES NFR BLD AUTO: 17 % (ref 14–44)
MCH RBC QN AUTO: 30.9 PG (ref 26.8–34.3)
MCHC RBC AUTO-ENTMCNC: 33.9 G/DL (ref 31.4–37.4)
MCV RBC AUTO: 91 FL (ref 82–98)
MONOCYTES # BLD AUTO: 0.98 THOUSAND/ÂΜL (ref 0.17–1.22)
MONOCYTES NFR BLD AUTO: 9 % (ref 4–12)
NEUTROPHILS # BLD AUTO: 7.03 THOUSANDS/ÂΜL (ref 1.85–7.62)
NEUTS SEG NFR BLD AUTO: 68 % (ref 43–75)
NRBC BLD AUTO-RTO: 0 /100 WBCS
PLATELET # BLD AUTO: 211 THOUSANDS/UL (ref 149–390)
PMV BLD AUTO: 9 FL (ref 8.9–12.7)
POTASSIUM SERPL-SCNC: 3.5 MMOL/L (ref 3.5–5.3)
PROTHROMBIN TIME: 13.9 SECONDS (ref 11.6–14.5)
RBC # BLD AUTO: 4.17 MILLION/UL (ref 3.88–5.62)
RSV RNA RESP QL NAA+PROBE: NEGATIVE
SARS-COV-2 RNA RESP QL NAA+PROBE: NEGATIVE
SODIUM SERPL-SCNC: 133 MMOL/L (ref 135–147)
WBC # BLD AUTO: 10.38 THOUSAND/UL (ref 4.31–10.16)

## 2023-06-08 PROCEDURE — 94640 AIRWAY INHALATION TREATMENT: CPT

## 2023-06-08 PROCEDURE — 80048 BASIC METABOLIC PNL TOTAL CA: CPT | Performed by: EMERGENCY MEDICINE

## 2023-06-08 PROCEDURE — 0241U HB NFCT DS VIR RESP RNA 4 TRGT: CPT | Performed by: EMERGENCY MEDICINE

## 2023-06-08 PROCEDURE — 93971 EXTREMITY STUDY: CPT

## 2023-06-08 PROCEDURE — 85730 THROMBOPLASTIN TIME PARTIAL: CPT | Performed by: EMERGENCY MEDICINE

## 2023-06-08 PROCEDURE — 99285 EMERGENCY DEPT VISIT HI MDM: CPT

## 2023-06-08 PROCEDURE — 36415 COLL VENOUS BLD VENIPUNCTURE: CPT | Performed by: EMERGENCY MEDICINE

## 2023-06-08 PROCEDURE — 94760 N-INVAS EAR/PLS OXIMETRY 1: CPT

## 2023-06-08 PROCEDURE — 94664 DEMO&/EVAL PT USE INHALER: CPT

## 2023-06-08 PROCEDURE — 85610 PROTHROMBIN TIME: CPT | Performed by: EMERGENCY MEDICINE

## 2023-06-08 PROCEDURE — 93971 EXTREMITY STUDY: CPT | Performed by: SURGERY

## 2023-06-08 PROCEDURE — 71045 X-RAY EXAM CHEST 1 VIEW: CPT

## 2023-06-08 PROCEDURE — 85025 COMPLETE CBC W/AUTO DIFF WBC: CPT | Performed by: EMERGENCY MEDICINE

## 2023-06-08 PROCEDURE — 99223 1ST HOSP IP/OBS HIGH 75: CPT | Performed by: INTERNAL MEDICINE

## 2023-06-08 PROCEDURE — 99215 OFFICE O/P EST HI 40 MIN: CPT

## 2023-06-08 PROCEDURE — 96374 THER/PROPH/DIAG INJ IV PUSH: CPT

## 2023-06-08 RX ORDER — FLUTICASONE PROPIONATE 50 MCG
1 SPRAY, SUSPENSION (ML) NASAL DAILY
Status: DISCONTINUED | OUTPATIENT
Start: 2023-06-08 | End: 2023-06-09 | Stop reason: HOSPADM

## 2023-06-08 RX ORDER — OXYCODONE HYDROCHLORIDE 5 MG/1
5 TABLET ORAL EVERY 4 HOURS PRN
Status: DISCONTINUED | OUTPATIENT
Start: 2023-06-08 | End: 2023-06-09 | Stop reason: HOSPADM

## 2023-06-08 RX ORDER — ALBUTEROL SULFATE 90 UG/1
2 AEROSOL, METERED RESPIRATORY (INHALATION) EVERY 6 HOURS PRN
Status: DISCONTINUED | OUTPATIENT
Start: 2023-06-08 | End: 2023-06-08

## 2023-06-08 RX ORDER — FUROSEMIDE 40 MG/1
40 TABLET ORAL DAILY
Status: DISCONTINUED | OUTPATIENT
Start: 2023-06-08 | End: 2023-06-09 | Stop reason: HOSPADM

## 2023-06-08 RX ORDER — PREGABALIN 75 MG/1
150 CAPSULE ORAL 2 TIMES DAILY
Status: DISCONTINUED | OUTPATIENT
Start: 2023-06-08 | End: 2023-06-09 | Stop reason: HOSPADM

## 2023-06-08 RX ORDER — HYDROMORPHONE HCL/PF 1 MG/ML
0.5 SYRINGE (ML) INJECTION EVERY 4 HOURS PRN
Status: DISCONTINUED | OUTPATIENT
Start: 2023-06-08 | End: 2023-06-09 | Stop reason: HOSPADM

## 2023-06-08 RX ORDER — ALBUTEROL SULFATE 2.5 MG/3ML
2.5 SOLUTION RESPIRATORY (INHALATION) EVERY 4 HOURS PRN
Status: DISCONTINUED | OUTPATIENT
Start: 2023-06-08 | End: 2023-06-08

## 2023-06-08 RX ORDER — AMITRIPTYLINE HYDROCHLORIDE 25 MG/1
50 TABLET, FILM COATED ORAL
Status: DISCONTINUED | OUTPATIENT
Start: 2023-06-08 | End: 2023-06-09 | Stop reason: HOSPADM

## 2023-06-08 RX ORDER — FENTANYL CITRATE 50 UG/ML
75 INJECTION, SOLUTION INTRAMUSCULAR; INTRAVENOUS ONCE
Status: COMPLETED | OUTPATIENT
Start: 2023-06-08 | End: 2023-06-08

## 2023-06-08 RX ORDER — PANTOPRAZOLE SODIUM 40 MG/1
40 TABLET, DELAYED RELEASE ORAL DAILY
Status: DISCONTINUED | OUTPATIENT
Start: 2023-06-08 | End: 2023-06-09 | Stop reason: HOSPADM

## 2023-06-08 RX ORDER — METOPROLOL SUCCINATE 25 MG/1
25 TABLET, EXTENDED RELEASE ORAL 2 TIMES DAILY
Status: DISCONTINUED | OUTPATIENT
Start: 2023-06-08 | End: 2023-06-09 | Stop reason: HOSPADM

## 2023-06-08 RX ORDER — ISOSORBIDE MONONITRATE 30 MG/1
30 TABLET, EXTENDED RELEASE ORAL DAILY
Status: DISCONTINUED | OUTPATIENT
Start: 2023-06-08 | End: 2023-06-09 | Stop reason: HOSPADM

## 2023-06-08 RX ORDER — ACETAMINOPHEN 325 MG/1
650 TABLET ORAL EVERY 6 HOURS PRN
Status: DISCONTINUED | OUTPATIENT
Start: 2023-06-08 | End: 2023-06-09 | Stop reason: HOSPADM

## 2023-06-08 RX ORDER — ALBUTEROL SULFATE 90 UG/1
2 AEROSOL, METERED RESPIRATORY (INHALATION) EVERY 4 HOURS PRN
Status: DISCONTINUED | OUTPATIENT
Start: 2023-06-08 | End: 2023-06-09 | Stop reason: HOSPADM

## 2023-06-08 RX ORDER — ALBUTEROL SULFATE 2.5 MG/3ML
2.5 SOLUTION RESPIRATORY (INHALATION) EVERY 6 HOURS PRN
Status: DISCONTINUED | OUTPATIENT
Start: 2023-06-08 | End: 2023-06-08

## 2023-06-08 RX ORDER — AMOXICILLIN 250 MG
1 CAPSULE ORAL
Status: DISCONTINUED | OUTPATIENT
Start: 2023-06-08 | End: 2023-06-09 | Stop reason: HOSPADM

## 2023-06-08 RX ORDER — ONDANSETRON 2 MG/ML
4 INJECTION INTRAMUSCULAR; INTRAVENOUS EVERY 6 HOURS PRN
Status: DISCONTINUED | OUTPATIENT
Start: 2023-06-08 | End: 2023-06-09 | Stop reason: HOSPADM

## 2023-06-08 RX ORDER — DILTIAZEM HYDROCHLORIDE 180 MG/1
180 CAPSULE, COATED, EXTENDED RELEASE ORAL DAILY
Status: DISCONTINUED | OUTPATIENT
Start: 2023-06-08 | End: 2023-06-09 | Stop reason: HOSPADM

## 2023-06-08 RX ORDER — TAMSULOSIN HYDROCHLORIDE 0.4 MG/1
0.4 CAPSULE ORAL
Status: DISCONTINUED | OUTPATIENT
Start: 2023-06-08 | End: 2023-06-09 | Stop reason: HOSPADM

## 2023-06-08 RX ADMIN — AMITRIPTYLINE HYDROCHLORIDE 50 MG: 25 TABLET, FILM COATED ORAL at 21:15

## 2023-06-08 RX ADMIN — SENNOSIDES AND DOCUSATE SODIUM 1 TABLET: 50; 8.6 TABLET ORAL at 21:49

## 2023-06-08 RX ADMIN — PREGABALIN 150 MG: 100 CAPSULE ORAL at 11:18

## 2023-06-08 RX ADMIN — METOPROLOL SUCCINATE 25 MG: 50 TABLET, EXTENDED RELEASE ORAL at 11:19

## 2023-06-08 RX ADMIN — DILTIAZEM HYDROCHLORIDE 180 MG: 180 CAPSULE, COATED, EXTENDED RELEASE ORAL at 11:18

## 2023-06-08 RX ADMIN — ALBUTEROL SULFATE 2.5 MG: 2.5 SOLUTION RESPIRATORY (INHALATION) at 11:38

## 2023-06-08 RX ADMIN — APIXABAN 10 MG: 5 TABLET, FILM COATED ORAL at 10:18

## 2023-06-08 RX ADMIN — METOPROLOL SUCCINATE 25 MG: 50 TABLET, EXTENDED RELEASE ORAL at 17:25

## 2023-06-08 RX ADMIN — FENTANYL CITRATE 75 MCG: 50 INJECTION, SOLUTION INTRAMUSCULAR; INTRAVENOUS at 07:57

## 2023-06-08 RX ADMIN — PREGABALIN 150 MG: 100 CAPSULE ORAL at 17:25

## 2023-06-08 RX ADMIN — HYDROMORPHONE HYDROCHLORIDE 0.5 MG: 1 INJECTION, SOLUTION INTRAMUSCULAR; INTRAVENOUS; SUBCUTANEOUS at 14:05

## 2023-06-08 RX ADMIN — ISOSORBIDE MONONITRATE 30 MG: 30 TABLET, EXTENDED RELEASE ORAL at 11:18

## 2023-06-08 RX ADMIN — PANTOPRAZOLE SODIUM 40 MG: 40 TABLET, DELAYED RELEASE ORAL at 11:18

## 2023-06-08 RX ADMIN — TAMSULOSIN HYDROCHLORIDE 0.4 MG: 0.4 CAPSULE ORAL at 17:26

## 2023-06-08 RX ADMIN — FLUTICASONE PROPIONATE 1 SPRAY: 50 SPRAY, METERED NASAL at 11:19

## 2023-06-08 RX ADMIN — FUROSEMIDE 40 MG: 40 TABLET ORAL at 11:19

## 2023-06-08 RX ADMIN — ASPIRIN 81 MG: 81 TABLET, COATED ORAL at 11:18

## 2023-06-08 RX ADMIN — OXYCODONE HYDROCHLORIDE 5 MG: 5 TABLET ORAL at 21:15

## 2023-06-08 RX ADMIN — APIXABAN 10 MG: 5 TABLET, FILM COATED ORAL at 17:25

## 2023-06-08 NOTE — ED PROVIDER NOTES
History  Chief Complaint   Patient presents with   • Leg Pain     Patient reports left leg pain for the past 5 days  Patient reports pain is in his toes and radiates up the leg  35-year-old male with history of vascular disease presenting for worsening and severe left lower extremity pain  Patient states that over the last 5 days he started develop pain at rest in his left lower extremity and he rates it as a 4 out of 10 but that when he tries to walk on it he states the pain is so severe that he is unable to walk  He states he has a cough and he had a fever yesterday at his doctor's office but otherwise denies chest pain, shortness of breath, belly pain, nausea or vomiting, diarrhea or constipation, dysuria, hematuria  Denies any trauma to the leg  Prior to Admission Medications   Prescriptions Last Dose Informant Patient Reported? Taking?    Linzess 290 MCG CAPS  Self Yes No   Sig: take 1 capsule by mouth AT LEAST 30 MINUTES BEFORE FIRST MEAL OF THE DAY   Potassium Chloride ER 20 MEQ TBCR  Self No No   Sig: Take 1 tablet (20 mEq total) by mouth daily   Restasis 0 05 % ophthalmic emulsion  Self Yes No   Sig: instill 1 drop INTO AFFECTED EYE(S) every 12 hours   albuterol (2 5 mg/3 mL) 0 083 % nebulizer solution  Self No No   Sig: Take 1 vial (2 5 mg total) by nebulization every 6 (six) hours as needed for wheezing or shortness of breath   albuterol (Ventolin HFA) 90 mcg/act inhaler  Self No No   Sig: Inhale 2 puffs every 6 (six) hours as needed for shortness of breath   amitriptyline (ELAVIL) 50 mg tablet  Self No No   Sig: take 1 tablet by mouth daily at bedtime   aspirin (ECOTRIN LOW STRENGTH) 81 mg EC tablet  Self No No   Sig: Take 1 tablet (81 mg total) by mouth daily   azelastine (ASTELIN) 0 1 % nasal spray  Self No No   Si spray into each nostril 2 (two) times a day Use in each nostril as directed   diltiazem (CARDIZEM CD) 180 mg 24 hr capsule  Self No No   Sig: take 1 capsule by mouth daily   fluticasone (FLONASE) 50 mcg/act nasal spray  Self No No   Si spray into each nostril daily   furosemide (LASIX) 40 mg tablet  Self No No   Sig: take 1 tablet by mouth once daily   isosorbide mononitrate (IMDUR) 30 mg 24 hr tablet  Self No No   Sig: Take 1 tablet (30 mg total) by mouth daily   metoprolol succinate (TOPROL-XL) 25 mg 24 hr tablet  Self No No   Sig: take 1 tablet by mouth twice a day   pantoprazole (PROTONIX) 40 mg tablet  Self No No   Sig: take 1 tablet by mouth once daily   pregabalin (LYRICA) 150 mg capsule  Self No No   Sig: Take 1 capsule (150 mg total) by mouth 2 (two) times a day   tamsulosin (FLOMAX) 0 4 mg  Self No No   Sig: Take 1 capsule (0 4 mg total) by mouth 2 (two) times a day   tiotropium (Spiriva HandiHaler) 18 mcg inhalation capsule  Self No No   Sig: Place 1 capsule (18 mcg total) into inhaler and [facility] daily [facility]      Facility-Administered Medications: None       Past Medical History:   Diagnosis Date   • Acute right MCA stroke ([facility] ) 9/15/2018   • Arthritis    • CAD (coronary artery disease)     s/p CABG    • COPD (chronic obstructive pulmonary disease) ([facility])    • GERD (gastroesophageal reflux disease)    • Grand mal status ([facility] ) 3/24/2019   • Heart attack ([facility] )    • Heart failure ([facility] )    • Hyperlipidemia    • Hypertension    • Lexiscan nuclear stress test 2016    EF 74% Normal   • Myocardial infarction [facility])    • Shortness of breath    • Stroke [facility])    • TIA (transient ischemic attack) 2018       Past Surgical History:   Procedure Laterality Date   • CARDIAC CATHETERIZATION  2015    LIMA occluded  No severe native lesions   • CARDIAC CATHETERIZATION N/A 2021    Procedure: Cardiac Coronary Angiogram;  Surgeon: Jamal Gomez MD;  Location: [facility];   Service: Cardiology   • CARDIAC CATHETERIZATION  2021    Procedure: Cardiac catheterization;  Surgeon: Jamal Gomez MD;  Location: [facility]; Service: Cardiology   • COLONOSCOPY     • CORONARY ARTERY BYPASS GRAFT  2000   • CYSTOSCOPY  10/31/2022    [facility]   • HERNIA REPAIR      umbilical hernia x2   • TONSILLECTOMY         Family History   Problem Relation Age of Onset   • Heart disease Mother    • Cancer Father    • Aneurysm Father    • Cancer Maternal Grandmother    • Cancer Paternal Grandfather      I have reviewed and agree with the history as documented  E-Cigarette/Vaping   • E-Cigarette Use Never User      E-Cigarette/Vaping Substances   • Nicotine Yes    • THC No    • CBD No    • Flavoring No    • Other No    • Unknown No      Social History     Tobacco Use   • Smoking status: Every Day     Packs/day: 1 00     Years: 50 00     Total pack years: 50 00     Types: Cigarettes     Start date: 1970   • Smokeless tobacco: Never   Vaping Use   • Vaping Use: Never used   Substance Use Topics   • Alcohol use: Yes     Comment: Socially   • Drug use: Never       Review of Systems   Musculoskeletal: Positive for arthralgias, gait problem and myalgias  All other systems reviewed and are negative  Physical Exam  Physical Exam  Vitals and nursing note reviewed  Constitutional:       General: He is not in acute distress  Appearance: He is well-developed  He is not diaphoretic  HENT:      Head: Normocephalic and atraumatic  Right Ear: External ear normal       Left Ear: External ear normal       Nose: Nose normal    Eyes:      General: No scleral icterus  Right eye: No discharge  Left eye: No discharge  Conjunctiva/sclera: Conjunctivae normal    Cardiovascular:      Rate and Rhythm: Normal rate and regular rhythm  Heart sounds: Normal heart sounds  No murmur heard  No friction rub  No gallop  Pulmonary:      Effort: Pulmonary effort is normal  No respiratory distress  Breath sounds: Normal breath sounds  No stridor  No wheezing, rhonchi or rales        Comments: Coughing on exam    Abdominal: General: Bowel sounds are normal  There is no distension  Palpations: Abdomen is soft  There is no mass  Tenderness: There is no abdominal tenderness  There is no guarding  Musculoskeletal:         General: No swelling, tenderness, deformity or signs of injury  Normal range of motion  Cervical back: Normal range of motion and neck supple  Right lower leg: No edema  Left lower leg: No edema  Comments: RLE  DP and PT pulses 2+  Cap refill <2 secs    LLE   DP and PT pulses 1+  Cap refill 2-4 secs      Sensation grossly intact and equal in B/L LEs  B/L LEs warm and well perfused    No pronounced calf swelling/erythema/tenederss   Skin:     General: Skin is warm and dry  Coloration: Skin is not pale  Findings: No erythema or rash  Neurological:      Mental Status: He is alert and oriented to person, place, and time  Psychiatric:         Behavior: Behavior normal          Thought Content:  Thought content normal          Judgment: Judgment normal          Vital Signs  ED Triage Vitals [06/08/23 0738]   Temperature Pulse Respirations Blood Pressure SpO2   97 6 °F (36 4 °C) 81 18 105/68 94 %      Temp Source Heart Rate Source Patient Position - Orthostatic VS BP Location FiO2 (%)   Temporal -- Sitting Left arm --      Pain Score       7           Vitals:    06/08/23 0738   BP: 105/68   Pulse: 81   Patient Position - Orthostatic VS: Sitting         Visual Acuity      ED Medications  Medications   apixaban (ELIQUIS) tablet 10 mg (has no administration in time range)   fentanyl citrate (PF) 100 MCG/2ML 75 mcg (75 mcg Intravenous Given 6/8/23 8077)       Diagnostic Studies  Results Reviewed     Procedure Component Value Units Date/Time    FLU/RSV/COVID - if FLU/RSV clinically relevant [[id number]]  (Normal) Collected: 06/08/23 0754    Lab Status: Final result Specimen: Nares from Nose Updated: 06/08/23 0895     SARS-CoV-2 Negative     INFLUENZA A PCR Negative     INFLUENZA B PCR Negative     RSV PCR Negative    Narrative:      FOR PEDIATRIC PATIENTS - copy/paste COVID Guidelines URL to browser: [email or web address]    SARS-CoV-2 assay is a Nucleic Acid Amplification assay intended for the  qualitative detection of nucleic acid from SARS-CoV-2 in nasopharyngeal  swabs  Results are for the presumptive identification of SARS-CoV-2 RNA  Positive results are indicative of infection with SARS-CoV-2, the virus  causing COVID-19, but do not rule out bacterial infection or co-infection  with other viruses  Laboratories within the [place] and its  territories are required to report all positive results to the appropriate  public health authorities  Negative results do not preclude SARS-CoV-2  infection and should not be used as the sole basis for treatment or other  patient management decisions  Negative results must be combined with  clinical observations, patient history, and epidemiological information  This test has not been FDA cleared or approved  This test has been authorized by FDA under an Emergency Use Authorization  (EUA)  This test is only authorized for the duration of time the  declaration that circumstances exist justifying the authorization of the  emergency use of an in vitro diagnostic tests for detection of SARS-CoV-2  virus and/or diagnosis of COVID-19 infection under section 564(b)(1) of  the Act, 21 U  S C  753XUP-1(E)(2), unless the authorization is terminated  or revoked sooner  The test has been validated but independent review by FDA  and CLIA is pending  Test performed using [employer] GeneXpert: This RT-PCR assay targets N2,  a region unique to SARS-CoV-2  A conserved region in the E-gene was chosen  for pan-Sarbecovirus detection which includes SARS-CoV-2  According to CMS-2020-01-R, this platform meets the definition of high-throughput technology      Basic metabolic panel [[id number]]  (Abnormal) Collected: 06/08/23 0754    Lab Status: Final result Specimen: Blood from Arm, Left Updated: 06/08/23 0829     Sodium 133 mmol/L      Potassium 3 5 mmol/L      Chloride 99 mmol/L      CO2 25 mmol/L      ANION GAP 9 mmol/L      BUN 13 mg/dL      Creatinine 1 34 mg/dL      Glucose 110 mg/dL      Calcium 8 5 mg/dL      eGFR 54 ml/min/1 73sq m     Narrative:      [place] guidelines for Chronic Kidney Disease (CKD):   •  Stage 1 with normal or high GFR (GFR > 90 mL/min/1 73 square meters)  •  Stage 2 Mild CKD (GFR = 60-89 mL/min/1 73 square meters)  •  Stage 3A Moderate CKD (GFR = 45-59 mL/min/1 73 square meters)  •  Stage 3B Moderate CKD (GFR = 30-44 mL/min/1 73 square meters)  •  Stage 4 Severe CKD (GFR = 15-29 mL/min/1 73 square meters)  •  Stage 5 End Stage CKD (GFR <15 mL/min/1 73 square meters)  Note: GFR calculation is accurate only with a steady state creatinine    Protime-INR [[id number]]  (Normal) Collected: 06/08/23 0754    Lab Status: Final result Specimen: Blood from Arm, Left Updated: 06/08/23 0822     Protime 13 9 seconds      INR 1 07    APTT [[id number]]  (Normal) Collected: 06/08/23 0754    Lab Status: Final result Specimen: Blood from Arm, Left Updated: 06/08/23 0822     PTT 26 seconds     CBC and differential [[id number]]  (Abnormal) Collected: 06/08/23 0754    Lab Status: Final result Specimen: Blood from Arm, Left Updated: 06/08/23 0801     WBC 10 38 Thousand/uL      RBC 4 17 Million/uL      Hemoglobin 12 9 g/dL      Hematocrit 38 0 %      MCV 91 fL      MCH 30 9 pg      MCHC 33 9 g/dL      RDW 12 5 %      MPV 9 0 fL      Platelets 428 Thousands/uL      nRBC 0 /100 WBCs      Neutrophils Relative 68 %      Immat GRANS % 1 %      Lymphocytes Relative 17 %      Monocytes Relative 9 %      Eosinophils Relative 4 %      Basophils Relative 1 %      Neutrophils Absolute 7 03 Thousands/µL      Immature Grans Absolute 0 07 Thousand/uL      Lymphocytes Absolute 1 79 Thousands/µL Monocytes Absolute 0 98 Thousand/µL      Eosinophils Absolute 0 44 Thousand/µL      Basophils Absolute 0 07 Thousands/µL                  VAS lower limb venous duplex study, unilateral/limited   Final Result by Ousmane Dominguez MD (06/08 1890)      XR chest 1 view portable   Final Result by Savanna Larios MD (06/08 3565)      No acute cardiopulmonary disease  Workstation performed: [id number]                    Procedures  Procedures         ED Course  ED Course as of 06/08/23 1016   Thu Jun 08, 2023   7084 Vascular surgery requesting patient be n p o  at this time for possible arteriogram   Order placed  8874 Creatinine(!): 1 34  Mildly increased from baseline   1009 Case discussed with vascular surgery who is stating that this pain is likely from his newly found blood clots and not secondary to arterial issues as he has palpable DP and PT pulses and recent arterial duplex showing no occlusive disease  1010 Discussed with patient that we would treat him with Eliquis for his blood clots and that they are extensive  Discussed with patient that we could discharge him with pain medication as well as blood thinning medication and he states that he does not believe he can go home at this time secondary to the pain and care for himself  SBIRT 20yo+    Flowsheet Row Most Recent Value   Initial Alcohol Screen: US AUDIT-C     1  How often do you have a drink containing alcohol? 0 Filed at: 06/08/2023 0738   2  How many drinks containing alcohol do you have on a typical day you are drinking? 0 Filed at: 06/08/2023 0738   3a  Male UNDER 65: How often do you have five or more drinks on one occasion? 0 Filed at: 06/08/2023 0738   3b  FEMALE Any Age, or MALE 65+: How often do you have 4 or more drinks on one occassion? 0 Filed at: 06/08/2023 0738   Audit-C Score 0 Filed at: 06/08/2023 5134   COLTEN: How many times in the past year have you        Used an illegal drug or used a prescription medication for non-medical reasons? Never Filed at: 06/08/2023 8541                    Medical Decision Making  70-year-old male with known peripheral vascular disease presenting for left lower extremity pain which has started to occur at rest and is significant with ambulation  States that it is so severe when he walks that he is unable to walk at this time  Patient states has been trying Tylenol at home without any relief  Patient did have a arterial duplex of the left lower extremity on May 12 of this year which showed diffuse atherosclerotic disease but no focal stenosis, Per the report compared to previous MELINDA study which was performed in September 21, 2021 there was no significant interval change noted  Patient states he was seen by his primary care doctor yesterday and there was concern for possible blood clot in the leg with his severe pain  Will perform venous duplex at this time  Vascular surgery consulted and case discussed with as patient follows for concerning of worsening claudication and peripheral vascular disease  We will check basic blood work and provide pain control this time  Patient noted to have significant DVT in left lower extremity  Discussed with patient this  Discussed pain control  Patient states that he does not believe he can go home this time as the pain is so severe and he is having difficulty with ambulation  Patient will be admitted for pain control started on Eliquis for DVT of the calf/popliteal and femoral veins of the left lower extremity  Amount and/or Complexity of Data Reviewed  Labs: ordered  Decision-making details documented in ED Course  Radiology: ordered  Risk  Prescription drug management  Decision regarding hospitalization            Disposition  Final diagnoses:   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 ([address] )   Claudication ([address] )   DVT (deep venous thrombosis) (HCC)   Left leg pain   Ambulatory dysfunction     Time reflects when diagnosis was documented in both MDM as applicable and the Disposition within this note     Time User Action Codes Description Comment    6/8/2023  7:50 AM Milad Monday Add [Q61 379] 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 ([facility] )     6/8/2023  7:50 AM Milad Monday Add [I73 9] Claudication ([facility] )     6/8/2023 10:14 AM Milad Monday Add [I82 409] DVT (deep venous thrombosis) ([facility] )     6/8/2023 10:14 AM Milad Monday Add [M79 605] Left leg pain     6/8/2023 10:14 AM Milad Monday Add [R26 2] Ambulatory dysfunction       ED Disposition     ED Disposition   Admit    Condition   Stable    Date/Time   u Jun 8, 2023 10:15 AM    Comment   Case was discussed with TANIA and the patient's admission status was agreed to be Admission Status: observation status to the service of Dr Aren Santos  Follow-up Information    None         Patient's Medications   Discharge Prescriptions    No medications on file       No discharge procedures on file      PDMP Review     None          ED Provider  Electronically Signed by           Jacob Walker DO  06/08/23 5594

## 2023-06-08 NOTE — ASSESSMENT & PLAN NOTE
· Presented due to worsening lower extremity pain  · Venous Duplex (6/8): right lower extremity without evidence of thrombus in the common femoral vein, SFJ, femoral vein or popliteal vein  There is acute deep vein thrombosis in the left mid posterior tibial, mid peroneal, popliteal and femoral veins, mostly occlusive to very minimal flow  Common femoral vein appears patent and compressible  · Evaluated by Vascular Surgery and no acute vascular intervention is warranted at this time; [address] recommended    · Patient was initiated on Eliquis in the ED, will continue  · This medication will need to be price checked for the patient prior to discharge

## 2023-06-08 NOTE — RESPIRATORY THERAPY NOTE
RT Protocol Note  Celia Chowdary 76 y o  male MRN: [id number]  Unit/Bed#: ED 28 Encounter: [id number]    Assessment    Principal Problem:    Acute deep vein thrombosis (DVT) of left lower extremity ([facility])  Active Problems:    Hypertension    Coronary artery disease involving native coronary artery of native heart with angina pectoris (HCC)    Urinary retention    PAD (peripheral artery disease) ([facility])      Home Pulmonary Medications:  Albuterol mdi prn, nebs prn, spiriva at HS       Past Medical History:   Diagnosis Date    Acute right MCA stroke ([facility] ) 9/15/2018    Arthritis     CAD (coronary artery disease)     s/p CABG 2000    COPD (chronic obstructive pulmonary disease) ([facility])     GERD (gastroesophageal reflux disease)     [facility] ([facility] ) 3/24/2019    Heart attack ([facility] )     Heart failure ([facility] )     Hyperlipidemia     Hypertension     Lexiscan nuclear stress test 03/19/2016    EF 74% Normal    Myocardial infarction ([facility] )     Shortness of breath     Stroke ([facility] )     TIA (transient ischemic attack) 09/13/2018     Social History     Socioeconomic History    Marital status: Single     Spouse name: None    Number of children: None    Years of education: None    Highest education level: None   Occupational History    None   Tobacco Use    Smoking status: Every Day     Packs/day: 1 00     Years: 50 00     Total pack years: 50 00     Types: Cigarettes     Start date: 1970    Smokeless tobacco: Never   Vaping Use    Vaping Use: Never used   Substance and Sexual Activity    Alcohol use: Yes     Comment: Socially    Drug use: Never    Sexual activity: Not Currently   Other Topics Concern    None   Social History Narrative    None     Social Determinants of Health     Financial Resource Strain: Low Risk  (6/7/2023)    Overall Financial Resource Strain (CARDIA)     Difficulty of Paying Living Expenses: Not very hard   Food Insecurity: Not on file   Transportation Needs: No Transportation Needs (6/7/2023)    PRAPARE - Transportation     Lack of Transportation (Medical): No     Lack of Transportation (Non-Medical): No   Physical Activity: Not on file   Stress: Not on file   Social Connections: Not on file   Intimate Partner Violence: Not on file   Housing Stability: Not on file       Subjective         Objective    Physical Exam:   Assessment Type: Pre-treatment  General Appearance: Awake, Alert  Respiratory Pattern: Dyspnea with exertion  Chest Assessment: Chest expansion symmetrical, Trachea midline  Bilateral Breath Sounds: Coarse  Cough: Productive  O2 Device: (P) rma    Vitals:  Blood pressure 126/66, pulse 68, temperature 97 6 °F (36 4 °C), temperature source Temporal, resp  rate 18, SpO2 96 %  Imaging and other studies: I have personally reviewed pertinent reports  O2 Device: (P) rma     Plan    Respiratory Plan: Mild Distress pathway        Resp Comments: (P) Pt  admitted for DVT  Hx of COPD  Pt states he uses his albuterol MDI 3-4x day at home, Nebs prn and spiriva at bedtime  Pt is a current every day smoker of 1ppd  Productive cough  Will continue prn udn at this time

## 2023-06-08 NOTE — CONSULTS
"Consultation - Vascular Surgery   Rosi Balderas 76 y o  male MRN: [id number]  Unit/Bed#: ED 29 Encounter: [id number]    Assessment:  76year old male with PMH significant for stroke, CAD, COPD, HF, HLD, HTN, previous MI s/p CABG presents for the evaluation of resting L leg pain  · Afebrile, vitals stable   · WBC 10 38  · Na 133  · Cr 1 34  · Arterial duplex 5/12/23 shows LLE diffuse atherosclerotic arterial disease with no focal stenosis in the femoro-popliteal arteries  MELINDA 1 18  · Venous duplex 6/8/23: \"LLE: There is acute DVT in the mid posterior tibial, mid peroneal, popliteal and femoral veins, mostly occlusive to very minimal flow  Common femoral vein appears patent and compressible  No evidence of superficial thrombophlebitis  Popliteal, posterior tibial and anterior tibial arterial Doppler waveforms are triphasic  \"    Plan:  · No emergent vascular intervention indicated at this time  Recommend anticoagulation in the presence of a LLE DVT  · Analgesia and antiemetics prn   · Discussed with ED and Dr Brandon Rivero    History of Present Illness   Chief Complaint: Resting leg pain     HPI:  Rosi Balderas is a 76 y o  male with past medical history significant for HTN, CAD, previous MI s/p CABG, COPD, SMA stenosis, HF, HLD who initially presented to [facility] ED with complaints of L resting leg pain x5 days  He states the pain originally started in his toes and worked its way up his leg  Patient follows outpatient with Dr Liz Dahl and was seen in early May  At that time, the patient was noted to have intermittent claudication of the left leg  Arterial duplex was then obtained which showed atherosclerotic disease without focal stenosis  Patient states over the past five days, the pain has increased significantly at rest  He notes it is worse with walking  He reports seeing his primary care doctor yesterday for similar symptoms  He had a fever of 100 9 in the office, and they referred him to the ED   Patient denies " having history of DVT/PE in the past  He reports having L calf pain  Endorses feeling weak  He denies chills, chest pain, sob, difficulty breathing, vomiting  He notes he has chronic swelling of his R leg, which he takes Lasix for  He notes he feels the L leg is more swollen than usual  He denies trauma to the L leg  He reports having phlegm  He has a 50 pack year history  Previous surgeries include cardiac cath, hernia repair, CABG  He takes 81mg of Aspirin daily  Allergies to Gabapentin  Inpatient consult to Vascular Surgery  Consult performed by: Amelie Israel PA-C  Consult ordered by: Maegan Collins DO          Review of Systems   Constitutional: Positive for fever  Negative for chills  HENT: Positive for congestion  Negative for sore throat  Respiratory: Positive for cough  Negative for shortness of breath  Cardiovascular: Positive for leg swelling  Negative for chest pain  Gastrointestinal: Negative for abdominal pain, constipation, diarrhea and nausea  Endocrine: Negative for polydipsia and polyuria  Genitourinary: Positive for difficulty urinating (chronic)  Negative for dysuria and urgency  Musculoskeletal: Positive for gait problem  Negative for back pain  Skin: Negative for pallor and rash  Neurological: Positive for weakness and numbness  Negative for dizziness         Historical Information   Past Medical History:   Diagnosis Date   • Acute right MCA stroke ([facility] ) 9/15/2018   • Arthritis    • CAD (coronary artery disease)     s/p CABG 2000   • COPD (chronic obstructive pulmonary disease) ([facility])    • GERD (gastroesophageal reflux disease)    • Grand mal status ([facility] ) 3/24/2019   • Heart attack ([facility] )    • Heart failure ([facility] )    • Hyperlipidemia    • Hypertension    • Lexiscan nuclear stress test 03/19/2016    EF 74% Normal   • Myocardial infarction [facility])    • Shortness of breath    • Stroke [facility])    • TIA (transient ischemic attack) 09/13/2018     Past Surgical History: Procedure Laterality Date   • CARDIAC CATHETERIZATION  08/19/2015    LIMA occluded  No severe native lesions   • CARDIAC CATHETERIZATION N/A 12/03/2021    Procedure: Cardiac Coronary Angiogram;  Surgeon: Jeny Thayer MD;  Location: [facility] CARDIAC CATH LAB; Service: Cardiology   • CARDIAC CATHETERIZATION  12/03/2021    Procedure: Cardiac catheterization;  Surgeon: Jeny Thayer MD;  Location: [facility] CARDIAC CATH LAB; Service: Cardiology   • COLONOSCOPY     • CORONARY ARTERY BYPASS GRAFT  2000   • CYSTOSCOPY  10/31/2022    [facility]   • HERNIA REPAIR      umbilical hernia x2   • TONSILLECTOMY       Social History   Social History     Substance and Sexual Activity   Alcohol Use Yes    Comment: Socially     Social History     Substance and Sexual Activity   Drug Use Never     E-Cigarette/Vaping   • E-Cigarette Use Never User      E-Cigarette/Vaping Substances   • Nicotine Yes    • THC No    • CBD No    • Flavoring No    • Other No    • Unknown No      Social History     Tobacco Use   Smoking Status Every Day   • Packs/day: 1 00   • Years: 50 00   • Total pack years: 50 00   • Types: Cigarettes   • Start date: 1970   Smokeless Tobacco Never      Family History:   Family History   Problem Relation Age of Onset   • Heart disease Mother    • Cancer Father    • Aneurysm Father    • Cancer Maternal Grandmother    • Cancer Paternal Grandfather        Meds/Allergies   all current active meds have been reviewed and current meds:   No current facility-administered medications for this encounter       Allergies   Allergen Reactions   • Gabapentin Headache       Objective   First Vitals:   Blood Pressure: 105/68 (06/08/23 0738)  Pulse: 81 (06/08/23 0738)  Temperature: 97 6 °F (36 4 °C) (06/08/23 0738)  Temp Source: Temporal (06/08/23 0738)  Respirations: 18 (06/08/23 0738)  SpO2: 94 % (06/08/23 0738)    Current Vitals:   Blood Pressure: 105/68 (06/08/23 0738)  Pulse: 81 (06/08/23 0738)  Temperature: 97 6 °F (36 4 °C) (06/08/23 6159)  Temp Source: Temporal (06/08/23 0738)  Respirations: 18 (06/08/23 0738)  SpO2: 94 % (06/08/23 0738)    No intake or output data in the 24 hours ending 06/08/23 0843    Invasive Devices     Peripheral Intravenous Line  Duration           Peripheral IV 06/08/23 Left Antecubital <1 day                Physical Exam  Constitutional:       General: He is not in acute distress  Appearance: He is obese  HENT:      Head: Normocephalic and atraumatic  Cardiovascular:      Rate and Rhythm: Normal rate and regular rhythm  Pulses:           Dorsalis pedis pulses are 0 on the left side  Posterior tibial pulses are 0 on the left side  Heart sounds: Normal heart sounds  Pulmonary:      Effort: Pulmonary effort is normal  No respiratory distress  Breath sounds: Wheezing present  Abdominal:      General: There is no distension  Palpations: Abdomen is soft  Tenderness: There is no abdominal tenderness  There is no guarding  Musculoskeletal:      Right lower leg: Edema present  Left lower leg: Edema present  Comments: LLE slightly erythematous  Skin:     General: Skin is warm and dry  Comments: B/L LE warm to touch  Decrease sensation on LLE compared to RLE  Motor intact b/l LE  Nonpalpable L dp and pt pulses  Neurological:      General: No focal deficit present  Mental Status: He is alert  Mental status is at baseline  Psychiatric:         Mood and Affect: Mood normal          Behavior: Behavior normal          Lab Results:   I have personally reviewed pertinent lab results    , CBC:   Lab Results   Component Value Date    HCT 38 0 06/08/2023    HGB 12 9 06/08/2023    MCH 30 9 06/08/2023    MCHC 33 9 06/08/2023    MCV 91 06/08/2023    MPV 9 0 06/08/2023    NRBC 0 06/08/2023     06/08/2023    RBC 4 17 06/08/2023    RDW 12 5 06/08/2023    WBC 10 38 (H) 06/08/2023   , CMP:   Lab Results   Component Value Date    BUN 13 06/08/2023    CALCIUM 8 5 06/08/2023    CL 99 06/08/2023    CO2 25 06/08/2023    CREATININE 1 34 (H) 06/08/2023    EGFR 54 06/08/2023    K 3 5 06/08/2023    SODIUM 133 (L) 06/08/2023     Imaging: I have personally reviewed pertinent reports  EKG, Pathology, and Other Studies: I have personally reviewed pertinent reports  Code Status: Prior  Advance Directive and Living Will:      Power of :    POLST:      Counseling / Coordination of Care  Total floor / unit time spent today 30 minutes  Greater than 50% of total time was spent with the patient and / or family counseling and / or coordination of care  A description of the counseling / coordination of care: evaluation of patient, review of diagnostic and laboratory studies, and discussion with attending       Cassandra Butler PA-C

## 2023-06-08 NOTE — ASSESSMENT & PLAN NOTE
· S/p CABG with cath demonstrating occluded LIMA and chronically occluded RCA  · RCA was not amenable to PCI and medical management was recommended  · Continue ASA as above, Imdur 30mg daily, and Toprol 25mg BID  · Patient is not currently on statin therapy as above

## 2023-06-08 NOTE — ED NOTES
"RN went through medications that would be given to patient prior to package being open  RN asked if patient would like to take medications one at a time or have all medications placed in medication cup  Patient preferred having all medications placed in one cup  Patient reported to RN that she did not go through medications and asked what each pill's name was when in the cup  RN explained to the patient each medication and use but now that the medications were out of the packaging, it was difficult to know exactly what each pill was  Patient stated to RN \"I am not trying to bust your chops\"  Patient given a refill on ginger ale per request  Call bell within reach        Stacy Sabillon RN  06/08/23 5966    "

## 2023-06-08 NOTE — ASSESSMENT & PLAN NOTE
· Presented for worsening leg pain   · Arterial Duplex (5/12): The resting evaluation of the right leg shows diffuse atherosclerotic arterial disease with no focal stenosis in the femoro-popliteal arteries  Ankle/Brachial index: 1 21 which is in the normal disease category  The resting evaluation of the left leg shows diffuse atherosclerotic arterial disease with nofocal stenosis in the femoro-popliteal arteries  Ankle/Brachial index: 1 18 which is in the normal disease category    · Continue ASA 81mg daily; was previously on statin but taken off due to side effects and has refused trial of alternative statin  · Following outpatient with Dr Anitha Evans Doctor

## 2023-06-08 NOTE — ASSESSMENT & PLAN NOTE
Pt is feeling generally unwell  Concern for infection or dvt given extremity symptoms  Offered venous duplex  Covid negative   Pt states he would prefer to be evaluated in the ER and will be going there after appt

## 2023-06-08 NOTE — ASSESSMENT & PLAN NOTE
· BP is adequately controlled at this time  · Continue Diltiazem 180mg daily and Toprol as above  · Monitor BP trends ad adjust medications as indicated

## 2023-06-08 NOTE — H&P
[address]  H&P  Name: Adam Soto 76 y o  male I MRN: [id number]  Unit/Bed#: ED 29 I Date of Admission: 6/8/2023   Date of Service: 6/8/2023 I Hospital Day: 0      Assessment/Plan   * Acute deep vein thrombosis (DVT) of left lower extremity (HCC)  Assessment & Plan  · Presented due to worsening lower extremity pain  · Venous Duplex (6/8): right lower extremity without evidence of thrombus in the common femoral vein, SFJ, femoral vein or popliteal vein  There is acute deep vein thrombosis in the left mid posterior tibial, mid peroneal, popliteal and femoral veins, mostly occlusive to very minimal flow  Common femoral vein appears patent and compressible  · Evaluated by Vascular Surgery and no acute vascular intervention is warranted at this time; [address] recommended  · Patient was initiated on Eliquis in the ED, will continue  · This medication will need to be price checked for the patient prior to discharge    PAD (peripheral artery disease) ([facility] )  Assessment & Plan  · Presented for worsening leg pain   · Arterial Duplex (5/12): The resting evaluation of the right leg shows diffuse atherosclerotic arterial disease with no focal stenosis in the femoro-popliteal arteries  Ankle/Brachial index: 1 21 which is in the normal disease category  The resting evaluation of the left leg shows diffuse atherosclerotic arterial disease with nofocal stenosis in the femoro-popliteal arteries  Ankle/Brachial index: 1 18 which is in the normal disease category    · Continue ASA 81mg daily; was previously on statin but taken off due to side effects and has refused trial of alternative statin  · Following outpatient with Dr Cielo Galindo Doctor    Ambulatory dysfunction  Assessment & Plan  · Difficulty ambulatory due to pain from above  · PT/OT evaluation requested    Coronary artery disease involving native coronary artery of native heart with angina pectoris [facility])  Assessment & Plan  · S/p CABG with cath demonstrating occluded LIMA and chronically occluded RCA  · RCA was not amenable to PCI and medical management was recommended  · Continue ASA as above, Imdur 30mg daily, and Toprol 25mg BID  · Patient is not currently on statin therapy as above    Hypertension  Assessment & Plan  · BP is adequately controlled at this time  · Continue Diltiazem 180mg daily and Toprol as above  · Monitor BP trends ad adjust medications as indicated    Urinary retention  Assessment & Plan  · Continue Flomax 0 4mg daily        VTE Pharmacologic Prophylaxis: VTE Score: 6 High Risk (Score >/= 5) - Pharmacological DVT Prophylaxis Ordered: apixaban (Eliquis)  Sequential Compression Devices Ordered  Code Status: Level 1 - Full Code   Discussion with family: Patient updated on plan of care  Anticipated Length of Stay: Patient will be admitted on an observation basis with an anticipated length of stay of less than 2 midnights secondary to LLE DVT  Total Time Spent on Date of Encounter in care of patient: 75 minutes This time was spent on one or more of the following: performing physical exam; counseling and coordination of care; obtaining or reviewing history; documenting in the medical record; reviewing/ordering tests, medications or procedures; communicating with other healthcare professionals and discussing with patient's family/caregivers  Chief Complaint: Left lower extremity pain     History of Present Illness:  Genoveva Hudson is a 76 y o  male with a PMH of CAD, PAD, HTN who presents with the complaint of left lower extremity pain  The patient has known PAD and has been following outpatient with vascular surgery  Given his increased pain he was concerned for worsening arterial disease/claudication and therefore presented to the ED for further evaluation  The patient states that symptoms have been limiting his ability to care for himself at home and was not able to cook himself anything to eat for dinner last night   Aside from lower extremity pain he denies any other significant or acute complaints  In the ED he was evaluated by vascular surgery who noted that his symptoms were not likely related to his arterial disease and no vascular intervention is warranted at this time  Venous duplex did however demonstrated acute DVT and [address] was recommended  The patient was actually going to be discharged home on PO Elqiuis from the ED but noted he could not walk or care for himself and was therefore admitted to the medical floor  Review of Systems:  Review of Systems   Constitutional: Negative for chills and fever  HENT: Negative for ear pain, sinus pressure and sore throat  Eyes: Negative for pain and visual disturbance  Respiratory: Negative for cough, shortness of breath and wheezing  Cardiovascular: Positive for leg swelling  Negative for chest pain and palpitations  Gastrointestinal: Negative for abdominal pain, constipation, diarrhea, nausea and vomiting  Genitourinary: Negative for dysuria and hematuria  Musculoskeletal: Positive for myalgias  Negative for arthralgias and back pain  Skin: Negative for color change and rash  Neurological: Negative for dizziness, seizures, syncope and weakness  Psychiatric/Behavioral: Negative for agitation, confusion and hallucinations  All other systems reviewed and are negative        Past Medical and Surgical History:   Past Medical History:   Diagnosis Date   • [facility] ) 9/15/2018   • Arthritis    • CAD (coronary artery disease)     s/p CABG 2000   • COPD (chronic obstructive pulmonary disease) ([facility])    • GERD (gastroesophageal reflux disease)    • Grand mal status ([facility] ) 3/24/2019   • Heart attack ([facility] )    • Heart failure ([facility] )    • Hyperlipidemia    • Hypertension    • Lexiscan nuclear stress test 03/19/2016    EF 74% Normal   • Myocardial infarction [facility])    • Shortness of breath    • Stroke [facility])    • TIA (transient ischemic attack) 09/13/2018 Past Surgical History:   Procedure Laterality Date   • CARDIAC CATHETERIZATION  08/19/2015    LIMA occluded  No severe native lesions   • CARDIAC CATHETERIZATION N/A 12/03/2021    Procedure: Cardiac Coronary Angiogram;  Surgeon: Aniceto Alvarado MD;  Location: [facility] CARDIAC CATH LAB; Service: Cardiology   • CARDIAC CATHETERIZATION  12/03/2021    Procedure: Cardiac catheterization;  Surgeon: Aniceto Alvarado MD;  Location: [facility] CARDIAC C[facility]; Service: Cardiology   • COLONOSCOPY     • CORONARY ARTERY BYPASS GRAFT  2000   • CYSTOSCOPY  10/31/2022    [facility]   • HERNIA REPAIR      umbilical hernia x2   • TONSILLECTOMY         Meds/Allergies:  Prior to Admission medications    Medication Sig Start Date End Date Taking?  Authorizing Provider   albuterol (2 5 mg/3 mL) 0 083 % nebulizer solution Take 1 vial (2 5 mg total) by nebulization every 6 (six) hours as needed for wheezing or shortness of breath 5/26/20   Cheo Alfred,    albuterol (Ventolin HFA) 90 mcg/act inhaler Inhale 2 puffs every 6 (six) hours as needed for shortness of breath 1/19/23   Candida Loja PA-C   amitriptyline (ELAVIL) 50 mg tablet take 1 tablet by mouth daily at bedtime 4/15/23   Candida Loja PA-C   aspirin (ECOTRIN LOW STRENGTH) 81 mg EC tablet Take 1 tablet (81 mg total) by mouth daily 9/17/18   Mat York MD   azelastine (ASTELIN) 0 1 % nasal spray 1 spray into each nostril 2 (two) times a day Use in each nostril as directed 10/19/22   Candida Loja PA-C   diltiazem (CARDIZEM CD) 180 mg 24 hr capsule take 1 capsule by mouth daily 1/6/23   Ruth Ann Arguello MD   fluticasone [facility]) 50 mcg/act nasal spray 1 spray into each nostril daily 9/29/22   ADRIANA James   furosemide (LASIX) 40 mg tablet take 1 tablet by mouth once daily 8/26/22   Ruth Ann Arguello MD   isosorbide mononitrate (IMDUR) 30 mg 24 hr tablet Take 1 tablet (30 mg total) by mouth daily 3/7/23   Ruth Ann Arguello MD   Linzess 290 MCG CAPS take 1 capsule by mouth AT LEAST 30 MINUTES BEFORE FIRST MEAL OF THE DAY 8/19/22   Historical Provider, MD   metoprolol succinate (TOPROL-XL) 25 mg 24 hr tablet take 1 tablet by mouth twice a day 10/25/22   Annabell Moritz, MD   pantoprazole (PROTONIX) 40 mg tablet take 1 tablet by mouth once daily 4/30/23   Candida Loja PA-C   Potassium Chloride ER 20 MEQ TBCR Take 1 tablet (20 mEq total) by mouth daily 3/9/23   ADRIANA Yates   pregabalin (LYRICA) 150 mg capsule Take 1 capsule (150 mg total) by mouth 2 (two) times a day 3/28/23   Candida Loja PA-C   Restasis 0 05 % ophthalmic emulsion instill 1 drop INTO AFFECTED EYE(S) every 12 hours 4/12/22   Historical Provider, MD   tamsulosin (FLOMAX) 0 4 mg Take 1 capsule (0 4 mg total) by mouth 2 (two) times a day 8/8/22 4/19/23  ADRIANA Alvarenga   tiotropium (Spiriva HandiHaler) 18 mcg inhalation capsule Place 1 capsule (18 mcg total) into inhaler and inhale daily In the [facility] 3/18/23   Nu Bajwa PA-C     I have reviewed home medications with patient personally  Allergies:    Allergies   Allergen Reactions   • Gabapentin Headache       Social History:  Marital Status: Single   Patient Pre-hospital Living Situation: Home  Patient Pre-hospital Level of Mobility: walks  Patient Pre-hospital Diet Restrictions: None    Substance Use History:   Social History     Substance and Sexual Activity   Alcohol Use Yes    Comment: Socially     Social History     Tobacco Use   Smoking Status Every Day   • Packs/day: 1 00   • Years: 50 00   • Total pack years: 50 00   • Types: Cigarettes   • Start date: 5   Smokeless Tobacco Never     Social History     Substance and Sexual Activity   Drug Use Never       Family History:  Family History   Problem Relation Age of Onset   • Heart disease Mother    • Cancer Father    • Aneurysm Father    • Cancer Maternal Grandmother    • Cancer Paternal Grandfather        Physical Exam:     Vitals:   Blood Pressure: 126/66 (06/08/23 1100)  Pulse: 60 (06/08/23 1141)  Temperature: 97 6 °F (36 4 °C) (06/08/23 0738)  Temp Source: Temporal (06/08/23 0738)  Respirations: 18 (06/08/23 1100)  SpO2: 95 % (06/08/23 1141)    Physical Exam  Vitals and nursing note reviewed  Constitutional:       General: He is not in acute distress  Appearance: Normal appearance  He is obese  HENT:      Head: Normocephalic and atraumatic  Mouth/Throat:      Mouth: Mucous membranes are moist       Pharynx: Oropharynx is clear  Eyes:      Extraocular Movements: Extraocular movements intact  Conjunctiva/sclera: Conjunctivae normal    Cardiovascular:      Rate and Rhythm: Normal rate and regular rhythm  Pulses: Normal pulses  Heart sounds: Normal heart sounds  Pulmonary:      Effort: Pulmonary effort is normal  No respiratory distress  Breath sounds: Normal breath sounds  No wheezing  Abdominal:      General: Bowel sounds are normal  There is no distension  Palpations: Abdomen is soft  Tenderness: There is no abdominal tenderness  Musculoskeletal:         General: Tenderness present  Normal range of motion  Cervical back: Normal range of motion and neck supple  Left lower leg: Edema present  Skin:     General: Skin is warm and dry  Neurological:      General: No focal deficit present  Mental Status: He is alert and oriented to person, place, and time  Mental status is at baseline     Psychiatric:         Mood and Affect: Mood normal          Behavior: Behavior normal          Judgment: Judgment normal           Additional Data:     Lab Results:  Results from last 7 days   Lab Units 06/08/23  0754   EOS PCT % 4   HEMATOCRIT % 38 0   HEMOGLOBIN g/dL 12 9   LYMPHS PCT % 17   MONOS PCT % 9   NEUTROS PCT % 68   PLATELETS Thousands/uL 211   WBC Thousand/uL 10 38*     Results from last 7 days   Lab Units 06/08/23  0754   ANION GAP mmol/L 9   BUN mg/dL 13   CALCIUM mg/dL 8 5   CHLORIDE mmol/L 99   CO2 mmol/L 25   CREATININE mg/dL 1 34*   GLUCOSE RANDOM mg/dL 110   POTASSIUM mmol/L 3 5   SODIUM mmol/L 133*     Results from last 7 days   Lab Units 06/08/23  0754   INR  1 07                   Lines/Drains:  Invasive Devices     Peripheral Intravenous Line  Duration           Peripheral IV 06/08/23 Left Antecubital <1 day                    Imaging: Reviewed radiology reports from this admission including: ultrasound(s)  VAS lower limb venous duplex study, unilateral/limited   Final Result by Denisha Dimas MD (06/08 4138)      XR chest 1 view portable   Final Result by Ren Martinez MD (06/08 9688)      No acute cardiopulmonary disease  Workstation performed: [id number]             EKG and Other Studies Reviewed on Admission:   · EKG: No EKG obtained  ** Please Note: This note has been constructed using a voice recognition system   **

## 2023-06-09 VITALS
HEART RATE: 72 BPM | DIASTOLIC BLOOD PRESSURE: 55 MMHG | OXYGEN SATURATION: 93 % | HEIGHT: 70 IN | BODY MASS INDEX: 31.66 KG/M2 | RESPIRATION RATE: 20 BRPM | WEIGHT: 221.12 LBS | SYSTOLIC BLOOD PRESSURE: 110 MMHG | TEMPERATURE: 97.4 F

## 2023-06-09 LAB
ALBUMIN SERPL BCP-MCNC: 3.3 G/DL (ref 3.5–5)
ALP SERPL-CCNC: 108 U/L (ref 34–104)
ALT SERPL W P-5'-P-CCNC: 32 U/L (ref 7–52)
ANION GAP SERPL CALCULATED.3IONS-SCNC: 7 MMOL/L (ref 4–13)
AST SERPL W P-5'-P-CCNC: 34 U/L (ref 13–39)
BILIRUB SERPL-MCNC: 0.52 MG/DL (ref 0.2–1)
BUN SERPL-MCNC: 12 MG/DL (ref 5–25)
CALCIUM ALBUM COR SERPL-MCNC: 8.8 MG/DL (ref 8.3–10.1)
CALCIUM SERPL-MCNC: 8.2 MG/DL (ref 8.4–10.2)
CHLORIDE SERPL-SCNC: 102 MMOL/L (ref 96–108)
CO2 SERPL-SCNC: 25 MMOL/L (ref 21–32)
CREAT SERPL-MCNC: 1.19 MG/DL (ref 0.6–1.3)
ERYTHROCYTE [DISTWIDTH] IN BLOOD BY AUTOMATED COUNT: 12.3 % (ref 11.6–15.1)
GFR SERPL CREATININE-BSD FRML MDRD: 62 ML/MIN/1.73SQ M
GLUCOSE SERPL-MCNC: 120 MG/DL (ref 65–140)
HCT VFR BLD AUTO: 36.2 % (ref 36.5–49.3)
HGB BLD-MCNC: 12.4 G/DL (ref 12–17)
MAGNESIUM SERPL-MCNC: 2.1 MG/DL (ref 1.9–2.7)
MCH RBC QN AUTO: 31.4 PG (ref 26.8–34.3)
MCHC RBC AUTO-ENTMCNC: 34.3 G/DL (ref 31.4–37.4)
MCV RBC AUTO: 92 FL (ref 82–98)
PLATELET # BLD AUTO: 234 THOUSANDS/UL (ref 149–390)
PMV BLD AUTO: 9.4 FL (ref 8.9–12.7)
POTASSIUM SERPL-SCNC: 4.1 MMOL/L (ref 3.5–5.3)
PROT SERPL-MCNC: 5.9 G/DL (ref 6.4–8.4)
RBC # BLD AUTO: 3.95 MILLION/UL (ref 3.88–5.62)
SODIUM SERPL-SCNC: 134 MMOL/L (ref 135–147)
WBC # BLD AUTO: 8.97 THOUSAND/UL (ref 4.31–10.16)

## 2023-06-09 PROCEDURE — 85027 COMPLETE CBC AUTOMATED: CPT | Performed by: INTERNAL MEDICINE

## 2023-06-09 PROCEDURE — 83735 ASSAY OF MAGNESIUM: CPT | Performed by: INTERNAL MEDICINE

## 2023-06-09 PROCEDURE — NC001 PR NO CHARGE: Performed by: NURSE PRACTITIONER

## 2023-06-09 PROCEDURE — 97166 OT EVAL MOD COMPLEX 45 MIN: CPT

## 2023-06-09 PROCEDURE — 80053 COMPREHEN METABOLIC PANEL: CPT | Performed by: INTERNAL MEDICINE

## 2023-06-09 PROCEDURE — 94664 DEMO&/EVAL PT USE INHALER: CPT

## 2023-06-09 PROCEDURE — 99239 HOSP IP/OBS DSCHRG MGMT >30: CPT | Performed by: NURSE PRACTITIONER

## 2023-06-09 PROCEDURE — 97163 PT EVAL HIGH COMPLEX 45 MIN: CPT

## 2023-06-09 RX ORDER — DOCUSATE SODIUM 100 MG/1
100 CAPSULE, LIQUID FILLED ORAL DAILY
Status: DISCONTINUED | OUTPATIENT
Start: 2023-06-09 | End: 2023-06-09 | Stop reason: HOSPADM

## 2023-06-09 RX ORDER — OXYCODONE HYDROCHLORIDE 5 MG/1
5 TABLET ORAL EVERY 4 HOURS PRN
Qty: 20 TABLET | Refills: 0 | Status: SHIPPED | OUTPATIENT
Start: 2023-06-09 | End: 2023-06-19

## 2023-06-09 RX ORDER — AMOXICILLIN 250 MG
1 CAPSULE ORAL
Qty: 30 TABLET | Refills: 0 | Status: SHIPPED | OUTPATIENT
Start: 2023-06-09 | End: 2023-07-09

## 2023-06-09 RX ORDER — POLYETHYLENE GLYCOL 3350 17 G/17G
17 POWDER, FOR SOLUTION ORAL DAILY PRN
Status: DISCONTINUED | OUTPATIENT
Start: 2023-06-09 | End: 2023-06-09 | Stop reason: HOSPADM

## 2023-06-09 RX ORDER — DOCUSATE SODIUM 100 MG/1
100 CAPSULE, LIQUID FILLED ORAL DAILY
Qty: 30 CAPSULE | Refills: 0 | Status: SHIPPED | OUTPATIENT
Start: 2023-06-10 | End: 2023-07-10

## 2023-06-09 RX ORDER — POLYETHYLENE GLYCOL 3350 17 G/17G
17 POWDER, FOR SOLUTION ORAL DAILY PRN
Qty: 10 EACH | Refills: 0 | Status: SHIPPED | OUTPATIENT
Start: 2023-06-09

## 2023-06-09 RX ADMIN — FLUTICASONE PROPIONATE 1 SPRAY: 50 SPRAY, METERED NASAL at 10:00

## 2023-06-09 RX ADMIN — PREGABALIN 150 MG: 100 CAPSULE ORAL at 17:57

## 2023-06-09 RX ADMIN — METOPROLOL SUCCINATE 25 MG: 50 TABLET, EXTENDED RELEASE ORAL at 17:57

## 2023-06-09 RX ADMIN — POLYETHYLENE GLYCOL 3350 17 G: 17 POWDER, FOR SOLUTION ORAL at 11:23

## 2023-06-09 RX ADMIN — UMECLIDINIUM 1 PUFF: 62.5 AEROSOL, POWDER ORAL at 09:59

## 2023-06-09 RX ADMIN — APIXABAN 10 MG: 5 TABLET, FILM COATED ORAL at 17:56

## 2023-06-09 RX ADMIN — ASPIRIN 81 MG: 81 TABLET, COATED ORAL at 08:44

## 2023-06-09 RX ADMIN — PANTOPRAZOLE SODIUM 40 MG: 40 TABLET, DELAYED RELEASE ORAL at 08:43

## 2023-06-09 RX ADMIN — PREGABALIN 150 MG: 100 CAPSULE ORAL at 08:44

## 2023-06-09 RX ADMIN — TAMSULOSIN HYDROCHLORIDE 0.4 MG: 0.4 CAPSULE ORAL at 16:02

## 2023-06-09 RX ADMIN — APIXABAN 10 MG: 5 TABLET, FILM COATED ORAL at 08:42

## 2023-06-09 RX ADMIN — DOCUSATE SODIUM 100 MG: 100 CAPSULE, LIQUID FILLED ORAL at 11:23

## 2023-06-09 RX ADMIN — OXYCODONE HYDROCHLORIDE 5 MG: 5 TABLET ORAL at 16:36

## 2023-06-09 RX ADMIN — METOPROLOL SUCCINATE 25 MG: 50 TABLET, EXTENDED RELEASE ORAL at 08:42

## 2023-06-09 RX ADMIN — HYDROMORPHONE HYDROCHLORIDE 0.5 MG: 1 INJECTION, SOLUTION INTRAMUSCULAR; INTRAVENOUS; SUBCUTANEOUS at 11:23

## 2023-06-09 NOTE — PHYSICAL THERAPY NOTE
Physical Therapy Evaluation     Patient's Name: Johnnie Walker    Admitting Diagnosis  Leg pain [M79 606]  Claudication ([facility]  ) [I73 9]  DVT (deep venous thrombosis) ([place] [address] ) [I82 409]  Left leg pain [M79 605]  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 ([facility] ) [I70 212]  Ambulatory dysfunction [R26 2]    Problem List  Patient Active Problem List   Diagnosis    Hypertension    Hyperlipidemia    COPD (chronic obstructive pulmonary disease) ([facility] )    Coronary artery disease involving native coronary artery of native heart with angina pectoris (HCC)    Anxiety and depression    Diverticular disease    Chronic constipation    ED (erectile dysfunction) of organic origin    Gastroesophageal reflux disease    Grand mal status ([facility] )    Insomnia    Tobacco abuse    Overweight    Seasonal allergies    Lower extremity edema    Benign neoplasm of colon    Aortic aneurysm ([facility] )    Superior mesenteric artery stenosis (HCC)    Occlusion of right internal iliac artery ([facility])    Peripheral neuropathy    Hypertensive heart and renal disease with CHF ([facility])    Depression, recurrent (HCC)    BPH (benign prostatic hyperplasia)    Urinary retention    Left hip pain    Suspicious nevus    PAD (peripheral artery disease) ([facility])    Arthralgia of left lower leg    Flu-like symptoms    Acute deep vein thrombosis (DVT) of left lower extremity ([facility] )    Ambulatory dysfunction       Past Medical History  Past Medical History:   Diagnosis Date    Acute right MCA stroke ([facility] ) 9/15/2018    Arthritis     CAD (coronary artery disease)     s/p CABG 2000    COPD (chronic obstructive pulmonary disease) ([facility])     GERD (gastroesophageal reflux disease)     Grand mal status ([facility] ) 3/24/2019    Heart attack ([facility] )     Heart failure ([facility] )     Hyperlipidemia     Hypertension     Lexiscan nuclear stress test 03/19/2016    EF 74% Normal    Myocardial infarction (HCC)     Shortness of breath     Stroke (HCC)     TIA (transient ischemic attack) "09/13/2018       Past Surgical History  Past Surgical History:   Procedure Laterality Date    CARDIAC CATHETERIZATION  08/19/2015    LIMA occluded  No severe native lesions    CARDIAC CATHETERIZATION N/A 12/03/2021    Procedure: Cardiac Coronary Angiogram;  Surgeon: Maryann Mondragon MD;  Location: [facility] CARDIAC CATH LAB; Service: Cardiology    CARDIAC CATHETERIZATION  12/03/2021    Procedure: Cardiac catheterization;  Surgeon: Maryann Mondragon MD;  Location: [facility] CARDIAC [facility]ATH [facility]; Service: Cardiology    COLONOSCOPY      CORONARY ARTERY BYPASS GRAFT  2000    CYSTOSCOPY  10/31/2022    Marlen    HERNIA REPAIR      umbilical hernia x2    TONSILLECTOMY          06/09/23 0949   PT Last Visit   PT Visit Date 06/09/23   Note Type   Note type Evaluation   Pain Assessment   Pain Assessment Tool 0-10   Pain Score 9  (fluctuating pain levels at rest, increased with activity)   Pain Location/Orientation Orientation: Left;Orientation: Lower; Location: Leg   Pain Onset/Description Onset: Ongoing;Frequency: Constant/Continuous; Descriptor: Aching;Descriptor: Josy Siad; Descriptor: Sore   Effect of Pain on Daily Activities yes   Patient's Stated Pain Goal No pain   Hospital Pain Intervention(s) Repositioned; Emotional support; Environmental changes   Multiple Pain Sites No   Restrictions/Precautions   Weight Bearing Precautions Per Order No   Other Precautions Fall Risk;Pain; Chair Alarm; Bed Alarm; Impulsive   Home Living   Type of Home Apartment   Home Layout Performs ADLs on one level; Able to live on main level with bedroom/bathroom;Stairs to enter with rails  (13 CHYNA, \"but they are old steps\")   Bathroom Shower/Tub Tub/shower unit   Bathroom Toilet Standard   Bathroom Equipment Grab bars in [place] [address]   (no prior AD usage or availability)   Prior Function   Level of [place] Independent with ADLs; Independent with functional mobility   Lives With Alone   Receives Help From Other " "(Comment)  (limited support, utilizes public transportation)   IADLs Independent with meal prep; Independent with medication management   Falls in the last 6 months 1 to 4  (\"about 2\")   Vocational On disability   General   Additional Pertinent History Petra LUU present for co-assessment due to medical complexity, required skilled interventions of 2 clinicians for care delivery  Family/Caregiver Present No   Cognition   Overall Cognitive Status WFL   Arousal/Participation Alert   Orientation Level Oriented X4   Memory Decreased recall of precautions   Following Commands Follows one step commands with increased time or repetition   RLE Assessment   RLE Assessment X  (3+/5 gross musculature)   LLE Assessment   LLE Assessment X  (3/5 gross musculature)   Vision-Basic Assessment   Current Vision Wears glasses only for reading   Vestibular   Spontaneous Nystagmus (-) no evidence of nystagmus at rest in room light   Gaze Holding Nystagmus (-) no evidence of nystagmus   Coordination   Movements are Fluid and Coordinated 0   Coordination and Movement Description Incremental, antalgic mobility requiring increased time  Proprioception   RLE Proprioception   (B pedal numbness, \"my legs are killing me\")   Bed Mobility   Supine to Sit 5  Supervision   Additional items Assist x 1;HOB elevated; Bedrails; Impulsive;Verbal cues   Sit to Supine 5  Supervision   Additional items   (DNT as Man Mariee was sitting on the bedside upon conclusion )   Additional Comments Verbal cues for environmental awareness  Patient was positioned out of bed on the recliner upon conclusion  However he declined sitting there and requested a return to bedside  Transfers   Sit to Stand 5  Supervision   Additional items Assist x 1;Bedrails; Impulsive   Stand to Sit 5  Supervision   Additional items Assist x 1;Bedrails;Verbal cues   Stand pivot 5  Supervision   Additional items Assist x 1; Impulsive;Verbal cues   Additional Comments Verbal cues for safety while " turning and environmental awareness  Ambulation/Elevation   Gait pattern Improper Weight shift;Decreased L stance; Short stride; Step to; Inconsistent mary   Gait Assistance 5  Supervision   Additional items Assist x 1;Verbal cues   Assistive Device None   Distance 25 feet x 2   Stair Management Assistance Not tested   Ambulation/Elevation Additional Comments Verbal cues for base of support widening for stabilization and safety while changing directions  Balance   Static Sitting Good   Dynamic Sitting Fair +   Static Standing Fair +   Dynamic Standing Fair   Ambulatory Fair   Endurance Deficit   Endurance Deficit Yes   Activity Tolerance   Activity Tolerance Patient limited by fatigue;Patient limited by pain   Medical Staff Made Aware Yes, CM was informed of d/c disposition recommendation  Nurse Made Aware Yes, nursing staff was informed of assessment outcome  Assessment   Prognosis Fair   Problem List Decreased strength;Decreased endurance; Impaired balance;Decreased mobility; Decreased coordination; Impaired judgement;Decreased safety awareness;Pain   Assessment Pt is 76 y o  male seen for PT evaluation s/p admit to [facility] on 6/8/2023 w/ Acute deep vein thrombosis (DVT) of lower extremity ([facility] )  PT consulted to assess pt's functional mobility and d/c needs  Order placed for PT eval and tx, w/ up and OOB as tolerated order  Comorbidities affecting pt's physical performance at time of assessment include: weakness, acute DVT of LLE,ambulatory dysfunction, CAD, HTN,PAD,COPD, anxiety and depression   PTA, pt was independent w/ all functional mobility w/ o AD utilization  Personal factors affecting pt at time of IE include: stairs to enter home, inability to navigate community distances, unable to perform dynamic tasks in community, anxiety, impulsivity, depression and limited insight into impairments   Please find objective findings from PT assessment regarding body systems outlined above with impairments and limitations including weakness, impaired balance, decreased endurance, impaired coordination, gait deviations, pain, decreased activity tolerance, decreased functional mobility tolerance, decreased safety awareness, impaired judgement and fall risk  From PT/mobility standpoint, recommendation at time of d/c would be home with outpatient rehabilitation pending progress in order to facilitate return to PLOF  Goals   Patient Goals to have less pain   LTG Expiration Date 06/19/23   Long Term Goal #1 1 )Patient will complete bed mobility modified I for decrease need for caregiver assistance, decrease burden of care  2 ) Patient will complete transfers modified I to decrease risk of falls, facilitate upright standing posture  3 ) BLE strength to greater than/equal to 4/5 gross musculature to increase ability to safely transfer, control descent to chair  4 ) Patient will exhibit increase dynamic standing to Good 3-5 minutes without LOB distant supervision to improve activity tolerance  5 ) Patient will exhibit increase dynamic ambulatory balance to Good 150-200 feet w/o AD distant supervision to improve ability to mobilize to toilet, chair and decrease risk for additional medical complications  6 ) Patient will exhibit good self monitoring and ability to follow 2 step commands to increase complexity of tasks and resume ADL's without LOB  PT Treatment Day 0   Plan   Treatment/Interventions Functional transfer training;LE strengthening/ROM; Elevations; Therapeutic exercise; Endurance training;Patient/family training;Gait training;Spoke to nursing;Spoke to case management   PT Frequency 3-5x/wk   Recommendation   PT Discharge Recommendation Home with outpatient rehabilitation   Additional Comments Upon conclusion, Melissa Ortega was resting on the bedside  All of his needs were within reach     AM-PAC Basic Mobility Inpatient   Turning in Flat Bed Without Bedrails 4   Lying on Back to Sitting on Edge of Flat Bed Without Bedrails 3   Moving Bed to Chair 3   Standing Up From Chair Using Arms 3   Walk in Room 3   Climb 3-5 Stairs With Railing 3   Basic Mobility Inpatient Raw Score 19   Basic Mobility Standardized Score 42 48   Highest Level Of Mobility   -HLM Goal 6: Walk 10 steps or more   -HLM Achieved 7: Walk 25 feet or more     History/Personal Factors/Comorbidities: weakness, acute DVT of LLE,ambulatory dysfunction, CAD, HTN,PAD,COPD, anxiety and depression     # of body structures/limitations: muscle weakness, activity intolerance,decreased endurance, impaired balance, gait deviations,pain, impaired coordination    Clinical presentation: unstable as seen in pain severity, impulsivity, fall risk, progressive symptoms prior to hospitalization,decreased insight    Initial Assessment Time: 0976-6379    Butch Sal, PT

## 2023-06-09 NOTE — CASE MANAGEMENT
Case Management Assessment & Discharge Planning Note    Patient name Maycol Wilkins /-01 MRN [id number]  : 1955 Date 2023       Current Admission Date: 2023  Current Admission Diagnosis:Acute deep vein thrombosis (DVT) of left lower extremity [facility])   Patient Active Problem List    Diagnosis Date Noted   • Arthralgia of left lower leg 2023   • Flu-like symptoms 2023   • Acute deep vein thrombosis (DVT) of left lower extremity ([facility] ) 2023   • Ambulatory dysfunction 2023   • PAD (peripheral artery disease) ([place] [address] ) 2023   • Suspicious nevus 2023   • Left hip pain 10/20/2022   • BPH (benign prostatic hyperplasia) 2022   • Urinary retention 2022   • Hypertensive heart and renal disease with CHF ([place] [address] ) 2022   • Depression, recurrent ([place] [address] ) 2022   • Superior mesenteric artery stenosis ([place] [address] ) 2022   • 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) 2022   • Peripheral neuropathy 2022   • Aortic aneurysm ([place] [address] ) 2020   • Lower extremity edema 2019   • Diverticular disease 2019   • Chronic constipation 2019   • ED (erectile dysfunction) of organic origin 2019   • Gastroesophageal reflux disease 2019   • Grand mal status ([place] [address] ) 2019   • Insomnia 2019   • Tobacco abuse 2019   • Overweight 2019   • Seasonal allergies 2019   • Anxiety and depression 2018   • Hypertension 2018   • Hyperlipidemia 2018   • COPD (chronic obstructive pulmonary disease) ([facility] ) 2018   • Coronary artery disease involving native coronary artery of native heart with angina pectoris ([place] [address] ) 2018   • Benign neoplasm of colon 2010      LOS (days): 0  Geometric Mean LOS (GMLOS) (days):   Days to GMLOS:     OBJECTIVE:              Current admission status: Observation  Referral Reason: Other (d/c planning)    Preferred Pharmacy:   [address] [place] [address] #[id number] - [facility] [place] - [address] [facility] [address]  Phone: [phone] Fax: [address], [address] [facility],  [facility] [address] ,  [facility] [address] [facility]  Phone: [phone] Fax: [address], [address]  [address]  [place] [address]  Phone: [phone] Fax: [phone]    Primary Care Provider: LESLEY WooC    Primary Insurance: [facility] [employer]  Secondary Insurance: Dina Cardenas    ASSESSMENT:  [address] [place] [address] , [facility] COTTAGE Representative - Sister   Primary Phone: [phone] (Home)               Advance Directives  Does patient have a [address] [facility] [address]?: No  Was patient offered paperwork?: Yes (declined paperwork)  Does patient have Advance Directives?: No  Was patient offered paperwork?: Yes (pt declined paperwork)         Readmission Root Cause  30 Day Readmission: No    Patient Information  Admitted from[de-identified] Home  Mental Status: Alert  During Assessment patient was accompanied by: Not accompanied during assessment  Assessment information provided by[de-identified] Patient  Primary Caregiver: Self  Support Systems: Family members (sister)  [place] of Residence: [facility] [address] do you live in?: [facility] entry access options   Select all that apply : Stairs  Number of steps to enter home : One Flight  Do the steps have railings?: Yes  Type of Current Residence: Apartment  Floor Level: 2  Upon entering residence, is there a bedroom on the main floor (no further steps)?: Yes  Upon entering residence, is there a bathroom on the main floor (no further steps)?: Yes  In the last 12 months, was there a time when you were not able to pay the mortgage or rent on time?: No  In the last 12 months, how many places have you lived?: 1  In the last 12 months, was there a time when you did not have a steady place to sleep or slept in a shelter (including now)?: No  Homeless/housing insecurity resource given?: N/A  Living Arrangements: Lives Alone  Is patient a ?: No    Activities of Daily Living Prior to Admission  Functional Status: Independent  Completes ADLs independently?: Yes  Ambulates independently?: Yes  Does patient use assisted devices?: No  Does patient currently own DME?: No  Does patient have a history of Outpatient Therapy (PT/OT)?: No  Does the patient have a history of Short-Term Rehab?: No  Does patient have a history of HHC?: No  Does patient currently have Jacki Moss?: No         Patient Information Continued  Income Source: SSI/SSD (disabled)  Does patient have prescription coverage?: Yes ([facility] [address] [place] pass)  Within the past 12 months, you worried that your food would run out before you got the money to buy more : Never true  Within the past 12 months, the food you bought just didn't last and you didn't have money to get more : Never true  Food insecurity resource given?: N/A  Does patient receive dialysis treatments?: No  Does patient have a history of substance abuse?: No  Does patient have a history of Mental Health Diagnosis?: Yes (depression)  Is patient receiving treatment for mental health?: Yes (pt had a psychiatrist)  Has patient received inpatient treatment related to mental health in the last 2 years?: No (pt had a psych stay about 10 yrs ago)         Means of Transportation  Means of Transport to [facility][de-identified] [facility]  In the past 12 months, has lack of transportation kept you from medical appointments or from getting medications?: No  In the past 12 months, has lack of transportation kept you from meetings, work, or from getting things needed for daily living?: No  Was application for public transport provided?: N/A        DISCHARGE DETAILS:    Discharge planning discussed with[de-identified] disha and sister was called at 16:50 pm   Freedom of Choice: Yes  Comments - Freedom of Choice: recommendation is for outpt rehab- lsit was given and order  CM contacted family/caregiver?: Yes (message was left for her to call to discuss d/c plan)  Were Treatment Team discharge recommendations reviewed with patient/caregiver?: Yes  Did patient/caregiver verbalize understanding of patient care needs?: Yes  Were patient/caregiver advised of the risks associated with not following Treatment Team discharge recommendations?: Yes    Contacts  Patient Contacts: Natividad Rogerssylwia  Relationship to Patient[de-identified] Family (sister)  Contact Method: Phone  Phone Number: [phone]   Reason/Outcome: Discharge Man Rosales         Is the patient interested in [facility] at discharge?: No    DME Referral Provided  Referral made for DME?: No    Other Referral/Resources/Interventions Provided:  Interventions: Prescription Price Check  Referral Comments: cm called [facility]  they have eliquis in [facility] and I was told the pt does not have a copay    Would you like to participate in our [address] service program?  : No - Declined    Treatment Team Recommendation: Home (home with outpt rehab & outpt follow up- medicar 17:30pm)  Discharge Destination Plan[de-identified] Home (home with outpt rehab & outpt follow up)  Transport at Discharge : Other (Comment) (Payal Mai)     Number/Name of Dispatcher: [id number]  Transported by [facility] and Unit #):  Freddie  ETA of Transport (Date): 06/09/23  ETA of Transport (Time): 1536      pt in agreement with the d /c and d/c plan

## 2023-06-09 NOTE — PLAN OF CARE
Problem: PHYSICAL THERAPY ADULT  Goal: Performs mobility at highest level of function for planned discharge setting  See evaluation for individualized goals  Description: Treatment/Interventions: Functional transfer training, LE strengthening/ROM, Elevations, Therapeutic exercise, Endurance training, Patient/family training, Gait training, Spoke to nursing, Spoke to case management          See flowsheet documentation for full assessment, interventions and recommendations  Note: Prognosis: Fair  Problem List: Decreased strength, Decreased endurance, Impaired balance, Decreased mobility, Decreased coordination, Impaired judgement, Decreased safety awareness, Pain  Assessment: Pt is 76 y o  male seen for PT evaluation s/p admit to [facility] on 6/8/2023 w/ Acute deep vein thrombosis (DVT) of lower extremity ([facility] )  PT consulted to assess pt's functional mobility and d/c needs  Order placed for PT eval and tx, w/ up and OOB as tolerated order  Comorbidities affecting pt's physical performance at time of assessment include: weakness, acute DVT of LLE,ambulatory dysfunction, CAD, HTN,PAD,COPD, anxiety and depression   PTA, pt was independent w/ all functional mobility w/ o AD utilization  Personal factors affecting pt at time of IE include: stairs to enter home, inability to navigate community distances, unable to perform dynamic tasks in community, anxiety, impulsivity, depression and limited insight into impairments  Please find objective findings from PT assessment regarding body systems outlined above with impairments and limitations including weakness, impaired balance, decreased endurance, impaired coordination, gait deviations, pain, decreased activity tolerance, decreased functional mobility tolerance, decreased safety awareness, impaired judgement and fall risk   From PT/mobility standpoint, recommendation at time of d/c would be home with outpatient rehabilitation pending progress in order to facilitate return to PLOF  PT Discharge Recommendation: Home with outpatient rehabilitation    See flowsheet documentation for full assessment

## 2023-06-09 NOTE — UTILIZATION REVIEW
NOTIFICATION OF OBSERVATION ADMISSION   AUTHORIZATION REQUEST   SERVICING FACILITY:   [address] [place], [address]  Tax ID: [id number]  NPI: [id number] ATTENDING PROVIDER:  Attending Name and NPI#: Kwame Campos Do [[id number]]  Address: [address] [place] [address] [place], [address]  Phone: [phone]     ADMISSION INFORMATION:  Place of Service: On [address] [facility] Code: 22 CPT Code:   Admitting Diagnosis Code/Description:  Leg pain [M79 606]  Claudication ([address] ) [I73 9]  DVT (deep venous thrombosis) ([address] ) [I82 409]  Left leg pain [M79 605]  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 ([address] ) [I70 212]  Ambulatory dysfunction [R26 2]  Observation Admission Date/Time: 06/08/2023 10:14AM  Discharge Date/Time: No discharge date for patient encounter  PHYSICIAN ADVISORY SERVICES:  Medical Necessity Denial & [employer] Review  Phone: [phone]  Fax: [phone]  Email: [email or web address]

## 2023-06-09 NOTE — UTILIZATION REVIEW
Initial Clinical Review    Admission: Date/Time/Statement:   Admission Orders (From admission, onward)     Ordered        06/08/23 1014  Place in Observation  Once                      Orders Placed This Encounter   Procedures   • Place in Observation     Standing Status:   Standing     Number of Occurrences:   1     Order Specific Question:   Level of Care     Answer:   Med Surg [16]     ED Arrival Information     Expected   -    Arrival   6/8/2023 07:31    Acuity   Urgent            Means of arrival   Walk-In    Escorted by   Self    Service   Hospitalist    Admission type   Emergency            Arrival complaint   leg pain           Chief Complaint   Patient presents with   • Leg Pain     Patient reports left leg pain for the past 5 days  Patient reports pain is in his toes and radiates up the leg  Initial Presentation: 76 y o  male to the ED from home with complaints of left pain for 5 days  Admitted under observation for acute DVT, pad, ambulatory dysfunction  Arrives with cough  Sensation grossly intact and equal in B/L LEs  B/L LEs warm and well perfused  Creat mildly elevated from baseline at 1 34  Venous Duplex (6/8): right lower extremity without evidence of thrombus in the common femoral vein, SFJ, femoral vein or popliteal vein  There is acute deep vein thrombosis in the left mid posterior tibial, mid peroneal, popliteal and femoral veins, mostly occlusive to very minimal flow  Common femoral vein appears patent and compressible  As per eval by vascular surgery, no surgical intervention needed  Started on Eliquis  Continue asa  Date:    6/9 Continue Eliquis, asa  Continues to have left pain and diffiuclty walking due to pain  Medicate as needed       ED Triage Vitals   Temperature Pulse Respirations Blood Pressure SpO2   06/08/23 0738 06/08/23 0738 06/08/23 0738 06/08/23 0738 06/08/23 0738   97 6 °F (36 4 °C) 81 18 105/68 94 %      Temp Source Heart Rate Source Patient Position - Orthostatic VS BP Location FiO2 (%)   06/08/23 0738 06/08/23 1100 06/08/23 0738 06/08/23 0738 --   Temporal Monitor Sitting Left arm       Pain Score       06/08/23 0738       7          Wt Readings from Last 1 Encounters:   06/08/23 100 kg (221 lb 1 9 oz)     Additional Vital Signs:   Date/Time Temp Pulse Resp BP MAP (mmHg) SpO2 O2 Device Patient Position - Orthostatic VS   06/09/23 06:40:44 -- 84 -- 109/62 78 86 % Abnormal  -- --   06/09/23 0640 98 8 °F (37 1 °C) -- 20 -- -- -- -- --   06/08/23 22:18:05 98 5 °F (36 9 °C) 64 18 99/61 74 93 % None (Room air) Lying   06/08/23 1604 -- -- -- -- -- -- None (Room air) --   06/08/23 14:55:22 97 7 °F (36 5 °C) 62 19 124/64 84 96 % -- --   06/08/23 1141 -- 60 -- -- -- 95 % -- --   06/08/23 1138 -- -- -- -- -- 96 % -- --   06/08/23 1100 -- 68 18 126/66 91 95 % -- --   06/08/23 1022 -- 62 18 113/56 78 95 % -- --   06/08/23 0738 97 6 °F (36 4 °C) 81 18 105/68 -- 94 % None (Room air) Sitting       Pertinent Labs/Diagnostic Test Results:   VAS lower limb venous duplex study, unilateral/limited   Final Result by Calvin Hines MD (06/08 1070)      XR chest 1 view portable   Final Result by Saintclair Comes, MD (06/08 7660)      No acute cardiopulmonary disease                 Workstation performed: [id number]           Results from last 7 days   Lab Units 06/08/23  0754   SARS-COV-2  Negative     Results from last 7 days   Lab Units 06/09/23  0453 06/08/23  0754   HEMATOCRIT % 36 2* 38 0   HEMOGLOBIN g/dL 12 4 12 9   NEUTROS ABS Thousands/µL  --  7 03   PLATELETS Thousands/uL 234 211   WBC Thousand/uL 8 97 10 38*         Results from last 7 days   Lab Units 06/09/23  0453 06/08/23  0754   ANION GAP mmol/L 7 9   BUN mg/dL 12 13   CALCIUM mg/dL 8 2* 8 5   CHLORIDE mmol/L 102 99   CO2 mmol/L 25 25   CREATININE mg/dL 1 19 1 34*   EGFR ml/min/1 73sq m 62 54   POTASSIUM mmol/L 4 1 3 5   MAGNESIUM mg/dL 2 1  --    SODIUM mmol/L 134* 133*     Results from last 7 days   Lab Units 06/09/23  0453   ALBUMIN g/dL 3 3*   ALK PHOS U/L 108*   ALT U/L 32   AST U/L 34   TOTAL BILIRUBIN mg/dL 0 52   TOTAL PROTEIN g/dL 5 9*         Results from last 7 days   Lab Units 06/09/23 0453 06/08/23  0754   GLUCOSE RANDOM mg/dL 120 110         Results from last 7 days   Lab Units 06/08/23  0754   INR  1 07   PROTIME seconds 13 9   PTT seconds 26         Results from last 7 days   Lab Units 06/08/23  0754   INFLUENZA A PCR  Negative   INFLUENZA B PCR  Negative   RSV PCR  Negative       ED Treatment:   Medication Administration from 06/08/2023 0731 to 06/08/2023 1452       Date/Time Order Dose Route Action     06/08/2023 0757 EDT fentanyl citrate (PF) 100 MCG/2ML 75 mcg 75 mcg Intravenous Given     06/08/2023 1018 EDT apixaban (ELIQUIS) tablet 10 mg 10 mg Oral Given     06/08/2023 1138 EDT albuterol inhalation solution 2 5 mg 2 5 mg Nebulization Given     06/08/2023 1118 EDT aspirin (ECOTRIN LOW STRENGTH) EC tablet 81 mg 81 mg Oral Given     06/08/2023 1118 EDT diltiazem (CARDIZEM CD) 24 hr capsule 180 mg 180 mg Oral Given     06/08/2023 1119 EDT fluticasone (FLONASE) 50 mcg/act nasal spray 1 spray 1 spray Nasal Given     06/08/2023 1119 EDT furosemide (LASIX) tablet 40 mg 40 mg Oral Given     06/08/2023 1118 EDT isosorbide mononitrate (IMDUR) 24 hr tablet 30 mg 30 mg Oral Given     06/08/2023 1119 EDT metoprolol succinate (TOPROL-XL) 24 hr tablet 25 mg 25 mg Oral Given     06/08/2023 1118 EDT pantoprazole (PROTONIX) EC tablet 40 mg 40 mg Oral Given     06/08/2023 1118 EDT pregabalin (LYRICA) capsule 150 mg 150 mg Oral Given     06/08/2023 1405 EDT HYDROmorphone (DILAUDID) injection 0 5 mg 0 5 mg Intravenous Given        Past Medical History:   Diagnosis Date   • Acute right MCA stroke ([facility] ) 9/15/2018   • Arthritis    • CAD (coronary artery disease)     s/p CABG 2000   • COPD (chronic obstructive pulmonary disease) (HCC)    • GERD (gastroesophageal reflux disease)    • [facility] 3/24/2019   • Heart attack [facility])    • Heart failure ([facility] [address] )    • Hyperlipidemia    • Hypertension    • Lexiscan nuclear stress test 03/19/2016    EF 74% Normal   • Myocardial infarction [facility])    • Shortness of breath    • Stroke [facility])    • TIA (transient ischemic attack) 09/13/2018       Admitting Diagnosis: Leg pain [M79 606]  Claudication ([facility]) [I73 9]  DVT (deep venous thrombosis) ([facility]) [I82 409]  Left leg pain [M79 605]  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 ([facility]) [I70 212]  Ambulatory dysfunction [R26 2]  Age/Sex: 76 y o  male  Admission Orders:  UP and OOB    Scheduled Medications:  amitriptyline, 50 mg, Oral, HS  apixaban, 10 mg, Oral, BID  aspirin, 81 mg, Oral, Daily  diltiazem, 180 mg, Oral, Daily  fluticasone, 1 spray, Nasal, Daily  furosemide, 40 mg, Oral, Daily  isosorbide mononitrate, 30 mg, Oral, Daily  metoprolol succinate, 25 mg, Oral, BID  pantoprazole, 40 mg, Oral, Daily  pregabalin, 150 mg, Oral, BID  senna-docusate sodium, 1 tablet, Oral, HS  tamsulosin, 0 4 mg, Oral, Daily With Dinner  umeclidinium, 1 puff, Inhalation, Daily      Continuous IV Infusions:     PRN Meds:  acetaminophen, 650 mg, Oral, Q6H PRN  albuterol, 2 puff, Inhalation, Q4H PRN  HYDROmorphone, 0 5 mg, Intravenous, Q4H PRN  ondansetron, 4 mg, Intravenous, Q6H PRN  oxyCODONE, 5 mg, Oral, Q4H PRN        IP CONSULT TO VASCULAR SURGERY    Network Utilization Review Department  ATTENTION: Please call with any questions or concerns to [phone] and carefully listen to the prompts so that you are directed to the right person  All voicemails are confidential   Amanda Mann all requests for admission clinical reviews, approved or denied determinations and any other requests to dedicated fax number below belonging to the campus where the patient is receiving treatment   List of dedicated fax numbers for the Facilities:  [address] DENIALS (Administrative/Medical Necessity) [phone]   [address] (Maternity/NICU/Pediatrics) Eula De Leon [address] Kalyani Tobin  [phone]   [address] [place] [address] [place] [address] [facility] 310 av [facility] 134 815 [facility] [phone]

## 2023-06-09 NOTE — ASSESSMENT & PLAN NOTE
· Presented due to worsening lower extremity pain  · Venous Duplex (6/8): right lower extremity without evidence of thrombus in the common femoral vein, SFJ, femoral vein or popliteal vein  There is acute deep vein thrombosis in the left mid posterior tibial, mid peroneal, popliteal and femoral veins, mostly occlusive to very minimal flow  Common femoral vein appears patent and compressible  · Evaluated by Vascular Surgery and no acute vascular intervention is warranted at this time; [address]  · Patient was initiated on Eliquis in the ED   Medication was price checked by case management

## 2023-06-09 NOTE — PLAN OF CARE
Problem: OCCUPATIONAL THERAPY ADULT  Goal: Performs self-care activities at highest level of function for planned discharge setting  See evaluation for individualized goals  Description: Treatment Interventions: ADL retraining, Functional transfer training, UE strengthening/ROM, Endurance training, Patient/family training, Compensatory technique education, Energy conservation, Activityengagement      See flowsheet documentation for full assessment, interventions and recommendations  Note: Limitation: Decreased ADL status, Decreased UE strength, Decreased Safe judgement during ADL, Decreased endurance  Prognosis: Good  Assessment: Pt is a 76 y o  male seen for OT evaluation s/p admit to [facility] on 6/8/2023 w/ Acute deep vein thrombosis (DVT) of lower extremity ([facility] [address] )  Comorbidities affecting pt's functional performance at time of assessment include: HTN, CAD, PAD, ambulatory dysfunction, COPD, anxiety and depression  Personal factors affecting pt at time of IE include:steps to enter environment, limited home support, difficulty performing ADLS, difficulty performing IADLS , limited insight into deficits, compliance, decreased initiation and engagement  and health management   Prior to admission, pt was (I) with ADLs and IADLs with limited home support  Upon evaluation: the following deficits impact occupational performance: weakness, decreased strength, decreased balance, decreased tolerance, impaired problem solving, decreased safety awareness and increased pain  Pt to benefit from continued skilled OT tx while in the hospital to address deficits as defined above and maximize level of functional independence w ADL's and functional mobility  Occupational Performance areas to address include: grooming, bathing/shower, toilet hygiene, dressing, functional mobility, community mobility and clothing management   From OT standpoint, recommendation at time of d/c would be Home with outpatient PT      OT Discharge Recommendation: Home with outpatient rehabilitation     Alfonzo Avinash PRINGLE, OTR/L

## 2023-06-09 NOTE — OCCUPATIONAL THERAPY NOTE
Occupational Therapy Evaluation     Patient Name: Loli Caraballo  [id number] Date: 6/9/2023  Problem List  Principal Problem:    Acute deep vein thrombosis (DVT) of left lower extremity ([facility])  Active Problems:    Hypertension    Coronary artery disease involving native coronary artery of native heart with angina pectoris [facility])    Urinary retention    PAD (peripheral artery disease) ([facility] [address] )    Ambulatory dysfunction    Past Medical History  Past Medical History:   Diagnosis Date    Acute right MCA stroke ([facility] [address] ) 9/15/2018    Arthritis     CAD (coronary artery disease)     s/p CABG 2000    COPD (chronic obstructive pulmonary disease) ([facility])     GERD (gastroesophageal reflux disease)     Grand mal status ([facility] [address] ) 3/24/2019    [facility]     Heart failure ([facility] [address] )     Hyperlipidemia     Hypertension     Lexiscan nuclear stress test 03/19/2016    EF 74% Normal    Myocardial infarction [facility])     Shortness of breath     Stroke [facility])     TIA (transient ischemic attack) 09/13/2018     Past Surgical History  Past Surgical History:   Procedure Laterality Date    CARDIAC CATHETERIZATION  08/19/2015    LIMA occluded  No severe native lesions    CARDIAC CATHETERIZATION N/A 12/03/2021    Procedure: Cardiac Coronary Angiogram;  Surgeon: Selma Clarke MD;  Location: [facility] CARDIAC CATH LAB; Service: Cardiology    CARDIAC CATHETERIZATION  12/03/2021    Procedure: Cardiac catheterization;  Surgeon: Selma Clarke MD;  Location: [facility] CARDIAC CATH LAB; Service: Cardiology    COLONOSCOPY      CORONARY ARTERY BYPASS GRAFT  2000    CYSTOSCOPY  10/31/2022    Mar[place]    HERNIA REPAIR      umbilical hernia x2    TONSILLECTOMY          06/09/23 0956   OT Last Visit   OT Visit Date 06/09/23   Note Type   Note type Evaluation   Additional Comments Pt seen as a co-eval with PT due to the patient's co-morbidities, clinically unstable presentation, and present impairments which are a regression from the patient's baseline     Pain Assessment   Pain "Assessment Tool 0-10   Pain Score 9  (fluctuating pain levels at rest, increased with activity)   Pain Location/Orientation Orientation: Left;Orientation: Lower; Location: Leg   Pain Onset/Description Onset: Ongoing;Frequency: Constant/Continuous; Descriptor: Aching;Descriptor: Weyman Breath; Descriptor: Sore   Effect of Pain on Daily Activities yes   Patient's Stated Pain Goal No pain   Hospital Pain Intervention(s) Repositioned; Emotional support; Environmental changes   Multiple Pain Sites No   Restrictions/Precautions   Weight Bearing Precautions Per Order No   Other Precautions Fall Risk;Pain;Bed Alarm; Chair Alarm; Impulsive   Home Living   Type of Home Apartment   Home Layout Able to live on main level with bedroom/bathroom; Performs ADLs on one level;Stairs to enter with rails  ([address]; \"but they are old steps\")   Bathroom Shower/Tub Tub/shower unit   Bathroom Toilet Standard   Bathroom Equipment Grab bars in [place] [id number]   (no prior AD usage or availability)   Prior Function   Level of [place] Independent with ADLs; Independent with functional mobility; Independent with IADLS   Lives With Alone   Receives Help From Other (Comment)  (limited support, utilizes public transportation)   IADLs Independent with meal prep; Independent with medication management   Falls in the last 6 months 1 to 4  (\"about 2\")   Vocational On disability   Subjective   Subjective \"I am just not comfortable sitting in the chair\"   ADL   Where Assessed Chair   UB Bathing Assistance 5  Supervision/Setup   LB Bathing Assistance 4  Minimal Assistance   [address] 5  Supervision/Setup   LB Dressing Assistance 4  Minimal Assistance   LB Dressing Deficit Don/doff R sock; Don/doff L sock  (d/t pain which limits mobility)   Bed Mobility   Supine to Sit 5  Supervision   Additional items Assist x 1;HOB elevated; Bedrails; Impulsive;Verbal cues   Sit to Supine   (DNT; pt seated at EOB upon conclusion of " session)   Transfers   Sit to Stand 5  Supervision   Additional items Assist x 1;Bedrails; Impulsive   Stand to Sit 5  Supervision   Additional items Assist x 1;Bedrails;Verbal cues   Stand pivot 5  Supervision   Additional items Assist x 1; Impulsive;Verbal cues   Additional Comments VC for safety while turning and environmental awareness; no AD utilized   Functional Mobility   Functional Mobility 5  Supervision   Additional Comments Pt completed short distance ADL mobility around room at (S) level w/ no AD  No significant LOB observed, mild instability  Additional items   (none)   Balance   Static Sitting Good   Dynamic Sitting Fair +   Static Standing Fair +   Dynamic Standing Fair   Ambulatory Fair   Activity Tolerance   Activity Tolerance Patient limited by fatigue;Patient limited by pain   Medical Staff Made Aware Yes, CM was informed of d/c recommendation   Nurse Made Aware Yes, RN made aware of session outcome   RUE Assessment   RUE Assessment WFL   RUE Strength   RUE Overall Strength   (4-/5)   LUE Assessment   LUE Assessment WFL   LUE Strength   LUE Overall Strength   (4-/5)   Vision-Basic Assessment   Current Vision Wears glasses only for reading   Psychosocial   Psychosocial (WDL) WDL   Cognition   Overall Cognitive Status WFL   Arousal/Participation Responsive; Alert; Cooperative   Attention Within functional limits   Orientation Level Oriented X4   Memory Decreased recall of precautions   Following Commands Follows one step commands with increased time or repetition   Comments Pt agreeable to OT evaluation   Assessment   Limitation Decreased ADL status; Decreased UE strength;Decreased Safe judgement during ADL;Decreased endurance   Prognosis Good   Assessment Pt is a 76 y o  male seen for OT evaluation s/p admit to [facility] on 6/8/2023 w/ Acute deep vein thrombosis (DVT) of lower extremity ([address] )    Comorbidities affecting pt's functional performance at time of assessment include: HTN, CAD, PAD, ambulatory dysfunction, COPD, anxiety and depression  Personal factors affecting pt at time of IE include:steps to enter environment, limited home support, difficulty performing ADLS, difficulty performing IADLS , limited insight into deficits, compliance, decreased initiation and engagement  and health management   Prior to admission, pt was (I) with ADLs and IADLs with limited home support  Upon evaluation: the following deficits impact occupational performance: weakness, decreased strength, decreased balance, decreased tolerance, impaired problem solving, decreased safety awareness and increased pain  Pt to benefit from continued skilled OT tx while in the hospital to address deficits as defined above and maximize level of functional independence w ADL's and functional mobility  Occupational Performance areas to address include: grooming, bathing/shower, toilet hygiene, dressing, functional mobility, community mobility and clothing management  From OT standpoint, recommendation at time of d/c would be Home with outpatient PT  Goals   Patient Goals to feel better   Plan   Treatment Interventions ADL retraining;Functional transfer training;UE strengthening/ROM; Endurance training;Patient/family training; Compensatory technique education; Energy conservation; Activityengagement   Goal Expiration Date 06/19/23   OT Treatment Day 0   OT Frequency 2-3x/wk   Recommendation   OT Discharge Recommendation Home with outpatient rehabilitation   Additional Comments  The patient's raw score on the -PAC Daily Activity inpatient short form is 21, standardized score is 44 27, greater than 39 4  Patients at this level are likely to benefit from discharge to home  Please refer to the recommendation of the Occupational Therapist for safe discharge planning     AM-PAC Daily Activity Inpatient   Lower Body Dressing 3   Bathing 3   Toileting 3   Upper Body Dressing 4   Grooming 4   Eating 4   Daily Activity Raw Score 21   Daily Activity Standardized Score (Calc for Raw Score >=11) 44 27   AM-PAC Applied Cognition Inpatient   Following a Speech/Presentation 3   Understanding Ordinary Conversation 4   Taking Medications 3   Remembering Where Things Are Placed or Put Away 3   Remembering List of 4-5 Errands 3   Taking Care of Complicated Tasks 3   Applied Cognition Raw Score 19   Applied Cognition Standardized Score 39 77     GOALS:    Pt will achieve the following within specified time frame: LTG  Pt will achieve the following goals within 10 days    *ADL transfers with (I) for inc'd independence with ADLs/purposeful tasks    *UB ADL with (I) for inc'd independence with self cares    *LB ADL with (I) using AE prn for inc'd independence with self cares    *Toileting with (I) for clothing management and hygiene for return to PLOF with personal care    *Increase static stand balance and dyn stand balance to G for inc'd safety with standing purposeful tasks    *Increase stand tolerance x7 m for inc'd tolerance with standing purposeful tasks    *Bed mobility- (I) for inc'd independence to manage own comfort and initiate EOB & OOB purposeful tasks    *Participate in 10m UE therex to increase overall stamina/activity tolerance for purposeful tasks      Kacie Baumann, MS, OTR/L

## 2023-06-09 NOTE — DISCHARGE SUMMARY
[address] 45  Discharge- Celia Chowdary 1955, 76 y o  male MRN: [id number]  Unit/Bed#: -01 Encounter: [id number]  Primary Care Provider: Mayank Tirado PA-C   Date and time admitted to hospital: 6/8/2023  7:39 AM    * Acute deep vein thrombosis (DVT) of left lower extremity ([facility] [address] )  Assessment & Plan  · Presented due to worsening lower extremity pain  · Venous Duplex (6/8): right lower extremity without evidence of thrombus in the common femoral vein, SFJ, femoral vein or popliteal vein  There is acute deep vein thrombosis in the left mid posterior tibial, mid peroneal, popliteal and femoral veins, mostly occlusive to very minimal flow  Common femoral vein appears patent and compressible  · Evaluated by Vascular Surgery and no acute vascular intervention is warranted at this time; [address]  · Patient was initiated on Eliquis in the ED  patient was price checked by case management    PAD (peripheral artery disease) ([facility] )  Assessment & Plan  · Presented for worsening leg pain   · Arterial Duplex (5/12): The resting evaluation of the right leg shows diffuse atherosclerotic arterial disease with no focal stenosis in the femoro-popliteal arteries  Ankle/Brachial index: 1 21 which is in the normal disease category  The resting evaluation of the left leg shows diffuse atherosclerotic arterial disease with nofocal stenosis in the femoro-popliteal arteries  Ankle/Brachial index: 1 18 which is in the normal disease category    · Continue ASA 81 mg daily; was previously on statin but taken off due to side effects and has refused trial of alternative statin  · Following outpatient with Dr Flavia Dahl    Ambulatory dysfunction  Assessment & Plan  · Difficulty ambulatory due to pain from above  · PT/OT evaluation pending    Urinary retention  Assessment & Plan  · Continue Flomax 0 4 mg daily    Coronary artery disease involving native coronary artery of native heart with angina pectoris [facility])  Assessment & Plan  · S/p CABG with cath demonstrating occluded LIMA and chronically occluded RCA  · RCA was not amenable to PCI and medical management was recommended  · Continue ASA as above, Imdur 30 mg daily, and Toprol 25 mg BID  · Patient is not currently on statin therapy as above    Hypertension  Assessment & Plan  · BP reviewed and acceptable  · Continue Diltiazem 180 mg daily and Toprol as above  · Monitor BP trends and adjust medications as indicated      Medical Problems     Resolved Problems  Date Reviewed: 6/9/2023   None       Discharging Physician / Practitioner: ADRIANA Soares  PCP: Madelyn Pope PA-C  Admission Date:   Admission Orders (From admission, onward)     Ordered        06/08/23 1014  Place in Observation  Once                      Discharge Date: 06/10/23    Consultations During Hospital Stay:  · Vascular    Procedures Performed:   · None    Significant Findings / Test Results:     Venous duplex unilateral/limited 6/8/2023:     RIGHT LOWER LIMB LIMITED:  Evaluation shows no evidence of thrombus in the common femoral vein, SFJ,  femoral vein or popliteal vein  Doppler evaluation shows a normal response to augmentation maneuvers  LEFT LOWER LIMB:  There is acute deep vein thrombosis in the mid posterior tibial, mid peroneal,  popliteal and femoral veins, mostly occlusive to very minimal flow  Common  femoral vein appears patent and compressible  No evidence of superficial thrombophlebitis noted  Doppler evaluation shows a normal response to augmentation maneuvers  Popliteal, posterior tibial and anterior tibial arterial Doppler waveform's are  triphasic  Complications: None apparent    Reason for Admission: DVT    Hospital Course:   Sachin Arbolead is a 76 y o  male patient who originally presented to the hospital on 6/8/2023 due to left leg pain  He was found to have a DVT of the left lower extremity    Vascular surgery was consulted in the ER and recommended that he be started on Eliquis  Patient said that he could not go home because he could not walk, so he was admitted to medicine  He was seen by physical therapy and recommendation was home with outpatient rehabilitation  Case management assisted with medications, and patient was discharged home in stable condition  Please see full patient chart for additional details  Condition at Discharge: stable    Discharge Day Visit / Exam:   * Please refer to separate progress note for these details *    Discussion with Family: Patient declined call to   Discharge instructions/Information to patient and family:   See after visit summary for information provided to patient and family  Provisions for Follow-Up Care:  See after visit summary for information related to follow-up care and any pertinent home health orders  Disposition:   Home    Planned Readmission: No     Discharge Statement:  I spent 50 minutes discharging the patient  This time was spent on the day of discharge  I had direct contact with the patient on the day of discharge  Greater than 50% of the total time was spent examining patient, answering all patient questions, arranging and discussing plan of care with patient as well as directly providing post-discharge instructions  Additional time then spent on discharge activities  Discharge Medications:  See after visit summary for reconciled discharge medications provided to patient and/or family        **Please Note: This note may have been constructed using a voice recognition system**

## 2023-06-09 NOTE — ASSESSMENT & PLAN NOTE
· Presented due to worsening lower extremity pain  · Venous Duplex (6/8): right lower extremity without evidence of thrombus in the common femoral vein, SFJ, femoral vein or popliteal vein  There is acute deep vein thrombosis in the left mid posterior tibial, mid peroneal, popliteal and femoral veins, mostly occlusive to very minimal flow  Common femoral vein appears patent and compressible  · Evaluated by Vascular Surgery and no acute vascular intervention is warranted at this time; [address]    · Patient was initiated on Eliquis in the ED, will continue  · This medication will need to be price checked for the patient prior to discharge

## 2023-06-09 NOTE — PROGRESS NOTES
[address]  Progress Note  Name: Nancy Hernandez  MRN: [id number]  Unit/Bed#: -01 I Date of Admission: 6/8/2023   Date of Service: 6/9/2023 I Hospital Day: 0    Assessment/Plan      * Acute deep vein thrombosis (DVT) of left lower extremity ([facility])  Assessment & Plan  · Presented due to worsening lower extremity pain  · Venous Duplex (6/8): right lower extremity without evidence of thrombus in the common femoral vein, SFJ, femoral vein or popliteal vein  There is acute deep vein thrombosis in the left mid posterior tibial, mid peroneal, popliteal and femoral veins, mostly occlusive to very minimal flow  Common femoral vein appears patent and compressible  · Evaluated by Vascular Surgery and no acute vascular intervention is warranted at this time; [address]  · Patient was initiated on Eliquis in the ED, will continue  · This medication will need to be price checked for the patient prior to discharge    PAD (peripheral artery disease) ([facility] [address] )  Assessment & Plan  · Presented for worsening leg pain   · Arterial Duplex (5/12): The resting evaluation of the right leg shows diffuse atherosclerotic arterial disease with no focal stenosis in the femoro-popliteal arteries  Ankle/Brachial index: 1 21 which is in the normal disease category  The resting evaluation of the left leg shows diffuse atherosclerotic arterial disease with nofocal stenosis in the femoro-popliteal arteries  Ankle/Brachial index: 1 18 which is in the normal disease category    · Continue ASA 81 mg daily; was previously on statin but taken off due to side effects and has refused trial of alternative statin  · Following outpatient with Dr Noah Dahl    Ambulatory dysfunction  Assessment & Plan  · Difficulty ambulatory due to pain from above  · PT/OT evaluation pending    Urinary retention  Assessment & Plan  · Continue Flomax 0 4 mg daily    Coronary artery disease involving native coronary artery of native heart with angina pectoris [facility])  Assessment & Plan  · S/p CABG with cath demonstrating occluded LIMA and chronically occluded RCA  · RCA was not amenable to PCI and medical management was recommended  · Continue ASA as above, Imdur 30 mg daily, and Toprol 25 mg BID  · Patient is not currently on statin therapy as above    Hypertension  Assessment & Plan  · BP reviewed and acceptable  · Continue Diltiazem 180 mg daily and Toprol as above  · Monitor BP trends ad adjust medications as indicated           VTE Pharmacologic Prophylaxis: VTE Score: 6 High Risk (Score >/= 5) - Pharmacological DVT Prophylaxis Ordered: apixaban (Eliquis)  Sequential Compression Devices Ordered  Patient Centered Rounds: I performed bedside rounds with nursing staff today  Discussions with Specialists or Other Care Team Provider: CM    Education and Discussions with Family / Patient: Patient declined call to   Total Time Spent on Date of Encounter in care of patient: 55 minutes This time was spent on one or more of the following: performing physical exam; counseling and coordination of care; obtaining or reviewing history; documenting in the medical record; reviewing/ordering tests, medications or procedures; communicating with other healthcare professionals and discussing with patient's family/caregivers  Current Length of Stay: 0 day(s)  Current Patient Status: Observation   Certification Statement: The patient will continue to require additional inpatient hospital stay due to PT/OT, care coordination  Discharge Plan: Anticipate discharge in 24-48 hrs to discharge location to be determined pending rehab evaluations  Code Status: Level 1 - Full Code    Subjective:   Patient seen and examined  He says he is having left leg pain and can hardly walk  He also has a cough but says it is due to smoking  1 pack/day       Objective:     Vitals:   Temp (24hrs), Av 3 °F (36 8 °C), Min:97 7 °F (36 5 °C), Max:98 8 °F (37 1 °C)    Temp:  [97 7 °F (36 5 °C)-98 8 °F (37 1 °C)] 98 8 °F (37 1 °C)  HR:  [60-84] 84  Resp:  [18-20] 20  BP: ()/(61-64) 109/62  SpO2:  [86 %-96 %] 86 %  Body mass index is 31 73 kg/m²  Input and Output Summary (last 24 hours): Intake/Output Summary (Last 24 hours) at 6/9/2023 1107  Last data filed at 6/9/2023 0900  Gross per 24 hour   Intake 360 ml   Output --   Net 360 ml       Physical Exam:   Physical Exam  Vitals and nursing note reviewed  HENT:      Head: Normocephalic and atraumatic  Mouth/Throat:      Pharynx: Oropharynx is clear  Eyes:      Pupils: Pupils are equal, round, and reactive to light  Cardiovascular:      Rate and Rhythm: Normal rate  Pulmonary:      Effort: Pulmonary effort is normal  No respiratory distress  Breath sounds: Normal breath sounds  Abdominal:      General: Bowel sounds are normal       Palpations: Abdomen is soft  Tenderness: There is no abdominal tenderness  Musculoskeletal:         General: Tenderness present  Cervical back: Neck supple  Left lower leg: Edema present  Comments: Left leg tenderness  Antalgic gait  Skin:     General: Skin is warm and dry  Capillary Refill: Capillary refill takes less than 2 seconds  Neurological:      General: No focal deficit present  Mental Status: He is alert          Additional Data:     Labs:  Results from last 7 days   Lab Units 06/09/23  0453 06/08/23  0754   EOS PCT %  --  4   HEMATOCRIT % 36 2* 38 0   HEMOGLOBIN g/dL 12 4 12 9   LYMPHS PCT %  --  17   MONOS PCT %  --  9   NEUTROS PCT %  --  68   PLATELETS Thousands/uL 234 211   WBC Thousand/uL 8 97 10 38*     Results from last 7 days   Lab Units 06/09/23  0453   ANION GAP mmol/L 7   ALBUMIN g/dL 3 3*   ALK PHOS U/L 108*   ALT U/L 32   AST U/L 34   BUN mg/dL 12   CALCIUM mg/dL 8 2*   CHLORIDE mmol/L 102   CO2 mmol/L 25   CREATININE mg/dL 1 19   GLUCOSE RANDOM mg/dL 120   POTASSIUM mmol/L 4 1   SODIUM mmol/L 134*   TOTAL BILIRUBIN mg/dL 0 52     Results from last 7 days   Lab Units 06/08/23  0754   INR  1 07                   Lines/Drains:  Invasive Devices     Peripheral Intravenous Line  Duration           Peripheral IV 06/08/23 Left Antecubital 1 day                      Imaging: Reviewed radiology reports from this admission including: ultrasound(s)    Recent Cultures (last 7 days):         Last 24 Hours Medication List:   Current Facility-Administered Medications   Medication Dose Route Frequency Provider Last Rate   • acetaminophen  650 mg Oral Q6H PRN [facility] Yusuf, DO     • albuterol  2 puff Inhalation Q4H PRN [facility], DO     • amitriptyline  50 mg Oral HS [facility], DO     • apixaban  10 mg Oral BID [facility], DO     • aspirin  81 mg Oral Daily [facility], DO     • diltiazem  180 mg Oral Daily [facility], DO     • fluticasone  1 spray Nasal Daily [facility], DO     • furosemide  40 mg Oral Daily [facility], DO     • HYDROmorphone  0 5 mg Intravenous Q4H PRN [facility], DO     • isosorbide mononitrate  30 mg Oral Daily [facility], DO     • metoprolol succinate  25 mg Oral BID [facility], DO     • ondansetron  4 mg Intravenous Q6H PRN [facility], DO     • oxyCODONE  5 mg Oral Q4H PRN [facility], DO     • pantoprazole  40 mg Oral Daily [facility], DO     • pregabalin  150 mg Oral BID [facility], DO     • senna-docusate sodium  1 tablet Oral HS Rickie Sneed PA-C     • tamsulosin  0 4 mg [facility]     • umeclidinium  1 puff Inhalation Daily [address], DO          Today, Patient Was Seen By: ADRIANA Nascimento    **Please Note: This note may have been constructed using a voice recognition system  **

## 2023-06-09 NOTE — ASSESSMENT & PLAN NOTE
· BP reviewed and acceptable  · Continue Diltiazem 180 mg daily and Toprol as above  · Monitor BP trends ad adjust medications as indicated

## 2023-06-09 NOTE — ASSESSMENT & PLAN NOTE
· S/p CABG with cath demonstrating occluded LIMA and chronically occluded RCA  · RCA was not amenable to PCI and medical management was recommended  · Continue ASA as above, Imdur 30 mg daily, and Toprol 25 mg BID  · Patient is not currently on statin therapy as above

## 2023-06-09 NOTE — ASSESSMENT & PLAN NOTE
· Presented for worsening leg pain   · Arterial Duplex (5/12): The resting evaluation of the right leg shows diffuse atherosclerotic arterial disease with no focal stenosis in the femoro-popliteal arteries  Ankle/Brachial index: 1 21 which is in the normal disease category  The resting evaluation of the left leg shows diffuse atherosclerotic arterial disease with nofocal stenosis in the femoro-popliteal arteries  Ankle/Brachial index: 1 18 which is in the normal disease category    · Continue ASA 81 mg daily; was previously on statin but taken off due to side effects and has refused trial of alternative statin  · Following outpatient with Dr Cielo Galindo Doctor

## 2023-06-10 NOTE — ASSESSMENT & PLAN NOTE
· BP reviewed and acceptable  · Continue Diltiazem 180 mg daily and Toprol as above  · Monitor BP trends and adjust medications as indicated

## 2023-06-12 ENCOUNTER — TRANSITIONAL CARE MANAGEMENT (OUTPATIENT)
Dept: INTERNAL MEDICINE CLINIC | Facility: CLINIC | Age: 68
End: 2023-06-12

## 2023-06-14 ENCOUNTER — TELEPHONE (OUTPATIENT)
Dept: INTERNAL MEDICINE CLINIC | Facility: CLINIC | Age: 68
End: 2023-06-14

## 2023-06-14 ENCOUNTER — OFFICE VISIT (OUTPATIENT)
Dept: INTERNAL MEDICINE CLINIC | Facility: CLINIC | Age: 68
End: 2023-06-14
Payer: COMMERCIAL

## 2023-06-14 VITALS
DIASTOLIC BLOOD PRESSURE: 64 MMHG | WEIGHT: 224.13 LBS | TEMPERATURE: 98 F | HEIGHT: 70 IN | BODY MASS INDEX: 32.09 KG/M2 | HEART RATE: 88 BPM | SYSTOLIC BLOOD PRESSURE: 132 MMHG | OXYGEN SATURATION: 98 %

## 2023-06-14 DIAGNOSIS — I82.452 ACUTE DEEP VEIN THROMBOSIS (DVT) OF LEFT PERONEAL VEIN (HCC): Primary | ICD-10-CM

## 2023-06-14 DIAGNOSIS — Z74.8 ASSISTANCE NEEDED WITH TRANSPORTATION: ICD-10-CM

## 2023-06-14 DIAGNOSIS — I82.4Z2 ACUTE DEEP VEIN THROMBOSIS (DVT) OF DISTAL VEIN OF LEFT LOWER EXTREMITY (HCC): Primary | ICD-10-CM

## 2023-06-14 PROCEDURE — 99496 TRANSJ CARE MGMT HIGH F2F 7D: CPT | Performed by: PHYSICIAN ASSISTANT

## 2023-06-14 NOTE — TELEPHONE ENCOUNTER
Pt forgot to ask you at his appt  He is requesting a letter for the bus company to take him to the store because he has a hard time walking

## 2023-06-15 NOTE — PROGRESS NOTES
Transition of Care  Follow-up After Hospitalization    Johnnie Walker 76 y o  male   Date:  6/15/2023    TCM Call     Date and time call was made  6/12/2023 10:58 AM    Hospital care reviewed  Records reviewed    Patient was hospitialized at  [address]    Date of Admission  06/08/23    Date of discharge  06/09/23    Diagnosis  Acute deep vein thrombosis (DVT) of left lower extremity    Disposition  Home    Were the patients medications reviewed and updated  Yes    Current Symptoms  None      TCM Call     Post hospital issues  None    Should patient be enrolled in anticoag monitoring? No    Scheduled for follow up? Yes    Did you obtain your prescribed medications  Yes    Do you need help managing your prescriptions or medications  No    Is transportation to your appointment needed  No    I have advised the patient to call PCP with any new or worsening symptoms  ronda farrell ma        Admit Date: 6/8  Discharge Date: 6/9  Diagnosis: LLE DVT  Location: [facility] records were reviewed  Medications upon discharge reviewed/updated  Medication Changes: started ASA and Eliquis  Imaging: venous duplex  Consults: vascular  Discharge Disposition:  home  Follow up visits with other specialists: vascular      Assessment and Plan:    1  LLE DVT: Encouraged to quit smoking  Continue Eliquis for at least 6 months  Will update doppler in 3-6 months  Surgical shoe ordered for pt but unsure if he will be able to get this  Social work referral provided  Kana Ervin was seen today for transition of care management  Diagnoses and all orders for this visit:    Acute deep vein thrombosis (DVT) of distal vein of left lower extremity ([address] )  -     [employer]            HPI:  Pt presents for TCM  At our last visit he presented with LLE swelling and pain  He was recommended to go to the ER for evaluation for DVT  He did so but not until the following day, 6/8/23   He underwent duplex which revealed normal RLE but large clot in the LLE  He was consulted by vascular and no operative intervention recommended  He was started on Eliquis  He was kept overnight and discharged 6/9/23  He was also started on ASA 81mg daily  He refuses a statin as previously recommended  This clot was likely provoked due to pt being very sedentary, having significant vascular disease and significant smoking history  He is asking for a RX for a show that has velcro closure like a post op surgical shoe as he has too much pain to wear his sneakers  He notes many socio-economic struggles with transportation and caring for himself at home as he lives alone and has no immediate family locally  ROS: Review of Systems   Constitutional: Negative for chills and fever  HENT: Negative for congestion, ear pain, hearing loss, postnasal drip, rhinorrhea, sinus pressure, sinus pain, sore throat and trouble swallowing  Eyes: Negative for pain and visual disturbance  Respiratory: Negative for cough, chest tightness, shortness of breath and wheezing  Cardiovascular: Positive for leg swelling  Negative for chest pain and palpitations  Gastrointestinal: Negative for abdominal pain, blood in stool, constipation, diarrhea, nausea and vomiting  Endocrine: Negative for cold intolerance, heat intolerance, polydipsia, polyphagia and polyuria  Genitourinary: Negative for difficulty urinating, dysuria, flank pain and urgency  Musculoskeletal: Positive for gait problem  Negative for arthralgias, back pain and myalgias  Skin: Negative for rash  Allergic/Immunologic: Negative  Neurological: Negative for dizziness, weakness, light-headedness and headaches  Hematological: Negative  Psychiatric/Behavioral: Negative for behavioral problems, dysphoric mood and sleep disturbance  The patient is not nervous/anxious          Past Medical History:   Diagnosis Date   • Acute right MCA stroke ([address] ) 9/15/2018   • Arthritis    • CAD (coronary artery disease) s/p CABG 2000   • COPD (chronic obstructive pulmonary disease) ([facility])    • GERD (gastroesophageal reflux disease)    • Grand mal status ([facility] ) 3/24/2019   • Heart attack ([facility] )    • Heart failure ([facility] )    • Hyperlipidemia    • Hypertension    • Lexiscan nuclear stress test 03/19/2016    EF 74% Normal   • Myocardial infarction [facility])    • Shortness of breath    • Stroke [facility])    • TIA (transient ischemic attack) 09/13/2018       Past Surgical History:   Procedure Laterality Date   • CARDIAC CATHETERIZATION  08/19/2015    LIMA occluded  No severe native lesions   • CARDIAC CATHETERIZATION N/A 12/03/2021    Procedure: Cardiac Coronary Angiogram;  Surgeon: Josh Chavez MD;  Location: [facility] CARDIAC [facility]ATH [facility]; Service: Cardiology   • CARDIAC CATHETERIZATION  12/03/2021    Procedure: Cardiac catheterization;  Surgeon: Josh Chavez MD;  Location: [facility];   Service: Cardiology   • COLONOSCOPY     • CORONARY ARTERY BYPASS GRAFT  2000   • CYSTOSCOPY  10/31/2022    [facility]   • HERNIA REPAIR      umbilical hernia x2   • TONSILLECTOMY         Social History     Socioeconomic History   • Marital status: Single     Spouse name: None   • Number of children: None   • Years of education: None   • Highest education level: None   Occupational History   • None   Tobacco Use   • Smoking status: Every Day     Packs/day: 1 00     Years: 50 00     Total pack years: 50 00     Types: Cigarettes     Start date: 1970   • Smokeless tobacco: Never   Vaping Use   • Vaping Use: Never used   Substance and Sexual Activity   • Alcohol use: Yes     Comment: Socially   • Drug use: Never   • Sexual activity: Not Currently   Other Topics Concern   • None   Social History Narrative   • None     Social Determinants of Health     Financial Resource Strain: Low Risk  (6/7/2023)    Overall Financial Resource Strain (CARDIA)    • Difficulty of Paying Living Expenses: Not very hard   Food Insecurity: No Food Insecurity (6/9/2023)    Hunger Vital Sign    • Worried About Running Out of Food in the Last Year: Never true    • Ran Out of Food in the Last Year: Never true   Transportation Needs: No Transportation Needs (6/9/2023)    PRAPARE - Transportation    • Lack of Transportation (Medical): No    • Lack of Transportation (Non-Medical):  No   Physical Activity: Not on file   Stress: Not on file   Social Connections: Not on file   Intimate Partner Violence: Not on file   Housing Stability: Low Risk  (6/9/2023)    Housing Stability Vital Sign    • Unable to Pay for Housing in the Last Year: No    • Number of Places Lived in the Last Year: 1    • Unstable Housing in the Last Year: No       Family History   Problem Relation Age of Onset   • Heart disease Mother    • Cancer Father    • Aneurysm Father    • Cancer Maternal Grandmother    • Cancer Paternal Grandfather        Allergies   Allergen Reactions   • Gabapentin Headache         Current Outpatient Medications:   •  albuterol (2 5 mg/3 mL) 0 083 % nebulizer solution, Take 1 vial (2 5 mg total) by nebulization every 6 (six) hours as needed for wheezing or shortness of breath, Disp: 25 vial, Rfl: 2  •  albuterol (Ventolin HFA) 90 mcg/act inhaler, Inhale 2 puffs every 6 (six) hours as needed for shortness of breath, Disp: 54 g, Rfl: 1  •  apixaban (Eliquis) 5 mg, Take 1 tablet (5 mg total) by mouth 2 (two) times a day Do not start before Sujey 15, 2023 , Disp: 30 tablet, Rfl: 0  •  apixaban (ELIQUIS) 5 mg, Take 2 tablets (10 mg total) by mouth 2 (two) times a day for 12 doses, Disp: 24 tablet, Rfl: 0  •  aspirin (ECOTRIN LOW STRENGTH) 81 mg EC tablet, Take 1 tablet (81 mg total) by mouth daily, Disp: 30 tablet, Rfl: 0  •  azelastine (ASTELIN) 0 1 % nasal spray, 1 spray into each nostril 2 (two) times a day Use in each nostril as directed, Disp: 1 mL, Rfl: 2  •  diltiazem (CARDIZEM CD) 180 mg 24 hr capsule, take 1 capsule by mouth daily, Disp: 30 capsule, Rfl: 5  •  docusate sodium (COLACE) 100 mg capsule, Take 1 capsule (100 mg total) by mouth daily Do not start before Sujey 10, 2023 , Disp: 30 capsule, Rfl: 0  •  fluticasone (FLONASE) 50 mcg/act nasal spray, 1 spray into each nostril daily, Disp: 9 9 mL, Rfl: 0  •  furosemide (LASIX) 40 mg tablet, take 1 tablet by mouth once daily, Disp: 90 tablet, Rfl: 3  •  isosorbide mononitrate (IMDUR) 30 mg 24 hr tablet, Take 1 tablet (30 mg total) by mouth daily, Disp: 90 tablet, Rfl: 3  •  Linzess 290 MCG CAPS, take 1 capsule by mouth AT LEAST 30 MINUTES BEFORE FIRST MEAL OF THE DAY, Disp: , Rfl:   •  metoprolol succinate (TOPROL-XL) 25 mg 24 hr tablet, take 1 tablet by mouth twice a day, Disp: 180 tablet, Rfl: 3  •  oxyCODONE (ROXICODONE) 5 immediate release tablet, Take 1 tablet (5 mg total) by mouth every 4 (four) hours as needed for moderate pain for up to 10 days Max Daily Amount: 30 mg, Disp: 20 tablet, Rfl: 0  •  pantoprazole (PROTONIX) 40 mg tablet, take 1 tablet by mouth once daily, Disp: 90 tablet, Rfl: 0  •  polyethylene glycol (MIRALAX) 17 g packet, Take 17 g by mouth daily as needed (constipation), Disp: 10 each, Rfl: 0  •  Potassium Chloride ER 20 MEQ TBCR, Take 1 tablet (20 mEq total) by mouth daily, Disp: 30 tablet, Rfl: 11  •  tiotropium (Spiriva HandiHaler) 18 mcg inhalation capsule, Place 1 capsule (18 mcg total) into inhaler and inhale daily In the Handhaler, Disp: 30 capsule, Rfl: 2  •  amitriptyline (ELAVIL) 50 mg tablet, take 1 tablet by mouth daily at bedtime (Patient not taking: Reported on 6/14/2023), Disp: 30 tablet, Rfl: 2  •  pregabalin (LYRICA) 150 mg capsule, Take 1 capsule (150 mg total) by mouth 2 (two) times a day (Patient not taking: Reported on 6/14/2023), Disp: 60 capsule, Rfl: 2  •  Restasis 0 05 % ophthalmic emulsion, instill 1 drop INTO AFFECTED EYE(S) every 12 hours (Patient not taking: Reported on 6/14/2023), Disp: , Rfl:   •  senna-docusate sodium (SENOKOT S) 8 6-50 mg per tablet, Take 1 tablet by mouth daily at bedtime (Patient not taking: "Reported on 6/14/2023), Disp: 30 tablet, Rfl: 0  •  tamsulosin (FLOMAX) 0 4 mg, Take 1 capsule (0 4 mg total) by mouth 2 (two) times a day, Disp: 60 capsule, Rfl: 12      Physical Exam:  /64 (BP Location: Left arm, Patient Position: Sitting)   Pulse 88   Temp 98 °F (36 7 °C)   Ht 5' 10\" (1 778 m)   Wt 102 kg (224 lb 2 oz)   SpO2 98%   BMI 32 16 kg/m²     Physical Exam  Vitals and nursing note reviewed  Constitutional:       General: He is not in acute distress  Appearance: He is well-developed  He is not diaphoretic  HENT:      Head: Normocephalic and atraumatic  Right Ear: External ear normal       Left Ear: External ear normal       Nose: Nose normal       Mouth/Throat:      Pharynx: No oropharyngeal exudate  Eyes:      General: No scleral icterus  Right eye: No discharge  Left eye: No discharge  Conjunctiva/sclera: Conjunctivae normal       Pupils: Pupils are equal, round, and reactive to light  Neck:      Thyroid: No thyromegaly  Cardiovascular:      Rate and Rhythm: Normal rate and regular rhythm  Heart sounds: Normal heart sounds  No murmur heard  No friction rub  No gallop  Pulmonary:      Effort: Pulmonary effort is normal  No respiratory distress  Breath sounds: Normal breath sounds  No wheezing or rales  Abdominal:      General: Bowel sounds are normal  There is no distension  Palpations: Abdomen is soft  Tenderness: There is no abdominal tenderness  Musculoskeletal:         General: No tenderness or deformity  Normal range of motion  Cervical back: Normal range of motion and neck supple  Skin:     General: Skin is warm and dry  Neurological:      Mental Status: He is alert and oriented to person, place, and time  Cranial Nerves: No cranial nerve deficit  Psychiatric:         Behavior: Behavior normal          Thought Content:  Thought content normal          Judgment: Judgment normal              Labs:  Lab " Results   Component Value Date    HCT 36 2 (L) 06/09/2023    HGB 12 4 06/09/2023    MCV 92 06/09/2023     06/09/2023    WBC 8 97 06/09/2023     Lab Results   Component Value Date    ALKPHOS 108 (H) 06/09/2023    ALT 32 06/09/2023    ANIONGAP 12 4 06/22/2018    AST 34 06/09/2023    BILITOT 0 4 06/22/2018    BUN 12 06/09/2023    CALCIUM 8 2 (L) 06/09/2023     06/09/2023    CO2 25 06/09/2023    CORRECTEDCA 8 8 06/09/2023    CREATININE 1 19 06/09/2023    EGFR 62 06/09/2023    GLUF 99 03/16/2023    K 4 1 06/09/2023     06/22/2018    PROT 5 6 (L) 06/22/2018

## 2023-06-19 ENCOUNTER — PATIENT OUTREACH (OUTPATIENT)
Dept: INTERNAL MEDICINE CLINIC | Facility: CLINIC | Age: 68
End: 2023-06-19

## 2023-06-19 DIAGNOSIS — Z71.89 ENCOUNTER FOR COUNSELING FOR CARE MANAGEMENT OF PATIENT WITH CHRONIC CONDITIONS AND COMPLEX HEALTH NEEDS USING NURSE-BASED MODEL: Primary | ICD-10-CM

## 2023-06-19 NOTE — PROGRESS NOTES
RUSS DRUMMOND outreached pt to introduce self and offer assistance  Pt stated he is set up with [employer], but they will not take him to the grocery store  RUSS DRUMMOND offered to call [place] to discuss and pt was agreeable  Call placed to [facility]  Staff stated pt is set up with services and he needs to pay to go shopping  Pt can purchase tickets from the website or by phone  RUSS DRUMMOND confirmed with staff that a letter from pt's PCP is not necessary  Staff stated that they do not have a program that requires a letter  Called pt to share information from [facility]  Pt stated he knows that he needs to purchase tickets  RUSS DRUMMOND asked why he requested a letter from his PCP  Pt stated he does not know if he is allowed to walk far d/t having a blood clot  RUSS DRUMMOND asked if pt has contacted the local food pantry to discuss options for delivery  Pt stated he will not do that because he has food at home  RUSS DRUMMOND encouraged pt to discuss this with his PCP  Pt stated he doesn't know if that is who he should ask  RUSS DRUMMOND offered referral to RN CM and pt was agreeable  Note routed to PCP  Referral closed

## 2023-06-20 ENCOUNTER — PATIENT OUTREACH (OUTPATIENT)
Dept: INTERNAL MEDICINE CLINIC | Facility: CLINIC | Age: 68
End: 2023-06-20

## 2023-06-20 NOTE — PROGRESS NOTES
Outpatient Care Management Note:  Received return call from Ciera  He is unsure of restrictions involved around his blood clot  Elmo Sandoval that, as his PCP told him, activity and smoking cessation would both be beneficial   Miguelangel verbalizes cramping in his feet when walking distances  Upon further questioning this has been occurring prior to the DVT  Again stressed the importance of activity  Ciera then verbalized that he gets hot and sweaty when walking up a hill  Discussed that as this is also not a new finding, it is more likely due to weather, his COPD, weight and smoking than it is his DVT  Ciera does not want to have to walk to the store  Reinforced discussion with RUSS DRUMMOND in which he was informed that bus transportation is available but he needs to purchase tickets for same  He is interested in this being provided at no cost with a letter from his PCP  Advised him that , as the RUSS DRUMMOND told him, this is not an option  No further needs at this time

## 2023-06-20 NOTE — PROGRESS NOTES
Outpatient Care Management Note:  In basket referral received from RUSS DRUMMOND  Mr Ping Giraldo has questions regarding his DVT  Chart review completed  Outreach call attempted to Mr Ping Giraldo  Message left for patient to please return call  Contact information left on message

## 2023-06-26 DIAGNOSIS — J44.9 CHRONIC OBSTRUCTIVE PULMONARY DISEASE, UNSPECIFIED COPD TYPE (HCC): ICD-10-CM

## 2023-06-26 RX ORDER — TIOTROPIUM BROMIDE 18 UG/1
CAPSULE ORAL; RESPIRATORY (INHALATION)
Qty: 30 CAPSULE | Refills: 2 | Status: SHIPPED | OUTPATIENT
Start: 2023-06-26

## 2023-06-30 ENCOUNTER — TELEPHONE (OUTPATIENT)
Dept: INTERNAL MEDICINE CLINIC | Facility: CLINIC | Age: 68
End: 2023-06-30

## 2023-06-30 DIAGNOSIS — I82.409 DVT (DEEP VENOUS THROMBOSIS) (HCC): ICD-10-CM

## 2023-06-30 NOTE — TELEPHONE ENCOUNTER
Pt needs apixaban (Eliquis) 5 mg refilled, send to [facility] on [facility]  Pt needs ASAP

## 2023-07-03 ENCOUNTER — APPOINTMENT (EMERGENCY)
Dept: RADIOLOGY | Facility: HOSPITAL | Age: 68
End: 2023-07-03
Payer: COMMERCIAL

## 2023-07-03 ENCOUNTER — HOSPITAL ENCOUNTER (INPATIENT)
Facility: HOSPITAL | Age: 68
LOS: 1 days | Discharge: HOME | End: 2023-07-05
Attending: EMERGENCY MEDICINE | Admitting: INTERNAL MEDICINE
Payer: COMMERCIAL

## 2023-07-03 ENCOUNTER — APPOINTMENT (EMERGENCY)
Dept: CT IMAGING | Facility: HOSPITAL | Age: 68
End: 2023-07-03
Payer: COMMERCIAL

## 2023-07-03 DIAGNOSIS — I26.99 ACUTE PULMONARY EMBOLISM WITHOUT ACUTE COR PULMONALE, UNSPECIFIED PULMONARY EMBOLISM TYPE (HCC): ICD-10-CM

## 2023-07-03 DIAGNOSIS — J40 BRONCHITIS: ICD-10-CM

## 2023-07-03 DIAGNOSIS — K59.09 CHRONIC CONSTIPATION: Primary | ICD-10-CM

## 2023-07-03 DIAGNOSIS — I26.99 PULMONARY EMBOLI (HCC): Primary | ICD-10-CM

## 2023-07-03 DIAGNOSIS — J44.9 COPD (CHRONIC OBSTRUCTIVE PULMONARY DISEASE) (HCC): ICD-10-CM

## 2023-07-03 PROBLEM — K43.9 HERNIA OF ABDOMINAL WALL: Status: ACTIVE | Noted: 2023-07-03

## 2023-07-03 LAB
2HR DELTA HS TROPONIN: 0 NG/L
ALBUMIN SERPL BCP-MCNC: 3.8 G/DL (ref 3.5–5)
ALP SERPL-CCNC: 93 U/L (ref 34–104)
ALT SERPL W P-5'-P-CCNC: 9 U/L (ref 7–52)
ANION GAP SERPL CALCULATED.3IONS-SCNC: 9 MMOL/L
APTT PPP: 28 SECONDS (ref 23–37)
AST SERPL W P-5'-P-CCNC: 11 U/L (ref 13–39)
ATRIAL RATE: 75 BPM
BASOPHILS # BLD AUTO: 0.04 THOUSANDS/ÂΜL (ref 0–0.1)
BASOPHILS NFR BLD AUTO: 0 % (ref 0–1)
BILIRUB SERPL-MCNC: 0.42 MG/DL (ref 0.2–1)
BUN SERPL-MCNC: 9 MG/DL (ref 5–25)
CALCIUM SERPL-MCNC: 8.6 MG/DL (ref 8.4–10.2)
CARDIAC TROPONIN I PNL SERPL HS: 4 NG/L
CARDIAC TROPONIN I PNL SERPL HS: 4 NG/L
CHLORIDE SERPL-SCNC: 100 MMOL/L (ref 96–108)
CO2 SERPL-SCNC: 24 MMOL/L (ref 21–32)
CREAT SERPL-MCNC: 1.15 MG/DL (ref 0.6–1.3)
EOSINOPHIL # BLD AUTO: 0.29 THOUSAND/ÂΜL (ref 0–0.61)
EOSINOPHIL NFR BLD AUTO: 3 % (ref 0–6)
ERYTHROCYTE [DISTWIDTH] IN BLOOD BY AUTOMATED COUNT: 12.4 % (ref 11.6–15.1)
GFR SERPL CREATININE-BSD FRML MDRD: 65 ML/MIN/1.73SQ M
GLUCOSE SERPL-MCNC: 110 MG/DL (ref 65–140)
HCT VFR BLD AUTO: 35.7 % (ref 36.5–49.3)
HGB BLD-MCNC: 12.3 G/DL (ref 12–17)
IMM GRANULOCYTES # BLD AUTO: 0.05 THOUSAND/UL (ref 0–0.2)
IMM GRANULOCYTES NFR BLD AUTO: 1 % (ref 0–2)
INR PPP: 1.15 (ref 0.84–1.19)
LYMPHOCYTES # BLD AUTO: 1.79 THOUSANDS/ÂΜL (ref 0.6–4.47)
LYMPHOCYTES NFR BLD AUTO: 20 % (ref 14–44)
MCH RBC QN AUTO: 31.1 PG (ref 26.8–34.3)
MCHC RBC AUTO-ENTMCNC: 34.5 G/DL (ref 31.4–37.4)
MCV RBC AUTO: 90 FL (ref 82–98)
MONOCYTES # BLD AUTO: 0.49 THOUSAND/ÂΜL (ref 0.17–1.22)
MONOCYTES NFR BLD AUTO: 5 % (ref 4–12)
NEUTROPHILS # BLD AUTO: 6.4 THOUSANDS/ÂΜL (ref 1.85–7.62)
NEUTS SEG NFR BLD AUTO: 71 % (ref 43–75)
NRBC BLD AUTO-RTO: 0 /100 WBCS
P AXIS: 52 DEGREES
PLATELET # BLD AUTO: 229 THOUSANDS/UL (ref 149–390)
PMV BLD AUTO: 8.6 FL (ref 8.9–12.7)
POTASSIUM SERPL-SCNC: 3.4 MMOL/L (ref 3.5–5.3)
PR INTERVAL: 158 MS
PROT SERPL-MCNC: 6.2 G/DL (ref 6.4–8.4)
PROTHROMBIN TIME: 14.7 SECONDS (ref 11.6–14.5)
QRS AXIS: 81 DEGREES
QRSD INTERVAL: 90 MS
QT INTERVAL: 402 MS
QTC INTERVAL: 448 MS
RBC # BLD AUTO: 3.96 MILLION/UL (ref 3.88–5.62)
SODIUM SERPL-SCNC: 133 MMOL/L (ref 135–147)
T WAVE AXIS: 78 DEGREES
VENTRICULAR RATE: 75 BPM
WBC # BLD AUTO: 9.06 THOUSAND/UL (ref 4.31–10.16)

## 2023-07-03 PROCEDURE — NC001 PR NO CHARGE: Performed by: NURSE PRACTITIONER

## 2023-07-03 PROCEDURE — 85025 COMPLETE CBC W/AUTO DIFF WBC: CPT | Performed by: EMERGENCY MEDICINE

## 2023-07-03 PROCEDURE — 99285 EMERGENCY DEPT VISIT HI MDM: CPT | Performed by: EMERGENCY MEDICINE

## 2023-07-03 PROCEDURE — 85610 PROTHROMBIN TIME: CPT | Performed by: PHYSICIAN ASSISTANT

## 2023-07-03 PROCEDURE — 93005 ELECTROCARDIOGRAM TRACING: CPT

## 2023-07-03 PROCEDURE — 71275 CT ANGIOGRAPHY CHEST: CPT

## 2023-07-03 PROCEDURE — 71045 X-RAY EXAM CHEST 1 VIEW: CPT

## 2023-07-03 PROCEDURE — 99285 EMERGENCY DEPT VISIT HI MDM: CPT

## 2023-07-03 PROCEDURE — 36415 COLL VENOUS BLD VENIPUNCTURE: CPT

## 2023-07-03 PROCEDURE — 80053 COMPREHEN METABOLIC PANEL: CPT | Performed by: EMERGENCY MEDICINE

## 2023-07-03 PROCEDURE — G1004 CDSM NDSC: HCPCS

## 2023-07-03 PROCEDURE — 93010 ELECTROCARDIOGRAM REPORT: CPT | Performed by: INTERNAL MEDICINE

## 2023-07-03 PROCEDURE — 84484 ASSAY OF TROPONIN QUANT: CPT | Performed by: EMERGENCY MEDICINE

## 2023-07-03 PROCEDURE — 85730 THROMBOPLASTIN TIME PARTIAL: CPT | Performed by: PHYSICIAN ASSISTANT

## 2023-07-03 RX ORDER — IPRATROPIUM BROMIDE AND ALBUTEROL SULFATE 2.5; .5 MG/3ML; MG/3ML
3 SOLUTION RESPIRATORY (INHALATION)
Status: DISCONTINUED | OUTPATIENT
Start: 2023-07-03 | End: 2023-07-03

## 2023-07-03 RX ORDER — HEPARIN SODIUM 1000 [USP'U]/ML
7600 INJECTION, SOLUTION INTRAVENOUS; SUBCUTANEOUS EVERY 6 HOURS PRN
Status: DISCONTINUED | OUTPATIENT
Start: 2023-07-03 | End: 2023-07-05

## 2023-07-03 RX ORDER — LINACLOTIDE 290 UG/1
CAPSULE, GELATIN COATED ORAL
Qty: 30 CAPSULE | Refills: 2 | Status: SHIPPED | OUTPATIENT
Start: 2023-07-03

## 2023-07-03 RX ORDER — HEPARIN SODIUM 10000 [USP'U]/100ML
3-30 INJECTION, SOLUTION INTRAVENOUS
Status: DISCONTINUED | OUTPATIENT
Start: 2023-07-03 | End: 2023-07-05

## 2023-07-03 RX ORDER — IPRATROPIUM BROMIDE AND ALBUTEROL SULFATE 2.5; .5 MG/3ML; MG/3ML
3 SOLUTION RESPIRATORY (INHALATION) ONCE
Status: COMPLETED | OUTPATIENT
Start: 2023-07-03 | End: 2023-07-03

## 2023-07-03 RX ORDER — DEXAMETHASONE SODIUM PHOSPHATE 10 MG/ML
8 INJECTION, SOLUTION INTRAMUSCULAR; INTRAVENOUS ONCE
Status: COMPLETED | OUTPATIENT
Start: 2023-07-03 | End: 2023-07-03

## 2023-07-03 RX ORDER — HEPARIN SODIUM 1000 [USP'U]/ML
7600 INJECTION, SOLUTION INTRAVENOUS; SUBCUTANEOUS ONCE
Status: COMPLETED | OUTPATIENT
Start: 2023-07-03 | End: 2023-07-03

## 2023-07-03 RX ORDER — ACETAMINOPHEN 325 MG/1
650 TABLET ORAL EVERY 4 HOURS PRN
Status: DISCONTINUED | OUTPATIENT
Start: 2023-07-03 | End: 2023-07-05 | Stop reason: HOSPADM

## 2023-07-03 RX ORDER — NICOTINE 21 MG/24HR
1 PATCH, TRANSDERMAL 24 HOURS TRANSDERMAL DAILY
Status: DISCONTINUED | OUTPATIENT
Start: 2023-07-04 | End: 2023-07-05 | Stop reason: HOSPADM

## 2023-07-03 RX ORDER — HEPARIN SODIUM 1000 [USP'U]/ML
3800 INJECTION, SOLUTION INTRAVENOUS; SUBCUTANEOUS EVERY 6 HOURS PRN
Status: DISCONTINUED | OUTPATIENT
Start: 2023-07-03 | End: 2023-07-05

## 2023-07-03 RX ORDER — ONDANSETRON 2 MG/ML
4 INJECTION INTRAMUSCULAR; INTRAVENOUS EVERY 6 HOURS PRN
Status: DISCONTINUED | OUTPATIENT
Start: 2023-07-03 | End: 2023-07-05 | Stop reason: HOSPADM

## 2023-07-03 RX ADMIN — HEPARIN SODIUM 7600 UNITS: 1000 INJECTION INTRAVENOUS; SUBCUTANEOUS at 23:08

## 2023-07-03 RX ADMIN — DEXAMETHASONE SODIUM PHOSPHATE 8 MG: 10 INJECTION, SOLUTION INTRAMUSCULAR; INTRAVENOUS at 22:43

## 2023-07-03 RX ADMIN — HEPARIN SODIUM 18 UNITS/KG/HR: 10000 INJECTION, SOLUTION INTRAVENOUS at 23:09

## 2023-07-03 RX ADMIN — IOHEXOL 90 ML: 350 INJECTION, SOLUTION INTRAVENOUS at 19:22

## 2023-07-03 RX ADMIN — IPRATROPIUM BROMIDE AND ALBUTEROL SULFATE 3 ML: .5; 3 SOLUTION RESPIRATORY (INHALATION) at 18:25

## 2023-07-03 RX ADMIN — MORPHINE SULFATE 2 MG: 2 INJECTION, SOLUTION INTRAMUSCULAR; INTRAVENOUS at 22:43

## 2023-07-03 NOTE — ED PROVIDER NOTES
History  Chief Complaint   Patient presents with   • Shortness of Breath     Patient reports increasing over the past few days. Worse with exertion. • Hand Swelling     Patient reports left hand swelling for the past 3 days, denies injury   • Back Pain     Patient reports mid shoulder blade pain, left sided. 70-year-old male with history of DVT, on Eliquis presents to the emergency room complaining of shortness of breath. Patient also notes he has pain into his back. Patient denies any fever or chills, but notes that it feels like it is just a strain to breathe. Patient continues to smoke. Patient notes that he feels like his leg is heavy due to blood clots and frequently is dragging it. Patient had a venous and arterial duplex done about a month ago. Prior to Admission Medications   Prescriptions Last Dose Informant Patient Reported? Taking?    Linzess 290 MCG CAPS   No No   Sig: Take 1 capsule by mouth AT LEAST 30 MINUTES BEFORE FIRST MEAL OF THE DAY   Potassium Chloride ER 20 MEQ TBCR  Self No No   Sig: Take 1 tablet (20 mEq total) by mouth daily   Restasis 0.05 % ophthalmic emulsion  Self Yes No   Sig: instill 1 drop INTO AFFECTED EYE(S) every 12 hours   Patient not taking: Reported on 6/14/2023   Spiriva HandiHaler 18 MCG inhalation capsule   No No   Sig: place 1 capsule INTO INHALER AND INHALE DAILY IN THE HANDHALER   albuterol (2.5 mg/3 mL) 0.083 % nebulizer solution  Self No No   Sig: Take 1 vial (2.5 mg total) by nebulization every 6 (six) hours as needed for wheezing or shortness of breath   albuterol (Ventolin HFA) 90 mcg/act inhaler  Self No No   Sig: Inhale 2 puffs every 6 (six) hours as needed for shortness of breath   amitriptyline (ELAVIL) 50 mg tablet  Self No No   Sig: take 1 tablet by mouth daily at bedtime   Patient not taking: Reported on 6/14/2023   apixaban (ELIQUIS) 5 mg   No No   Sig: Take 2 tablets (10 mg total) by mouth 2 (two) times a day for 12 doses   apixaban (Eliquis) 5 mg   No No   Sig: Take 1 tablet (5 mg total) by mouth 2 (two) times a day   aspirin (ECOTRIN LOW STRENGTH) 81 mg EC tablet  Self No No   Sig: Take 1 tablet (81 mg total) by mouth daily   azelastine (ASTELIN) 0.1 % nasal spray  Self No No   Si spray into each nostril 2 (two) times a day Use in each nostril as directed   diltiazem (CARDIZEM CD) 180 mg 24 hr capsule  Self No No   Sig: take 1 capsule by mouth daily   docusate sodium (COLACE) 100 mg capsule   No No   Sig: Take 1 capsule (100 mg total) by mouth daily Do not start before Sujey 10, 2023.    fluticasone (FLONASE) 50 mcg/act nasal spray  Self No No   Si spray into each nostril daily   furosemide (LASIX) 40 mg tablet  Self No No   Sig: take 1 tablet by mouth once daily   isosorbide mononitrate (IMDUR) 30 mg 24 hr tablet  Self No No   Sig: Take 1 tablet (30 mg total) by mouth daily   metoprolol succinate (TOPROL-XL) 25 mg 24 hr tablet  Self No No   Sig: take 1 tablet by mouth twice a day   pantoprazole (PROTONIX) 40 mg tablet  Self No No   Sig: take 1 tablet by mouth once daily   polyethylene glycol (MIRALAX) 17 g packet   No No   Sig: Take 17 g by mouth daily as needed (constipation)   pregabalin (LYRICA) 150 mg capsule  Self No No   Sig: Take 1 capsule (150 mg total) by mouth 2 (two) times a day   Patient not taking: Reported on 2023   senna-docusate sodium (SENOKOT S) 8.6-50 mg per tablet   No No   Sig: Take 1 tablet by mouth daily at bedtime   Patient not taking: Reported on 2023   tamsulosin (FLOMAX) 0.4 mg  Self No No   Sig: Take 1 capsule (0.4 mg total) by mouth 2 (two) times a day      Facility-Administered Medications: None       Past Medical History:   Diagnosis Date   • Acute right MCA stroke ([address] [facility]) 09/15/2018   • Arthritis    • CAD (coronary artery disease)     s/p CABG    • COPD (chronic obstructive pulmonary disease) ([facility])    • GERD (gastroesophageal reflux disease)    • Grand mal status ([address] [facility]) 2019   • H/O blood clots    • Heart attack ([address])    • Heart failure ([address])    • Hyperlipidemia    • Hypertension    • Lexiscan nuclear stress test 03/19/2016    EF 74% Normal   • Myocardial infarction [facility])    • Shortness of breath    • Stroke [facility])    • TIA (transient ischemic attack) 09/13/2018       Past Surgical History:   Procedure Laterality Date   • CARDIAC CATHETERIZATION  08/19/2015    LIMA occluded. No severe native lesions   • CARDIAC CATHETERIZATION N/A 12/03/2021    Procedure: Cardiac Coronary Angiogram;  Surgeon: Aracely Machado MD;  Location: [facility] CARDIAC CATH LAB; Service: Cardiology   • CARDIAC CATHETERIZATION  12/03/2021    Procedure: Cardiac catheterization;  Surgeon: Aracely Machado MD;  Location: [facility] CARDIAC CATH LAB; Service: Cardiology   • COLONOSCOPY     • CORONARY ARTERY BYPASS GRAFT  2000   • CYSTOSCOPY  10/31/2022    [facility]   • HERNIA REPAIR      umbilical hernia x2   • TONSILLECTOMY         Family History   Problem Relation Age of Onset   • Heart disease Mother    • Cancer Father    • Aneurysm Father    • Cancer Maternal Grandmother    • Cancer Paternal Grandfather      I have reviewed and agree with the history as documented. E-Cigarette/Vaping   • E-Cigarette Use Never User      E-Cigarette/Vaping Substances   • Nicotine Yes    • THC No    • CBD No    • Flavoring No    • Other No    • Unknown No      Social History     Tobacco Use   • Smoking status: Every Day     Packs/day: 1.00     Years: 50.00     Total pack years: 50.00     Types: Cigarettes     Start date: 1970   • Smokeless tobacco: Never   Vaping Use   • Vaping Use: Never used   Substance Use Topics   • Alcohol use: Not Currently     Comment: Socially   • Drug use: Never       Review of Systems   Constitutional: Positive for activity change. Negative for chills and fever. HENT: Positive for congestion. Negative for ear pain and sore throat. Eyes: Negative for pain and visual disturbance.    Respiratory: Positive for cough and shortness of breath. Cardiovascular: Negative for chest pain and palpitations. Gastrointestinal: Negative for abdominal pain and vomiting. Genitourinary: Negative for dysuria and hematuria. Musculoskeletal: Positive for gait problem. Negative for arthralgias and back pain. Skin: Negative for rash. Neurological: Positive for weakness. Negative for seizures and syncope. All other systems reviewed and are negative. Physical Exam  Physical Exam  Constitutional:       General: He is not in acute distress. Appearance: Normal appearance. He is normal weight. He is not ill-appearing. HENT:      Head: Normocephalic and atraumatic. Right Ear: External ear normal.      Left Ear: External ear normal.      Nose: Nose normal.      Mouth/Throat:      Mouth: Mucous membranes are moist.   Eyes:      Conjunctiva/sclera: Conjunctivae normal.   Cardiovascular:      Rate and Rhythm: Normal rate and regular rhythm. Pulses: Normal pulses. Heart sounds: Normal heart sounds. Pulmonary:      Effort: Pulmonary effort is normal.      Breath sounds: Decreased breath sounds, wheezing and rhonchi present. No rales. Chest:      Chest wall: No deformity or tenderness. Abdominal:      General: Abdomen is flat. There is no distension. Palpations: Abdomen is soft. There is no mass. Musculoskeletal:         General: No swelling, tenderness or deformity. Normal range of motion. Cervical back: Normal range of motion. Right lower leg: Edema present. Left lower leg: Edema present. Comments: Positive left lower extremity edema. Dorsalis pedis and posterior tibial pulses are 2/4   Skin:     General: Skin is warm and dry. Capillary Refill: Capillary refill takes 2 to 3 seconds. Coloration: Skin is not pale. Neurological:      General: No focal deficit present. Mental Status: He is alert and oriented to person, place, and time. Mental status is at baseline. Psychiatric:         Mood and Affect: Mood normal.         Vital Signs  ED Triage Vitals [07/03/23 1754]   Temperature Pulse Respirations Blood Pressure SpO2   (!) 97.2 °F (36.2 °C) 77 17 111/67 93 %      Temp Source Heart Rate Source Patient Position - Orthostatic VS BP Location FiO2 (%)   Tympanic Monitor Sitting -- --      Pain Score       6           Vitals:    07/03/23 1754 07/03/23 1906 07/04/23 0021   BP: 111/67 107/68    Pulse: 77 75 72   Patient Position - Orthostatic VS: Sitting           Visual Acuity      ED Medications  Medications   albuterol inhalation solution 2.5 mg (has no administration in time range)   aspirin (ECOTRIN LOW STRENGTH) EC tablet 81 mg (has no administration in time range)   azelastine (ASTELIN) 0.1 % nasal spray 1 spray (has no administration in time range)   diltiazem (CARDIZEM CD) 24 hr capsule 180 mg (has no administration in time range)   docusate sodium (COLACE) capsule 100 mg (has no administration in time range)   fluticasone (FLONASE) 50 mcg/act nasal spray 1 spray (has no administration in time range)   furosemide (LASIX) tablet 40 mg (has no administration in time range)   isosorbide mononitrate (IMDUR) 24 hr tablet 30 mg (has no administration in time range)   lubiprostone (AMITIZA) capsule 24 mcg (24 mcg Oral Given 7/4/23 0107)   metoprolol succinate (TOPROL-XL) 24 hr tablet 25 mg (has no administration in time range)   pantoprazole (PROTONIX) EC tablet 40 mg (has no administration in time range)   polyethylene glycol (MIRALAX) packet 17 g (has no administration in time range)   potassium chloride (K-DUR,KLOR-CON) CR tablet 20 mEq (has no administration in time range)   umeclidinium 62.5 mcg/actuation inhaler AEPB 1 puff (has no administration in time range)   tamsulosin (FLOMAX) capsule 0.4 mg (0.4 mg Oral Given 7/4/23 0107)   acetaminophen (TYLENOL) tablet 650 mg (has no administration in time range)   ondansetron (ZOFRAN) injection 4 mg (has no administration in time range)   nicotine (NICODERM CQ) 21 mg/24 hr TD 24 hr patch 1 patch (has no administration in time range)   heparin (porcine) 25,000 units in 0.45% NaCl 250 mL infusion (premix) (18 Units/kg/hr × 95 kg (Order-Specific) Intravenous New Bag 7/3/23 2309)   heparin (porcine) injection 7,600 Units (has no administration in time range)   heparin (porcine) injection 3,800 Units (has no administration in time range)   ipratropium-albuterol (DUO-NEB) 0.5-2.5 mg/3 mL inhalation solution 3 mL (3 mL Nebulization Given 7/3/23 1825)   iohexol (OMNIPAQUE) 350 MG/ML injection (MULTI-DOSE) 90 mL (90 mL Intravenous Given 7/3/23 1922)   dexamethasone (PF) (DECADRON) injection 8 mg (8 mg Intravenous Given 7/3/23 2243)   morphine injection 2 mg (2 mg Intravenous Given 7/3/23 2243)   heparin (porcine) injection 7,600 Units (7,600 Units Intravenous Given 7/3/23 2308)       Diagnostic Studies  Results Reviewed     Procedure Component Value Units Date/Time    APTT [[id number]]  (Normal) Collected: 07/03/23 2308    Lab Status: Final result Specimen: Blood from Arm, Left Updated: 07/03/23 2343     PTT 28 seconds     Protime-INR [[id number]]  (Abnormal) Collected: 07/03/23 2308    Lab Status: Final result Specimen: Blood from Arm, Left Updated: 07/03/23 2343     Protime 14.7 seconds      INR 1.15    HS Troponin I 2hr [[id number]]  (Normal) Collected: 07/03/23 2007    Lab Status: Final result Specimen: Blood from Arm, Right Updated: 07/03/23 2036     hs TnI 2hr 4 ng/L      Delta 2hr hsTnI 0 ng/L     HS Troponin 0hr (reflex protocol) [[id number]]  (Normal) Collected: 07/03/23 1805    Lab Status: Final result Specimen: Blood from Arm, Right Updated: 07/03/23 1837     hs TnI 0hr 4 ng/L     Comprehensive metabolic panel [[id number]]  (Abnormal) Collected: 07/03/23 1805    Lab Status: Final result Specimen: Blood from Arm, Right Updated: 07/03/23 1830     Sodium 133 mmol/L      Potassium 3.4 mmol/L      Chloride 100 mmol/L      CO2 24 mmol/L ANION GAP 9 mmol/L      BUN 9 mg/dL      Creatinine 1.15 mg/dL      Glucose 110 mg/dL      Calcium 8.6 mg/dL      AST 11 U/L      ALT 9 U/L      Alkaline Phosphatase 93 U/L      Total Protein 6.2 g/dL      Albumin 3.8 g/dL      Total Bilirubin 0.42 mg/dL      eGFR 65 ml/min/1.73sq m     Narrative:      National Kidney Disease Foundation guidelines for Chronic Kidney Disease (CKD):   •  Stage 1 with normal or high GFR (GFR > 90 mL/min/1.73 square meters)  •  Stage 2 Mild CKD (GFR = 60-89 mL/min/1.73 square meters)  •  Stage 3A Moderate CKD (GFR = 45-59 mL/min/1.73 square meters)  •  Stage 3B Moderate CKD (GFR = 30-44 mL/min/1.73 square meters)  •  Stage 4 Severe CKD (GFR = 15-29 mL/min/1.73 square meters)  •  Stage 5 End Stage CKD (GFR <15 mL/min/1.73 square meters)  Note: GFR calculation is accurate only with a steady state creatinine    CBC and differential [[id number]]  (Abnormal) Collected: 07/03/23 1805    Lab Status: Final result Specimen: Blood from Arm, Right Updated: 07/03/23 1813     WBC 9.06 Thousand/uL      RBC 3.96 Million/uL      Hemoglobin 12.3 g/dL      Hematocrit 35.7 %      MCV 90 fL      MCH 31.1 pg      MCHC 34.5 g/dL      RDW 12.4 %      MPV 8.6 fL      Platelets 794 Thousands/uL      nRBC 0 /100 WBCs      Neutrophils Relative 71 %      Immat GRANS % 1 %      Lymphocytes Relative 20 %      Monocytes Relative 5 %      Eosinophils Relative 3 %      Basophils Relative 0 %      Neutrophils Absolute 6.40 Thousands/µL      Immature Grans Absolute 0.05 Thousand/uL      Lymphocytes Absolute 1.79 Thousands/µL      Monocytes Absolute 0.49 Thousand/µL      Eosinophils Absolute 0.29 Thousand/µL      Basophils Absolute 0.04 Thousands/µL                  CTA ED chest PE study   Final Result by Azalia Krishnan MD (07/03 2034)      1. Right-sided pulmonary emboli as described above. Measured RV/LV ratio is within normal limits at less than 0.9.   2.  Bronchial wall thickening may represent bronchitis.       I personally discussed this study with Shadia Breen on 7/3/2023 8:29 PM.               Workstation performed: [id number]         XR chest 1 view portable    (Results Pending)              Procedures  ECG 12 Lead Documentation Only    Date/Time: 7/3/2023 6:26 PM    Performed by: Shadia Connor DO  Authorized by: Shadia Connor DO    ECG reviewed by me, the ED Provider: yes    Patient location:  ED  Comments:      EKG shows a sinus rhythm at 75 beats a minute with normal acute ST or T wave changes noted. ED Course  ED Course as of 07/04/23 0120   Mon Jul 03, 2023 2037 Patient found to have 2 pulmonary emboli. It is unclear whether or not these pulmonary emboli where there last month in June because he was not imaged at that time. 2141 Patient will need to be admitted for further evaluation and whether or not this is a failure of Eliquis or not. SBIRT 20yo+    Flowsheet Row Most Recent Value   Initial Alcohol Screen: US AUDIT-C     1. How often do you have a drink containing alcohol? 1 Filed at: 07/03/2023 1757   2. How many drinks containing alcohol do you have on a typical day you are drinking? 0 Filed at: 07/03/2023 1757   3a. Male UNDER 65: How often do you have five or more drinks on one occasion? 0 Filed at: 07/03/2023 1757   3b. FEMALE Any Age, or MALE 65+: How often do you have 4 or more drinks on one occassion? 0 Filed at: 07/03/2023 1757   Audit-C Score 1 Filed at: 07/03/2023 1757   COLTEN: How many times in the past year have you. .. Used an illegal drug or used a prescription medication for non-medical reasons?  Never Filed at: 07/03/2023 1757          Wells' Criteria for PE    Flowsheet Row Most Recent Value   Wells' Criteria for PE    Clinical signs and symptoms of DVT 3 Filed at: 07/03/2023 1906   PE is primary diagnosis or equally likely 3 Filed at: 07/03/2023 1906   HR >100 0 Filed at: 07/03/2023 1906   Immobilization at least 3 days or Surgery in the previous 4 weeks 0 Filed at: 07/03/2023 1906   Previous, objectively diagnosed PE or DVT 1.5 Filed at: 07/03/2023 1906   Hemoptysis 0 Filed at: 07/03/2023 1906   Malignancy with treatment within 6 months or palliative 0 Filed at: 07/03/2023 1906   Wells' Criteria Total 7.5 Filed at: 07/03/2023 1906                Medical Decision Making  69-year-old male presents emergency room complaining of chest pain shortness of breath, pain into his back and increasing leg swelling on the left. The patient was admitted roughly a month ago for the leg edema and was found to have positive DVT. The patient had arterial Dopplers as well bilaterally which showed flow to both feet. The patient was placed on anticoagulants and discharged, but he is here today because over the past 2 days he has been complaining of worsening shortness of breath chest pain and radiation into his back and he took the ambulance to the hospital.  Patient denies any fever or chills but does admit to cough and does continue smoking. Differential diagnosis at the time of evaluation is exacerbation of COPD, pneumonia or possibly pulmonary embolus. Patient's lab work was nonacute while in the emergency department, and the patient was given steroids and a nebulizer treatment as the patient does have wheezes and rhonchi bilaterally. The patient did go get a CT scan of the chest which was positive for 2 pulmonary emboli. It is unclear if these pulmonary emboli were present a month ago when he was admitted for DVT as notes CT scan was done at that time. The patient however did have a CT scan done in April that does visualize the pulmonary arteries and according to radiology, these blood clots were not present then. This patient represents possibly an old pulmonary embolus not imaged while in the hospital last month, or possibly new pulmonary emboli.   The patient has just recently increased his dosage of anticoagulants, but the patient will be admitted and an echo ordered and monitored for improvement in symptoms. Amount and/or Complexity of Data Reviewed  Labs: ordered. Radiology: ordered. Risk  Prescription drug management. Decision regarding hospitalization. Disposition  Final diagnoses:   Pulmonary emboli ([address])   COPD (chronic obstructive pulmonary disease) ([address])     Time reflects when diagnosis was documented in both MDM as applicable and the Disposition within this note     Time User Action Codes Description Comment    7/3/2023  9:42 PM Loetta Romberg Add [I26.99] Pulmonary emboli ([address])     7/3/2023  9:42 PM BruticoHaroon Pillbaldemar [J44.9] COPD (chronic obstructive pulmonary disease) ([address])     7/3/2023  9:43 PM Luanne Gibson Add [I26.99] Pulmonary embolus ([address])     7/3/2023  9:43 PM Mercy Gibson Champ [I26.99] Pulmonary embolus [facility])       ED Disposition     ED Disposition   Admit    Condition   Stable    Date/Time   Mon Jul 3, 2023  9:43 PM    Comment   Case was discussed with David Johns and the patient's admission status was agreed to be Admission Status: observation status to the service of Dr. Anayeli Dobson. Follow-up Information    None         Patient's Medications   Discharge Prescriptions    No medications on file       No discharge procedures on file.     PDMP Review     None          ED Provider  Electronically Signed by           Joe Rossi., DO  07/04/23 0120

## 2023-07-04 PROBLEM — I26.99 PULMONARY EMBOLISM (HCC): Status: ACTIVE | Noted: 2023-07-04

## 2023-07-04 PROBLEM — R68.89 FLU-LIKE SYMPTOMS: Status: RESOLVED | Noted: 2023-06-08 | Resolved: 2023-07-04

## 2023-07-04 PROBLEM — I82.409 ACUTE DEEP VEIN THROMBOSIS (DVT) OF LOWER EXTREMITY (HCC): Status: RESOLVED | Noted: 2023-06-08 | Resolved: 2023-07-04

## 2023-07-04 LAB
ANION GAP SERPL CALCULATED.3IONS-SCNC: 8 MMOL/L
APTT PPP: 56 SECONDS (ref 23–37)
APTT PPP: 70 SECONDS (ref 23–37)
APTT PPP: 99 SECONDS (ref 23–37)
BUN SERPL-MCNC: 10 MG/DL (ref 5–25)
CALCIUM SERPL-MCNC: 8.6 MG/DL (ref 8.4–10.2)
CHLORIDE SERPL-SCNC: 101 MMOL/L (ref 96–108)
CO2 SERPL-SCNC: 26 MMOL/L (ref 21–32)
CREAT SERPL-MCNC: 1.06 MG/DL (ref 0.6–1.3)
ERYTHROCYTE [DISTWIDTH] IN BLOOD BY AUTOMATED COUNT: 12.4 % (ref 11.6–15.1)
GFR SERPL CREATININE-BSD FRML MDRD: 71 ML/MIN/1.73SQ M
GLUCOSE P FAST SERPL-MCNC: 177 MG/DL (ref 65–99)
GLUCOSE SERPL-MCNC: 177 MG/DL (ref 65–140)
HCT VFR BLD AUTO: 36.5 % (ref 36.5–49.3)
HGB BLD-MCNC: 12.4 G/DL (ref 12–17)
MCH RBC QN AUTO: 30.5 PG (ref 26.8–34.3)
MCHC RBC AUTO-ENTMCNC: 34 G/DL (ref 31.4–37.4)
MCV RBC AUTO: 90 FL (ref 82–98)
PLATELET # BLD AUTO: 227 THOUSANDS/UL (ref 149–390)
PMV BLD AUTO: 9.1 FL (ref 8.9–12.7)
POTASSIUM SERPL-SCNC: 3.9 MMOL/L (ref 3.5–5.3)
RBC # BLD AUTO: 4.07 MILLION/UL (ref 3.88–5.62)
SODIUM SERPL-SCNC: 135 MMOL/L (ref 135–147)
WBC # BLD AUTO: 7.96 THOUSAND/UL (ref 4.31–10.16)

## 2023-07-04 PROCEDURE — 99223 1ST HOSP IP/OBS HIGH 75: CPT | Performed by: INTERNAL MEDICINE

## 2023-07-04 PROCEDURE — 94664 DEMO&/EVAL PT USE INHALER: CPT

## 2023-07-04 PROCEDURE — 94760 N-INVAS EAR/PLS OXIMETRY 1: CPT

## 2023-07-04 PROCEDURE — 36415 COLL VENOUS BLD VENIPUNCTURE: CPT | Performed by: INTERNAL MEDICINE

## 2023-07-04 PROCEDURE — 85730 THROMBOPLASTIN TIME PARTIAL: CPT | Performed by: INTERNAL MEDICINE

## 2023-07-04 PROCEDURE — 80048 BASIC METABOLIC PNL TOTAL CA: CPT | Performed by: PHYSICIAN ASSISTANT

## 2023-07-04 PROCEDURE — 85027 COMPLETE CBC AUTOMATED: CPT | Performed by: PHYSICIAN ASSISTANT

## 2023-07-04 RX ORDER — LORAZEPAM 1 MG/1
1 TABLET ORAL ONCE
Status: COMPLETED | OUTPATIENT
Start: 2023-07-04 | End: 2023-07-04

## 2023-07-04 RX ORDER — PANTOPRAZOLE SODIUM 40 MG/1
40 TABLET, DELAYED RELEASE ORAL DAILY
Status: DISCONTINUED | OUTPATIENT
Start: 2023-07-04 | End: 2023-07-05 | Stop reason: HOSPADM

## 2023-07-04 RX ORDER — LUBIPROSTONE 8 UG/1
24 CAPSULE ORAL 2 TIMES DAILY
Status: DISCONTINUED | OUTPATIENT
Start: 2023-07-04 | End: 2023-07-05 | Stop reason: HOSPADM

## 2023-07-04 RX ORDER — DILTIAZEM HYDROCHLORIDE 180 MG/1
180 CAPSULE, COATED, EXTENDED RELEASE ORAL DAILY
Status: DISCONTINUED | OUTPATIENT
Start: 2023-07-04 | End: 2023-07-05 | Stop reason: HOSPADM

## 2023-07-04 RX ORDER — AZELASTINE 1 MG/ML
1 SPRAY, METERED NASAL 2 TIMES DAILY
Status: DISCONTINUED | OUTPATIENT
Start: 2023-07-04 | End: 2023-07-05 | Stop reason: HOSPADM

## 2023-07-04 RX ORDER — TAMSULOSIN HYDROCHLORIDE 0.4 MG/1
0.4 CAPSULE ORAL 2 TIMES DAILY
Status: DISCONTINUED | OUTPATIENT
Start: 2023-07-04 | End: 2023-07-05 | Stop reason: HOSPADM

## 2023-07-04 RX ORDER — METOPROLOL SUCCINATE 25 MG/1
25 TABLET, EXTENDED RELEASE ORAL 2 TIMES DAILY
Status: DISCONTINUED | OUTPATIENT
Start: 2023-07-04 | End: 2023-07-05 | Stop reason: HOSPADM

## 2023-07-04 RX ORDER — FLUTICASONE PROPIONATE 50 MCG
1 SPRAY, SUSPENSION (ML) NASAL DAILY
Status: DISCONTINUED | OUTPATIENT
Start: 2023-07-04 | End: 2023-07-05 | Stop reason: HOSPADM

## 2023-07-04 RX ORDER — FUROSEMIDE 40 MG/1
40 TABLET ORAL DAILY
Status: DISCONTINUED | OUTPATIENT
Start: 2023-07-04 | End: 2023-07-05 | Stop reason: HOSPADM

## 2023-07-04 RX ORDER — ISOSORBIDE MONONITRATE 30 MG/1
30 TABLET, EXTENDED RELEASE ORAL DAILY
Status: DISCONTINUED | OUTPATIENT
Start: 2023-07-04 | End: 2023-07-05 | Stop reason: HOSPADM

## 2023-07-04 RX ORDER — ALBUTEROL SULFATE 2.5 MG/3ML
2.5 SOLUTION RESPIRATORY (INHALATION) EVERY 6 HOURS PRN
Status: DISCONTINUED | OUTPATIENT
Start: 2023-07-04 | End: 2023-07-04

## 2023-07-04 RX ORDER — GUAIFENESIN 600 MG/1
600 TABLET, EXTENDED RELEASE ORAL EVERY 12 HOURS SCHEDULED
Status: DISCONTINUED | OUTPATIENT
Start: 2023-07-04 | End: 2023-07-05 | Stop reason: HOSPADM

## 2023-07-04 RX ORDER — HYDROCODONE BITARTRATE AND ACETAMINOPHEN 5; 325 MG/1; MG/1
1 TABLET ORAL EVERY 6 HOURS PRN
Status: DISCONTINUED | OUTPATIENT
Start: 2023-07-04 | End: 2023-07-05 | Stop reason: HOSPADM

## 2023-07-04 RX ORDER — LORAZEPAM 1 MG/1
1 TABLET ORAL EVERY 8 HOURS PRN
Status: DISCONTINUED | OUTPATIENT
Start: 2023-07-04 | End: 2023-07-05 | Stop reason: HOSPADM

## 2023-07-04 RX ORDER — ALBUTEROL SULFATE 90 UG/1
2 AEROSOL, METERED RESPIRATORY (INHALATION) EVERY 4 HOURS PRN
Status: DISCONTINUED | OUTPATIENT
Start: 2023-07-04 | End: 2023-07-05 | Stop reason: HOSPADM

## 2023-07-04 RX ORDER — DOCUSATE SODIUM 100 MG/1
100 CAPSULE, LIQUID FILLED ORAL DAILY
Status: DISCONTINUED | OUTPATIENT
Start: 2023-07-04 | End: 2023-07-05 | Stop reason: HOSPADM

## 2023-07-04 RX ORDER — POLYETHYLENE GLYCOL 3350 17 G/17G
17 POWDER, FOR SOLUTION ORAL DAILY PRN
Status: DISCONTINUED | OUTPATIENT
Start: 2023-07-04 | End: 2023-07-05 | Stop reason: HOSPADM

## 2023-07-04 RX ORDER — LUBIPROSTONE 24 UG/1
CAPSULE ORAL
Status: DISPENSED
Start: 2023-07-04 | End: 2023-07-04

## 2023-07-04 RX ORDER — POTASSIUM CHLORIDE 20 MEQ/1
20 TABLET, EXTENDED RELEASE ORAL DAILY
Status: DISCONTINUED | OUTPATIENT
Start: 2023-07-04 | End: 2023-07-05 | Stop reason: HOSPADM

## 2023-07-04 RX ADMIN — TAMSULOSIN HYDROCHLORIDE 0.4 MG: 0.4 CAPSULE ORAL at 21:30

## 2023-07-04 RX ADMIN — ALBUTEROL SULFATE 2 PUFF: 108 INHALANT RESPIRATORY (INHALATION) at 19:51

## 2023-07-04 RX ADMIN — METOPROLOL SUCCINATE 25 MG: 50 TABLET, EXTENDED RELEASE ORAL at 17:52

## 2023-07-04 RX ADMIN — FLUTICASONE PROPIONATE 1 SPRAY: 50 SPRAY, METERED NASAL at 14:08

## 2023-07-04 RX ADMIN — LUBIPROSTONE 24 MCG: 24 CAPSULE, GELATIN COATED ORAL at 21:30

## 2023-07-04 RX ADMIN — FUROSEMIDE 40 MG: 40 TABLET ORAL at 09:00

## 2023-07-04 RX ADMIN — DILTIAZEM HYDROCHLORIDE 180 MG: 180 CAPSULE, COATED, EXTENDED RELEASE ORAL at 09:00

## 2023-07-04 RX ADMIN — FLUTICASONE PROPIONATE 1 SPRAY: 50 SPRAY, METERED NASAL at 09:03

## 2023-07-04 RX ADMIN — ACETAMINOPHEN 650 MG: 325 TABLET ORAL at 17:52

## 2023-07-04 RX ADMIN — HYDROCODONE BITARTRATE AND ACETAMINOPHEN 1 TABLET: 5; 325 TABLET ORAL at 21:29

## 2023-07-04 RX ADMIN — TAMSULOSIN HYDROCHLORIDE 0.4 MG: 0.4 CAPSULE ORAL at 01:07

## 2023-07-04 RX ADMIN — POTASSIUM CHLORIDE 20 MEQ: 1500 TABLET, EXTENDED RELEASE ORAL at 08:59

## 2023-07-04 RX ADMIN — HEPARIN SODIUM 16 UNITS/KG/HR: 10000 INJECTION, SOLUTION INTRAVENOUS at 15:00

## 2023-07-04 RX ADMIN — ISOSORBIDE MONONITRATE 30 MG: 30 TABLET, EXTENDED RELEASE ORAL at 08:59

## 2023-07-04 RX ADMIN — AZELASTINE HYDROCHLORIDE 1 SPRAY: 137 SPRAY, METERED NASAL at 17:51

## 2023-07-04 RX ADMIN — UMECLIDINIUM 1 PUFF: 62.5 AEROSOL, POWDER ORAL at 14:06

## 2023-07-04 RX ADMIN — METOPROLOL SUCCINATE 25 MG: 50 TABLET, EXTENDED RELEASE ORAL at 09:00

## 2023-07-04 RX ADMIN — LUBIPROSTONE 24 MCG: 24 CAPSULE, GELATIN COATED ORAL at 01:07

## 2023-07-04 RX ADMIN — LUBIPROSTONE 24 MCG: 24 CAPSULE, GELATIN COATED ORAL at 09:01

## 2023-07-04 RX ADMIN — LORAZEPAM 1 MG: 1 TABLET ORAL at 09:00

## 2023-07-04 RX ADMIN — GUAIFENESIN 600 MG: 600 TABLET ORAL at 21:29

## 2023-07-04 RX ADMIN — LORAZEPAM 1 MG: 1 TABLET ORAL at 21:30

## 2023-07-04 RX ADMIN — ACETAMINOPHEN 650 MG: 325 TABLET ORAL at 05:15

## 2023-07-04 RX ADMIN — HEPARIN SODIUM 3800 UNITS: 1000 INJECTION INTRAVENOUS; SUBCUTANEOUS at 20:45

## 2023-07-04 RX ADMIN — ASPIRIN 81 MG: 81 TABLET, COATED ORAL at 09:00

## 2023-07-04 RX ADMIN — PANTOPRAZOLE SODIUM 40 MG: 40 TABLET, DELAYED RELEASE ORAL at 08:59

## 2023-07-04 RX ADMIN — DOCUSATE SODIUM 100 MG: 100 CAPSULE, LIQUID FILLED ORAL at 08:59

## 2023-07-04 NOTE — ASSESSMENT & PLAN NOTE
Continue prehospital Toprol-XL 25 mg p.o. twice daily, Cardizem  mg p.o. daily and Lasix 40 mg p.o. daily

## 2023-07-04 NOTE — PLAN OF CARE
Problem: Potential for Falls  Goal: Patient will remain free of falls  Description: INTERVENTIONS:  - Educate patient/family on patient safety including physical limitations  - Instruct patient to call for assistance with activity   - Consult OT/PT to assist with strengthening/mobility   - Keep Call bell within reach  - Keep bed low and locked with side rails adjusted as appropriate  - Keep care items and personal belongings within reach  - Initiate and maintain comfort rounds  - Make Fall Risk Sign visible to staff  - Offer Toileting every  Hours, in advance of need  - Initiate/Maintain alarm  - Obtain necessary fall risk management equipment:   - Apply yellow socks and bracelet for high fall risk patients  - Consider moving patient to room near nurses station  Outcome: Progressing     Problem: Prexisting or High Potential for Compromised Skin Integrity  Goal: Skin integrity is maintained or improved  Description: INTERVENTIONS:  - Identify patients at risk for skin breakdown  - Assess and monitor skin integrity  - Assess and monitor nutrition and hydration status  - Monitor labs   - Assess for incontinence   - Turn and reposition patient  - Assist with mobility/ambulation  - Relieve pressure over bony prominences  - Avoid friction and shearing  - Provide appropriate hygiene as needed including keeping skin clean and dry  - Evaluate need for skin moisturizer/barrier cream  - Collaborate with interdisciplinary team   - Patient/family teaching  - Consider wound care consult   Outcome: Progressing     Problem: PAIN - ADULT  Goal: Verbalizes/displays adequate comfort level or baseline comfort level  Description: Interventions:  - Encourage patient to monitor pain and request assistance  - Assess pain using appropriate pain scale  - Administer analgesics based on type and severity of pain and evaluate response  - Implement non-pharmacological measures as appropriate and evaluate response  - Consider cultural and social influences on pain and pain management  - Notify physician/advanced practitioner if interventions unsuccessful or patient reports new pain  Outcome: Progressing     Problem: INFECTION - ADULT  Goal: Absence or prevention of progression during hospitalization  Description: INTERVENTIONS:  - Assess and monitor for signs and symptoms of infection  - Monitor lab/diagnostic results  - Monitor all insertion sites, i.e. indwelling lines, tubes, and drains  - Monitor endotracheal if appropriate and nasal secretions for changes in amount and color  - [place] appropriate cooling/warming therapies per order  - Administer medications as ordered  - Instruct and encourage patient and family to use good hand hygiene technique  - Identify and instruct in appropriate isolation precautions for identified infection/condition  Outcome: Progressing  Goal: Absence of fever/infection during neutropenic period  Description: INTERVENTIONS:  - Monitor WBC    Outcome: Progressing     Problem: SAFETY ADULT  Goal: Patient will remain free of falls  Description: INTERVENTIONS:  - Educate patient/family on patient safety including physical limitations  - Instruct patient to call for assistance with activity   - Consult OT/PT to assist with strengthening/mobility   - Keep Call bell within reach  - Keep bed low and locked with side rails adjusted as appropriate  - Keep care items and personal belongings within reach  - Initiate and maintain comfort rounds  - Make Fall Risk Sign visible to staff  - Offer Toileting every  Hours, in advance of need  - Initiate/Maintain alarm  - Obtain necessary fall risk management equipment:  - Apply yellow socks and bracelet for high fall risk patients  - Consider moving patient to room near nurses station  Outcome: Progressing  Goal: Maintain or return to baseline ADL function  Description: INTERVENTIONS:  -  Assess patient's ability to carry out ADLs; assess patient's baseline for ADL function and identify physical deficits which impact ability to perform ADLs (bathing, care of mouth/teeth, toileting, grooming, dressing, etc.)  - Assess/evaluate cause of self-care deficits   - Assess range of motion  - Assess patient's mobility; develop plan if impaired  - Assess patient's need for assistive devices and provide as appropriate  - Encourage maximum independence but intervene and supervise when necessary  - Involve family in performance of ADLs  - Assess for home care needs following discharge   - Consider OT consult to assist with ADL evaluation and planning for discharge  - Provide patient education as appropriate  Outcome: Progressing  Goal: Maintains/Returns to pre admission functional level  Description: INTERVENTIONS:  - Perform BMAT or MOVE assessment daily.   - Set and communicate daily mobility goal to care team and patient/family/caregiver. - Collaborate with rehabilitation services on mobility goals if consulted  - Perform Range of Motion times a day. - Reposition patient every  hours.   - Dangle patient  times a day  - Stand patient  times a day  - Ambulate patient  times a day  - Out of bed to chair  times a day   - Out of bed for meals  times a day  - Out of bed for toileting  - Record patient progress and toleration of activity level   Outcome: Progressing     Problem: DISCHARGE PLANNING  Goal: Discharge to home or other facility with appropriate resources  Description: INTERVENTIONS:  - Identify barriers to discharge w/patient and caregiver  - Arrange for needed discharge resources and transportation as appropriate  - Identify discharge learning needs (meds, wound care, etc.)  - Arrange for interpretive services to assist at discharge as needed  - Refer to Case Management Department for coordinating discharge planning if the patient needs post-hospital services based on physician/advanced practitioner order or complex needs related to functional status, cognitive ability, or social support system  Outcome: Progressing     Problem: Knowledge Deficit  Goal: Patient/family/caregiver demonstrates understanding of disease process, treatment plan, medications, and discharge instructions  Description: Complete learning assessment and assess knowledge base.   Interventions:  - Provide teaching at level of understanding  - Provide teaching via preferred learning methods  Outcome: Progressing

## 2023-07-04 NOTE — RESPIRATORY THERAPY NOTE
RT Protocol Note  Nichole New 76 y.o. male MRN: [id number]  Unit/Bed#: ED 22 Encounter: [id number]    Assessment    Principal Problem:    Pulmonary embolism (HCC)  Active Problems:    Hypertension    COPD (chronic obstructive pulmonary disease) (HCC)    Gastroesophageal reflux disease    Tobacco abuse      Home Pulmonary Medications:    Home Devices/Therapy: (P) Other (Comment) (MDI alb BID Spiriva Daily at hs, no other resp therapy)    Past Medical History:   Diagnosis Date    Acute right MCA stroke ([address]) 09/15/2018    Arthritis     CAD (coronary artery disease)     s/p CABG 2000    COPD (chronic obstructive pulmonary disease) ([facility])     GERD (gastroesophageal reflux disease)     Grand mal status ([address]) 03/24/2019    H/O blood clots     Heart attack ([address])     Heart failure ([address])     Hyperlipidemia     Hypertension     Lexiscan nuclear stress test 03/19/2016    EF 74% Normal    Myocardial infarction ([address])     Shortness of breath     Stroke ([facility])     TIA (transient ischemic attack) 09/13/2018     Social History     Socioeconomic History    Marital status: Single     Spouse name: None    Number of children: None    Years of education: None    Highest education level: None   Occupational History    None   Tobacco Use    Smoking status: Every Day     Packs/day: 1.00     Years: 50.00     Total pack years: 50.00     Types: Cigarettes     Start date: 1970    Smokeless tobacco: Never   Vaping Use    Vaping Use: Never used   Substance and Sexual Activity    Alcohol use: Not Currently     Comment: Socially    Drug use: Never    Sexual activity: Not Currently   Other Topics Concern    None   Social History Narrative    None     Social Determinants of Health     Financial Resource Strain: Low Risk  (6/7/2023)    Overall Financial Resource Strain (CARDIA)     Difficulty of Paying Living Expenses: Not very hard   Food Insecurity: No Food Insecurity (6/9/2023)    Hunger Vital Sign     Worried About Running Out of Food in the Last Year: Never true     [address] in the Last Year: Never true   Transportation Needs: No Transportation Needs (6/9/2023)    PRAPARE - Transportation     Lack of Transportation (Medical): No     Lack of Transportation (Non-Medical): No   Physical Activity: Not on file   Stress: Not on file   Social Connections: Not on file   Intimate Partner Violence: Not on file   Housing Stability: Low Risk  (6/9/2023)    Housing Stability Vital Sign     Unable to Pay for Housing in the Last Year: No     Number of Places Lived in the Last Year: 1     Unstable Housing in the Last Year: No       Subjective         Objective    Physical Exam:   Assessment Type: (P) Assess only  General Appearance: (P) Awake, Drowsy  Respiratory Pattern: (P) Normal  Chest Assessment: (P) Chest expansion symmetrical  Bilateral Breath Sounds: (P) Diminished, Clear  O2 Device: (P) ra    Vitals:  Blood pressure 107/68, pulse (P) 76, temperature (!) 97.2 °F (36.2 °C), temperature source Tympanic, resp. rate (P) 16, height 5' 10" (1.778 m), weight 99.8 kg (220 lb), SpO2 (P) 91 %. Imaging and other studies: I have personally reviewed pertinent reports.       O2 Device: (P) ra     Plan    Respiratory Plan: (P) Home Bronchodilator Patient pathway        Resp Comments: (P) pt assessed for resp protocol, pt admitted for possible PE, pt has COPD hx uses MDI alb BID and Spiriva Daily hs, pt in no apparent distress, BS dim cta, will cont home therapy regimine pt aware to call rn when needs

## 2023-07-04 NOTE — PLAN OF CARE
Problem: PAIN - ADULT  Goal: Verbalizes/displays adequate comfort level or baseline comfort level  Description: Interventions:  - Encourage patient to monitor pain and request assistance  - Assess pain using appropriate pain scale  - Administer analgesics based on type and severity of pain and evaluate response  - Implement non-pharmacological measures as appropriate and evaluate response  - Consider cultural and social influences on pain and pain management  - Notify physician/advanced practitioner if interventions unsuccessful or patient reports new pain  Outcome: Progressing     Problem: SAFETY ADULT  Goal: Patient will remain free of falls  Description: INTERVENTIONS:  - Educate patient/family on patient safety including physical limitations  - Instruct patient to call for assistance with activity   - Consult OT/PT to assist with strengthening/mobility   - Keep Call bell within reach  - Keep bed low and locked with side rails adjusted as appropriate  - Keep care items and personal belongings within reach  - Initiate and maintain comfort rounds  - Make Fall Risk Sign visible to staff  - Apply yellow socks and bracelet for high fall risk patients  - Consider moving patient to room near nurses station  Outcome: Progressing  Goal: Maintain or return to baseline ADL function  Description: INTERVENTIONS:  -  Assess patient's ability to carry out ADLs; assess patient's baseline for ADL function and identify physical deficits which impact ability to perform ADLs (bathing, care of mouth/teeth, toileting, grooming, dressing, etc.)  - Assess/evaluate cause of self-care deficits   - Assess range of motion  - Assess patient's mobility; develop plan if impaired  - Assess patient's need for assistive devices and provide as appropriate  - Encourage maximum independence but intervene and supervise when necessary  - Involve family in performance of ADLs  - Assess for home care needs following discharge   - Consider OT consult to assist with ADL evaluation and planning for discharge  - Provide patient education as appropriate  Outcome: Progressing  Goal: Maintains/Returns to pre admission functional level  Description: INTERVENTIONS:  - Perform BMAT or MOVE assessment daily.   - Set and communicate daily mobility goal to care team and patient/family/caregiver.    - Collaborate with rehabilitation services on mobility goals if consulted  - Out of bed for toileting  - Record patient progress and toleration of activity level   Outcome: Progressing

## 2023-07-04 NOTE — PROGRESS NOTES
Progress Note - Triage Asssessment   Sabrina Harris 76 y.o. male MRN: [id number]    Time Called ( Time): 2037  Date Called: 07/03/23  Room#: ED 25  Person requesting evaluation: Dr. Shayy Mccord    Situation:    Sabrina Harris 58-year-old male with a history of a DVT on Eliquis, COPD, GERD, MI, hypertension, who presented to the ED with increased shortness of breath and lower back pain. Complains of pain with inspiration. Patient still smokes and denies recent fever or chills. In the ED his labs were unremarkable. CT PE with right-sided pulmonary emboli and an RV LV ratio within normal limits that is less than 0.9. Vital signs stable with a heart rate in the 70s and pulse ox 93% on room air. Patient experiencing no tachypnea or tachycardia. Sleeping on R upon my arrival.  Lungs coarse and diminished at the bases. Patient has lower extremity edema. Interventions:   Admitted as observation for PE. Uncertain if PE is acute or chronic and related to failed Eliquis therapy.   Recommend getting an echo         Triage Assessment:     Patient can be admitted to med-surg level of care    Recommendations discussed with Dr. Shayy Mccord

## 2023-07-04 NOTE — H&P
[address] [facility]  H&P  Name: Baudilio Joiner 76 y.o. male I MRN: [id number]  Unit/Bed#: ED 22 I Date of Admission: 7/3/2023   Date of Service: 7/4/2023 I Hospital Day: 0      Assessment/Plan   * Pulmonary embolism [facility])  Assessment & Plan  · Placed in observation telemetry  · Patient was recently admitted to our facility 6/8/2023 with left lower extremity DVT and started on Eliquis which he states he has been compliant with  · CTA revealed "Pulmonary embolism in the proximal lobar branch of the right middle lobe with segmental extension (series 2, image images 139-143). Probable embolism in the proximal lobar branch of the right upper lobe (series 601, image 74; series 2, image 115). Eccentric filling defect within a proximal segmental branch in the right lower lobe is likely an additional embolism, though may be nonacute given its eccentric location (series 2, image 141). · We will place on heparin drip  · Obtain echocardiogram in a.m. · Placed on O2 protocol    Tobacco abuse  Assessment & Plan  We will give nicotine 21 mg transdermal daily    Gastroesophageal reflux disease  Assessment & Plan  Continue prehospital Protonix 40 mg p.o. daily    COPD (chronic obstructive pulmonary disease) ([facility])  Assessment & Plan  · Not in exacerbation at this time  · Continue prehospital respiratory medications    Hypertension  Assessment & Plan  Continue prehospital Toprol-XL 25 mg p.o. twice daily, Cardizem  mg p.o. daily and Lasix 40 mg p.o. daily         VTE Prophylaxis: Heparin Drip  Code Status: Level 1  POLST: There is no POLST form on file for this patient (pre-hospital)  Discussion with family: None present at bedside at time of exam    Anticipated Length of Stay:  Patient will be admitted on an Observation basis with an anticipated length of stay of  < 2 midnights.    Justification for Hospital Stay: Acute pulmonary embolism requiring heparin drip further evaluation    Chief Complaint:   Chest pain and shortness of breath x1 week    History of Present Illness:    Dorian Davis is a 76 y.o. male who presents with chest pain and shortness of breath x1 week. Patient was recently admitted to our facility on 6/8/2023 with diagnosis of left lower extremity DVT and subsequently started on Eliquis who returns to the ER for further evaluation treatment is 1 week history of dyspnea with activity as well as chest pain but upon further questioning relates that to back pain located in his shoulder blade on the left side. Patient denies any aggravating or alleviating factors. No cough, fever or chills. Patient does state that he has been compliant with his Eliquis at home and only other complaint that he offers is a sensation of heaviness in his left lower extremity. Review of Systems:  Review of Systems   Constitutional: Negative for chills and fever. Respiratory: Positive for cough and shortness of breath. Negative for wheezing. Cardiovascular: Positive for chest pain. Negative for palpitations. Gastrointestinal: Negative for abdominal pain, diarrhea, nausea and vomiting. Genitourinary: Negative for dysuria, frequency, hematuria and urgency. Musculoskeletal: Positive for back pain. Neurological: Negative for weakness, light-headedness and headaches. All other systems reviewed and are negative.       Past Medical and Surgical History:   Past Medical History:   Diagnosis Date   • Acute right MCA stroke ([facility]) 09/15/2018   • Arthritis    • CAD (coronary artery disease)     s/p CABG 2000   • COPD (chronic obstructive pulmonary disease) ([facility])    • GERD (gastroesophageal reflux disease)    • Grand mal status ([facility]) 03/24/2019   • H/O blood clots    • Heart attack ([facility])    • Heart failure ([facility])    • Hyperlipidemia    • Hypertension    • Lexiscan nuclear stress test 03/19/2016    EF 74% Normal   • Myocardial infarction [facility])    • Shortness of breath    • Stroke [facility])    • TIA (transient ischemic attack) 09/13/2018       Past Surgical History:   Procedure Laterality Date   • CARDIAC CATHETERIZATION  08/19/2015    LIMA occluded. No severe native lesions   • CARDIAC CATHETERIZATION N/A 12/03/2021    Procedure: Cardiac Coronary Angiogram;  Surgeon: Courtney Mckeon MD;  Location: [facility] CARDIAC CATH LAB; Service: Cardiology   • CARDIAC CATHETERIZATION  12/03/2021    Procedure: Cardiac catheterization;  Surgeon: Courtney Mckeon MD;  Location: [facility] CARDIAC CATH LAB; Service: Cardiology   • COLONOSCOPY     • CORONARY ARTERY BYPASS GRAFT  2000   • CYSTOSCOPY  10/31/2022    [facility]   • HERNIA REPAIR      umbilical hernia x2   • TONSILLECTOMY         Meds/Allergies:  Prior to Admission medications    Medication Sig Start Date End Date Taking?  Authorizing Provider   albuterol (2.5 mg/3 mL) 0.083 % nebulizer solution Take 1 vial (2.5 mg total) by nebulization every 6 (six) hours as needed for wheezing or shortness of breath 5/26/20   Keith Godinez DO   albuterol (Ventolin HFA) 90 mcg/act inhaler Inhale 2 puffs every 6 (six) hours as needed for shortness of breath 1/19/23   Candida Loja PA-C   amitriptyline (ELAVIL) 50 mg tablet take 1 tablet by mouth daily at bedtime  Patient not taking: Reported on 6/14/2023 4/15/23   Candida Loja PA-C   apixaban (ELIQUIS) 5 mg Take 2 tablets (10 mg total) by mouth 2 (two) times a day for 12 doses 6/9/23 6/15/23  ADRIANA Loza   apixaban (Eliquis) 5 mg Take 1 tablet (5 mg total) by mouth 2 (two) times a day 6/30/23   Candida Loja PA-C   aspirin (ECOTRIN LOW STRENGTH) 81 mg EC tablet Take 1 tablet (81 mg total) by mouth daily 9/17/18   Tye Rios MD   azelastine (ASTELIN) 0.1 % nasal spray 1 spray into each nostril 2 (two) times a day Use in each nostril as directed 10/19/22   Candida Loja PA-C   diltiazem (CARDIZEM CD) 180 mg 24 hr capsule take 1 capsule by mouth daily 1/6/23   Johnathan Toledo MD   docusate sodium (COLACE) 100 mg capsule Take 1 capsule (100 mg total) by mouth daily Do not start before Sujey 10, 2023. 6/10/23 7/10/23  ADRIANA Rodrigues   fluticasone [facility]) 50 mcg/act nasal spray 1 spray into each nostril daily 9/29/22   ADRIANA Wells   furosemide (LASIX) 40 mg tablet take 1 tablet by mouth once daily 8/26/22   Armani Jerome MD   isosorbide mononitrate (IMDUR) 30 mg 24 hr tablet Take 1 tablet (30 mg total) by mouth daily 3/7/23   Armani Jerome MD   Linzess 290 MCG CAPS Take 1 capsule by mouth AT LEAST 30 MINUTES BEFORE FIRST MEAL OF THE DAY 7/3/23   Haritha Seth PA-C   metoprolol succinate (TOPROL-XL) 25 mg 24 hr tablet take 1 tablet by mouth twice a day 10/25/22   Armani Jerome MD   pantoprazole (PROTONIX) 40 mg tablet take 1 tablet by mouth once daily 4/30/23   Candida Loja PA-C   polyethylene glycol (MIRALAX) 17 g packet Take 17 g by mouth daily as needed (constipation) 6/9/23   ADRIANA Rodrigues   Potassium Chloride ER 20 MEQ TBCR Take 1 tablet (20 mEq total) by mouth daily 3/9/23   ADRIANA Hendricks   pregabalin (LYRICA) 150 mg capsule Take 1 capsule (150 mg total) by mouth 2 (two) times a day  Patient not taking: Reported on 6/14/2023 3/28/23   Candida Loja PA-C   Restasis 0.05 % ophthalmic emulsion instill 1 drop INTO AFFECTED EYE(S) every 12 hours  Patient not taking: Reported on 6/14/2023 4/12/22   Historical Provider, MD   senna-docusate sodium (SENOKOT S) 8.6-50 mg per tablet Take 1 tablet by mouth daily at bedtime  Patient not taking: Reported on 6/14/2023 6/9/23 7/9/23  ADRIANA Rodrigues   Spiriva HandiHaler 18 MCG inhalation capsule place 1 capsule INTO INHALER AND INHALE DAILY IN THE [facility] 6/26/23   Candida Loja PA-C   tamsulosin (FLOMAX) 0.4 mg Take 1 capsule (0.4 mg total) by mouth 2 (two) times a day 8/8/22 4/19/23  ADRIANA Kimble     I have reviewed home medications with patient personally. Allergies:    Allergies   Allergen Reactions   • Gabapentin Headache       Social History:  Marital Status: Single   Occupation: Retired  Patient Pre-hospital Living Situation: Resides at home alone  Patient Pre-hospital Level of Mobility: Full without assist  Patient Pre-hospital Diet Restrictions: None    Social History     Substance and Sexual Activity   Alcohol Use Not Currently    Comment: Socially     Social History     Tobacco Use   Smoking Status Every Day   • Packs/day: 1.00   • Years: 50.00   • Total pack years: 50.00   • Types: Cigarettes   • Start date: 5   Smokeless Tobacco Never     Social History     Substance and Sexual Activity   Drug Use Never       Family History:  I have reviewed the patients family history    Physical Exam:   Vitals:   Blood Pressure: 107/68 (07/03/23 1906)  Pulse: 75 (07/03/23 1906)  Temperature: (!) 97.2 °F (36.2 °C) (07/03/23 1754)  Temp Source: Tympanic (07/03/23 1754)  Respirations: 22 (07/03/23 1906)  Height: 5' 10" (177.8 cm) (07/03/23 1754)  Weight - Scale: 99.8 kg (220 lb) (07/03/23 1754)  SpO2: 93 % (07/03/23 1906)    Physical Exam  Vitals and nursing note reviewed. Constitutional:       General: He is not in acute distress. Appearance: Normal appearance. HENT:      Head: Normocephalic and atraumatic. Right Ear: Tympanic membrane normal.      Left Ear: Tympanic membrane normal.      Nose: Nose normal.      Mouth/Throat:      Mouth: Mucous membranes are moist.      Pharynx: No oropharyngeal exudate or posterior oropharyngeal erythema. Eyes:      Extraocular Movements: Extraocular movements intact. Pupils: Pupils are equal, round, and reactive to light. Cardiovascular:      Rate and Rhythm: Normal rate and regular rhythm. Pulses: Normal pulses. Heart sounds: Normal heart sounds. Pulmonary:      Effort: Pulmonary effort is normal. No respiratory distress. Breath sounds: Rhonchi present. Abdominal:      General: Abdomen is flat.  Bowel sounds are normal.      Palpations: Abdomen is soft. Musculoskeletal:         General: Normal range of motion. Cervical back: Normal range of motion and neck supple. Right lower leg: Edema present. Left lower leg: Edema present. Skin:     General: Skin is warm and dry. Capillary Refill: Capillary refill takes less than 2 seconds. Neurological:      General: No focal deficit present. Mental Status: He is alert and oriented to person, place, and time. Additional Data:   Lab Results: I have personally reviewed pertinent reports. Results from last 7 days   Lab Units 07/03/23  1805   WBC Thousand/uL 9.06   HEMOGLOBIN g/dL 12.3   HEMATOCRIT % 35.7*   PLATELETS Thousands/uL 229   NEUTROS PCT % 71   LYMPHS PCT % 20   MONOS PCT % 5   EOS PCT % 3     Results from last 7 days   Lab Units 07/03/23  1805   SODIUM mmol/L 133*   POTASSIUM mmol/L 3.4*   CHLORIDE mmol/L 100   CO2 mmol/L 24   BUN mg/dL 9   CREATININE mg/dL 1.15   CALCIUM mg/dL 8.6   ALK PHOS U/L 93   ALT U/L 9   AST U/L 11*     Results from last 7 days   Lab Units 07/03/23  2308   INR  1.15               Imaging: I have personally reviewed pertinent reports. CTA ED chest PE study   Final Result by Salo Knight MD (07/03 2034)      1. Right-sided pulmonary emboli as described above. Measured RV/LV ratio is within normal limits at less than 0.9.   2.  Bronchial wall thickening may represent bronchitis. I personally discussed this study with Kee Mello on 7/3/2023 8:29 PM.               Workstation performed: [id number]         XR chest 1 view portable    (Results Pending)       EKG, Pathology, and Other Studies Reviewed on Admission:   · EKG: N/A    Epic Records Reviewed: Yes     ** Please Note: This note has been constructed using a voice recognition system.  **

## 2023-07-04 NOTE — ASSESSMENT & PLAN NOTE
· Placed in observation telemetry  · Patient was recently admitted to our facility 6/8/2023 with left lower extremity DVT and started on Eliquis which he states he has been compliant with  · CTA revealed "Pulmonary embolism in the proximal lobar branch of the right middle lobe with segmental extension (series 2, image images 139-143). Probable embolism in the proximal lobar branch of the right upper lobe (series 601, image 74; series 2, image 115). Eccentric filling defect within a proximal segmental branch in the right lower lobe is likely an additional embolism, though may be nonacute given its eccentric location (series 2, image 141). · We will place on heparin drip  · Obtain echocardiogram in a.m.   · Placed on O2 protocol

## 2023-07-04 NOTE — UTILIZATION REVIEW
Date: 7/5    Day 3: Has surpassed a 2nd midnight with active treatments and services, which include tx for PE, Heparin drip with change to Xarelto. Initial Clinical Review    OBSERVATION 7/3 CHANGED TO INPATIENT ON 7/4 @ 1343 - PE ON HEPARIN DRIP    Admission: Date/Time/Statement:   Admission Orders (From admission, onward)     Ordered        07/04/23 1343  Inpatient Admission  Once                      Orders Placed This Encounter   Procedures   • Inpatient Admission     Standing Status:   Standing     Number of Occurrences:   1     Order Specific Question:   Level of Care     Answer:   Med Surg [16]     Order Specific Question:   Estimated length of stay     Answer:   More than 2 Midnights     Order Specific Question:   Certification     Answer:   I certify that inpatient services are medically necessary for this patient for a duration of greater than two midnights. See H&P and MD Progress Notes for additional information about the patient's course of treatment. ED Arrival Information     Expected   7/3/2023 17:38    Arrival   7/3/2023 17:46    Acuity   Urgent            Means of arrival   Ambulance    Escorted by   [facility]ILI[facility] WITH [facility]    Service   Hospitalist    Admission type   Emergency            Arrival complaint   back pain           Chief Complaint   Patient presents with   • Shortness of Breath     Patient reports increasing over the past few days. Worse with exertion. • Hand Swelling     Patient reports left hand swelling for the past 3 days, denies injury   • Back Pain     Patient reports mid shoulder blade pain, left sided. Initial Presentation: 76 y.o. male presents to the ED via EMS from home with c/o SOB, pain in his back, strain to breath, still smoking, leg feels heavy from blood clots and he drags it. PMH: recent LLE DVT on Eliquis with reported compliance, GERD, COPD, HTN, smoker. In the ED he has normal VS.  Labs - Low NA, K. Imaging - R side PE with RV:LV ratio < 0.9.   Treated with Nebs, IV Decadron, IV analgesia, PO Cardizem, IV Lasix, Heparin bolus and drip. On exam + rhonchi, BLE edema. He is admitted to INPATIENT status with PE - heparin drip, changed to Xarelto, hematology OP referral, hypercoagulable work up. Date: 7/5   Day 2:   WBCs up to 14 - started on PO antibiotic x 5 days. Echo completed. PE and put on Xarelto. Pt is d/c to home today. Referrals to hematology for anticoagulable work up. PCP f/u in 1 wk.       ED Triage Vitals   Temperature Pulse Respirations Blood Pressure SpO2   07/03/23 1754 07/03/23 1754 07/03/23 1754 07/03/23 1754 07/03/23 1754   (!) 97.2 °F (36.2 °C) 77 17 111/67 93 %      Temp Source Heart Rate Source Patient Position - Orthostatic VS BP Location FiO2 (%)   07/03/23 1754 07/03/23 1754 07/03/23 1754 07/05/23 0750 --   Tympanic Monitor Sitting Left arm       Pain Score       07/03/23 1754       6          Wt Readings from Last 1 Encounters:   07/05/23 101 kg (223 lb)     Additional Vital Signs:   07/05/23 0751 -- 83 -- 138/83 -- -- -- --   07/05/23 07:50:31 97.6 °F (36.4 °C) 83 20 138/83 101 94 % None (Room air) Lying   07/05/23 03:03:35 98.1 °F (36.7 °C) 70 18 120/69 86 94 % -- --   07/04/23 2304 97.5 °F (36.4 °C) 79 18 110/79 89 96 % -- --   07/04/23 1752 -- -- -- -- -- -- None (Room air) --   07/04/23 17:15:57 -- 82 18 135/76 96 95 % -- --   07/04/23 1630 -- 84 -- 111/68 85 92 % -- --   07/04/23 1600 -- 54 Abnormal  -- 133/64 89 94 % -- --   07/04/23 1530 -- 69 -- 118/66 88 94 % -- --     07/04/23 1200 -- 81 -- 126/74 95 -- -- --   07/04/23 1059 -- 69 -- 121/71 91 -- -- --   07/04/23 1029 -- 85 -- 124/73 93 -- -- --   07/04/23 1012 -- 92 -- -- -- 92 % -- --   07/04/23 1002 -- 89 -- 118/77 91 91 % -- --   07/04/23 0952 -- 89 -- -- -- -- -- --   07/04/23 0932 -- 94 -- 144/86 109 92 % -- --   07/04/23 0902 -- 101 -- 141/86 107 -- -- --   07/04/23 0852 -- 109 Abnormal  -- 136/83 104 -- -- --   07/04/23 0732 -- 84 -- 107/55 76 -- -- --   07/04/23 6749 -- 87 -- 120/58 83 -- -- --   07/04/23 0519 -- 83 17 141/75 102 92 % -- --   07/04/23 0432 -- 76 16 -- -- 91 % None (Room air) --   07/04/23 0110 -- 74 20 154/95 118 93 % -- --   07/04/23 0021 -- 72 16 -- -- 94 % None (Room air) --   07/03/23 2310 -- 69 17 122/78 95 95 % -- --   07/03/23 2000 -- 67 19 120/61 84 93 % -- --   07/03/23 1906 -- 75 22 107/68 82 93 % -- --     Pertinent Labs/Diagnostic Test Results:       7/3 ECG - Normal sinus rhythm    7/4 Echo - •  Left Ventricle: Left ventricular cavity size is normal. Wall thickness is normal. The left ventricular ejection fraction is 55%. Systolic function is normal. Wall motion is normal.  •  Right Ventricle: Right ventricular cavity size is mildly dilated. Systolic function is normal. Normal tricuspid annular plane systolic excursion (TAPSE) > 1.7 cm. •  The right ventricular systolic pressure is normal.  •  Mitral Valve: There is mild regurgitation. CTA ED chest PE study   Final Result by Lia Byrd MD (07/03 2034)      1. Right-sided pulmonary emboli as described above. Measured RV/LV ratio is within normal limits at less than 0.9.   2.  Bronchial wall thickening may represent bronchitis. I personally discussed this study with Jayy August on 7/3/2023 8:29 PM.               Workstation performed: [id number]         XR chest 1 view portable   Final Result by Meme Frost MD (07/04 1108)      No acute cardiopulmonary disease.                   Workstation performed: [id number]               Results from last 7 days   Lab Units 07/05/23  0301 07/04/23  0513 07/03/23  1805   WBC Thousand/uL 14.23* 7.96 9.06   HEMOGLOBIN g/dL 11.8* 12.4 12.3   HEMATOCRIT % 34.2* 36.5 35.7*   PLATELETS Thousands/uL 204 227 229   NEUTROS ABS Thousands/µL 12.54*  --  6.40         Results from last 7 days   Lab Units 07/05/23  0301 07/04/23  0513 07/03/23  1805   SODIUM mmol/L 135 135 133*   POTASSIUM mmol/L 3.9 3.9 3.4*   CHLORIDE mmol/L 100 101 100   CO2 mmol/L 26 26 24   ANION GAP mmol/L 9 8 9   BUN mg/dL 12 10 9   CREATININE mg/dL 0.94 1.06 1.15   EGFR ml/min/1.73sq m 82 71 65   CALCIUM mg/dL 9.4 8.6 8.6     Results from last 7 days   Lab Units 07/03/23  1805   AST U/L 11*   ALT U/L 9   ALK PHOS U/L 93   TOTAL PROTEIN g/dL 6.2*   ALBUMIN g/dL 3.8   TOTAL BILIRUBIN mg/dL 0.42         Results from last 7 days   Lab Units 07/05/23  0301 07/04/23 0513 07/03/23  1805   GLUCOSE RANDOM mg/dL 141* 177* 110     Results from last 7 days   Lab Units 07/03/23 2007 07/03/23  1805   HS TNI 0HR ng/L  --  4   HS TNI 2HR ng/L 4  --    HSTNI D2 ng/L 0  --          Results from last 7 days   Lab Units 07/05/23  0301 07/04/23  1816 07/04/23  1215 07/04/23 0513 07/03/23  2308   PROTIME seconds  --   --   --   --  14.7*   INR   --   --   --   --  1.15   PTT seconds 90* 56* 70*   < > 28    < > = values in this interval not displayed.            ED Treatment:   Medication Administration from 07/03/2023 1746 to 07/04/2023 1349       Date/Time Order Dose Route Action     07/03/2023 1825 EDT ipratropium-albuterol (DUO-NEB) 0.5-2.5 mg/3 mL inhalation solution 3 mL 3 mL Nebulization Given     07/03/2023 1922 EDT iohexol (OMNIPAQUE) 350 MG/ML injection (MULTI-DOSE) 90 mL 90 mL Intravenous Given     07/03/2023 2243 EDT dexamethasone (PF) (DECADRON) injection 8 mg 8 mg Intravenous Given     07/03/2023 2243 EDT morphine injection 2 mg 2 mg Intravenous Given     07/04/2023 0900 EDT aspirin (ECOTRIN LOW STRENGTH) EC tablet 81 mg 81 mg Oral Given     07/04/2023 0900 EDT diltiazem (CARDIZEM CD) 24 hr capsule 180 mg 180 mg Oral Given     07/04/2023 0859 EDT docusate sodium (COLACE) capsule 100 mg 100 mg Oral Given     07/04/2023 0903 EDT fluticasone (FLONASE) 50 mcg/act nasal spray 1 spray 1 spray Nasal Given     07/04/2023 0900 EDT furosemide (LASIX) tablet 40 mg 40 mg Oral Given     07/04/2023 0859 EDT isosorbide mononitrate (IMDUR) 24 hr tablet 30 mg 30 mg Oral Given     07/04/2023 0901 EDT lubiprostone (AMITIZA) capsule 24 mcg 24 mcg Oral Given     07/04/2023 0107 EDT lubiprostone (AMITIZA) capsule 24 mcg 24 mcg Oral Given     07/04/2023 0900 EDT metoprolol succinate (TOPROL-XL) 24 hr tablet 25 mg 25 mg Oral Given     07/04/2023 0859 EDT pantoprazole (PROTONIX) EC tablet 40 mg 40 mg Oral Given     07/04/2023 0859 EDT potassium chloride (K-DUR,KLOR-CON) CR tablet 20 mEq 20 mEq Oral Given     07/04/2023 0107 EDT tamsulosin (FLOMAX) capsule 0.4 mg 0.4 mg Oral Given     07/04/2023 0515 EDT acetaminophen (TYLENOL) tablet 650 mg 650 mg Oral Given     07/03/2023 2308 EDT heparin (porcine) injection 7,600 Units 7,600 Units Intravenous Given     07/04/2023 0615 EDT heparin (porcine) 25,000 units in 0.45% NaCl 250 mL infusion (premix) 16 Units/kg/hr Intravenous Rate/Dose Change     07/03/2023 2309 EDT heparin (porcine) 25,000 units in 0.45% NaCl 250 mL infusion (premix) 18 Units/kg/hr Intravenous New Bag     07/04/2023 0900 EDT LORazepam (ATIVAN) tablet 1 mg 1 mg Oral Given        Past Medical History:   Diagnosis Date   • Acute right MCA stroke ([address]) 09/15/2018   • Arthritis    • CAD (coronary artery disease)     s/p CABG 2000   • COPD (chronic obstructive pulmonary disease) ([facility])    • GERD (gastroesophageal reflux disease)    • Grand mal status ([address]) 03/24/2019   • H/O blood clots    • Heart attack ([address])    • Heart failure ([facility])    • Hyperlipidemia    • Hypertension    • Lexiscan nuclear stress test 03/19/2016    EF 74% Normal   • Myocardial infarction [facility])    • Shortness of breath    • Stroke [facility])    • TIA (transient ischemic attack) 09/13/2018     Present on Admission:  • COPD (chronic obstructive pulmonary disease) ([facility])  • Gastroesophageal reflux disease  • Hypertension  • Tobacco abuse  • Pulmonary embolism ([facility])      Admitting Diagnosis: COPD (chronic obstructive pulmonary disease) ([address]) [J44.9]  Back pain [M54.9]  Pulmonary emboli ([address]) [I26.99]  Age/Sex: 76 y.o. male  Admission Orders:  Scheduled Medications:  aspirin, 81 mg, Oral, Daily  azelastine, 1 spray, Each Nare, BID  cefdinir, 300 mg, Oral, Q12H YANCI  diltiazem, 180 mg, Oral, Daily  docusate sodium, 100 mg, Oral, Daily  fluticasone, 1 spray, Nasal, Daily  furosemide, 40 mg, Oral, Daily  guaiFENesin, 600 mg, Oral, Q12H YANCI  isosorbide mononitrate, 30 mg, Oral, Daily  lubiprostone, 24 mcg, Oral, BID  metoprolol succinate, 25 mg, Oral, BID  nicotine, 1 patch, Transdermal, Daily  pantoprazole, 40 mg, Oral, Daily  potassium chloride, 20 mEq, Oral, Daily  rivaroxaban, 15 mg, Oral, BID With Meals  tamsulosin, 0.4 mg, Oral, BID  umeclidinium, 1 puff, Inhalation, Daily      Continuous IV Infusions:     PRN Meds:  acetaminophen, 650 mg, Oral, Q4H PRN - x 1 7/4albuterol, 2 puff, Inhalation, Q4H PRN  heparin (porcine), 3,800 Units, Intravenous, Q6H PRN  heparin (porcine), 7,600 Units, Intravenous, Q6H PRN  ondansetron, 4 mg, Intravenous, Q6H PRN  polyethylene glycol, 17 g, Oral, Daily PRN    Tele  Heparin drip  Echo        Network Utilization Review Department  ATTENTION: Please call with any questions or concerns to [phone] and carefully listen to the prompts so that you are directed to the right person. All voicemails are confidential.  Cayla Martinez all requests for admission clinical reviews, approved or denied determinations and any other requests to dedicated fax number below belonging to the campus where the patient is receiving treatment.  List of dedicated fax numbers for the Facilities:  [place] DENIALS (Administrative/Medical Necessity) [phone]   [facility] (Maternity/NICU/Pediatrics) [phone]   [address] [place] [address] [phone]   [facility] [phone] 1505 [facility] 207 [facility] [address] [phone] 1150 [facility]. 1901 [facility] [address] [facility] [address]  [facility] [phone]

## 2023-07-05 ENCOUNTER — TRANSITIONAL CARE MANAGEMENT (OUTPATIENT)
Dept: INTERNAL MEDICINE CLINIC | Facility: CLINIC | Age: 68
End: 2023-07-05

## 2023-07-05 ENCOUNTER — APPOINTMENT (INPATIENT)
Dept: NON INVASIVE DIAGNOSTICS | Facility: HOSPITAL | Age: 68
End: 2023-07-05
Payer: COMMERCIAL

## 2023-07-05 ENCOUNTER — TELEPHONE (OUTPATIENT)
Dept: HEMATOLOGY ONCOLOGY | Facility: CLINIC | Age: 68
End: 2023-07-05

## 2023-07-05 VITALS
BODY MASS INDEX: 31.92 KG/M2 | SYSTOLIC BLOOD PRESSURE: 138 MMHG | HEIGHT: 70 IN | RESPIRATION RATE: 20 BRPM | WEIGHT: 223 LBS | TEMPERATURE: 97.6 F | HEART RATE: 83 BPM | OXYGEN SATURATION: 94 % | DIASTOLIC BLOOD PRESSURE: 83 MMHG

## 2023-07-05 PROBLEM — J40 BRONCHITIS: Status: ACTIVE | Noted: 2023-07-05

## 2023-07-05 LAB
ANION GAP SERPL CALCULATED.3IONS-SCNC: 9 MMOL/L
AORTIC ROOT: 4.1 CM
APICAL FOUR CHAMBER EJECTION FRACTION: 71 %
APTT PPP: 90 SECONDS (ref 23–37)
AV LVOT MEAN GRADIENT: 3 MMHG
AV LVOT PEAK GRADIENT: 7 MMHG
BASOPHILS # BLD AUTO: 0.02 THOUSANDS/ÂΜL (ref 0–0.1)
BASOPHILS NFR BLD AUTO: 0 % (ref 0–1)
BUN SERPL-MCNC: 12 MG/DL (ref 5–25)
CALCIUM SERPL-MCNC: 9.4 MG/DL (ref 8.4–10.2)
CHLORIDE SERPL-SCNC: 100 MMOL/L (ref 96–108)
CO2 SERPL-SCNC: 26 MMOL/L (ref 21–32)
CREAT SERPL-MCNC: 0.94 MG/DL (ref 0.6–1.3)
DOP CALC LVOT PEAK VEL VTI: 30.05 CM
DOP CALC LVOT PEAK VEL: 1.3 M/S
E WAVE DECELERATION TIME: 250 MS
E/A RATIO: 1.02
EOSINOPHIL # BLD AUTO: 0 THOUSAND/ÂΜL (ref 0–0.61)
EOSINOPHIL NFR BLD AUTO: 0 % (ref 0–6)
ERYTHROCYTE [DISTWIDTH] IN BLOOD BY AUTOMATED COUNT: 12.4 % (ref 11.6–15.1)
FRACTIONAL SHORTENING: 56 (ref 28–44)
GFR SERPL CREATININE-BSD FRML MDRD: 82 ML/MIN/1.73SQ M
GLUCOSE SERPL-MCNC: 141 MG/DL (ref 65–140)
HCT VFR BLD AUTO: 34.2 % (ref 36.5–49.3)
HGB BLD-MCNC: 11.8 G/DL (ref 12–17)
IMM GRANULOCYTES # BLD AUTO: 0.08 THOUSAND/UL (ref 0–0.2)
IMM GRANULOCYTES NFR BLD AUTO: 1 % (ref 0–2)
INTERVENTRICULAR SEPTUM IN DIASTOLE (PARASTERNAL SHORT AXIS VIEW): 1 CM
INTERVENTRICULAR SEPTUM: 1 CM (ref 0.6–1.1)
LAAS-AP2: 17.2 CM2
LAAS-AP4: 19.9 CM2
LEFT ATRIUM SIZE: 4.3 CM
LEFT ATRIUM VOLUME (MOD BIPLANE): 53 ML
LEFT ATRIUM VOLUME INDEX (MOD BIPLANE): 24.2
LEFT INTERNAL DIMENSION IN SYSTOLE: 2.2 CM (ref 2.1–4)
LEFT VENTRICULAR INTERNAL DIMENSION IN DIASTOLE: 5 CM (ref 3.5–6)
LEFT VENTRICULAR POSTERIOR WALL IN END DIASTOLE: 1 CM
LEFT VENTRICULAR STROKE VOLUME: 101 ML
LVSV (TEICH): 101 ML
LYMPHOCYTES # BLD AUTO: 1.19 THOUSANDS/ÂΜL (ref 0.6–4.47)
LYMPHOCYTES NFR BLD AUTO: 8 % (ref 14–44)
MCH RBC QN AUTO: 30.6 PG (ref 26.8–34.3)
MCHC RBC AUTO-ENTMCNC: 34.5 G/DL (ref 31.4–37.4)
MCV RBC AUTO: 89 FL (ref 82–98)
MONOCYTES # BLD AUTO: 0.4 THOUSAND/ÂΜL (ref 0.17–1.22)
MONOCYTES NFR BLD AUTO: 3 % (ref 4–12)
MV E'TISSUE VEL-SEP: 10 CM/S
MV PEAK A VEL: 1.07 M/S
MV PEAK E VEL: 109 CM/S
MV STENOSIS PRESSURE HALF TIME: 73 MS
MV VALVE AREA P 1/2 METHOD: 3
NEUTROPHILS # BLD AUTO: 12.54 THOUSANDS/ÂΜL (ref 1.85–7.62)
NEUTS SEG NFR BLD AUTO: 88 % (ref 43–75)
NRBC BLD AUTO-RTO: 0 /100 WBCS
PLATELET # BLD AUTO: 204 THOUSANDS/UL (ref 149–390)
PMV BLD AUTO: 9 FL (ref 8.9–12.7)
POTASSIUM SERPL-SCNC: 3.9 MMOL/L (ref 3.5–5.3)
RBC # BLD AUTO: 3.85 MILLION/UL (ref 3.88–5.62)
RIGHT ATRIUM AREA SYSTOLE A4C: 14.7 CM2
RIGHT VENTRICLE ID DIMENSION: 2.5 CM
SL CV LEFT ATRIUM LENGTH A2C: 5.3 CM
SL CV LV EF: 55
SL CV PED ECHO LEFT VENTRICLE DIASTOLIC VOLUME (MOD BIPLANE) 2D: 117 ML
SL CV PED ECHO LEFT VENTRICLE SYSTOLIC VOLUME (MOD BIPLANE) 2D: 16 ML
SODIUM SERPL-SCNC: 135 MMOL/L (ref 135–147)
TR MAX PG: 29 MMHG
TR PEAK VELOCITY: 2.7 M/S
TRICUSPID ANNULAR PLANE SYSTOLIC EXCURSION: 1.8 CM
TRICUSPID VALVE PEAK REGURGITATION VELOCITY: 2.68 M/S
WBC # BLD AUTO: 14.23 THOUSAND/UL (ref 4.31–10.16)

## 2023-07-05 PROCEDURE — 93306 TTE W/DOPPLER COMPLETE: CPT

## 2023-07-05 PROCEDURE — 85025 COMPLETE CBC W/AUTO DIFF WBC: CPT | Performed by: INTERNAL MEDICINE

## 2023-07-05 PROCEDURE — 93306 TTE W/DOPPLER COMPLETE: CPT | Performed by: INTERNAL MEDICINE

## 2023-07-05 PROCEDURE — 85730 THROMBOPLASTIN TIME PARTIAL: CPT | Performed by: INTERNAL MEDICINE

## 2023-07-05 PROCEDURE — 99239 HOSP IP/OBS DSCHRG MGMT >30: CPT | Performed by: INTERNAL MEDICINE

## 2023-07-05 PROCEDURE — 80048 BASIC METABOLIC PNL TOTAL CA: CPT | Performed by: INTERNAL MEDICINE

## 2023-07-05 RX ORDER — CEFDINIR 300 MG/1
300 CAPSULE ORAL EVERY 12 HOURS SCHEDULED
Qty: 10 CAPSULE | Refills: 0 | Status: SHIPPED | OUTPATIENT
Start: 2023-07-05 | End: 2023-07-10

## 2023-07-05 RX ORDER — CEFDINIR 300 MG/1
300 CAPSULE ORAL EVERY 12 HOURS SCHEDULED
Status: DISCONTINUED | OUTPATIENT
Start: 2023-07-05 | End: 2023-07-05 | Stop reason: HOSPADM

## 2023-07-05 RX ADMIN — DILTIAZEM HYDROCHLORIDE 180 MG: 180 CAPSULE, COATED, EXTENDED RELEASE ORAL at 08:32

## 2023-07-05 RX ADMIN — DOCUSATE SODIUM 100 MG: 100 CAPSULE, LIQUID FILLED ORAL at 08:32

## 2023-07-05 RX ADMIN — RIVAROXABAN 15 MG: 15 TABLET, FILM COATED ORAL at 08:32

## 2023-07-05 RX ADMIN — ISOSORBIDE MONONITRATE 30 MG: 30 TABLET, EXTENDED RELEASE ORAL at 08:31

## 2023-07-05 RX ADMIN — ALBUTEROL SULFATE 2 PUFF: 108 INHALANT RESPIRATORY (INHALATION) at 04:21

## 2023-07-05 RX ADMIN — FUROSEMIDE 40 MG: 40 TABLET ORAL at 08:33

## 2023-07-05 RX ADMIN — ASPIRIN 81 MG: 81 TABLET, COATED ORAL at 08:31

## 2023-07-05 RX ADMIN — CEFDINIR 300 MG: 300 CAPSULE ORAL at 09:28

## 2023-07-05 RX ADMIN — AZELASTINE HYDROCHLORIDE 1 SPRAY: 137 SPRAY, METERED NASAL at 08:33

## 2023-07-05 RX ADMIN — PANTOPRAZOLE SODIUM 40 MG: 40 TABLET, DELAYED RELEASE ORAL at 08:32

## 2023-07-05 RX ADMIN — POTASSIUM CHLORIDE 20 MEQ: 1500 TABLET, EXTENDED RELEASE ORAL at 08:31

## 2023-07-05 RX ADMIN — FLUTICASONE PROPIONATE 1 SPRAY: 50 SPRAY, METERED NASAL at 08:33

## 2023-07-05 RX ADMIN — METOPROLOL SUCCINATE 25 MG: 50 TABLET, EXTENDED RELEASE ORAL at 08:33

## 2023-07-05 RX ADMIN — UMECLIDINIUM 1 PUFF: 62.5 AEROSOL, POWDER ORAL at 08:35

## 2023-07-05 RX ADMIN — TAMSULOSIN HYDROCHLORIDE 0.4 MG: 0.4 CAPSULE ORAL at 08:32

## 2023-07-05 RX ADMIN — LUBIPROSTONE 24 MCG: 24 CAPSULE, GELATIN COATED ORAL at 08:29

## 2023-07-05 NOTE — ASSESSMENT & PLAN NOTE
· Patient was recently admitted to our facility 6/8/2023 with left lower extremity DVT and started on Eliquis which he states he has been compliant with  · Now with pulmonary embolism noted on CTA chest  · Patient has been maintained on heparin drip  · Discussed case with on-call pulmonology, recommended to switch from Eliquis to Xarelto  · We will discharge on Xarelto  · Ambulatory referral to hematology and pulmonology  · Follow-up with PCP as outpatient

## 2023-07-05 NOTE — PLAN OF CARE
Problem: DISCHARGE PLANNING  Goal: Discharge to home or other facility with appropriate resources  Description: INTERVENTIONS:  - Identify barriers to discharge w/patient and caregiver  - Arrange for needed discharge resources and transportation as appropriate  - Identify discharge learning needs (meds, wound care, etc.)  - Arrange for interpretive services to assist at discharge as needed  - Refer to Case Management Department for coordinating discharge planning if the patient needs post-hospital services based on physician/advanced practitioner order or complex needs related to functional status, cognitive ability, or social support system  Outcome: Progressing

## 2023-07-05 NOTE — TELEPHONE ENCOUNTER
I called Fariha Hyatt in response to a referral that was received for patient to establish care with Hematology. Outreach was made to schedule a consultation. .    patient requests a call back. Another attempt will be made to contact patient.

## 2023-07-05 NOTE — DISCHARGE SUMMARY
[address]  Discharge- Bhakti Cleveland 1955, 76 y.o. male MRN: [id number]  Unit/Bed#: MS [facility]yl[facility] Encounter: [id number]  Primary Care Provider: Ty Jimenez PA-C   Date and time admitted to hospital: 7/3/2023  5:47 PM    * Pulmonary embolism [facility])  Assessment & Plan  · Patient was recently admitted to our facility 6/8/2023 with left lower extremity DVT and started on Eliquis which he states he has been compliant with  · Now with pulmonary embolism noted on CTA chest  · Patient has been maintained on heparin drip  · Discussed case with on-call pulmonology, recommended to switch from Eliquis to Xarelto  · We will discharge on Xarelto  · Ambulatory referral to hematology and pulmonology  · Follow-up with PCP as outpatient    Bronchitis  Assessment & Plan  · Patient with cough productive of clear sputum, CT with bronchial thickening concerning for bronchitis  · Also with leukocytosis however currently afebrile  · Will initiate treatment with cefdinir to complete 5 days of therapy  · Advised to follow-up with PCP as outpatient or return to the ER with any worsening symptoms    Tobacco abuse  Assessment & Plan  Counseled on smoking cessation    Gastroesophageal reflux disease  Assessment & Plan  Continue prehospital Protonix 40 mg p.o. daily    COPD (chronic obstructive pulmonary disease) ([address])  Assessment & Plan  · Not in exacerbation at this time  · Continue prehospital respiratory medications    Hypertension  Assessment & Plan  Continue prehospital Toprol-XL 25 mg p.o. twice daily, Cardizem  mg p.o. daily and Lasix 40 mg p.o. daily      Discharging Physician / Practitioner: Jessy Smith MD  PCP: Ty Jimenez PA-C  Admission Date:   Admission Orders (From admission, onward)     Ordered        07/04/23 1343  Inpatient Admission  Once            07/03/23 2152  Place in Observation  Once                      Discharge Date: 07/05/23    Medical Problems     Resolved Problems  Date Reviewed: 7/3/2023   None         Consultations During Hospital Stay:  · None    Procedures Performed:   · None    Significant Findings / Test Results:   · Pulmonary embolism    Incidental Findings:   · None    Test Results Pending at Discharge (will require follow up): · None     Outpatient Tests Requested:  · Routine labs with PCP as outpatient    Complications:    • None    Reason for Admission: Pulmonary embolism    Hospital Course:     Nichole New is a 76 y.o. male patient who originally presented to the hospital on 7/3/2023 due to shortness of breath. Patient found to have pulmonary embolism on CT imaging. Patient recently on Eliquis for lower extremity DVT. Patient states he has been compliant with Eliquis. Given that patient was on Eliquis for 1 month can consider this to be an Eliquis failure. Spoke with on-call pulmonology who recommended switching to Xarelto. Patient was initially on heparin drip and then transition to Xarelto. We will provide ambulatory referral to hematology for hypercoagulable work-up    Also with possible bronchitis based on symptoms and CT findings. Patient initiated on cefdinir and discharged with 5 days of therapy. Advised to follow-up with his PCP within 1 week of discharge. Also advised to return if he had any worsening symptoms    Please see above list of diagnoses and related plan for additional information. Condition at Discharge: stable     Discharge Day Visit / Exam:     Subjective: No complaints at this time    Vitals: Blood Pressure: 138/83 (07/05/23 0751)  Pulse: 83 (07/05/23 0751)  Temperature: 97.6 °F (36.4 °C) (07/05/23 0750)  Temp Source: Oral (07/05/23 0750)  Respirations: 20 (07/05/23 0750)  Height: 5' 10" (177.8 cm) (07/05/23 0751)  Weight - Scale: 101 kg (223 lb) (07/05/23 0751)  SpO2: 94 % (07/05/23 0750)     Exam:   Physical Exam  Constitutional:       General: He is not in acute distress.   HENT:      Head: Normocephalic and atraumatic. Nose: Nose normal.      Mouth/Throat:      Mouth: Mucous membranes are moist.   Eyes:      Extraocular Movements: Extraocular movements intact. Conjunctiva/sclera: Conjunctivae normal.   Cardiovascular:      Rate and Rhythm: Normal rate and regular rhythm. Pulmonary:      Effort: Pulmonary effort is normal.   Abdominal:      Palpations: Abdomen is soft. Tenderness: There is no abdominal tenderness. Musculoskeletal:         General: Normal range of motion. Cervical back: Normal range of motion and neck supple. Skin:     General: Skin is warm and dry. Neurological:      General: No focal deficit present. Mental Status: He is alert. Mental status is at baseline. Cranial Nerves: No cranial nerve deficit. Psychiatric:         Mood and Affect: Mood normal.         Behavior: Behavior normal.         Discussion with Family: Updated patient regarding discharge plan    Discharge instructions/Information to patient and family:   See after visit summary for information provided to patient and family. Provisions for Follow-Up Care:  See after visit summary for information related to follow-up care and any pertinent home health orders. Disposition:     Home      Planned Readmission:   • no     Discharge Statement:  I spent 35 minutes discharging the patient. This time was spent on the day of discharge. I had direct contact with the patient on the day of discharge. Greater than 50% of the total time was spent examining patient, answering all patient questions, arranging and discussing plan of care with patient as well as directly providing post-discharge instructions. Additional time then spent on discharge activities. Discharge Medications:  See after visit summary for reconciled discharge medications provided to patient and family.       ** Please Note: This note has been constructed using a voice recognition system **

## 2023-07-05 NOTE — UTILIZATION REVIEW
NOTIFICATION OF INPATIENT ADMISSION   AUTHORIZATION REQUEST   SERVICING FACILITY:   [address] [place] [address]  Tax ID: [id number]  NPI: [id number]   ATTENDING PROVIDER:  Attending Name and NPI#: Isabel ShaileshRehana schmidt [[id number]]  Address: [address] [place] [address]  Phone: [phone]     ADMISSION INFORMATION:  Place of Service: [address] [place] [address]  Place of Service Code: 21  Inpatient Admission Date/Time: 7/4/23  1:43 PM  Discharge Date/Time: No discharge date for patient encounter. Admitting Diagnosis Code/Description:  COPD (chronic obstructive pulmonary disease) ([address]) [J44.9]  Back pain [M54.9]  Pulmonary emboli ([address]) [I26.99]     UTILIZATION REVIEW CONTACT:  Gabriela Canchola Utilization   Network Utilization Review Department  Phone: [phone]  Fax [phone]  Email: [email or web address]  Contact for approvals/pending authorizations, clinical reviews, and discharge. PHYSICIAN ADVISORY SERVICES:  Medical Necessity Denial & [id number] Review  Phone: [phone]  Fax: [phone]  Email: [email or web address]

## 2023-07-05 NOTE — ASSESSMENT & PLAN NOTE
· Patient with cough productive of clear sputum, CT with bronchial thickening concerning for bronchitis  · Also with leukocytosis however currently afebrile  · Will initiate treatment with cefdinir to complete 5 days of therapy  · Advised to follow-up with PCP as outpatient or return to the ER with any worsening symptoms

## 2023-07-05 NOTE — NURSING NOTE
Discharge instructions given to Pt. Pt verbalizes understanding of instructions. Pt left with all belongings, in no acute distress in stable condition. Pt left with [facility] transport to home.

## 2023-07-05 NOTE — CASE MANAGEMENT
Case Management Assessment & Discharge Planning Note    Patient name Joshua Living  Location /-76 MRN [id number]  : 1955 Date 2023       Current Admission Date: 7/3/2023  Current Admission Diagnosis:Pulmonary embolism [facility])   Patient Active Problem List    Diagnosis Date Noted   • Bronchitis 2023   • Pulmonary embolism ([address]) 2023   • Hernia of abdominal wall 2023   • Arthralgia of left lower leg 2023   • Ambulatory dysfunction 2023   • PAD (peripheral artery disease) ([address]) 2023   • Suspicious nevus 2023   • Left hip pain 10/20/2022   • BPH (benign prostatic hyperplasia) 2022   • Urinary retention 2022   • Hypertensive heart and renal disease with CHF ([address]) 2022   • Depression, recurrent ([address]) 2022   • Superior mesenteric artery stenosis ([address]) 2022   • 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) 2022   • Peripheral neuropathy 2022   • Aortic aneurysm ([address]) 2020   • Lower extremity edema 2019   • Diverticular disease 2019   • Chronic constipation 2019   • ED (erectile dysfunction) of organic origin 2019   • Gastroesophageal reflux disease 2019   • Grand mal status ([address]) 2019   • Insomnia 2019   • Tobacco abuse 2019   • Overweight 2019   • Seasonal allergies 2019   • Anxiety and depression 2018   • Hypertension 2018   • Hyperlipidemia 2018   • COPD (chronic obstructive pulmonary disease) ([address]) 2018   • Coronary artery disease involving native coronary artery of native heart with angina pectoris ([address]) 2018   • Benign neoplasm of colon 2010      LOS (days): 1  Geometric Mean LOS (GMLOS) (days):   Days to GMLOS:     OBJECTIVE:    Risk of Unplanned Readmission Score: 15.69         Current admission status: Inpatient       Preferred Pharmacy:   [address] [place] [address] 0543 [facility] [address]  Phone: [phone] Fax: [phone] 5974 [facility], [address] [facility],  [facility],  [facility]8 [place] [address] [facility]  Phone: [phone] Fax: [phone] [address], [address]  [address] [facility]. [address]. Sebastian [place] [address]  Phone: [phone] Fax: [phone]    Primary Care Provider: James Knight PA-C    Primary Insurance: [facility]  Secondary Insurance: Thi Barrett    ASSESSMENT:  [address] COTTAGE Representative - Sister   Primary Phone: [phone] (Home)               Advance Directives  Does patient have a [address]?: No  Was patient offered paperwork?: Yes (declines)  Does patient currently have a Health Care decision maker?: No  Does patient have Advance Directives?: No  Was patient offered paperwork?: Yes (declines)  Primary Contact: Sameera Allison (Sister)   [phone] (Home Phone)    Readmission Root Cause  30 Day Readmission: No    Patient Information  Admitted from[de-identified] Home  Mental Status: Alert  During Assessment patient was accompanied by: Not accompanied during assessment  Assessment information provided by[de-identified] Patient  Primary Caregiver: Self  Support Systems: Family members, Friends/neighbors, [facility] [address] of Residence: [facility] do you live in?: [address] [facility] entry access options.  Select all that apply.: Stairs  Number of steps to enter home.: One Flight  Do the steps have railings?: Yes  Type of Current Residence: Apartment  Floor Level: 2  Upon entering residence, is there a bedroom on the main floor (no further steps)?: Yes  Upon entering residence, is there a bathroom on the main floor (no further steps)?: Yes  In the last 12 months, was there a time when you were not able to pay the mortgage or rent on time?: No  In the last 12 months, how many places have you lived?: 1  In the last 12 months, was there a time when you did not have a steady place to sleep or slept in a shelter (including now)?: No  Homeless/housing insecurity resource given?: N/A  Living Arrangements: Lives Alone  Is patient a ?: No    Activities of Daily Living Prior to Admission  Functional Status: Independent  Completes ADLs independently?: Yes  Ambulates independently?: Yes  Does patient use assisted devices?: No  Does patient currently own DME?: No  Does patient have a history of Outpatient Therapy (PT/OT)?: No  Does the patient have a history of Short-Term Rehab?: No  Does patient have a history of HHC?: No  Does patient currently have [facility]?: No    Patient Information Continued  Does patient have prescription coverage?: Yes  Within the past 12 months, you worried that your food would run out before you got the money to buy more.: Never true  Within the past 12 months, the food you bought just didn't last and you didn't have money to get more.: Never true  Food insecurity resource given?: N/A  Does patient receive dialysis treatments?: No  Does patient have a history of substance abuse?: No    Means of Transportation  Means of Transport to Appts[de-identified] Quincy Betters  In the past 12 months, has lack of transportation kept you from medical appointments or from getting medications?: No  In the past 12 months, has lack of transportation kept you from meetings, work, or from getting things needed for daily living?: No    DISCHARGE DETAILS:    Discharge planning discussed with[de-identified] Patient  Freedom of Choice: Yes     CM contacted family/caregiver?: No- see comments    Treatment Team Recommendation: Home  Discharge Destination Plan[de-identified] Home           Additional Comments: Met with patient at bedside. Patient declines [facility] Patient requesting ride home.   Med car requested for 1100am.

## 2023-07-06 NOTE — UTILIZATION REVIEW
NOTIFICATION OF ADMISSION DISCHARGE   This is a Notification of Discharge from [address] [place] [address]. Please be advised that this patient has been discharge from our facility. Below you will find the admission and discharge date and time including the patient’s disposition. UTILIZATION REVIEW CONTACT:  Anuradha Fowler  Utilization   Network Utilization Review Department  Phone: [phone] x carefully listen to the prompts. All voicemails are confidential.  Email: [email or web address]     ADMISSION INFORMATION  PRESENTATION DATE: 7/3/2023  5:47 PM  OBERVATION ADMISSION DATE:   INPATIENT ADMISSION DATE: 7/4/23  1:43 PM   DISCHARGE DATE: 7/5/2023 11:15 AM   DISPOSITION:Home/Self Care    IMPORTANT INFORMATION:  Send all requests for admission clinical reviews, approved or denied determinations and any other requests to dedicated fax number below belonging to the campus where the patient is receiving treatment.  List of dedicated fax numbers:  [place] DENIALS (Administrative/Medical Necessity) [phone] 2303 [facility] (Maternity/NICU/Pediatrics) [phone]   [facility] [phone]   [facility] [phone] 1636 [facility] [phone] [address] [facility] [phone]   [facility] [phone]   [address] [place] [address] 608 [facility] [phone]   [address] [place] [address] [phone] 3441 [facility] [phone] 2720 [facility] [address] [facility] [phone]

## 2023-07-07 DIAGNOSIS — I25.10 CORONARY ARTERIOSCLEROSIS IN NATIVE ARTERY: ICD-10-CM

## 2023-07-07 RX ORDER — DILTIAZEM HYDROCHLORIDE 180 MG/1
CAPSULE, COATED, EXTENDED RELEASE ORAL
Qty: 30 CAPSULE | Refills: 5 | Status: SHIPPED | OUTPATIENT
Start: 2023-07-07

## 2023-07-12 ENCOUNTER — OFFICE VISIT (OUTPATIENT)
Dept: INTERNAL MEDICINE CLINIC | Facility: CLINIC | Age: 68
End: 2023-07-12
Payer: COMMERCIAL

## 2023-07-12 VITALS
DIASTOLIC BLOOD PRESSURE: 68 MMHG | HEIGHT: 70 IN | BODY MASS INDEX: 31.94 KG/M2 | SYSTOLIC BLOOD PRESSURE: 142 MMHG | WEIGHT: 223.13 LBS | HEART RATE: 90 BPM | TEMPERATURE: 97.6 F | OXYGEN SATURATION: 97 %

## 2023-07-12 DIAGNOSIS — F41.9 ANXIETY: ICD-10-CM

## 2023-07-12 DIAGNOSIS — G40.401: ICD-10-CM

## 2023-07-12 DIAGNOSIS — I26.99 ACUTE PULMONARY EMBOLISM WITHOUT ACUTE COR PULMONALE, UNSPECIFIED PULMONARY EMBOLISM TYPE (HCC): Primary | ICD-10-CM

## 2023-07-12 PROCEDURE — 99215 OFFICE O/P EST HI 40 MIN: CPT | Performed by: PHYSICIAN ASSISTANT

## 2023-07-12 RX ORDER — LORAZEPAM 0.5 MG/1
0.5 TABLET ORAL EVERY 8 HOURS PRN
Qty: 90 TABLET | Refills: 0 | Status: SHIPPED | OUTPATIENT
Start: 2023-07-12

## 2023-07-12 NOTE — PROGRESS NOTES
Transition of Care  Follow-up After Hospitalization    Sarah Chapa 76 y.o. male   Date:  7/12/2023    TCM Call     Date and time call was made  7/5/2023  2:54 PM    Hospital care reviewed  Records reviewed    Patient was hospitialized at  [address] [facility]    Date of Admission  07/03/23    Date of discharge  07/05/23    Diagnosis  Pulmonary embolism    Disposition  Home    Were the patients medications reviewed and updated  Yes    Current Symptoms  None      TCM Call     Post hospital issues  None    Should patient be enrolled in anticoag monitoring? No    Scheduled for follow up? Yes    Did you obtain your prescribed medications  Yes    Do you need help managing your prescriptions or medications  No    Is transportation to your appointment needed  No    I have advised the patient to call PCP with any new or worsening symptoms  ronda farrell ma        Admit Date:  Discharge Date:  Diagnosis:  Location:   Hospital records were reviewed. Medications upon discharge reviewed/updated. Medication Changes:  Imaging:  Consults:   Discharge Disposition:    Follow up visits with other specialists:       Assessment and Plan:    1. Pulmonary embolism: continue xarelto. Keep upcoming hematology and pulmonary appts. 2. Anxiety: Restart Ativan. Patient was informed that this medicine has an abuse potential and may be habit forming. We discussed that this may also cause drowsiness. The medication should not be combined with alcohol. The patient was informed not to operate machinery while taking this medication and should not drive until they know how they respond to the medication. The patient verbalized understanding of this. Robbie Sauceda was seen today for transition of care management. Diagnoses and all orders for this visit:    Acute pulmonary embolism without acute cor pulmonale, unspecified pulmonary embolism type (HCC)    Anxiety  -     LORazepam (Ativan) 0.5 mg tablet;  Take 1 tablet (0.5 mg total) by mouth every 8 (eight) hours as needed for anxiety    Grand mal status ([address])            HPI:  Pt presents for TCM. He was admitted to [address] [place] [address] on 7/3 for SOB. He underwent CT chest which revealed pulmonary embolism. The month prior he had been diagnosed with LLE DVT and  Was placed on Eliquis that he states he had been compliant with. He was transitioned to Xarelto in case this was an eliquis failure. He was also given cefdinir due to possible bronchitis. He improved and was discharged home on 7/5. He is feeling better. He has follow up appt with both pulmonary and hematology scheduled. He notes a decline in his mood as well as increased agitation and poor sleep. He was on Ativan previously which helped and would like to restart this. ROS: Review of Systems   Constitutional: Negative for chills and fever. HENT: Negative for congestion, ear pain, hearing loss, postnasal drip, rhinorrhea, sinus pressure, sinus pain, sore throat and trouble swallowing. Eyes: Negative for pain and visual disturbance. Respiratory: Negative for cough, chest tightness, shortness of breath and wheezing. Cardiovascular: Negative. Negative for chest pain, palpitations and leg swelling. Gastrointestinal: Negative for abdominal pain, blood in stool, constipation, diarrhea, nausea and vomiting. Endocrine: Negative for cold intolerance, heat intolerance, polydipsia, polyphagia and polyuria. Genitourinary: Negative for difficulty urinating, dysuria, flank pain and urgency. Musculoskeletal: Negative for arthralgias, back pain, gait problem and myalgias. Skin: Negative for rash. Allergic/Immunologic: Negative. Neurological: Negative for dizziness, weakness, light-headedness and headaches. Hematological: Negative. Psychiatric/Behavioral: Negative for behavioral problems, dysphoric mood and sleep disturbance. The patient is not nervous/anxious.         Past Medical History:   Diagnosis Date   • Acute right MCA stroke ([address] [facility]) 09/15/2018   • Arthritis    • CAD (coronary artery disease)     s/p CABG 2000   • COPD (chronic obstructive pulmonary disease) ([facility])    • GERD (gastroesophageal reflux disease)    • Grand mal status ([address] [facility]) 03/24/2019   • H/O blood clots    • Heart attack ([address] [facility])    • Heart failure ([facility])    • Hyperlipidemia    • Hypertension    • Lexiscan nuclear stress test 03/19/2016    EF 74% Normal   • Myocardial infarction [facility])    • Shortness of breath    • Stroke [facility])    • TIA (transient ischemic attack) 09/13/2018       Past Surgical History:   Procedure Laterality Date   • CARDIAC CATHETERIZATION  08/19/2015    LIMA occluded. No severe native lesions   • CARDIAC CATHETERIZATION N/A 12/03/2021    Procedure: Cardiac Coronary Angiogram;  Surgeon: Fabi Crews MD;  Location: [facility] CARDIAC CATH LAB; Service: Cardiology   • CARDIAC CATHETERIZATION  12/03/2021    Procedure: Cardiac catheterization;  Surgeon: Fabi Crews MD;  Location: [facility] CARDIAC [facility];   Service: Cardiology   • COLONOSCOPY     • CORONARY ARTERY BYPASS GRAFT  2000   • CYSTOSCOPY  10/31/2022    [facility]   • HERNIA REPAIR      umbilical hernia x2   • TONSILLECTOMY         Social History     Socioeconomic History   • Marital status: Single     Spouse name: None   • Number of children: None   • Years of education: None   • Highest education level: None   Occupational History   • None   Tobacco Use   • Smoking status: Every Day     Packs/day: 1.00     Years: 50.00     Total pack years: 50.00     Types: Cigarettes     Start date: 1970   • Smokeless tobacco: Never   Vaping Use   • Vaping Use: Never used   Substance and Sexual Activity   • Alcohol use: Not Currently     Comment: Socially   • Drug use: Never   • Sexual activity: Not Currently   Other Topics Concern   • None   Social History Narrative   • None     Social Determinants of Health     Financial Resource Strain: Low Risk  (6/7/2023)    Overall Financial Resource Strain (CARDIA)    • Difficulty of Paying Living Expenses: Not very hard   Food Insecurity: No Food Insecurity (7/5/2023)    Hunger Vital Sign    • Worried About Running Out of Food in the Last Year: Never true    • Ran Out of Food in the Last Year: Never true   Transportation Needs: No Transportation Needs (7/5/2023)    PRAPARE - Transportation    • Lack of Transportation (Medical): No    • Lack of Transportation (Non-Medical):  No   Physical Activity: Not on file   Stress: Not on file   Social Connections: Not on file   Intimate Partner Violence: Not on file   Housing Stability: Low Risk  (7/5/2023)    Housing Stability Vital Sign    • Unable to Pay for Housing in the Last Year: No    • Number of Places Lived in the Last Year: 1    • Unstable Housing in the Last Year: No       Family History   Problem Relation Age of Onset   • Heart disease Mother    • Cancer Father    • Aneurysm Father    • Cancer Maternal Grandmother    • Cancer Paternal Grandfather        Allergies   Allergen Reactions   • Gabapentin Headache         Current Outpatient Medications:   •  albuterol (2.5 mg/3 mL) 0.083 % nebulizer solution, Take 1 vial (2.5 mg total) by nebulization every 6 (six) hours as needed for wheezing or shortness of breath, Disp: 25 vial, Rfl: 2  •  albuterol (Ventolin HFA) 90 mcg/act inhaler, Inhale 2 puffs every 6 (six) hours as needed for shortness of breath, Disp: 54 g, Rfl: 1  •  aspirin (ECOTRIN LOW STRENGTH) 81 mg EC tablet, Take 1 tablet (81 mg total) by mouth daily, Disp: 30 tablet, Rfl: 0  •  azelastine (ASTELIN) 0.1 % nasal spray, 1 spray into each nostril 2 (two) times a day Use in each nostril as directed, Disp: 1 mL, Rfl: 2  •  diltiazem (CARDIZEM CD) 180 mg 24 hr capsule, take 1 capsule by mouth daily, Disp: 30 capsule, Rfl: 5  •  fluticasone (FLONASE) 50 mcg/act nasal spray, 1 spray into each nostril daily, Disp: 9.9 mL, Rfl: 0  •  furosemide (LASIX) 40 mg tablet, take 1 tablet by mouth once daily, Disp: 90 tablet, Rfl: 3  •  isosorbide mononitrate (IMDUR) 30 mg 24 hr tablet, Take 1 tablet (30 mg total) by mouth daily, Disp: 90 tablet, Rfl: 3  •  Linzess 290 MCG CAPS, Take 1 capsule by mouth AT LEAST 30 MINUTES BEFORE FIRST MEAL OF THE DAY, Disp: 30 capsule, Rfl: 2  •  LORazepam (Ativan) 0.5 mg tablet, Take 1 tablet (0.5 mg total) by mouth every 8 (eight) hours as needed for anxiety, Disp: 90 tablet, Rfl: 0  •  metoprolol succinate (TOPROL-XL) 25 mg 24 hr tablet, take 1 tablet by mouth twice a day, Disp: 180 tablet, Rfl: 3  •  pantoprazole (PROTONIX) 40 mg tablet, take 1 tablet by mouth once daily, Disp: 90 tablet, Rfl: 0  •  polyethylene glycol (MIRALAX) 17 g packet, Take 17 g by mouth daily as needed (constipation), Disp: 10 each, Rfl: 0  •  Potassium Chloride ER 20 MEQ TBCR, Take 1 tablet (20 mEq total) by mouth daily, Disp: 30 tablet, Rfl: 11  •  rivaroxaban (XARELTO) 15 mg tablet, Take 1 tablet (15 mg total) by mouth 2 (two) times a day with meals for 21 days, Disp: 42 tablet, Rfl: 0  •  Spiriva HandiHaler 18 MCG inhalation capsule, place 1 capsule INTO INHALER AND INHALE DAILY IN THE HANDHALER, Disp: 30 capsule, Rfl: 2  •  docusate sodium (COLACE) 100 mg capsule, Take 1 capsule (100 mg total) by mouth daily Do not start before Sujey 10, 2023., Disp: 30 capsule, Rfl: 0  •  tamsulosin (FLOMAX) 0.4 mg, Take 1 capsule (0.4 mg total) by mouth 2 (two) times a day, Disp: 60 capsule, Rfl: 12      Physical Exam:  /68 (BP Location: Left arm, Patient Position: Sitting)   Pulse 90   Temp 97.6 °F (36.4 °C)   Ht 5' 10" (1.778 m)   Wt 101 kg (223 lb 2 oz)   SpO2 97%   BMI 32.02 kg/m²     Physical Exam  Vitals and nursing note reviewed. Constitutional:       General: He is not in acute distress. Appearance: He is well-developed. He is not diaphoretic. HENT:      Head: Normocephalic and atraumatic.       Right Ear: External ear normal.      Left Ear: External ear normal.      Nose: Nose normal.      Mouth/Throat:      Pharynx: No oropharyngeal exudate. Eyes:      General: No scleral icterus. Right eye: No discharge. Left eye: No discharge. Conjunctiva/sclera: Conjunctivae normal.      Pupils: Pupils are equal, round, and reactive to light. Neck:      Thyroid: No thyromegaly. Cardiovascular:      Rate and Rhythm: Normal rate and regular rhythm. Heart sounds: Normal heart sounds. No murmur heard. No friction rub. No gallop. Pulmonary:      Effort: Pulmonary effort is normal. No respiratory distress. Breath sounds: Normal breath sounds. No wheezing or rales. Abdominal:      General: Bowel sounds are normal. There is no distension. Palpations: Abdomen is soft. Tenderness: There is no abdominal tenderness. Musculoskeletal:         General: No tenderness or deformity. Normal range of motion. Cervical back: Normal range of motion and neck supple. Skin:     General: Skin is warm and dry. Neurological:      Mental Status: He is alert and oriented to person, place, and time. Cranial Nerves: No cranial nerve deficit. Psychiatric:         Behavior: Behavior normal.         Thought Content:  Thought content normal.         Judgment: Judgment normal.             Labs:  Lab Results   Component Value Date    WBC 14.23 (H) 07/05/2023    HGB 11.8 (L) 07/05/2023    HCT 34.2 (L) 07/05/2023    MCV 89 07/05/2023     07/05/2023     Lab Results   Component Value Date     06/22/2018    K 3.9 07/05/2023     07/05/2023    CO2 26 07/05/2023    ANIONGAP 12.4 06/22/2018    BUN 12 07/05/2023    CREATININE 0.94 07/05/2023    GLUF 177 (H) 07/04/2023    CALCIUM 9.4 07/05/2023    CORRECTEDCA 8.8 06/09/2023    AST 11 (L) 07/03/2023    ALT 9 07/03/2023    ALKPHOS 93 07/03/2023    PROT 5.6 (L) 06/22/2018    BILITOT 0.4 06/22/2018    EGFR 82 07/05/2023

## 2023-07-27 ENCOUNTER — TELEPHONE (OUTPATIENT)
Dept: INTERNAL MEDICINE CLINIC | Facility: CLINIC | Age: 68
End: 2023-07-27

## 2023-07-27 DIAGNOSIS — I26.99 ACUTE PULMONARY EMBOLISM WITHOUT ACUTE COR PULMONALE, UNSPECIFIED PULMONARY EMBOLISM TYPE (HCC): ICD-10-CM

## 2023-07-27 NOTE — TELEPHONE ENCOUNTER
patient called stating he needs a refill on rivaroxaban (XARELTO) 15 mg tablet () please send to [facility] on [address]    patient states he only has 1 left.

## 2023-07-28 ENCOUNTER — TELEPHONE (OUTPATIENT)
Dept: PULMONOLOGY | Facility: CLINIC | Age: 68
End: 2023-07-28

## 2023-07-28 NOTE — TELEPHONE ENCOUNTER
Left message for patient to call back for earlier appt on Aug 1st with Dr Yefri Kennedy in the [facility] office     Did mention these appt were being offered on a first come first serve base

## 2023-07-30 DIAGNOSIS — K44.9 HIATAL HERNIA: ICD-10-CM

## 2023-07-30 RX ORDER — PANTOPRAZOLE SODIUM 40 MG/1
TABLET, DELAYED RELEASE ORAL
Qty: 90 TABLET | Refills: 0 | Status: SHIPPED | OUTPATIENT
Start: 2023-07-30

## 2023-08-09 ENCOUNTER — TELEPHONE (OUTPATIENT)
Dept: FAMILY MEDICINE CLINIC | Facility: CLINIC | Age: 68
End: 2023-08-09

## 2023-08-09 ENCOUNTER — TELEPHONE (OUTPATIENT)
Dept: INTERNAL MEDICINE CLINIC | Facility: CLINIC | Age: 68
End: 2023-08-09

## 2023-08-09 NOTE — TELEPHONE ENCOUNTER
I took a phone call from Marcus Lee. He is looking to have his xarelto refilled he is almost out of them.  He would like them sent to [address] [place] [address] [address]

## 2023-08-10 DIAGNOSIS — I26.99 ACUTE PULMONARY EMBOLISM WITHOUT ACUTE COR PULMONALE, UNSPECIFIED PULMONARY EMBOLISM TYPE (HCC): ICD-10-CM

## 2023-08-21 DIAGNOSIS — R60.0 LOWER EXTREMITY EDEMA: ICD-10-CM

## 2023-08-21 DIAGNOSIS — I25.10 CORONARY ARTERIOSCLEROSIS IN NATIVE ARTERY: ICD-10-CM

## 2023-08-21 RX ORDER — FUROSEMIDE 40 MG/1
TABLET ORAL
Qty: 90 TABLET | Refills: 3 | Status: SHIPPED | OUTPATIENT
Start: 2023-08-21

## 2023-08-21 RX ORDER — DILTIAZEM HYDROCHLORIDE 180 MG/1
180 CAPSULE, COATED, EXTENDED RELEASE ORAL DAILY
Qty: 90 CAPSULE | Refills: 3 | Status: SHIPPED | OUTPATIENT
Start: 2023-08-21

## 2023-08-22 ENCOUNTER — CONSULT (OUTPATIENT)
Dept: PULMONOLOGY | Facility: CLINIC | Age: 68
End: 2023-08-22
Payer: COMMERCIAL

## 2023-08-22 VITALS
WEIGHT: 221.4 LBS | OXYGEN SATURATION: 96 % | BODY MASS INDEX: 31.7 KG/M2 | TEMPERATURE: 97.6 F | HEIGHT: 70 IN | HEART RATE: 84 BPM | SYSTOLIC BLOOD PRESSURE: 120 MMHG | DIASTOLIC BLOOD PRESSURE: 82 MMHG | RESPIRATION RATE: 16 BRPM

## 2023-08-22 DIAGNOSIS — J44.9 CHRONIC OBSTRUCTIVE PULMONARY DISEASE, UNSPECIFIED COPD TYPE (HCC): ICD-10-CM

## 2023-08-22 DIAGNOSIS — R06.09 DYSPNEA ON EXERTION: ICD-10-CM

## 2023-08-22 DIAGNOSIS — I26.99 ACUTE PULMONARY EMBOLISM WITHOUT ACUTE COR PULMONALE, UNSPECIFIED PULMONARY EMBOLISM TYPE (HCC): Primary | ICD-10-CM

## 2023-08-22 DIAGNOSIS — I82.441 ACUTE DEEP VEIN THROMBOSIS (DVT) OF TIBIAL VEIN OF RIGHT LOWER EXTREMITY (HCC): ICD-10-CM

## 2023-08-22 DIAGNOSIS — E66.09 CLASS 1 OBESITY DUE TO EXCESS CALORIES WITHOUT SERIOUS COMORBIDITY WITH BODY MASS INDEX (BMI) OF 31.0 TO 31.9 IN ADULT: ICD-10-CM

## 2023-08-22 DIAGNOSIS — F17.210 NICOTINE DEPENDENCE, CIGARETTES, UNCOMPLICATED: ICD-10-CM

## 2023-08-22 PROCEDURE — 99204 OFFICE O/P NEW MOD 45 MIN: CPT | Performed by: INTERNAL MEDICINE

## 2023-08-22 RX ORDER — FLUTICASONE PROPIONATE AND SALMETEROL 100; 50 UG/1; UG/1
POWDER RESPIRATORY (INHALATION)
COMMUNITY

## 2023-08-22 RX ORDER — FLUTICASONE FUROATE AND VILANTEROL 100; 25 UG/1; UG/1
POWDER RESPIRATORY (INHALATION)
COMMUNITY

## 2023-08-22 NOTE — PROGRESS NOTES
Pulmonary Consultation   Bruce Sandhu 76 y.o. male MRN: [id number]    Encounter: [id number]      Reason for consultation:   Acute pulmonary embolism    Requesting physician:  Osito Rodriguez MD      Impressions:   · Acute pulmonary embolism. · Acute DVT. · Chronic obstructive pulmonary disease of undetermined severity. · Ongoing tobacco use. · Obesity. · Dyspnea on exertion. Recommendations:  · Xarelto 20 mg p.o. daily. · Smoking cessation. · Spiriva once a day. · Albuterol rescue inhaler 2 inhalations 4 times a day as needed. · Weight loss  · Regular walks to improve stamina. · Follow-up in 5 months. Discussion:  The patient's acute pulmonary embolism and DVT are unprovoked. His pulmonary status has returned to his baseline. I have maintained him on the Xarelto 20 mg once a day. He needs anticoagulation for at least 6 months. However, given his other comorbidities and unprovoked nature of the acute pulmonary embolism probably he will need anticoagulation for life. We will discuss the duration of anticoagulation in more details during the next visit. He has an extensive smoking history. He has history of COPD. It is of undetermined severity. I I was going to order PFTs however the patient declined. I talked him about his smoking habit. He is not motivated to quit smoking. I will see him in 5 months and a follow-up visit. History of Present Illness   HPI:  Bruce Sandhu is a 76 y.o. male who is here for evaluation of acute pulmonary embolism. In early July the patient was admitted to the hospital with acute DVT and pulmonary embolism. He was anticoagulated and transitioned he said initially to Eliquis and later to Xarelto. He is taking Xarelto 20 mg once a day. His symptoms improved and back to his baseline. He has baseline shortness of breath on exertion. He has an extensive smoking history and was diagnosed in the past with COPD.   He is getting treated with Spiriva and albuterol rescue inhaler. Review of systems:  12 point review of systems was completed and was otherwise negative except as listed in HPI. Historical Information   Past Medical History:   Diagnosis Date   • Acute right MCA stroke ([address]) 09/15/2018   • Arthritis    • CAD (coronary artery disease)     s/p CABG 2000   • COPD (chronic obstructive pulmonary disease) ([facility])    • GERD (gastroesophageal reflux disease)    • Grand mal status ([address]) 03/24/2019   • H/O blood clots    • Heart attack ([address])    • Heart failure ([address])    • Hyperlipidemia    • Hypertension    • Lexiscan nuclear stress test 03/19/2016    EF 74% Normal   • Myocardial infarction [facility])    • Shortness of breath    • Stroke [facility])    • TIA (transient ischemic attack) 09/13/2018     Past Surgical History:   Procedure Laterality Date   • CARDIAC CATHETERIZATION  08/19/2015    LIMA occluded. No severe native lesions   • CARDIAC CATHETERIZATION N/A 12/03/2021    Procedure: Cardiac Coronary Angiogram;  Surgeon: Rosaura Hernandez MD;  Location: [facility] CARDIAC CATH LAB; Service: Cardiology   • CARDIAC CATHETERIZATION  12/03/2021    Procedure: Cardiac catheterization;  Surgeon: Rosaura Hernandez MD;  Location: [facility] CARDIAC C[facility] LAB; Service: Cardiology   • COLONOSCOPY     • CORONARY ARTERY BYPASS GRAFT  2000   • CYSTOSCOPY  10/31/2022    [facility]   • HERNIA REPAIR      umbilical hernia x2   • TONSILLECTOMY       Family History   Problem Relation Age of Onset   • Heart disease Mother    • Cancer Father    • Aneurysm Father    • Cancer Maternal Grandmother    • Cancer Paternal Grandfather        Family History:  No family history of DVT or PE. No family history of chronic lung disease. Social History:  The patient lives by himself. He worked as a . He has over 60-pack-year smoking history and continues to smoke a pack a day. He has no pets. Meds/Allergies   No current facility-administered medications for this visit.      (Not in a hospital admission)    Allergies   Allergen Reactions   • Gabapentin Headache       Vitals: Blood pressure 120/82, pulse 84, temperature 97.6 °F (36.4 °C), temperature source Temporal, resp. rate 16, height 5' 10" (1.778 m), weight 100 kg (221 lb 6.4 oz), SpO2 96 %. ,      Physical exam:        Head/eyes:    Normocephalic, without obvious abnormality, atraumatic,         PERRL, extraocular muscles intact, no scleral icterus    Nose:   Nares normal, septum midline, mucosa normal, no drainage    or sinus tenderness   Throat:   Moist mucous membranes, no thrush   Neck:   Supple, trachea midline, no adenopathy; no carotid    bruit or JVD   Lungs:    Decreased breath sounds. No wheezing or rhonchi. Heart:    Regular rate and rhythm, S1 and S2 normal, no murmur, rub   or gallop   Abdomen:     Soft, non-tender, bowel sounds active all four quadrants,     no masses, no organomegaly   Extremities:   Extremities normal, atraumatic, no cyanosis or edema   Skin:   Warm, dry, turgor normal, no rashes or lesions   Neurologic:   CNII-XII intact, normal strength, non-focal             Imaging and other studies: I have personally reviewed pertinent films in PACS CT scan of the chest is reviewed on the PACS system. It showed filling defects in the pulmonary arteries consistent with pulmonary embolism.     Lab Results   Component Value Date    WBC 14.23 (H) 07/05/2023    HGB 11.8 (L) 07/05/2023    HCT 34.2 (L) 07/05/2023    MCV 89 07/05/2023     07/05/2023     Lab Results   Component Value Date    SODIUM 135 07/05/2023    K 3.9 07/05/2023     07/05/2023    CO2 26 07/05/2023    BUN 12 07/05/2023    CREATININE 0.94 07/05/2023    GLUC 141 (H) 07/05/2023    CALCIUM 9.4 07/05/2023             Marialuisa Hirsch MD

## 2023-08-27 DIAGNOSIS — R33.9 URINARY RETENTION: ICD-10-CM

## 2023-08-28 RX ORDER — TAMSULOSIN HYDROCHLORIDE 0.4 MG/1
0.4 CAPSULE ORAL 2 TIMES DAILY
Qty: 60 CAPSULE | Refills: 5 | Status: SHIPPED | OUTPATIENT
Start: 2023-08-28 | End: 2023-09-06

## 2023-08-29 ENCOUNTER — TELEPHONE (OUTPATIENT)
Dept: GYNECOLOGIC ONCOLOGY | Facility: CLINIC | Age: 68
End: 2023-08-29

## 2023-08-29 ENCOUNTER — CONSULT (OUTPATIENT)
Dept: HEMATOLOGY ONCOLOGY | Facility: CLINIC | Age: 68
End: 2023-08-29
Payer: COMMERCIAL

## 2023-08-29 VITALS
RESPIRATION RATE: 18 BRPM | WEIGHT: 223 LBS | OXYGEN SATURATION: 97 % | TEMPERATURE: 98.2 F | HEART RATE: 75 BPM | SYSTOLIC BLOOD PRESSURE: 124 MMHG | BODY MASS INDEX: 31.92 KG/M2 | HEIGHT: 70 IN | DIASTOLIC BLOOD PRESSURE: 74 MMHG

## 2023-08-29 DIAGNOSIS — R51.9 PRESSURE IN HEAD: ICD-10-CM

## 2023-08-29 DIAGNOSIS — I82.492 ACUTE DEEP VEIN THROMBOSIS (DVT) OF OTHER SPECIFIED VEIN OF LEFT LOWER EXTREMITY (HCC): ICD-10-CM

## 2023-08-29 DIAGNOSIS — R10.84 GENERALIZED ABDOMINAL DISCOMFORT: ICD-10-CM

## 2023-08-29 DIAGNOSIS — D64.9 NORMOCYTIC ANEMIA: ICD-10-CM

## 2023-08-29 DIAGNOSIS — R63.0 DECREASED APPETITE: ICD-10-CM

## 2023-08-29 DIAGNOSIS — R68.81 EARLY SATIETY: ICD-10-CM

## 2023-08-29 DIAGNOSIS — I26.99 ACUTE PULMONARY EMBOLISM WITHOUT ACUTE COR PULMONALE, UNSPECIFIED PULMONARY EMBOLISM TYPE (HCC): Primary | ICD-10-CM

## 2023-08-29 DIAGNOSIS — D72.828 OTHER ELEVATED WHITE BLOOD CELL (WBC) COUNT: ICD-10-CM

## 2023-08-29 DIAGNOSIS — H93.13 TINNITUS OF BOTH EARS: ICD-10-CM

## 2023-08-29 PROCEDURE — 99205 OFFICE O/P NEW HI 60 MIN: CPT | Performed by: NURSE PRACTITIONER

## 2023-08-29 NOTE — TELEPHONE ENCOUNTER
Called and left a voicemail for patient. My direct number for the day was given. We need to schedule his follow up with Dr. Franklyn Pérez and also schedule an MRI and CT scan.
No

## 2023-08-29 NOTE — TELEPHONE ENCOUNTER
Called patient and left a voicemail regarding his MRI and CT scan that I scheduled as well as his follow up with Dr. Shaggy Fajardo. [facility] number was left if patient had any questions or needed to reschedule any of those appointments.

## 2023-08-29 NOTE — PROGRESS NOTES
Hematology/Oncology Outpatient Consultation  Olive Conteh 76 y.o. male 1955 [id number]    Date:  8/29/2023      Assessment and Plan:  1. Acute pulmonary embolism without acute cor pulmonale, unspecified pulmonary embolism type [facility])  Patient recently developed extensive acute unprovoked left lower extremity DVT was admitted to the hospital 6/8/2023 for observation and started on Eliquis. CT imaging of the chest was not pursued. He then represented to the hospital with complaints of shortness of breath/back pain approximately 1 month later 7/3 and was admitted after he was found to have pulmonary embolism. Unclear if this was present upon initial presentation. He was admitted and started on a heparin drip anticoagulation was changed to alternate DOAC Xarelto at discharge. He continues the Xarelto 20 mg daily without interruption. Did discuss with patient that there are multiple reasons to consider indefinite anticoagulation including his unprovoked event, male sex, obesity, sedentary lifestyle and tobacco abuse. He seems to be in agreement with this. Was told that we will pursue limited hypercoagulable work-up to rule out inherited thrombophilia if insurance will approve. We will also pursue work-up to rule out antiphospholipid syndrome as if he is found to have this Coumadin tends to be superior anticoagulation with this condition. He will follow-up in about 4 to 6 weeks from now to review the findings of his additional work-up. - Beta-2 glycoprotein antibodies; Future  - Cardiolipin antibody; Future  - D-dimer, quantitative; Future  - CBC and differential; Future  - Comprehensive metabolic panel; Future  - C-reactive protein; Future  - Sedimentation rate, automated; Future  - Factor 5 leiden; Future  - Prothrombin Gene Mutation; Future    2. Acute deep vein thrombosis (DVT) of other specified vein of left lower extremity (HCC)  - Beta-2 glycoprotein antibodies;  Future  - Cardiolipin antibody; Future  - D-dimer, quantitative; Future  - CBC and differential; Future  - Comprehensive metabolic panel; Future  - C-reactive protein; Future  - Sedimentation rate, automated; Future  - Factor 5 leiden; Future  - Prothrombin Gene Mutation; Future    3. Normocytic anemia  Patient's most recent CBC did show mild normocytic anemia hemoglobin 11.8. We will pursue additional anemia work-up before his next office visit. - CBC and differential; Future  - Comprehensive metabolic panel; Future  - C-reactive protein; Future  - Sedimentation rate, automated; Future  - IgG, IgA, IgM; Future  - Protein electrophoresis, serum; Future  - LD,Blood; Future  - Iron Panel (Includes Ferritin, Iron Sat%, Iron, and TIBC); Future  - Ferritin; Future  - Folate; Future  - Vitamin B12; Future  - Occult Blood, Fecal Immunochemical; Future  - Peripheral Smear; Future  - Direct antiglobulin test; Future  - Haptoglobin; Future    4. Other elevated white blood cell (WBC) count  Was noted to have leukocytosis on his most recent CBC without any hint of immature cells in the peripheral blood. Suspect this is likely reactive in nature due to his recent PE/tobacco abuse/COPD. Will monitor for now. - CBC and differential; Future  - Comprehensive metabolic panel; Future  - C-reactive protein; Future  - Sedimentation rate, automated; Future  - IgG, IgA, IgM; Future  - Protein electrophoresis, serum; Future  - LD,Blood; Future  - Peripheral Smear; Future    5. Decreased appetite  Patient admits today that he has been having some GI symptoms over the past year which has been getting worse. Appetite is decreased. Abdomen often feels tight, swollen/distended. He admits to early satiety. He did have colonoscopy about a year ago but states he is supposed to have 1 year follow-up since the preparation was poor.     Did recommend that he pursue this in the near future but would defer until he is on the anticoagulation at least 3 months without interruption. Given his unprovoked PE/DVT of unclear etiology discussed it may be prudent to pursue repeat abdominal imaging to rule out malignant process as the cause of his hypercoagulable state/GI symptoms.    - CT abdomen pelvis w contrast; Future    6. Early satiety  - CT abdomen pelvis w contrast; Future    7. Generalized abdominal discomfort  - CT abdomen pelvis w contrast; Future    8. Pressure in head  Patient also mentions that he has been not feeling right in his head. Admits to forgetfulness, tinnitus and feeling clogged/pressure sensation in his head. He apparently did have a traumatic fall with concussion 4/2023 and was evaluated in the emergency department for this. Could be the reason for his symptoms however again given recent hypercoagulable state of unclear etiology and history of TIA will pursue brain imaging to rule out any serious pathology.    - MRI brain w wo contrast; Future    9. Tinnitus of both ears  - MRI brain w wo contrast; Future      HPI:  Patient is a 51-year-old male who presents for further evaluation/recommendations regarding his recent acute pulmonary embolism/left lower extremity DVT. He has a past medical history of tobacco abuse, COPD, TIA, hyperlipidemia, GERD, CAD status post CABG 2000, PAD, arthritis and hypertension. No prior thrombosis up until recent events. No family history of thrombosis. He states that he presented to the hospital 6/8/2023 with reports of progressing heaviness, pain and edema to the left lower extremity. VAS lower limb venous duplex study, unilateral/limited (6/8/23)  CONCLUSION:  Impression:  RIGHT LOWER LIMB LIMITED:  Evaluation shows no evidence of thrombus in the common femoral vein, SFJ,  femoral vein or popliteal vein. Doppler evaluation shows a normal response to augmentation maneuvers.      LEFT LOWER LIMB:  There is acute deep vein thrombosis in the mid posterior tibial, mid peroneal,  popliteal and femoral veins, mostly occlusive to very minimal flow. Common  femoral vein appears patent and compressible. No evidence of superficial thrombophlebitis noted. Doppler evaluation shows a normal response to augmentation maneuvers. Popliteal, posterior tibial and anterior tibial arterial Doppler waveform's are  triphasic. He was started on Eliquis for anticoagulation and admitted overnight for observation. No chest imaging was pursued. He then represented to the hospital 7/3/2023 with reports of worsening shortness of breath, back pain. CTA ED chest PE study (7/3/23)  IMPRESSION:  1. Right-sided pulmonary emboli as described above. Measured RV/LV ratio is within normal limits at less than 0.9.  2.  Bronchial wall thickening may represent bronchitis. PULMONARY ARTERIAL TREE: Pulmonary embolism in the proximal lobar branch of the right middle lobe with segmental extension (series 2, image images 139-143). Probable embolism in the proximal lobar branch of the right upper lobe (series 601, image 74;   series 2, image 115). Eccentric filling defect within a proximal segmental branch in the right lower lobe is likely an additional embolism, though may be nonacute given its eccentric location (series 2, image 141). LUNGS: Mild emphysema. Mild bronchial wall thickening. No focal consolidation. Respiratory motion limits detection of small pulmonary nodules. Approximate 5 mm right upper lobe nodule is unchanged from index study of June 26, 2017 (series 3, image 85).    He was then readmitted to the hospital and started on a heparin drip which was later transitioned to alternative DOAC Xarelto at discharge. He continues to Xarelto 20 mg daily at this time. Patient does report to me today that over the past year he has been having decreased appetite and feeling discomfort in his abdomen. Mainly feels tight, with swollen abdomen, early satiety.   He did have colonoscopy last year but mentions it was recommended 1 year follow-up since he had poor preparation. He decided not to pursue this. He also states that he has not been feeling right in his head. Reports forgetfulness and feeling pressure/clogged sensation in his head. He does admit to tinnitus. Apparently had possible concussion after recent traumatic fall 4/2023 and was evaluated in the emergency department for this. Most recent laboratory studies drawn while he was admitted to the hospital 7/5/2023 showed leukocytosis which is likely reactive in nature WBC 14.23, he had mild normocytic anemia H&H 11.8/34.2, MCV 89 platelet count normal 204. Creatinine 0.94, GFR 82, glucose 141 remaining metabolic panel appropriate.       ROS: Review of Systems   Constitutional: Positive for activity change and appetite change. HENT: Positive for tinnitus and trouble swallowing. Respiratory: Positive for cough and shortness of breath. Cardiovascular: Positive for leg swelling. Gastrointestinal: Positive for abdominal distention and constipation. Musculoskeletal: Positive for arthralgias, gait problem and myalgias. Skin: Positive for color change. Neurological: Positive for numbness and headaches. Psychiatric/Behavioral: Positive for dysphoric mood and sleep disturbance. All other systems reviewed and are negative.       Past Medical History:   Diagnosis Date   • Acute right MCA stroke ([address]) 09/15/2018   • Arthritis    • CAD (coronary artery disease)     s/p CABG 2000   • COPD (chronic obstructive pulmonary disease) ([facility])    • GERD (gastroesophageal reflux disease)    • Grand mal status ([address]) 03/24/2019   • H/O blood clots    • Heart attack ([address])    • Heart failure ([address])    • Hyperlipidemia    • Hypertension    • Lexiscan nuclear stress test 03/19/2016    EF 74% Normal   • Myocardial infarction [facility])    • Shortness of breath    • Stroke [facility])    • TIA (transient ischemic attack) 09/13/2018       Past Surgical History:   Procedure Laterality Date   • CARDIAC CATHETERIZATION 08/19/2015    LIMA occluded. No severe native lesions   • CARDIAC CATHETERIZATION N/A 12/03/2021    Procedure: Cardiac Coronary Angiogram;  Surgeon: Judy Quintero MD;  Location: [facility] CARDIAC CATH LAB; Service: Cardiology   • CARDIAC CATHETERIZATION  12/03/2021    Procedure: Cardiac catheterization;  Surgeon: Judy Quintero MD;  Location: [facility]ARDIAC [facility]; Service: Cardiology   • COLONOSCOPY     • CORONARY ARTERY BYPASS GRAFT  2000   • CYSTOSCOPY  10/31/2022    [facility]   • HERNIA REPAIR      umbilical hernia x2   • TONSILLECTOMY         Social History     Socioeconomic History   • Marital status: Single     Spouse name: None   • Number of children: None   • Years of education: None   • Highest education level: None   Occupational History   • None   Tobacco Use   • Smoking status: Every Day     Packs/day: 1.00     Years: 50.00     Total pack years: 50.00     Types: Cigarettes     Start date: 1970   • Smokeless tobacco: Never   Vaping Use   • Vaping Use: Never used   Substance and Sexual Activity   • Alcohol use: Not Currently     Comment: Socially   • Drug use: Never   • Sexual activity: Not Currently   Other Topics Concern   • None   Social History Narrative   • None     Social Determinants of Health     Financial Resource Strain: Low Risk  (6/7/2023)    Overall Financial Resource Strain (CARDIA)    • Difficulty of Paying Living Expenses: Not very hard   Food Insecurity: No Food Insecurity (7/5/2023)    Hunger Vital Sign    • Worried About Running Out of Food in the Last Year: Never true    • Ran Out of Food in the Last Year: Never true   Transportation Needs: No Transportation Needs (7/5/2023)    PRAPARE - Transportation    • Lack of Transportation (Medical): No    • Lack of Transportation (Non-Medical):  No   Physical Activity: Not on file   Stress: Not on file   Social Connections: Not on file   Intimate Partner Violence: Not on file   Housing Stability: Low Risk  (7/5/2023)    Housing Stability Vital Sign    • Unable to Pay for Housing in the Last Year: No    • Number of Places Lived in the Last Year: 1    • Unstable Housing in the Last Year: No       Family History   Problem Relation Age of Onset   • Heart disease Mother    • Cancer Father    • Aneurysm Father    • Cancer Maternal Grandmother    • Cancer Paternal Grandfather        Allergies   Allergen Reactions   • Gabapentin Headache         Current Outpatient Medications:   •  albuterol (2.5 mg/3 mL) 0.083 % nebulizer solution, Take 1 vial (2.5 mg total) by nebulization every 6 (six) hours as needed for wheezing or shortness of breath, Disp: 25 vial, Rfl: 2  •  albuterol (Ventolin HFA) 90 mcg/act inhaler, Inhale 2 puffs every 6 (six) hours as needed for shortness of breath, Disp: 54 g, Rfl: 1  •  aspirin (ECOTRIN LOW STRENGTH) 81 mg EC tablet, Take 1 tablet (81 mg total) by mouth daily, Disp: 30 tablet, Rfl: 0  •  azelastine (ASTELIN) 0.1 % nasal spray, 1 spray into each nostril 2 (two) times a day Use in each nostril as directed, Disp: 1 mL, Rfl: 2  •  diltiazem (CARDIZEM CD) 180 mg 24 hr capsule, Take 1 capsule (180 mg total) by mouth daily, Disp: 90 capsule, Rfl: 3  •  fluticasone (FLONASE) 50 mcg/act nasal spray, 1 spray into each nostril daily, Disp: 9.9 mL, Rfl: 0  •  furosemide (LASIX) 40 mg tablet, take 1 tablet by mouth once daily, Disp: 90 tablet, Rfl: 3  •  isosorbide mononitrate (IMDUR) 30 mg 24 hr tablet, Take 1 tablet (30 mg total) by mouth daily, Disp: 90 tablet, Rfl: 3  •  Linzess 290 MCG CAPS, Take 1 capsule by mouth AT LEAST 30 MINUTES BEFORE FIRST MEAL OF THE DAY, Disp: 30 capsule, Rfl: 2  •  LORazepam (Ativan) 0.5 mg tablet, Take 1 tablet (0.5 mg total) by mouth every 8 (eight) hours as needed for anxiety, Disp: 90 tablet, Rfl: 0  •  metoprolol succinate (TOPROL-XL) 25 mg 24 hr tablet, take 1 tablet by mouth twice a day, Disp: 180 tablet, Rfl: 3  •  pantoprazole (PROTONIX) 40 mg tablet, take 1 tablet by mouth once daily, Disp: 90 tablet, Rfl: 0  •  polyethylene glycol (MIRALAX) 17 g packet, Take 17 g by mouth daily as needed (constipation), Disp: 10 each, Rfl: 0  •  Potassium Chloride ER 20 MEQ TBCR, Take 1 tablet (20 mEq total) by mouth daily, Disp: 30 tablet, Rfl: 11  •  rivaroxaban (XARELTO) 20 mg tablet, Take 1 tablet (20 mg total) by mouth in the morning, Disp: 30 tablet, Rfl: 5  •  Spiriva HandiHaler 18 MCG inhalation capsule, place 1 capsule INTO INHALER AND INHALE DAILY IN THE HANDHALER, Disp: 30 capsule, Rfl: 2  •  tamsulosin (FLOMAX) 0.4 mg, take 1 capsule by mouth twice a day, Disp: 60 capsule, Rfl: 5  •  docusate sodium (COLACE) 100 mg capsule, Take 1 capsule (100 mg total) by mouth daily Do not start before Sujey 10, 2023., Disp: 30 capsule, Rfl: 0  •  Fluticasone Furoate-Vilanterol (Breo Ellipta) 100-25 mcg/actuation inhaler, , Disp: , Rfl:   •  Fluticasone-Salmeterol (Advair Diskus) 100-50 mcg/dose inhaler, , Disp: , Rfl:       Physical Exam:  /74 (BP Location: Left arm, Patient Position: Sitting, Cuff Size: Adult)   Pulse 75   Temp 98.2 °F (36.8 °C)   Resp 18   Ht 5' 10" (1.778 m)   Wt 101 kg (223 lb)   SpO2 97%   BMI 32.00 kg/m²     Physical Exam  Vitals reviewed. Constitutional:       General: He is not in acute distress. Appearance: He is well-developed. He is obese. He is not diaphoretic. HENT:      Head: Normocephalic and atraumatic. Eyes:      General: Lids are normal. No scleral icterus. Conjunctiva/sclera: Conjunctivae normal.      Pupils: Pupils are equal, round, and reactive to light. Neck:      Thyroid: No thyromegaly. Cardiovascular:      Rate and Rhythm: Normal rate and regular rhythm. Heart sounds: Normal heart sounds. No murmur heard. Pulmonary:      Effort: Pulmonary effort is normal. No respiratory distress. Breath sounds: Normal breath sounds. Abdominal:      General: There is distension. Palpations: Abdomen is soft. There is no hepatomegaly or splenomegaly. Tenderness: There is no abdominal tenderness. Hernia: A hernia is present. Musculoskeletal:         General: Normal range of motion. Cervical back: Normal range of motion and neck supple. Right lower leg: Edema present. Left lower leg: Edema present. Lymphadenopathy:      Cervical: No cervical adenopathy. Upper Body:      Right upper body: No axillary adenopathy. Left upper body: No axillary adenopathy. Skin:     General: Skin is warm and dry. Coloration: Skin is pale. Findings: No erythema or rash. Neurological:      General: No focal deficit present. Mental Status: He is alert and oriented to person, place, and time. Psychiatric:         Mood and Affect: Mood normal. Affect is blunt. Behavior: Behavior normal. Behavior is cooperative. Thought Content: Thought content normal.         Judgment: Judgment normal.           Labs:  Lab Results   Component Value Date    WBC 14.23 (H) 07/05/2023    HGB 11.8 (L) 07/05/2023    HCT 34.2 (L) 07/05/2023    MCV 89 07/05/2023     07/05/2023        Patient voiced understanding and agreement in the above discussion. Aware to contact our office with questions/symptoms in the interim. This note has been generated by voice recognition software system. Therefore, there may be spelling, grammar, and or syntax errors. Please contact if questions arise.

## 2023-09-06 DIAGNOSIS — R33.9 URINARY RETENTION: ICD-10-CM

## 2023-09-06 RX ORDER — TAMSULOSIN HYDROCHLORIDE 0.4 MG/1
0.4 CAPSULE ORAL 2 TIMES DAILY
Qty: 180 CAPSULE | Refills: 5 | Status: SHIPPED | OUTPATIENT
Start: 2023-09-06

## 2023-09-11 ENCOUNTER — TELEPHONE (OUTPATIENT)
Dept: HEMATOLOGY ONCOLOGY | Facility: CLINIC | Age: 68
End: 2023-09-11

## 2023-09-14 ENCOUNTER — RA CDI HCC (OUTPATIENT)
Dept: OTHER | Facility: HOSPITAL | Age: 68
End: 2023-09-14

## 2023-09-14 NOTE — PROGRESS NOTES
[address] [facility] coding opportunities       Chart reviewed, no opportunity found: [address]        Patients Insurance     Medicare Insurance: [facility]

## 2023-09-16 DIAGNOSIS — J44.1 CHRONIC OBSTRUCTIVE PULMONARY DISEASE WITH ACUTE EXACERBATION (HCC): ICD-10-CM

## 2023-09-18 RX ORDER — ALBUTEROL SULFATE 2.5 MG/3ML
SOLUTION RESPIRATORY (INHALATION)
Qty: 75 ML | Refills: 1 | Status: SHIPPED | OUTPATIENT
Start: 2023-09-18

## 2023-09-19 ENCOUNTER — HOSPITAL ENCOUNTER (OUTPATIENT)
Dept: CT IMAGING | Facility: HOSPITAL | Age: 68
Discharge: HOME | End: 2023-09-19
Payer: COMMERCIAL

## 2023-09-19 DIAGNOSIS — R68.81 EARLY SATIETY: ICD-10-CM

## 2023-09-19 DIAGNOSIS — R10.84 GENERALIZED ABDOMINAL DISCOMFORT: ICD-10-CM

## 2023-09-19 DIAGNOSIS — R63.0 DECREASED APPETITE: ICD-10-CM

## 2023-09-19 PROCEDURE — G1004 CDSM NDSC: HCPCS

## 2023-09-19 PROCEDURE — 74177 CT ABD & PELVIS W/CONTRAST: CPT

## 2023-09-19 RX ADMIN — IOHEXOL 100 ML: 350 INJECTION, SOLUTION INTRAVENOUS at 10:34

## 2023-09-20 ENCOUNTER — HOSPITAL ENCOUNTER (OUTPATIENT)
Dept: MRI IMAGING | Facility: HOSPITAL | Age: 68
Discharge: HOME | End: 2023-09-20
Payer: COMMERCIAL

## 2023-09-20 ENCOUNTER — APPOINTMENT (OUTPATIENT)
Dept: LAB | Facility: HOSPITAL | Age: 68
End: 2023-09-20
Payer: COMMERCIAL

## 2023-09-20 DIAGNOSIS — I26.99 ACUTE PULMONARY EMBOLISM WITHOUT ACUTE COR PULMONALE, UNSPECIFIED PULMONARY EMBOLISM TYPE (HCC): ICD-10-CM

## 2023-09-20 DIAGNOSIS — D64.9 NORMOCYTIC ANEMIA: ICD-10-CM

## 2023-09-20 DIAGNOSIS — D72.828 OTHER ELEVATED WHITE BLOOD CELL (WBC) COUNT: ICD-10-CM

## 2023-09-20 DIAGNOSIS — R51.9 PRESSURE IN HEAD: ICD-10-CM

## 2023-09-20 DIAGNOSIS — I82.492 ACUTE DEEP VEIN THROMBOSIS (DVT) OF OTHER SPECIFIED VEIN OF LEFT LOWER EXTREMITY (HCC): ICD-10-CM

## 2023-09-20 DIAGNOSIS — Z12.5 SCREENING FOR PROSTATE CANCER: ICD-10-CM

## 2023-09-20 DIAGNOSIS — H93.13 TINNITUS OF BOTH EARS: ICD-10-CM

## 2023-09-20 LAB
ALBUMIN SERPL BCP-MCNC: 4 G/DL (ref 3.5–5)
ALP SERPL-CCNC: 86 U/L (ref 34–104)
ALT SERPL W P-5'-P-CCNC: 7 U/L (ref 7–52)
ANION GAP SERPL CALCULATED.3IONS-SCNC: 7 MMOL/L
AST SERPL W P-5'-P-CCNC: 9 U/L (ref 13–39)
BILIRUB SERPL-MCNC: 0.49 MG/DL (ref 0.2–1)
BUN SERPL-MCNC: 7 MG/DL (ref 5–25)
CALCIUM SERPL-MCNC: 8.7 MG/DL (ref 8.4–10.2)
CHLORIDE SERPL-SCNC: 102 MMOL/L (ref 96–108)
CO2 SERPL-SCNC: 27 MMOL/L (ref 21–32)
CREAT SERPL-MCNC: 1.02 MG/DL (ref 0.6–1.3)
CRP SERPL QL: 10.4 MG/L
D DIMER PPP FEU-MCNC: 0.47 UG/ML FEU
DAT POLY-SP REAG RBC QL: NEGATIVE
ERYTHROCYTE [DISTWIDTH] IN BLOOD BY AUTOMATED COUNT: 13 % (ref 11.6–15.1)
ERYTHROCYTE [SEDIMENTATION RATE] IN BLOOD: 12 MM/HOUR (ref 0–19)
FERRITIN SERPL-MCNC: 25 NG/ML (ref 24–336)
FOLATE SERPL-MCNC: 3.7 NG/ML
GFR SERPL CREATININE-BSD FRML MDRD: 75 ML/MIN/1.73SQ M
GLUCOSE P FAST SERPL-MCNC: 83 MG/DL (ref 65–99)
HCT VFR BLD AUTO: 39 % (ref 36.5–49.3)
HGB BLD-MCNC: 13.4 G/DL (ref 12–17)
IMM EOSINOPHIL NFR BLD MANUAL: 2 % (ref 0–6)
LDH SERPL-CCNC: 131 U/L (ref 140–271)
LYMPHOCYTES NFR BLD: 23 % (ref 14–44)
MCH RBC QN AUTO: 30.3 PG (ref 26.8–34.3)
MCHC RBC AUTO-ENTMCNC: 34.4 G/DL (ref 31.4–37.4)
MCV RBC AUTO: 88 FL (ref 82–98)
MONOCYTES NFR BLD AUTO: 4 % (ref 4–12)
NEUTS SEG NFR BLD AUTO: 71 % (ref 45–77)
NRBC BLD AUTO-RTO: 0 /100 WBCS
PLATELET # BLD AUTO: 266 THOUSANDS/UL (ref 149–390)
PLATELET BLD QL SMEAR: ADEQUATE
PMV BLD AUTO: 8.9 FL (ref 8.9–12.7)
POTASSIUM SERPL-SCNC: 3.6 MMOL/L (ref 3.5–5.3)
PROT SERPL-MCNC: 6.1 G/DL (ref 6.4–8.4)
PSA SERPL-MCNC: 0.47 NG/ML (ref 0–4)
RBC # BLD AUTO: 4.42 MILLION/UL (ref 3.88–5.62)
RBC MORPH BLD: NORMAL
SODIUM SERPL-SCNC: 136 MMOL/L (ref 135–147)
TOTAL CELLS COUNTED SPEC: 100
VIT B12 SERPL-MCNC: 140 PG/ML (ref 180–914)
WBC # BLD AUTO: 7.91 THOUSAND/UL (ref 4.31–10.16)

## 2023-09-20 PROCEDURE — 86140 C-REACTIVE PROTEIN: CPT

## 2023-09-20 PROCEDURE — 83615 LACTATE (LD) (LDH) ENZYME: CPT

## 2023-09-20 PROCEDURE — 83550 IRON BINDING TEST: CPT

## 2023-09-20 PROCEDURE — 85652 RBC SED RATE AUTOMATED: CPT

## 2023-09-20 PROCEDURE — 83010 ASSAY OF HAPTOGLOBIN QUANT: CPT

## 2023-09-20 PROCEDURE — 86146 BETA-2 GLYCOPROTEIN ANTIBODY: CPT

## 2023-09-20 PROCEDURE — 70553 MRI BRAIN STEM W/O & W/DYE: CPT

## 2023-09-20 PROCEDURE — 84165 PROTEIN E-PHORESIS SERUM: CPT

## 2023-09-20 PROCEDURE — 82784 ASSAY IGA/IGD/IGG/IGM EACH: CPT

## 2023-09-20 PROCEDURE — 83540 ASSAY OF IRON: CPT

## 2023-09-20 PROCEDURE — A9585 GADOBUTROL INJECTION: HCPCS | Performed by: NURSE PRACTITIONER

## 2023-09-20 PROCEDURE — 85379 FIBRIN DEGRADATION QUANT: CPT

## 2023-09-20 PROCEDURE — 82607 VITAMIN B-12: CPT

## 2023-09-20 PROCEDURE — 84153 ASSAY OF PSA TOTAL: CPT

## 2023-09-20 PROCEDURE — 85007 BL SMEAR W/DIFF WBC COUNT: CPT

## 2023-09-20 PROCEDURE — 82728 ASSAY OF FERRITIN: CPT

## 2023-09-20 PROCEDURE — 82746 ASSAY OF FOLIC ACID SERUM: CPT

## 2023-09-20 PROCEDURE — G1004 CDSM NDSC: HCPCS

## 2023-09-20 PROCEDURE — 86880 COOMBS TEST DIRECT: CPT

## 2023-09-20 PROCEDURE — 80053 COMPREHEN METABOLIC PANEL: CPT

## 2023-09-20 PROCEDURE — 86147 CARDIOLIPIN ANTIBODY EA IG: CPT

## 2023-09-20 PROCEDURE — 36415 COLL VENOUS BLD VENIPUNCTURE: CPT

## 2023-09-20 RX ORDER — GADOBUTROL 604.72 MG/ML
10 INJECTION INTRAVENOUS
Status: COMPLETED | OUTPATIENT
Start: 2023-09-20 | End: 2023-09-20

## 2023-09-20 RX ADMIN — GADOBUTROL 10 ML: 604.72 INJECTION INTRAVENOUS at 10:40

## 2023-09-21 LAB
ALBUMIN SERPL ELPH-MCNC: 3.71 G/DL (ref 3.2–5.1)
ALBUMIN SERPL ELPH-MCNC: 64 % (ref 48–70)
ALPHA1 GLOB SERPL ELPH-MCNC: 0.3 G/DL (ref 0.15–0.47)
ALPHA1 GLOB SERPL ELPH-MCNC: 5.1 % (ref 1.8–7)
ALPHA2 GLOB SERPL ELPH-MCNC: 0.71 G/DL (ref 0.42–1.04)
ALPHA2 GLOB SERPL ELPH-MCNC: 12.2 % (ref 5.9–14.9)
BETA GLOB ABNORMAL SERPL ELPH-MCNC: 0.34 G/DL (ref 0.31–0.57)
BETA1 GLOB SERPL ELPH-MCNC: 5.8 % (ref 4.7–7.7)
BETA2 GLOB SERPL ELPH-MCNC: 4.1 % (ref 3.1–7.9)
BETA2+GAMMA GLOB SERPL ELPH-MCNC: 0.24 G/DL (ref 0.2–0.58)
GAMMA GLOB ABNORMAL SERPL ELPH-MCNC: 0.51 G/DL (ref 0.4–1.66)
GAMMA GLOB SERPL ELPH-MCNC: 8.8 % (ref 6.9–22.3)
HAPTOGLOB SERPL-MCNC: 211 MG/DL (ref 32–363)
IGA SERPL-MCNC: 79 MG/DL (ref 66–433)
IGG SERPL-MCNC: 534 MG/DL (ref 635–1741)
IGG/ALB SER: 1.78 {RATIO} (ref 1.1–1.8)
IGM SERPL-MCNC: 36 MG/DL (ref 45–281)
IRON SATN MFR SERPL: 25 % (ref 15–50)
IRON SERPL-MCNC: 65 UG/DL (ref 50–212)
PROT PATTERN SERPL ELPH-IMP: ABNORMAL
PROT SERPL-MCNC: 5.8 G/DL (ref 6.4–8.4)
TIBC SERPL-MCNC: 256 UG/DL (ref 250–450)
UIBC SERPL-MCNC: 191 UG/DL (ref 155–355)

## 2023-09-21 PROCEDURE — 84165 PROTEIN E-PHORESIS SERUM: CPT | Performed by: STUDENT IN AN ORGANIZED HEALTH CARE EDUCATION/TRAINING PROGRAM

## 2023-09-22 LAB
B2 GLYCOPROT1 IGA SERPL IA-ACNC: 0.6
B2 GLYCOPROT1 IGG SERPL IA-ACNC: <0.8
B2 GLYCOPROT1 IGM SERPL IA-ACNC: <2.4
CARDIOLIPIN IGA SER IA-ACNC: 3.8
CARDIOLIPIN IGG SER IA-ACNC: 1.1
CARDIOLIPIN IGM SER IA-ACNC: 2.3

## 2023-09-26 LAB
F2 GENE MUT ANL BLD/T: NORMAL
F5 GENE MUT ANL BLD/T: ABNORMAL
Lab: ABNORMAL
Lab: NORMAL

## 2023-09-28 ENCOUNTER — APPOINTMENT (OUTPATIENT)
Dept: LAB | Facility: CLINIC | Age: 68
End: 2023-09-28
Payer: COMMERCIAL

## 2023-09-28 ENCOUNTER — OFFICE VISIT (OUTPATIENT)
Dept: INTERNAL MEDICINE CLINIC | Facility: CLINIC | Age: 68
End: 2023-09-28
Payer: COMMERCIAL

## 2023-09-28 VITALS
HEIGHT: 70 IN | OXYGEN SATURATION: 98 % | HEART RATE: 84 BPM | WEIGHT: 220 LBS | BODY MASS INDEX: 31.5 KG/M2 | SYSTOLIC BLOOD PRESSURE: 130 MMHG | TEMPERATURE: 98.4 F | DIASTOLIC BLOOD PRESSURE: 80 MMHG

## 2023-09-28 DIAGNOSIS — K55.1 SUPERIOR MESENTERIC ARTERY STENOSIS (HCC): ICD-10-CM

## 2023-09-28 DIAGNOSIS — I26.99 ACUTE PULMONARY EMBOLISM WITHOUT ACUTE COR PULMONALE, UNSPECIFIED PULMONARY EMBOLISM TYPE (HCC): ICD-10-CM

## 2023-09-28 DIAGNOSIS — Z13.1 SCREENING FOR DIABETES MELLITUS (DM): ICD-10-CM

## 2023-09-28 DIAGNOSIS — G40.401: ICD-10-CM

## 2023-09-28 DIAGNOSIS — I10 PRIMARY HYPERTENSION: ICD-10-CM

## 2023-09-28 DIAGNOSIS — J44.9 CHRONIC OBSTRUCTIVE PULMONARY DISEASE, UNSPECIFIED COPD TYPE (HCC): ICD-10-CM

## 2023-09-28 DIAGNOSIS — G62.9 PERIPHERAL POLYNEUROPATHY: ICD-10-CM

## 2023-09-28 DIAGNOSIS — F41.9 ANXIETY: ICD-10-CM

## 2023-09-28 DIAGNOSIS — I10 PRIMARY HYPERTENSION: Primary | ICD-10-CM

## 2023-09-28 DIAGNOSIS — R79.82 ELEVATED C-REACTIVE PROTEIN (CRP): ICD-10-CM

## 2023-09-28 DIAGNOSIS — I25.119 CORONARY ARTERY DISEASE INVOLVING NATIVE CORONARY ARTERY OF NATIVE HEART WITH ANGINA PECTORIS (HCC): ICD-10-CM

## 2023-09-28 DIAGNOSIS — Z72.0 TOBACCO ABUSE: ICD-10-CM

## 2023-09-28 DIAGNOSIS — K21.9 GASTROESOPHAGEAL REFLUX DISEASE, UNSPECIFIED WHETHER ESOPHAGITIS PRESENT: ICD-10-CM

## 2023-09-28 DIAGNOSIS — E53.8 VITAMIN B12 DEFICIENCY: ICD-10-CM

## 2023-09-28 DIAGNOSIS — I73.9 PAD (PERIPHERAL ARTERY DISEASE) (HCC): ICD-10-CM

## 2023-09-28 DIAGNOSIS — E78.2 MIXED HYPERLIPIDEMIA: ICD-10-CM

## 2023-09-28 DIAGNOSIS — Z11.59 NEED FOR HEPATITIS C SCREENING TEST: ICD-10-CM

## 2023-09-28 DIAGNOSIS — Z23 ENCOUNTER FOR VACCINATION: ICD-10-CM

## 2023-09-28 DIAGNOSIS — F33.9 DEPRESSION, RECURRENT (HCC): ICD-10-CM

## 2023-09-28 LAB
CREAT UR-MCNC: 104.9 MG/DL
EST. AVERAGE GLUCOSE BLD GHB EST-MCNC: 126 MG/DL
HBA1C MFR BLD: 6 %
MICROALBUMIN UR-MCNC: 15.2 MG/L
MICROALBUMIN/CREAT 24H UR: 14 MG/G CREATININE (ref 0–30)

## 2023-09-28 PROCEDURE — 84443 ASSAY THYROID STIM HORMONE: CPT

## 2023-09-28 PROCEDURE — 80061 LIPID PANEL: CPT

## 2023-09-28 PROCEDURE — 3044F HG A1C LEVEL LT 7.0%: CPT | Performed by: INTERNAL MEDICINE

## 2023-09-28 PROCEDURE — 86803 HEPATITIS C AB TEST: CPT

## 2023-09-28 PROCEDURE — 82043 UR ALBUMIN QUANTITATIVE: CPT | Performed by: INTERNAL MEDICINE

## 2023-09-28 PROCEDURE — 82570 ASSAY OF URINE CREATININE: CPT | Performed by: INTERNAL MEDICINE

## 2023-09-28 PROCEDURE — 83036 HEMOGLOBIN GLYCOSYLATED A1C: CPT

## 2023-09-28 PROCEDURE — 99214 OFFICE O/P EST MOD 30 MIN: CPT | Performed by: INTERNAL MEDICINE

## 2023-09-28 RX ORDER — LORAZEPAM 0.5 MG/1
0.5 TABLET ORAL EVERY 8 HOURS PRN
Qty: 90 TABLET | Refills: 0 | Status: SHIPPED | OUTPATIENT
Start: 2023-09-28

## 2023-09-28 NOTE — PROGRESS NOTES
Assessment/Plan:  Problem List Items Addressed This Visit        Digestive    Superior mesenteric artery stenosis (HCC)    Gastroesophageal reflux disease       Respiratory    COPD (chronic obstructive pulmonary disease) (HCC)    Relevant Medications    fluticasone-umeclidinium-vilanterol 200-62.5-25 mcg/actuation AEPB inhaler       Cardiovascular and Mediastinum    Pulmonary embolism (HCC)    PAD (peripheral artery disease) ([address])    Relevant Orders    Lipid Panel with Direct LDL reflex    TSH, 3rd generation with Free T4 reflex    Hypertension - Primary    Relevant Orders    Lipid Panel with Direct LDL reflex    Albumin / creatinine urine ratio    TSH, 3rd generation with Free T4 reflex    Coronary artery disease involving native coronary artery of native heart with angina pectoris (HCC)    Relevant Orders    Lipid Panel with Direct LDL reflex    TSH, 3rd generation with Free T4 reflex       Nervous and Auditory    Peripheral neuropathy    Grand mal status ([address])       Other    Tobacco abuse    Hyperlipidemia    Relevant Orders    Lipid Panel with Direct LDL reflex    Depression, recurrent (HCC)    Relevant Medications    LORazepam (Ativan) 0.5 mg tablet    Anxiety    Relevant Medications    LORazepam (Ativan) 0.5 mg tablet    Other Relevant Orders    [facility] Prescribed Meds and Special Instructions    Amphetamines, Methamphetamines    Butalbital    Phenobarbital    Secobarbital    Alprazolam    Clonazepam    Diazepam    Lorazepam    Gabapentin    Pregabalin    Cocaine    Heroin    Buprenorphine    Levorphanol    Meperidine    Naltrexone    Fentanyl    Methadone    Oxycodone    Tapentadol    THC    Tramadol    Codeine, Hydrocodone, Hydropmorphone, Morphine    Bath Salts    Ethyl Glucuronide/Ethyl Sulfate    Kratom    Spice    Methylphenidate    Phentermine    Validity Oxidant    Validity Creatinine    Validity pH    Validity Specific   Other Visit Diagnoses     Encounter for vaccination        Vitamin B12 deficiency        Elevated C-reactive protein (CRP)        Screening for diabetes mellitus (DM)        Relevant Orders    Hemoglobin A1C    Need for hepatitis C screening test        Relevant Orders    Hepatitis C antibody           Diagnoses and all orders for this visit:    Primary hypertension  -     Lipid Panel with Direct LDL reflex; Future  -     Albumin / creatinine urine ratio  -     TSH, 3rd generation with Free T4 reflex; Future    Coronary artery disease involving native coronary artery of native heart with angina pectoris ([address])  -     Lipid Panel with Direct LDL reflex; Future  -     TSH, 3rd generation with Free T4 reflex; Future    PAD (peripheral artery disease) (HCC)  -     Lipid Panel with Direct LDL reflex; Future  -     TSH, 3rd generation with Free T4 reflex; Future    Acute pulmonary embolism without acute cor pulmonale, unspecified pulmonary embolism type (HCC)    Chronic obstructive pulmonary disease, unspecified COPD type ([address])  -     Discontinue: fluticasone-umeclidinium-vilanterol 100-62.5-25 mcg/actuation inhaler; Inhale 1 puff daily Rinse mouth after use. -     fluticasone-umeclidinium-vilanterol 838-64.0-66 mcg/actuation AEPB inhaler; Inhale 1 puff daily Rinse mouth after use.     Gastroesophageal reflux disease, unspecified whether esophagitis present    Superior mesenteric artery stenosis (HCC)    Peripheral polyneuropathy    Grand mal status ([address] )    Anxiety  -     [facility] Prescribed Meds and Special Instructions  -     Amphetamines, Methamphetamines  -     Butalbital  -     Phenobarbital  -     Secobarbital  -     Alprazolam  -     Clonazepam  -     Diazepam  -     Lorazepam  -     Gabapentin  -     Pregabalin  -     Cocaine  -     Heroin  -     Buprenorphine  -     Levorphanol  -     Meperidine  -     Naltrexone  -     Fentanyl  -     Methadone  -     Oxycodone  -     Tapentadol  -     THC  -     Tramadol  -     Codeine, Hydrocodone, Hydropmorphone, Morphine  -     Bath Salts  -     Ethyl Glucuronide/Ethyl Sulfate  -     Kratom  -     Spice  -     Methylphenidate  -     Phentermine  -     Validity Oxidant  -     Validity Creatinine  -     Validity pH  -     Validity Specific  -     LORazepam (Ativan) 0.5 mg tablet; Take 1 tablet (0.5 mg total) by mouth every 8 (eight) hours as needed for anxiety    Depression, recurrent (HCC)    Mixed hyperlipidemia  -     Lipid Panel with Direct LDL reflex; Future    Tobacco abuse    Encounter for vaccination    Vitamin B12 deficiency    Elevated C-reactive protein (CRP)    Screening for diabetes mellitus (DM)  -     Hemoglobin A1C; Future    Need for hepatitis C screening test  -     Hepatitis C antibody; Future        No problem-specific Assessment & Plan notes found for this encounter. A/P: Doing ok and discussed recent labs. Will check additional routine labs. Appreciate Heme input. Continue DOAC for now and f/u with Heme. To start OTC B12 and folate. COPD not controlled. Will d/c spiriva and has not been using the advair, will add triple therapy. Wean tobacco. Defers flu vaccine. Continue current treatment and RTC four months for routine. Subjective:      Patient ID: Aaron Gill is a 76 y.o. male. WM RTC for f/u HTN, HLD, etc. Doing ok and no new issues. Seeing heme for unprovoked DVT. Is on DOAC and no new events. Recent labs with elevated CRP and low B12. Reports using his SAMANTHA TID for chest tightness and wheezing. Remains active w/o difficulty and no falls. No reflux. Denies CP, SOB, palpitations, edema, orthopnea or PND. No claudication. No sz activity. Still smoking. MDD/WANDA are controlled. Due for labs and vaccines.        The following portions of the patient's history were reviewed and updated as appropriate:   He has a past medical history of Acute right MCA stroke ([address]) (09/15/2018), Arthritis, CAD (coronary artery disease), COPD (chronic obstructive pulmonary disease) ([address]), GERD (gastroesophageal reflux disease), Grand mal status ([address]) (03/24/2019), H/O blood clots, Heart attack ([address]), Heart failure ([address]), Hyperlipidemia, Hypertension, Lexiscan nuclear stress test (03/19/2016), Myocardial infarction [facility]), Shortness of breath, Stroke ([address]), and TIA (transient ischemic attack) (09/13/2018). ,  does not have any pertinent problems on file. ,   has a past surgical history that includes Coronary artery bypass graft (2000); Cardiac catheterization (08/19/2015); Colonoscopy; Tonsillectomy; Hernia repair; Cardiac catheterization (N/A, 12/03/2021); Cardiac catheterization (12/03/2021); and Cystoscopy (10/31/2022). ,  family history includes Aneurysm in his father; Cancer in his father, maternal grandmother, and paternal grandfather; Heart disease in his mother. ,   reports that he has been smoking cigarettes. He started smoking about 53 years ago. He has a 50.00 pack-year smoking history. He has never been exposed to tobacco smoke. He has never used smokeless tobacco. He reports that he does not currently use alcohol. He reports that he does not use drugs. ,  is allergic to gabapentin. .  Current Outpatient Medications   Medication Sig Dispense Refill   • fluticasone-umeclidinium-vilanterol 200-62.5-25 mcg/actuation AEPB inhaler Inhale 1 puff daily Rinse mouth after use.  1 each 5   • LORazepam (Ativan) 0.5 mg tablet Take 1 tablet (0.5 mg total) by mouth every 8 (eight) hours as needed for anxiety 90 tablet 0   • albuterol (2.5 mg/3 mL) 0.083 % nebulizer solution inhale 3 milliliters by mouth three times a day if needed 75 mL 1   • albuterol (Ventolin HFA) 90 mcg/act inhaler Inhale 2 puffs every 6 (six) hours as needed for shortness of breath 54 g 1   • aspirin (ECOTRIN LOW STRENGTH) 81 mg EC tablet Take 1 tablet (81 mg total) by mouth daily 30 tablet 0   • azelastine (ASTELIN) 0.1 % nasal spray 1 spray into each nostril 2 (two) times a day Use in each nostril as directed 1 mL 2   • diltiazem (CARDIZEM CD) 180 mg 24 hr capsule Take 1 capsule (180 mg total) by mouth daily 90 capsule 3   • docusate sodium (COLACE) 100 mg capsule Take 1 capsule (100 mg total) by mouth daily Do not start before Sujey 10, 2023. 30 capsule 0   • fluticasone (FLONASE) 50 mcg/act nasal spray 1 spray into each nostril daily 9.9 mL 0   • furosemide (LASIX) 40 mg tablet take 1 tablet by mouth once daily 90 tablet 3   • isosorbide mononitrate (IMDUR) 30 mg 24 hr tablet Take 1 tablet (30 mg total) by mouth daily 90 tablet 3   • Linzess 290 MCG CAPS Take 1 capsule by mouth AT LEAST 30 MINUTES BEFORE FIRST MEAL OF THE DAY 30 capsule 2   • metoprolol succinate (TOPROL-XL) 25 mg 24 hr tablet take 1 tablet by mouth twice a day 180 tablet 3   • pantoprazole (PROTONIX) 40 mg tablet take 1 tablet by mouth once daily 90 tablet 0   • polyethylene glycol (MIRALAX) 17 g packet Take 17 g by mouth daily as needed (constipation) 10 each 0   • Potassium Chloride ER 20 MEQ TBCR Take 1 tablet (20 mEq total) by mouth daily 30 tablet 11   • rivaroxaban (XARELTO) 20 mg tablet Take 1 tablet (20 mg total) by mouth in the morning 30 tablet 5   • tamsulosin (FLOMAX) 0.4 mg take 1 capsule by mouth twice a day 180 capsule 5     No current facility-administered medications for this visit. Review of Systems   Constitutional: Negative for activity change, chills, diaphoresis, fatigue and fever. HENT: Negative. Eyes: Negative for visual disturbance. Respiratory: Positive for chest tightness and wheezing. Negative for cough and shortness of breath. Cardiovascular: Negative for chest pain, palpitations and leg swelling. Gastrointestinal: Negative for abdominal pain, constipation, diarrhea, nausea and vomiting. Endocrine: Negative for cold intolerance and heat intolerance. Genitourinary: Negative for difficulty urinating, dysuria and frequency. Musculoskeletal: Negative for arthralgias, gait problem and myalgias.    Neurological: Negative for dizziness, seizures, syncope, weakness, light-headedness and headaches. Psychiatric/Behavioral: Negative for confusion, dysphoric mood and sleep disturbance. The patient is not nervous/anxious. PHQ-2/9 Depression Screening          Objective:  Vitals:    09/28/23 0916   BP: 130/80   Pulse: 84   Temp: 98.4 °F (36.9 °C)   SpO2: 98%   Weight: 99.8 kg (220 lb)   Height: 5' 10" (1.778 m)     Body mass index is 31.57 kg/m². Physical Exam  Vitals and nursing note reviewed. Constitutional:       General: He is not in acute distress. Appearance: Normal appearance. He is not ill-appearing. HENT:      Head: Normocephalic and atraumatic. Mouth/Throat:      Mouth: Mucous membranes are moist.   Eyes:      Extraocular Movements: Extraocular movements intact. Conjunctiva/sclera: Conjunctivae normal.      Pupils: Pupils are equal, round, and reactive to light. Neck:      Vascular: No carotid bruit. Cardiovascular:      Rate and Rhythm: Normal rate and regular rhythm. Heart sounds: Normal heart sounds. No murmur heard. Pulmonary:      Effort: Pulmonary effort is normal. No respiratory distress. Breath sounds: Wheezing present. No rhonchi or rales. Comments: Coarse and decreased BS. Abdominal:      General: Bowel sounds are normal. There is no distension. Palpations: Abdomen is soft. Tenderness: There is no abdominal tenderness. Musculoskeletal:      Cervical back: Neck supple. Right lower leg: Edema present. Left lower leg: Edema present. Neurological:      General: No focal deficit present. Mental Status: He is alert and oriented to person, place, and time. Mental status is at baseline. Psychiatric:         Mood and Affect: Mood normal.         Thought Content: Thought content normal.         Judgment: Judgment normal.       Tobacco Cessation Counseling: Tobacco cessation counseling and education was provided.  The patient is sincerely urged to quit consumption of tobacco. He is not ready to quit tobacco. The numerous health risks of tobacco consumption were discussed. If he decides to quit, there are a number of helpful adjunctive aids, and he can see me to discuss nicotine replacement therapy, chantix, or bupropion anytime in the future. Scheduled Medication Review:  Pt's scheduled medication use was reviewed by myself/staff via the [facility] website. Pt's use has been found to be appropriate w/o any concerns for misuse by the patient. Pt's current conditions require continued scheduled medication use at this time. Future review for continued appropriate medication use and misuse will continue.

## 2023-09-29 ENCOUNTER — TELEPHONE (OUTPATIENT)
Dept: HEMATOLOGY ONCOLOGY | Facility: CLINIC | Age: 68
End: 2023-09-29

## 2023-09-29 LAB
CHOLEST SERPL-MCNC: 194 MG/DL
HCV AB SER QL: NORMAL
HDLC SERPL-MCNC: 30 MG/DL
LDLC SERPL CALC-MCNC: 132 MG/DL (ref 0–100)
TRIGL SERPL-MCNC: 159 MG/DL
TSH SERPL DL<=0.05 MIU/L-ACNC: 2.71 UIU/ML (ref 0.45–4.5)

## 2023-09-29 NOTE — TELEPHONE ENCOUNTER
Call Transfer   Who are you speaking with? Patient   If it is not the patient, are they listed on an active communication consent form? N/A   Who is the patients HemOnc/SurgOnc provider? N/A   What is the reason for this call? The patient is requesting to speak to Dr. Nida Saint office. Person/Department that the call was transferred to? Time that call was transferred? [address] [facility] 2050 internal med    Your call will be transferred now. If you receive a voicemail, please leave a detailed message and a member of the team will return your call as soon as possible. Did you relay this information to the caller?   Yes

## 2023-09-30 LAB

## 2023-10-12 ENCOUNTER — TELEPHONE (OUTPATIENT)
Dept: HEMATOLOGY ONCOLOGY | Facility: CLINIC | Age: 68
End: 2023-10-12

## 2023-10-12 ENCOUNTER — TELEMEDICINE (OUTPATIENT)
Dept: HEMATOLOGY ONCOLOGY | Facility: CLINIC | Age: 68
End: 2023-10-12
Payer: COMMERCIAL

## 2023-10-12 DIAGNOSIS — D50.0 IRON DEFICIENCY ANEMIA DUE TO CHRONIC BLOOD LOSS: ICD-10-CM

## 2023-10-12 DIAGNOSIS — E53.8 FOLIC ACID DEFICIENCY: ICD-10-CM

## 2023-10-12 DIAGNOSIS — E53.8 VITAMIN B12 DEFICIENCY: ICD-10-CM

## 2023-10-12 DIAGNOSIS — R10.84 GENERALIZED ABDOMINAL DISCOMFORT: ICD-10-CM

## 2023-10-12 DIAGNOSIS — D50.9 IRON DEFICIENCY ANEMIA, UNSPECIFIED IRON DEFICIENCY ANEMIA TYPE: Primary | ICD-10-CM

## 2023-10-12 DIAGNOSIS — I82.492 ACUTE DEEP VEIN THROMBOSIS (DVT) OF OTHER SPECIFIED VEIN OF LEFT LOWER EXTREMITY (HCC): ICD-10-CM

## 2023-10-12 DIAGNOSIS — I26.99 ACUTE PULMONARY EMBOLISM WITHOUT ACUTE COR PULMONALE, UNSPECIFIED PULMONARY EMBOLISM TYPE (HCC): ICD-10-CM

## 2023-10-12 PROCEDURE — 99214 OFFICE O/P EST MOD 30 MIN: CPT | Performed by: INTERNAL MEDICINE

## 2023-10-12 RX ORDER — CYANOCOBALAMIN 1000 UG/ML
1000 INJECTION, SOLUTION INTRAMUSCULAR; SUBCUTANEOUS ONCE
OUTPATIENT
Start: 2023-10-19 | End: 2023-10-19

## 2023-10-12 RX ORDER — SODIUM CHLORIDE 9 MG/ML
20 INJECTION, SOLUTION INTRAVENOUS ONCE
OUTPATIENT
Start: 2023-10-19

## 2023-10-12 RX ORDER — LANOLIN ALCOHOL/MO/W.PET/CERES
1000 CREAM (GRAM) TOPICAL DAILY
Qty: 30 TABLET | Refills: 5 | Status: SHIPPED | OUTPATIENT
Start: 2023-10-12

## 2023-10-12 RX ORDER — FOLIC ACID 1 MG/1
1 TABLET ORAL DAILY
Qty: 30 TABLET | Refills: 5 | Status: SHIPPED | OUTPATIENT
Start: 2023-10-12

## 2023-10-12 NOTE — PROGRESS NOTES
Virtual Brief Visit    This Visit is being completed by telephone. The Patient is located at Home and in the following state in which I hold an active license [place]    The patient was identified by name and date of birth. Celine Valente was informed that this is a telemedicine visit and that the visit is being conducted through the [employer]. He agrees to proceed. .  My office door was closed. No one else was in the room. He acknowledged consent and understanding of privacy and security of the video platform. The patient has agreed to participate and understands they can discontinue the visit at any time. Patient is aware this is a billable service. Assessment/Plan:  1. Iron deficiency anemia, unspecified iron deficiency anemia type  He seems to be iron deficient without significant anemia. He was offered iron IV x3 doses of Venofer. He will be sent for GI evaluation.  - CBC and differential; Future  - Comprehensive metabolic panel; Future  - Magnesium; Future  - Iron Panel (Includes Ferritin, Iron Sat%, Iron, and TIBC); Future  - Ferritin; Future  - Folate; Future  - C-reactive protein; Future  - Sedimentation rate, automated; Future  - Ambulatory referral to Gastroenterology; Future    2. Vitamin B12 deficiency  Very low vitamin B12 level of 140. This will be corrected with 3 doses of vitamin B12 injections. Oral supplements will be also started. - CBC and differential; Future  - Comprehensive metabolic panel; Future  - Magnesium; Future  - Iron Panel (Includes Ferritin, Iron Sat%, Iron, and TIBC); Future  - Vitamin B12; Future  - C-reactive protein; Future  - Sedimentation rate, automated; Future  - vitamin B-12 (VITAMIN B-12) 1,000 mcg tablet; Take 1 tablet (1,000 mcg total) by mouth daily  Dispense: 30 tablet; Refill: 5    3. Folic acid deficiency  Folic acid deficiency was noted. The patient will be started on folic acid supplements. - Folate;  Future  - folic acid (FOLVITE) 1 mg tablet; Take 1 tablet (1 mg total) by mouth daily  Dispense: 30 tablet; Refill: 5    4. Acute pulmonary embolism without acute cor pulmonale, unspecified pulmonary embolism type (HCC)  Limited hypercoagulable work-up was negative for antiphospholipid antibody syndrome. He was found to have heterozygous factor V Leiden. He was told that he will need to be on anticoagulation indefinitely unless there is absolute contraindication. 5. Acute deep vein thrombosis (DVT) of other specified vein of left lower extremity ([address])      6. Generalized abdominal discomfort  He had a CT scan of the abdomen pelvis on 9/19/2023 which showed:  MPRESSION:     No acute CT findings in the abdomen or pelvis. Bilateral renal cysts. No hydronephrosis. Colonic diverticulosis without acute diverticulitis. Slight prominence of the central portion of the prostate extending into the base of the bladder. Chronic left-sided rib fracture deformities partially seen. - Ambulatory referral to Gastroenterology;  Future        Problem List Items Addressed This Visit          Cardiovascular and Mediastinum    Pulmonary embolism (HCC)       Other    Iron deficiency anemia due to chronic blood loss    Relevant Medications    folic acid (FOLVITE) 1 mg tablet    vitamin B-12 (VITAMIN B-12) 1,000 mcg tablet    Vitamin B12 deficiency    Relevant Medications    vitamin B-12 (VITAMIN B-12) 1,000 mcg tablet    Other Relevant Orders    CBC and differential    Comprehensive metabolic panel    Magnesium    Iron Panel (Includes Ferritin, Iron Sat%, Iron, and TIBC)    Vitamin B12    C-reactive protein    Sedimentation rate, automated     Other Visit Diagnoses       Iron deficiency anemia, unspecified iron deficiency anemia type    -  Primary    Relevant Medications    folic acid (FOLVITE) 1 mg tablet    vitamin B-12 (VITAMIN B-12) 1,000 mcg tablet    Other Relevant Orders    CBC and differential    Comprehensive metabolic panel    Magnesium Iron Panel (Includes Ferritin, Iron Sat%, Iron, and TIBC)    Ferritin    Folate    C-reactive protein    Sedimentation rate, automated    Ambulatory referral to Gastroenterology    Folic acid deficiency        Relevant Medications    folic acid (FOLVITE) 1 mg tablet    Other Relevant Orders    Folate    Acute deep vein thrombosis (DVT) of other specified vein of left lower extremity (HCC)        Generalized abdominal discomfort        Relevant Orders    Ambulatory referral to Gastroenterology            Recent Visits  No visits were found meeting these conditions. Showing recent visits within past 7 days and meeting all other requirements  Today's Visits  Date Type Provider Dept   10/12/23 Telephone Holly Kirkland MD [facility] [place]   10/12/23 Telephone Holly Kirkland MD [facility] [place]   10/12/23 Junior Maldonado MD [facility]   Showing today's visits and meeting all other requirements  Future Appointments  No visits were found meeting these conditions. Showing future appointments within next 150 days and meeting all other requirements         Patient is a 70-year-old male who presents for further evaluation/recommendations regarding his recent acute pulmonary embolism/left lower extremity DVT. He has a past medical history of tobacco abuse, COPD, TIA, hyperlipidemia, GERD, CAD status post CABG 2000, PAD, arthritis and hypertension. No prior thrombosis up until recent events. No family history of thrombosis. He states that he presented to the hospital 6/8/2023 with reports of progressing heaviness, pain and edema to the left lower extremity. VAS lower limb venous duplex study, unilateral/limited (6/8/23)  CONCLUSION:  Impression:  RIGHT LOWER LIMB LIMITED:  Evaluation shows no evidence of thrombus in the common femoral vein, SFJ,  femoral vein or popliteal vein. Doppler evaluation shows a normal response to augmentation maneuvers.      LEFT LOWER LIMB:  There is acute deep vein thrombosis in the mid posterior tibial, mid peroneal,  popliteal and femoral veins, mostly occlusive to very minimal flow. Common  femoral vein appears patent and compressible. No evidence of superficial thrombophlebitis noted. Doppler evaluation shows a normal response to augmentation maneuvers. Popliteal, posterior tibial and anterior tibial arterial Doppler waveform's are  triphasic. He was started on Eliquis for anticoagulation and admitted overnight for observation. No chest imaging was pursued. He then represented to the hospital 7/3/2023 with reports of worsening shortness of breath, back pain. CTA ED chest PE study (7/3/23)  IMPRESSION:  1. Right-sided pulmonary emboli as described above. Measured RV/LV ratio is within normal limits at less than 0.9.  2.  Bronchial wall thickening may represent bronchitis. PULMONARY ARTERIAL TREE: Pulmonary embolism in the proximal lobar branch of the right middle lobe with segmental extension (series 2, image images 139-143). Probable embolism in the proximal lobar branch of the right upper lobe (series 601, image 74;   series 2, image 115). Eccentric filling defect within a proximal segmental branch in the right lower lobe is likely an additional embolism, though may be nonacute given its eccentric location (series 2, image 141). LUNGS: Mild emphysema. Mild bronchial wall thickening. No focal consolidation. Respiratory motion limits detection of small pulmonary nodules. Approximate 5 mm right upper lobe nodule is unchanged from index study of June 26, 2017 (series 3, image 85). He was then readmitted to the hospital and started on a heparin drip which was later transitioned to alternative DOAC Xarelto at discharge. He continues to Xarelto 20 mg daily at this time. Patient does report to me today that over the past year he has been having decreased appetite and feeling discomfort in his abdomen.   Mainly feels tight, with swollen abdomen, early satiety. He did have colonoscopy last year but mentions it was recommended 1 year follow-up since he had poor preparation. He decided not to pursue this. He also states that he has not been feeling right in his head. Reports forgetfulness and feeling pressure/clogged sensation in his head. He does admit to tinnitus. Apparently had possible concussion after recent traumatic fall 4/2023 and was evaluated in the emergency department for this. Most recent laboratory studies drawn while he was admitted to the hospital 7/5/2023 showed leukocytosis which is likely reactive in nature WBC 14.23, he had mild normocytic anemia H&H 11.8/34.2, MCV 89 platelet count normal 204. Creatinine 0.94, GFR 82, glucose 141 remaining metabolic panel appropriate.       Visit Time  Total Visit Duration: 25min

## 2023-10-12 NOTE — TELEPHONE ENCOUNTER
CALLED PATIENT TO MAKE HIS FOLLOW UP APPOINTMENT. SCHEDULED APPOINTMENT FOR 03/26/2024.  PATIENT AGREED

## 2023-10-13 ENCOUNTER — TELEPHONE (OUTPATIENT)
Dept: HEMATOLOGY ONCOLOGY | Facility: CLINIC | Age: 68
End: 2023-10-13

## 2023-10-13 NOTE — TELEPHONE ENCOUNTER
Received vm: Hello. Yes, I'm trying to get in touch with P[facility]on. Elvin, my name is Lio Benedict with Patient. Please see that they'll call you on [address] I want to find out one of the extra questions about my eye and B12 and stuff like that. So alright, call me back. It's [phone]. That's Kate Maynard, date of birth, March 3rd, 1955. Thank you. Returned call to patient. Reviewed upcoming appt date and time and reviewed iron infusions ordered. Patient aware and agreeable. Patient aware infusion schedulers will be calling to schedule the venofer.   No additional questions at this time

## 2023-10-13 NOTE — TELEPHONE ENCOUNTER
What would be a preferred day of the week that would work best for your infusion appointment? Wednesday or Thursday  Do you prefer mornings or afternoons for your appointments? PM  Are there any days or dates that do not work for your schedule, including any upcoming vacations? N/a  We are going to try our best to schedule you at the infusion center closest to your home. In the event that we are unable to what would be your next preferred infusion site or sites? [address]    Do you have transportation to take you to all of your appointments?  Yes - uses [employer]

## 2023-10-13 NOTE — TELEPHONE ENCOUNTER
Left message on both patient's and sister Sameera's [facility]) phones requesting call back to review preferences.

## 2023-10-16 ENCOUNTER — TELEPHONE (OUTPATIENT)
Dept: HEMATOLOGY ONCOLOGY | Facility: CLINIC | Age: 68
End: 2023-10-16

## 2023-10-16 NOTE — TELEPHONE ENCOUNTER
Patient Call    Who are you speaking with? Patient    If it is not the patient, are they listed on an active communication consent form? N/A   What is the reason for this call? Patient calling in stating that he is supposed to have iron infusions scheduled for this week. The patient states that he spoke with a nurse, however I do not see these dates and times for infusions. Patient would like the dates, times, and address as he states he needs to inform the bus company that will be taking him for his appointment. Does this require a call back? Yes   If a call back is required, please list best call back number [phone]   If a call back is required, advise that a message will be forwarded to their care team and someone will return their call as soon as possible. Did you relay this information to the patient?  Yes

## 2023-10-23 ENCOUNTER — DOCUMENTATION (OUTPATIENT)
Dept: HEMATOLOGY ONCOLOGY | Facility: CLINIC | Age: 68
End: 2023-10-23

## 2023-10-23 NOTE — PROGRESS NOTES
Oncology Finance Advocacy Intake and Intervention  Oncology Finance Counselor/Advocate placed call to patient. This writer informed patient that this writer is here to assist patient with billing questions, financial assistance, payment/payment plans, quotes, copayment assistance, insurance optimization, and insurance navigation.    This writer conducted a thorough benefit review of copayment, deductible, and out of pocket cost. This information is documented below and has been reviewed with patient.     Copayment: N/A  Deductible: $226 MET REMAINING -0-  Out of Pocket Cost: $8300 MET $1287.39 REMAINING $7012.61  Insurance optimization (Limited benefit vs self-pay): N/A  Patient assistance status: N/A  Free Drug Applications: N/A  Interventions:  Added to careteam  Pt outreach to follow  Called pt to go over his ins benefits got his voicemail left a message for pt to call me back        Information above was review thoroughly with patient and patient was advise of possible assistance programs/interventions. If any question arise patient can contact this writer at below information. This information was given to patient at time of contact.      Maris Canada  Phone:[phone]  Email: [email or web address]

## 2023-10-24 ENCOUNTER — TELEPHONE (OUTPATIENT)
Dept: HEMATOLOGY ONCOLOGY | Facility: CLINIC | Age: 68
End: 2023-10-24

## 2023-10-24 NOTE — TELEPHONE ENCOUNTER
Returned a call to pt and had to leave a message that pt needs IV iron and B 12. Let pt know he can take orally but it is poorly absorbed ancd that is why IV has been ordered Left my Teams number [phone].

## 2023-10-24 NOTE — TELEPHONE ENCOUNTER
Received voice message from patient:    Yes, cathy Garcia, this is Cathi Llanos For several appointment with you jarrod on Thursday Perfusion of bullying and B12. I'm trying to find out why am I going for these infusions? What's the reason for it and what's it for? And my telephone number is [phone], it's [phone]. My YOB: 1955. I'm at [address] [place], [place], [address] [facility]. The telephone number is [phone]. Please call me back as soon as possible because I gotta know what I'm Sarah Alonso do because I'm supposed to go Thursday, which I'm looking for the time here, but I don't see no time here. You Melinda. That's Cathi Llanos, March 3rd, 1955. Lisa Brandt coming in Thursday for infusions, the Boy and B12 and I don't understand why you just can't give me vitamins and that's it is your back to me because I got to know about tomorrow what to do or what not to do. Thank you. Right now it's 227 that's called that say. I appreciate it. Thank you. Please call him to discuss. Thank you!

## 2023-10-26 ENCOUNTER — HOSPITAL ENCOUNTER (OUTPATIENT)
Dept: INFUSION CENTER | Facility: HOSPITAL | Age: 68
Discharge: HOME | End: 2023-10-26
Attending: INTERNAL MEDICINE
Payer: COMMERCIAL

## 2023-10-26 VITALS
DIASTOLIC BLOOD PRESSURE: 97 MMHG | RESPIRATION RATE: 18 BRPM | TEMPERATURE: 96.9 F | SYSTOLIC BLOOD PRESSURE: 152 MMHG | OXYGEN SATURATION: 97 % | HEART RATE: 76 BPM

## 2023-10-26 DIAGNOSIS — D50.0 IRON DEFICIENCY ANEMIA DUE TO CHRONIC BLOOD LOSS: Primary | ICD-10-CM

## 2023-10-26 DIAGNOSIS — E53.8 VITAMIN B12 DEFICIENCY: ICD-10-CM

## 2023-10-26 DIAGNOSIS — K44.9 HIATAL HERNIA: ICD-10-CM

## 2023-10-26 DIAGNOSIS — I25.10 CORONARY ARTERIOSCLEROSIS IN NATIVE ARTERY: ICD-10-CM

## 2023-10-26 PROCEDURE — 96365 THER/PROPH/DIAG IV INF INIT: CPT

## 2023-10-26 PROCEDURE — 96372 THER/PROPH/DIAG INJ SC/IM: CPT

## 2023-10-26 RX ORDER — METOPROLOL SUCCINATE 25 MG/1
TABLET, EXTENDED RELEASE ORAL
Qty: 180 TABLET | Refills: 3 | Status: SHIPPED | OUTPATIENT
Start: 2023-10-26

## 2023-10-26 RX ORDER — CYANOCOBALAMIN 1000 UG/ML
1000 INJECTION, SOLUTION INTRAMUSCULAR; SUBCUTANEOUS ONCE
Status: COMPLETED | OUTPATIENT
Start: 2023-10-26 | End: 2023-10-26

## 2023-10-26 RX ORDER — SODIUM CHLORIDE 9 MG/ML
20 INJECTION, SOLUTION INTRAVENOUS ONCE
Status: COMPLETED | OUTPATIENT
Start: 2023-10-26 | End: 2023-10-26

## 2023-10-26 RX ORDER — SODIUM CHLORIDE 9 MG/ML
20 INJECTION, SOLUTION INTRAVENOUS ONCE
Status: CANCELLED | OUTPATIENT
Start: 2023-11-02

## 2023-10-26 RX ORDER — CYANOCOBALAMIN 1000 UG/ML
1000 INJECTION, SOLUTION INTRAMUSCULAR; SUBCUTANEOUS ONCE
Status: CANCELLED | OUTPATIENT
Start: 2023-11-02 | End: 2023-11-02

## 2023-10-26 RX ORDER — PANTOPRAZOLE SODIUM 40 MG/1
TABLET, DELAYED RELEASE ORAL
Qty: 90 TABLET | Refills: 0 | Status: SHIPPED | OUTPATIENT
Start: 2023-10-26

## 2023-10-26 RX ADMIN — IRON SUCROSE 200 MG: 20 INJECTION, SOLUTION INTRAVENOUS at 12:47

## 2023-10-26 RX ADMIN — SODIUM CHLORIDE 20 ML/HR: 9 INJECTION, SOLUTION INTRAVENOUS at 12:45

## 2023-10-26 RX ADMIN — CYANOCOBALAMIN 1000 MCG: 1000 INJECTION, SOLUTION INTRAMUSCULAR at 13:31

## 2023-10-26 NOTE — PROGRESS NOTES
Patient tolerated IV venofer infusion without incident. PIV removed with catheter tip intact. B12 injection administered as ordered to R deltoid. AVS given to patient at time of discharge, and next appointment reviewed. Patient ambulated off unit in stable condition.

## 2023-11-01 ENCOUNTER — DOCUMENTATION (OUTPATIENT)
Dept: HEMATOLOGY ONCOLOGY | Facility: CLINIC | Age: 68
End: 2023-11-01

## 2023-11-01 NOTE — PROGRESS NOTES
2ND ATTEMPT  Called pt to go over his ins benefits  Got his voicemail left a message for pt to call me back

## 2023-11-02 ENCOUNTER — HOSPITAL ENCOUNTER (OUTPATIENT)
Dept: INFUSION CENTER | Facility: HOSPITAL | Age: 68
End: 2023-11-02
Attending: INTERNAL MEDICINE
Payer: COMMERCIAL

## 2023-11-02 DIAGNOSIS — E53.8 VITAMIN B12 DEFICIENCY: ICD-10-CM

## 2023-11-02 DIAGNOSIS — D50.0 IRON DEFICIENCY ANEMIA DUE TO CHRONIC BLOOD LOSS: Primary | ICD-10-CM

## 2023-11-02 PROCEDURE — 96372 THER/PROPH/DIAG INJ SC/IM: CPT

## 2023-11-02 PROCEDURE — 96365 THER/PROPH/DIAG IV INF INIT: CPT

## 2023-11-02 RX ORDER — CYANOCOBALAMIN 1000 UG/ML
1000 INJECTION, SOLUTION INTRAMUSCULAR; SUBCUTANEOUS ONCE
OUTPATIENT
Start: 2023-11-09 | End: 2023-11-09

## 2023-11-02 RX ORDER — CYANOCOBALAMIN 1000 UG/ML
1000 INJECTION, SOLUTION INTRAMUSCULAR; SUBCUTANEOUS ONCE
Status: COMPLETED | OUTPATIENT
Start: 2023-11-02 | End: 2023-11-02

## 2023-11-02 RX ORDER — SODIUM CHLORIDE 9 MG/ML
20 INJECTION, SOLUTION INTRAVENOUS ONCE
Status: COMPLETED | OUTPATIENT
Start: 2023-11-02 | End: 2023-11-02

## 2023-11-02 RX ORDER — SODIUM CHLORIDE 9 MG/ML
20 INJECTION, SOLUTION INTRAVENOUS ONCE
OUTPATIENT
Start: 2023-11-09

## 2023-11-02 RX ADMIN — SODIUM CHLORIDE 200 MG: 9 INJECTION, SOLUTION INTRAVENOUS at 14:41

## 2023-11-02 RX ADMIN — CYANOCOBALAMIN 1000 MCG: 1000 INJECTION, SOLUTION INTRAMUSCULAR at 15:09

## 2023-11-02 RX ADMIN — SODIUM CHLORIDE 20 ML/HR: 0.9 INJECTION, SOLUTION INTRAVENOUS at 14:25

## 2023-11-02 NOTE — PROGRESS NOTES
Patient denies current infection or being on antibiotics. Patient tolerated IV Venofer without reaction or issues. B12 injection given in right deltoid without issues. AVS given to patient. Patient ambulated off unit without incident. All personal belongings taken with patient.

## 2023-11-05 ENCOUNTER — TELEPHONE (OUTPATIENT)
Dept: OTHER | Facility: OTHER | Age: 68
End: 2023-11-05

## 2023-11-06 NOTE — TELEPHONE ENCOUNTER
Pt called stating eh forgot to make arrangements with the bus service for transportation ot his appointment on 11/6 so he needed to cancel the appointment. Pt would like to reschedule appointment.

## 2023-11-09 ENCOUNTER — HOSPITAL ENCOUNTER (OUTPATIENT)
Dept: INFUSION CENTER | Facility: HOSPITAL | Age: 68
End: 2023-11-09
Attending: INTERNAL MEDICINE
Payer: COMMERCIAL

## 2023-11-09 VITALS
OXYGEN SATURATION: 97 % | TEMPERATURE: 96.5 F | HEART RATE: 76 BPM | RESPIRATION RATE: 18 BRPM | DIASTOLIC BLOOD PRESSURE: 93 MMHG | SYSTOLIC BLOOD PRESSURE: 153 MMHG

## 2023-11-09 DIAGNOSIS — E53.8 VITAMIN B12 DEFICIENCY: ICD-10-CM

## 2023-11-09 DIAGNOSIS — D50.0 IRON DEFICIENCY ANEMIA DUE TO CHRONIC BLOOD LOSS: Primary | ICD-10-CM

## 2023-11-09 PROCEDURE — 96365 THER/PROPH/DIAG IV INF INIT: CPT

## 2023-11-09 PROCEDURE — 96372 THER/PROPH/DIAG INJ SC/IM: CPT

## 2023-11-09 RX ORDER — SODIUM CHLORIDE 9 MG/ML
20 INJECTION, SOLUTION INTRAVENOUS ONCE
Status: COMPLETED | OUTPATIENT
Start: 2023-11-09 | End: 2023-11-09

## 2023-11-09 RX ORDER — CYANOCOBALAMIN 1000 UG/ML
1000 INJECTION, SOLUTION INTRAMUSCULAR; SUBCUTANEOUS ONCE
Status: COMPLETED | OUTPATIENT
Start: 2023-11-09 | End: 2023-11-09

## 2023-11-09 RX ORDER — CYANOCOBALAMIN 1000 UG/ML
1000 INJECTION, SOLUTION INTRAMUSCULAR; SUBCUTANEOUS ONCE
Status: CANCELLED | OUTPATIENT
Start: 2023-11-09 | End: 2023-11-09

## 2023-11-09 RX ORDER — SODIUM CHLORIDE 9 MG/ML
20 INJECTION, SOLUTION INTRAVENOUS ONCE
Status: CANCELLED | OUTPATIENT
Start: 2023-11-09

## 2023-11-09 RX ADMIN — CYANOCOBALAMIN 1000 MCG: 1000 INJECTION, SOLUTION INTRAMUSCULAR at 14:31

## 2023-11-09 RX ADMIN — SODIUM CHLORIDE 20 ML/HR: 0.9 INJECTION, SOLUTION INTRAVENOUS at 13:45

## 2023-11-09 RX ADMIN — IRON SUCROSE 200 MG: 20 INJECTION, SOLUTION INTRAVENOUS at 13:47

## 2023-11-09 NOTE — PROGRESS NOTES
Pt tolerated today's venofer without adverse reaction noted. Peripheral iv discontinued jelco intact. Reviewed upcoming appt. Defers avs. Discharged ambulatory.

## 2023-11-30 ENCOUNTER — HOSPITAL ENCOUNTER (EMERGENCY)
Facility: HOSPITAL | Age: 68
Discharge: HOME | End: 2023-11-30
Attending: EMERGENCY MEDICINE
Payer: COMMERCIAL

## 2023-11-30 ENCOUNTER — APPOINTMENT (EMERGENCY)
Dept: RADIOLOGY | Facility: HOSPITAL | Age: 68
End: 2023-11-30
Payer: COMMERCIAL

## 2023-11-30 VITALS
DIASTOLIC BLOOD PRESSURE: 87 MMHG | SYSTOLIC BLOOD PRESSURE: 144 MMHG | HEART RATE: 91 BPM | OXYGEN SATURATION: 96 % | RESPIRATION RATE: 18 BRPM | TEMPERATURE: 98.2 F

## 2023-11-30 DIAGNOSIS — M94.0 COSTOCHONDRITIS: ICD-10-CM

## 2023-11-30 DIAGNOSIS — R07.89 NON-CARDIAC CHEST PAIN: Primary | ICD-10-CM

## 2023-11-30 LAB
2HR DELTA HS TROPONIN: 0 NG/L
ANION GAP SERPL CALCULATED.3IONS-SCNC: 8 MMOL/L
ATRIAL RATE: 90 BPM
BASOPHILS # BLD AUTO: 0.03 THOUSANDS/ÂΜL (ref 0–0.1)
BASOPHILS NFR BLD AUTO: 1 % (ref 0–1)
BUN SERPL-MCNC: 12 MG/DL (ref 5–25)
CALCIUM SERPL-MCNC: 8.6 MG/DL (ref 8.4–10.2)
CARDIAC TROPONIN I PNL SERPL HS: 4 NG/L
CARDIAC TROPONIN I PNL SERPL HS: 4 NG/L
CHLORIDE SERPL-SCNC: 96 MMOL/L (ref 96–108)
CO2 SERPL-SCNC: 26 MMOL/L (ref 21–32)
CREAT SERPL-MCNC: 1.21 MG/DL (ref 0.6–1.3)
D DIMER PPP FEU-MCNC: 0.42 UG/ML FEU
EOSINOPHIL # BLD AUTO: 0.16 THOUSAND/ÂΜL (ref 0–0.61)
EOSINOPHIL NFR BLD AUTO: 3 % (ref 0–6)
ERYTHROCYTE [DISTWIDTH] IN BLOOD BY AUTOMATED COUNT: 12 % (ref 11.6–15.1)
GFR SERPL CREATININE-BSD FRML MDRD: 61 ML/MIN/1.73SQ M
GLUCOSE SERPL-MCNC: 96 MG/DL (ref 65–140)
HCT VFR BLD AUTO: 40.9 % (ref 36.5–49.3)
HGB BLD-MCNC: 14.3 G/DL (ref 12–17)
IMM GRANULOCYTES # BLD AUTO: 0.02 THOUSAND/UL (ref 0–0.2)
IMM GRANULOCYTES NFR BLD AUTO: 0 % (ref 0–2)
LYMPHOCYTES # BLD AUTO: 2.02 THOUSANDS/ÂΜL (ref 0.6–4.47)
LYMPHOCYTES NFR BLD AUTO: 35 % (ref 14–44)
MCH RBC QN AUTO: 30.6 PG (ref 26.8–34.3)
MCHC RBC AUTO-ENTMCNC: 35 G/DL (ref 31.4–37.4)
MCV RBC AUTO: 87 FL (ref 82–98)
MONOCYTES # BLD AUTO: 0.41 THOUSAND/ÂΜL (ref 0.17–1.22)
MONOCYTES NFR BLD AUTO: 7 % (ref 4–12)
NEUTROPHILS # BLD AUTO: 3.06 THOUSANDS/ÂΜL (ref 1.85–7.62)
NEUTS SEG NFR BLD AUTO: 54 % (ref 43–75)
NRBC BLD AUTO-RTO: 0 /100 WBCS
P AXIS: 50 DEGREES
PLATELET # BLD AUTO: 237 THOUSANDS/UL (ref 149–390)
PMV BLD AUTO: 8.9 FL (ref 8.9–12.7)
POTASSIUM SERPL-SCNC: 3.9 MMOL/L (ref 3.5–5.3)
PR INTERVAL: 172 MS
QRS AXIS: 86 DEGREES
QRSD INTERVAL: 94 MS
QT INTERVAL: 374 MS
QTC INTERVAL: 457 MS
RBC # BLD AUTO: 4.68 MILLION/UL (ref 3.88–5.62)
SODIUM SERPL-SCNC: 130 MMOL/L (ref 135–147)
T WAVE AXIS: 74 DEGREES
VENTRICULAR RATE: 90 BPM
WBC # BLD AUTO: 5.7 THOUSAND/UL (ref 4.31–10.16)

## 2023-11-30 PROCEDURE — 85025 COMPLETE CBC W/AUTO DIFF WBC: CPT | Performed by: EMERGENCY MEDICINE

## 2023-11-30 PROCEDURE — 80048 BASIC METABOLIC PNL TOTAL CA: CPT | Performed by: EMERGENCY MEDICINE

## 2023-11-30 PROCEDURE — 99285 EMERGENCY DEPT VISIT HI MDM: CPT | Performed by: EMERGENCY MEDICINE

## 2023-11-30 PROCEDURE — 84484 ASSAY OF TROPONIN QUANT: CPT | Performed by: EMERGENCY MEDICINE

## 2023-11-30 PROCEDURE — 99285 EMERGENCY DEPT VISIT HI MDM: CPT

## 2023-11-30 PROCEDURE — 71045 X-RAY EXAM CHEST 1 VIEW: CPT

## 2023-11-30 PROCEDURE — 85379 FIBRIN DEGRADATION QUANT: CPT | Performed by: EMERGENCY MEDICINE

## 2023-11-30 PROCEDURE — 93005 ELECTROCARDIOGRAM TRACING: CPT

## 2023-11-30 PROCEDURE — 36415 COLL VENOUS BLD VENIPUNCTURE: CPT | Performed by: EMERGENCY MEDICINE

## 2023-11-30 RX ORDER — ASPIRIN 81 MG/1
324 TABLET, CHEWABLE ORAL ONCE
Status: COMPLETED | OUTPATIENT
Start: 2023-11-30 | End: 2023-11-30

## 2023-11-30 RX ORDER — SODIUM CHLORIDE 9 MG/ML
3 INJECTION INTRAVENOUS
Status: DISCONTINUED | OUTPATIENT
Start: 2023-11-30 | End: 2023-11-30 | Stop reason: HOSPADM

## 2023-11-30 RX ADMIN — ASPIRIN 324 MG: 81 TABLET, CHEWABLE ORAL at 07:45

## 2023-11-30 NOTE — ED PROVIDER NOTES
History  Chief Complaint   Patient presents with    Chest Pain     Patient reports left sided chest pain radiating down his left arm for the past 5 days intermittently. Patient is a 61-year-old male with history of CAD, COPD that presents for evaluation of chest pain. Having some intermittent left-sided chest pain. It starts in the center of his chest and wraps around the left side. It seems to be worsened after coughing or with certain types of movement. It is not exertional.  He has not been take anything for the pain. He is currently asymptomatic. Patient diagnosed with PE earlier this year, he has been compliant with Xarelto than 1 missed dose a couple days ago. Patient has a chronic cough and dyspnea that are unchanged from baseline. Prior to Admission Medications   Prescriptions Last Dose Informant Patient Reported? Taking?    LORazepam (Ativan) 0.5 mg tablet   No No   Sig: Take 1 tablet (0.5 mg total) by mouth every 8 (eight) hours as needed for anxiety   Linzess 290 MCG CAPS  Self No No   Sig: Take 1 capsule by mouth AT LEAST 30 MINUTES BEFORE FIRST MEAL OF THE DAY   Potassium Chloride ER 20 MEQ TBCR  Self No No   Sig: Take 1 tablet (20 mEq total) by mouth daily   albuterol (2.5 mg/3 mL) 0.083 % nebulizer solution   No No   Sig: inhale 3 milliliters by mouth three times a day if needed   albuterol (Ventolin HFA) 90 mcg/act inhaler  Self No No   Sig: Inhale 2 puffs every 6 (six) hours as needed for shortness of breath   aspirin (ECOTRIN LOW STRENGTH) 81 mg EC tablet  Self No No   Sig: Take 1 tablet (81 mg total) by mouth daily   azelastine (ASTELIN) 0.1 % nasal spray  Self No No   Si spray into each nostril 2 (two) times a day Use in each nostril as directed   diltiazem (CARDIZEM CD) 180 mg 24 hr capsule  Self No No   Sig: Take 1 capsule (180 mg total) by mouth daily   docusate sodium (COLACE) 100 mg capsule   No No   Sig: Take 1 capsule (100 mg total) by mouth daily Do not start before Sujey 10, 2023. fluticasone (FLONASE) 50 mcg/act nasal spray  Self No No   Si spray into each nostril daily   fluticasone-umeclidinium-vilanterol 200-62.5-25 mcg/actuation AEPB inhaler   No No   Sig: Inhale 1 puff daily Rinse mouth after use. folic acid (FOLVITE) 1 mg tablet   No No   Sig: Take 1 tablet (1 mg total) by mouth daily   furosemide (LASIX) 40 mg tablet  Self No No   Sig: take 1 tablet by mouth once daily   isosorbide mononitrate (IMDUR) 30 mg 24 hr tablet  Self No No   Sig: Take 1 tablet (30 mg total) by mouth daily   metoprolol succinate (TOPROL-XL) 25 mg 24 hr tablet   No No   Sig: take 1 tablet by mouth twice a day   pantoprazole (PROTONIX) 40 mg tablet   No No   Sig: take 1 tablet by mouth once daily   polyethylene glycol (MIRALAX) 17 g packet  Self No No   Sig: Take 17 g by mouth daily as needed (constipation)   rivaroxaban (XARELTO) 20 mg tablet  Self No No   Sig: Take 1 tablet (20 mg total) by mouth in the morning   tamsulosin (FLOMAX) 0.4 mg   No No   Sig: take 1 capsule by mouth twice a day   vitamin B-12 (VITAMIN B-12) 1,000 mcg tablet   No No   Sig: Take 1 tablet (1,000 mcg total) by mouth daily      Facility-Administered Medications: None       Past Medical History:   Diagnosis Date    Acute right MCA stroke ([facility]) 09/15/2018    Arthritis     CAD (coronary artery disease)     s/p CABG     COPD (chronic obstructive pulmonary disease) (HCC)     GERD (gastroesophageal reflux disease)     Grand mal status ([address]) 2019    H/O blood clots     Heart attack ([facility])     Heart failure (HCC)     Hyperlipidemia     Hypertension     Lexiscan nuclear stress test 2016    EF 74% Normal    Myocardial infarction (HCC)     Shortness of breath     Stroke [facility])     TIA (transient ischemic attack) 2018       Past Surgical History:   Procedure Laterality Date    CARDIAC CATHETERIZATION  2015    LIMA occluded.  No severe native lesions    CARDIAC CATHETERIZATION N/A 2021 Procedure: Cardiac Coronary Angiogram;  Surgeon: Blayne Orourke MD;  Location: [facility] CARDIAC [facility]ATH LAB; Service: Cardiology    CARDIAC CATHETERIZATION  12/03/2021    Procedure: Cardiac catheterization;  Surgeon: Blayne Orourke MD;  Location: [facility]; Service: Cardiology    COLONOSCOPY      CORONARY ARTERY BYPASS GRAFT  2000    CYSTOSCOPY  10/31/2022    [facility]    HERNIA REPAIR      umbilical hernia x2    TONSILLECTOMY         Family History   Problem Relation Age of Onset    Heart disease Mother     Cancer Father     Aneurysm Father     Cancer Maternal Grandmother     Cancer Paternal Grandfather      I have reviewed and agree with the history as documented. E-Cigarette/Vaping    E-Cigarette Use Never User      E-Cigarette/Vaping Substances    Nicotine Yes     THC No     CBD No     Flavoring No     Other No     Unknown No      Social History     Tobacco Use    Smoking status: Every Day     Packs/day: 1.00     Years: 50.00     Total pack years: 50.00     Types: Cigarettes     Start date: 1970     Passive exposure: Never    Smokeless tobacco: Never   Vaping Use    Vaping Use: Never used   Substance Use Topics    Alcohol use: Not Currently     Comment: Socially    Drug use: Never       Review of Systems   Constitutional:  Negative for fever. HENT:  Negative for sore throat. Eyes:  Negative for photophobia. Respiratory:  Positive for cough and shortness of breath. Cardiovascular:  Positive for chest pain. Gastrointestinal:  Negative for abdominal pain. Genitourinary:  Negative for dysuria. Musculoskeletal:  Negative for back pain. Skin:  Negative for rash. Neurological:  Negative for light-headedness. Hematological:  Negative for adenopathy. Psychiatric/Behavioral:  Negative for agitation. All other systems reviewed and are negative. Physical Exam  Physical Exam  Vitals reviewed. Constitutional:       General: He is not in acute distress.      Appearance: He is well-developed. HENT:      Head: Normocephalic. Eyes:      Pupils: Pupils are equal, round, and reactive to light. Cardiovascular:      Rate and Rhythm: Normal rate and regular rhythm. Heart sounds: Normal heart sounds. No murmur heard. No friction rub. No gallop. Pulmonary:      Effort: Pulmonary effort is normal.      Breath sounds: Normal breath sounds. Comments: Patient with clear lungs bilaterally, no increased work of breathing noted  Abdominal:      General: Bowel sounds are normal. There is no distension. Palpations: Abdomen is soft. Tenderness: There is no abdominal tenderness. There is no guarding. Musculoskeletal:         General: Normal range of motion. Cervical back: Normal range of motion and neck supple. Skin:     Capillary Refill: Capillary refill takes less than 2 seconds. Neurological:      Mental Status: He is alert and oriented to person, place, and time. Cranial Nerves: No cranial nerve deficit. Sensory: No sensory deficit. Motor: No abnormal muscle tone. Psychiatric:         Behavior: Behavior normal.         Thought Content:  Thought content normal.         Judgment: Judgment normal.         Vital Signs  ED Triage Vitals [11/30/23 0734]   Temperature Pulse Respirations Blood Pressure SpO2   98.2 °F (36.8 °C) 91 18 144/87 96 %      Temp src Heart Rate Source Patient Position - Orthostatic VS BP Location FiO2 (%)   -- -- -- -- --      Pain Score       1           Vitals:    11/30/23 0734   BP: 144/87   Pulse: 91         Visual Acuity      ED Medications  Medications   sodium chloride (PF) 0.9 % injection 3 mL (has no administration in time range)   aspirin chewable tablet 324 mg (324 mg Oral Given 11/30/23 0742)       Diagnostic Studies  Results Reviewed       Procedure Component Value Units Date/Time    HS Troponin I 2hr [[id number]]  (Normal) Collected: 11/30/23 0919    Lab Status: Final result Specimen: Blood from Arm, Left Updated: 11/30/23 0950     hs TnI 2hr 4 ng/L      Delta 2hr hsTnI 0 ng/L     HS Troponin I 4hr [[id number]]     Lab Status: No result Specimen: Blood     HS Troponin 0hr (reflex protocol) [[id number]]  (Normal) Collected: 11/30/23 0745    Lab Status: Final result Specimen: Blood from Arm, Left Updated: 11/30/23 0814     hs TnI 0hr 4 ng/L     Basic metabolic panel [[id number]]  (Abnormal) Collected: 11/30/23 0745    Lab Status: Final result Specimen: Blood from Arm, Left Updated: 11/30/23 0806     Sodium 130 mmol/L      Potassium 3.9 mmol/L      Chloride 96 mmol/L      CO2 26 mmol/L      ANION GAP 8 mmol/L      BUN 12 mg/dL      Creatinine 1.21 mg/dL      Glucose 96 mg/dL      Calcium 8.6 mg/dL      eGFR 61 ml/min/1.73sq m     Narrative:      [facility] (CKD):     Stage 1 with normal or high GFR (GFR > 90 mL/min/1.73 square meters)    Stage 2 Mild CKD (GFR = 60-89 mL/min/1.73 square meters)    Stage 3A Moderate CKD (GFR = 45-59 mL/min/1.73 square meters)    Stage 3B Moderate CKD (GFR = 30-44 mL/min/1.73 square meters)    Stage 4 Severe CKD (GFR = 15-29 mL/min/1.73 square meters)    Stage 5 End Stage CKD (GFR <15 mL/min/1.73 square meters)  Note: GFR calculation is accurate only with a steady state creatinine    D-dimer, quantitative [[id number]]  (Normal) Collected: 11/30/23 0745    Lab Status: Final result Specimen: Blood from Arm, Left Updated: 11/30/23 0804     D-Dimer, Quant 0.42 ug/ml FEU     Narrative: In the evaluation for possible pulmonary embolism, in the appropriate (Well's Score of 4 or less) patient, the age adjusted d-dimer cutoff for this patient can be calculated as:    Age x 0.01 (in ug/mL) for Age-adjusted D-dimer exclusion threshold for a patient over 50 years.     CBC and differential [[id number]] Collected: 11/30/23 0745    Lab Status: Final result Specimen: Blood from Arm, Left Updated: 11/30/23 0750     WBC 5.70 Thousand/uL      RBC 4.68 Million/uL      Hemoglobin 14.3 g/dL      Hematocrit 40.9 %      MCV 87 fL      MCH 30.6 pg      MCHC 35.0 g/dL      RDW 12.0 %      MPV 8.9 fL      Platelets 836 Thousands/uL      nRBC 0 /100 WBCs      Neutrophils Relative 54 %      Immat GRANS % 0 %      Lymphocytes Relative 35 %      Monocytes Relative 7 %      Eosinophils Relative 3 %      Basophils Relative 1 %      Neutrophils Absolute 3.06 Thousands/µL      Immature Grans Absolute 0.02 Thousand/uL      Lymphocytes Absolute 2.02 Thousands/µL      Monocytes Absolute 0.41 Thousand/µL      Eosinophils Absolute 0.16 Thousand/µL      Basophils Absolute 0.03 Thousands/µL                    X-ray chest 1 view portable    (Results Pending)              Procedures  ECG 12 Lead Documentation Only    Date/Time: 11/30/2023 10:15 AM    Performed by: Phuc Younger MD  Authorized by: Phuc Younger MD    ECG reviewed by me, the ED Provider: yes    Patient location:  ED  Previous ECG:     Previous ECG:  Unavailable    Comparison to cardiac monitor: Yes    Interpretation:     Interpretation: normal    Rate:     ECG rate assessment: normal    Rhythm:     Rhythm: sinus rhythm    Ectopy:     Ectopy: none    QRS:     QRS axis:  Normal    QRS intervals:  Normal  Conduction:     Conduction: normal    ST segments:     ST segments:  Normal  T waves:     T waves: normal             ED Course                               SBIRT 22yo+      Flowsheet Row Most Recent Value   Initial Alcohol Screen: US AUDIT-C     1. How often do you have a drink containing alcohol? 0 Filed at: 11/30/2023 0735   2. How many drinks containing alcohol do you have on a typical day you are drinking? 0 Filed at: 11/30/2023 0735   3a. Male UNDER 65: How often do you have five or more drinks on one occasion? 0 Filed at: 11/30/2023 0735   3b. FEMALE Any Age, or MALE 65+: How often do you have 4 or more drinks on one occassion?  0 Filed at: 11/30/2023 0735   Audit-C Score 0 Filed at: 11/30/2023 8551   COLTEN: How many times in the past year have you. .. Used an illegal drug or used a prescription medication for non-medical reasons? Never Filed at: 11/30/2023 2093                      Medical Decision Making  Patient is a 51-year-old male who presents for evaluation of left-sided chest pain. Initial concern for CAD, recurrent PE. However workup here is negative. EKG is nonischemic. Troponin x 2 negative. D-dimer negative. Chest x-ray reviewed and negative. Advised patient that this is likely musculoskeletal chest pain. Advised follow-up with cardiology and PCP and strict turn precautions. Amount and/or Complexity of Data Reviewed  Labs: ordered. Radiology: ordered. Risk  OTC drugs. Prescription drug management. Disposition  Final diagnoses:   Non-cardiac chest pain   Costochondritis     Time reflects when diagnosis was documented in both MDM as applicable and the Disposition within this note       Time User Action Codes Description Comment    11/30/2023  9:55 AM Iker Ly [R07.89] Non-cardiac chest pain     11/30/2023  9:55 AM Iker Ly [M94.0] Costochondritis           ED Disposition       ED Disposition   Discharge    Condition   Stable    Date/Time   Thu Nov 30, 2023 [address] discharge to home/self care.                    Follow-up Information       Follow up With Specialties Details Why Contact Info Additional [facility] Emergency Department Emergency Medicine  If symptoms worsen [address] [address] [place] [id number]  [address] [place] [address] Emergency Department, [address], [address] [place] [address]            Discharge Medication List as of 11/30/2023  9:55 AM        CONTINUE these medications which have NOT CHANGED    Details   albuterol (2.5 mg/3 mL) 0.083 % nebulizer solution inhale 3 milliliters by mouth three times a day if needed, Normal      albuterol (Ventolin HFA) 90 mcg/act inhaler Inhale 2 puffs every 6 (six) hours as needed for shortness of breath, Starting Thu 1/19/2023, Normal      aspirin (ECOTRIN LOW STRENGTH) 81 mg EC tablet Take 1 tablet (81 mg total) by mouth daily, Starting Mon 9/17/2018, Print      azelastine (ASTELIN) 0.1 % nasal spray 1 spray into each nostril 2 (two) times a day Use in each nostril as directed, Starting Wed 10/19/2022, Normal      diltiazem (CARDIZEM CD) 180 mg 24 hr capsule Take 1 capsule (180 mg total) by mouth daily, Starting Mon 8/21/2023, Normal      docusate sodium (COLACE) 100 mg capsule Take 1 capsule (100 mg total) by mouth daily Do not start before Sujey 10, 2023., Starting Sat 6/10/2023, Until Mon 7/10/2023, Normal      fluticasone (FLONASE) 50 mcg/act nasal spray 1 spray into each nostril daily, Starting Thu 9/29/2022, Normal      fluticasone-umeclidinium-vilanterol 200-62.5-25 mcg/actuation AEPB inhaler Inhale 1 puff daily Rinse mouth after use., Starting Thu 9/03/8536, Normal      folic acid (FOLVITE) 1 mg tablet Take 1 tablet (1 mg total) by mouth daily, Starting Thu 10/12/2023, Normal      furosemide (LASIX) 40 mg tablet take 1 tablet by mouth once daily, Normal      isosorbide mononitrate (IMDUR) 30 mg 24 hr tablet Take 1 tablet (30 mg total) by mouth daily, Starting Tue 3/7/2023, Normal      Linzess 290 MCG CAPS Take 1 capsule by mouth AT LEAST 30 MINUTES BEFORE FIRST MEAL OF THE DAY, Normal      LORazepam (Ativan) 0.5 mg tablet Take 1 tablet (0.5 mg total) by mouth every 8 (eight) hours as needed for anxiety, Starting Thu 9/28/2023, Normal      metoprolol succinate (TOPROL-XL) 25 mg 24 hr tablet take 1 tablet by mouth twice a day, Normal      pantoprazole (PROTONIX) 40 mg tablet take 1 tablet by mouth once daily, Normal      polyethylene glycol (MIRALAX) 17 g packet Take 17 g by mouth daily as needed (constipation), Starting Fri 6/9/2023, Normal      Potassium Chloride ER 20 MEQ TBCR Take 1 tablet (20 mEq total) by mouth daily, Starting Thu 3/9/2023, Normal      rivaroxaban (XARELTO) 20 mg tablet Take 1 tablet (20 mg total) by mouth in the morning, Starting Thu 8/10/2023, Until Sat 9/9/2023, Normal      tamsulosin (FLOMAX) 0.4 mg take 1 capsule by mouth twice a day, Starting Wed 9/6/2023, Normal      vitamin B-12 (VITAMIN B-12) 1,000 mcg tablet Take 1 tablet (1,000 mcg total) by mouth daily, Starting Thu 10/12/2023, Normal             No discharge procedures on file.     PDMP Review         Value Time User    PDMP Reviewed  Yes 9/28/2023  9:35 AM Portillo Painting DO            ED Provider  Electronically Signed by             Ivonne Cisneros MD  11/30/23 2581

## 2023-11-30 NOTE — DISCHARGE INSTRUCTIONS
-Please follow-up with cardiologist as discussed and return to care if you develop any new or worsening symptoms

## 2023-12-01 ENCOUNTER — PATIENT OUTREACH (OUTPATIENT)
Dept: INTERNAL MEDICINE CLINIC | Facility: CLINIC | Age: 68
End: 2023-12-01

## 2023-12-01 ENCOUNTER — VBI (OUTPATIENT)
Dept: INTERNAL MEDICINE CLINIC | Facility: CLINIC | Age: 68
End: 2023-12-01

## 2023-12-01 DIAGNOSIS — J44.9 CHRONIC OBSTRUCTIVE PULMONARY DISEASE, UNSPECIFIED COPD TYPE (HCC): ICD-10-CM

## 2023-12-01 DIAGNOSIS — Z71.89 COORDINATION OF COMPLEX CARE: Primary | ICD-10-CM

## 2023-12-01 NOTE — PROGRESS NOTES
Referral from [facility] Base Team. ED Risk score 95%. Patient presented to [facility] ER with c/o left side CP down his left arm x 5 days intermittently. Says it is worse after coughing and with certain movements. Patient diagnosed with costochondritis and dc'd home. No CCM needs identified. Patient was not called.

## 2023-12-01 NOTE — TELEPHONE ENCOUNTER
12/01/23 9:51 AM    Patient contacted post ED visit, first outreach attempt made. Message was left for patient to return a call to the VBI Department at [facility]: Phone [phone]. Thank you.   Marzena Armstrong MA  [facility]

## 2023-12-04 ENCOUNTER — TELEPHONE (OUTPATIENT)
Dept: INTERNAL MEDICINE CLINIC | Facility: CLINIC | Age: 68
End: 2023-12-04

## 2023-12-04 NOTE — TELEPHONE ENCOUNTER
12/04/23 9:32 AM    Patient contacted post ED visit, second outreach attempt made. Message was left for patient to return a call to the VBI Department at [facility]: Phone [phone]. Thank you.   Jm Payne MA  [facility]

## 2023-12-05 NOTE — TELEPHONE ENCOUNTER
12/05/23 9:00 AM    Patient contacted post ED visit, phone outreaches were unsuccessful; patient does not have MyChart, a [facility] letter has not been sent. Thank you.   Charlie Beltre MA  [facility]

## 2023-12-11 DIAGNOSIS — F41.9 ANXIETY: ICD-10-CM

## 2023-12-11 RX ORDER — LORAZEPAM 0.5 MG/1
0.5 TABLET ORAL EVERY 8 HOURS PRN
Qty: 90 TABLET | Refills: 0 | Status: SHIPPED | OUTPATIENT
Start: 2023-12-11

## 2024-01-02 ENCOUNTER — RA CDI HCC (OUTPATIENT)
Dept: OTHER | Facility: HOSPITAL | Age: 69
End: 2024-01-02

## 2024-01-02 NOTE — PROGRESS NOTES
HCC coding opportunities          Chart Reviewed number of suggestions sent to Provider: 4  K55.1  I73.9  I74.5  I71.9     Patients Insurance     Medicare Insurance: [facility] Medicare Advantage

## 2024-01-15 ENCOUNTER — OFFICE VISIT (OUTPATIENT)
Dept: UROLOGY | Facility: CLINIC | Age: 69
End: 2024-01-15
Payer: COMMERCIAL

## 2024-01-15 VITALS
OXYGEN SATURATION: 98 % | DIASTOLIC BLOOD PRESSURE: 98 MMHG | BODY MASS INDEX: 32.27 KG/M2 | HEIGHT: 70 IN | HEART RATE: 81 BPM | RESPIRATION RATE: 16 BRPM | WEIGHT: 225.4 LBS | SYSTOLIC BLOOD PRESSURE: 144 MMHG | TEMPERATURE: 97.5 F

## 2024-01-15 DIAGNOSIS — N40.1 BENIGN PROSTATIC HYPERPLASIA WITH URINARY RETENTION: Primary | ICD-10-CM

## 2024-01-15 DIAGNOSIS — R33.8 BENIGN PROSTATIC HYPERPLASIA WITH URINARY RETENTION: Primary | ICD-10-CM

## 2024-01-15 DIAGNOSIS — Z12.5 SCREENING FOR PROSTATE CANCER: ICD-10-CM

## 2024-01-15 PROCEDURE — 99213 OFFICE O/P EST LOW 20 MIN: CPT

## 2024-01-15 RX ORDER — FINASTERIDE 5 MG/1
5 TABLET, FILM COATED ORAL DAILY
Qty: 30 TABLET | Refills: 6 | Status: SHIPPED | OUTPATIENT
Start: 2024-01-15

## 2024-01-15 RX ORDER — DULOXETIN HYDROCHLORIDE 60 MG/1
1 CAPSULE, DELAYED RELEASE ORAL DAILY
COMMUNITY

## 2024-01-15 NOTE — PROGRESS NOTES
1/15/2024    Chief Complaint   Patient presents with    Follow-up       Assessment and Plan    68 y.o. male     BPH  He feels he is drinking more fluids than he able to urinate out. He reports a weak urinary stream. He voided prior to arrival and unable to check random bladder scan due to machine not being charged. He was offered diagnostic straight catheterization, but declined as he is not in pain.   Cystoscopy from October 2022 showed minimal outlet obstruction.  I suspect much of his problem his his chronic constipation.  We will also check a Renal US with [facility] for updated baseline imaging.   Follow-up 3 months pending US results.     2. Prostate cancer screening  PSA 0.47 (9/20/2023)  YOLANDA deferred this office visit.       Subjective:      He presents today continuing to report issues with constipation as well as difficulty urinating. He continues with Flomax 0.4 mg BID as well as Linzess and MiraLax for his constipation.           History of Present Illness  Miguelangel Lancaster is a 68 y.o. male here for evaluation of    Established patient but new to me last seen by Dr. Gee on 10/31/2022 for cystoscopy evaluation of BPH.  Originally seen in August 2022 for ongoing constipation and difficulty with urination. Patient drinks copious amounts of soda and tea.  Patient previously saw an outside urologist in [place].  Had prior cystoscopy 12-15 years ago.  Patient was started on Flomax which was then doubled to twice a day.  Patient follows with GI and is on Linzess and MiraLax p.r.n..  Has undergone colonoscopy polyp removal.  Cystoscopy showed minimal bladder outlet obstruction.  Upon retroflexion of the scope there were small amount of nodularity on the right lateral bladder neck there was not well-appreciated in the intraurethral portion.  Overall this was a negative cystoscopy.    Prostate cancer screening: PSA 0.47 as of 9/20/2023.    Review of Systems   Constitutional:  Negative for chills and fever.  "  HENT:  Negative for congestion and sore throat.    Respiratory:  Negative for cough and shortness of breath.    Cardiovascular:  Negative for chest pain and leg swelling.   Gastrointestinal:  Positive for constipation. Negative for abdominal pain and diarrhea.   Genitourinary:  Negative for dysuria, frequency, hematuria and urgency.        Weak urinary stream, frequency    Musculoskeletal:  Negative for back pain and gait problem.   Skin:  Negative for wound.   Allergic/Immunologic: Negative for immunocompromised state.   Hematological:  Does not bruise/bleed easily.               Vitals  Vitals:    01/15/24 1049   BP: 144/98   BP Location: Left arm   Patient Position: Sitting   Cuff Size: Adult   Pulse: 81   Resp: 16   Temp: 97.5 °F (36.4 °C)   TempSrc: Temporal   SpO2: 98%   Weight: 102 kg (225 lb 6.4 oz)   Height: 5' 10\" (1.778 m)       Physical Exam  Vitals reviewed.   Constitutional:       General: He is not in acute distress.     Appearance: Normal appearance. He is not ill-appearing or toxic-appearing.   HENT:      Head: Normocephalic and atraumatic.   Eyes:      General: No scleral icterus.     Conjunctiva/sclera: Conjunctivae normal.   Cardiovascular:      Rate and Rhythm: Normal rate.   Pulmonary:      Effort: Pulmonary effort is normal. No respiratory distress.   Abdominal:      General: There is no distension.      Palpations: Abdomen is soft.      Tenderness: There is no right CVA tenderness or left CVA tenderness.      Hernia: No hernia is present.   Musculoskeletal:      Cervical back: Normal range of motion.      Right lower leg: No edema.      Left lower leg: No edema.   Skin:     General: Skin is warm and dry.      Coloration: Skin is not jaundiced or pale.   Neurological:      General: No focal deficit present.      Mental Status: He is alert and oriented to person, place, and time. Mental status is at baseline.      Gait: Gait normal.   Psychiatric:         Mood and Affect: Mood normal.         " Behavior: Behavior normal.         Thought Content: Thought content normal.         Judgment: Judgment normal.         Past History  Past Medical History:   Diagnosis Date    Acute right MCA stroke ([facility]) 09/15/2018    Arthritis     CAD (coronary artery disease)     s/p CABG 2000    COPD (chronic obstructive pulmonary disease) ([facility])     GERD (gastroesophageal reflux disease)     Grand mal status ([facility]) 03/24/2019    H/O blood clots     Heart attack ([facility])     Heart failure ([facility])     Hyperlipidemia     Hypertension     Lexiscan nuclear stress test 03/19/2016    EF 74% Normal    Myocardial infarction ([facility])     Shortness of breath     Stroke ([facility])     TIA (transient ischemic attack) 09/13/2018     Social History     Socioeconomic History    Marital status: Single     Spouse name: None    Number of children: None    Years of education: None    Highest education level: None   Occupational History    None   Tobacco Use    Smoking status: Every Day     Current packs/day: 1.00     Average packs/day: 1 pack/day for 54.0 years (54.0 ttl pk-yrs)     Types: Cigarettes     Start date: 1970     Passive exposure: Never    Smokeless tobacco: Never   Vaping Use    Vaping status: Never Used   Substance and Sexual Activity    Alcohol use: Not Currently     Comment: Socially    Drug use: Never    Sexual activity: Not Currently   Other Topics Concern    None   Social History Narrative    None     Social Determinants of Health     Financial Resource Strain: Low Risk  (6/7/2023)    Overall Financial Resource Strain (CARDIA)     Difficulty of Paying Living Expenses: Not very hard   Food Insecurity: No Food Insecurity (7/5/2023)    Hunger Vital Sign     Worried About Running Out of Food in the Last Year: Never true     Ran Out of Food in the Last Year: Never true   Transportation Needs: No Transportation Needs (7/5/2023)    PRAPARE - Transportation     Lack of Transportation (Medical): No     Lack of Transportation (Non-Medical): No   Physical  Activity: Not on file   Stress: Not on file   Social Connections: Not on file   Intimate Partner Violence: Not on file   Housing Stability: Low Risk  (7/5/2023)    Housing Stability Vital Sign     Unable to Pay for Housing in the Last Year: No     Number of Places Lived in the Last Year: 1     Unstable Housing in the Last Year: No     Social History     Tobacco Use   Smoking Status Every Day    Current packs/day: 1.00    Average packs/day: 1 pack/day for 54.0 years (54.0 ttl pk-yrs)    Types: Cigarettes    Start date: 1970    Passive exposure: Never   Smokeless Tobacco Never     Family History   Problem Relation Age of Onset    Heart disease Mother     Cancer Father     Aneurysm Father     Cancer Maternal Grandmother     Cancer Paternal Grandfather        The following portions of the patient's history were reviewed and updated as appropriate allergies, current medications, past medical history, past social history, past surgical history and problem list    Imaging:    Results  No results found for this or any previous visit (from the past 1 hour(s)).]  Lab Results   Component Value Date    PSA 0.47 09/20/2023    PSA 0.2 03/22/2023    PSA 0.3 03/17/2022     Lab Results   Component Value Date    CALCIUM 8.6 11/30/2023     06/22/2018    K 3.9 11/30/2023    CO2 26 11/30/2023    CL 96 11/30/2023    BUN 12 11/30/2023    CREATININE 1.21 11/30/2023     Lab Results   Component Value Date    WBC 5.70 11/30/2023    HGB 14.3 11/30/2023    HCT 40.9 11/30/2023    MCV 87 11/30/2023     11/30/2023       Please Note:  Voice dictation software has been used to create this document. There may be inadvertent transcriptions errors.     ADRIANA Davila 01/15/24

## 2024-01-19 DIAGNOSIS — K44.9 HIATAL HERNIA: ICD-10-CM

## 2024-01-19 RX ORDER — PANTOPRAZOLE SODIUM 40 MG/1
TABLET, DELAYED RELEASE ORAL
Qty: 90 TABLET | Refills: 0 | Status: SHIPPED | OUTPATIENT
Start: 2024-01-19

## 2024-01-29 ENCOUNTER — HOSPITAL ENCOUNTER (OUTPATIENT)
Dept: ULTRASOUND IMAGING | Facility: HOSPITAL | Age: 69
Discharge: HOME | End: 2024-01-29
Payer: COMMERCIAL

## 2024-01-29 DIAGNOSIS — N40.1 BENIGN PROSTATIC HYPERPLASIA WITH URINARY RETENTION: ICD-10-CM

## 2024-01-29 DIAGNOSIS — R33.8 BENIGN PROSTATIC HYPERPLASIA WITH URINARY RETENTION: ICD-10-CM

## 2024-01-29 PROCEDURE — 76770 US EXAM ABDO BACK WALL COMP: CPT

## 2024-01-31 DIAGNOSIS — I26.99 ACUTE PULMONARY EMBOLISM WITHOUT ACUTE COR PULMONALE, UNSPECIFIED PULMONARY EMBOLISM TYPE (HCC): ICD-10-CM

## 2024-01-31 RX ORDER — RIVAROXABAN 20 MG/1
20 TABLET, FILM COATED ORAL DAILY
Qty: 30 TABLET | Refills: 2 | Status: SHIPPED | OUTPATIENT
Start: 2024-01-31

## 2024-02-05 ENCOUNTER — OFFICE VISIT (OUTPATIENT)
Dept: OBGYN CLINIC | Facility: CLINIC | Age: 69
End: 2024-02-05
Payer: COMMERCIAL

## 2024-02-05 VITALS
DIASTOLIC BLOOD PRESSURE: 82 MMHG | BODY MASS INDEX: 32.5 KG/M2 | HEIGHT: 70 IN | WEIGHT: 227 LBS | HEART RATE: 73 BPM | SYSTOLIC BLOOD PRESSURE: 123 MMHG

## 2024-02-05 DIAGNOSIS — M25.532 PAIN IN LEFT WRIST: Primary | ICD-10-CM

## 2024-02-05 PROCEDURE — 99204 OFFICE O/P NEW MOD 45 MIN: CPT | Performed by: STUDENT IN AN ORGANIZED HEALTH CARE EDUCATION/TRAINING PROGRAM

## 2024-02-05 NOTE — PROGRESS NOTES
Ortho Sports Medicine New Patient Elbow Visit     Assesment:   68 y.o.male with left wrist pain for approximately 2 months without a specific injury and exam concerning for tendinitis.    Plan:  I reviewed the history, exam, and imaging with the patient in clinic today.  I did review the x-rays which show no evidence of fracture or significant degenerative changes of the wrist.  I discussed that his pain is most likely due to overuse resulting in some tendinitis.  I discussed potential treatment options including physical therapy, bracing, and anti-inflammatory medications.  The patient is unable to take anti-inflammatories as it irritates her stomach.  I recommended physical therapy which the patient deferred.  I also offered the patient a wrist brace which she also deferred.  I discussed that he could modify his activities and avoid movements that cause irritation to the wrist.  I recommended he follow-up in 6 weeks for repeat evaluation.  Patient demonstrated understanding discussion was agreed with the plan.  All his questions were answered.  He can follow-up in 6 weeks for repeat evaluation.  He can reach out to clinic with any questions or concerns anytime.    Conservative treatment:  Ice to elbow 1-2 times daily, for 20 minutes at a time.  Let pain guide return to activities.  Velcro wrist splint provided in office - patient declined.   Follow-up on an as needed basis.     Imaging:  All imaging from today was reviewed by myself and explained to the patient.     Injection:  No Injection planned at this time.    Surgery:  No surgery is recommended at this point, continue with conservative treatment plan as noted.    Follow up:  Return if symptoms worsen or fail to improve.        Chief Complaint   Patient presents with    Left Wrist - Pain       History of Present Illness:  The patient is a 68 y.o. RHD male seen in clinic for left wrist pain.  The pain started approximately 2 months ago.  Patient denies a  specific injury but did note that he has recently started pulling a shopping cart to get groceries home from the store.  Patient states that the pain is localized over the ulnar aspect of the wrist and radiates up the ulnar side of the forearm.  He denies any clicking or popping in the wrist.  He denies any numbness or tingling in the fingers.  He states that the pain is worse with wrist extension and ulnar deviation.  Patient has tried Tylenol for pain control.  He states that he cannot take anti-inflammatories because it upsets his stomach.  He did state that he fell several years ago and was evaluated by Dr. Franks who recommended conservative management.  Patient denies any prior surgeries on the left wrist.    Occupation: Disabled.       Hand/wrist Surgical History:  None    Past Medical, Social and Family History:  Past Medical History:   Diagnosis Date    Acute right MCA stroke ([facility]) 09/15/2018    Arthritis     CAD (coronary artery disease)     s/p CABG 2000    COPD (chronic obstructive pulmonary disease) ([facility])     GERD (gastroesophageal reflux disease)     Grand mal status ([facility]) 03/24/2019    H/O blood clots     Heart attack ([facility])     Heart failure ([facility])     Hyperlipidemia     Hypertension     Lexiscan nuclear stress test 03/19/2016    EF 74% Normal    Myocardial infarction ([facility])     Shortness of breath     Stroke ([facility])     TIA (transient ischemic attack) 09/13/2018     Past Surgical History:   Procedure Laterality Date    CARDIAC CATHETERIZATION  08/19/2015    LIMA occluded. No severe native lesions    CARDIAC CATHETERIZATION N/A 12/03/2021    Procedure: Cardiac Coronary Angiogram;  Surgeon: Fredis Rogel MD;  Location: [facility];  Service: Cardiology    CARDIAC CATHETERIZATION  12/03/2021    Procedure: Cardiac catheterization;  Surgeon: Fredis Rogel MD;  Location: [facility];  Service: Cardiology    COLONOSCOPY      CORONARY ARTERY BYPASS GRAFT  2000    CYSTOSCOPY  10/31/2022     [facility]    HERNIA REPAIR      umbilical hernia x2    TONSILLECTOMY       Allergies   Allergen Reactions    Bupropion Other (See Comments)    Gabapentin Headache     Current Outpatient Medications on File Prior to Visit   Medication Sig Dispense Refill    albuterol (2.5 mg/3 mL) 0.083 % nebulizer solution inhale 3 milliliters by mouth three times a day if needed 75 mL 1    albuterol (Ventolin HFA) 90 mcg/act inhaler Inhale 2 puffs every 6 (six) hours as needed for shortness of breath 54 g 1    aspirin (ECOTRIN LOW STRENGTH) 81 mg EC tablet Take 1 tablet (81 mg total) by mouth daily 30 tablet 0    azelastine (ASTELIN) 0.1 % nasal spray 1 spray into each nostril 2 (two) times a day Use in each nostril as directed 1 mL 2    diltiazem (CARDIZEM CD) 180 mg 24 hr capsule Take 1 capsule (180 mg total) by mouth daily 90 capsule 3    finasteride (PROSCAR) 5 mg tablet Take 1 tablet (5 mg total) by mouth daily 30 tablet 6    fluticasone (FLONASE) 50 mcg/act nasal spray 1 spray into each nostril daily 9.9 mL 0    fluticasone-umeclidinium-vilanterol 200-62.5-25 mcg/actuation AEPB inhaler Inhale 1 puff daily Rinse mouth after use. 1 each 5    folic acid (FOLVITE) 1 mg tablet Take 1 tablet (1 mg total) by mouth daily 30 tablet 5    furosemide (LASIX) 40 mg tablet take 1 tablet by mouth once daily 90 tablet 3    isosorbide mononitrate (IMDUR) 30 mg 24 hr tablet Take 1 tablet (30 mg total) by mouth daily 90 tablet 3    Linzess 290 MCG CAPS Take 1 capsule by mouth AT LEAST 30 MINUTES BEFORE FIRST MEAL OF THE DAY 30 capsule 2    LORazepam (Ativan) 0.5 mg tablet Take 1 tablet (0.5 mg total) by mouth every 8 (eight) hours as needed for anxiety 90 tablet 0    metoprolol succinate (TOPROL-XL) 25 mg 24 hr tablet take 1 tablet by mouth twice a day 180 tablet 3    pantoprazole (PROTONIX) 40 mg tablet take 1 tablet by mouth once daily 90 tablet 0    Potassium Chloride ER 20 MEQ TBCR Take 1 tablet (20 mEq total) by mouth daily 30 tablet  11    tamsulosin (FLOMAX) 0.4 mg take 1 capsule by mouth twice a day 180 capsule 5    vitamin B-12 (VITAMIN B-12) 1,000 mcg tablet Take 1 tablet (1,000 mcg total) by mouth daily 30 tablet 5    Xarelto 20 MG tablet take 1 tablet by mouth once daily 30 tablet 2    docusate sodium (COLACE) 100 mg capsule Take 1 capsule (100 mg total) by mouth daily Do not start before Sujey 10, 2023. (Patient not taking: Reported on 1/15/2024) 30 capsule 0    DULoxetine (CYMBALTA) 60 mg delayed release capsule Take 1 capsule by mouth daily (Patient not taking: Reported on 1/15/2024)      polyethylene glycol (MIRALAX) 17 g packet Take 17 g by mouth daily as needed (constipation) (Patient not taking: Reported on 1/15/2024) 10 each 0     No current facility-administered medications on file prior to visit.     Social History     Socioeconomic History    Marital status: Single     Spouse name: Not on file    Number of children: Not on file    Years of education: Not on file    Highest education level: Not on file   Occupational History    Not on file   Tobacco Use    Smoking status: Every Day     Current packs/day: 1.00     Average packs/day: 1 pack/day for 54.1 years (54.1 ttl pk-yrs)     Types: Cigarettes     Start date: 1970     Passive exposure: Never    Smokeless tobacco: Never   Vaping Use    Vaping status: Never Used   Substance and Sexual Activity    Alcohol use: Not Currently     Comment: Socially    Drug use: Never    Sexual activity: Not Currently   Other Topics Concern    Not on file   Social History Narrative    Not on file     Social Determinants of Health     Financial Resource Strain: Low Risk  (6/7/2023)    Overall Financial Resource Strain (CARDIA)     Difficulty of Paying Living Expenses: Not very hard   Food Insecurity: No Food Insecurity (7/5/2023)    Hunger Vital Sign     Worried About Running Out of Food in the Last Year: Never true     Ran Out of Food in the Last Year: Never true   Transportation Needs: No  "Transportation Needs (7/5/2023)    PRAPARE - Transportation     Lack of Transportation (Medical): No     Lack of Transportation (Non-Medical): No   Physical Activity: Not on file   Stress: Not on file   Social Connections: Not on file   Intimate Partner Violence: Not on file   Housing Stability: Low Risk  (7/5/2023)    Housing Stability Vital Sign     Unable to Pay for Housing in the Last Year: No     Number of Places Lived in the Last Year: 1     Unstable Housing in the Last Year: No         I have reviewed the past medical, surgical, social and family history, medications and allergies as documented in the EMR.    Review of systems: ROS is negative other than that noted in the HPI.  Constitutional: Negative for fatigue and fever.   HENT: Negative for sore throat.    Respiratory: Negative for shortness of breath.    Cardiovascular: Negative for chest pain.   Gastrointestinal: Negative for abdominal pain.   Endocrine: Negative for cold intolerance and heat intolerance.   Genitourinary: Negative for flank pain.   Musculoskeletal: Negative for back pain.   Skin: Negative for rash.   Allergic/Immunologic: Negative for immunocompromised state.   Neurological: Negative for dizziness.   Psychiatric/Behavioral: Negative for agitation.     Physical Exam:    Blood pressure 123/82, pulse 73, height 5' 10\" (1.778 m), weight 103 kg (227 lb).    General/Constitutional: NAD, well developed, well nourished  HENT: Normocephalic, atraumatic  CV: Intact distal pulses, regular rate  Resp: No respiratory distress or labored breathing  GI: Soft and non-tender   Lymphatic: No lymphadenopathy palpated  Neuro: Alert and Oriented x 3, no focal deficits  Psych: Normal mood, normal affect, normal judgement, normal behavior  Skin: Warm, dry, no rashes, no erythema      Focused Left Wrist Exam:  Skin is intact.  No ecchymosis, erythema or effusion noted on exam.    Full range of motion of the wrist including full extension, full flexion, full " pronation, and full supination without pain.     No tenderness over the scaphoid, radial styloid, ulnar styloid, TFCC. No tenderness over the radiocarpal joints.      No tenderness and negative Tinel's over the carpal tunnel.      5/5 strength with wrist flexion and extension, forearm pronation and supination.  No pain with resisted wrist extension or wrist flexion.  No pain with resisted pronation or supination of the forearm.       UE NV Exam: +2 Radial pulses bilaterally  Sensation intact to light touch C5-T1 bilaterally, Radial/median/ulnar nerve motor intact    Bilateral shoulder, wrist/hand, and forearm ROM full, painless with passive ROM, no ttp or crepitance throughout extremities above wrist joint    Cervical ROM is full without pain, numbness or tingling    Negative spurling maneuver bilaterally       Elbow Imaging:    X-rays of the left wrist were obtained 2/5/24 and reviewed with the patient.  Based on my independent review, imaging shows no acute osseous abnormalities or fractures. Well preserved joint space.       Scribe Attestation      I,:  Ti Will am acting as a scribe while in the presence of the attending physician.:       I,:  Aldair Sutherland MD personally performed the services described in this documentation    as scribed in my presence.:

## 2024-02-09 NOTE — RESULT ENCOUNTER NOTE
Please let patient know that his renal ultrasound shows good bladder emptying.  His prostate is quite large however he has no significant postvoid residual.  Stable bilateral renal cyst.  Follow-up as scheduled.

## 2024-02-12 ENCOUNTER — TELEPHONE (OUTPATIENT)
Age: 69
End: 2024-02-12

## 2024-02-12 NOTE — TELEPHONE ENCOUNTER
"Returned call to patient and advised him of ADRIANA Mckoy's note based on US kidney and bladder: \"Please let patient know that his renal ultrasound shows good bladder emptying.  His prostate is quite large however he has no significant postvoid residual.  Stable bilateral renal cyst.  Follow-up as scheduled.\"  Patient verbalized understanding.   "

## 2024-02-12 NOTE — TELEPHONE ENCOUNTER
Pt sated he received a call Friday from our office.  Asking if it was to do with his recent ultrasound.  There is no telephone encounter.      Please call pt back at [phone]

## 2024-02-14 DIAGNOSIS — F41.9 ANXIETY: ICD-10-CM

## 2024-02-14 RX ORDER — LORAZEPAM 0.5 MG/1
0.5 TABLET ORAL EVERY 8 HOURS PRN
Qty: 90 TABLET | Refills: 0 | Status: SHIPPED | OUTPATIENT
Start: 2024-02-14

## 2024-02-20 DIAGNOSIS — N40.1 BENIGN PROSTATIC HYPERPLASIA WITH URINARY RETENTION: ICD-10-CM

## 2024-02-20 DIAGNOSIS — R33.8 BENIGN PROSTATIC HYPERPLASIA WITH URINARY RETENTION: ICD-10-CM

## 2024-02-20 RX ORDER — FINASTERIDE 5 MG/1
5 TABLET, FILM COATED ORAL DAILY
Qty: 90 TABLET | Refills: 1 | Status: SHIPPED | OUTPATIENT
Start: 2024-02-20

## 2024-02-27 ENCOUNTER — OFFICE VISIT (OUTPATIENT)
Dept: PULMONOLOGY | Facility: CLINIC | Age: 69
End: 2024-02-27
Payer: COMMERCIAL

## 2024-02-27 VITALS
HEIGHT: 70 IN | OXYGEN SATURATION: 97 % | DIASTOLIC BLOOD PRESSURE: 68 MMHG | TEMPERATURE: 98.2 F | SYSTOLIC BLOOD PRESSURE: 122 MMHG | BODY MASS INDEX: 32.5 KG/M2 | WEIGHT: 227 LBS | HEART RATE: 68 BPM

## 2024-02-27 DIAGNOSIS — I26.99 ACUTE PULMONARY EMBOLISM WITHOUT ACUTE COR PULMONALE, UNSPECIFIED PULMONARY EMBOLISM TYPE (HCC): ICD-10-CM

## 2024-02-27 DIAGNOSIS — F17.210 SMOKING GREATER THAN 20 PACK YEARS: ICD-10-CM

## 2024-02-27 DIAGNOSIS — J44.9 COPD, SEVERITY TO BE DETERMINED (HCC): Primary | ICD-10-CM

## 2024-02-27 PROCEDURE — 99214 OFFICE O/P EST MOD 30 MIN: CPT

## 2024-02-27 NOTE — PROGRESS NOTES
Pulmonary Follow Up Note  Miguelangel Lancaster 68 y.o. male MRN: [id number]  2/27/2024    Assessment:    COPD, severity to be determined (HCC)  -Unknown severity. He continues to adamantly decline PFT testing. Suspect moderate-severe given his ongoing smoking  -Symptoms have improved since inhalers switched from Spiriva to Trelegy in September by his PCP  -No recent exacerbations. Not using PRN albtuerol    Plan:  -Declined PFTs/Spirometry  -Continue Trelegy 1 puff daily and albuterol every 6 hours PRN  -Encouraged smoking cessation to prevent progression of his lung disease  -Follow up in 6 months or sooner if needed    Smoking greater than 20 pack years  -Ongoing tobacco abuse with approximately 54-pack-year history  -Smoking 1 ppd.  He is not interested or motivated in quitting at this time, under a lot of stress  -Due for CT lung cancer screening in July. He is interested in continuing with screening. Risks vs benefits of LDCT screening was discussed with patient including risks vs benefits where risks including false positive test, radiation exposure, risk of overdiagnosis and benefits included early detection in potentially malignant lesions therefore improving the rate of intervention and therefore improving overall survival/mortality.    -Orders placed for LDCT, will follow up with the patient with those results.     Pulmonary embolism (HCC)  -Unprovoked DVT and PE 6/2023.   -On Xarelto indefinitely due to comorbidities   -Follows with Hematology, next appointment in March    Plan:    Diagnoses and all orders for this visit:    COPD, severity to be determined (HCC)    Smoking greater than 20 pack years  -     Cancel: CT lung screening program; Future  -     CT lung screening program; Future    Acute pulmonary embolism without acute cor pulmonale, unspecified pulmonary embolism type (HCC)        Return in about 6 months (around 8/27/2024).      History of Present Illness     Chief Complaint:   Chief Complaint    Patient presents with    Follow-up     Patient feels real tired       Patient ID: Miguelangel is a 68 y.o. y.o. male has a past medical history of Acute right MCA stroke ([facility]) (09/15/2018), Arthritis, CAD (coronary artery disease), COPD (chronic obstructive pulmonary disease) ([facility]), GERD (gastroesophageal reflux disease), Grand mal status ([facility]) (03/24/2019), H/O blood clots, Heart attack ([facility]), Heart failure (HCC), Hyperlipidemia, Hypertension, Lexiscan nuclear stress test (03/19/2016), Myocardial infarction ([facility]), Shortness of breath, Stroke ([facility]), and TIA (transient ischemic attack) (09/13/2018).      2/27/2024  HPI: Miguelangel Lancaster is a 68 y.o. male who presents to the office today for a follow up visit. He has a past medical history of tobacco abuse, COPD, TIA, hyperlipidemia, GERD, CAD s/p CABG 2000, PAD, arthritis, and hypertension.  He also had a recent acute pulmonary embolism and left lower extremity DVT in July 2023. Patient was previously seen in the office in August 2023 as a new patient consult for pulmonary embolism.  He has been maintained on Xarelto, follows with Hematology who recommends lifelong therapy due to unprovoked nature and other comorbidities.  At his last office visit, he was started in Spiriva. This has since been switched to Trelegy in September by his PCP.     Patient denies any exacerbations of his COPD since his last office visit.  He is compliant with his Trelegy, however has difficulty with his short-term memory and sometimes forgets if he takes it or not. He is not currently using albuterol inhaler, didn't know that he could use both Trelegy and albuterol. He has a system now where he marks it down after he takes.  He does still have shortness of breath with activity, cough productive of brown sputum, some wheezing.  Denies any chest pain/chest tightness or lower extremity swelling. He continues to smoke 1 ppd. He is not interested in quitting at this time citing he is under a lot of  stress.         Review of Systems   Constitutional:  Negative for activity change, chills, fever and unexpected weight change.   HENT:  Negative for congestion, postnasal drip, rhinorrhea, sore throat and trouble swallowing.    Respiratory:  Positive for cough, shortness of breath and wheezing. Negative for chest tightness.    Cardiovascular:  Negative for chest pain, palpitations and leg swelling.   Allergic/Immunologic: Negative.        Historical Information   Past Medical History:   Diagnosis Date    Acute right MCA stroke ([facility]) 09/15/2018    Arthritis     CAD (coronary artery disease)     s/p CABG 2000    COPD (chronic obstructive pulmonary disease) ([facility])     GERD (gastroesophageal reflux disease)     Grand mal status ([facility]) 03/24/2019    H/O blood clots     Heart attack ([facility])     Heart failure ([facility])     Hyperlipidemia     Hypertension     Lexiscan nuclear stress test 03/19/2016    EF 74% Normal    Myocardial infarction ([facility])     Shortness of breath     Stroke ([facility])     TIA (transient ischemic attack) 09/13/2018     Past Surgical History:   Procedure Laterality Date    CARDIAC CATHETERIZATION  08/19/2015    LIMA occluded. No severe native lesions    CARDIAC CATHETERIZATION N/A 12/03/2021    Procedure: Cardiac Coronary Angiogram;  Surgeon: Fredis Rogel MD;  Location: [facility];  Service: Cardiology    CARDIAC CATHETERIZATION  12/03/2021    Procedure: Cardiac catheterization;  Surgeon: Fredis Rogel MD;  Location: [facility];  Service: Cardiology    COLONOSCOPY      CORONARY ARTERY BYPASS GRAFT  2000    CYSTOSCOPY  10/31/2022    [facility]    HERNIA REPAIR      umbilical hernia x2    TONSILLECTOMY       Family History   Problem Relation Age of Onset    Heart disease Mother     Cancer Father     Aneurysm Father     Cancer Maternal Grandmother     Cancer Paternal Grandfather        Smoking history: He reports that he has been smoking cigarettes. He started smoking about 54 years ago. He has  a 54.2 pack-year smoking history. He has never been exposed to tobacco smoke. He has never used smokeless tobacco.    Occupational History:     Immunization History   Administered Date(s) Administered    COVID-19 MODERNA VACC 0.5 ML IM 04/07/2021, 05/07/2021, 12/10/2021    COVID-19 Moderna Vac BIVALENT 12 Yr+ IM 0.5 ML 05/30/2023    COVID-19 Moderna mRNA Vaccine 12 Yr+ 50 mcg/0.5 mL (Spikevax) 10/18/2023    INFLUENZA 11/19/2010, 11/18/2011, 09/16/2014, 09/20/2015, 11/06/2016, 02/14/2018, 11/07/2018, 10/19/2022    Influenza Quadrivalent 3 years and older 11/07/2018    Influenza, high dose seasonal 0.7 mL 10/19/2022    Influenza, injectable, quadrivalent, preservative free 0.5 mL 11/07/2018, 09/11/2019    Influenza, seasonal, injectable 11/19/2010, 11/18/2011    Pneumococcal Conjugate 13-Valent 11/14/2015    Pneumococcal Polysaccharide PPV23 01/01/2010, 02/14/2018    Tdap 01/11/2011    Tuberculin Skin Test-PPD Intradermal 01/31/2011    Zoster 12/03/2015    influenza, injectable, quadrivalent 11/07/2018       Meds/Allergies     Current Outpatient Medications:     albuterol (2.5 mg/3 mL) 0.083 % nebulizer solution, inhale 3 milliliters by mouth three times a day if needed, Disp: 75 mL, Rfl: 1    albuterol (Ventolin HFA) 90 mcg/act inhaler, Inhale 2 puffs every 6 (six) hours as needed for shortness of breath, Disp: 54 g, Rfl: 1    aspirin (ECOTRIN LOW STRENGTH) 81 mg EC tablet, Take 1 tablet (81 mg total) by mouth daily, Disp: 30 tablet, Rfl: 0    azelastine (ASTELIN) 0.1 % nasal spray, 1 spray into each nostril 2 (two) times a day Use in each nostril as directed, Disp: 1 mL, Rfl: 2    diltiazem (CARDIZEM CD) 180 mg 24 hr capsule, Take 1 capsule (180 mg total) by mouth daily, Disp: 90 capsule, Rfl: 3    finasteride (PROSCAR) 5 mg tablet, take 1 tablet by mouth once daily, Disp: 90 tablet, Rfl: 1    fluticasone (FLONASE) 50 mcg/act nasal spray, 1 spray into each nostril daily, Disp: 9.9 mL, Rfl: 0     fluticasone-umeclidinium-vilanterol 200-62.5-25 mcg/actuation AEPB inhaler, Inhale 1 puff daily Rinse mouth after use., Disp: 1 each, Rfl: 5    folic acid (FOLVITE) 1 mg tablet, Take 1 tablet (1 mg total) by mouth daily, Disp: 30 tablet, Rfl: 5    furosemide (LASIX) 40 mg tablet, take 1 tablet by mouth once daily, Disp: 90 tablet, Rfl: 3    isosorbide mononitrate (IMDUR) 30 mg 24 hr tablet, Take 1 tablet (30 mg total) by mouth daily, Disp: 90 tablet, Rfl: 3    Linzess 290 MCG CAPS, Take 1 capsule by mouth AT LEAST 30 MINUTES BEFORE FIRST MEAL OF THE DAY, Disp: 30 capsule, Rfl: 2    LORazepam (Ativan) 0.5 mg tablet, Take 1 tablet (0.5 mg total) by mouth every 8 (eight) hours as needed for anxiety, Disp: 90 tablet, Rfl: 0    pantoprazole (PROTONIX) 40 mg tablet, take 1 tablet by mouth once daily, Disp: 90 tablet, Rfl: 0    Potassium Chloride ER 20 MEQ TBCR, Take 1 tablet (20 mEq total) by mouth daily, Disp: 30 tablet, Rfl: 11    tamsulosin (FLOMAX) 0.4 mg, take 1 capsule by mouth twice a day, Disp: 180 capsule, Rfl: 5    vitamin B-12 (VITAMIN B-12) 1,000 mcg tablet, Take 1 tablet (1,000 mcg total) by mouth daily, Disp: 30 tablet, Rfl: 5    Xarelto 20 MG tablet, take 1 tablet by mouth once daily, Disp: 30 tablet, Rfl: 2    docusate sodium (COLACE) 100 mg capsule, Take 1 capsule (100 mg total) by mouth daily Do not start before Sujey 10, 2023. (Patient not taking: Reported on 1/15/2024), Disp: 30 capsule, Rfl: 0    DULoxetine (CYMBALTA) 60 mg delayed release capsule, Take 1 capsule by mouth daily (Patient not taking: Reported on 1/15/2024), Disp: , Rfl:     metoprolol succinate (TOPROL-XL) 25 mg 24 hr tablet, take 1 tablet by mouth twice a day, Disp: 180 tablet, Rfl: 3    polyethylene glycol (MIRALAX) 17 g packet, Take 17 g by mouth daily as needed (constipation) (Patient not taking: Reported on 1/15/2024), Disp: 10 each, Rfl: 0  Allergies:   Allergies   Allergen Reactions    Bupropion Other (See Comments)    Gabapentin  "Headache         Vitals:  Vitals:    02/27/24 1020   BP: 122/68   BP Location: Left arm   Patient Position: Sitting   Cuff Size: Adult   Pulse: 68   Temp: 98.2 °F (36.8 °C)   TempSrc: Temporal   SpO2: 97%   Weight: 103 kg (227 lb)   Height: 5' 10\" (1.778 m)   Oxygen Therapy  SpO2: 97 %  .  Wt Readings from Last 3 Encounters:   02/27/24 103 kg (227 lb)   02/05/24 103 kg (227 lb)   01/15/24 102 kg (225 lb 6.4 oz)     Body mass index is 32.57 kg/m².    Physical Exam  Vitals and nursing note reviewed.   Constitutional:       General: He is not in acute distress.     Appearance: Normal appearance. He is well-developed.   Cardiovascular:      Rate and Rhythm: Normal rate and regular rhythm.      Heart sounds: Normal heart sounds. No murmur heard.  Pulmonary:      Effort: Pulmonary effort is normal. No respiratory distress.      Breath sounds: Normal breath sounds. No decreased breath sounds, wheezing, rhonchi or rales.   Musculoskeletal:         General: No swelling.      Right lower leg: No edema.      Left lower leg: No edema.   Psychiatric:         Mood and Affect: Mood and affect normal.         Behavior: Behavior normal. Behavior is cooperative.           Labs: I have personally reviewed pertinent lab results.  Lab Results   Component Value Date    WBC 5.70 11/30/2023    HGB 14.3 11/30/2023    HCT 40.9 11/30/2023    MCV 87 11/30/2023     11/30/2023     Lab Results   Component Value Date    CALCIUM 8.6 11/30/2023     06/22/2018    K 3.9 11/30/2023    CO2 26 11/30/2023    CL 96 11/30/2023    BUN 12 11/30/2023    CREATININE 1.21 11/30/2023     No results found for: \"IGE\"  Lab Results   Component Value Date    ALT 7 09/20/2023    AST 9 (L) 09/20/2023    ALKPHOS 86 09/20/2023    BILITOT 0.4 06/22/2018       Imaging and other studies: I have personally reviewed pertinent reports and I have personally reviewed pertinent films in PACS     Chest x-ray 11/30/2023  No acute cardiopulmonary disease    CTA chest PE " study 7/3/2023  Right-sided pulmonary emboli.  Measured RV/LV ratio was within normal limits at less than 0.9.  Bronchial wall thickening may represent bronchitis.  Approximate 5 mm right upper lobe nodule is unchanged since 2017.    Echo 7/5/2023  Left ventricular cavity size is normal.  Wall thickness is normal.  Left ventricular ejection fraction is 55%.  Systolic function is normal.  Wall motion is normal.  Right ventricular cavity size is mildly dilated.  Systolic function is normal.  Normal tricuspid annular plane systolic excursion (TAPSE) > 1.7 cm.  The right ventricular systolic pressure is normal.  Mild mitral valve regurgitation.    Pulmonary function testing: none prior for review

## 2024-02-27 NOTE — ASSESSMENT & PLAN NOTE
-Unknown severity. He continues to adamantly decline PFT testing. Suspect moderate-severe given his ongoing smoking  -Symptoms have improved since inhalers switched from Spiriva to Trelegy in September by his PCP  -No recent exacerbations. Not using PRN albtuerol    Plan:  -Declined PFTs/Spirometry  -Continue Trelegy 1 puff daily and albuterol every 6 hours PRN  -Encouraged smoking cessation to prevent progression of his lung disease  -Follow up in 6 months or sooner if needed

## 2024-02-27 NOTE — ASSESSMENT & PLAN NOTE
-Unprovoked DVT and PE 6/2023.   -On Xarelto indefinitely due to comorbidities   -Follows with Hematology, next appointment in March

## 2024-02-27 NOTE — ASSESSMENT & PLAN NOTE
-Ongoing tobacco abuse with approximately 54-pack-year history  -Smoking 1 ppd.  He is not interested or motivated in quitting at this time, under a lot of stress  -Due for CT lung cancer screening in July. He is interested in continuing with screening. Risks vs benefits of LDCT screening was discussed with patient including risks vs benefits where risks including false positive test, radiation exposure, risk of overdiagnosis and benefits included early detection in potentially malignant lesions therefore improving the rate of intervention and therefore improving overall survival/mortality.    -Orders placed for LDCT, will follow up with the patient with those results.

## 2024-03-05 ENCOUNTER — APPOINTMENT (OUTPATIENT)
Dept: RADIOLOGY | Facility: CLINIC | Age: 69
End: 2024-03-05
Payer: COMMERCIAL

## 2024-03-05 ENCOUNTER — OFFICE VISIT (OUTPATIENT)
Dept: PODIATRY | Facility: CLINIC | Age: 69
End: 2024-03-05
Payer: COMMERCIAL

## 2024-03-05 VITALS
SYSTOLIC BLOOD PRESSURE: 122 MMHG | WEIGHT: 227 LBS | DIASTOLIC BLOOD PRESSURE: 90 MMHG | HEART RATE: 73 BPM | HEIGHT: 70 IN | BODY MASS INDEX: 32.5 KG/M2

## 2024-03-05 DIAGNOSIS — M79.672 PAIN IN BOTH FEET: ICD-10-CM

## 2024-03-05 DIAGNOSIS — M21.961 DEFORMITY OF BOTH FEET: ICD-10-CM

## 2024-03-05 DIAGNOSIS — M79.671 PAIN IN BOTH FEET: ICD-10-CM

## 2024-03-05 DIAGNOSIS — B35.1 ONYCHOMYCOSIS: ICD-10-CM

## 2024-03-05 DIAGNOSIS — M21.962 DEFORMITY OF BOTH FEET: ICD-10-CM

## 2024-03-05 DIAGNOSIS — M19.079 ARTHRITIS, MIDFOOT: ICD-10-CM

## 2024-03-05 DIAGNOSIS — M79.671 PAIN IN BOTH FEET: Primary | ICD-10-CM

## 2024-03-05 DIAGNOSIS — I25.10 CORONARY ARTERIOSCLEROSIS IN NATIVE ARTERY: ICD-10-CM

## 2024-03-05 DIAGNOSIS — M79.672 PAIN IN BOTH FEET: Primary | ICD-10-CM

## 2024-03-05 DIAGNOSIS — I87.2 VENOUS INSUFFICIENCY OF BOTH LOWER EXTREMITIES: ICD-10-CM

## 2024-03-05 DIAGNOSIS — M20.41 HAMMERTOE OF SECOND TOE OF RIGHT FOOT: ICD-10-CM

## 2024-03-05 DIAGNOSIS — L85.3 XEROSIS OF SKIN: ICD-10-CM

## 2024-03-05 PROCEDURE — 11721 DEBRIDE NAIL 6 OR MORE: CPT | Performed by: STUDENT IN AN ORGANIZED HEALTH CARE EDUCATION/TRAINING PROGRAM

## 2024-03-05 PROCEDURE — 99204 OFFICE O/P NEW MOD 45 MIN: CPT | Performed by: STUDENT IN AN ORGANIZED HEALTH CARE EDUCATION/TRAINING PROGRAM

## 2024-03-05 PROCEDURE — 73630 X-RAY EXAM OF FOOT: CPT

## 2024-03-05 RX ORDER — AMMONIUM LACTATE 12 G/100G
CREAM TOPICAL AS NEEDED
Qty: 385 G | Refills: 2 | Status: SHIPPED | OUTPATIENT
Start: 2024-03-05

## 2024-03-05 RX ORDER — ISOSORBIDE MONONITRATE 30 MG/1
30 TABLET, EXTENDED RELEASE ORAL DAILY
Qty: 90 TABLET | Refills: 3 | Status: SHIPPED | OUTPATIENT
Start: 2024-03-05

## 2024-03-05 NOTE — PATIENT INSTRUCTIONS
We have sent your information to:  [facility] in [place] [address] [facility] [place] [address]  [facility]   Ph: [phone]    Please allow up to 2 weeks for your information to be processed and they will contact you directly. If you do not receive a call from them within this timeframe please call them directly.

## 2024-03-05 NOTE — PROGRESS NOTES
Assessment/Plan:    No problem-specific Assessment & Plan notes found for this encounter.       Diagnoses and all orders for this visit:    Pain in both feet  -     X-ray foot left 3+ views; Future  -     X-ray foot right 3+ views; Future  -     Custom shoe DME  -     Diclofenac Sodium (VOLTAREN) 1 %; Apply 2 g topically 4 (four) times a day    Hammertoe of second toe of right foot  -     Custom shoe DME    Venous insufficiency of both lower extremities  -     Compression Stocking  -     ammonium lactate (LAC-HYDRIN) 12 % cream; Apply topically as needed for dry skin    Arthritis, midfoot  -     Custom shoe DME  -     Diclofenac Sodium (VOLTAREN) 1 %; Apply 2 g topically 4 (four) times a day    Onychomycosis    Deformity of both feet  -     Custom shoe DME    Xerosis of skin  -     ammonium lactate (LAC-HYDRIN) 12 % cream; Apply topically as needed for dry skin      Plan:       1. A thorough neurovascular exam was performed. Patient presents for at-risk foot care.  Patient has no acute concerns today.  Patient has significant lower extremity risk due to diminished PT pulses in the feet and trophic skin changes to the lower extremity including thick toenail, atrophic skin, and decreased hair growth.      2. All 10 toenails debrided Using nail nipper, jorden, and curette, reduced in thickness and length. Devitalized nail tissue and fungal debris excised and removed. Patient tolerated well.       3. Patient was educated on importance of daily foot assessment and proper shoe gear. Educational materials were provided. Patient has Q8  findings and is recommended for at risk foot care every 10-12 weeks.    4.  Bilateral foot x-rays reviewed, I presented with x-ray finding with patient which is consistent with mild midfoot osteoarthritis.  I recommended supportive shoe gear and Voltaren gel twice a day.  Patient requested custom shoe prescription, script in place.    5.  Significant xerosis noted to bilateral feet for which I  recommended AmLactin lotion prescription in place.  We also discussed wearing over-the-counter or prescription compression stockings 15 to 20 mmHg pressure.  Discussed importance of leg elevation and compliance with lasix.     6. Recent PCP notes reviewed and blood work reviewed    7. Return in 3 months    Lab Results   Component Value Date    HGBA1C 6.0 (H) 09/28/2023         Subjective:      Patient ID: Miguelangel Lancaster is a 69 y.o. male.    HPI:  Miguelangel Lancaster is a 69 y.o. male who presents with painful, elongated toenails and bilateral foot pain with duration of years.  They have difficulty applying their socks and shoes due to the elongation of the nails. The pressure within their shoe gear is painful and they have been unable to cut their nails adequately. Patient states pain is 1/10 in shoe gear.  Patient reports bilateral midfoot pain is usually with ambulation and walking.  He walks for groceries and prescription from his home.  Patient denies any trauma.  He also has swelling to bilateral lower legs.  Patient sees cardiologist and is on Lasix.  No other complaints.          The following portions of the patient's history were reviewed and updated as appropriate: He  has a past medical history of Acute right MCA stroke ([facility]) (09/15/2018), Arthritis, CAD (coronary artery disease), COPD (chronic obstructive pulmonary disease) ([facility]), GERD (gastroesophageal reflux disease), Grand mal status ([facility]) (03/24/2019), H/O blood clots, Heart attack ([facility]), Heart failure ([facility]), Hyperlipidemia, Hypertension, Lexiscan nuclear stress test (03/19/2016), Myocardial infarction ([facility]), Shortness of breath, Stroke ([facility]), and TIA (transient ischemic attack) (09/13/2018).  He   Patient Active Problem List    Diagnosis Date Noted    Venous insufficiency of both lower extremities 03/05/2024    Deformity of both feet 03/05/2024    Arthritis, midfoot 03/05/2024    Pain in both feet 03/05/2024    Hammertoe of second toe of right foot  03/05/2024    Smoking greater than 20 pack years 02/27/2024    Pain in left wrist 02/05/2024    Iron deficiency anemia due to chronic blood loss 10/12/2023    Vitamin B12 deficiency 10/12/2023    Bronchitis 07/05/2023    Pulmonary embolism ([facility]) 07/04/2023    Hernia of abdominal wall 07/03/2023    Arthralgia of left lower leg 06/08/2023    Ambulatory dysfunction 06/08/2023    PAD (peripheral artery disease) ([facility]) 05/02/2023    Suspicious nevus 04/19/2023    Left hip pain 10/20/2022    BPH (benign prostatic hyperplasia) 08/08/2022    Urinary retention 08/08/2022    Hypertensive heart and renal disease with CHF ([facility]) 06/30/2022    Depression, recurrent ([facility]) 06/30/2022    Superior mesenteric artery stenosis ([facility]) 03/01/2022    Occlusion of right internal iliac artery ([facility]) 03/01/2022    Peripheral neuropathy 03/01/2022    Aortic aneurysm ([facility]) 07/02/2020    Lower extremity edema 06/11/2019    Diverticular disease 03/24/2019    Chronic constipation 03/24/2019    ED (erectile dysfunction) of organic origin 03/24/2019    Gastroesophageal reflux disease 03/24/2019    Grand mal status ([facility]) 03/24/2019    Insomnia 03/24/2019    Tobacco abuse 03/24/2019    Overweight 03/24/2019    Seasonal allergies 03/24/2019    Anxiety 09/17/2018    Hypertension 09/14/2018    Hyperlipidemia 09/14/2018    COPD, severity to be determined ([facility]) 09/14/2018    Coronary artery disease involving native coronary artery of native heart with angina pectoris ([facility]) 09/14/2018    Benign neoplasm of colon 11/19/2010     He  has a past surgical history that includes Coronary artery bypass graft (2000); Cardiac catheterization (08/19/2015); Colonoscopy; Tonsillectomy; Hernia repair; Cardiac catheterization (N/A, 12/03/2021); Cardiac catheterization (12/03/2021); and Cystoscopy (10/31/2022).  Current Outpatient Medications   Medication Sig Dispense Refill    albuterol (2.5 mg/3 mL) 0.083 % nebulizer solution inhale 3 milliliters by mouth three times a day  if needed 75 mL 1    albuterol (Ventolin HFA) 90 mcg/act inhaler Inhale 2 puffs every 6 (six) hours as needed for shortness of breath 54 g 1    ammonium lactate (LAC-HYDRIN) 12 % cream Apply topically as needed for dry skin 385 g 2    aspirin (ECOTRIN LOW STRENGTH) 81 mg EC tablet Take 1 tablet (81 mg total) by mouth daily 30 tablet 0    azelastine (ASTELIN) 0.1 % nasal spray 1 spray into each nostril 2 (two) times a day Use in each nostril as directed 1 mL 2    Diclofenac Sodium (VOLTAREN) 1 % Apply 2 g topically 4 (four) times a day 100 g 2    diltiazem (CARDIZEM CD) 180 mg 24 hr capsule Take 1 capsule (180 mg total) by mouth daily 90 capsule 3    finasteride (PROSCAR) 5 mg tablet take 1 tablet by mouth once daily 90 tablet 1    fluticasone (FLONASE) 50 mcg/act nasal spray 1 spray into each nostril daily 9.9 mL 0    fluticasone-umeclidinium-vilanterol 200-62.5-25 mcg/actuation AEPB inhaler Inhale 1 puff daily Rinse mouth after use. 1 each 5    folic acid (FOLVITE) 1 mg tablet Take 1 tablet (1 mg total) by mouth daily 30 tablet 5    furosemide (LASIX) 40 mg tablet take 1 tablet by mouth once daily 90 tablet 3    Linzess 290 MCG CAPS Take 1 capsule by mouth AT LEAST 30 MINUTES BEFORE FIRST MEAL OF THE DAY 30 capsule 2    LORazepam (Ativan) 0.5 mg tablet Take 1 tablet (0.5 mg total) by mouth every 8 (eight) hours as needed for anxiety 90 tablet 0    metoprolol succinate (TOPROL-XL) 25 mg 24 hr tablet take 1 tablet by mouth twice a day 180 tablet 3    pantoprazole (PROTONIX) 40 mg tablet take 1 tablet by mouth once daily 90 tablet 0    Potassium Chloride ER 20 MEQ TBCR Take 1 tablet (20 mEq total) by mouth daily 30 tablet 11    tamsulosin (FLOMAX) 0.4 mg take 1 capsule by mouth twice a day 180 capsule 5    vitamin B-12 (VITAMIN B-12) 1,000 mcg tablet Take 1 tablet (1,000 mcg total) by mouth daily 30 tablet 5    Xarelto 20 MG tablet take 1 tablet by mouth once daily 30 tablet 2    docusate sodium (COLACE) 100 mg  "capsule Take 1 capsule (100 mg total) by mouth daily Do not start before Sujey 10, 2023. (Patient not taking: Reported on 1/15/2024) 30 capsule 0    DULoxetine (CYMBALTA) 60 mg delayed release capsule Take 1 capsule by mouth daily (Patient not taking: Reported on 1/15/2024)      isosorbide mononitrate (IMDUR) 30 mg 24 hr tablet Take 1 tablet (30 mg total) by mouth daily 90 tablet 3    polyethylene glycol (MIRALAX) 17 g packet Take 17 g by mouth daily as needed (constipation) (Patient not taking: Reported on 1/15/2024) 10 each 0     No current facility-administered medications for this visit.   .    Review of Systems   All other systems reviewed and are negative.        Objective:      /90 (BP Location: Right arm, Patient Position: Sitting, Cuff Size: Large)   Pulse 73   Ht 5' 10\" (1.778 m)   Wt 103 kg (227 lb)   BMI 32.57 kg/m²          Physical Exam  Vitals reviewed.   Cardiovascular:      Pulses:           Dorsalis pedis pulses are 0 on the right side and 0 on the left side.        Posterior tibial pulses are 0 on the right side and 0 on the left side.   Musculoskeletal:      Right lower le+ Edema present.      Left lower le+ Edema present.      Right foot: Tenderness present.      Left foot: Tenderness present.      Comments: Tenderness to touch noted bilateral dorsal plantar feet midfoot.    Bilateral lower extremity significant varicose veins noted.   Feet:      Right foot:      Protective Sensation: 10 sites tested.  9 sites sensed.      Skin integrity: Dry skin present.      Toenail Condition: Right toenails are abnormally thick and long. Fungal disease present.     Left foot:      Protective Sensation: 10 sites tested.  10 sites sensed.      Skin integrity: Dry skin present.      Toenail Condition: Left toenails are abnormally thick and long. Fungal disease present.     Comments: On exam patient has thickened, hypertrophic, discolored, brittle toenails with subungual debris and tenderness " x10.   Patient has diminished pedal pulses and decreased perfusion to the lower extremities. Patient has significant trophic changes to the skin including thick toenails, decreased pedal hair and atrophic skin.

## 2024-03-09 DIAGNOSIS — I25.119 CORONARY ARTERY DISEASE INVOLVING NATIVE CORONARY ARTERY OF NATIVE HEART WITH ANGINA PECTORIS (HCC): ICD-10-CM

## 2024-03-11 RX ORDER — POTASSIUM CHLORIDE 1500 MG/1
1 TABLET, EXTENDED RELEASE ORAL DAILY
Qty: 30 TABLET | Refills: 5 | Status: SHIPPED | OUTPATIENT
Start: 2024-03-11

## 2024-03-14 DIAGNOSIS — J44.9 CHRONIC OBSTRUCTIVE PULMONARY DISEASE, UNSPECIFIED COPD TYPE (HCC): ICD-10-CM

## 2024-03-15 RX ORDER — ALBUTEROL SULFATE 90 UG/1
AEROSOL, METERED RESPIRATORY (INHALATION)
Qty: 54 G | Refills: 1 | Status: SHIPPED | OUTPATIENT
Start: 2024-03-15

## 2024-03-19 ENCOUNTER — OFFICE VISIT (OUTPATIENT)
Dept: CARDIOLOGY CLINIC | Facility: CLINIC | Age: 69
End: 2024-03-19
Payer: COMMERCIAL

## 2024-03-19 VITALS
DIASTOLIC BLOOD PRESSURE: 76 MMHG | HEIGHT: 69 IN | SYSTOLIC BLOOD PRESSURE: 124 MMHG | WEIGHT: 225 LBS | BODY MASS INDEX: 33.33 KG/M2 | RESPIRATION RATE: 22 BRPM | HEART RATE: 88 BPM | OXYGEN SATURATION: 97 %

## 2024-03-19 DIAGNOSIS — I10 PRIMARY HYPERTENSION: ICD-10-CM

## 2024-03-19 DIAGNOSIS — I25.119 CORONARY ARTERY DISEASE INVOLVING NATIVE CORONARY ARTERY OF NATIVE HEART WITH ANGINA PECTORIS (HCC): Primary | ICD-10-CM

## 2024-03-19 DIAGNOSIS — E78.2 MIXED HYPERLIPIDEMIA: ICD-10-CM

## 2024-03-19 PROCEDURE — 99214 OFFICE O/P EST MOD 30 MIN: CPT | Performed by: INTERNAL MEDICINE

## 2024-03-19 NOTE — PROGRESS NOTES
" Patient ID: Miguelangel Lancaster is a 69 y.o. male.        Plan:      Coronary artery disease involving native coronary artery of native heart with angina pectoris (HCC)  Rare angina. Hasn't used NTG recently.    Hyperlipidemia  Tolerating statin tx.    Hypertension  Well controlled.     Follow up Plan/Other summary comments:  1 year f/u.  Tobacco cessation encouraged as well as less soda.    HPI: Patient seen in f/u regarding the above issues.  No true change in exertional capacity.  No CP recently.    To reiterate, he had heart bypass surgery in the year 2000. Most recent cath of 12/3/2021 revealed non functional LIMA graft to mid LAD, 100 % RCA. Diffuse LAD disease. Widely patent Cx.      Most recent or relevant cardiac/vascular testing:    Myoview 07/02/2020:  Normal ejection fraction.  Small zone of ischemia in the inferoseptum.  TTE 7/5/2023:Normal RV and LV systolic fxn.      Past Surgical History:   Procedure Laterality Date    CARDIAC CATHETERIZATION  08/19/2015    LIMA occluded. No severe native lesions    CARDIAC CATHETERIZATION N/A 12/03/2021    Procedure: Cardiac Coronary Angiogram;  Surgeon: Fredis Rogel MD;  Location: [facility];  Service: Cardiology    CARDIAC CATHETERIZATION  12/03/2021    Procedure: Cardiac catheterization;  Surgeon: Fredis Rogel MD;  Location: [facility];  Service: Cardiology    COLONOSCOPY      CORONARY ARTERY BYPASS GRAFT  2000    CYSTOSCOPY  10/31/2022    [facility]    HERNIA REPAIR      umbilical hernia x2    TONSILLECTOMY         Lipid Profile: Reviewed      Review of Systems   10  point ROS  was otherwise non pertinent or negative except as per HPI or as below.   Gait:  Normal.        Objective:     /76 (BP Location: Left arm, Patient Position: Sitting, Cuff Size: Large)   Pulse 88   Resp 22   Ht 5' 9\" (1.753 m)   Wt 102 kg (225 lb)   SpO2 97%   BMI 33.23 kg/m²     PHYSICAL EXAM:    General:  Normal appearance in no distress.  Eyes:  " Anicteric.  Oral mucosa:  Moist.  Neck:  No JVD. Carotid upstrokes are brisk without bruits.  No masses.  Chest:  Clear to auscultation.  Well healed midline sternotomy  Cardiac:  No palpable PMI.  Normal S1 and S2.  No murmur gallop or rub.  Abdomen:  Soft and nontender. No palpable organomegaly or aortic enlargement.  Extremities:  No peripheral edema.  Musculoskeletal:  Symmetric.   Vascular:  Femoral pulses are brisk without bruits.  Popliteal pulses are intact bilaterally.   Pedal pulses are intact.  Neuro:  Grossly symmetric.  Psych:  Alert and oriented x3.      Meds reviewed.    Past Medical History:   Diagnosis Date    Acute right MCA stroke ([facility]) 09/15/2018    Arthritis     CAD (coronary artery disease)     s/p CABG 2000    COPD (chronic obstructive pulmonary disease) ([facility])     GERD (gastroesophageal reflux disease)     Grand mal status ([facility]) 03/24/2019    H/O blood clots     Heart attack ([facility])     Heart failure ([facility])     Hyperlipidemia     Hypertension     Lexiscan nuclear stress test 03/19/2016    EF 74% Normal    Myocardial infarction ([facility])     Shortness of breath     Stroke ([facility])     TIA (transient ischemic attack) 09/13/2018           Social History     Tobacco Use   Smoking Status Every Day    Current packs/day: 1.00    Average packs/day: 1 pack/day for 54.2 years (54.2 ttl pk-yrs)    Types: Cigarettes    Start date: 1970    Passive exposure: Never   Smokeless Tobacco Never

## 2024-03-26 ENCOUNTER — APPOINTMENT (OUTPATIENT)
Dept: LAB | Facility: CLINIC | Age: 69
End: 2024-03-26
Payer: COMMERCIAL

## 2024-03-26 ENCOUNTER — OFFICE VISIT (OUTPATIENT)
Dept: HEMATOLOGY ONCOLOGY | Facility: CLINIC | Age: 69
End: 2024-03-26
Payer: COMMERCIAL

## 2024-03-26 VITALS
DIASTOLIC BLOOD PRESSURE: 74 MMHG | HEIGHT: 69 IN | RESPIRATION RATE: 17 BRPM | BODY MASS INDEX: 33.23 KG/M2 | TEMPERATURE: 97.2 F | HEART RATE: 88 BPM | OXYGEN SATURATION: 96 % | SYSTOLIC BLOOD PRESSURE: 126 MMHG

## 2024-03-26 DIAGNOSIS — E53.8 VITAMIN B12 DEFICIENCY: ICD-10-CM

## 2024-03-26 DIAGNOSIS — D50.9 IRON DEFICIENCY ANEMIA, UNSPECIFIED IRON DEFICIENCY ANEMIA TYPE: ICD-10-CM

## 2024-03-26 DIAGNOSIS — E53.8 FOLIC ACID DEFICIENCY: ICD-10-CM

## 2024-03-26 DIAGNOSIS — I26.99 ACUTE PULMONARY EMBOLISM WITHOUT ACUTE COR PULMONALE, UNSPECIFIED PULMONARY EMBOLISM TYPE (HCC): ICD-10-CM

## 2024-03-26 DIAGNOSIS — D50.9 IRON DEFICIENCY ANEMIA, UNSPECIFIED IRON DEFICIENCY ANEMIA TYPE: Primary | ICD-10-CM

## 2024-03-26 LAB
ALBUMIN SERPL BCP-MCNC: 4.1 G/DL (ref 3.5–5)
ALP SERPL-CCNC: 85 U/L (ref 34–104)
ALT SERPL W P-5'-P-CCNC: 9 U/L (ref 7–52)
ANION GAP SERPL CALCULATED.3IONS-SCNC: 5 MMOL/L (ref 4–13)
AST SERPL W P-5'-P-CCNC: 11 U/L (ref 13–39)
BASOPHILS # BLD AUTO: 0.07 THOUSANDS/ÂΜL (ref 0–0.1)
BASOPHILS NFR BLD AUTO: 1 % (ref 0–1)
BILIRUB SERPL-MCNC: 0.68 MG/DL (ref 0.2–1)
BUN SERPL-MCNC: 14 MG/DL (ref 5–25)
CALCIUM SERPL-MCNC: 9.3 MG/DL (ref 8.4–10.2)
CHLORIDE SERPL-SCNC: 101 MMOL/L (ref 96–108)
CO2 SERPL-SCNC: 32 MMOL/L (ref 21–32)
CREAT SERPL-MCNC: 1.14 MG/DL (ref 0.6–1.3)
CRP SERPL QL: 15.9 MG/L
EOSINOPHIL # BLD AUTO: 0.35 THOUSAND/ÂΜL (ref 0–0.61)
EOSINOPHIL NFR BLD AUTO: 4 % (ref 0–6)
ERYTHROCYTE [DISTWIDTH] IN BLOOD BY AUTOMATED COUNT: 12.1 % (ref 11.6–15.1)
ERYTHROCYTE [SEDIMENTATION RATE] IN BLOOD: 14 MM/HOUR (ref 0–19)
FERRITIN SERPL-MCNC: 66 NG/ML (ref 24–336)
FOLATE SERPL-MCNC: 13.8 NG/ML
GFR SERPL CREATININE-BSD FRML MDRD: 65 ML/MIN/1.73SQ M
GLUCOSE P FAST SERPL-MCNC: 99 MG/DL (ref 65–99)
HCT VFR BLD AUTO: 43.3 % (ref 36.5–49.3)
HGB BLD-MCNC: 14.9 G/DL (ref 12–17)
IMM GRANULOCYTES # BLD AUTO: 0.05 THOUSAND/UL (ref 0–0.2)
IMM GRANULOCYTES NFR BLD AUTO: 1 % (ref 0–2)
IRON SATN MFR SERPL: 31 % (ref 15–50)
IRON SERPL-MCNC: 84 UG/DL (ref 50–212)
LYMPHOCYTES # BLD AUTO: 2.9 THOUSANDS/ÂΜL (ref 0.6–4.47)
LYMPHOCYTES NFR BLD AUTO: 29 % (ref 14–44)
MAGNESIUM SERPL-MCNC: 2.1 MG/DL (ref 1.9–2.7)
MCH RBC QN AUTO: 30.6 PG (ref 26.8–34.3)
MCHC RBC AUTO-ENTMCNC: 34.4 G/DL (ref 31.4–37.4)
MCV RBC AUTO: 89 FL (ref 82–98)
MONOCYTES # BLD AUTO: 0.55 THOUSAND/ÂΜL (ref 0.17–1.22)
MONOCYTES NFR BLD AUTO: 6 % (ref 4–12)
NEUTROPHILS # BLD AUTO: 5.97 THOUSANDS/ÂΜL (ref 1.85–7.62)
NEUTS SEG NFR BLD AUTO: 59 % (ref 43–75)
NRBC BLD AUTO-RTO: 0 /100 WBCS
PLATELET # BLD AUTO: 257 THOUSANDS/UL (ref 149–390)
PMV BLD AUTO: 9.3 FL (ref 8.9–12.7)
POTASSIUM SERPL-SCNC: 4.2 MMOL/L (ref 3.5–5.3)
PROT SERPL-MCNC: 6.2 G/DL (ref 6.4–8.4)
RBC # BLD AUTO: 4.87 MILLION/UL (ref 3.88–5.62)
SODIUM SERPL-SCNC: 138 MMOL/L (ref 135–147)
TIBC SERPL-MCNC: 270 UG/DL (ref 250–450)
UIBC SERPL-MCNC: 186 UG/DL (ref 155–355)
VIT B12 SERPL-MCNC: 174 PG/ML (ref 180–914)
WBC # BLD AUTO: 9.89 THOUSAND/UL (ref 4.31–10.16)

## 2024-03-26 PROCEDURE — 82746 ASSAY OF FOLIC ACID SERUM: CPT

## 2024-03-26 PROCEDURE — G2211 COMPLEX E/M VISIT ADD ON: HCPCS | Performed by: INTERNAL MEDICINE

## 2024-03-26 PROCEDURE — 85652 RBC SED RATE AUTOMATED: CPT

## 2024-03-26 PROCEDURE — 85025 COMPLETE CBC W/AUTO DIFF WBC: CPT

## 2024-03-26 PROCEDURE — 82728 ASSAY OF FERRITIN: CPT

## 2024-03-26 PROCEDURE — 99214 OFFICE O/P EST MOD 30 MIN: CPT | Performed by: INTERNAL MEDICINE

## 2024-03-26 PROCEDURE — 80053 COMPREHEN METABOLIC PANEL: CPT

## 2024-03-26 PROCEDURE — 83735 ASSAY OF MAGNESIUM: CPT

## 2024-03-26 PROCEDURE — 82607 VITAMIN B-12: CPT

## 2024-03-26 PROCEDURE — 86140 C-REACTIVE PROTEIN: CPT

## 2024-03-26 PROCEDURE — 83550 IRON BINDING TEST: CPT

## 2024-03-26 PROCEDURE — 83540 ASSAY OF IRON: CPT

## 2024-03-26 PROCEDURE — 36415 COLL VENOUS BLD VENIPUNCTURE: CPT

## 2024-03-26 RX ORDER — LANOLIN ALCOHOL/MO/W.PET/CERES
1000 CREAM (GRAM) TOPICAL DAILY
Qty: 30 TABLET | Refills: 5 | Status: SHIPPED | OUTPATIENT
Start: 2024-03-26

## 2024-03-26 NOTE — PROGRESS NOTES
Hematology/Oncology Outpatient Follow-up  Miguelangel Lancaster 69 y.o. male 1955 [id number]    Date:  3/26/2024    Assessment and Plan:  1. Iron deficiency anemia, unspecified iron deficiency anemia type  The patient did receive 3 doses of Venofer IV around the month of October of last year.  He was told that we will wait for the result of the iron panel to come back to see if he would need iron IV.    He was told that we will give him a call to correct his iron deficiency if his iron panel shows any hint of low ferritin.  - CBC and differential; Future  - Comprehensive metabolic panel; Future  - Ferritin; Future  - C-reactive protein; Future  - Magnesium; Future  - Iron Panel (Includes Ferritin, Iron Sat%, Iron, and TIBC); Future    2. Vitamin B12 deficiency  He was encouraged to continue with vitamin B12 supplements daily.  We will check his vitamin B12 level.  - vitamin B-12 (VITAMIN B-12) 1,000 mcg tablet; Take 1 tablet (1,000 mcg total) by mouth daily  Dispense: 30 tablet; Refill: 5  - Vitamin B12; Future    3. Folic acid deficiency  He stated that he takes folic acid daily.  - Folate; Future    4. Acute pulmonary embolism without acute cor pulmonale, unspecified pulmonary embolism type (HCC)  Limited hypercoagulable work-up was negative for antiphospholipid antibody syndrome. He was found to have heterozygous factor V Leiden. He was told that he will need to be on anticoagulation indefinitely unless there is absolute contraindication.  He continues to be on Xarelto 20 mg daily which he is tolerating without any obvious signs of bleeding.      HPI:    Patient is a 68-year-old male who presents for further evaluation/recommendations regarding his recent acute pulmonary embolism/left lower extremity DVT.  He has a past medical history of tobacco abuse, COPD, TIA, hyperlipidemia, GERD, CAD status post CABG 2000, PAD, arthritis and hypertension.  No prior thrombosis up until recent events.  No family history of  thrombosis.     He states that he presented to the hospital 6/8/2023 with reports of progressing heaviness, pain and edema to the left lower extremity.  VAS lower limb venous duplex study, unilateral/limited (6/8/23)  CONCLUSION:  Impression:  RIGHT LOWER LIMB LIMITED:  Evaluation shows no evidence of thrombus in the common femoral vein, SFJ,  femoral vein or popliteal vein.  Doppler evaluation shows a normal response to augmentation maneuvers.     LEFT LOWER LIMB:  There is acute deep vein thrombosis in the mid posterior tibial, mid peroneal,  popliteal and femoral veins, mostly occlusive to very minimal flow. Common  femoral vein appears patent and compressible.  No evidence of superficial thrombophlebitis noted.  Doppler evaluation shows a normal response to augmentation maneuvers.  Popliteal, posterior tibial and anterior tibial arterial Doppler waveform's are  triphasic.     He was started on Eliquis for anticoagulation and admitted overnight for observation.  No chest imaging was pursued.       He then represented to the hospital 7/3/2023 with reports of worsening shortness of breath, back pain.  CTA ED chest PE study (7/3/23)  IMPRESSION:  1.  Right-sided pulmonary emboli as described above. Measured RV/LV ratio is within normal limits at less than 0.9.  2.  Bronchial wall thickening may represent bronchitis.     PULMONARY ARTERIAL TREE: Pulmonary embolism in the proximal lobar branch of the right middle lobe with segmental extension (series 2, image images 139-143). Probable embolism in the proximal lobar branch of the right upper lobe (series 601, image 74;   series 2, image 115). Eccentric filling defect within a proximal segmental branch in the right lower lobe is likely an additional embolism, though may be nonacute given its eccentric location (series 2, image 141).  LUNGS: Mild emphysema. Mild bronchial wall thickening. No focal consolidation. Respiratory motion limits detection of small pulmonary  nodules. Approximate 5 mm right upper lobe nodule is unchanged from index study of June 26, 2017 (series 3, image 85).     He was then readmitted to the hospital and started on a heparin drip which was later transitioned to alternative DOAC Xarelto at discharge.  He continues to Xarelto 20 mg daily at this time.        Interval history:  Patient came there for follow-up visit.  He denied significant changes of his overall condition.  He continues to complain about multiple symptoms.  He also continues to be on Xarelto 20 mg daily without interruption.  He denied bleeding from any sites.  He did have blood work prior to this office visit which is still pending.  ROS: Review of Systems   Constitutional:  Positive for appetite change and fatigue. Negative for chills and fever.   HENT:  Positive for trouble swallowing. Negative for ear pain and sore throat.    Eyes:  Negative for pain and visual disturbance.   Respiratory:  Positive for cough and shortness of breath.    Cardiovascular:  Negative for chest pain and palpitations.   Gastrointestinal:  Positive for constipation and diarrhea. Negative for abdominal pain and vomiting.   Genitourinary:  Negative for dysuria and hematuria.   Musculoskeletal:  Positive for arthralgias and back pain.   Skin:  Negative for color change and rash.   Neurological:  Positive for dizziness. Negative for seizures and syncope.   Psychiatric/Behavioral:  Positive for sleep disturbance.    All other systems reviewed and are negative.      Past Medical History:   Diagnosis Date    Acute right MCA stroke ([facility]) 09/15/2018    Arthritis     CAD (coronary artery disease)     s/p CABG 2000    COPD (chronic obstructive pulmonary disease) (HCC)     GERD (gastroesophageal reflux disease)     Grand mal status ([facility]) 03/24/2019    H/O blood clots     Heart attack (HCC)     Heart failure (HCC)     Hyperlipidemia     Hypertension     Lexiscan nuclear stress test 03/19/2016    EF 74% Normal    Myocardial  infarction (HCC)     Shortness of breath     Stroke (HCC)     TIA (transient ischemic attack) 09/13/2018       Past Surgical History:   Procedure Laterality Date    CARDIAC CATHETERIZATION  08/19/2015    LIMA occluded. No severe native lesions    CARDIAC CATHETERIZATION N/A 12/03/2021    Procedure: Cardiac Coronary Angiogram;  Surgeon: Fredis Rogel MD;  Location: [facility];  Service: Cardiology    CARDIAC CATHETERIZATION  12/03/2021    Procedure: Cardiac catheterization;  Surgeon: Fredis Rogel MD;  Location: [facility];  Service: Cardiology    COLONOSCOPY      CORONARY ARTERY BYPASS GRAFT  2000    CYSTOSCOPY  10/31/2022    [facility]    HERNIA REPAIR      umbilical hernia x2    TONSILLECTOMY         Social History     Socioeconomic History    Marital status: Single     Spouse name: None    Number of children: None    Years of education: None    Highest education level: None   Occupational History    None   Tobacco Use    Smoking status: Every Day     Current packs/day: 1.00     Average packs/day: 1 pack/day for 54.2 years (54.2 ttl pk-yrs)     Types: Cigarettes     Start date: 1970     Passive exposure: Never    Smokeless tobacco: Never   Vaping Use    Vaping status: Never Used   Substance and Sexual Activity    Alcohol use: Not Currently     Comment: Socially    Drug use: Never    Sexual activity: Not Currently   Other Topics Concern    None   Social History Narrative    None     Social Determinants of Health     Financial Resource Strain: Low Risk  (6/7/2023)    Overall Financial Resource Strain (CARDIA)     Difficulty of Paying Living Expenses: Not very hard   Food Insecurity: No Food Insecurity (7/5/2023)    Hunger Vital Sign     Worried About Running Out of Food in the Last Year: Never true     Ran Out of Food in the Last Year: Never true   Transportation Needs: No Transportation Needs (7/5/2023)    PRAPARE - Transportation     Lack of Transportation (Medical): No     Lack of  Transportation (Non-Medical): No   Physical Activity: Not on file   Stress: Not on file   Social Connections: Not on file   Intimate Partner Violence: Not on file   Housing Stability: Low Risk  (7/5/2023)    Housing Stability Vital Sign     Unable to Pay for Housing in the Last Year: No     Number of Places Lived in the Last Year: 1     Unstable Housing in the Last Year: No       Family History   Problem Relation Age of Onset    Heart disease Mother     Cancer Father     Aneurysm Father     Cancer Maternal Grandmother     Cancer Paternal Grandfather        Allergies   Allergen Reactions    Bupropion Other (See Comments)    Gabapentin Headache         Current Outpatient Medications:     albuterol (2.5 mg/3 mL) 0.083 % nebulizer solution, inhale 3 milliliters by mouth three times a day if needed, Disp: 75 mL, Rfl: 1    albuterol (PROVENTIL HFA,VENTOLIN HFA) 90 mcg/act inhaler, inhale 2 puffs every 6 hours if needed for shortness of breath, Disp: 54 g, Rfl: 1    ammonium lactate (LAC-HYDRIN) 12 % cream, Apply topically as needed for dry skin, Disp: 385 g, Rfl: 2    aspirin (ECOTRIN LOW STRENGTH) 81 mg EC tablet, Take 1 tablet (81 mg total) by mouth daily, Disp: 30 tablet, Rfl: 0    azelastine (ASTELIN) 0.1 % nasal spray, 1 spray into each nostril 2 (two) times a day Use in each nostril as directed, Disp: 1 mL, Rfl: 2    Diclofenac Sodium (VOLTAREN) 1 %, Apply 2 g topically 4 (four) times a day, Disp: 100 g, Rfl: 2    diltiazem (CARDIZEM CD) 180 mg 24 hr capsule, Take 1 capsule (180 mg total) by mouth daily, Disp: 90 capsule, Rfl: 3    finasteride (PROSCAR) 5 mg tablet, take 1 tablet by mouth once daily, Disp: 90 tablet, Rfl: 1    fluticasone (FLONASE) 50 mcg/act nasal spray, 1 spray into each nostril daily, Disp: 9.9 mL, Rfl: 0    fluticasone-umeclidinium-vilanterol 200-62.5-25 mcg/actuation AEPB inhaler, Inhale 1 puff daily Rinse mouth after use., Disp: 1 each, Rfl: 5    folic acid (FOLVITE) 1 mg tablet, Take 1 tablet  "(1 mg total) by mouth daily, Disp: 30 tablet, Rfl: 5    furosemide (LASIX) 40 mg tablet, take 1 tablet by mouth once daily, Disp: 90 tablet, Rfl: 3    isosorbide mononitrate (IMDUR) 30 mg 24 hr tablet, Take 1 tablet (30 mg total) by mouth daily, Disp: 90 tablet, Rfl: 3    Linzess 290 MCG CAPS, Take 1 capsule by mouth AT LEAST 30 MINUTES BEFORE FIRST MEAL OF THE DAY, Disp: 30 capsule, Rfl: 2    LORazepam (Ativan) 0.5 mg tablet, Take 1 tablet (0.5 mg total) by mouth every 8 (eight) hours as needed for anxiety, Disp: 90 tablet, Rfl: 0    metoprolol succinate (TOPROL-XL) 25 mg 24 hr tablet, take 1 tablet by mouth twice a day, Disp: 180 tablet, Rfl: 3    pantoprazole (PROTONIX) 40 mg tablet, take 1 tablet by mouth once daily, Disp: 90 tablet, Rfl: 0    polyethylene glycol (MIRALAX) 17 g packet, Take 17 g by mouth daily as needed (constipation), Disp: 10 each, Rfl: 0    Potassium Chloride ER 20 MEQ TBCR, take 1 tablet by mouth once daily, Disp: 30 tablet, Rfl: 5    tamsulosin (FLOMAX) 0.4 mg, take 1 capsule by mouth twice a day, Disp: 180 capsule, Rfl: 5    vitamin B-12 (VITAMIN B-12) 1,000 mcg tablet, Take 1 tablet (1,000 mcg total) by mouth daily, Disp: 30 tablet, Rfl: 5    Xarelto 20 MG tablet, take 1 tablet by mouth once daily, Disp: 30 tablet, Rfl: 2    docusate sodium (COLACE) 100 mg capsule, Take 1 capsule (100 mg total) by mouth daily Do not start before Sujey 10, 2023. (Patient not taking: Reported on 1/15/2024), Disp: 30 capsule, Rfl: 0    DULoxetine (CYMBALTA) 60 mg delayed release capsule, Take 1 capsule by mouth daily (Patient not taking: Reported on 1/15/2024), Disp: , Rfl:       Physical Exam:  /74 (BP Location: Right arm, Patient Position: Sitting, Cuff Size: Adult)   Pulse 88   Temp (!) 97.2 °F (36.2 °C)   Resp 17   Ht 5' 9\" (1.753 m)   SpO2 96%   BMI 33.23 kg/m²     Physical Exam  Constitutional:       Appearance: He is well-developed. He is obese.   HENT:      Head: Normocephalic and " atraumatic.      Nose: Nose normal.   Eyes:      General: No scleral icterus.        Right eye: No discharge.         Left eye: No discharge.      Conjunctiva/sclera: Conjunctivae normal.      Pupils: Pupils are equal, round, and reactive to light.   Neck:      Thyroid: No thyromegaly.      Trachea: No tracheal deviation.   Cardiovascular:      Rate and Rhythm: Normal rate and regular rhythm.      Heart sounds: Normal heart sounds. No murmur heard.     No friction rub.   Pulmonary:      Effort: Pulmonary effort is normal. No respiratory distress.      Breath sounds: Normal breath sounds. No wheezing or rales.   Chest:      Chest wall: No tenderness.   Abdominal:      General: There is no distension.      Palpations: Abdomen is soft. There is no hepatomegaly or splenomegaly.      Tenderness: There is no abdominal tenderness. There is no guarding or rebound.      Comments: Obese abdomen   Musculoskeletal:         General: No tenderness or deformity. Normal range of motion.      Cervical back: Normal range of motion and neck supple.   Lymphadenopathy:      Cervical: No cervical adenopathy.   Skin:     General: Skin is warm and dry.      Coloration: Skin is not pale.      Findings: No erythema or rash.   Neurological:      Mental Status: He is alert and oriented to person, place, and time.      Cranial Nerves: No cranial nerve deficit.      Coordination: Coordination normal.      Deep Tendon Reflexes: Reflexes are normal and symmetric.   Psychiatric:         Behavior: Behavior normal.         Thought Content: Thought content normal.         Judgment: Judgment normal.           Labs:  Lab Results   Component Value Date    WBC 5.70 11/30/2023    HGB 14.3 11/30/2023    HCT 40.9 11/30/2023    MCV 87 11/30/2023     11/30/2023     Lab Results   Component Value Date     06/22/2018    K 3.9 11/30/2023    CL 96 11/30/2023    CO2 26 11/30/2023    ANIONGAP 12.4 06/22/2018    BUN 12 11/30/2023    CREATININE 1.21  11/30/2023    GLUF 83 09/20/2023    CALCIUM 8.6 11/30/2023    CORRECTEDCA 8.8 06/09/2023    AST 9 (L) 09/20/2023    ALT 7 09/20/2023    ALKPHOS 86 09/20/2023    PROT 5.6 (L) 06/22/2018    BILITOT 0.4 06/22/2018    EGFR 61 11/30/2023       Patient voiced understanding and agreement in the above discussion. Aware to contact our office with questions/symptoms in the interim.

## 2024-03-27 ENCOUNTER — TELEPHONE (OUTPATIENT)
Dept: HEMATOLOGY ONCOLOGY | Facility: CLINIC | Age: 69
End: 2024-03-27

## 2024-03-27 DIAGNOSIS — E53.8 VITAMIN B12 DEFICIENCY: Primary | ICD-10-CM

## 2024-03-27 DIAGNOSIS — D50.0 IRON DEFICIENCY ANEMIA DUE TO CHRONIC BLOOD LOSS: ICD-10-CM

## 2024-03-27 RX ORDER — CYANOCOBALAMIN 1000 UG/ML
1000 INJECTION, SOLUTION INTRAMUSCULAR; SUBCUTANEOUS ONCE
OUTPATIENT
Start: 2024-04-03

## 2024-03-27 RX ORDER — CYANOCOBALAMIN 1000 UG/ML
1000 INJECTION, SOLUTION INTRAMUSCULAR; SUBCUTANEOUS ONCE
OUTPATIENT
Start: 2024-05-22

## 2024-03-27 NOTE — TELEPHONE ENCOUNTER
Pt needs to be scheduled for weekly B 12 X 4 then q month at [facility]. I did ask the pt what day would work for him but he asked when a good time was. I told him to discuss with the inf .

## 2024-04-01 ENCOUNTER — HOSPITAL ENCOUNTER (OUTPATIENT)
Dept: INFUSION CENTER | Facility: HOSPITAL | Age: 69
Discharge: HOME | End: 2024-04-01
Attending: INTERNAL MEDICINE
Payer: COMMERCIAL

## 2024-04-01 VITALS — TEMPERATURE: 97.7 F

## 2024-04-01 DIAGNOSIS — D50.0 IRON DEFICIENCY ANEMIA DUE TO CHRONIC BLOOD LOSS: Primary | ICD-10-CM

## 2024-04-01 DIAGNOSIS — E53.8 VITAMIN B12 DEFICIENCY: ICD-10-CM

## 2024-04-01 PROCEDURE — 96372 THER/PROPH/DIAG INJ SC/IM: CPT

## 2024-04-01 RX ORDER — CYANOCOBALAMIN 1000 UG/ML
1000 INJECTION, SOLUTION INTRAMUSCULAR; SUBCUTANEOUS ONCE
Status: CANCELLED | OUTPATIENT
Start: 2024-04-08

## 2024-04-01 RX ORDER — CYANOCOBALAMIN 1000 UG/ML
1000 INJECTION, SOLUTION INTRAMUSCULAR; SUBCUTANEOUS ONCE
Status: COMPLETED | OUTPATIENT
Start: 2024-04-01 | End: 2024-04-01

## 2024-04-01 RX ADMIN — CYANOCOBALAMIN 1000 MCG: 1000 INJECTION, SOLUTION INTRAMUSCULAR at 12:56

## 2024-04-01 NOTE — PROGRESS NOTES
Miguelangel Lancaster  received vitamin B12 injection without incident.     Miguelangel Lancaster is aware of future appt on  4-8-24 at 1:00    AVS printed and given to Miguelangel Lancaster

## 2024-04-02 ENCOUNTER — TELEPHONE (OUTPATIENT)
Dept: INTERNAL MEDICINE CLINIC | Facility: CLINIC | Age: 69
End: 2024-04-02

## 2024-04-02 NOTE — TELEPHONE ENCOUNTER
It doesn't look like I ever prescribed him a statin, he may want to reach out to his cardiologist if they manage this

## 2024-04-02 NOTE — TELEPHONE ENCOUNTER
[facility] was called and given information that we did not prescribe medication. They will correct in their system.

## 2024-04-03 ENCOUNTER — TELEPHONE (OUTPATIENT)
Dept: INTERNAL MEDICINE CLINIC | Facility: CLINIC | Age: 69
End: 2024-04-03

## 2024-04-03 DIAGNOSIS — E53.8 FOLIC ACID DEFICIENCY: ICD-10-CM

## 2024-04-03 RX ORDER — FOLIC ACID 1 MG/1
1000 TABLET ORAL DAILY
Qty: 30 TABLET | Refills: 5 | Status: SHIPPED | OUTPATIENT
Start: 2024-04-03

## 2024-04-03 NOTE — TELEPHONE ENCOUNTER
Patient left message inquiring about his appointment.  Left patient a message with his appointment date and time and also that we did not order atorvastatin for him.

## 2024-04-09 NOTE — TELEPHONE ENCOUNTER
Patients insurance called requesting this refill. I made them aware we have not prescribed him this medication and that it is not on his medication list.     I left a voicemail for the patient that we have not prescribed him this medication.

## 2024-04-10 ENCOUNTER — HOSPITAL ENCOUNTER (OUTPATIENT)
Dept: INFUSION CENTER | Facility: HOSPITAL | Age: 69
Discharge: HOME | End: 2024-04-10
Attending: INTERNAL MEDICINE
Payer: COMMERCIAL

## 2024-04-10 ENCOUNTER — TELEPHONE (OUTPATIENT)
Dept: HEMATOLOGY ONCOLOGY | Facility: CLINIC | Age: 69
End: 2024-04-10

## 2024-04-10 VITALS — TEMPERATURE: 97.9 F

## 2024-04-10 DIAGNOSIS — D50.0 IRON DEFICIENCY ANEMIA DUE TO CHRONIC BLOOD LOSS: Primary | ICD-10-CM

## 2024-04-10 DIAGNOSIS — E53.8 VITAMIN B12 DEFICIENCY: ICD-10-CM

## 2024-04-10 PROCEDURE — 96372 THER/PROPH/DIAG INJ SC/IM: CPT

## 2024-04-10 RX ORDER — CYANOCOBALAMIN 1000 UG/ML
1000 INJECTION, SOLUTION INTRAMUSCULAR; SUBCUTANEOUS ONCE
Status: CANCELLED | OUTPATIENT
Start: 2024-04-17

## 2024-04-10 RX ORDER — CYANOCOBALAMIN 1000 UG/ML
1000 INJECTION, SOLUTION INTRAMUSCULAR; SUBCUTANEOUS ONCE
Status: COMPLETED | OUTPATIENT
Start: 2024-04-10 | End: 2024-04-10

## 2024-04-10 RX ADMIN — CYANOCOBALAMIN 1000 MCG: 1000 INJECTION, SOLUTION INTRAMUSCULAR at 10:47

## 2024-04-10 NOTE — TELEPHONE ENCOUNTER
Returned call to patient.  Reviewed OK to continue with oral B12 supplements.  Patient is currently receiving B12 injections.  Patient aware and appreciative.

## 2024-04-10 NOTE — TELEPHONE ENCOUNTER
Patient Call    Who are you speaking with? Patient    If it is not the patient, are they listed on an active communication consent form? N/A   What is the reason for this call? Pt asked if he should take b12 supplement. He also asked about lab results   Does this require a call back? Yes   If a call back is required, please list best call back number [phone]    If a call back is required, advise that a message will be forwarded to their care team and someone will return their call as soon as possible.   Did you relay this information to the patient? Yes

## 2024-04-10 NOTE — PROGRESS NOTES
Patient tolerated B12 injection given in right deltoid without reaction or issues.      Miguelangel Lancaster is aware of future appt on 4/17/24 at 1100.     AVS printed and given to Miguelangel Lancaster:  Yes.     Patient ambulated off unit without incident.  All personal belongings taken with patient.

## 2024-04-15 ENCOUNTER — HOSPITAL ENCOUNTER (OUTPATIENT)
Dept: INFUSION CENTER | Facility: HOSPITAL | Age: 69
End: 2024-04-15
Attending: INTERNAL MEDICINE

## 2024-04-17 ENCOUNTER — HOSPITAL ENCOUNTER (OUTPATIENT)
Dept: INFUSION CENTER | Facility: HOSPITAL | Age: 69
Discharge: HOME | End: 2024-04-17
Attending: INTERNAL MEDICINE
Payer: COMMERCIAL

## 2024-04-17 VITALS — TEMPERATURE: 97.8 F

## 2024-04-17 DIAGNOSIS — E53.8 VITAMIN B12 DEFICIENCY: ICD-10-CM

## 2024-04-17 DIAGNOSIS — D50.0 IRON DEFICIENCY ANEMIA DUE TO CHRONIC BLOOD LOSS: Primary | ICD-10-CM

## 2024-04-17 PROCEDURE — 96372 THER/PROPH/DIAG INJ SC/IM: CPT

## 2024-04-17 RX ORDER — CYANOCOBALAMIN 1000 UG/ML
1000 INJECTION, SOLUTION INTRAMUSCULAR; SUBCUTANEOUS ONCE
Status: CANCELLED | OUTPATIENT
Start: 2024-04-24

## 2024-04-17 RX ORDER — CYANOCOBALAMIN 1000 UG/ML
1000 INJECTION, SOLUTION INTRAMUSCULAR; SUBCUTANEOUS ONCE
Status: COMPLETED | OUTPATIENT
Start: 2024-04-17 | End: 2024-04-17

## 2024-04-17 RX ADMIN — CYANOCOBALAMIN 1000 MCG: 1000 INJECTION, SOLUTION INTRAMUSCULAR at 10:55

## 2024-04-17 NOTE — PROGRESS NOTES
Migeulangel Lancaster  tolerated B12 treatment well with no complications.      Miguelangel Lancaster is aware of future appt on 4/24 at 11AM.     AVS printed and given to Miguelangel Lancaster.

## 2024-04-22 DIAGNOSIS — K44.9 HIATAL HERNIA: ICD-10-CM

## 2024-04-22 RX ORDER — PANTOPRAZOLE SODIUM 40 MG/1
TABLET, DELAYED RELEASE ORAL
Qty: 90 TABLET | Refills: 1 | Status: SHIPPED | OUTPATIENT
Start: 2024-04-22

## 2024-04-24 ENCOUNTER — HOSPITAL ENCOUNTER (OUTPATIENT)
Dept: INFUSION CENTER | Facility: HOSPITAL | Age: 69
Discharge: HOME | End: 2024-04-24
Attending: INTERNAL MEDICINE
Payer: COMMERCIAL

## 2024-04-24 DIAGNOSIS — D50.0 IRON DEFICIENCY ANEMIA DUE TO CHRONIC BLOOD LOSS: Primary | ICD-10-CM

## 2024-04-24 DIAGNOSIS — E53.8 VITAMIN B12 DEFICIENCY: ICD-10-CM

## 2024-04-24 PROCEDURE — 96372 THER/PROPH/DIAG INJ SC/IM: CPT

## 2024-04-24 RX ORDER — CYANOCOBALAMIN 1000 UG/ML
1000 INJECTION, SOLUTION INTRAMUSCULAR; SUBCUTANEOUS ONCE
Status: CANCELLED | OUTPATIENT
Start: 2024-05-01

## 2024-04-24 RX ORDER — CYANOCOBALAMIN 1000 UG/ML
1000 INJECTION, SOLUTION INTRAMUSCULAR; SUBCUTANEOUS ONCE
Status: COMPLETED | OUTPATIENT
Start: 2024-04-24 | End: 2024-04-24

## 2024-04-24 RX ADMIN — CYANOCOBALAMIN 1000 MCG: 1000 INJECTION INTRAMUSCULAR; SUBCUTANEOUS at 11:16

## 2024-04-24 NOTE — PROGRESS NOTES
Miguelangel Lancaster  tolerated B12 treatment well with no complications.      Miguelangel Lancaster is aware of future appt on 5/22 at 11AM.     AVS printed and given to Miguelangel Lancaster.

## 2024-05-03 DIAGNOSIS — I26.99 ACUTE PULMONARY EMBOLISM WITHOUT ACUTE COR PULMONALE, UNSPECIFIED PULMONARY EMBOLISM TYPE (HCC): ICD-10-CM

## 2024-05-03 DIAGNOSIS — F41.9 ANXIETY: ICD-10-CM

## 2024-05-05 RX ORDER — RIVAROXABAN 20 MG/1
20 TABLET, FILM COATED ORAL DAILY
Qty: 30 TABLET | Refills: 5 | Status: SHIPPED | OUTPATIENT
Start: 2024-05-05

## 2024-05-06 RX ORDER — LORAZEPAM 0.5 MG/1
0.5 TABLET ORAL EVERY 8 HOURS PRN
Qty: 90 TABLET | Refills: 0 | Status: SHIPPED | OUTPATIENT
Start: 2024-05-06

## 2024-05-22 ENCOUNTER — HOSPITAL ENCOUNTER (OUTPATIENT)
Dept: INFUSION CENTER | Facility: HOSPITAL | Age: 69
End: 2024-05-22
Attending: INTERNAL MEDICINE

## 2024-05-24 ENCOUNTER — HOSPITAL ENCOUNTER (OUTPATIENT)
Dept: INFUSION CENTER | Facility: HOSPITAL | Age: 69
Discharge: HOME | End: 2024-05-24
Attending: INTERNAL MEDICINE

## 2024-05-29 DIAGNOSIS — E53.8 VITAMIN B12 DEFICIENCY: Primary | ICD-10-CM

## 2024-05-29 RX ORDER — CYANOCOBALAMIN 1000 UG/ML
1000 INJECTION, SOLUTION INTRAMUSCULAR; SUBCUTANEOUS ONCE
OUTPATIENT
Start: 2024-05-30

## 2024-05-30 ENCOUNTER — TELEPHONE (OUTPATIENT)
Age: 69
End: 2024-05-30

## 2024-05-30 ENCOUNTER — HOSPITAL ENCOUNTER (OUTPATIENT)
Dept: INFUSION CENTER | Facility: HOSPITAL | Age: 69
Discharge: HOME | End: 2024-05-30
Attending: INTERNAL MEDICINE

## 2024-05-30 NOTE — TELEPHONE ENCOUNTER
Pt called in to reschedule his Urgent Follow Up with Ean due to a schedule conflict and having a lot going on at home. Pt was rescheduled for 6/7 but stated he is concerned because he was told there were cysts on his kidneys but he was not given any antibiotics. Pt asking if he is to be taking antibiotics and if so, can they be given prior to his appt? Please advise.     Pt call back: [phone]

## 2024-05-30 NOTE — TELEPHONE ENCOUNTER
Spoke to patient and advised he will not require antibiotics for cysts. Advised he has an appointment next week to review the results.

## 2024-06-04 NOTE — TELEPHONE ENCOUNTER
SHAD marley about r/s appt for stable b/l renal cysts.offered him appointment 6/14@ 9:20, 7/1 @ 2:40 or 7/15 @ 1:15. Next appointment with Dr Del Rosario is 9/5 or he can go to a different office to be seen by an Md

## 2024-06-04 NOTE — TELEPHONE ENCOUNTER
Patient called today to reschedule his ER follow up appointment for 6/14 at 1 PM with JACLYN Farias in [place].    Pt states that he has a lot going on right now.    Pt is questioning why he is seeing a PA and not an MD?    Pt is stepping out this AM and asks for a returned call later on.    Please review.    Call back [phone]

## 2024-06-05 ENCOUNTER — OFFICE VISIT (OUTPATIENT)
Dept: INTERNAL MEDICINE CLINIC | Facility: CLINIC | Age: 69
End: 2024-06-05
Payer: COMMERCIAL

## 2024-06-05 ENCOUNTER — TELEPHONE (OUTPATIENT)
Dept: OTHER | Facility: OTHER | Age: 69
End: 2024-06-05

## 2024-06-05 VITALS
DIASTOLIC BLOOD PRESSURE: 80 MMHG | TEMPERATURE: 97.6 F | SYSTOLIC BLOOD PRESSURE: 140 MMHG | BODY MASS INDEX: 32.07 KG/M2 | WEIGHT: 224 LBS | HEART RATE: 76 BPM | HEIGHT: 70 IN | OXYGEN SATURATION: 96 %

## 2024-06-05 DIAGNOSIS — Z13.1 SCREENING FOR DIABETES MELLITUS: ICD-10-CM

## 2024-06-05 DIAGNOSIS — Z00.00 MEDICARE ANNUAL WELLNESS VISIT, SUBSEQUENT: Primary | ICD-10-CM

## 2024-06-05 DIAGNOSIS — R33.9 URINARY RETENTION: Chronic | ICD-10-CM

## 2024-06-05 DIAGNOSIS — G40.401: ICD-10-CM

## 2024-06-05 DIAGNOSIS — Z72.0 TOBACCO ABUSE: Chronic | ICD-10-CM

## 2024-06-05 DIAGNOSIS — D50.0 IRON DEFICIENCY ANEMIA DUE TO CHRONIC BLOOD LOSS: Chronic | ICD-10-CM

## 2024-06-05 DIAGNOSIS — I10 PRIMARY HYPERTENSION: ICD-10-CM

## 2024-06-05 DIAGNOSIS — I25.119 CORONARY ARTERY DISEASE INVOLVING NATIVE CORONARY ARTERY OF NATIVE HEART WITH ANGINA PECTORIS (HCC): Chronic | ICD-10-CM

## 2024-06-05 DIAGNOSIS — Z13.220 SCREENING FOR HYPERLIPIDEMIA: ICD-10-CM

## 2024-06-05 DIAGNOSIS — F17.210 SMOKING GREATER THAN 20 PACK YEARS: ICD-10-CM

## 2024-06-05 DIAGNOSIS — R26.2 AMBULATORY DYSFUNCTION: ICD-10-CM

## 2024-06-05 DIAGNOSIS — K21.9 GASTROESOPHAGEAL REFLUX DISEASE WITHOUT ESOPHAGITIS: Chronic | ICD-10-CM

## 2024-06-05 DIAGNOSIS — I73.9 PAD (PERIPHERAL ARTERY DISEASE) (HCC): ICD-10-CM

## 2024-06-05 DIAGNOSIS — F33.9 DEPRESSION, RECURRENT (HCC): ICD-10-CM

## 2024-06-05 DIAGNOSIS — G62.89 OTHER POLYNEUROPATHY: ICD-10-CM

## 2024-06-05 DIAGNOSIS — I87.2 VENOUS INSUFFICIENCY OF BOTH LOWER EXTREMITIES: ICD-10-CM

## 2024-06-05 DIAGNOSIS — I74.5 OCCLUSION OF RIGHT ILIAC ARTERY (HCC): ICD-10-CM

## 2024-06-05 PROBLEM — G47.00 INSOMNIA: Chronic | Status: ACTIVE | Noted: 2019-03-24

## 2024-06-05 PROBLEM — J44.9 COPD, SEVERITY TO BE DETERMINED (HCC): Chronic | Status: ACTIVE | Noted: 2018-09-14

## 2024-06-05 PROBLEM — N40.0 BPH (BENIGN PROSTATIC HYPERPLASIA): Chronic | Status: ACTIVE | Noted: 2022-08-08

## 2024-06-05 PROBLEM — E53.8 VITAMIN B12 DEFICIENCY: Chronic | Status: ACTIVE | Noted: 2023-10-12

## 2024-06-05 PROCEDURE — G0439 PPPS, SUBSEQ VISIT: HCPCS | Performed by: NURSE PRACTITIONER

## 2024-06-05 NOTE — PROGRESS NOTES
Ambulatory Visit  Name: Miguelangel Lancaster      : 1955      MRN: [id number]  Encounter Provider: ADRIANA Lott  Encounter Date: 2024   Encounter department: [facility]    Assessment & Plan   1. Medicare annual wellness visit, subsequent  2. Screening for diabetes mellitus  -     Hemoglobin A1C; Future; Expected date: 2024  3. Screening for hyperlipidemia  -     Lipid panel; Future; Expected date: 2024  4. Smoking greater than 20 pack years  -     CT lung nodule follow-up; Future; Expected date: 2024  5. Coronary artery disease involving native coronary artery of native heart with angina pectoris (HCC)  -     Ambulatory Referral to Cardiology; Future  6. Primary hypertension  -     Ambulatory Referral to Cardiology; Future  7. PAD (peripheral artery disease) ([facility])  8. Venous insufficiency of both lower extremities  9. Gastroesophageal reflux disease without esophagitis  10. Other polyneuropathy  11. Urinary retention  12. Tobacco abuse  13. Ambulatory dysfunction  14. Iron deficiency anemia due to chronic blood loss  15. Occlusion of right iliac artery ([facility])  16. Depression, recurrent ([facility])  17. Grand mal status ([facility])      Depression Screening and Follow-up Plan: Patient was screened for depression during today's encounter. They screened negative with a PHQ-9 score of 0.    Falls Plan of Care: Assessed feet and footwear. Home safety education provided.       Preventive health issues were discussed with patient, and age appropriate screening tests were ordered as noted in patient's After Visit Summary. Personalized health advice and appropriate referrals for health education or preventive services given if needed, as noted in patient's After Visit Summary.    History of Present Illness     The patient is here today to discuss his Medicare Annual Wellness Visit. Please continue to the PORCH section of the note for details of today's visit.         Patient Care  Team:  Candida Loja PA-C as PCP - General (Internal Medicine)  Fanta Gongora MD as PCP - [facility]  MD Joanie Alberto DO (Gastroenterology)  Uzma Agee PA-C as Physician Assistant (Physician Assistant)  Radha Dahl MD (Vascular Surgery)  Jackelyn Flores MD (Pulmonary Disease)  ADRIANA Mendez as Nurse Practitioner (Urology)  Maris Canada as  (Oncology)  ADRIANA Davila as Nurse Practitioner (Nurse Practitioner)  ADRIANA Odonnell as Nurse Practitioner (Pulmonology)  ADRIANA Odonnell as Nurse Practitioner (Pulmonology)    Review of Systems   Constitutional:  Negative for activity change, chills, fatigue and fever.   HENT:  Negative for rhinorrhea and sore throat.    Eyes:  Negative for pain.   Respiratory:  Negative for cough and shortness of breath.    Cardiovascular:  Negative for chest pain, palpitations and leg swelling.   Gastrointestinal:  Negative for abdominal pain, constipation, diarrhea, nausea and vomiting.   Genitourinary:  Negative for difficulty urinating, flank pain, frequency and urgency.   Musculoskeletal:  Negative for gait problem, joint swelling and myalgias.   Skin:  Negative for color change.   Neurological:  Negative for dizziness, weakness, light-headedness and headaches.   Psychiatric/Behavioral:  Negative for sleep disturbance. The patient is not nervous/anxious.    All other systems reviewed and are negative.    Medical History Reviewed by provider this encounter:  Tobacco  Allergies  Meds  Problems  Med Hx  Surg Hx  Fam Hx       Annual Wellness Visit Questionnaire   Miguelangel is here for his Subsequent Wellness visit. Last Medicare Wellness visit information reviewed, patient interviewed and updates made to the record today.      Health Risk Assessment:   Patient rates overall health as good. Patient feels that their physical health rating is same. Patient is satisfied with  their life. Eyesight was rated as same. Hearing was rated as same. Patient feels that their emotional and mental health rating is same. Patients states they are never, rarely angry. Patient states they are never, rarely unusually tired/fatigued. Pain experienced in the last 7 days has been none. Patient states that he has experienced no weight loss or gain in last 6 months.     Depression Screening:   PHQ-9 Score: 0      Fall Risk Screening:   In the past year, patient has experienced: no history of falling in past year      Home Safety:  Patient does not have trouble with stairs inside or outside of their home. Patient has working smoke alarms and has working carbon monoxide detector. Home safety hazards include: none.     Nutrition:   Current diet is Regular.     Medications:   Patient is currently taking over-the-counter supplements. OTC medications include: see medication list. Patient is able to manage medications.     Activities of Daily Living (ADLs)/Instrumental Activities of Daily Living (IADLs):   Walk and transfer into and out of bed and chair?: Yes  Dress and groom yourself?: Yes    Bathe or shower yourself?: Yes    Feed yourself? Yes  Do your laundry/housekeeping?: Yes  Manage your money, pay your bills and track your expenses?: Yes  Make your own meals?: Yes    Do your own shopping?: Yes    Previous Hospitalizations:   Any hospitalizations or ED visits within the last 12 months?: Yes    How many hospitalizations have you had in the last year?: 1-2    Advance Care Planning:   Living will: Yes    Durable POA for healthcare: No    Advanced directive: Yes    Advanced directive counseling given: Yes    Five wishes given: No    Patient declined ACP directive: No    End of Life Decisions reviewed with patient: Yes    Provider agrees with end of life decisions: Yes      Cognitive Screening:   Provider or family/friend/caregiver concerned regarding cognition?: No    PREVENTIVE SCREENINGS      Cardiovascular  Screening:    General: Screening Not Indicated and History Lipid Disorder      Diabetes Screening:     General: Screening Current      Prostate Cancer Screening:    General: Screening Current      Abdominal Aortic Aneurysm (AAA) Screening:    Risk factors include: age between 65-76 yo and tobacco use        Lung Cancer Screening:     General: Screening Current      Hepatitis C Screening:    General: Screening Current    Screening, Brief Intervention, and Referral to Treatment (SBIRT)    Screening  Typical number of drinks in a day: 0  Typical number of drinks in a week: 0  Interpretation: Low risk drinking behavior.    Single Item Drug Screening:  How often have you used an illegal drug (including marijuana) or a prescription medication for non-medical reasons in the past year? never    Single Item Drug Screen Score: 0  Interpretation: Negative screen for possible drug use disorder    Other Counseling Topics:   Car/seat belt/driving safety and calcium and vitamin D intake and regular weightbearing exercise.     Social Determinants of Health     Financial Resource Strain: Low Risk  (6/7/2023)    Overall Financial Resource Strain (CARDIA)     Difficulty of Paying Living Expenses: Not very hard   Food Insecurity: No Food Insecurity (6/5/2024)    Hunger Vital Sign     Worried About Running Out of Food in the Last Year: Never true     Ran Out of Food in the Last Year: Never true   Transportation Needs: No Transportation Needs (6/5/2024)    PRAPARE - Transportation     Lack of Transportation (Medical): No     Lack of Transportation (Non-Medical): No   Housing Stability: Low Risk  (6/5/2024)    Housing Stability Vital Sign     Unable to Pay for Housing in the Last Year: No     Number of Times Moved in the Last Year: 0     Homeless in the Last Year: No   Utilities: Not At Risk (6/5/2024)    [facility] Utilities     Threatened with loss of utilities: No     No results found.    Objective     /80   Pulse 76   Temp 97.6 °F  "(36.4 °C)   Ht 5' 10\" (1.778 m)   Wt 102 kg (224 lb)   SpO2 96%   BMI 32.14 kg/m²     Physical Exam  Vitals and nursing note reviewed.   Constitutional:       General: He is awake.      Appearance: Normal appearance. He is well-developed and overweight.   HENT:      Head: Normocephalic and atraumatic.      Nose: Nose normal.      Mouth/Throat:      Mouth: Mucous membranes are moist.   Eyes:      Conjunctiva/sclera: Conjunctivae normal.   Cardiovascular:      Rate and Rhythm: Normal rate and regular rhythm.      Pulses: Normal pulses.      Heart sounds: Normal heart sounds. No murmur heard.  Pulmonary:      Effort: Pulmonary effort is normal. No respiratory distress.      Breath sounds: Normal breath sounds.   Abdominal:      General: Bowel sounds are normal.      Palpations: Abdomen is soft.      Tenderness: There is no abdominal tenderness.      Comments: Positive gas    Musculoskeletal:      Cervical back: Neck supple.      Right lower leg: No edema.      Left lower leg: No edema.   Skin:     General: Skin is warm and dry.   Neurological:      Mental Status: He is alert and oriented to person, place, and time.      Motor: Motor function is intact.      Coordination: Coordination is intact.      Gait: Gait is intact.   Psychiatric:         Attention and Perception: Attention normal.         Mood and Affect: Mood normal.         Speech: Speech normal.         Behavior: Behavior normal. Behavior is cooperative.         Thought Content: Thought content normal.         Cognition and Memory: Cognition normal.         Judgment: Judgment normal.       Administrative Statements   I have spent a total time of 40 minutes on 06/05/24 In caring for this patient including Diagnostic results, Prognosis, Risks and benefits of tx options, Instructions for management, Patient and family education, Importance of tx compliance, Risk factor reductions, Impressions, Counseling / Coordination of care, Documenting in the medical " record, Reviewing / ordering tests, medicine, procedures  , and Obtaining or reviewing history  .

## 2024-06-05 NOTE — TELEPHONE ENCOUNTER
Patient called and canceled his appointment and is asking if the office can give him back a call to reschedule his appointment.

## 2024-06-05 NOTE — PATIENT INSTRUCTIONS
Medicare Preventive Visit Patient Instructions  Thank you for completing your Welcome to Medicare Visit or Medicare Annual Wellness Visit today. Your next wellness visit will be due in one year (6/6/2025).  The screening/preventive services that you may require over the next 5-10 years are detailed below. Some tests may not apply to you based off risk factors and/or age. Screening tests ordered at today's visit but not completed yet may show as past due. Also, please note that scanned in results may not display below.  Preventive Screenings:  Service Recommendations Previous Testing/Comments   Colorectal Cancer Screening  Colonoscopy    Fecal Occult Blood Test (FOBT)/Fecal Immunochemical Test (FIT)  Fecal DNA/Cologuard Test  Flexible Sigmoidoscopy Age: 45-75 years old   Colonoscopy: every 10 years (May be performed more frequently if at higher risk)  OR  FOBT/FIT: every 1 year  OR  Cologuard: every 3 years  OR  Sigmoidoscopy: every 5 years  Screening may be recommended earlier than age 45 if at higher risk for colorectal cancer. Also, an individualized decision between you and your healthcare provider will decide whether screening between the ages of 76-85 would be appropriate. Colonoscopy: Not on file  FOBT/FIT: Not on file  Cologuard: 05/10/2022  Sigmoidoscopy: Not on file          Prostate Cancer Screening Individualized decision between patient and health care provider in men between ages of 55-69   Medicare will cover every 12 months beginning on the day after your 50th birthday PSA: 0.47 ng/mL     Screening Current     Hepatitis C Screening Once for adults born between 1945 and 1965  More frequently in patients at high risk for Hepatitis C Hep C Antibody: 09/28/2023    Screening Current   Diabetes Screening 1-2 times per year if you're at risk for diabetes or have pre-diabetes Fasting glucose: 99 mg/dL (3/26/2024)  A1C: 6.0 % (9/28/2023)  Screening Current   Cholesterol Screening Once every 5 years if you  don't have a lipid disorder. May order more often based on risk factors. Lipid panel: 09/29/2023  Screening Not Indicated  History Lipid Disorder      Other Preventive Screenings Covered by Medicare:  Abdominal Aortic Aneurysm (AAA) Screening: covered once if your at risk. You're considered to be at risk if you have a family history of AAA or a male between the age of 65-75 who smoking at least 100 cigarettes in your lifetime.  Lung Cancer Screening: covers low dose CT scan once per year if you meet all of the following conditions: (1) Age 55-77; (2) No signs or symptoms of lung cancer; (3) Current smoker or have quit smoking within the last 15 years; (4) You have a tobacco smoking history of at least 20 pack years (packs per day x number of years you smoked); (5) You get a written order from a healthcare provider.  Glaucoma Screening: covered annually if you're considered high risk: (1) You have diabetes OR (2) Family history of glaucoma OR (3)  aged 50 and older OR (4)  American aged 65 and older  Osteoporosis Screening: covered every 2 years if you meet one of the following conditions: (1) Have a vertebral abnormality; (2) On glucocorticoid therapy for more than 3 months; (3) Have primary hyperparathyroidism; (4) On osteoporosis medications and need to assess response to drug therapy.  HIV Screening: covered annually if you're between the age of 15-65. Also covered annually if you are younger than 15 and older than 65 with risk factors for HIV infection. For pregnant patients, it is covered up to 3 times per pregnancy.    Immunizations:  Immunization Recommendations   Influenza Vaccine Annual influenza vaccination during flu season is recommended for all persons aged >= 6 months who do not have contraindications   Pneumococcal Vaccine   * Pneumococcal conjugate vaccine = PCV13 (Prevnar 13), PCV15 (Vaxneuvance), PCV20 (Prevnar 20)  * Pneumococcal polysaccharide vaccine = PPSV23 (Pneumovax)  Adults 19-63 yo with certain risk factors or if 65+ yo  If never received any pneumonia vaccine: recommend Prevnar 20 (PCV20)  Give PCV20 if previously received 1 dose of PCV13 or PPSV23   Hepatitis B Vaccine 3 dose series if at intermediate or high risk (ex: diabetes, end stage renal disease, liver disease)   Respiratory syncytial virus (RSV) Vaccine - COVERED BY MEDICARE PART D  * RSVPreF3 (Arexvy) CDC recommends that adults 60 years of age and older may receive a single dose of RSV vaccine using shared clinical decision-making (SCDM)   Tetanus (Td) Vaccine - COST NOT COVERED BY MEDICARE PART B Following completion of primary series, a booster dose should be given every 10 years to maintain immunity against tetanus. Td may also be given as tetanus wound prophylaxis.   Tdap Vaccine - COST NOT COVERED BY MEDICARE PART B Recommended at least once for all adults. For pregnant patients, recommended with each pregnancy.   Shingles Vaccine (Shingrix) - COST NOT COVERED BY MEDICARE PART B  2 shot series recommended in those 19 years and older who have or will have weakened immune systems or those 50 years and older     Health Maintenance Due:      Topic Date Due    Lung Cancer Screening  07/03/2024    Colorectal Cancer Screening  05/10/2025    Hepatitis C Screening  Completed     Immunizations Due:  There are no preventive care reminders to display for this patient.  Advance Directives   What are advance directives?  Advance directives are legal documents that state your wishes and plans for medical care. These plans are made ahead of time in case you lose your ability to make decisions for yourself. Advance directives can apply to any medical decision, such as the treatments you want, and if you want to donate organs.   What are the types of advance directives?  There are many types of advance directives, and each state has rules about how to use them. You may choose a combination of any of the following:  Living will:   This is a written record of the treatment you want. You can also choose which treatments you do not want, which to limit, and which to stop at a certain time. This includes surgery, medicine, IV fluid, and tube feedings.   Durable power of  for healthcare (DPAHC):  This is a written record that states who you want to make healthcare choices for you when you are unable to make them for yourself. This person, called a proxy, is usually a family member or a friend. You may choose more than 1 proxy.  Do not resuscitate (DNR) order:  A DNR order is used in case your heart stops beating or you stop breathing. It is a request not to have certain forms of treatment, such as CPR. A DNR order may be included in other types of advance directives.  Medical directive:  This covers the care that you want if you are in a coma, near death, or unable to make decisions for yourself. You can list the treatments you want for each condition. Treatment may include pain medicine, surgery, blood transfusions, dialysis, IV or tube feedings, and a ventilator (breathing machine).  Values history:  This document has questions about your views, beliefs, and how you feel and think about life. This information can help others choose the care that you would choose.  Why are advance directives important?  An advance directive helps you control your care. Although spoken wishes may be used, it is better to have your wishes written down. Spoken wishes can be misunderstood, or not followed. Treatments may be given even if you do not want them. An advance directive may make it easier for your family to make difficult choices about your care.   Cigarette Smoking and Your Health   Risks to your health if you smoke:  Nicotine and other chemicals found in tobacco damage every cell in your body. Even if you are a light smoker, you have an increased risk for cancer, heart disease, and lung disease. If you are pregnant or have diabetes, smoking  increases your risk for complications.   Benefits to your health if you stop smoking:   You decrease respiratory symptoms such as coughing, wheezing, and shortness of breath.   You reduce your risk for cancers of the lung, mouth, throat, kidney, bladder, pancreas, stomach, and cervix. If you already have cancer, you increase the benefits of chemotherapy. You also reduce your risk for cancer returning or a second cancer from developing.   You reduce your risk for heart disease, blood clots, heart attack, and stroke.   You reduce your risk for lung infections, and diseases such as pneumonia, asthma, chronic bronchitis, and emphysema.  Your circulation improves. More oxygen can be delivered to your body. If you have diabetes, you lower your risk for complications, such as kidney, artery, and eye diseases. You also lower your risk for nerve damage. Nerve damage can lead to amputations, poor vision, and blindness.  You improve your body's ability to heal and to fight infections.  For more information and support to stop smoking:   [employer]  Phone: 4- 400 - 410-5038  Web Address: www.[facility]  Weight Management   Why it is important to manage your weight:  Being overweight increases your risk of health conditions such as heart disease, high blood pressure, type 2 diabetes, and certain types of cancer. It can also increase your risk for osteoarthritis, sleep apnea, and other respiratory problems. Aim for a slow, steady weight loss. Even a small amount of weight loss can lower your risk of health problems.  How to lose weight safely:  A safe and healthy way to lose weight is to eat fewer calories and get regular exercise. You can lose up about 1 pound a week by decreasing the number of calories you eat by 500 calories each day.   Healthy meal plan for weight management:  A healthy meal plan includes a variety of foods, contains fewer calories, and helps you stay healthy. A healthy meal plan includes the  following:  Eat whole-grain foods more often.  A healthy meal plan should contain fiber. Fiber is the part of grains, fruits, and vegetables that is not broken down by your body. Whole-grain foods are healthy and provide extra fiber in your diet. Some examples of whole-grain foods are whole-wheat breads and pastas, oatmeal, brown rice, and bulgur.  Eat a variety of vegetables every day.  Include dark, leafy greens such as spinach, kale, jeanie greens, and mustard greens. Eat yellow and orange vegetables such as carrots, sweet potatoes, and winter squash.   Eat a variety of fruits every day.  Choose fresh or canned fruit (canned in its own juice or light syrup) instead of juice. Fruit juice has very little or no fiber.  Eat low-fat dairy foods.  Drink fat-free (skim) milk or 1% milk. Eat fat-free yogurt and low-fat cottage cheese. Try low-fat cheeses such as mozzarella and other reduced-fat cheeses.  Choose meat and other protein foods that are low in fat.  Choose beans or other legumes such as split peas or lentils. Choose fish, skinless poultry (chicken or turkey), or lean cuts of red meat (beef or pork). Before you cook meat or poultry, cut off any visible fat.   Use less fat and oil.  Try baking foods instead of frying them. Add less fat, such as margarine, sour cream, regular salad dressing and mayonnaise to foods. Eat fewer high-fat foods. Some examples of high-fat foods include french fries, doughnuts, ice cream, and cakes.  Eat fewer sweets.  Limit foods and drinks that are high in sugar. This includes candy, cookies, regular soda, and sweetened drinks.  Exercise:  Exercise at least 30 minutes per day on most days of the week. Some examples of exercise include walking, biking, dancing, and swimming. You can also fit in more physical activity by taking the stairs instead of the elevator or parking farther away from stores. Ask your healthcare provider about the best exercise plan for you.      © Copyright [employer]  [employer] 2018 Information is for End User's use only and may not be sold, redistributed or otherwise used for commercial purposes. All illustrations and images included in CareNotes® are the copyrighted property of [employer] or [employer]    Medicare Preventive Visit Patient Instructions  Thank you for completing your Welcome to Medicare Visit or Medicare Annual Wellness Visit today. Your next wellness visit will be due in one year (6/6/2025).  The screening/preventive services that you may require over the next 5-10 years are detailed below. Some tests may not apply to you based off risk factors and/or age. Screening tests ordered at today's visit but not completed yet may show as past due. Also, please note that scanned in results may not display below.  Preventive Screenings:  Service Recommendations Previous Testing/Comments   Colorectal Cancer Screening  Colonoscopy    Fecal Occult Blood Test (FOBT)/Fecal Immunochemical Test (FIT)  Fecal DNA/Cologuard Test  Flexible Sigmoidoscopy Age: 45-75 years old   Colonoscopy: every 10 years (May be performed more frequently if at higher risk)  OR  FOBT/FIT: every 1 year  OR  Cologuard: every 3 years  OR  Sigmoidoscopy: every 5 years  Screening may be recommended earlier than age 45 if at higher risk for colorectal cancer. Also, an individualized decision between you and your healthcare provider will decide whether screening between the ages of 76-85 would be appropriate. Colonoscopy: Not on file  FOBT/FIT: Not on file  Cologuard: 05/10/2022  Sigmoidoscopy: Not on file          Prostate Cancer Screening Individualized decision between patient and health care provider in men between ages of 55-69   Medicare will cover every 12 months beginning on the day after your 50th birthday PSA: 0.47 ng/mL     Screening Current     Hepatitis C Screening Once for adults born between 1945 and 1965  More frequently in patients at high risk for Hepatitis C Hep C Antibody:  09/28/2023    Screening Current   Diabetes Screening 1-2 times per year if you're at risk for diabetes or have pre-diabetes Fasting glucose: 99 mg/dL (3/26/2024)  A1C: 6.0 % (9/28/2023)  Screening Current   Cholesterol Screening Once every 5 years if you don't have a lipid disorder. May order more often based on risk factors. Lipid panel: 09/29/2023  Screening Not Indicated  History Lipid Disorder      Other Preventive Screenings Covered by Medicare:  Abdominal Aortic Aneurysm (AAA) Screening: covered once if your at risk. You're considered to be at risk if you have a family history of AAA or a male between the age of 65-75 who smoking at least 100 cigarettes in your lifetime.  Lung Cancer Screening: covers low dose CT scan once per year if you meet all of the following conditions: (1) Age 55-77; (2) No signs or symptoms of lung cancer; (3) Current smoker or have quit smoking within the last 15 years; (4) You have a tobacco smoking history of at least 20 pack years (packs per day x number of years you smoked); (5) You get a written order from a healthcare provider.  Glaucoma Screening: covered annually if you're considered high risk: (1) You have diabetes OR (2) Family history of glaucoma OR (3)  aged 50 and older OR (4)  American aged 65 and older  Osteoporosis Screening: covered every 2 years if you meet one of the following conditions: (1) Have a vertebral abnormality; (2) On glucocorticoid therapy for more than 3 months; (3) Have primary hyperparathyroidism; (4) On osteoporosis medications and need to assess response to drug therapy.  HIV Screening: covered annually if you're between the age of 15-65. Also covered annually if you are younger than 15 and older than 65 with risk factors for HIV infection. For pregnant patients, it is covered up to 3 times per pregnancy.    Immunizations:  Immunization Recommendations   Influenza Vaccine Annual influenza vaccination during flu season is  recommended for all persons aged >= 6 months who do not have contraindications   Pneumococcal Vaccine   * Pneumococcal conjugate vaccine = PCV13 (Prevnar 13), PCV15 (Vaxneuvance), PCV20 (Prevnar 20)  * Pneumococcal polysaccharide vaccine = PPSV23 (Pneumovax) Adults 19-63 yo with certain risk factors or if 65+ yo  If never received any pneumonia vaccine: recommend Prevnar 20 (PCV20)  Give PCV20 if previously received 1 dose of PCV13 or PPSV23   Hepatitis B Vaccine 3 dose series if at intermediate or high risk (ex: diabetes, end stage renal disease, liver disease)   Respiratory syncytial virus (RSV) Vaccine - COVERED BY MEDICARE PART D  * RSVPreF3 (Arexvy) CDC recommends that adults 60 years of age and older may receive a single dose of RSV vaccine using shared clinical decision-making (SCDM)   Tetanus (Td) Vaccine - COST NOT COVERED BY MEDICARE PART B Following completion of primary series, a booster dose should be given every 10 years to maintain immunity against tetanus. Td may also be given as tetanus wound prophylaxis.   Tdap Vaccine - COST NOT COVERED BY MEDICARE PART B Recommended at least once for all adults. For pregnant patients, recommended with each pregnancy.   Shingles Vaccine (Shingrix) - COST NOT COVERED BY MEDICARE PART B  2 shot series recommended in those 19 years and older who have or will have weakened immune systems or those 50 years and older     Health Maintenance Due:      Topic Date Due    Lung Cancer Screening  07/03/2024    Colorectal Cancer Screening  05/10/2025    Hepatitis C Screening  Completed     Immunizations Due:  There are no preventive care reminders to display for this patient.  Advance Directives   What are advance directives?  Advance directives are legal documents that state your wishes and plans for medical care. These plans are made ahead of time in case you lose your ability to make decisions for yourself. Advance directives can apply to any medical decision, such as the  treatments you want, and if you want to donate organs.   What are the types of advance directives?  There are many types of advance directives, and each state has rules about how to use them. You may choose a combination of any of the following:  Living will:  This is a written record of the treatment you want. You can also choose which treatments you do not want, which to limit, and which to stop at a certain time. This includes surgery, medicine, IV fluid, and tube feedings.   Durable power of  for healthcare (DPAHC):  This is a written record that states who you want to make healthcare choices for you when you are unable to make them for yourself. This person, called a proxy, is usually a family member or a friend. You may choose more than 1 proxy.  Do not resuscitate (DNR) order:  A DNR order is used in case your heart stops beating or you stop breathing. It is a request not to have certain forms of treatment, such as CPR. A DNR order may be included in other types of advance directives.  Medical directive:  This covers the care that you want if you are in a coma, near death, or unable to make decisions for yourself. You can list the treatments you want for each condition. Treatment may include pain medicine, surgery, blood transfusions, dialysis, IV or tube feedings, and a ventilator (breathing machine).  Values history:  This document has questions about your views, beliefs, and how you feel and think about life. This information can help others choose the care that you would choose.  Why are advance directives important?  An advance directive helps you control your care. Although spoken wishes may be used, it is better to have your wishes written down. Spoken wishes can be misunderstood, or not followed. Treatments may be given even if you do not want them. An advance directive may make it easier for your family to make difficult choices about your care.   Cigarette Smoking and Your Health   Risks to  your health if you smoke:  Nicotine and other chemicals found in tobacco damage every cell in your body. Even if you are a light smoker, you have an increased risk for cancer, heart disease, and lung disease. If you are pregnant or have diabetes, smoking increases your risk for complications.   Benefits to your health if you stop smoking:   You decrease respiratory symptoms such as coughing, wheezing, and shortness of breath.   You reduce your risk for cancers of the lung, mouth, throat, kidney, bladder, pancreas, stomach, and cervix. If you already have cancer, you increase the benefits of chemotherapy. You also reduce your risk for cancer returning or a second cancer from developing.   You reduce your risk for heart disease, blood clots, heart attack, and stroke.   You reduce your risk for lung infections, and diseases such as pneumonia, asthma, chronic bronchitis, and emphysema.  Your circulation improves. More oxygen can be delivered to your body. If you have diabetes, you lower your risk for complications, such as kidney, artery, and eye diseases. You also lower your risk for nerve damage. Nerve damage can lead to amputations, poor vision, and blindness.  You improve your body's ability to heal and to fight infections.  For more information and support to stop smoking:   [employer]  Phone: 5- 232 - 503-1417  Web Address: www.[employer]  Weight Management   Why it is important to manage your weight:  Being overweight increases your risk of health conditions such as heart disease, high blood pressure, type 2 diabetes, and certain types of cancer. It can also increase your risk for osteoarthritis, sleep apnea, and other respiratory problems. Aim for a slow, steady weight loss. Even a small amount of weight loss can lower your risk of health problems.  How to lose weight safely:  A safe and healthy way to lose weight is to eat fewer calories and get regular exercise. You can lose up about 1 pound a week by  decreasing the number of calories you eat by 500 calories each day.   Healthy meal plan for weight management:  A healthy meal plan includes a variety of foods, contains fewer calories, and helps you stay healthy. A healthy meal plan includes the following:  Eat whole-grain foods more often.  A healthy meal plan should contain fiber. Fiber is the part of grains, fruits, and vegetables that is not broken down by your body. Whole-grain foods are healthy and provide extra fiber in your diet. Some examples of whole-grain foods are whole-wheat breads and pastas, oatmeal, brown rice, and bulgur.  Eat a variety of vegetables every day.  Include dark, leafy greens such as spinach, kale, jeanie greens, and mustard greens. Eat yellow and orange vegetables such as carrots, sweet potatoes, and winter squash.   Eat a variety of fruits every day.  Choose fresh or canned fruit (canned in its own juice or light syrup) instead of juice. Fruit juice has very little or no fiber.  Eat low-fat dairy foods.  Drink fat-free (skim) milk or 1% milk. Eat fat-free yogurt and low-fat cottage cheese. Try low-fat cheeses such as mozzarella and other reduced-fat cheeses.  Choose meat and other protein foods that are low in fat.  Choose beans or other legumes such as split peas or lentils. Choose fish, skinless poultry (chicken or turkey), or lean cuts of red meat (beef or pork). Before you cook meat or poultry, cut off any visible fat.   Use less fat and oil.  Try baking foods instead of frying them. Add less fat, such as margarine, sour cream, regular salad dressing and mayonnaise to foods. Eat fewer high-fat foods. Some examples of high-fat foods include french fries, doughnuts, ice cream, and cakes.  Eat fewer sweets.  Limit foods and drinks that are high in sugar. This includes candy, cookies, regular soda, and sweetened drinks.  Exercise:  Exercise at least 30 minutes per day on most days of the week. Some examples of exercise include  walking, biking, dancing, and swimming. You can also fit in more physical activity by taking the stairs instead of the elevator or parking farther away from stores. Ask your healthcare provider about the best exercise plan for you.      © Copyright [employer] 2018 Information is for End User's use only and may not be sold, redistributed or otherwise used for commercial purposes. All illustrations and images included in CareNotes® are the copyrighted property of [employer] or [employer]      __________________________________________________    NEED BLOOD WORK DONE ON 9/9/2024

## 2024-06-06 NOTE — TELEPHONE ENCOUNTER
LM again for ayaka  he is to call back to schedule appt for stable b/l renal cysts.offered him appointment 6/14@ 9:20, 7/1 @ 2:40 or 7/15 @ 1:15. Next appointment with Dr Del Rosario is 9/5 or he can go to a different office to be seen by an Md Geovany hassan

## 2024-06-07 ENCOUNTER — TELEPHONE (OUTPATIENT)
Dept: OBGYN CLINIC | Facility: HOSPITAL | Age: 69
End: 2024-06-07

## 2024-06-07 DIAGNOSIS — K59.09 CHRONIC CONSTIPATION: ICD-10-CM

## 2024-06-07 NOTE — TELEPHONE ENCOUNTER
Patient called to refill his Linzess 290 MCG CAPS , please send it to: [id number] - [place] - [address] [place] [address]   Thank you.

## 2024-06-07 NOTE — TELEPHONE ENCOUNTER
Caller: Patient    Doctor/Office: Podiatry    Call regarding :  Reschedule appt     Call was transferred to: Podiatry

## 2024-06-08 DIAGNOSIS — J44.9 CHRONIC OBSTRUCTIVE PULMONARY DISEASE, UNSPECIFIED COPD TYPE (HCC): ICD-10-CM

## 2024-06-10 DIAGNOSIS — E53.8 VITAMIN B12 DEFICIENCY: Primary | ICD-10-CM

## 2024-06-10 RX ORDER — LINACLOTIDE 290 UG/1
CAPSULE, GELATIN COATED ORAL
Qty: 30 CAPSULE | Refills: 2 | Status: SHIPPED | OUTPATIENT
Start: 2024-06-10

## 2024-06-10 RX ORDER — CYANOCOBALAMIN 1000 UG/ML
1000 INJECTION, SOLUTION INTRAMUSCULAR; SUBCUTANEOUS ONCE
OUTPATIENT
Start: 2024-06-12

## 2024-06-10 RX ORDER — FLUTICASONE FUROATE, UMECLIDINIUM BROMIDE AND VILANTEROL TRIFENATATE 200; 62.5; 25 UG/1; UG/1; UG/1
1 POWDER RESPIRATORY (INHALATION) DAILY
Qty: 60 BLISTER | Refills: 1 | Status: SHIPPED | OUTPATIENT
Start: 2024-06-10

## 2024-06-12 ENCOUNTER — HOSPITAL ENCOUNTER (OUTPATIENT)
Dept: INFUSION CENTER | Facility: HOSPITAL | Age: 69
Discharge: HOME | End: 2024-06-12
Attending: INTERNAL MEDICINE

## 2024-06-13 ENCOUNTER — HOSPITAL ENCOUNTER (OUTPATIENT)
Dept: CT IMAGING | Facility: HOSPITAL | Age: 69
End: 2024-06-13
Payer: COMMERCIAL

## 2024-06-13 DIAGNOSIS — F17.210 SMOKING GREATER THAN 20 PACK YEARS: ICD-10-CM

## 2024-06-13 PROCEDURE — 71250 CT THORAX DX C-: CPT

## 2024-06-17 ENCOUNTER — TELEPHONE (OUTPATIENT)
Age: 69
End: 2024-06-17

## 2024-06-17 NOTE — TELEPHONE ENCOUNTER
Caller: Miguelangel     Doctor: Dr. Marquez     Reason for call: patient is not happy w/ the care he has been getting w/ Dr. Marquez. Stated he was unhappy with Dr. Underwood as well. I advised that since he has seen multiple providers, it would need to be discussed with the other offices to transfer care. He said he does not care what needs to be done, would just like to not have Dr. Marquez. He was tough and physical. Please advise.     Call back#: [phone]

## 2024-06-17 NOTE — TELEPHONE ENCOUNTER
After being on the phone with him for almost 15 minutes- ayaka does not want to come to [facility] or [place] office. I offered him to see dr caba in [place] and he said he doesn't want to step foot in this building. He refuses to see dr hinson in [place], [place] or [place]. He told me how he wasn't happy with his care here when he saw dr mckinney and his body hurts after his physical exam in march. I told him the only cardiology in [facility] then would be [facility]. He said that works for him as its only a mile and some from his house.

## 2024-06-20 DIAGNOSIS — I25.10 CORONARY ARTERIOSCLEROSIS IN NATIVE ARTERY: ICD-10-CM

## 2024-06-20 RX ORDER — DILTIAZEM HYDROCHLORIDE 180 MG/1
180 CAPSULE, COATED, EXTENDED RELEASE ORAL DAILY
Qty: 90 CAPSULE | Refills: 1 | Status: SHIPPED | OUTPATIENT
Start: 2024-06-20

## 2024-06-21 ENCOUNTER — TELEPHONE (OUTPATIENT)
Dept: INTERNAL MEDICINE CLINIC | Facility: CLINIC | Age: 69
End: 2024-06-21

## 2024-06-24 DIAGNOSIS — J44.9 COPD, SEVERITY TO BE DETERMINED (HCC): Primary | Chronic | ICD-10-CM

## 2024-06-24 DIAGNOSIS — R91.8 LUNG NODULES: ICD-10-CM

## 2024-06-26 NOTE — TELEPHONE ENCOUNTER
Pt called and said he had a missed call. Read verbatim the results and he agreed to do the CT again in 3 months. He asked if we could remind him in 3 months to get it done, I advised him to call central scheduling today to get an appt for 3 months from now. He said he didn't like to call them bc the wait time on the phone is so long. But he said he will try.    He also wanted to mention to Candida that he lost all the hair on his lower legs randomly, below his knees to his ankle. Can see his veins in his chest and has been having a lot of headaches lately. Isnt sure if they are all related and would like to know what Candida thinks. Please advise and notify.

## 2024-06-26 NOTE — TELEPHONE ENCOUNTER
I have not seen pt in a year, its hard to give any recommendation about his concerns without a visit. Recommend he schedule non urgent appt, long visit

## 2024-06-28 ENCOUNTER — APPOINTMENT (OUTPATIENT)
Dept: LAB | Facility: CLINIC | Age: 69
End: 2024-06-28
Payer: COMMERCIAL

## 2024-06-28 DIAGNOSIS — Z13.1 SCREENING FOR DIABETES MELLITUS: ICD-10-CM

## 2024-06-28 LAB
EST. AVERAGE GLUCOSE BLD GHB EST-MCNC: 123 MG/DL
HBA1C MFR BLD: 5.9 %

## 2024-06-28 PROCEDURE — 83036 HEMOGLOBIN GLYCOSYLATED A1C: CPT

## 2024-06-28 PROCEDURE — 36415 COLL VENOUS BLD VENIPUNCTURE: CPT

## 2024-07-01 PROBLEM — G57.93 NEUROPATHY OF BOTH FEET: Chronic | Status: ACTIVE | Noted: 2024-07-01

## 2024-07-01 PROBLEM — R10.84 GENERALIZED ABDOMINAL PAIN: Chronic | Status: ACTIVE | Noted: 2024-07-01

## 2024-07-01 PROBLEM — R10.84 GENERALIZED ABDOMINAL PAIN: Status: ACTIVE | Noted: 2024-07-01

## 2024-07-01 PROBLEM — R10.9 ABDOMINAL PAIN: Chronic | Status: ACTIVE | Noted: 2024-07-01

## 2024-07-01 PROBLEM — R10.9 ABDOMINAL PAIN: Status: ACTIVE | Noted: 2024-07-01

## 2024-07-01 PROBLEM — G57.93 NEUROPATHY OF BOTH FEET: Status: ACTIVE | Noted: 2024-07-01

## 2024-07-01 PROBLEM — R73.03 PREDIABETES: Chronic | Status: ACTIVE | Noted: 2024-07-01

## 2024-07-01 PROBLEM — R73.03 PREDIABETES: Status: ACTIVE | Noted: 2024-07-01

## 2024-07-03 ENCOUNTER — OFFICE VISIT (OUTPATIENT)
Dept: INTERNAL MEDICINE CLINIC | Facility: CLINIC | Age: 69
End: 2024-07-03
Payer: COMMERCIAL

## 2024-07-03 VITALS
TEMPERATURE: 97.9 F | DIASTOLIC BLOOD PRESSURE: 80 MMHG | HEIGHT: 70 IN | BODY MASS INDEX: 32.14 KG/M2 | SYSTOLIC BLOOD PRESSURE: 144 MMHG | HEART RATE: 87 BPM | OXYGEN SATURATION: 97 %

## 2024-07-03 DIAGNOSIS — I10 PRIMARY HYPERTENSION: ICD-10-CM

## 2024-07-03 DIAGNOSIS — R10.13 DYSPEPSIA: Primary | ICD-10-CM

## 2024-07-03 DIAGNOSIS — F33.9 DEPRESSION, RECURRENT (HCC): ICD-10-CM

## 2024-07-03 DIAGNOSIS — K21.9 GASTROESOPHAGEAL REFLUX DISEASE, UNSPECIFIED WHETHER ESOPHAGITIS PRESENT: Chronic | ICD-10-CM

## 2024-07-03 DIAGNOSIS — K82.8 BILIARY DYSKINESIA: ICD-10-CM

## 2024-07-03 PROBLEM — I74.5 OCCLUSION OF RIGHT ILIAC ARTERY (HCC): Status: RESOLVED | Noted: 2022-03-01 | Resolved: 2024-07-03

## 2024-07-03 PROBLEM — G40.401 GRAND MAL STATUS (HCC): Status: RESOLVED | Noted: 2019-03-24 | Resolved: 2024-07-03

## 2024-07-03 PROCEDURE — 99214 OFFICE O/P EST MOD 30 MIN: CPT | Performed by: INTERNAL MEDICINE

## 2024-07-03 PROCEDURE — G2211 COMPLEX E/M VISIT ADD ON: HCPCS | Performed by: INTERNAL MEDICINE

## 2024-07-03 RX ORDER — ESOMEPRAZOLE MAGNESIUM 40 MG/1
40 CAPSULE, DELAYED RELEASE ORAL DAILY
Qty: 90 CAPSULE | Refills: 0 | Status: SHIPPED | OUTPATIENT
Start: 2024-07-03 | End: 2024-10-01

## 2024-07-03 NOTE — PROGRESS NOTES
"Ambulatory Visit  Name: Miguelangel Lancaster      : 1955      MRN: [id number]  Encounter Provider: Bora Mcnamara DO  Encounter Date: 7/3/2024   Encounter department: [facility]    Assessment & Plan   1. Dyspepsia  -     Ambulatory Referral to Gastroenterology; Future  -     esomeprazole (NexIUM) 40 MG capsule; Take 1 capsule (40 mg total) by mouth in the morning  2. Biliary dyskinesia  -     NM Hepatobiliary w RX; Future; Expected date: 2024  3. Gastroesophageal reflux disease, unspecified whether esophagitis present  Assessment & Plan:  Trial of stronger PPI Pantoprazole to Nexium 40 mg Qday  4. Depression, recurrent (HCC)  Assessment & Plan:  The patient notes no symptoms at this time and we will continue to follow  5. Primary hypertension  Assessment & Plan:  Mild HTN and we will follow up at the next visit.       History of Present Illness     The patient noted that he had physical manipulation during an exam by his cardiologist and states that since that time he has had eructation.  The patient no decreased appetite and bloating also.  The patient does not have any change in his bowels other than constipation.  He is on Protonix 40 mg Daily.  He notes no fever or chills.  CT of abdomen and pelvis notes no obvious pathology.        Review of Systems   HENT: Negative.     Respiratory:  Negative for cough, chest tightness and shortness of breath.        Objective     /80   Pulse 87   Temp 97.9 °F (36.6 °C)   Ht 5' 10\" (1.778 m)   SpO2 97%   BMI 32.14 kg/m²     Physical Exam  Vitals and nursing note reviewed.   Constitutional:       General: He is not in acute distress.     Appearance: He is well-developed.   HENT:      Head: Normocephalic and atraumatic.   Eyes:      Conjunctiva/sclera: Conjunctivae normal.   Cardiovascular:      Rate and Rhythm: Regular rhythm.      Heart sounds: No murmur heard.  Pulmonary:      Effort: Pulmonary effort is normal. No respiratory distress. "      Breath sounds: Normal breath sounds.   Abdominal:      Palpations: Abdomen is soft.      Tenderness: There is no abdominal tenderness.   Musculoskeletal:         General: No swelling.      Cervical back: Neck supple.   Skin:     General: Skin is warm and dry.      Capillary Refill: Capillary refill takes less than 2 seconds.   Neurological:      Mental Status: He is alert.   Psychiatric:         Mood and Affect: Mood normal.       Administrative Statements

## 2024-07-05 PROBLEM — Z13.1 SCREENING FOR DIABETES MELLITUS: Status: RESOLVED | Noted: 2022-07-26 | Resolved: 2024-07-05

## 2024-07-05 PROBLEM — Z13.220 SCREENING FOR HYPERLIPIDEMIA: Status: RESOLVED | Noted: 2022-07-26 | Resolved: 2024-07-05

## 2024-07-05 PROBLEM — Z00.00 MEDICARE ANNUAL WELLNESS VISIT, SUBSEQUENT: Status: RESOLVED | Noted: 2024-06-05 | Resolved: 2024-07-05

## 2024-07-09 DIAGNOSIS — F41.9 ANXIETY: ICD-10-CM

## 2024-07-09 RX ORDER — LORAZEPAM 0.5 MG/1
0.5 TABLET ORAL EVERY 8 HOURS PRN
Qty: 90 TABLET | Refills: 0 | Status: SHIPPED | OUTPATIENT
Start: 2024-07-09

## 2024-07-17 ENCOUNTER — HOSPITAL ENCOUNTER (OUTPATIENT)
Dept: NUCLEAR MEDICINE | Facility: HOSPITAL | Age: 69
Discharge: HOME | End: 2024-07-17
Payer: COMMERCIAL

## 2024-07-17 DIAGNOSIS — K82.8 BILIARY DYSKINESIA: ICD-10-CM

## 2024-07-17 PROCEDURE — 78227 HEPATOBIL SYST IMAGE W/DRUG: CPT

## 2024-07-17 PROCEDURE — A9537 TC99M MEBROFENIN: HCPCS

## 2024-07-17 RX ORDER — SINCALIDE 5 UG/5ML
0.02 INJECTION, POWDER, LYOPHILIZED, FOR SOLUTION INTRAVENOUS
Status: COMPLETED | OUTPATIENT
Start: 2024-07-17 | End: 2024-07-17

## 2024-07-17 RX ADMIN — SINCALIDE 2 MCG: 5 INJECTION, POWDER, LYOPHILIZED, FOR SOLUTION INTRAVENOUS at 11:45

## 2024-07-24 ENCOUNTER — LAB (OUTPATIENT)
Dept: LAB | Facility: CLINIC | Age: 69
End: 2024-07-24
Payer: COMMERCIAL

## 2024-07-24 ENCOUNTER — OFFICE VISIT (OUTPATIENT)
Dept: INTERNAL MEDICINE CLINIC | Facility: CLINIC | Age: 69
End: 2024-07-24
Payer: COMMERCIAL

## 2024-07-24 VITALS
OXYGEN SATURATION: 97 % | WEIGHT: 223 LBS | DIASTOLIC BLOOD PRESSURE: 62 MMHG | TEMPERATURE: 97.5 F | SYSTOLIC BLOOD PRESSURE: 110 MMHG | HEIGHT: 70 IN | BODY MASS INDEX: 31.92 KG/M2 | HEART RATE: 80 BPM

## 2024-07-24 DIAGNOSIS — F17.210 SMOKING GREATER THAN 20 PACK YEARS: ICD-10-CM

## 2024-07-24 DIAGNOSIS — K57.92 DIVERTICULITIS: ICD-10-CM

## 2024-07-24 DIAGNOSIS — E78.2 MIXED HYPERLIPIDEMIA: ICD-10-CM

## 2024-07-24 DIAGNOSIS — Z86.718 HISTORY OF DVT (DEEP VEIN THROMBOSIS): ICD-10-CM

## 2024-07-24 DIAGNOSIS — Z86.711 HISTORY OF PULMONARY EMBOLISM: ICD-10-CM

## 2024-07-24 DIAGNOSIS — I10 PRIMARY HYPERTENSION: ICD-10-CM

## 2024-07-24 DIAGNOSIS — I77.1 ARTERIAL INSUFFICIENCY (HCC): ICD-10-CM

## 2024-07-24 DIAGNOSIS — Z72.0 TOBACCO ABUSE: Chronic | ICD-10-CM

## 2024-07-24 DIAGNOSIS — I73.9 PAD (PERIPHERAL ARTERY DISEASE) (HCC): Primary | ICD-10-CM

## 2024-07-24 LAB
ALBUMIN SERPL BCG-MCNC: 3.9 G/DL (ref 3.5–5)
ALP SERPL-CCNC: 91 U/L (ref 34–104)
ALT SERPL W P-5'-P-CCNC: 7 U/L (ref 7–52)
ANION GAP SERPL CALCULATED.3IONS-SCNC: 10 MMOL/L (ref 4–13)
AST SERPL W P-5'-P-CCNC: 10 U/L (ref 13–39)
BASOPHILS # BLD AUTO: 0.08 THOUSANDS/ÂΜL (ref 0–0.1)
BASOPHILS NFR BLD AUTO: 1 % (ref 0–1)
BILIRUB SERPL-MCNC: 0.53 MG/DL (ref 0.2–1)
BUN SERPL-MCNC: 6 MG/DL (ref 5–25)
CALCIUM SERPL-MCNC: 8.7 MG/DL (ref 8.4–10.2)
CHLORIDE SERPL-SCNC: 104 MMOL/L (ref 96–108)
CHOLEST SERPL-MCNC: 205 MG/DL
CO2 SERPL-SCNC: 25 MMOL/L (ref 21–32)
CREAT SERPL-MCNC: 0.98 MG/DL (ref 0.6–1.3)
CREAT UR-MCNC: 126.1 MG/DL
D DIMER PPP FEU-MCNC: 0.29 UG/ML FEU
EOSINOPHIL # BLD AUTO: 0.26 THOUSAND/ÂΜL (ref 0–0.61)
EOSINOPHIL NFR BLD AUTO: 3 % (ref 0–6)
ERYTHROCYTE [DISTWIDTH] IN BLOOD BY AUTOMATED COUNT: 12.8 % (ref 11.6–15.1)
GFR SERPL CREATININE-BSD FRML MDRD: 78 ML/MIN/1.73SQ M
GLUCOSE P FAST SERPL-MCNC: 90 MG/DL (ref 65–99)
HCT VFR BLD AUTO: 41.8 % (ref 36.5–49.3)
HDLC SERPL-MCNC: 30 MG/DL
HGB BLD-MCNC: 14.3 G/DL (ref 12–17)
IMM GRANULOCYTES # BLD AUTO: 0.07 THOUSAND/UL (ref 0–0.2)
IMM GRANULOCYTES NFR BLD AUTO: 1 % (ref 0–2)
LDLC SERPL CALC-MCNC: 137 MG/DL (ref 0–100)
LYMPHOCYTES # BLD AUTO: 2.16 THOUSANDS/ÂΜL (ref 0.6–4.47)
LYMPHOCYTES NFR BLD AUTO: 21 % (ref 14–44)
MCH RBC QN AUTO: 30.2 PG (ref 26.8–34.3)
MCHC RBC AUTO-ENTMCNC: 34.2 G/DL (ref 31.4–37.4)
MCV RBC AUTO: 88 FL (ref 82–98)
MICROALBUMIN UR-MCNC: 14.8 MG/L
MICROALBUMIN/CREAT 24H UR: 12 MG/G CREATININE (ref 0–30)
MONOCYTES # BLD AUTO: 0.52 THOUSAND/ÂΜL (ref 0.17–1.22)
MONOCYTES NFR BLD AUTO: 5 % (ref 4–12)
NEUTROPHILS # BLD AUTO: 7.34 THOUSANDS/ÂΜL (ref 1.85–7.62)
NEUTS SEG NFR BLD AUTO: 69 % (ref 43–75)
NRBC BLD AUTO-RTO: 0 /100 WBCS
PLATELET # BLD AUTO: 272 THOUSANDS/UL (ref 149–390)
PMV BLD AUTO: 9.2 FL (ref 8.9–12.7)
POTASSIUM SERPL-SCNC: 3.7 MMOL/L (ref 3.5–5.3)
PROT SERPL-MCNC: 6.4 G/DL (ref 6.4–8.4)
RBC # BLD AUTO: 4.74 MILLION/UL (ref 3.88–5.62)
SODIUM SERPL-SCNC: 139 MMOL/L (ref 135–147)
TRIGL SERPL-MCNC: 188 MG/DL
WBC # BLD AUTO: 10.43 THOUSAND/UL (ref 4.31–10.16)

## 2024-07-24 PROCEDURE — 85025 COMPLETE CBC W/AUTO DIFF WBC: CPT

## 2024-07-24 PROCEDURE — 80061 LIPID PANEL: CPT

## 2024-07-24 PROCEDURE — G2211 COMPLEX E/M VISIT ADD ON: HCPCS | Performed by: INTERNAL MEDICINE

## 2024-07-24 PROCEDURE — 85379 FIBRIN DEGRADATION QUANT: CPT

## 2024-07-24 PROCEDURE — 36415 COLL VENOUS BLD VENIPUNCTURE: CPT

## 2024-07-24 PROCEDURE — 82570 ASSAY OF URINE CREATININE: CPT

## 2024-07-24 PROCEDURE — 82043 UR ALBUMIN QUANTITATIVE: CPT

## 2024-07-24 PROCEDURE — 99214 OFFICE O/P EST MOD 30 MIN: CPT | Performed by: INTERNAL MEDICINE

## 2024-07-24 PROCEDURE — 80053 COMPREHEN METABOLIC PANEL: CPT

## 2024-07-24 RX ORDER — CIPROFLOXACIN 500 MG/1
500 TABLET, FILM COATED ORAL EVERY 12 HOURS SCHEDULED
Qty: 14 TABLET | Refills: 0 | Status: SHIPPED | OUTPATIENT
Start: 2024-07-24 | End: 2024-07-31

## 2024-07-24 RX ORDER — METRONIDAZOLE 500 MG/1
500 TABLET ORAL EVERY 8 HOURS SCHEDULED
Qty: 21 TABLET | Refills: 0 | Status: SHIPPED | OUTPATIENT
Start: 2024-07-24 | End: 2024-07-31

## 2024-07-24 RX ORDER — NICOTINE 21 MG/24HR
1 PATCH, TRANSDERMAL 24 HOURS TRANSDERMAL EVERY 24 HOURS
Qty: 28 PATCH | Refills: 0 | Status: SHIPPED | OUTPATIENT
Start: 2024-07-24 | End: 2024-08-21

## 2024-07-24 NOTE — PROGRESS NOTES
Ambulatory Visit  Name: Miguelangel Lancaster      : 1955      MRN: [id number]  Encounter Provider: Bora Mcnamara DO  Encounter Date: 2024   Encounter department: [facility]    Assessment & Plan   1. PAD (peripheral artery disease) (HCC)  Assessment & Plan:  Arterial dopplers of the lower extremity ordered  2. Primary hypertension  Assessment & Plan:  BP controlled and no change at this time.    Orders:  -     CBC and differential; Future  -     Albumin / creatinine urine ratio; Future  3. Mixed hyperlipidemia  -     Comprehensive metabolic panel; Future  -     Lipid Panel with Direct LDL reflex; Future  4. Smoking greater than 20 pack years  Assessment & Plan:  The patient is interested in smoking cessation and we will provide the 12 mg nicotine patch  Orders:  -     nicotine (NICODERM CQ) 21 mg/24 hr TD 24 hr patch; Place 1 patch on the skin over 24 hours every 24 hours for 28 days  5. Tobacco abuse  6. Arterial insufficiency (HCC)  -     VAS ARTERIAL DUPLEX- LOWER LIMB BILATERAL; Future; Expected date: 2024  7. Diverticulitis  -     ciprofloxacin (CIPRO) 500 mg tablet; Take 1 tablet (500 mg total) by mouth every 12 (twelve) hours for 7 days  -     metroNIDAZOLE (FLAGYL) 500 mg tablet; Take 1 tablet (500 mg total) by mouth every 8 (eight) hours for 7 days  8. History of DVT (deep vein thrombosis)  -     D-dimer, quantitative; Future  9. History of pulmonary embolism  Assessment & Plan:  D-Dimer ordered  Noted on Xarelto 20 mg Qday       History of Present Illness     Lung screen negative.  HIDA scan negative.  Reviewed the recent labs.  Notes claudication like symptoms in his feet with ambulation.  Concerned regarding edema of his bilateral feet.  He is still noting pain, but it is in the LLQ.  Prior diverticulosis noted on the CT of the abdomen and pelvis.        Review of Systems   Constitutional:  Negative for chills and fever.   HENT:  Negative for ear pain and sore throat.   "  Eyes:  Negative for pain and visual disturbance.   Respiratory:  Negative for cough and shortness of breath.    Cardiovascular:  Negative for chest pain and palpitations.   Gastrointestinal:  Negative for abdominal pain and vomiting.   Genitourinary:  Negative for dysuria and hematuria.   Musculoskeletal:  Negative for arthralgias and back pain.   Skin:  Negative for color change and rash.   Neurological:  Negative for seizures and syncope.   All other systems reviewed and are negative.      Objective     /62 (BP Location: Left arm, Patient Position: Sitting)   Pulse 80   Temp 97.5 °F (36.4 °C) (Tympanic)   Ht 5' 10\" (1.778 m)   Wt 101 kg (223 lb)   SpO2 97%   BMI 32.00 kg/m²     Physical Exam  Vitals and nursing note reviewed.   Constitutional:       General: He is not in acute distress.     Appearance: He is well-developed.   HENT:      Head: Normocephalic and atraumatic.   Eyes:      Conjunctiva/sclera: Conjunctivae normal.   Cardiovascular:      Rate and Rhythm: Normal rate and regular rhythm.      Heart sounds: No murmur heard.  Pulmonary:      Effort: Pulmonary effort is normal. No respiratory distress.      Breath sounds: Normal breath sounds.   Abdominal:      Palpations: Abdomen is soft.      Tenderness: There is abdominal tenderness.   Musculoskeletal:         General: No swelling.      Cervical back: Neck supple.   Skin:     General: Skin is warm and dry.      Capillary Refill: Capillary refill takes less than 2 seconds.   Neurological:      Mental Status: He is alert.   Psychiatric:         Mood and Affect: Mood normal.       Administrative Statements           "

## 2024-07-25 ENCOUNTER — TELEPHONE (OUTPATIENT)
Dept: INTERNAL MEDICINE CLINIC | Facility: CLINIC | Age: 69
End: 2024-07-25

## 2024-07-25 ENCOUNTER — HOSPITAL ENCOUNTER (OUTPATIENT)
Dept: NON INVASIVE DIAGNOSTICS | Facility: HOSPITAL | Age: 69
Discharge: HOME | End: 2024-07-25
Payer: COMMERCIAL

## 2024-07-25 DIAGNOSIS — I77.1 ARTERIAL INSUFFICIENCY (HCC): ICD-10-CM

## 2024-07-25 PROCEDURE — 93925 LOWER EXTREMITY STUDY: CPT

## 2024-07-25 PROCEDURE — 93923 UPR/LXTR ART STDY 3+ LVLS: CPT

## 2024-07-25 NOTE — TELEPHONE ENCOUNTER
----- Message from Bora Mcnamara DO sent at 7/25/2024  3:28 PM EDT -----  Mild changes on labs and we will discuss at our next follow up

## 2024-07-27 PROCEDURE — 93925 LOWER EXTREMITY STUDY: CPT | Performed by: SURGERY

## 2024-07-27 PROCEDURE — 93922 UPR/L XTREMITY ART 2 LEVELS: CPT | Performed by: SURGERY

## 2024-07-29 ENCOUNTER — TELEPHONE (OUTPATIENT)
Dept: INTERNAL MEDICINE CLINIC | Facility: CLINIC | Age: 69
End: 2024-07-29

## 2024-07-29 NOTE — TELEPHONE ENCOUNTER
----- Message from Bora Mcnamara DO sent at 7/29/2024  7:07 AM EDT -----  Arterial doppler was noted to be in the normal range

## 2024-08-02 ENCOUNTER — OFFICE VISIT (OUTPATIENT)
Dept: INTERNAL MEDICINE CLINIC | Facility: CLINIC | Age: 69
End: 2024-08-02
Payer: COMMERCIAL

## 2024-08-02 ENCOUNTER — TELEPHONE (OUTPATIENT)
Age: 69
End: 2024-08-02

## 2024-08-02 VITALS
HEIGHT: 70 IN | HEART RATE: 90 BPM | SYSTOLIC BLOOD PRESSURE: 124 MMHG | TEMPERATURE: 97.7 F | OXYGEN SATURATION: 96 % | BODY MASS INDEX: 32.64 KG/M2 | DIASTOLIC BLOOD PRESSURE: 82 MMHG | WEIGHT: 228 LBS

## 2024-08-02 DIAGNOSIS — L65.9 HAIR LOSS: ICD-10-CM

## 2024-08-02 DIAGNOSIS — J44.9 CHRONIC OBSTRUCTIVE PULMONARY DISEASE, UNSPECIFIED COPD TYPE (HCC): ICD-10-CM

## 2024-08-02 DIAGNOSIS — D72.829 LEUKOCYTOSIS, UNSPECIFIED TYPE: ICD-10-CM

## 2024-08-02 DIAGNOSIS — R13.19 ESOPHAGEAL DYSPHAGIA: Primary | ICD-10-CM

## 2024-08-02 DIAGNOSIS — E78.00 ELEVATED LDL CHOLESTEROL LEVEL: ICD-10-CM

## 2024-08-02 DIAGNOSIS — E78.00 ELEVATED CHOLESTEROL: ICD-10-CM

## 2024-08-02 DIAGNOSIS — I10 PRIMARY HYPERTENSION: ICD-10-CM

## 2024-08-02 PROCEDURE — 1159F MED LIST DOCD IN RCRD: CPT | Performed by: INTERNAL MEDICINE

## 2024-08-02 PROCEDURE — 3074F SYST BP LT 130 MM HG: CPT | Performed by: INTERNAL MEDICINE

## 2024-08-02 PROCEDURE — 1160F RVW MEDS BY RX/DR IN RCRD: CPT | Performed by: INTERNAL MEDICINE

## 2024-08-02 PROCEDURE — G2211 COMPLEX E/M VISIT ADD ON: HCPCS | Performed by: INTERNAL MEDICINE

## 2024-08-02 PROCEDURE — 3079F DIAST BP 80-89 MM HG: CPT | Performed by: INTERNAL MEDICINE

## 2024-08-02 PROCEDURE — 99214 OFFICE O/P EST MOD 30 MIN: CPT | Performed by: INTERNAL MEDICINE

## 2024-08-02 RX ORDER — ROSUVASTATIN CALCIUM 5 MG/1
5 TABLET, COATED ORAL DAILY
Qty: 30 TABLET | Refills: 5 | Status: SHIPPED | OUTPATIENT
Start: 2024-08-02

## 2024-08-02 RX ORDER — FLUTICASONE FUROATE, UMECLIDINIUM BROMIDE AND VILANTEROL TRIFENATATE 200; 62.5; 25 UG/1; UG/1; UG/1
1 POWDER RESPIRATORY (INHALATION) DAILY
Qty: 60 BLISTER | Refills: 1 | Status: SHIPPED | OUTPATIENT
Start: 2024-08-02

## 2024-08-02 NOTE — TELEPHONE ENCOUNTER
Patient does not follow here anymore with [facility]. Will fax form once received to the appropriate office

## 2024-08-02 NOTE — TELEPHONE ENCOUNTER
Caller: Miguelangel Lancaster    Doctor: Dr. Markson    Call back #: [phone]    Reason for call: Patient called office stating that [employer] is going to send the [place] office a form today that needs to be filled out by the clinical team in order to verify that patient has appointment August 6th at 2:30 pm. Patient needs this filled out so that the bus can pick him up at desired time and drop him off at the correct location. Please advise. Thank you!

## 2024-08-02 NOTE — TELEPHONE ENCOUNTER
"This pt is wondering If you are putting a referral in for him to get the \"scope down his throat\"? Are you putting that in for him, or is it just the referral for gastro?  "

## 2024-08-02 NOTE — PROGRESS NOTES
"The patient Ambulatory Visit  Name: Miguelangel Lancaster      : 1955      MRN: [id number]  Encounter Provider: Bora Mcnamara DO  Encounter Date: 2024   Encounter department: [facility]    Assessment & Plan   1. Esophageal dysphagia  Comments:  Follow up with GI for further evaluation  Orders:  -     Ambulatory Referral to Gastroenterology; Future  2. Hair loss  Comments:  check testosterone level  Orders:  -     Testosterone; Future  3. Primary hypertension  Assessment & Plan:  BP is good today diltiazem  mg Qday  Furosemide 40 mg Qday  Metoprolol XL 25 mg QDay  4. Elevated LDL cholesterol level  Comments:  Started on Crestor 5 mg and we will follow up on the LDL cholesterol in 6 month  Orders:  -     LDL cholesterol, direct; Future  5. Elevated cholesterol  -     rosuvastatin (CRESTOR) 5 mg tablet; Take 1 tablet (5 mg total) by mouth daily  6. Leukocytosis, unspecified type  -     CBC and differential; Future       History of Present Illness     The patient was seen and examined and noted to have several mild abnormalities on your labs.  The patient states has some issues with swallowing and notes issues with solids feeling like they get stuck.        Review of Systems   Constitutional:  Negative for chills, fatigue and fever.   HENT: Negative.     Respiratory:  Negative for cough, chest tightness and shortness of breath.    Cardiovascular:  Negative for chest pain and palpitations.   Gastrointestinal:  Negative for abdominal pain, constipation, diarrhea, nausea and vomiting.   Genitourinary: Negative.    Musculoskeletal:  Negative for back pain and myalgias.   Skin: Negative.    Neurological: Negative.    Psychiatric/Behavioral:  Negative for dysphoric mood. The patient is not nervous/anxious.        Objective     /82 (BP Location: Left arm, Patient Position: Sitting, Cuff Size: Adult)   Pulse 90   Temp 97.7 °F (36.5 °C) (Tympanic)   Ht 5' 10\" (1.778 m)   Wt 103 kg (228 " lb)   SpO2 96%   BMI 32.71 kg/m²     Physical Exam  Vitals and nursing note reviewed.   Constitutional:       General: He is not in acute distress.     Appearance: He is well-developed.   HENT:      Head: Normocephalic and atraumatic.   Eyes:      Conjunctiva/sclera: Conjunctivae normal.   Cardiovascular:      Rate and Rhythm: Normal rate and regular rhythm.      Heart sounds: No murmur heard.  Pulmonary:      Effort: Pulmonary effort is normal. No respiratory distress.      Breath sounds: Normal breath sounds.   Abdominal:      Palpations: Abdomen is soft.      Tenderness: There is no abdominal tenderness.   Musculoskeletal:         General: No swelling.      Cervical back: Neck supple.   Skin:     General: Skin is warm and dry.      Capillary Refill: Capillary refill takes less than 2 seconds.   Neurological:      Mental Status: He is alert.   Psychiatric:         Mood and Affect: Mood normal.       Administrative Statements

## 2024-08-05 NOTE — TELEPHONE ENCOUNTER
Fax received from [address]  P: [phone] ext [phone]  F: [phone]    Form scanned in pts chart and placed in [employer] PA-C folder.

## 2024-08-07 ENCOUNTER — TELEPHONE (OUTPATIENT)
Age: 69
End: 2024-08-07

## 2024-08-07 ENCOUNTER — TELEPHONE (OUTPATIENT)
Dept: HEMATOLOGY ONCOLOGY | Facility: CLINIC | Age: 69
End: 2024-08-07

## 2024-08-07 DIAGNOSIS — F17.210 SMOKING GREATER THAN 20 PACK YEARS: ICD-10-CM

## 2024-08-07 DIAGNOSIS — J44.9 CHRONIC OBSTRUCTIVE PULMONARY DISEASE, UNSPECIFIED COPD TYPE (HCC): ICD-10-CM

## 2024-08-07 DIAGNOSIS — E78.00 ELEVATED CHOLESTEROL: ICD-10-CM

## 2024-08-07 RX ORDER — FLUTICASONE FUROATE, UMECLIDINIUM BROMIDE AND VILANTEROL TRIFENATATE 200; 62.5; 25 UG/1; UG/1; UG/1
1 POWDER RESPIRATORY (INHALATION) DAILY
Qty: 180 BLISTER | Refills: 1 | Status: SHIPPED | OUTPATIENT
Start: 2024-08-07

## 2024-08-07 RX ORDER — ROSUVASTATIN CALCIUM 5 MG/1
5 TABLET, COATED ORAL DAILY
Qty: 90 TABLET | Refills: 1 | Status: SHIPPED | OUTPATIENT
Start: 2024-08-07

## 2024-08-07 RX ORDER — NICOTINE 21 MG/24HR
1 PATCH, TRANSDERMAL 24 HOURS TRANSDERMAL EVERY 24 HOURS
Qty: 90 PATCH | Refills: 0 | Status: SHIPPED | OUTPATIENT
Start: 2024-08-07 | End: 2024-11-05

## 2024-08-07 NOTE — TELEPHONE ENCOUNTER
Patient is calling regarding his medications as they are all for a 30-day supply.  He states with the amount of medications he has and them all being filled on different schedules, he is at the pharmacy constantly every month.  He states he has to walk there and back each time as he has no vehicle and it takes him a long time.  He is asking if there is any way Candida would consider making his medications all 90-day supply instead of 30 so he doesn't have to get to the pharmacy as much.  This has been difficult for him as it has been very hot out.    He would like a call back to let him know and appreciates it very much.

## 2024-08-07 NOTE — TELEPHONE ENCOUNTER
Patient returned call, he wanted to check the status of refill request. I informed him that the orders were sent this afternoon. However, he wants to inquire about his blood thinner medication. Please advise.

## 2024-08-07 NOTE — TELEPHONE ENCOUNTER
I havent seen pt in over a year so I was unaware of the no refill issue, ok to order 90 d supplies of his meds with 1 refill

## 2024-08-07 NOTE — TELEPHONE ENCOUNTER
Pt requesting if he can be squeezed in sooner than his appt on 9/24/24 appt with Dr. Hadley if possible. Pt would like a call back at [phone]. Thank you.

## 2024-08-08 DIAGNOSIS — I26.99 ACUTE PULMONARY EMBOLISM WITHOUT ACUTE COR PULMONALE, UNSPECIFIED PULMONARY EMBOLISM TYPE (HCC): ICD-10-CM

## 2024-08-08 NOTE — TELEPHONE ENCOUNTER
Left message for patient stating that at this time Dr. Hadley does not have any sooner openings, advised patient if he has any questions or concerns to please call our office.

## 2024-08-19 DIAGNOSIS — J01.40 ACUTE NON-RECURRENT PANSINUSITIS: ICD-10-CM

## 2024-08-19 NOTE — TELEPHONE ENCOUNTER
Medication: Fluticasone     Dose/Frequency: 50 mcg / act nasal     Quantity: 9.9 ml     Pharmacy: [id number]    Office:   [x] PCP/Provider -   [] Speciality/Provider -     Does the patient have enough for 3 days?   [] Yes   [x] No - Send as HP to POD

## 2024-08-20 RX ORDER — FLUTICASONE PROPIONATE 50 MCG
1 SPRAY, SUSPENSION (ML) NASAL DAILY
Qty: 9.9 ML | Refills: 0 | Status: SHIPPED | OUTPATIENT
Start: 2024-08-20

## 2024-09-01 DIAGNOSIS — I25.119 CORONARY ARTERY DISEASE INVOLVING NATIVE CORONARY ARTERY OF NATIVE HEART WITH ANGINA PECTORIS (HCC): ICD-10-CM

## 2024-09-02 RX ORDER — POTASSIUM CHLORIDE 1500 MG/1
1 TABLET, EXTENDED RELEASE ORAL DAILY
Qty: 30 TABLET | Refills: 5 | Status: SHIPPED | OUTPATIENT
Start: 2024-09-02

## 2024-09-11 DIAGNOSIS — F41.9 ANXIETY: ICD-10-CM

## 2024-09-12 ENCOUNTER — TELEPHONE (OUTPATIENT)
Age: 69
End: 2024-09-12

## 2024-09-12 RX ORDER — LORAZEPAM 0.5 MG/1
0.5 TABLET ORAL EVERY 8 HOURS PRN
Qty: 90 TABLET | Refills: 0 | Status: SHIPPED | OUTPATIENT
Start: 2024-09-12

## 2024-09-12 NOTE — TELEPHONE ENCOUNTER
PT called in requesting medication refill of the following medication:    LORazepam (ATIVAN) 0.5 mg tablet     Script going to the following pharmacy:  [id number] - [place] - [address] [place] [address] [phone]     Appears there is currently an order placed. Pt stated pharmacy called in, that he wanted to make sure it went through. States he has 1 pill remaining.    For any further information or questions, please call pt. Thank you!    Miguelangel Lancaster   [phone]

## 2024-09-20 ENCOUNTER — HOSPITAL ENCOUNTER (OUTPATIENT)
Dept: CT IMAGING | Facility: HOSPITAL | Age: 69
End: 2024-09-20
Payer: COMMERCIAL

## 2024-09-20 DIAGNOSIS — R91.8 LUNG NODULES: ICD-10-CM

## 2024-09-20 DIAGNOSIS — J44.9 COPD, SEVERITY TO BE DETERMINED (HCC): Chronic | ICD-10-CM

## 2024-09-20 PROCEDURE — 71250 CT THORAX DX C-: CPT

## 2024-09-24 ENCOUNTER — TELEPHONE (OUTPATIENT)
Dept: INTERNAL MEDICINE CLINIC | Facility: CLINIC | Age: 69
End: 2024-09-24

## 2024-09-24 ENCOUNTER — OFFICE VISIT (OUTPATIENT)
Dept: HEMATOLOGY ONCOLOGY | Facility: CLINIC | Age: 69
End: 2024-09-24
Payer: COMMERCIAL

## 2024-09-24 ENCOUNTER — APPOINTMENT (OUTPATIENT)
Dept: LAB | Facility: CLINIC | Age: 69
End: 2024-09-24
Payer: COMMERCIAL

## 2024-09-24 VITALS
BODY MASS INDEX: 32.21 KG/M2 | HEART RATE: 75 BPM | WEIGHT: 225 LBS | RESPIRATION RATE: 18 BRPM | HEIGHT: 70 IN | TEMPERATURE: 98 F | SYSTOLIC BLOOD PRESSURE: 136 MMHG | DIASTOLIC BLOOD PRESSURE: 80 MMHG | OXYGEN SATURATION: 98 %

## 2024-09-24 DIAGNOSIS — D50.9 IRON DEFICIENCY ANEMIA, UNSPECIFIED IRON DEFICIENCY ANEMIA TYPE: ICD-10-CM

## 2024-09-24 DIAGNOSIS — E53.8 VITAMIN B12 DEFICIENCY: Primary | ICD-10-CM

## 2024-09-24 DIAGNOSIS — E53.8 VITAMIN B12 DEFICIENCY: ICD-10-CM

## 2024-09-24 DIAGNOSIS — R91.1 LUNG NODULE: Primary | ICD-10-CM

## 2024-09-24 DIAGNOSIS — E53.8 FOLIC ACID DEFICIENCY: ICD-10-CM

## 2024-09-24 DIAGNOSIS — I26.99 ACUTE PULMONARY EMBOLISM WITHOUT ACUTE COR PULMONALE, UNSPECIFIED PULMONARY EMBOLISM TYPE (HCC): ICD-10-CM

## 2024-09-24 DIAGNOSIS — D50.0 IRON DEFICIENCY ANEMIA DUE TO CHRONIC BLOOD LOSS: ICD-10-CM

## 2024-09-24 LAB
ALBUMIN SERPL BCG-MCNC: 4.2 G/DL (ref 3.5–5)
ALP SERPL-CCNC: 92 U/L (ref 34–104)
ALT SERPL W P-5'-P-CCNC: 12 U/L (ref 7–52)
ANION GAP SERPL CALCULATED.3IONS-SCNC: 9 MMOL/L (ref 4–13)
AST SERPL W P-5'-P-CCNC: 12 U/L (ref 13–39)
BASOPHILS # BLD AUTO: 0.07 THOUSANDS/ΜL (ref 0–0.1)
BASOPHILS NFR BLD AUTO: 1 % (ref 0–1)
BILIRUB SERPL-MCNC: 0.52 MG/DL (ref 0.2–1)
BUN SERPL-MCNC: 8 MG/DL (ref 5–25)
CALCIUM SERPL-MCNC: 9.1 MG/DL (ref 8.4–10.2)
CHLORIDE SERPL-SCNC: 99 MMOL/L (ref 96–108)
CO2 SERPL-SCNC: 26 MMOL/L (ref 21–32)
CREAT SERPL-MCNC: 0.91 MG/DL (ref 0.6–1.3)
CRP SERPL QL: 11.8 MG/L
EOSINOPHIL # BLD AUTO: 0.24 THOUSAND/ΜL (ref 0–0.61)
EOSINOPHIL NFR BLD AUTO: 3 % (ref 0–6)
ERYTHROCYTE [DISTWIDTH] IN BLOOD BY AUTOMATED COUNT: 12.3 % (ref 11.6–15.1)
FERRITIN SERPL-MCNC: 42 NG/ML (ref 24–336)
FOLATE SERPL-MCNC: 14.3 NG/ML
GFR SERPL CREATININE-BSD FRML MDRD: 85 ML/MIN/1.73SQ M
GLUCOSE SERPL-MCNC: 97 MG/DL (ref 65–140)
HCT VFR BLD AUTO: 42 % (ref 36.5–49.3)
HGB BLD-MCNC: 14.7 G/DL (ref 12–17)
IMM GRANULOCYTES # BLD AUTO: 0.04 THOUSAND/UL (ref 0–0.2)
IMM GRANULOCYTES NFR BLD AUTO: 0 % (ref 0–2)
IRON SATN MFR SERPL: 26 % (ref 15–50)
IRON SERPL-MCNC: 72 UG/DL (ref 50–212)
LYMPHOCYTES # BLD AUTO: 1.9 THOUSANDS/ΜL (ref 0.6–4.47)
LYMPHOCYTES NFR BLD AUTO: 21 % (ref 14–44)
MAGNESIUM SERPL-MCNC: 1.9 MG/DL (ref 1.9–2.7)
MCH RBC QN AUTO: 30.4 PG (ref 26.8–34.3)
MCHC RBC AUTO-ENTMCNC: 35 G/DL (ref 31.4–37.4)
MCV RBC AUTO: 87 FL (ref 82–98)
MONOCYTES # BLD AUTO: 0.53 THOUSAND/ΜL (ref 0.17–1.22)
MONOCYTES NFR BLD AUTO: 6 % (ref 4–12)
NEUTROPHILS # BLD AUTO: 6.47 THOUSANDS/ΜL (ref 1.85–7.62)
NEUTS SEG NFR BLD AUTO: 69 % (ref 43–75)
NRBC BLD AUTO-RTO: 0 /100 WBCS
PLATELET # BLD AUTO: 253 THOUSANDS/UL (ref 149–390)
PMV BLD AUTO: 9.8 FL (ref 8.9–12.7)
POTASSIUM SERPL-SCNC: 4.1 MMOL/L (ref 3.5–5.3)
PROT SERPL-MCNC: 6.5 G/DL (ref 6.4–8.4)
RBC # BLD AUTO: 4.83 MILLION/UL (ref 3.88–5.62)
SODIUM SERPL-SCNC: 134 MMOL/L (ref 135–147)
TIBC SERPL-MCNC: 277 UG/DL (ref 250–450)
UIBC SERPL-MCNC: 205 UG/DL (ref 155–355)
VIT B12 SERPL-MCNC: 304 PG/ML (ref 180–914)
WBC # BLD AUTO: 9.25 THOUSAND/UL (ref 4.31–10.16)

## 2024-09-24 PROCEDURE — 85025 COMPLETE CBC W/AUTO DIFF WBC: CPT

## 2024-09-24 PROCEDURE — 82728 ASSAY OF FERRITIN: CPT

## 2024-09-24 PROCEDURE — 82607 VITAMIN B-12: CPT

## 2024-09-24 PROCEDURE — 86140 C-REACTIVE PROTEIN: CPT

## 2024-09-24 PROCEDURE — 83540 ASSAY OF IRON: CPT

## 2024-09-24 PROCEDURE — 99214 OFFICE O/P EST MOD 30 MIN: CPT | Performed by: INTERNAL MEDICINE

## 2024-09-24 PROCEDURE — 83735 ASSAY OF MAGNESIUM: CPT

## 2024-09-24 PROCEDURE — 83550 IRON BINDING TEST: CPT

## 2024-09-24 PROCEDURE — 82746 ASSAY OF FOLIC ACID SERUM: CPT

## 2024-09-24 PROCEDURE — 36415 COLL VENOUS BLD VENIPUNCTURE: CPT

## 2024-09-24 PROCEDURE — 80053 COMPREHEN METABOLIC PANEL: CPT

## 2024-09-24 PROCEDURE — G2211 COMPLEX E/M VISIT ADD ON: HCPCS | Performed by: INTERNAL MEDICINE

## 2024-09-24 NOTE — TELEPHONE ENCOUNTER
----- Message from Candida Loja PA-C sent at 9/24/2024 12:10 PM EDT -----  Lung screening CT reveals previous nodule is stable. He has a new nodule noted and they recommend rechecking in 3 months , I will place order

## 2024-09-24 NOTE — PROGRESS NOTES
Hematology/Oncology Outpatient Follow-up  Miguelangel Lancaster 69 y.o. male 1955 [id number]    Date:  9/24/2024    Assessment and Plan:  1. Vitamin B12 deficiency  He is on supplements.  - Vitamin B12; Future    2. Folic acid deficiency  He is supposed to take folic acid supplements daily.  - CBC and differential; Future  - Comprehensive metabolic panel; Future  - Ferritin; Future  - Folate; Future  - C-reactive protein; Future  - LD,Blood; Future  - Iron Panel (Includes Ferritin, Iron Sat%, Iron, and TIBC); Future  - Magnesium; Future  - Sedimentation rate, automated; Future  - Vitamin B12; Future    3. Iron deficiency anemia due to chronic blood loss  He did receive Venofer IV x 3 doses send October 2023.  I did ask him to get his blood work done in the near future to see if he is still iron deficient.  - CBC and differential; Future  - Comprehensive metabolic panel; Future  - Ferritin; Future  - Folate; Future  - C-reactive protein; Future  - LD,Blood; Future  - Iron Panel (Includes Ferritin, Iron Sat%, Iron, and TIBC); Future  - Magnesium; Future  - Sedimentation rate, automated; Future  - Vitamin B12; Future    4. Acute pulmonary embolism without acute cor pulmonale, unspecified pulmonary embolism type (HCC)  He is on indefinite anticoagulation with Xarelto 20 mg daily.  He denied obvious bleeding from any sites.      HPI:  Patient is a 69-year-old male who presents for further evaluation/recommendations regarding his recent acute pulmonary embolism/left lower extremity DVT.  He has a past medical history of tobacco abuse, COPD, TIA, hyperlipidemia, GERD, CAD status post CABG 2000, PAD, arthritis and hypertension.  No prior thrombosis up until recent events.  No family history of thrombosis.     He states that he presented to the hospital 6/8/2023 with reports of progressing heaviness, pain and edema to the left lower extremity.  VAS lower limb venous duplex study, unilateral/limited  (6/8/23)  CONCLUSION:  Impression:  RIGHT LOWER LIMB LIMITED:  Evaluation shows no evidence of thrombus in the common femoral vein, SFJ,  femoral vein or popliteal vein.  Doppler evaluation shows a normal response to augmentation maneuvers.     LEFT LOWER LIMB:  There is acute deep vein thrombosis in the mid posterior tibial, mid peroneal,  popliteal and femoral veins, mostly occlusive to very minimal flow. Common  femoral vein appears patent and compressible.  No evidence of superficial thrombophlebitis noted.  Doppler evaluation shows a normal response to augmentation maneuvers.  Popliteal, posterior tibial and anterior tibial arterial Doppler waveform's are  triphasic.     He was started on Eliquis for anticoagulation and admitted overnight for observation.  No chest imaging was pursued.       He then represented to the hospital 7/3/2023 with reports of worsening shortness of breath, back pain.  CTA ED chest PE study (7/3/23)  IMPRESSION:  1.  Right-sided pulmonary emboli as described above. Measured RV/LV ratio is within normal limits at less than 0.9.  2.  Bronchial wall thickening may represent bronchitis.     PULMONARY ARTERIAL TREE: Pulmonary embolism in the proximal lobar branch of the right middle lobe with segmental extension (series 2, image images 139-143). Probable embolism in the proximal lobar branch of the right upper lobe (series 601, image 74;   series 2, image 115). Eccentric filling defect within a proximal segmental branch in the right lower lobe is likely an additional embolism, though may be nonacute given its eccentric location (series 2, image 141).  LUNGS: Mild emphysema. Mild bronchial wall thickening. No focal consolidation. Respiratory motion limits detection of small pulmonary nodules. Approximate 5 mm right upper lobe nodule is unchanged from index study of June 26, 2017 (series 3, image 85).     He was then readmitted to the hospital and started on a heparin drip which was later  transitioned to alternative DOAC Xarelto at discharge.  He continues to Xarelto 20 mg daily at this time.          Interval history:  Patient came today for a follow-up visit.  He continues to complain about arthralgia and fatigue.  He also seems to have insomnia of unknown etiology.  He continues to take the Xarelto 20 mg daily without interruption.  He was supposed to get blood work prior to this office visit which was not done.  Most recent available blood work from 7/24/2024 showed white cell count of 10.4 with hemoglobin of 14.3 and normal platelets.  CMP was entirely normal.  ROS: Review of Systems   Constitutional:  Positive for fatigue. Negative for chills and fever.   HENT:  Positive for mouth sores and trouble swallowing. Negative for ear pain and sore throat.    Eyes:  Negative for pain and visual disturbance.   Respiratory:  Negative for cough and shortness of breath.    Cardiovascular:  Negative for chest pain and palpitations.   Gastrointestinal:  Negative for abdominal pain and vomiting.   Genitourinary:  Negative for dysuria and hematuria.   Musculoskeletal:  Negative for arthralgias and back pain.   Skin:  Negative for color change and rash.   Neurological:  Positive for dizziness and headaches. Negative for seizures and syncope.   Psychiatric/Behavioral:  Positive for sleep disturbance.    All other systems reviewed and are negative.      Past Medical History:   Diagnosis Date    Acute right MCA stroke ([facility]) 09/15/2018    Arthritis     CAD (coronary artery disease)     s/p CABG 2000    COPD (chronic obstructive pulmonary disease) (HCC)     GERD (gastroesophageal reflux disease)     Grand mal status ([facility]) 03/24/2019    H/O blood clots     Heart attack (HCC)     Heart failure ([facility])     Hyperlipidemia     Hypertension     Lexiscan nuclear stress test 03/19/2016    EF 74% Normal    Myocardial infarction (HCC)     Shortness of breath     Stroke ([facility])     TIA (transient ischemic attack) 09/13/2018        Past Surgical History:   Procedure Laterality Date    CARDIAC CATHETERIZATION  08/19/2015    LIMA occluded. No severe native lesions    CARDIAC CATHETERIZATION N/A 12/03/2021    Procedure: Cardiac Coronary Angiogram;  Surgeon: Fredis Rogel MD;  Location: [facility];  Service: Cardiology    CARDIAC CATHETERIZATION  12/03/2021    Procedure: Cardiac catheterization;  Surgeon: Fredis Rogel MD;  Location: [facility];  Service: Cardiology    COLONOSCOPY      CORONARY ARTERY BYPASS GRAFT  2000    CYSTOSCOPY  10/31/2022    [facility]    HERNIA REPAIR      umbilical hernia x2    TONSILLECTOMY         Social History     Socioeconomic History    Marital status: Single     Spouse name: None    Number of children: None    Years of education: None    Highest education level: None   Occupational History    None   Tobacco Use    Smoking status: Every Day     Current packs/day: 1.00     Average packs/day: 1 pack/day for 54.7 years (54.7 ttl pk-yrs)     Types: Cigarettes     Start date: 1970     Passive exposure: Never    Smokeless tobacco: Never   Vaping Use    Vaping status: Never Used   Substance and Sexual Activity    Alcohol use: Not Currently     Comment: Socially    Drug use: Never    Sexual activity: Not Currently   Other Topics Concern    None   Social History Narrative    None     Social Determinants of Health     Financial Resource Strain: Low Risk  (6/7/2023)    Overall Financial Resource Strain (CARDIA)     Difficulty of Paying Living Expenses: Not very hard   Food Insecurity: No Food Insecurity (6/5/2024)    Hunger Vital Sign     Worried About Running Out of Food in the Last Year: Never true     Ran Out of Food in the Last Year: Never true   Transportation Needs: No Transportation Needs (6/5/2024)    PRAPARE - Transportation     Lack of Transportation (Medical): No     Lack of Transportation (Non-Medical): No   Physical Activity: Not on file   Stress: Not on file   Social Connections:  Unknown (6/18/2024)    Received from [employer]     How often do you feel lonely or isolated from those around you? (Adult - for ages 18 years and over): Not on file   Intimate Partner Violence: Not on file   Housing Stability: Low Risk  (6/5/2024)    Housing Stability Vital Sign     Unable to Pay for Housing in the Last Year: No     Number of Times Moved in the Last Year: 0     Homeless in the Last Year: No       Family History   Problem Relation Age of Onset    Heart disease Mother     Cancer Father     Aneurysm Father     Cancer Maternal Grandmother     Cancer Paternal Grandfather        Allergies   Allergen Reactions    Bupropion Other (See Comments)    Gabapentin Headache         Current Outpatient Medications:     albuterol (2.5 mg/3 mL) 0.083 % nebulizer solution, inhale 3 milliliters by mouth three times a day if needed, Disp: 75 mL, Rfl: 1    albuterol (PROVENTIL HFA,VENTOLIN HFA) 90 mcg/act inhaler, inhale 2 puffs every 6 hours if needed for shortness of breath, Disp: 54 g, Rfl: 1    ammonium lactate (LAC-HYDRIN) 12 % cream, Apply topically as needed for dry skin, Disp: 385 g, Rfl: 2    aspirin (ECOTRIN LOW STRENGTH) 81 mg EC tablet, Take 1 tablet (81 mg total) by mouth daily, Disp: 30 tablet, Rfl: 0    azelastine (ASTELIN) 0.1 % nasal spray, 1 spray into each nostril 2 (two) times a day Use in each nostril as directed, Disp: 1 mL, Rfl: 2    Diclofenac Sodium (VOLTAREN) 1 %, Apply 2 g topically 4 (four) times a day, Disp: 100 g, Rfl: 2    diltiazem (CARDIZEM CD) 180 mg 24 hr capsule, take 1 capsule by mouth once daily, Disp: 90 capsule, Rfl: 1    Docusate Sodium (DOC-Q-LACE PO), , Disp: , Rfl:     esomeprazole (NexIUM) 40 MG capsule, Take 1 capsule (40 mg total) by mouth in the morning, Disp: 90 capsule, Rfl: 0    finasteride (PROSCAR) 5 mg tablet, take 1 tablet by mouth once daily, Disp: 90 tablet, Rfl: 1    fluticasone (FLONASE) 50 mcg/act nasal spray, 1 spray into each nostril  "daily, Disp: 9.9 mL, Rfl: 0    fluticasone-umeclidinium-vilanterol (Trelegy Ellipta) 200-62.5-25 mcg/actuation AEPB inhaler, Inhale 1 puff daily Rinse mouth after use, Disp: 180 blister, Rfl: 1    folic acid (FOLVITE) 1 mg tablet, take 1 tablet by mouth once daily, Disp: 30 tablet, Rfl: 5    furosemide (LASIX) 40 mg tablet, take 1 tablet by mouth once daily, Disp: 90 tablet, Rfl: 3    isosorbide mononitrate (IMDUR) 30 mg 24 hr tablet, Take 1 tablet (30 mg total) by mouth daily, Disp: 90 tablet, Rfl: 3    Linzess 290 MCG CAPS, Take 1 capsule by mouth AT LEAST 30 MINUTES BEFORE FIRST MEAL OF THE DAY, Disp: 30 capsule, Rfl: 2    LORazepam (ATIVAN) 0.5 mg tablet, take 1 tablet by mouth every 8 hours if needed for anxiety, Disp: 90 tablet, Rfl: 0    metoprolol succinate (TOPROL-XL) 25 mg 24 hr tablet, take 1 tablet by mouth twice a day, Disp: 180 tablet, Rfl: 3    nicotine (NICODERM CQ) 21 mg/24 hr TD 24 hr patch, Place 1 patch on the skin over 24 hours every 24 hours, Disp: 90 patch, Rfl: 0    polyethylene glycol (MIRALAX) 17 g packet, Take 17 g by mouth daily as needed (constipation), Disp: 10 each, Rfl: 0    Potassium Chloride ER 20 MEQ TBCR, take 1 tablet by mouth once daily, Disp: 30 tablet, Rfl: 5    rivaroxaban (Xarelto) 20 mg tablet, Take 1 tablet (20 mg total) by mouth daily, Disp: 90 tablet, Rfl: 1    tamsulosin (FLOMAX) 0.4 mg, take 1 capsule by mouth twice a day, Disp: 180 capsule, Rfl: 5    vitamin B-12 (VITAMIN B-12) 1,000 mcg tablet, Take 1 tablet (1,000 mcg total) by mouth daily, Disp: 30 tablet, Rfl: 5    VITAMIN D PO, OTC daily, Disp: , Rfl:     rosuvastatin (CRESTOR) 5 mg tablet, Take 1 tablet (5 mg total) by mouth daily (Patient not taking: Reported on 9/24/2024), Disp: 90 tablet, Rfl: 1      Physical Exam:  /80 (BP Location: Right arm, Patient Position: Sitting, Cuff Size: Adult)   Pulse 75   Temp 98 °F (36.7 °C)   Resp 18   Ht 5' 10\" (1.778 m)   Wt 102 kg (225 lb)   SpO2 98%   BMI 32.28 " kg/m²     Physical Exam  Constitutional:       Appearance: He is well-developed.   HENT:      Head: Normocephalic and atraumatic.      Nose: Nose normal.   Eyes:      General: No scleral icterus.        Right eye: No discharge.         Left eye: No discharge.      Conjunctiva/sclera: Conjunctivae normal.      Pupils: Pupils are equal, round, and reactive to light.   Neck:      Thyroid: No thyromegaly.      Trachea: No tracheal deviation.   Cardiovascular:      Rate and Rhythm: Normal rate and regular rhythm.      Heart sounds: Normal heart sounds. No murmur heard.     No friction rub.   Pulmonary:      Effort: Pulmonary effort is normal. No respiratory distress.      Breath sounds: Normal breath sounds. No wheezing or rales.   Chest:      Chest wall: No tenderness.   Abdominal:      General: There is no distension.      Palpations: Abdomen is soft. There is no hepatomegaly or splenomegaly.      Tenderness: There is no abdominal tenderness. There is no guarding or rebound.   Musculoskeletal:         General: No tenderness or deformity. Normal range of motion.      Cervical back: Normal range of motion and neck supple.   Lymphadenopathy:      Cervical: No cervical adenopathy.   Skin:     General: Skin is warm and dry.      Coloration: Skin is not pale.      Findings: No erythema or rash.   Neurological:      Mental Status: He is alert and oriented to person, place, and time.      Cranial Nerves: No cranial nerve deficit.      Coordination: Coordination normal.      Deep Tendon Reflexes: Reflexes are normal and symmetric.   Psychiatric:         Behavior: Behavior normal.         Thought Content: Thought content normal.         Judgment: Judgment normal.           Labs:  Lab Results   Component Value Date    WBC 10.43 (H) 07/24/2024    HGB 14.3 07/24/2024    HCT 41.8 07/24/2024    MCV 88 07/24/2024     07/24/2024     Lab Results   Component Value Date     06/22/2018    K 3.7 07/24/2024     07/24/2024     CO2 25 07/24/2024    ANIONGAP 12.4 06/22/2018    BUN 6 07/24/2024    CREATININE 0.98 07/24/2024    GLUF 90 07/24/2024    CALCIUM 8.7 07/24/2024    CORRECTEDCA 8.8 06/09/2023    AST 10 (L) 07/24/2024    ALT 7 07/24/2024    ALKPHOS 91 07/24/2024    PROT 5.6 (L) 06/22/2018    BILITOT 0.4 06/22/2018    EGFR 78 07/24/2024       Patient voiced understanding and agreement in the above discussion. Aware to contact our office with questions/symptoms in the interim.

## 2024-09-24 NOTE — TELEPHONE ENCOUNTER
This pt stopped by to let you know that he has barely been getting any sleep. He has been getting about 4-4.5 hours of sleep a day and he's wondering if there is something you could do to help this?

## 2024-09-25 NOTE — TELEPHONE ENCOUNTER
Appointment scheduled with provider.    Reason:  Discuss issues with sleeping    Symptoms:  Patient only sleeping 4 hours a night    Provider:  Bora Mcnamara    Date/Time:  10/1/24 @ 1:40 p.m.    **[facility] does not have anything available until January

## 2024-09-26 DIAGNOSIS — R10.13 DYSPEPSIA: ICD-10-CM

## 2024-09-26 RX ORDER — ESOMEPRAZOLE MAGNESIUM 40 MG/1
40 CAPSULE, DELAYED RELEASE ORAL EVERY MORNING
Qty: 90 CAPSULE | Refills: 1 | Status: SHIPPED | OUTPATIENT
Start: 2024-09-26

## 2024-10-01 DIAGNOSIS — E53.8 FOLIC ACID DEFICIENCY: ICD-10-CM

## 2024-10-01 DIAGNOSIS — I25.119 CORONARY ARTERY DISEASE INVOLVING NATIVE CORONARY ARTERY OF NATIVE HEART WITH ANGINA PECTORIS (HCC): ICD-10-CM

## 2024-10-01 RX ORDER — FOLIC ACID 1 MG/1
1000 TABLET ORAL DAILY
Qty: 30 TABLET | Refills: 5 | Status: SHIPPED | OUTPATIENT
Start: 2024-10-01

## 2024-10-01 RX ORDER — POTASSIUM CHLORIDE 1500 MG/1
1 TABLET, EXTENDED RELEASE ORAL DAILY
Qty: 90 TABLET | Refills: 1 | Status: SHIPPED | OUTPATIENT
Start: 2024-10-01

## 2024-10-02 DIAGNOSIS — I26.99 ACUTE PULMONARY EMBOLISM WITHOUT ACUTE COR PULMONALE, UNSPECIFIED PULMONARY EMBOLISM TYPE (HCC): ICD-10-CM

## 2024-10-04 ENCOUNTER — APPOINTMENT (EMERGENCY)
Dept: CT IMAGING | Facility: HOSPITAL | Age: 69
End: 2024-10-04
Payer: COMMERCIAL

## 2024-10-04 ENCOUNTER — HOSPITAL ENCOUNTER (EMERGENCY)
Facility: HOSPITAL | Age: 69
Discharge: HOME | End: 2024-10-04
Attending: STUDENT IN AN ORGANIZED HEALTH CARE EDUCATION/TRAINING PROGRAM
Payer: COMMERCIAL

## 2024-10-04 ENCOUNTER — TELEPHONE (OUTPATIENT)
Dept: OTHER | Facility: OTHER | Age: 69
End: 2024-10-04

## 2024-10-04 ENCOUNTER — APPOINTMENT (EMERGENCY)
Dept: RADIOLOGY | Facility: HOSPITAL | Age: 69
End: 2024-10-04
Payer: COMMERCIAL

## 2024-10-04 VITALS
OXYGEN SATURATION: 95 % | DIASTOLIC BLOOD PRESSURE: 66 MMHG | BODY MASS INDEX: 32.21 KG/M2 | HEIGHT: 70 IN | SYSTOLIC BLOOD PRESSURE: 115 MMHG | TEMPERATURE: 97.4 F | HEART RATE: 62 BPM | WEIGHT: 225 LBS | RESPIRATION RATE: 22 BRPM

## 2024-10-04 DIAGNOSIS — Z79.01 ANTICOAGULATED: ICD-10-CM

## 2024-10-04 DIAGNOSIS — S20.219A CHEST WALL CONTUSION: ICD-10-CM

## 2024-10-04 DIAGNOSIS — Z95.1 HX OF CABG: ICD-10-CM
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
## 2024-10-04 LAB
ANION GAP SERPL CALCULATED.3IONS-SCNC: 7 MMOL/L (ref 4–13)
ATRIAL RATE: 277 BPM
BASOPHILS # BLD AUTO: 0.07 THOUSANDS/ΜL (ref 0–0.1)
BASOPHILS NFR BLD AUTO: 1 % (ref 0–1)
BUN SERPL-MCNC: 13 MG/DL (ref 5–25)
CALCIUM SERPL-MCNC: 8.8 MG/DL (ref 8.4–10.2)
CARDIAC TROPONIN I PNL SERPL HS: 4 NG/L
CHLORIDE SERPL-SCNC: 99 MMOL/L (ref 96–108)
CO2 SERPL-SCNC: 25 MMOL/L (ref 21–32)
CREAT SERPL-MCNC: 1.23 MG/DL (ref 0.6–1.3)
EOSINOPHIL # BLD AUTO: 0.22 THOUSAND/ΜL (ref 0–0.61)
EOSINOPHIL NFR BLD AUTO: 2 % (ref 0–6)
ERYTHROCYTE [DISTWIDTH] IN BLOOD BY AUTOMATED COUNT: 12.6 % (ref 11.6–15.1)
GFR SERPL CREATININE-BSD FRML MDRD: 59 ML/MIN/1.73SQ M
GLUCOSE SERPL-MCNC: 91 MG/DL (ref 65–140)
HCT VFR BLD AUTO: 40.5 % (ref 36.5–49.3)
HGB BLD-MCNC: 13.9 G/DL (ref 12–17)
IMM GRANULOCYTES # BLD AUTO: 0.04 THOUSAND/UL (ref 0–0.2)
IMM GRANULOCYTES NFR BLD AUTO: 0 % (ref 0–2)
LYMPHOCYTES # BLD AUTO: 2.72 THOUSANDS/ΜL (ref 0.6–4.47)
LYMPHOCYTES NFR BLD AUTO: 24 % (ref 14–44)
MCH RBC QN AUTO: 30.3 PG (ref 26.8–34.3)
MCHC RBC AUTO-ENTMCNC: 34.3 G/DL (ref 31.4–37.4)
MCV RBC AUTO: 88 FL (ref 82–98)
MONOCYTES # BLD AUTO: 0.66 THOUSAND/ΜL (ref 0.17–1.22)
MONOCYTES NFR BLD AUTO: 6 % (ref 4–12)
NEUTROPHILS # BLD AUTO: 7.69 THOUSANDS/ΜL (ref 1.85–7.62)
NEUTS SEG NFR BLD AUTO: 67 % (ref 43–75)
NRBC BLD AUTO-RTO: 0 /100 WBCS
PLATELET # BLD AUTO: 256 THOUSANDS/UL (ref 149–390)
PMV BLD AUTO: 8.8 FL (ref 8.9–12.7)
POTASSIUM SERPL-SCNC: 3.5 MMOL/L (ref 3.5–5.3)
QRS AXIS: 85 DEGREES
QRSD INTERVAL: 80 MS
QT INTERVAL: 402 MS
QTC INTERVAL: 442 MS
RBC # BLD AUTO: 4.59 MILLION/UL (ref 3.88–5.62)
SODIUM SERPL-SCNC: 131 MMOL/L (ref 135–147)
T WAVE AXIS: 79 DEGREES
VENTRICULAR RATE: 73 BPM
WBC # BLD AUTO: 11.4 THOUSAND/UL (ref 4.31–10.16)

## 2024-10-04 PROCEDURE — 80048 BASIC METABOLIC PNL TOTAL CA: CPT | Performed by: STUDENT IN AN ORGANIZED HEALTH CARE EDUCATION/TRAINING PROGRAM

## 2024-10-04 PROCEDURE — 85025 COMPLETE CBC W/AUTO DIFF WBC: CPT | Performed by: STUDENT IN AN ORGANIZED HEALTH CARE EDUCATION/TRAINING PROGRAM

## 2024-10-04 PROCEDURE — 99285 EMERGENCY DEPT VISIT HI MDM: CPT | Performed by: STUDENT IN AN ORGANIZED HEALTH CARE EDUCATION/TRAINING PROGRAM

## 2024-10-04 PROCEDURE — 36415 COLL VENOUS BLD VENIPUNCTURE: CPT | Performed by: STUDENT IN AN ORGANIZED HEALTH CARE EDUCATION/TRAINING PROGRAM

## 2024-10-04 PROCEDURE — 74177 CT ABD & PELVIS W/CONTRAST: CPT

## 2024-10-04 PROCEDURE — 71260 CT THORAX DX C+: CPT

## 2024-10-04 PROCEDURE — 93005 ELECTROCARDIOGRAM TRACING: CPT

## 2024-10-04 PROCEDURE — 93010 ELECTROCARDIOGRAM REPORT: CPT | Performed by: INTERNAL MEDICINE

## 2024-10-04 PROCEDURE — 84484 ASSAY OF TROPONIN QUANT: CPT | Performed by: STUDENT IN AN ORGANIZED HEALTH CARE EDUCATION/TRAINING PROGRAM

## 2024-10-04 PROCEDURE — 99285 EMERGENCY DEPT VISIT HI MDM: CPT

## 2024-10-04 PROCEDURE — 71045 X-RAY EXAM CHEST 1 VIEW: CPT

## 2024-10-04 PROCEDURE — 70450 CT HEAD/BRAIN W/O DYE: CPT

## 2024-10-04 RX ADMIN — IOHEXOL 100 ML: 350 INJECTION, SOLUTION INTRAVENOUS at 07:49

## 2024-10-04 NOTE — TELEPHONE ENCOUNTER
Patient called and canceled his appointment on 10/07 at 11:00 AM and reschedule his appointment for 11/11 at 10:40 AM.

## 2024-10-04 NOTE — ED PROVIDER NOTES
"Emergency Department Trauma Note  Miguelangel Lancaster 69 y.o. male MRN: [id number]  Unit/Bed#: ED 02/ED 02 Encounter: [id number]      Trauma Alert: Trauma Acuity: Trauma Evaluation  Model of Arrival: Mode of Arrival: Direct from scene via    Trauma Team: Current Providers  Attending Provider: Zbigniew Navarrete MD  Registered Nurse: Chelsi Fried, RN  Registered Nurse: Axel Bradley, RN  Consultants:     None      History of Present Illness     Chief Complaint:   Chief Complaint   Patient presents with    Fall     According to the patient, he had fallen backwards while trying to put a door stop under his door on Monday.  Patient reports left sided rib pain. No LOC.  Patient is on thinner.     HPI:  Miguelangel Lancaster is a 69 y.o. male who presents with fall.  Mechanism:Details of Incident: According to the patient, he had fallen backwards while trying to put a door stop under his door on Monday.  Patient reports left sided rib pain. No LOC.  Patient is on thinner. Injury Date: 09/30/24          Fall  Associated symptoms: abdominal pain    Associated symptoms: no chest pain, no headaches, no nausea and no seizures        This 69-year-old male who presents to the emergency department status post fall on Monday.  Patient is he was working a standing height lost his footing fell backwards striking the back of his head and his left rib cage.  Patient states since that time he has been having diffuse pain along the entirety of his left flank and left mid axillary line.  Patient is concerned that he may have broken a rib.  In addition patient reports some new onset abdominal pain with the complaint of \"I think I am bleeding in my stomach \".  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  Does have a pertinent history of CABG.    Review of Systems   Constitutional:  Negative for activity change, appetite change, chills, fatigue and fever.   HENT:  Negative for congestion, dental problem, drooling, ear discharge, ear pain, " facial swelling, postnasal drip, rhinorrhea and sinus pain.    Eyes:  Negative for photophobia, pain, discharge and itching.   Respiratory:  Negative for apnea, cough, chest tightness and shortness of breath.    Cardiovascular:  Negative for chest pain and leg swelling.   Gastrointestinal:  Positive for abdominal pain. Negative for abdominal distention, anal bleeding, constipation, diarrhea and nausea.   Endocrine: Negative for cold intolerance, heat intolerance and polydipsia.   Genitourinary:  Negative for difficulty urinating.   Musculoskeletal:  Negative for arthralgias, gait problem, joint swelling and myalgias.   Skin:  Negative for color change and pallor.   Allergic/Immunologic: Negative for immunocompromised state.   Neurological:  Negative for dizziness, seizures, facial asymmetry, weakness, light-headedness, numbness and headaches.   Psychiatric/Behavioral:  Negative for agitation, behavioral problems, confusion, decreased concentration and dysphoric mood.    All other systems reviewed and are negative.      Historical Information     Immunizations:   Immunization History   Administered Date(s) Administered    COVID-19 MODERNA VACC 0.5 ML IM 04/07/2021, 05/07/2021, 12/10/2021    COVID-19 Moderna Vac BIVALENT 12 Yr+ IM 0.5 ML 05/30/2023    COVID-19 Moderna mRNA Vaccine 12 Yr+ 50 mcg/0.5 mL (Spikevax) 10/18/2023    INFLUENZA 11/19/2010, 11/18/2011, 09/16/2014, 09/20/2015, 11/06/2016, 02/14/2018, 11/07/2018, 08/18/2020, 08/27/2021, 10/19/2022, 10/18/2023    Influenza Quadrivalent 3 years and older 11/07/2018    Influenza, high dose seasonal 0.7 mL 10/19/2022    Influenza, injectable, quadrivalent, preservative free 0.5 mL 11/07/2018, 09/11/2019    Influenza, seasonal, injectable 11/19/2010, 11/18/2011    Pneumococcal Conjugate 13-Valent 11/14/2015    Pneumococcal Conjugate Vaccine 20-valent (Pcv20), Polysace 03/31/2023    Pneumococcal Polysaccharide PPV23 01/01/2010, 02/14/2018    Respiratory Syncytial Virus  Vaccine (Recombinant) 11/16/2023    Tdap 01/11/2011    Tuberculin Skin Test-PPD Intradermal 01/31/2011    Zoster 12/03/2015    influenza, injectable, quadrivalent 11/07/2018    influenza, trivalent, adjuvanted 08/19/2024       Past Medical History:   Diagnosis Date    Acute right MCA stroke ([facility]) 09/15/2018    Arthritis     CAD (coronary artery disease)     s/p CABG 2000    COPD (chronic obstructive pulmonary disease) (HCC)     GERD (gastroesophageal reflux disease)     Grand mal status ([facility]) 03/24/2019    H/O blood clots     Heart attack (HCC)     Heart failure (HCC)     Hyperlipidemia     Hypertension     Lexiscan nuclear stress test 03/19/2016    EF 74% Normal    Myocardial infarction (HCC)     Shortness of breath     Stroke ([facility])     TIA (transient ischemic attack) 09/13/2018       Family History   Problem Relation Age of Onset    Heart disease Mother     Cancer Father     Aneurysm Father     Cancer Maternal Grandmother     Cancer Paternal Grandfather      Past Surgical History:   Procedure Laterality Date    CARDIAC CATHETERIZATION  08/19/2015    LIMA occluded. No severe native lesions    CARDIAC CATHETERIZATION N/A 12/03/2021    Procedure: Cardiac Coronary Angiogram;  Surgeon: Fredis Rogel MD;  Location: [facility];  Service: Cardiology    CARDIAC CATHETERIZATION  12/03/2021    Procedure: Cardiac catheterization;  Surgeon: Fredis Rogel MD;  Location: [facility];  Service: Cardiology    COLONOSCOPY      CORONARY ARTERY BYPASS GRAFT  2000    CYSTOSCOPY  10/31/2022    [facility]    HERNIA REPAIR      umbilical hernia x2    TONSILLECTOMY       Social History     Tobacco Use    Smoking status: Every Day     Current packs/day: 1.00     Average packs/day: 1 pack/day for 54.8 years (54.8 ttl pk-yrs)     Types: Cigarettes     Start date: 1970     Passive exposure: Never    Smokeless tobacco: Never   Vaping Use    Vaping status: Never Used   Substance Use Topics    Alcohol use: Not Currently      Comment: Socially    Drug use: Never     E-Cigarette/Vaping    E-Cigarette Use Never User      E-Cigarette/Vaping Substances    Nicotine No     THC No     CBD No     Flavoring No     Other No     Unknown No        Family History: non-contributory    Meds/Allergies   Prior to Admission Medications   Prescriptions Last Dose Informant Patient Reported? Taking?   Diclofenac Sodium (VOLTAREN) 1 %  Self No No   Sig: Apply 2 g topically 4 (four) times a day   Docusate Sodium (DOC-Q-LACE PO)  Self Yes No   LORazepam (ATIVAN) 0.5 mg tablet  Self No No   Sig: take 1 tablet by mouth every 8 hours if needed for anxiety   Linzess 290 MCG CAPS  Self No No   Sig: Take 1 capsule by mouth AT LEAST 30 MINUTES BEFORE FIRST MEAL OF THE DAY   Potassium Chloride ER 20 MEQ TBCR   No No   Sig: take 1 tablet by mouth once daily   VITAMIN D PO  Self Yes No   Sig: OTC daily   albuterol (2.5 mg/3 mL) 0.083 % nebulizer solution  Self No No   Sig: inhale 3 milliliters by mouth three times a day if needed   albuterol (PROVENTIL HFA,VENTOLIN HFA) 90 mcg/act inhaler  Self No No   Sig: inhale 2 puffs every 6 hours if needed for shortness of breath   ammonium lactate (LAC-HYDRIN) 12 % cream  Self No No   Sig: Apply topically as needed for dry skin   aspirin (ECOTRIN LOW STRENGTH) 81 mg EC tablet  Self No No   Sig: Take 1 tablet (81 mg total) by mouth daily   azelastine (ASTELIN) 0.1 % nasal spray  Self No No   Si spray into each nostril 2 (two) times a day Use in each nostril as directed   diltiazem (CARDIZEM CD) 180 mg 24 hr capsule  Self No No   Sig: take 1 capsule by mouth once daily   esomeprazole (NexIUM) 40 MG capsule   No No   Sig: take 1 capsule by mouth every morning   finasteride (PROSCAR) 5 mg tablet  Self No No   Sig: take 1 tablet by mouth once daily   fluticasone (FLONASE) 50 mcg/act nasal spray  Self No No   Si spray into each nostril daily   fluticasone-umeclidinium-vilanterol (Trelegy Ellipta) 200-62.5-25 mcg/actuation AEPB  inhaler  Self No No   Sig: Inhale 1 puff daily Rinse mouth after use   folic acid (FOLVITE) 1 mg tablet   No No   Sig: take 1 tablet by mouth once daily   furosemide (LASIX) 40 mg tablet  Self No No   Sig: take 1 tablet by mouth once daily   isosorbide mononitrate (IMDUR) 30 mg 24 hr tablet  Self No No   Sig: Take 1 tablet (30 mg total) by mouth daily   metoprolol succinate (TOPROL-XL) 25 mg 24 hr tablet  Self No No   Sig: take 1 tablet by mouth twice a day   nicotine (NICODERM CQ) 21 mg/24 hr TD 24 hr patch  Self No No   Sig: Place 1 patch on the skin over 24 hours every 24 hours   polyethylene glycol (MIRALAX) 17 g packet  Self No No   Sig: Take 17 g by mouth daily as needed (constipation)   rivaroxaban (Xarelto) 20 mg tablet   No No   Sig: Take 1 tablet (20 mg total) by mouth daily   rosuvastatin (CRESTOR) 5 mg tablet  Self No No   Sig: Take 1 tablet (5 mg total) by mouth daily   Patient not taking: Reported on 9/24/2024   tamsulosin (FLOMAX) 0.4 mg  Self No No   Sig: take 1 capsule by mouth twice a day   vitamin B-12 (VITAMIN B-12) 1,000 mcg tablet  Self No No   Sig: Take 1 tablet (1,000 mcg total) by mouth daily      Facility-Administered Medications: None       Allergies   Allergen Reactions    Bupropion Other (See Comments)    Gabapentin Headache       PHYSICAL EXAM    PE limited by: none    Objective   Vitals:   First set: Temperature: (!) 97.4 °F (36.3 °C) (10/04/24 0722)  Pulse: 67 (10/04/24 0722)  Respirations: 18 (10/04/24 0722)  Blood Pressure: 146/85 (10/04/24 0722)  SpO2: 97 % (10/04/24 0722)    Primary Survey:   (A) Airway: Alert oriented self maintained  (B) Breathing: Good bilateral breath expansion  (C) Circulation: Pulses:   normal  (D) Disabliity:  GCS Total:  15  (E) Expose:  Completed    Secondary Survey: (Click on Physical Exam tab above)  Physical Exam  Vitals and nursing note reviewed.   Constitutional:       Appearance: He is well-developed. He is obese. He is not ill-appearing or  diaphoretic.   HENT:      Head: Normocephalic.   Eyes:      Pupils: Pupils are equal, round, and reactive to light.   Cardiovascular:      Rate and Rhythm: Normal rate and regular rhythm.   Pulmonary:      Effort: Pulmonary effort is normal.      Breath sounds: Normal breath sounds.   Abdominal:      General: Bowel sounds are normal.      Palpations: Abdomen is soft.      Comments: Tenderness palpation left rib cage midaxillary line ribs 4 through 7.  No obvious hematoma.   Musculoskeletal:         General: Normal range of motion.      Cervical back: Normal range of motion and neck supple.      Comments: Full range of motion of the cervical spine no step-offs tenderness crepitus remainder of the spine palpated down to the sacral region with no abnormalities noted.   Skin:     General: Skin is warm.   Neurological:      Mental Status: He is alert.         Cervical spine cleared by clinical criteria? Yes     Invasive Devices       None                   Lab Results:   Results Reviewed       Procedure Component Value Units Date/Time    HS Troponin 0hr (reflex protocol) [[id number]]  (Normal) Collected: 10/04/24 0739    Lab Status: Final result Specimen: Blood from Arm, Right Updated: 10/04/24 0813     hs TnI 0hr 4 ng/L     HS Troponin I 2hr [[id number]]     Lab Status: No result Specimen: Blood     HS Troponin I 4hr [[id number]]     Lab Status: No result Specimen: Blood     Basic metabolic panel [[id number]]  (Abnormal) Collected: 10/04/24 0739    Lab Status: Final result Specimen: Blood from Arm, Right Updated: 10/04/24 0805     Sodium 131 mmol/L      Potassium 3.5 mmol/L      Chloride 99 mmol/L      CO2 25 mmol/L      ANION GAP 7 mmol/L      BUN 13 mg/dL      Creatinine 1.23 mg/dL      Glucose 91 mg/dL      Calcium 8.8 mg/dL      eGFR 59 ml/min/1.73sq m     Narrative:      National Kidney Disease Foundation guidelines for Chronic Kidney Disease (CKD):     Stage 1 with normal or high GFR (GFR > 90 mL/min/1.73 square  meters)    Stage 2 Mild CKD (GFR = 60-89 mL/min/1.73 square meters)    Stage 3A Moderate CKD (GFR = 45-59 mL/min/1.73 square meters)    Stage 3B Moderate CKD (GFR = 30-44 mL/min/1.73 square meters)    Stage 4 Severe CKD (GFR = 15-29 mL/min/1.73 square meters)    Stage 5 End Stage CKD (GFR <15 mL/min/1.73 square meters)  Note: GFR calculation is accurate only with a steady state creatinine    CBC and differential [[id number]]  (Abnormal) Collected: 10/04/24 0739    Lab Status: Final result Specimen: Blood from Arm, Right Updated: 10/04/24 0745     WBC 11.40 Thousand/uL      RBC 4.59 Million/uL      Hemoglobin 13.9 g/dL      Hematocrit 40.5 %      MCV 88 fL      MCH 30.3 pg      MCHC 34.3 g/dL      RDW 12.6 %      MPV 8.8 fL      Platelets 256 Thousands/uL      nRBC 0 /100 WBCs      Segmented % 67 %      Immature Grans % 0 %      Lymphocytes % 24 %      Monocytes % 6 %      Eosinophils Relative 2 %      Basophils Relative 1 %      Absolute Neutrophils 7.69 Thousands/µL      Absolute Immature Grans 0.04 Thousand/uL      Absolute Lymphocytes 2.72 Thousands/µL      Absolute Monocytes 0.66 Thousand/µL      Eosinophils Absolute 0.22 Thousand/µL      Basophils Absolute 0.07 Thousands/µL                    Imaging Studies:   Direct to CT: Yes  TRAUMA - CT head wo contrast   Final Result by Chencho Maldonado MD (10/04 0811)      No acute intracranial abnormality.                  Workstation performed: [id number]         TRAUMA - CT chest abdomen pelvis w contrast   Final Result by Chencho Maldonado MD (10/04 0818)      No acute finding in the chest, abdomen, or pelvis.      Unchanged pulmonary nodules, follow-up recommendations provided on chest CT 9/20/2024. See separate report.         The study was marked in EPIC for immediate notification.      Workstation performed: [id number]         XR Trauma chest portable   Final Result by Ignacio Mi MD (10/04 0751)      No acute cardiopulmonary disease.        "     Workstation performed: [id number]               Procedures  ECG 12 Lead Documentation Only    Date/Time: 10/4/2024 7:29 AM    Performed by: Zbigniew Navarrete MD  Authorized by: Zbigniew Navarrete MD    Indications / Diagnosis:  Fall  Patient location:  ED  Previous ECG:     Previous ECG:  Unavailable  Interpretation:     Interpretation: abnormal    Rate:     ECG rate:  70    ECG rate assessment: normal    Ectopy:     Ectopy: PVCs    QRS:     QRS axis:  Normal    QRS intervals:  Normal  Conduction:     Conduction: normal    ST segments:     ST segments:  Non-specific  T waves:     T waves: non-specific             ED Course  ED Course as of 10/04/24 0902   Fri Oct 04, 2024   0740 X-rays interpreted by myself mildly rotated with barrel chest appearance.  Otherwise no acute abnormalities noted no obvious infiltrate will follow-up formal read.   0751 WBC(!): 11.40   0751 Mild leukocytosis no obvious infectious etiology on physical exam or history.   0802 Scans reviewed by myself looks like he has 3 possible sequential rib fractures in the left mid axillary line will follow-up formal read   0808 Sodium(!): 131   0808 Creatinine: 1.23   0824 CT scan reviewed and I discussed the case personally with radiologist Dr. Maldonado regarding the rib fractures.  He states the rib fractures are chronic and have been there for \"years \".           Medical Decision Making  Problems Addressed:  Anticoagulated: chronic illness or injury  Chest wall contusion: acute illness or injury  Fall, initial encounter: acute illness or injury  Hx of CABG: chronic illness or injury    Amount and/or Complexity of Data Reviewed  External Data Reviewed: labs, radiology, ECG and notes.  Labs: ordered. Decision-making details documented in ED Course.  Radiology: ordered and independent interpretation performed. Decision-making details documented in ED Course.  ECG/medicine tests: ordered and independent interpretation performed. Decision-making details " documented in ED Course.    Risk  Prescription drug management.                Disposition  [facility] Transfer: No  Final diagnoses:   Fall, initial encounter   Anticoagulated   Hx of CABG   Chest wall contusion     Time reflects when diagnosis was documented in both MDM as applicable and the Disposition within this note       Time User Action Codes Description Comment    10/4/2024  7:36 AM Zbigniew Navarrete [W19.XXXA] Fall, initial encounter     10/4/2024  7:36 AM Zbigniew Navarrete [Z79.01] Anticoagulated     10/4/2024  7:37 AM Zbigniew Navarrete [Z95.1] Hx of CABG     10/4/2024  8:24 AM Zbigniew Navarrete [S20.219A] Chest wall contusion           ED Disposition       ED Disposition   Discharge    Condition   Stable    Date/Time   Fri Oct 4, 2024  8:25 AM    Comment   Miguelangel Lancaster discharge to home/self care.                   Follow-up Information    None       Discharge Medication List as of 10/4/2024  8:25 AM        CONTINUE these medications which have NOT CHANGED    Details   albuterol (2.5 mg/3 mL) 0.083 % nebulizer solution inhale 3 milliliters by mouth three times a day if needed, Normal      albuterol (PROVENTIL HFA,VENTOLIN HFA) 90 mcg/act inhaler inhale 2 puffs every 6 hours if needed for shortness of breath, Normal      ammonium lactate (LAC-HYDRIN) 12 % cream Apply topically as needed for dry skin, Starting Tue 3/5/2024, Normal      aspirin (ECOTRIN LOW STRENGTH) 81 mg EC tablet Take 1 tablet (81 mg total) by mouth daily, Starting Mon 9/17/2018, Print      azelastine (ASTELIN) 0.1 % nasal spray 1 spray into each nostril 2 (two) times a day Use in each nostril as directed, Starting Wed 10/19/2022, Normal      Diclofenac Sodium (VOLTAREN) 1 % Apply 2 g topically 4 (four) times a day, Starting Tue 3/5/2024, Normal      diltiazem (CARDIZEM CD) 180 mg 24 hr capsule take 1 capsule by mouth once daily, Starting Thu 6/20/2024, Normal      Docusate Sodium (DOC-Q-LACE PO) Historical Med      esomeprazole  (NexIUM) 40 MG capsule take 1 capsule by mouth every morning, Starting Thu 9/26/2024, Normal      finasteride (PROSCAR) 5 mg tablet take 1 tablet by mouth once daily, Starting Tue 2/20/2024, Normal      fluticasone (FLONASE) 50 mcg/act nasal spray 1 spray into each nostril daily, Starting Tue 8/20/2024, Normal      fluticasone-umeclidinium-vilanterol (Trelegy Ellipta) 200-62.5-25 mcg/actuation AEPB inhaler Inhale 1 puff daily Rinse mouth after use, Starting Wed 8/7/2024, Normal      folic acid (FOLVITE) 1 mg tablet take 1 tablet by mouth once daily, Starting Tue 10/1/2024, Normal      furosemide (LASIX) 40 mg tablet take 1 tablet by mouth once daily, Normal      isosorbide mononitrate (IMDUR) 30 mg 24 hr tablet Take 1 tablet (30 mg total) by mouth daily, Starting Tue 3/5/2024, Normal      Linzess 290 MCG CAPS Take 1 capsule by mouth AT LEAST 30 MINUTES BEFORE FIRST MEAL OF THE DAY, Normal      LORazepam (ATIVAN) 0.5 mg tablet take 1 tablet by mouth every 8 hours if needed for anxiety, Starting Thu 9/12/2024, Normal      metoprolol succinate (TOPROL-XL) 25 mg 24 hr tablet take 1 tablet by mouth twice a day, Normal      nicotine (NICODERM CQ) 21 mg/24 hr TD 24 hr patch Place 1 patch on the skin over 24 hours every 24 hours, Starting Wed 8/7/2024, Until Tue 11/5/2024, Normal      polyethylene glycol (MIRALAX) 17 g packet Take 17 g by mouth daily as needed (constipation), Starting Fri 6/9/2023, Normal      Potassium Chloride ER 20 MEQ TBCR take 1 tablet by mouth once daily, Starting Tue 10/1/2024, Normal      rivaroxaban (Xarelto) 20 mg tablet Take 1 tablet (20 mg total) by mouth daily, Starting Wed 10/2/2024, Normal      rosuvastatin (CRESTOR) 5 mg tablet Take 1 tablet (5 mg total) by mouth daily, Starting Wed 8/7/2024, Normal      tamsulosin (FLOMAX) 0.4 mg take 1 capsule by mouth twice a day, Starting Wed 9/6/2023, Normal      vitamin B-12 (VITAMIN B-12) 1,000 mcg tablet Take 1 tablet (1,000 mcg total) by mouth  daily, Starting Tue 3/26/2024, Normal      VITAMIN D PO OTC daily, Historical Med           No discharge procedures on file.    PDMP Review         Value Time User    PDMP Reviewed  Yes 9/28/2023  9:35 AM Miguelangel Morataya DO            ED Provider  Electronically Signed by           Zbigniew Navarrete MD  10/04/24 0902

## 2024-10-05 DIAGNOSIS — R60.0 LOWER EXTREMITY EDEMA: ICD-10-CM

## 2024-10-07 ENCOUNTER — VBI (OUTPATIENT)
Dept: INTERNAL MEDICINE CLINIC | Facility: CLINIC | Age: 69
End: 2024-10-07

## 2024-10-07 RX ORDER — FUROSEMIDE 40 MG
TABLET ORAL
Qty: 90 TABLET | Refills: 1 | Status: SHIPPED | OUTPATIENT
Start: 2024-10-07

## 2024-10-07 NOTE — TELEPHONE ENCOUNTER
10/07/24 3:26 PM    Patient contacted post ED visit, first outreach attempt made. Message was left for patient to return a call to the [facility] Department at [facility]: Phone [phone].    Thank you.  [facility] Weeks MA  [facility]

## 2024-10-08 ENCOUNTER — TELEPHONE (OUTPATIENT)
Dept: CARDIOLOGY CLINIC | Facility: CLINIC | Age: 69
End: 2024-10-08

## 2024-10-08 NOTE — TELEPHONE ENCOUNTER
I called ayaka and left him a voicemail to please call back to schedule an appt with cardiology.   Last I spoke to him, documented 6/17... he no longer wanted to follow with cardiology in [place] or [place]. He was very unhappy with the providers and wanted to establish with [facility] heart specialists.

## 2024-10-08 NOTE — TELEPHONE ENCOUNTER
----- Message from Gris CHRISTIANSON sent at 10/8/2024  9:01 AM EDT -----    ----- Message -----  From: Otto De La Rosa PA-C  Sent: 10/7/2024   5:55 PM EDT  To: Cardiology [place] Cler[facility]; #    Good afternoon, patient was previously scheduled for appointment with me that was canceled and rearranged for [place] which also appears to have been canceled.  I do not see any upcoming appointments with cardiology scheduled for patient.  He follows with Dr. Marquez as primary cardiologist, can he please be scheduled for follow-up appointment with Dr. Marquez?

## 2024-10-08 NOTE — TELEPHONE ENCOUNTER
10/08/24 9:31 AM    Patient contacted post ED visit, second outreach attempt made. Message was left for patient to return a call to the [facility] Department at [facility]: Phone [phone].    Thank you.  [facility] Weeks MA  [facility]

## 2024-10-09 NOTE — TELEPHONE ENCOUNTER
10/09/24 8:49 AM    Patient contacted post ED visit, phone outreaches were unsuccessful; patient does not have MyChart, a MyChart letter has not been sent.     Thank you.  [facility] Weeks MA  [facility]

## 2024-10-15 DIAGNOSIS — I25.10 CORONARY ARTERIOSCLEROSIS IN NATIVE ARTERY: ICD-10-CM

## 2024-10-16 RX ORDER — METOPROLOL SUCCINATE 25 MG/1
TABLET, EXTENDED RELEASE ORAL
Qty: 180 TABLET | Refills: 0 | Status: SHIPPED | OUTPATIENT
Start: 2024-10-16

## 2024-10-30 ENCOUNTER — TELEPHONE (OUTPATIENT)
Age: 69
End: 2024-10-30

## 2024-10-30 NOTE — TELEPHONE ENCOUNTER
"Hello,    The following message was sent via e-mail to the leadership team:     Please advise if you can help facilitate the following overbook request:    Patient Name: Miguelangel Lancaster    Patient MRN: [id number]    Call back #: [phone]    Insurance: [employer] insurance    Department:Cardiology    Speciality: General Cardiology    Reason for overbook request: PATIENT REQUEST    Comments (Write \"N/a\" if no comments): Patient requested to have an appointment this month, November, with cardiology. I offered patient to see Lamar Garcia or Emilie Antonio as none of the cardiologists in his area ([place], [place], [place], or [place]) have availability until next year in February. Patient declined and stated he wants to see a cardiologist. Patient recently saw Dr. Underwood in 2021 and Dr. Marquez in 2024 and did not want to be scheduled with either provider again.    Requested doctor and location: No specific provider/ [place], [place], [place] or [place]    Date of current appointment: Did not want to schedule.      Thank you.    "

## 2024-11-07 NOTE — TELEPHONE ENCOUNTER
"The patient had mentioned starting semaglutide, looks like clinical pharmacist sent prescription on 4 November.  Based on his blood sugars, I would recommend using his fast acting insulin aspartate 23 units with each meal and adjust based on scale.  Continue with Basaglar at 45 units twice a day.  Report blood sugars again in another week.         Note        You routed conversation to Matthew Rdoríguez MD1 hour ago (10:54 AM)     You1 hour ago (10:54 AM)       PATIENT BROUGHT IN BLOOD GLUCOSE READINGS. SEE UNDER \"MEDIA\"          PATIENT NOTIFIED. VERBALIZED UNDERSTANDING.  " Vm left for pt to reach out to [facility] to set up transportation

## 2024-11-09 DIAGNOSIS — R33.9 URINARY RETENTION: ICD-10-CM

## 2024-11-11 RX ORDER — TAMSULOSIN HYDROCHLORIDE 0.4 MG/1
0.4 CAPSULE ORAL 2 TIMES DAILY
Qty: 180 CAPSULE | Refills: 1 | Status: SHIPPED | OUTPATIENT
Start: 2024-11-11

## 2024-11-14 DIAGNOSIS — R10.13 DYSPEPSIA: ICD-10-CM

## 2024-11-14 RX ORDER — ESOMEPRAZOLE MAGNESIUM 40 MG/1
40 CAPSULE, DELAYED RELEASE ORAL EVERY MORNING
Qty: 90 CAPSULE | Refills: 0 | Status: SHIPPED | OUTPATIENT
Start: 2024-11-14

## 2024-11-14 NOTE — TELEPHONE ENCOUNTER
Medication: esomeprazole (NexIUM) 40 MG capsule     Dose/Frequency: take 1 capsule by mouth every morning     Quantity: 90 capsule     Pharmacy: [id number] - [place] - [address]     Office:   [x] PCP/Provider -   [] Speciality/Provider -     Does the patient have enough for 3 days?   [] Yes   [x] No - Send as HP to POD

## 2024-11-15 ENCOUNTER — OFFICE VISIT (OUTPATIENT)
Dept: CARDIOLOGY CLINIC | Facility: HOSPITAL | Age: 69
End: 2024-11-15
Payer: COMMERCIAL

## 2024-11-15 VITALS
BODY MASS INDEX: 32.21 KG/M2 | SYSTOLIC BLOOD PRESSURE: 110 MMHG | WEIGHT: 225 LBS | HEART RATE: 87 BPM | DIASTOLIC BLOOD PRESSURE: 70 MMHG | OXYGEN SATURATION: 96 % | HEIGHT: 70 IN

## 2024-11-15 DIAGNOSIS — I10 PRIMARY HYPERTENSION: ICD-10-CM

## 2024-11-15 DIAGNOSIS — I71.9 AORTIC ANEURYSM (HCC): ICD-10-CM

## 2024-11-15 DIAGNOSIS — E78.00 ELEVATED CHOLESTEROL: ICD-10-CM

## 2024-11-15 DIAGNOSIS — Z72.0 TOBACCO ABUSE: Chronic | ICD-10-CM

## 2024-11-15 DIAGNOSIS — R07.9 CHEST PAIN, UNSPECIFIED TYPE: ICD-10-CM

## 2024-11-15 DIAGNOSIS — E78.2 MIXED HYPERLIPIDEMIA: ICD-10-CM

## 2024-11-15 DIAGNOSIS — I25.119 CORONARY ARTERY DISEASE INVOLVING NATIVE CORONARY ARTERY OF NATIVE HEART WITH ANGINA PECTORIS (HCC): Primary | Chronic | ICD-10-CM

## 2024-11-15 DIAGNOSIS — I73.9 PAD (PERIPHERAL ARTERY DISEASE) (HCC): ICD-10-CM

## 2024-11-15 PROCEDURE — 99204 OFFICE O/P NEW MOD 45 MIN: CPT | Performed by: INTERNAL MEDICINE

## 2024-11-15 RX ORDER — MULTIVITAMIN
1 TABLET ORAL DAILY
COMMUNITY

## 2024-11-15 RX ORDER — ROSUVASTATIN CALCIUM 20 MG/1
20 TABLET, COATED ORAL DAILY
Qty: 90 TABLET | Refills: 3 | Status: SHIPPED | OUTPATIENT
Start: 2024-11-15

## 2024-11-15 NOTE — PROGRESS NOTES
Cardiology Consultation     Miguelangel Lancaster  [id number]  1955  [facility] [address] [address]      Diagnoses and all orders for this visit:    Coronary artery disease involving native coronary artery of native heart with angina pectoris (HCC)    Primary hypertension    Aortic aneurysm (HCC)    PAD (peripheral artery disease) (HCC)    Tobacco abuse    Mixed hyperlipidemia    Other orders  -     Multiple Vitamin (multivitamin) tablet; Take 1 tablet by mouth daily      I had the pleasure of seeing Miguelangel Lancaster to establish requested by Lamar Loja PA-C    History of the Presenting Illness, Discussion/Summary and my Plan are as follows:::    Miguelangel is a pleasant 69-year-old gentleman with hypertension, dyslipidemia, no diabetes, chronic and ongoing tobacco use-at least 55 pack years, CAD and history of CABG in 2000 with stenting prior to that as well as after that, most recent coronary angiography in 2021 showing a nonfunctioning LIMA supplying the LAD with diffuse 30-40% disease, diffuse disease in the circumflex system and the mid right coronary artery 100% lesion that was previously stented.  No interventions were performed.  He also has a history of DVT in June 2023 after which he was initially on Eliquis but subsequently presented with a PE in July 2023 and was regarded to be an Eliquis failure and started on Xarelto.  He has been on furosemide for lower extremity edema    He walks every other day about a half a mile to 1 mile without any limitations but takes his time walking.    He also has discomfort/chest discomfort in his armpit that radiates of the chest without clear precipitating or relieving factors, lasting a few minutes, occurring almost daily.    ECG recently-October fourth whilein the ER for a fall showed normal sinus rhythm, was a poor quality ECG.  He is  currently chest pain-free.  Cardiac exam is unremarkable.  Last echo was in July 2023 and unremarkable.,  Mildly dilated right ventricle, normal LV function    Plan:    Chest pain: In the setting of coronary artery disease will proceed with a pharmacologic nuclear stress test, has ongoing risk factors including untreated dyslipidemia and tobacco use.    History of CAD: Prior stenting as well as CABG-it appears LIMA-LAD only, see above for anatomy, continue aspirin, beta-blocker, isosorbide, restart statin     dyslipidemia: Discussed that goal LDL would be less than 70 and goal non-HDL less than 100-in his case these are 137 and 175, agreeable to restarting statin, will start rosuvastatin 20 mg daily.    Hypertension: Controlled    History of DVT/PE: He had a DVT in June 2023 and started on Eliquis and while being compliant, 1 month later presented with a PE in July 2023 and was regarded to be an Eliquis failure and started on Xarelto, compliant with it.  No further episodes.    Follow-up in 6 months if nuclear stress test is negative           Latest Reference Range & Units 09/13/21 09:57 12/04/21 05:18 06/30/22 10:16 07/26/22 11:38 09/29/23 00:21 07/24/24 10:54   Cholesterol See Comment mg/dL 134 202 (H) 177 133 194 205 (H)   Triglycerides See Comment mg/dL 124 94 204 (H) 120 159 (H) 188 (H)   HDL >=40 mg/dL 35 (L) 38 (L) 33 (L) 32 (L) 30 (L) 30 (L)   Non-HDL Cholesterol mg/dl 99  144 101     LDL Calculated 0 - 100 mg/dL 74 145 (H) 103 (H) 77 132 (H) 137 (H)   (H): Data is abnormally high  (L): Data is abnormally low   Latest Reference Range & Units 09/24/24 14:48 10/04/24 07:39   BUN 5 - 25 mg/dL 8 13   Creatinine 0.60 - 1.30 mg/dL 0.91 1.23      Latest Reference Range & Units 09/24/24 14:48 10/04/24 07:39   Hemoglobin 12.0 - 17.0 g/dL 14.7 13.9   Hematocrit 36.5 - 49.3 % 42.0 40.5         1. Coronary artery disease involving native coronary artery of native heart with angina pectoris (HCC)        2. Primary  hypertension        3. Aortic aneurysm (HCC)        4. PAD (peripheral artery disease) ([facility])        5. Tobacco abuse        6. Mixed hyperlipidemia          Patient Active Problem List   Diagnosis    Hypertension    Hyperlipidemia    COPD, severity to be determined ([facility])    Coronary artery disease involving native coronary artery of native heart with angina pectoris ([facility])    Anxiety    Diverticular disease    Chronic constipation    ED (erectile dysfunction) of organic origin    Gastroesophageal reflux disease    Insomnia    Tobacco abuse    Overweight    Seasonal allergies    Lower extremity edema    Benign neoplasm of colon    Aortic aneurysm (HCC)    Superior mesenteric artery stenosis ([facility])    Peripheral neuropathy    Hypertensive heart and renal disease with CHF ([facility])    Depression, recurrent ([facility])    BPH (benign prostatic hyperplasia)    Urinary retention    Left hip pain    Suspicious nevus    PAD (peripheral artery disease) ([facility])    Arthralgia of left lower leg    Ambulatory dysfunction    Hernia of abdominal wall    History of pulmonary embolism    Bronchitis    Iron deficiency anemia due to chronic blood loss    Vitamin B12 deficiency    Pain in left wrist    Smoking greater than 20 pack years    Venous insufficiency of both lower extremities    Deformity of both feet    Arthritis, midfoot    Pain in both feet    Hammertoe of second toe of right foot    Prediabetes    Neuropathy of both feet    Generalized abdominal pain     Past Medical History:   Diagnosis Date    Acute right MCA stroke ([facility]) 09/15/2018    Arthritis     CAD (coronary artery disease)     s/p CABG 2000    COPD (chronic obstructive pulmonary disease) ([facility])     GERD (gastroesophageal reflux disease)     Grand mal status ([facility]) 03/24/2019    H/O blood clots     Heart attack ([facility])     Heart failure ([facility])     Hyperlipidemia     Hypertension     Lexiscan nuclear stress test 03/19/2016    EF 74% Normal    Myocardial infarction ([facility])     Shortness  of breath     Stroke (HCC)     TIA (transient ischemic attack) 09/13/2018     Social History     Socioeconomic History    Marital status: Single     Spouse name: Not on file    Number of children: Not on file    Years of education: Not on file    Highest education level: Not on file   Occupational History    Not on file   Tobacco Use    Smoking status: Every Day     Current packs/day: 1.00     Average packs/day: 1 pack/day for 54.9 years (54.9 ttl pk-yrs)     Types: Cigarettes     Start date: 1970     Passive exposure: Never    Smokeless tobacco: Never   Vaping Use    Vaping status: Never Used   Substance and Sexual Activity    Alcohol use: Not Currently     Comment: Socially    Drug use: Never    Sexual activity: Not Currently   Other Topics Concern    Not on file   Social History Narrative    Not on file     Social Drivers of Health     Financial Resource Strain: Low Risk  (6/7/2023)    Overall Financial Resource Strain (CARDIA)     Difficulty of Paying Living Expenses: Not very hard   Food Insecurity: No Food Insecurity (6/5/2024)    Nursing - Inadequate Food Risk Classification     Worried About Running Out of Food in the Last Year: Never true     Ran Out of Food in the Last Year: Never true     Ran Out of Food in the Last Year: Not on file   Transportation Needs: No Transportation Needs (6/5/2024)    PRAPARE - Transportation     Lack of Transportation (Medical): No     Lack of Transportation (Non-Medical): No   Physical Activity: Not on file   Stress: Not on file   Social Connections: Unknown (6/18/2024)    Received from [facility] [facility]    Social Connections     How often do you feel lonely or isolated from those around you? (Adult - for ages 18 years and over): Not on file   Intimate Partner Violence: Not on file   Housing Stability: Low Risk  (6/5/2024)    Housing Stability Vital Sign     Unable to Pay for Housing in the Last Year: No     Number of Times Moved in the Last Year: 0     Homeless in the Last Year: No       Family History   Problem Relation Age of Onset    Heart disease Mother     Cancer Father     Aneurysm Father     Cancer Maternal Grandmother     Cancer Paternal Grandfather      Past Surgical History:   Procedure Laterality Date    CARDIAC CATHETERIZATION  08/19/2015    LIMA occluded. No severe native lesions    CARDIAC CATHETERIZATION N/A 12/03/2021    Procedure: Cardiac Coronary Angiogram;  Surgeon: Fredis Rogel MD;  Location: [facility];  Service: Cardiology    CARDIAC CATHETERIZATION  12/03/2021    Procedure: Cardiac catheterization;  Surgeon: Fredis Rogel MD;  Location: [facility];  Service: Cardiology    COLONOSCOPY      CORONARY ARTERY BYPASS GRAFT  2000    CYSTOSCOPY  10/31/2022    [facility]    HERNIA REPAIR      umbilical hernia x2    TONSILLECTOMY         Current Outpatient Medications:     albuterol (2.5 mg/3 mL) 0.083 % nebulizer solution, inhale 3 milliliters by mouth three times a day if needed, Disp: 75 mL, Rfl: 1    albuterol (PROVENTIL HFA,VENTOLIN HFA) 90 mcg/act inhaler, inhale 2 puffs every 6 hours if needed for shortness of breath, Disp: 54 g, Rfl: 1    ammonium lactate (LAC-HYDRIN) 12 % cream, Apply topically as needed for dry skin, Disp: 385 g, Rfl: 2    aspirin (ECOTRIN LOW STRENGTH) 81 mg EC tablet, Take 1 tablet (81 mg total) by mouth daily, Disp: 30 tablet, Rfl: 0    azelastine (ASTELIN) 0.1 % nasal spray, 1 spray into each nostril 2 (two) times a day Use in each nostril as directed, Disp: 1 mL, Rfl: 2    Diclofenac Sodium (VOLTAREN) 1 %, Apply 2 g topically 4 (four) times a day, Disp: 100 g, Rfl: 2    diltiazem (CARDIZEM CD) 180 mg 24 hr capsule, take 1 capsule by mouth once daily, Disp: 90 capsule, Rfl: 1    Docusate Sodium (DOC-Q-LACE PO), , Disp: , Rfl:     esomeprazole (NexIUM) 40 MG capsule, Take 1 capsule (40 mg total) by mouth every morning, Disp: 90 capsule, Rfl: 0    finasteride (PROSCAR) 5 mg tablet, take 1 tablet by mouth once daily, Disp: 90  tablet, Rfl: 1    fluticasone (FLONASE) 50 mcg/act nasal spray, 1 spray into each nostril daily, Disp: 9.9 mL, Rfl: 0    fluticasone-umeclidinium-vilanterol (Trelegy Ellipta) 200-62.5-25 mcg/actuation AEPB inhaler, Inhale 1 puff daily Rinse mouth after use, Disp: 180 blister, Rfl: 1    furosemide (LASIX) 40 mg tablet, take 1 tablet by mouth once daily, Disp: 90 tablet, Rfl: 1    isosorbide mononitrate (IMDUR) 30 mg 24 hr tablet, Take 1 tablet (30 mg total) by mouth daily, Disp: 90 tablet, Rfl: 3    Linzess 290 MCG CAPS, Take 1 capsule by mouth AT LEAST 30 MINUTES BEFORE FIRST MEAL OF THE DAY (Patient taking differently: Take 1 capsule by mouth AT LEAST 30 MINUTES BEFORE FIRST MEAL OF THE DAY PRN), Disp: 30 capsule, Rfl: 2    LORazepam (ATIVAN) 0.5 mg tablet, take 1 tablet by mouth every 8 hours if needed for anxiety, Disp: 90 tablet, Rfl: 0    metoprolol succinate (TOPROL-XL) 25 mg 24 hr tablet, take 1 tablet by mouth twice a day, Disp: 180 tablet, Rfl: 0    Multiple Vitamin (multivitamin) tablet, Take 1 tablet by mouth daily, Disp: , Rfl:     polyethylene glycol (MIRALAX) 17 g packet, Take 17 g by mouth daily as needed (constipation), Disp: 10 each, Rfl: 0    Potassium Chloride ER 20 MEQ TBCR, take 1 tablet by mouth once daily, Disp: 90 tablet, Rfl: 1    rivaroxaban (Xarelto) 20 mg tablet, Take 1 tablet (20 mg total) by mouth daily, Disp: 90 tablet, Rfl: 1    tamsulosin (FLOMAX) 0.4 mg, take 1 capsule by mouth twice a day, Disp: 180 capsule, Rfl: 1    vitamin B-12 (VITAMIN B-12) 1,000 mcg tablet, Take 1 tablet (1,000 mcg total) by mouth daily, Disp: 30 tablet, Rfl: 5    folic acid (FOLVITE) 1 mg tablet, take 1 tablet by mouth once daily (Patient not taking: Reported on 11/15/2024), Disp: 30 tablet, Rfl: 5    nicotine (NICODERM CQ) 21 mg/24 hr TD 24 hr patch, Place 1 patch on the skin over 24 hours every 24 hours (Patient not taking: Reported on 11/15/2024), Disp: 90 patch, Rfl: 0    rosuvastatin (CRESTOR) 5 mg  "tablet, Take 1 tablet (5 mg total) by mouth daily (Patient not taking: Reported on 9/24/2024), Disp: 90 tablet, Rfl: 1    VITAMIN D PO, OTC daily (Patient not taking: Reported on 11/15/2024), Disp: , Rfl:   Allergies   Allergen Reactions    Bupropion Other (See Comments)    Gabapentin Headache     Vitals:    11/15/24 0901   BP: 110/70   Pulse: 87   SpO2: 96%   Weight: 102 kg (225 lb)   Height: 5' 10\" (1.778 m)         Imaging: No results found.    Review of Systems:  Review of Systems   Constitutional: Negative.    HENT: Negative.     Eyes: Negative.    Respiratory: Negative.     Cardiovascular: Negative.    Endocrine: Negative.    Musculoskeletal: Negative.        Physical Exam:    /70   Pulse 87   Ht 5' 10\" (1.778 m)   Wt 102 kg (225 lb)   SpO2 96%   BMI 32.28 kg/m²   Physical Exam  Constitutional:       General: He is not in acute distress.     Appearance: He is not ill-appearing, toxic-appearing or diaphoretic.   HENT:      Nose: Nose normal. No congestion or rhinorrhea.      Mouth/Throat:      Mouth: Mucous membranes are moist.      Pharynx: No oropharyngeal exudate or posterior oropharyngeal erythema.   Cardiovascular:      Rate and Rhythm: Normal rate and regular rhythm.      Heart sounds: No murmur heard.     No friction rub. No gallop.   Pulmonary:      Effort: Pulmonary effort is normal. No respiratory distress.      Breath sounds: No stridor. No wheezing or rhonchi.   Musculoskeletal:         General: No swelling, tenderness, deformity or signs of injury. Normal range of motion.   Skin:     General: Skin is warm.      Coloration: Skin is not jaundiced or pale.      Findings: No bruising or erythema.   Neurological:      Mental Status: He is alert.            This note was completed in part utilizing [employer] direct voice recognition software.   Grammatical errors, random word insertion, spelling mistakes, occasional wrong word or \"sound-alike\" substitutions and incomplete sentences may " be an occasional consequence of the system secondary to software limitations, ambient noise and hardware issues. At the time of dictation, efforts were made to edit, clarify and /or correct errors.  Please read the chart carefully and recognize, using context, where substitutions have occurred.  If you have any questions or concerns about the context, text or information contained within the body of this dictation, please contact myself, the provider, for further clarification.

## 2024-11-19 DIAGNOSIS — F41.9 ANXIETY: ICD-10-CM

## 2024-11-19 NOTE — TELEPHONE ENCOUNTER
Medication: Lorazepam     Dose/Frequency: 0.5 mg tab     Quantity: 90    Pharmacy: [id number]     Office:   [x] PCP/Provider - Candida Loja   [] Speciality/Provider -     Does the patient have enough for 3 days?   [] Yes   [x] No - Send as HP to POD

## 2024-11-20 RX ORDER — LORAZEPAM 0.5 MG/1
0.5 TABLET ORAL EVERY 8 HOURS PRN
Qty: 90 TABLET | Refills: 0 | Status: SHIPPED | OUTPATIENT
Start: 2024-11-20

## 2024-11-24 ENCOUNTER — TELEPHONE (OUTPATIENT)
Dept: OTHER | Facility: OTHER | Age: 69
End: 2024-11-24

## 2024-11-25 ENCOUNTER — HOSPITAL ENCOUNTER (OUTPATIENT)
Dept: NUCLEAR MEDICINE | Facility: HOSPITAL | Age: 69
Discharge: HOME | End: 2024-11-25
Attending: INTERNAL MEDICINE

## 2024-12-04 ENCOUNTER — OFFICE VISIT (OUTPATIENT)
Dept: INTERNAL MEDICINE CLINIC | Facility: CLINIC | Age: 69
End: 2024-12-04
Payer: COMMERCIAL

## 2024-12-04 ENCOUNTER — TELEPHONE (OUTPATIENT)
Age: 69
End: 2024-12-04

## 2024-12-04 ENCOUNTER — APPOINTMENT (OUTPATIENT)
Dept: RADIOLOGY | Facility: CLINIC | Age: 69
End: 2024-12-04
Payer: COMMERCIAL

## 2024-12-04 VITALS
DIASTOLIC BLOOD PRESSURE: 64 MMHG | WEIGHT: 226.6 LBS | SYSTOLIC BLOOD PRESSURE: 116 MMHG | OXYGEN SATURATION: 96 % | BODY MASS INDEX: 32.44 KG/M2 | HEIGHT: 70 IN | HEART RATE: 75 BPM | TEMPERATURE: 97.4 F

## 2024-12-04 DIAGNOSIS — M54.50 CHRONIC MIDLINE LOW BACK PAIN WITHOUT SCIATICA: ICD-10-CM

## 2024-12-04 DIAGNOSIS — R26.9 GAIT ABNORMALITY: ICD-10-CM

## 2024-12-04 DIAGNOSIS — I80.9 THROMBOPHLEBITIS: ICD-10-CM

## 2024-12-04 DIAGNOSIS — I83.90 VARICOSE VEINS OF ANKLE: ICD-10-CM

## 2024-12-04 DIAGNOSIS — G89.29 CHRONIC MIDLINE LOW BACK PAIN WITHOUT SCIATICA: ICD-10-CM

## 2024-12-04 DIAGNOSIS — I87.1 COMPRESSION OF VEIN: ICD-10-CM

## 2024-12-04 DIAGNOSIS — G57.93 NEUROPATHY OF BOTH FEET: Chronic | ICD-10-CM

## 2024-12-04 DIAGNOSIS — I73.9 PAD (PERIPHERAL ARTERY DISEASE) (HCC): ICD-10-CM

## 2024-12-04 DIAGNOSIS — M25.572 ACUTE LEFT ANKLE PAIN: Primary | ICD-10-CM

## 2024-12-04 DIAGNOSIS — M25.572 ACUTE LEFT ANKLE PAIN: ICD-10-CM

## 2024-12-04 DIAGNOSIS — I87.2 VENOUS INSUFFICIENCY OF BOTH LOWER EXTREMITIES: ICD-10-CM

## 2024-12-04 PROCEDURE — 99214 OFFICE O/P EST MOD 30 MIN: CPT | Performed by: INTERNAL MEDICINE

## 2024-12-04 PROCEDURE — 73610 X-RAY EXAM OF ANKLE: CPT

## 2024-12-04 PROCEDURE — G2211 COMPLEX E/M VISIT ADD ON: HCPCS | Performed by: INTERNAL MEDICINE

## 2024-12-04 RX ORDER — METHOCARBAMOL 500 MG/1
500 TABLET, FILM COATED ORAL 4 TIMES DAILY
Qty: 90 TABLET | Refills: 0 | Status: SHIPPED | OUTPATIENT
Start: 2024-12-04

## 2024-12-04 RX ORDER — PREDNISONE 10 MG/1
TABLET ORAL
Qty: 50 TABLET | Refills: 0 | Status: SHIPPED | OUTPATIENT
Start: 2024-12-04

## 2024-12-04 NOTE — TELEPHONE ENCOUNTER
Patient called to request information regarding referrals he was supposed to be provided with at his appointment today.  I provided him with phone numbers to schedule with Pain Management, vascular for venous duplex study as well as Podiatry and let him know no number was listed for PT so they will be reaching out to him.  Warm transferred to Central scheduling to schedule for vascular study.

## 2024-12-04 NOTE — PROGRESS NOTES
Name: Miguelangel Lancaster      : 1955      MRN: [id number]  Encounter Provider: Miguelangel Morataya DO  Encounter Date: 2024   Encounter department: [facility]  :  Assessment & Plan  Acute left ankle pain    Orders:    XR ankle 3+ vw left; Future    Ambulatory Referral to Podiatry; Future    Ambulatory referral to Physical Therapy; Future    VAS VENOUS DUPLEX -LOWER LIMB UNILATERAL; Future    Varicose veins of ankle    Orders:    XR ankle 3+ vw left; Future    Ambulatory Referral to Podiatry; Future    methocarbamol (ROBAXIN) 500 mg tablet; Take 1 tablet (500 mg total) by mouth 4 (four) times a day    VAS VENOUS DUPLEX -LOWER LIMB UNILATERAL; Future    Chronic midline low back pain without sciatica    Orders:    Ambulatory referral to Spine & Pain Management; Future    Ambulatory referral to Physical Therapy; Future    methocarbamol (ROBAXIN) 500 mg tablet; Take 1 tablet (500 mg total) by mouth 4 (four) times a day    predniSONE 10 mg tablet; 40mg po q d x 5, then 30mg po q d x 5, then 20mg po q d x 5, then 10mg po q d x 5, then d/c.    Gait abnormality    Orders:    XR ankle 3+ vw left; Future    Ambulatory referral to Spine & Pain Management; Future    Ambulatory referral to Physical Therapy; Future    Neuropathy of both feet    Orders:    Ambulatory Referral to Podiatry; Future    Ambulatory referral to Spine & Pain Management; Future    PAD (peripheral artery disease) (HCC)         Venous insufficiency of both lower extremities         Thrombophlebitis    Orders:    XR ankle 3+ vw left; Future    Ambulatory Referral to Podiatry; Future    predniSONE 10 mg tablet; 40mg po q d x 5, then 30mg po q d x 5, then 20mg po q d x 5, then 10mg po q d x 5, then d/c.    VAS VENOUS DUPLEX -LOWER LIMB UNILATERAL; Future    Compression of vein    Orders:    VAS VENOUS DUPLEX -LOWER LIMB UNILATERAL; Future    A/P: Stable. Suspect worsening DDD, HNP, and stenosis of the LS spine. Start steroid wean and  "muscle relaxant. Start PT and refer to pain management. ?ankle pain. GIven PE, suspect varicose vein is being irritated from the shoes. Pt has a indentation over the lesion and corresponds to the rim of his shows. ?thrombophlebitis. Start warm compresses and refer to DPM. Will check a duplex. May need to see vascular. Unable to take NSAID's and will start steroids as above. May need different shoes to take the pressure off the area. Will check an xray to insure no bony defects. RTC three weeks for f/u.        History of Present Illness     WM with PMH of PAD, neuropathy, HNP, and DVT, presents c/o several months of progressive left ankle and LBP. No trauma. NO fever or chills. Worsening gait due to both pains. Unable to take NSAID due to GI issues. Rates LBP 8/10 w/o radiation, saddle anesthesia, or change in bowel or bladder habits. Ankle pain posterior and burning at times.      Review of Systems   Constitutional:  Positive for activity change. Negative for chills, diaphoresis, fatigue and fever.   Respiratory:  Negative for cough, chest tightness, shortness of breath and wheezing.    Cardiovascular:  Negative for chest pain, palpitations and leg swelling.   Gastrointestinal:  Negative for abdominal pain, constipation, diarrhea, nausea and vomiting.   Genitourinary:  Negative for difficulty urinating, dysuria and frequency.   Musculoskeletal:  Positive for back pain. Negative for arthralgias, gait problem, joint swelling and myalgias.   Neurological:  Positive for numbness. Negative for dizziness, syncope, facial asymmetry, weakness, light-headedness and headaches.   Psychiatric/Behavioral:  Negative for confusion. The patient is not nervous/anxious.           Objective   /64   Pulse 75   Temp (!) 97.4 °F (36.3 °C) (Tympanic)   Ht 5' 10\" (1.778 m)   Wt 103 kg (226 lb 9.6 oz)   SpO2 96%   BMI 32.51 kg/m²      Physical Exam  Vitals and nursing note reviewed.   Constitutional:       General: He is not in " acute distress.     Appearance: Normal appearance. He is not ill-appearing.   HENT:      Head: Normocephalic and atraumatic.      Mouth/Throat:      Mouth: Mucous membranes are moist.   Eyes:      Extraocular Movements: Extraocular movements intact.      Conjunctiva/sclera: Conjunctivae normal.      Pupils: Pupils are equal, round, and reactive to light.   Musculoskeletal:         General: Tenderness and deformity present.      Right lower leg: No edema.      Left lower leg: No edema.      Comments: LS Spine w/o gross deformities, increase temp, erythema, swelling, or lesions. Tenderness midline L3-S1. ROM wnl. Spasms none. LE strength 5/5 with tone/ROM WNL. DTR 2/4. . Some difficulty with  gait abnormalities(toe/heel walking), but ?ankle contributing    Left ankle joint w/o any gross deformities, increase temp, erythema, or swelling. No crepitus. No effusions or ballotment. Joint integrity intact.. ROM wnl. Tenderness along the achillis with a moderate size fluctuant and tender mass..       Neurological:      General: No focal deficit present.      Mental Status: He is alert and oriented to person, place, and time. Mental status is at baseline.      Cranial Nerves: No cranial nerve deficit.      Motor: No weakness.      Coordination: Coordination normal.      Gait: Gait abnormal.      Deep Tendon Reflexes: Reflexes normal.   Psychiatric:         Mood and Affect: Mood normal.         Behavior: Behavior normal.         Thought Content: Thought content normal.         Judgment: Judgment normal.

## 2024-12-04 NOTE — ASSESSMENT & PLAN NOTE
Orders:    Ambulatory Referral to Podiatry; Future    Ambulatory referral to Spine & Pain Management; Future

## 2024-12-05 ENCOUNTER — RESULTS FOLLOW-UP (OUTPATIENT)
Dept: INTERNAL MEDICINE CLINIC | Facility: CLINIC | Age: 69
End: 2024-12-05

## 2024-12-05 NOTE — TELEPHONE ENCOUNTER
----- Message from Miguleangel Morataya DO sent at 12/5/2024  6:12 AM EST -----  Call pt xray is normal.

## 2024-12-12 DIAGNOSIS — I25.10 CORONARY ARTERIOSCLEROSIS IN NATIVE ARTERY: ICD-10-CM

## 2024-12-12 RX ORDER — DILTIAZEM HYDROCHLORIDE 180 MG/1
180 CAPSULE, COATED, EXTENDED RELEASE ORAL DAILY
Qty: 90 CAPSULE | Refills: 3 | Status: SHIPPED | OUTPATIENT
Start: 2024-12-12

## 2024-12-13 ENCOUNTER — HOSPITAL ENCOUNTER (OUTPATIENT)
Dept: NON INVASIVE DIAGNOSTICS | Facility: HOSPITAL | Age: 69
Discharge: HOME | End: 2024-12-13
Attending: INTERNAL MEDICINE
Payer: COMMERCIAL

## 2024-12-13 DIAGNOSIS — I87.1 COMPRESSION OF VEIN: ICD-10-CM

## 2024-12-13 DIAGNOSIS — M25.572 ACUTE LEFT ANKLE PAIN: ICD-10-CM

## 2024-12-13 DIAGNOSIS — I80.9 THROMBOPHLEBITIS: ICD-10-CM

## 2024-12-13 DIAGNOSIS — I83.90 VARICOSE VEINS OF ANKLE: ICD-10-CM

## 2024-12-13 PROCEDURE — 93971 EXTREMITY STUDY: CPT

## 2024-12-15 PROCEDURE — 93971 EXTREMITY STUDY: CPT | Performed by: SURGERY

## 2024-12-17 ENCOUNTER — VBI (OUTPATIENT)
Dept: ADMINISTRATIVE | Facility: OTHER | Age: 69
End: 2024-12-17

## 2024-12-17 NOTE — TELEPHONE ENCOUNTER
12/17/24 9:23 AM     Chart reviewed for [employer] ; nothing is submitted to the patient's insurance at this time.     Shannan Ernandez   [facility]

## 2024-12-20 DIAGNOSIS — G89.29 CHRONIC MIDLINE LOW BACK PAIN WITHOUT SCIATICA: ICD-10-CM

## 2024-12-20 DIAGNOSIS — I83.90 VARICOSE VEINS OF ANKLE: ICD-10-CM

## 2024-12-20 DIAGNOSIS — M54.50 CHRONIC MIDLINE LOW BACK PAIN WITHOUT SCIATICA: ICD-10-CM

## 2024-12-23 RX ORDER — METHOCARBAMOL 500 MG/1
500 TABLET, FILM COATED ORAL 4 TIMES DAILY
Qty: 90 TABLET | Refills: 0 | Status: SHIPPED | OUTPATIENT
Start: 2024-12-23

## 2024-12-31 ENCOUNTER — NURSE TRIAGE (OUTPATIENT)
Age: 69
End: 2024-12-31

## 2024-12-31 DIAGNOSIS — R60.0 LOWER EXTREMITY EDEMA: ICD-10-CM

## 2024-12-31 NOTE — TELEPHONE ENCOUNTER
"Please advise if ok for Miguelangel to have NM stress test with chronic clots left leg.        Reason for Disposition   Nursing judgment     Patient inquiring if it is ok to have the NM stress test done while he has chronic clots in his left leg.    Answer Assessment - Initial Assessment Questions  1. REASON FOR CALL: \"What is the main reason for your call?\" or \"How can I best help you?\"      December 13, 2024 US of legs shows: \"Left       Impression                             FV Mid     E1.Non Occlusive Thrombus (Chronic)    Popliteal  E1.Non Occlusive Thrombus (Chronic) \"  Miguelangel also has an order for a NM stress and he is inquiring if it is ok for him to have the stress done with the chronic clots.       3. OTHER QUESTIONS: \"Do you have any other questions?\"      Need a refill on the Lasix too.    Protocols used: Information Only Call - No Triage-Adult-[place]    "

## 2024-12-31 NOTE — TELEPHONE ENCOUNTER
Medication: Furosemide    Dose/Frequency: 40mg daily    Quantity: 90 R3    Pharmacy: [facility] [address] [place] [address]    Office:   [] PCP/Provider -   [x] Speciality/Provider - Dr Marquez    Does the patient have enough for 3 days?   [x] Yes   [] No - Send as HP to POD

## 2025-01-02 RX ORDER — FUROSEMIDE 40 MG/1
40 TABLET ORAL DAILY
Qty: 90 TABLET | Refills: 1 | Status: SHIPPED | OUTPATIENT
Start: 2025-01-02

## 2025-01-06 DIAGNOSIS — F41.9 ANXIETY: ICD-10-CM

## 2025-01-07 RX ORDER — LORAZEPAM 0.5 MG/1
0.5 TABLET ORAL EVERY 8 HOURS PRN
Qty: 90 TABLET | Refills: 0 | Status: SHIPPED | OUTPATIENT
Start: 2025-01-07

## 2025-01-09 DIAGNOSIS — I25.10 CORONARY ARTERIOSCLEROSIS IN NATIVE ARTERY: ICD-10-CM

## 2025-01-10 RX ORDER — METOPROLOL SUCCINATE 25 MG/1
25 TABLET, EXTENDED RELEASE ORAL 2 TIMES DAILY
Qty: 180 TABLET | Refills: 1 | Status: SHIPPED | OUTPATIENT
Start: 2025-01-10

## 2025-01-15 ENCOUNTER — OFFICE VISIT (OUTPATIENT)
Dept: INTERNAL MEDICINE CLINIC | Facility: CLINIC | Age: 70
End: 2025-01-15
Payer: COMMERCIAL

## 2025-01-15 VITALS
HEIGHT: 70 IN | OXYGEN SATURATION: 99 % | WEIGHT: 225 LBS | SYSTOLIC BLOOD PRESSURE: 136 MMHG | TEMPERATURE: 96.3 F | BODY MASS INDEX: 32.21 KG/M2 | HEART RATE: 64 BPM | DIASTOLIC BLOOD PRESSURE: 72 MMHG

## 2025-01-15 DIAGNOSIS — G62.9 PERIPHERAL POLYNEUROPATHY: ICD-10-CM

## 2025-01-15 DIAGNOSIS — M25.562 ARTHRALGIA OF LEFT LOWER LEG: Primary | ICD-10-CM

## 2025-01-15 DIAGNOSIS — F33.9 DEPRESSION, RECURRENT (HCC): ICD-10-CM

## 2025-01-15 DIAGNOSIS — I13.0 HYPERTENSIVE HEART AND CHRONIC KIDNEY DISEASE WITH HEART FAILURE AND STAGE 1 THROUGH STAGE 4 CHRONIC KIDNEY DISEASE, OR UNSPECIFIED CHRONIC KIDNEY DISEASE (HCC): ICD-10-CM

## 2025-01-15 DIAGNOSIS — I25.119 CORONARY ARTERY DISEASE INVOLVING NATIVE CORONARY ARTERY OF NATIVE HEART WITH ANGINA PECTORIS (HCC): ICD-10-CM

## 2025-01-15 DIAGNOSIS — J44.9 COPD, SEVERITY TO BE DETERMINED (HCC): ICD-10-CM

## 2025-01-15 PROBLEM — I26.99 ACUTE PULMONARY EMBOLISM WITHOUT ACUTE COR PULMONALE, UNSPECIFIED PULMONARY EMBOLISM TYPE (HCC): Status: ACTIVE | Noted: 2025-01-15

## 2025-01-15 PROBLEM — G57.93 NEUROPATHY OF BOTH FEET: Chronic | Status: RESOLVED | Noted: 2024-07-01 | Resolved: 2025-01-15

## 2025-01-15 PROBLEM — J40 BRONCHITIS: Status: RESOLVED | Noted: 2023-07-05 | Resolved: 2025-01-15

## 2025-01-15 PROBLEM — I26.99 ACUTE PULMONARY EMBOLISM WITHOUT ACUTE COR PULMONALE, UNSPECIFIED PULMONARY EMBOLISM TYPE (HCC): Status: RESOLVED | Noted: 2025-01-15 | Resolved: 2025-01-15

## 2025-01-15 PROCEDURE — 99214 OFFICE O/P EST MOD 30 MIN: CPT | Performed by: PHYSICIAN ASSISTANT

## 2025-01-15 RX ORDER — PREGABALIN 75 MG/1
75 CAPSULE ORAL 2 TIMES DAILY
Qty: 60 CAPSULE | Refills: 0 | Status: SHIPPED | OUTPATIENT
Start: 2025-01-15

## 2025-01-15 NOTE — ASSESSMENT & PLAN NOTE
Patient with neuropathy of feet. Patient has tried gabapentin before without relief. Discussed Lyrica and patient is agreeable to try.     Orders:  •  pregabalin (LYRICA) 75 mg capsule; Take 1 capsule (75 mg total) by mouth 2 (two) times a day

## 2025-01-15 NOTE — ASSESSMENT & PLAN NOTE
Patient presented to office for routine follow up with complaints of left leg pain. Patient expressed that this pain is ongoing for years. Noted some swelling to the left ankle. Patient describes pain is worse at night time or when standing for a long period of time. Patient with know chronic DVTs. With most recent lower extremity venous duplex completed in December. Continue taking Xarelto and ASA. Educated on risk of DVT such as stroke and heart attack and when to call 911.

## 2025-01-15 NOTE — PROGRESS NOTES
Name: Miguelangel Lancaster      : 1955      MRN: [id number]  Encounter Provider: Candida Loja PA-C  Encounter Date: 1/15/2025   Encounter department: [facility]  :  Assessment & Plan  Peripheral polyneuropathy  Patient with neuropathy of feet. Patient has tried gabapentin before without relief. Discussed Lyrica and patient is agreeable to try.     Orders:  •  pregabalin (LYRICA) 75 mg capsule; Take 1 capsule (75 mg total) by mouth 2 (two) times a day    Hypertensive heart and chronic kidney disease with heart failure and stage 1 through stage 4 chronic kidney disease, or unspecified chronic kidney disease (HCC)  Wt Readings from Last 3 Encounters:   01/15/25 102 kg (225 lb)   24 103 kg (226 lb 9.6 oz)   11/15/24 102 kg (225 lb)                  COPD, severity to be determined (HCC)         Depression, recurrent (HCC)           Coronary artery disease involving native coronary artery of native heart with angina pectoris (HCC)         Arthralgia of left lower leg  Patient presented to office for routine follow up with complaints of left leg pain. Patient expressed that this pain is ongoing for years. Noted some swelling to the left ankle. Patient describes pain is worse at night time or when standing for a long period of time. Patient with know chronic DVTs. With most recent lower extremity venous duplex completed in December. Continue taking Xarelto and ASA. Educated on risk of DVT such as stroke and heart attack and when to call 911.                Depression Screening and Follow-up Plan: Patient was screened for depression during today's encounter. They screened negative with a PHQ-9 score of 0.    Tobacco Cessation Counseling: Tobacco cessation counseling was provided. The patient is sincerely urged to quit consumption of tobacco. He is not ready to quit tobacco.       History of Present Illness     Patient arrived for routine follow up. Patient is well appearing and in no apparent  "distress. Patient has concerns about left leg pain that has been constant for years. No other concerns at this time.       Review of Systems   Constitutional: Negative.  Negative for chills and fever.   HENT: Negative.  Negative for congestion, ear pain, hearing loss, postnasal drip, rhinorrhea, sinus pressure, sinus pain, sore throat and trouble swallowing.    Eyes: Negative.  Negative for pain and visual disturbance.   Respiratory:  Positive for shortness of breath. Negative for cough, chest tightness and wheezing.         Shortness of breath when walking up stairs.   Cardiovascular:  Positive for leg swelling. Negative for chest pain and palpitations.        Patient reports intermittent chest pain that occurs while laying in bed. Resolves quickly. Patient ordered a NM Stress Test by cardiology.    Gastrointestinal: Negative.  Negative for abdominal pain, blood in stool, constipation, diarrhea, nausea and vomiting.   Endocrine: Negative.  Negative for cold intolerance, heat intolerance, polydipsia, polyphagia and polyuria.   Genitourinary: Negative.  Negative for difficulty urinating, dysuria, flank pain and urgency.   Musculoskeletal:  Positive for arthralgias. Negative for back pain, gait problem and myalgias.   Skin: Negative.  Negative for rash.   Allergic/Immunologic: Negative.    Neurological: Negative.  Negative for dizziness, weakness, light-headedness and headaches.   Hematological: Negative.    Psychiatric/Behavioral: Negative.  Negative for behavioral problems, dysphoric mood and sleep disturbance. The patient is not nervous/anxious.        Objective   /72   Pulse 64   Temp (!) 96.3 °F (35.7 °C) (Tympanic)   Ht 5' 10\" (1.778 m)   Wt 102 kg (225 lb)   SpO2 99%   BMI 32.28 kg/m²      Physical Exam  Vitals and nursing note reviewed.   Constitutional:       General: He is not in acute distress.     Appearance: Normal appearance. He is well-developed. He is not diaphoretic.   HENT:      Head: " Normocephalic and atraumatic.      Right Ear: External ear normal.      Left Ear: External ear normal.      Nose: Nose normal.      Mouth/Throat:      Pharynx: No oropharyngeal exudate.   Eyes:      General: No scleral icterus.        Right eye: No discharge.         Left eye: No discharge.      Conjunctiva/sclera: Conjunctivae normal.      Pupils: Pupils are equal, round, and reactive to light.   Neck:      Thyroid: No thyromegaly.   Cardiovascular:      Rate and Rhythm: Normal rate and regular rhythm.      Pulses:           Dorsalis pedis pulses are 1+ on the left side.      Heart sounds: Normal heart sounds. No murmur heard.     No friction rub. No gallop.      Comments: Patient with edema to left ankle. Vascular changes noted (dry skin and pallor).   Pulmonary:      Effort: Pulmonary effort is normal. No respiratory distress.      Breath sounds: Normal breath sounds. No wheezing or rales.   Abdominal:      General: Bowel sounds are normal. There is no distension.      Palpations: Abdomen is soft.      Tenderness: There is no abdominal tenderness.   Musculoskeletal:         General: No swelling or deformity. Normal range of motion.      Cervical back: Normal range of motion and neck supple.      Left lower leg: Tenderness present. 1+ Edema present.      Left ankle: Tenderness present.   Skin:     General: Skin is warm and dry.      Capillary Refill: Capillary refill takes 2 to 3 seconds.      Coloration: Skin is ashen.   Neurological:      Mental Status: He is alert and oriented to person, place, and time.      Cranial Nerves: No cranial nerve deficit.   Psychiatric:         Mood and Affect: Mood normal.         Behavior: Behavior normal.         Thought Content: Thought content normal.         Judgment: Judgment normal.       Administrative Statements   I have spent a total time of 15 minutes in caring for this patient on the day of the visit/encounter including Risks and benefits of tx options, Instructions for  management, Patient and family education, Importance of tx compliance, Risk factor reductions, Counseling / Coordination of care, Documenting in the medical record, Reviewing / ordering tests, medicine, procedures  , and Obtaining or reviewing history  .

## 2025-01-22 ENCOUNTER — TELEPHONE (OUTPATIENT)
Dept: OTHER | Facility: OTHER | Age: 70
End: 2025-01-22

## 2025-01-22 ENCOUNTER — HOSPITAL ENCOUNTER (OUTPATIENT)
Dept: NUCLEAR MEDICINE | Facility: HOSPITAL | Age: 70
Discharge: HOME | End: 2025-01-22
Attending: INTERNAL MEDICINE

## 2025-01-22 NOTE — TELEPHONE ENCOUNTER
Patient is calling regarding cancelling an appointment.    Date/Time: 1/22/2025 / 9:00 am    Patient was rescheduled: YES [] NO [x]    Patient requesting call back to reschedule: YES [x] NO []

## 2025-01-28 ENCOUNTER — TELEPHONE (OUTPATIENT)
Age: 70
End: 2025-01-28

## 2025-01-28 NOTE — TELEPHONE ENCOUNTER
Patient called, he wanted to let his provider know the   pregabalin (LYRICA) 75 mg capsule are not helping. He is having pain in his head and neck. He is asking if there is something else she could recommend?     He would like a call back, please advise.     Thank you

## 2025-01-28 NOTE — TELEPHONE ENCOUNTER
He's quite limited. He can't take anti-inflammatories due to being on a blood thinner. He previously tried cymbalta and gabapentin, we can revisit one of those if he wants?

## 2025-01-29 ENCOUNTER — TELEPHONE (OUTPATIENT)
Dept: INTERNAL MEDICINE CLINIC | Facility: CLINIC | Age: 70
End: 2025-01-29

## 2025-01-29 NOTE — TELEPHONE ENCOUNTER
Patient states that He has blood clots on his left foot and wants to know what can be done about that. He is agreeable to seeing a spine and pain specialist. He was informed that He is not to take anti-inflammatories and that you will get back to me tomorrow 01/30/2025. Please advise

## 2025-01-29 NOTE — TELEPHONE ENCOUNTER
Patient returned call to office. I relayed the following message :    He's quite limited. He can't take anti-inflammatories due to being on a blood thinner. He previously tried cymbalta and gabapentin, we can revisit one of those if he wants?     Patient stated he is not interested in taking Cymbalta and gabapentin. Patient states he has been taking Tylenol 650 mg for 2-3 days at a time. Patient had additional questions. Warm transferred to [place].

## 2025-01-30 NOTE — TELEPHONE ENCOUNTER
He doesn't need to see anyone for the blood clots necessarily as they are already being treated. As noted in previous msg he really is low in options due to the medications he takes, not many safe options except for what he has already tried. I was going to suggest continuing the lyrica but try a higher dose but I dont think he is open to that

## 2025-01-30 NOTE — TELEPHONE ENCOUNTER
"Multiple phone notes made, copied from other encounter:    Patient states that He has blood clots on his left foot and wants to know what can be done about that. He is agreeable to seeing a spine and pain specialist. He was informed that He is not to take anti-inflammatories and that you will get back to me tomorrow 01/30/2025. Please advise     Providers response:  \"Blood clots are being treated with anticoagulant\"    Lm for patient to call back for providers respone  "

## 2025-01-31 NOTE — TELEPHONE ENCOUNTER
Patient did call back I did read verbatim what provider put. Patient was asking how long will he have these blood clots. If we could give patient a call back he did also have some more questions

## 2025-02-03 NOTE — TELEPHONE ENCOUNTER
Sometimes they never completely resolve they just become stable within the vessel, we usually repeat imaging 6months after being on treatment.   Please reach out to pt to see what his other questions are, thanks

## 2025-02-04 NOTE — TELEPHONE ENCOUNTER
Patient called office back and was read Candida Loja's message verbatim , but he states he is still having pain and numbness in his legs and is wondering if he needs to be referred to Hematology for evaluation.  Patient asking for call back. Thank you.

## 2025-02-04 NOTE — TELEPHONE ENCOUNTER
Pt was already seen by heme/onc as well as vascular so he can call to schedule a follow up with either of them if he would like

## 2025-02-07 ENCOUNTER — HOSPITAL ENCOUNTER (INPATIENT)
Facility: HOSPITAL | Age: 70
LOS: 2 days | Discharge: HOME | End: 2025-02-09
Attending: EMERGENCY MEDICINE | Admitting: INTERNAL MEDICINE
Payer: COMMERCIAL

## 2025-02-07 ENCOUNTER — APPOINTMENT (EMERGENCY)
Dept: RADIOLOGY | Facility: HOSPITAL | Age: 70
End: 2025-02-07
Payer: COMMERCIAL

## 2025-02-07 DIAGNOSIS — I95.9 HYPOTENSION: ICD-10-CM

## 2025-02-07 DIAGNOSIS — J10.1 INFLUENZA A: ICD-10-CM

## 2025-02-07 DIAGNOSIS — J11.1 FLU: ICD-10-CM

## 2025-02-07 DIAGNOSIS — A41.9 SEPSIS (HCC): Primary | ICD-10-CM

## 2025-02-07 DIAGNOSIS — R50.9 FEVER: ICD-10-CM

## 2025-02-07 DIAGNOSIS — J44.1 COPD EXACERBATION (HCC): ICD-10-CM

## 2025-02-07 PROBLEM — E87.1 HYPONATREMIA: Status: ACTIVE | Noted: 2025-02-07

## 2025-02-07 PROBLEM — E83.42 HYPOMAGNESEMIA: Status: ACTIVE | Noted: 2025-02-07

## 2025-02-07 PROBLEM — Z86.718 HISTORY OF DVT IN ADULTHOOD: Status: ACTIVE | Noted: 2025-02-07

## 2025-02-07 PROBLEM — N18.2 CHRONIC KIDNEY DISEASE, STAGE 2 (MILD): Status: ACTIVE | Noted: 2025-02-07

## 2025-02-07 PROBLEM — Z86.73 HISTORY OF STROKE: Status: ACTIVE | Noted: 2018-09-14

## 2025-02-07 PROBLEM — I13.0 HYPERTENSIVE HEART AND RENAL DISEASE WITH CHF (HCC): Status: RESOLVED | Noted: 2022-06-30 | Resolved: 2025-02-07

## 2025-02-07 LAB
ALBUMIN SERPL BCG-MCNC: 3.8 G/DL (ref 3.5–5)
ALP SERPL-CCNC: 99 U/L (ref 34–104)
ALT SERPL W P-5'-P-CCNC: 24 U/L (ref 7–52)
ANION GAP SERPL CALCULATED.3IONS-SCNC: 8 MMOL/L (ref 4–13)
APTT PPP: 32 SECONDS (ref 23–34)
AST SERPL W P-5'-P-CCNC: 19 U/L (ref 13–39)
ATRIAL RATE: 107 BPM
BASOPHILS # BLD AUTO: 0.05 THOUSANDS/ÂΜL (ref 0–0.1)
BASOPHILS NFR BLD AUTO: 1 % (ref 0–1)
BILIRUB SERPL-MCNC: 0.59 MG/DL (ref 0.2–1)
BILIRUB UR QL STRIP: NEGATIVE
BNP SERPL-MCNC: 122 PG/ML (ref 0–100)
BUN SERPL-MCNC: 8 MG/DL (ref 5–25)
CALCIUM SERPL-MCNC: 8.4 MG/DL (ref 8.4–10.2)
CHLORIDE SERPL-SCNC: 96 MMOL/L (ref 96–108)
CLARITY UR: CLEAR
CO2 SERPL-SCNC: 25 MMOL/L (ref 21–32)
COLOR UR: YELLOW
CREAT SERPL-MCNC: 1.33 MG/DL (ref 0.6–1.3)
EOSINOPHIL # BLD AUTO: 0.01 THOUSAND/ÂΜL (ref 0–0.61)
EOSINOPHIL NFR BLD AUTO: 0 % (ref 0–6)
ERYTHROCYTE [DISTWIDTH] IN BLOOD BY AUTOMATED COUNT: 12.7 % (ref 11.6–15.1)
FLUAV RNA RESP QL NAA+PROBE: POSITIVE
FLUBV RNA RESP QL NAA+PROBE: NEGATIVE
GFR SERPL CREATININE-BSD FRML MDRD: 54 ML/MIN/1.73SQ M
GLUCOSE SERPL-MCNC: 112 MG/DL (ref 65–140)
GLUCOSE UR STRIP-MCNC: NEGATIVE MG/DL
HCT VFR BLD AUTO: 40.2 % (ref 36.5–49.3)
HGB BLD-MCNC: 13.6 G/DL (ref 12–17)
HGB UR QL STRIP.AUTO: NEGATIVE
IMM GRANULOCYTES # BLD AUTO: 0.07 THOUSAND/UL (ref 0–0.2)
IMM GRANULOCYTES NFR BLD AUTO: 1 % (ref 0–2)
INR PPP: 1.24 (ref 0.85–1.19)
KETONES UR STRIP-MCNC: NEGATIVE MG/DL
L PNEUMO1 AG UR QL IA.RAPID: NEGATIVE
LACTATE SERPL-SCNC: 1.5 MMOL/L (ref 0.5–2)
LEUKOCYTE ESTERASE UR QL STRIP: NEGATIVE
LYMPHOCYTES # BLD AUTO: 1.02 THOUSANDS/ÂΜL (ref 0.6–4.47)
LYMPHOCYTES NFR BLD AUTO: 15 % (ref 14–44)
MAGNESIUM SERPL-MCNC: 1.5 MG/DL (ref 1.9–2.7)
MCH RBC QN AUTO: 30.2 PG (ref 26.8–34.3)
MCHC RBC AUTO-ENTMCNC: 33.8 G/DL (ref 31.4–37.4)
MCV RBC AUTO: 89 FL (ref 82–98)
MONOCYTES # BLD AUTO: 0.96 THOUSAND/ÂΜL (ref 0.17–1.22)
MONOCYTES NFR BLD AUTO: 14 % (ref 4–12)
NEUTROPHILS # BLD AUTO: 4.92 THOUSANDS/ÂΜL (ref 1.85–7.62)
NEUTS SEG NFR BLD AUTO: 69 % (ref 43–75)
NITRITE UR QL STRIP: NEGATIVE
NRBC BLD AUTO-RTO: 0 /100 WBCS
P AXIS: 57 DEGREES
PH UR STRIP.AUTO: 6 [PH]
PLATELET # BLD AUTO: 216 THOUSANDS/UL (ref 149–390)
PMV BLD AUTO: 9.3 FL (ref 8.9–12.7)
POTASSIUM SERPL-SCNC: 3.5 MMOL/L (ref 3.5–5.3)
PR INTERVAL: 150 MS
PROCALCITONIN SERPL-MCNC: 0.12 NG/ML
PROT SERPL-MCNC: 6.2 G/DL (ref 6.4–8.4)
PROT UR STRIP-MCNC: NEGATIVE MG/DL
PROTHROMBIN TIME: 16.1 SECONDS (ref 12.3–15)
QRS AXIS: 92 DEGREES
QRSD INTERVAL: 86 MS
QT INTERVAL: 344 MS
QTC INTERVAL: 459 MS
RBC # BLD AUTO: 4.51 MILLION/UL (ref 3.88–5.62)
RSV RNA RESP QL NAA+PROBE: NEGATIVE
S PNEUM AG UR QL: NEGATIVE
SARS-COV-2 RNA RESP QL NAA+PROBE: NEGATIVE
SODIUM SERPL-SCNC: 129 MMOL/L (ref 135–147)
SP GR UR STRIP.AUTO: <=1.005
T WAVE AXIS: 68 DEGREES
UROBILINOGEN UR QL STRIP.AUTO: 0.2 E.U./DL
VENTRICULAR RATE: 107 BPM
WBC # BLD AUTO: 7.03 THOUSAND/UL (ref 4.31–10.16)

## 2025-02-07 PROCEDURE — 80053 COMPREHEN METABOLIC PANEL: CPT | Performed by: EMERGENCY MEDICINE

## 2025-02-07 PROCEDURE — 87040 BLOOD CULTURE FOR BACTERIA: CPT | Performed by: EMERGENCY MEDICINE

## 2025-02-07 PROCEDURE — 94640 AIRWAY INHALATION TREATMENT: CPT

## 2025-02-07 PROCEDURE — 85025 COMPLETE CBC W/AUTO DIFF WBC: CPT | Performed by: EMERGENCY MEDICINE

## 2025-02-07 PROCEDURE — 87449 NOS EACH ORGANISM AG IA: CPT | Performed by: INTERNAL MEDICINE

## 2025-02-07 PROCEDURE — 71045 X-RAY EXAM CHEST 1 VIEW: CPT

## 2025-02-07 PROCEDURE — 99285 EMERGENCY DEPT VISIT HI MDM: CPT | Performed by: EMERGENCY MEDICINE

## 2025-02-07 PROCEDURE — 83605 ASSAY OF LACTIC ACID: CPT | Performed by: EMERGENCY MEDICINE

## 2025-02-07 PROCEDURE — 96374 THER/PROPH/DIAG INJ IV PUSH: CPT

## 2025-02-07 PROCEDURE — 84145 PROCALCITONIN (PCT): CPT | Performed by: EMERGENCY MEDICINE

## 2025-02-07 PROCEDURE — 83880 ASSAY OF NATRIURETIC PEPTIDE: CPT | Performed by: EMERGENCY MEDICINE

## 2025-02-07 PROCEDURE — 96375 TX/PRO/DX INJ NEW DRUG ADDON: CPT

## 2025-02-07 PROCEDURE — 94664 DEMO&/EVAL PT USE INHALER: CPT

## 2025-02-07 PROCEDURE — 93010 ELECTROCARDIOGRAM REPORT: CPT | Performed by: INTERNAL MEDICINE

## 2025-02-07 PROCEDURE — 36415 COLL VENOUS BLD VENIPUNCTURE: CPT | Performed by: EMERGENCY MEDICINE

## 2025-02-07 PROCEDURE — 81003 URINALYSIS AUTO W/O SCOPE: CPT | Performed by: EMERGENCY MEDICINE

## 2025-02-07 PROCEDURE — 0241U HB NFCT DS VIR RESP RNA 4 TRGT: CPT | Performed by: EMERGENCY MEDICINE

## 2025-02-07 PROCEDURE — 94760 N-INVAS EAR/PLS OXIMETRY 1: CPT

## 2025-02-07 PROCEDURE — 87205 SMEAR GRAM STAIN: CPT | Performed by: INTERNAL MEDICINE

## 2025-02-07 PROCEDURE — 87070 CULTURE OTHR SPECIMN AEROBIC: CPT | Performed by: INTERNAL MEDICINE

## 2025-02-07 PROCEDURE — 85730 THROMBOPLASTIN TIME PARTIAL: CPT | Performed by: EMERGENCY MEDICINE

## 2025-02-07 PROCEDURE — 99223 1ST HOSP IP/OBS HIGH 75: CPT | Performed by: INTERNAL MEDICINE

## 2025-02-07 PROCEDURE — 85610 PROTHROMBIN TIME: CPT | Performed by: EMERGENCY MEDICINE

## 2025-02-07 PROCEDURE — 93005 ELECTROCARDIOGRAM TRACING: CPT

## 2025-02-07 PROCEDURE — 99285 EMERGENCY DEPT VISIT HI MDM: CPT

## 2025-02-07 PROCEDURE — 96361 HYDRATE IV INFUSION ADD-ON: CPT

## 2025-02-07 PROCEDURE — 83735 ASSAY OF MAGNESIUM: CPT | Performed by: EMERGENCY MEDICINE

## 2025-02-07 RX ORDER — METHYLPREDNISOLONE SODIUM SUCCINATE 40 MG/ML
40 INJECTION, POWDER, LYOPHILIZED, FOR SOLUTION INTRAMUSCULAR; INTRAVENOUS EVERY 8 HOURS SCHEDULED
Status: DISCONTINUED | OUTPATIENT
Start: 2025-02-07 | End: 2025-02-07

## 2025-02-07 RX ORDER — LEVALBUTEROL INHALATION SOLUTION 1.25 MG/3ML
1.25 SOLUTION RESPIRATORY (INHALATION)
Status: DISCONTINUED | OUTPATIENT
Start: 2025-02-07 | End: 2025-02-09 | Stop reason: HOSPADM

## 2025-02-07 RX ORDER — OSELTAMIVIR PHOSPHATE 75 MG/1
75 CAPSULE ORAL EVERY 12 HOURS SCHEDULED
Status: DISCONTINUED | OUTPATIENT
Start: 2025-02-07 | End: 2025-02-09 | Stop reason: HOSPADM

## 2025-02-07 RX ORDER — GUAIFENESIN/DEXTROMETHORPHAN 100-10MG/5
10 SYRUP ORAL EVERY 4 HOURS
Status: DISPENSED | OUTPATIENT
Start: 2025-02-07 | End: 2025-02-08

## 2025-02-07 RX ORDER — LORAZEPAM 0.5 MG/1
0.5 TABLET ORAL EVERY 8 HOURS PRN
Status: DISCONTINUED | OUTPATIENT
Start: 2025-02-07 | End: 2025-02-09 | Stop reason: HOSPADM

## 2025-02-07 RX ORDER — GUAIFENESIN/DEXTROMETHORPHAN 100-10MG/5
10 SYRUP ORAL EVERY 4 HOURS PRN
Status: DISCONTINUED | OUTPATIENT
Start: 2025-02-08 | End: 2025-02-09 | Stop reason: HOSPADM

## 2025-02-07 RX ORDER — OSELTAMIVIR PHOSPHATE 75 MG/1
75 CAPSULE ORAL ONCE
Status: COMPLETED | OUTPATIENT
Start: 2025-02-07 | End: 2025-02-07

## 2025-02-07 RX ORDER — METOPROLOL SUCCINATE 25 MG/1
25 TABLET, EXTENDED RELEASE ORAL 2 TIMES DAILY
Status: DISCONTINUED | OUTPATIENT
Start: 2025-02-07 | End: 2025-02-09 | Stop reason: HOSPADM

## 2025-02-07 RX ORDER — DILTIAZEM HYDROCHLORIDE 180 MG/1
180 CAPSULE, COATED, EXTENDED RELEASE ORAL DAILY
Status: DISCONTINUED | OUTPATIENT
Start: 2025-02-07 | End: 2025-02-09 | Stop reason: HOSPADM

## 2025-02-07 RX ORDER — ALBUTEROL SULFATE 2.5 MG/3ML
1 SOLUTION RESPIRATORY (INHALATION) ONCE
Status: COMPLETED | OUTPATIENT
Start: 2025-02-07 | End: 2025-02-07

## 2025-02-07 RX ORDER — POTASSIUM CHLORIDE 750 MG/1
20 TABLET, EXTENDED RELEASE ORAL DAILY
Status: DISCONTINUED | OUTPATIENT
Start: 2025-02-07 | End: 2025-02-09 | Stop reason: HOSPADM

## 2025-02-07 RX ORDER — FOLIC ACID 1 MG/1
1000 TABLET ORAL DAILY
Status: DISCONTINUED | OUTPATIENT
Start: 2025-02-07 | End: 2025-02-09 | Stop reason: HOSPADM

## 2025-02-07 RX ORDER — PANTOPRAZOLE SODIUM 40 MG/1
40 TABLET, DELAYED RELEASE ORAL
Status: DISCONTINUED | OUTPATIENT
Start: 2025-02-07 | End: 2025-02-09 | Stop reason: HOSPADM

## 2025-02-07 RX ORDER — METHOCARBAMOL 500 MG/1
500 TABLET, FILM COATED ORAL 4 TIMES DAILY
Status: DISCONTINUED | OUTPATIENT
Start: 2025-02-07 | End: 2025-02-09 | Stop reason: HOSPADM

## 2025-02-07 RX ORDER — MAGNESIUM SULFATE HEPTAHYDRATE 40 MG/ML
2 INJECTION, SOLUTION INTRAVENOUS ONCE
Status: COMPLETED | OUTPATIENT
Start: 2025-02-07 | End: 2025-02-07

## 2025-02-07 RX ORDER — POLYETHYLENE GLYCOL 3350 17 G/17G
17 POWDER, FOR SOLUTION ORAL DAILY PRN
Status: DISCONTINUED | OUTPATIENT
Start: 2025-02-07 | End: 2025-02-09 | Stop reason: HOSPADM

## 2025-02-07 RX ORDER — IPRATROPIUM BROMIDE AND ALBUTEROL SULFATE 2.5; .5 MG/3ML; MG/3ML
3 SOLUTION RESPIRATORY (INHALATION) ONCE
Status: COMPLETED | OUTPATIENT
Start: 2025-02-07 | End: 2025-02-07

## 2025-02-07 RX ORDER — LEVALBUTEROL INHALATION SOLUTION 0.63 MG/3ML
0.63 SOLUTION RESPIRATORY (INHALATION)
Status: DISCONTINUED | OUTPATIENT
Start: 2025-02-07 | End: 2025-02-07

## 2025-02-07 RX ORDER — FINASTERIDE 5 MG/1
5 TABLET, FILM COATED ORAL DAILY
Status: DISCONTINUED | OUTPATIENT
Start: 2025-02-07 | End: 2025-02-09 | Stop reason: HOSPADM

## 2025-02-07 RX ORDER — ISOSORBIDE MONONITRATE 30 MG/1
30 TABLET, EXTENDED RELEASE ORAL DAILY
Status: DISCONTINUED | OUTPATIENT
Start: 2025-02-08 | End: 2025-02-09 | Stop reason: HOSPADM

## 2025-02-07 RX ORDER — ACETAMINOPHEN 325 MG/1
650 TABLET ORAL ONCE
Status: COMPLETED | OUTPATIENT
Start: 2025-02-07 | End: 2025-02-07

## 2025-02-07 RX ORDER — PREGABALIN 75 MG/1
75 CAPSULE ORAL 2 TIMES DAILY
Status: DISCONTINUED | OUTPATIENT
Start: 2025-02-07 | End: 2025-02-09 | Stop reason: HOSPADM

## 2025-02-07 RX ORDER — NICOTINE 21 MG/24HR
1 PATCH, TRANSDERMAL 24 HOURS TRANSDERMAL DAILY PRN
Status: DISCONTINUED | OUTPATIENT
Start: 2025-02-07 | End: 2025-02-09 | Stop reason: HOSPADM

## 2025-02-07 RX ORDER — ACETAMINOPHEN 325 MG/1
650 TABLET ORAL EVERY 6 HOURS PRN
Status: DISCONTINUED | OUTPATIENT
Start: 2025-02-07 | End: 2025-02-09 | Stop reason: HOSPADM

## 2025-02-07 RX ORDER — METHYLPREDNISOLONE SODIUM SUCCINATE 40 MG/ML
40 INJECTION, POWDER, LYOPHILIZED, FOR SOLUTION INTRAMUSCULAR; INTRAVENOUS EVERY 8 HOURS SCHEDULED
Status: DISCONTINUED | OUTPATIENT
Start: 2025-02-07 | End: 2025-02-09 | Stop reason: HOSPADM

## 2025-02-07 RX ORDER — TAMSULOSIN HYDROCHLORIDE 0.4 MG/1
0.4 CAPSULE ORAL 2 TIMES DAILY
Status: DISCONTINUED | OUTPATIENT
Start: 2025-02-07 | End: 2025-02-09 | Stop reason: HOSPADM

## 2025-02-07 RX ORDER — METHYLPREDNISOLONE SODIUM SUCCINATE 125 MG/2ML
125 INJECTION, POWDER, LYOPHILIZED, FOR SOLUTION INTRAMUSCULAR; INTRAVENOUS ONCE
Status: COMPLETED | OUTPATIENT
Start: 2025-02-07 | End: 2025-02-07

## 2025-02-07 RX ORDER — ASPIRIN 81 MG/1
81 TABLET ORAL DAILY
Status: DISCONTINUED | OUTPATIENT
Start: 2025-02-07 | End: 2025-02-09 | Stop reason: HOSPADM

## 2025-02-07 RX ORDER — FLUTICASONE FUROATE AND VILANTEROL 200; 25 UG/1; UG/1
1 POWDER RESPIRATORY (INHALATION) DAILY
Status: DISCONTINUED | OUTPATIENT
Start: 2025-02-07 | End: 2025-02-09 | Stop reason: HOSPADM

## 2025-02-07 RX ORDER — FLUTICASONE PROPIONATE 50 MCG
1 SPRAY, SUSPENSION (ML) NASAL DAILY
Status: DISCONTINUED | OUTPATIENT
Start: 2025-02-07 | End: 2025-02-09 | Stop reason: HOSPADM

## 2025-02-07 RX ADMIN — LORAZEPAM 0.5 MG: 0.5 TABLET ORAL at 22:40

## 2025-02-07 RX ADMIN — TAMSULOSIN HYDROCHLORIDE 0.4 MG: 0.4 CAPSULE ORAL at 12:44

## 2025-02-07 RX ADMIN — LEVALBUTEROL HYDROCHLORIDE 0.63 MG: 0.63 SOLUTION RESPIRATORY (INHALATION) at 13:20

## 2025-02-07 RX ADMIN — SODIUM CHLORIDE 2190 ML: 0.9 INJECTION, SOLUTION INTRAVENOUS at 06:54

## 2025-02-07 RX ADMIN — TAMSULOSIN HYDROCHLORIDE 0.4 MG: 0.4 CAPSULE ORAL at 18:10

## 2025-02-07 RX ADMIN — UMECLIDINIUM 1 PUFF: 62.5 AEROSOL, POWDER ORAL at 12:40

## 2025-02-07 RX ADMIN — ACETAMINOPHEN 650 MG: 325 TABLET ORAL at 06:48

## 2025-02-07 RX ADMIN — FLUTICASONE PROPIONATE 1 SPRAY: 50 SPRAY, METERED NASAL at 12:47

## 2025-02-07 RX ADMIN — SODIUM CHLORIDE 1000 ML: 0.9 INJECTION, SOLUTION INTRAVENOUS at 09:14

## 2025-02-07 RX ADMIN — IPRATROPIUM BROMIDE 0.5 MG: 0.5 SOLUTION RESPIRATORY (INHALATION) at 13:20

## 2025-02-07 RX ADMIN — MAGNESIUM SULFATE HEPTAHYDRATE 2 G: 40 INJECTION, SOLUTION INTRAVENOUS at 08:30

## 2025-02-07 RX ADMIN — METHYLPREDNISOLONE SODIUM SUCCINATE 40 MG: 40 INJECTION, POWDER, FOR SOLUTION INTRAMUSCULAR; INTRAVENOUS at 14:20

## 2025-02-07 RX ADMIN — IPRATROPIUM BROMIDE AND ALBUTEROL SULFATE 3 ML: 2.5; .5 SOLUTION RESPIRATORY (INHALATION) at 06:47

## 2025-02-07 RX ADMIN — NICOTINE 1 PATCH: 14 PATCH, EXTENDED RELEASE TRANSDERMAL at 12:40

## 2025-02-07 RX ADMIN — POTASSIUM CHLORIDE 20 MEQ: 750 TABLET, EXTENDED RELEASE ORAL at 12:43

## 2025-02-07 RX ADMIN — FLUTICASONE FUROATE AND VILANTEROL TRIFENATATE 1 PUFF: 200; 25 POWDER RESPIRATORY (INHALATION) at 12:40

## 2025-02-07 RX ADMIN — GUAIFENESIN AND DEXTROMETHORPHAN 10 ML: 100; 10 SYRUP ORAL at 12:39

## 2025-02-07 RX ADMIN — FINASTERIDE 5 MG: 5 TABLET, FILM COATED ORAL at 12:42

## 2025-02-07 RX ADMIN — CYANOCOBALAMIN TAB 500 MCG 1000 MCG: 500 TAB at 12:43

## 2025-02-07 RX ADMIN — LORAZEPAM 0.5 MG: 0.5 TABLET ORAL at 14:21

## 2025-02-07 RX ADMIN — CEFEPIME 2000 MG: 2 INJECTION, POWDER, FOR SOLUTION INTRAVENOUS at 17:47

## 2025-02-07 RX ADMIN — OSELTAMAVIR PHOSPHATE 75 MG: 75 CAPSULE ORAL at 08:29

## 2025-02-07 RX ADMIN — RIVAROXABAN 20 MG: 10 TABLET, FILM COATED ORAL at 12:42

## 2025-02-07 RX ADMIN — FOLIC ACID 1000 MCG: 1 TABLET ORAL at 12:43

## 2025-02-07 RX ADMIN — GUAIFENESIN AND DEXTROMETHORPHAN 10 ML: 100; 10 SYRUP ORAL at 17:21

## 2025-02-07 RX ADMIN — METHYLPREDNISOLONE SODIUM SUCCINATE 125 MG: 125 INJECTION, POWDER, FOR SOLUTION INTRAMUSCULAR; INTRAVENOUS at 06:45

## 2025-02-07 RX ADMIN — PANTOPRAZOLE SODIUM 40 MG: 40 TABLET, DELAYED RELEASE ORAL at 12:43

## 2025-02-07 RX ADMIN — METHYLPREDNISOLONE SODIUM SUCCINATE 40 MG: 40 INJECTION, POWDER, FOR SOLUTION INTRAMUSCULAR; INTRAVENOUS at 22:34

## 2025-02-07 RX ADMIN — CEFEPIME 2000 MG: 2 INJECTION, POWDER, FOR SOLUTION INTRAVENOUS at 08:30

## 2025-02-07 RX ADMIN — B-COMPLEX W/ C & FOLIC ACID TAB 1 TABLET: TAB at 12:43

## 2025-02-07 RX ADMIN — LEVALBUTEROL HYDROCHLORIDE 1.25 MG: 1.25 SOLUTION RESPIRATORY (INHALATION) at 20:12

## 2025-02-07 RX ADMIN — ASPIRIN 81 MG: 81 TABLET, COATED ORAL at 12:42

## 2025-02-07 RX ADMIN — OSELTAMAVIR PHOSPHATE 75 MG: 75 CAPSULE ORAL at 22:35

## 2025-02-07 RX ADMIN — METOPROLOL SUCCINATE 25 MG: 25 TABLET, EXTENDED RELEASE ORAL at 18:10

## 2025-02-07 RX ADMIN — IPRATROPIUM BROMIDE 0.5 MG: 0.5 SOLUTION RESPIRATORY (INHALATION) at 20:12

## 2025-02-07 NOTE — H&P
H&P - Hospitalist   Name: Miguelangel Lancaster 69 y.o. male I MRN: [id number]  Unit/Bed#: ED 24 I Date of Admission: 2/7/2025   Date of Service: 2/7/2025 I Hospital Day: 0     Assessment & Plan  COPD exacerbation (HCC)  Likely precipitated by influenza A infection (see below)  Transient required 2 L of oxygen via nasal cannula, now weaned down to baseline room air  Continue IV Solu-Medrol - c/w Trelegy and nebulizer treatments  Influenza A  Continue Tamiflu  Initially presented with bodyaches, weakness, and fevers  Droplet/contact precautions  Supportive care  Hyponatremia  Serum sodium of 129 on admission  S/p IV fluid hydration  Continue to monitor  Sepsis (HCC)  Presented with fevers coupled with tachypnea/tachycardia  In the setting of influenza A infection  Given a dose of IV Cefepime in the ED -> observe off further antibiotics  Baseline procalcitonin and lactic acid normal  Monitor vitals and maintain hemodynamics -> goal MAP > 65 -> received IV fluid boluses in the ED with positive BP response  Primary hypertension  Known history of hypertension -> presented transiently hypotensive due to sepsis/infection  Administer antihypertensives only if meeting parameters   Home regimen includes Imdur/Toprol-XL/Cardizem  History of stroke  Continue prehospital Crestor 20 mg po   Chronic kidney disease stage 2-3  Baseline creatinine approximately 1.0-1.2 -> currently stable at 1.33  Monitor renal function and urine output  Limit/avoid nephrotoxins and hypotension as possible  Hypomagnesemia  Monitor/replete serum magnesium  Serum potassium normal  Coronary artery disease  Continue ASA/Toprol-XL/Imdur - not currently on statin therapy  S/p CABG  Gastroesophageal reflux disease  Continue PPI  BPH (benign prostatic hyperplasia)  Continue Flomax  History of DVT  Continue Xarelto      VTE Pharmacologic Prophylaxis: VTE Score: 6 High Risk (Score >/= 5) - Pharmacological DVT Prophylaxis Ordered: Rivaroxaban (Xarelto). Sequential  Compression Devices Ordered.    Code Status: Level 1 - Full Code    Discussion with:  Patient at bedside    Anticipated Length of Stay:  Patient will be admitted on an Inpatient basis with an anticipated length of stay of greater than 2 midnights.   Justification for Hospital Stay: COPD exacerbation with influenza A infection requiring intravenous corticosteroids and infectious management.    Total Time for Visit (including Counseling & Coordination of Care):  75 minutes. More than 50% of total time spent on counseling and coordination of care, on one or more of the following: performing physical exam; counseling and coordination of care; obtaining or reviewing history; documenting in the medical record; reviewing/ordering tests, medications or procedures; communicating with other healthcare professionals and discussing with patient's family/caregivers.      History of Present Illness    Chief Complaint: Shortness of breath    Miguelangel Lancaster is a 69 y.o. male who presents with complaints of generalized weakness with bodyaches and subjective fevers, ultimately diagnosed with an influenza A infection and associated COPD exacerbation.  In the ED, he was given a dose of Tamiflu along with intravenous corticosteroids, and was administered an Albuterol breathing treatment prior to arrival.  In the ED, he was transiently hypotensive requiring a few liters of IV fluids with subsequent BP response.  At the time of my encounter after arrival to the medical floor, he was seen ambulating in his room stating that his shortness of breath had somewhat improved, although still endorsed some intermittent coughing.  Denies other acute complaints at this time.    Review of Systems:  A thorough 12 point review systems was conducted.  Pertinent positives and negatives are mentioned in the history of present illness.      Past Medical & Surgical History    Past Medical History:   Diagnosis Date    Acute right MCA stroke (HCC) 09/15/2018     Arthritis     CAD (coronary artery disease)     s/p CABG 2000    COPD (chronic obstructive pulmonary disease) ([facility])     GERD (gastroesophageal reflux disease)     Grand mal status ([facility]) 03/24/2019    H/O blood clots     Heart attack ([facility])     Heart failure ([facility])     Hyperlipidemia     Hypertension     Lexiscan nuclear stress test 03/19/2016    EF 74% Normal    Myocardial infarction ([facility])     Shortness of breath     Stroke ([facility])     TIA (transient ischemic attack) 09/13/2018       Past Surgical History:   Procedure Laterality Date    CARDIAC CATHETERIZATION  08/19/2015    LIMA occluded. No severe native lesions    CARDIAC CATHETERIZATION N/A 12/03/2021    Procedure: Cardiac Coronary Angiogram;  Surgeon: Fredis Rogel MD;  Location: [facility] CARDIAC [facility]ATH [facility];  Service: Cardiology    CARDIAC CATHETERIZATION  12/03/2021    Procedure: Cardiac catheterization;  Surgeon: Fredis Rogel MD;  Location: [facility]AC [facility];  Service: Cardiology    COLONOSCOPY      CORONARY ARTERY BYPASS GRAFT  2000    CYSTOSCOPY  10/31/2022    [facility]    HERNIA REPAIR      umbilical hernia x2    TONSILLECTOMY           Medications:   Prior to Admission medications    Medication Sig Start Date End Date Taking? Authorizing Provider   albuterol (2.5 mg/3 mL) 0.083 % nebulizer solution inhale 3 milliliters by mouth three times a day if needed 9/18/23  Yes Candida Loja PA-C   albuterol (PROVENTIL HFA,VENTOLIN HFA) 90 mcg/act inhaler inhale 2 puffs every 6 hours if needed for shortness of breath 3/15/24  Yes Candida Loja PA-C   aspirin (ECOTRIN LOW STRENGTH) 81 mg EC tablet Take 1 tablet (81 mg total) by mouth daily 9/17/18  Yes Edward Donald MD   Diclofenac Sodium (VOLTAREN) 1 % Apply 2 g topically 4 (four) times a day 3/5/24  Yes Snow Worthy DPM   diltiazem (CARDIZEM CD) 180 mg 24 hr capsule take 1 capsule by mouth once daily 12/12/24  Yes Awilda Pinzon PA-C   esomeprazole (NexIUM) 40 MG capsule Take 1 capsule (40  mg total) by mouth every morning 11/14/24  Yes Candida Loja PA-C   finasteride (PROSCAR) 5 mg tablet take 1 tablet by mouth once daily 2/20/24  Yes ADRIANA Davila   fluticasone (FLONASE) 50 mcg/act nasal spray 1 spray into each nostril daily 8/20/24  Yes Candida Loja PA-C   fluticasone-umeclidinium-vilanterol (Trelegy Ellipta) 200-62.5-25 mcg/actuation AEPB inhaler Inhale 1 puff daily Rinse mouth after use 8/7/24  Yes Candida Loja PA-C   folic acid (FOLVITE) 1 mg tablet take 1 tablet by mouth once daily 10/1/24  Yes Cedrick Hadley MD   furosemide (LASIX) 40 mg tablet Take 1 tablet (40 mg total) by mouth daily 1/2/25  Yes Matt Marquez MD   isosorbide mononitrate (IMDUR) 30 mg 24 hr tablet Take 1 tablet (30 mg total) by mouth daily 3/5/24  Yes Matt Marquez MD   Linzess 290 MCG CAPS Take 1 capsule by mouth AT LEAST 30 MINUTES BEFORE FIRST MEAL OF THE DAY  Patient taking differently: Take 1 capsule by mouth AT LEAST 30 MINUTES BEFORE FIRST MEAL OF THE DAY  PRN 6/10/24  Yes Candida Loja PA-C   LORazepam (ATIVAN) 0.5 mg tablet take 1 tablet by mouth every 8 hours if needed for anxiety 1/7/25  Yes Candida Loja PA-C   methocarbamol (ROBAXIN) 500 mg tablet take 1 tablet by mouth four times a day 12/23/24  Yes Candida Loja PA-C   metoprolol succinate (TOPROL-XL) 25 mg 24 hr tablet take 1 tablet by mouth twice a day 1/10/25  Yes Matt Marquez MD   Multiple Vitamin (multivitamin) tablet Take 1 tablet by mouth daily   Yes Pati Ruiz MD   polyethylene glycol (MIRALAX) 17 g packet Take 17 g by mouth daily as needed (constipation) 6/9/23  Yes ADRIANA Oneil   Potassium Chloride ER 20 MEQ TBCR take 1 tablet by mouth once daily 10/1/24  Yes Matt Marquez MD   pregabalin (LYRICA) 75 mg capsule Take 1 capsule (75 mg total) by mouth 2 (two) times a day 1/15/25  Yes aCndida Loja PA-C   rivaroxaban (Xarelto) 20 mg tablet Take 1 tablet (20 mg total) by mouth daily 10/2/24  Yes  Bora Mcnamara DO   tamsulosin (FLOMAX) 0.4 mg take 1 capsule by mouth twice a day 11/11/24  Yes ADRIANA Mendez   vitamin B-12 (VITAMIN B-12) 1,000 mcg tablet Take 1 tablet (1,000 mcg total) by mouth daily 3/26/24  Yes Cedrick Hadley MD   ammonium lactate (LAC-HYDRIN) 12 % cream Apply topically as needed for dry skin  Patient not taking: Reported on 2/7/2025 3/5/24   Snow Worthy DPM   Docusate Sodium (DOC-Q-LACE PO)     Historical Provider, MD   nicotine (NICODERM CQ) 21 mg/24 hr TD 24 hr patch Place 1 patch on the skin over 24 hours every 24 hours  Patient not taking: Reported on 11/15/2024 8/7/24 11/5/24  Candida Loja PA-C   rosuvastatin (CRESTOR) 20 MG tablet Take 1 tablet (20 mg total) by mouth daily  Patient not taking: Reported on 2/7/2025 11/15/24   Ani Crowder MD   azelastine (ASTELIN) 0.1 % nasal spray 1 spray into each nostril 2 (two) times a day Use in each nostril as directed  Patient not taking: Reported on 12/4/2024 10/19/22 2/7/25  Candida Loja PA-C   predniSONE 10 mg tablet 40mg po q d x 5, then 30mg po q d x 5, then 20mg po q d x 5, then 10mg po q d x 5, then d/c.  Patient not taking: Reported on 1/15/2025 12/4/24 2/7/25  Miguelangel Morataya DO   VITAMIN D PO OTC daily  Patient not taking: Reported on 11/15/2024  2/7/25  Historical Provider, MD       Allergies:   Allergies   Allergen Reactions    Bupropion Other (See Comments)    Gabapentin Headache         Social & Family History    Social History     Substance and Sexual Activity   Alcohol Use Not Currently    Comment: Socially     Social History     Tobacco Use   Smoking Status Every Day    Current packs/day: 1.00    Average packs/day: 1 pack/day for 55.1 years (55.1 ttl pk-yrs)    Types: Cigarettes    Start date: 1970    Passive exposure: Never   Smokeless Tobacco Never     Social History     Substance and Sexual Activity   Drug Use Never       Family History   Problem Relation Age of Onset    Heart disease Mother     Cancer Father      "Aneurysm Father     Cancer Maternal Grandmother     Cancer Paternal Grandfather          Objective     Vitals:   Blood Pressure: 122/71 (02/07/25 1417)  Pulse: 76 (02/07/25 1417)  Temperature: 98.2 °F (36.8 °C) (02/07/25 1417)  Temp Source: Oral (02/07/25 1031)  Respirations: 20 (02/07/25 1417)  Height: 5' 10\" (177.8 cm) (02/07/25 1015)  Weight - Scale: 103 kg (227 lb 1.2 oz) (02/07/25 1015)  SpO2: 95 % (02/07/25 1417)      Physical Exam:    GENERAL Mildly weak/fatigued   HEAD   Normocephalic - atraumatic   EYES   PERRL - EOMI    MOUTH   Mucosa moist   NECK   Supple - full range of motion   CARDIAC Rate controlled   PULMONARY Diminished/coarse bilateral breath sounds with expiratory wheezes   ABDOMEN Nontender/nondistended   MUSCULOSKELETAL   Motor strength/range of motion fairly intact   NEUROLOGIC   Alert/oriented at baseline   PSYCHIATRIC   Mood/affect stable         Labs & Recent Cultures:  Results from last 7 days   Lab Units 02/07/25  0650   WBC Thousand/uL 7.03   HEMOGLOBIN g/dL 13.6   HEMATOCRIT % 40.2   PLATELETS Thousands/uL 216   SEGS PCT % 69   LYMPHO PCT % 15   MONO PCT % 14*   EOS PCT % 0     Results from last 7 days   Lab Units 02/07/25  0650   SODIUM mmol/L 129*   POTASSIUM mmol/L 3.5   CHLORIDE mmol/L 96   CO2 mmol/L 25   BUN mg/dL 8   CREATININE mg/dL 1.33*   ANION GAP mmol/L 8   CALCIUM mg/dL 8.4   ALBUMIN g/dL 3.8   TOTAL BILIRUBIN mg/dL 0.59   ALK PHOS U/L 99   ALT U/L 24   AST U/L 19   GLUCOSE RANDOM mg/dL 112     Results from last 7 days   Lab Units 02/07/25  0650   INR  1.24*             Results from last 7 days   Lab Units 02/07/25  0650   LACTIC ACID mmol/L 1.5   PROCALCITONIN ng/ml 0.12         Results from last 7 days   Lab Units 02/07/25  0650   BLOOD CULTURE  Received in Microbiology Lab. Culture in Progress.  Received in Microbiology Lab. Culture in Progress.         Lines/Drains:  Invasive Devices       Peripheral Intravenous Line  Duration             Peripheral IV 02/07/25 Dorsal " (posterior);Right Wrist <1 day    Peripheral IV 02/07/25 Right Antecubital <1 day                      Imaging:     XR chest portable  Result Date: 2/7/2025  Narrative: XR CHEST PORTABLE INDICATION: Fever, body aches, flulike symptoms. COMPARISON: Chest radiograph 10/4/2024, CT chest abdomen pelvis 10/4/2024. Chest CT 9/20/2024. FINDINGS: Monitoring leads and clips project over the chest. Clear lungs. Pulmonary nodule seen on prior chest CTs are not clearly seen on this radiograph. No pneumothorax or pleural effusion. Unchanged cardiomediastinal silhouette. Status post CABG. Age-related degenerative changes in the spine. Normal upper abdomen.     Impression: No acute cardiopulmonary disease. Pulmonary nodules noted on recent CT imaging are not clearly evident on this radiograph. Please refer to chest CT report of 9/20/2024 for further characterization and follow-up recommendations. Resident: Bob Caba I, the attending radiologist, have reviewed the images and agree with the final report above. Workstation performed: [id number]                 JAELYN HARVEY MD   Hospitalist - [facility] Internal Medicine        ** Please Note:  Documentation is constructed using a voice recognition dictation system.  An occasional wrong word/phrase or “sound-a-like” substitution may have been picked up by dictation device due to the inherent limitations of voice recognition software.  Read the chart carefully and recognize, using reasonable context, where substitutions may have occurred.**

## 2025-02-07 NOTE — PLAN OF CARE
Problem: INFECTION - ADULT  Goal: Absence or prevention of progression during hospitalization  Description: INTERVENTIONS:  - Assess and monitor for signs and symptoms of infection  - Monitor lab/diagnostic results  - Monitor all insertion sites, i.e. indwelling lines, tubes, and drains  - Monitor endotracheal if appropriate and nasal secretions for changes in amount and color  - [place] appropriate cooling/warming therapies per order  - Administer medications as ordered  - Instruct and encourage patient and family to use good hand hygiene technique  - Identify and instruct in appropriate isolation precautions for identified infection/condition  Outcome: Progressing     Problem: Excess Fluid Volume  Goal: Patient is able to achieve and maintain homeostasis  Description: INTERVENTIONS: Monitor for sign and symptoms of fluid overload  - Evaluate LE edema every shift  - Elevate LE to prevent dependent edema  - Apply BALJIT stockings as ordered   - Monitor ankle circumference daily  - Assess for jugular vein distention  - Evaluate provider orders for the CHF diuretic algorithm. Administer diuretics as ordered  - Weigh the patient daily at 0600 and report a weight gain of five pounds or more   - Strict intake and output  - Monitor fluid intake and adhere to fluid restrictions  - Assess lung sounds every shift and as needed  - Monitor vital signs and lab values (CBC, chem, BUN, BNP)  - Measure and document urine output    Outcome: Progressing

## 2025-02-07 NOTE — QUICK NOTE
Progress Note - Triage Asssessment   Miguelangel Lancaster 69 y.o. male MRN: [id number]    Time Called ( Time): 0932  Date Called: 02/07/25  Room#: ED 24  Person requesting evaluation: Dr. Castanon/Dr. Espinoza    Situation:    Miguelangel Lancaster is a 70 yo male w/PMHx of R MCA CVA in 2018, CAD s/p CABG in 2000 and prior stenting, HTN, HLD, COPD, GERD, depression who presented to ED today for acute hypoxic respiratory failure in setting of FLU A infection w/concurrent COPD exacerbation. CC was asked to evaluate patient as he had borderline hypotension.     Interventions:   BP has improved w/volume resuscitation per sepsis protocol         Triage Assessment:     Patient is now stable for admission under SLIM for further management.     Recommendations discussed with Dr. Espinoza/Dr. Castanon.

## 2025-02-07 NOTE — ED CARE HANDOFF
Emergency Department Sign Out Note        Sign out and transfer of care from . See Separate Emergency Department note.     The patient, Miguelangel Lnacaster, was evaluated by the previous provider for fever chills wheezing.    Workup Completed:  Lab chest x-ray    ED Course / Workup Pending (followup):  Patient seen examined at bedside awake alert oriented x 3 states feeling somewhat better patient continued to have wheezing had an episode of hypoxia with oxygen saturation 88% currently on 2 L nasal cannula satting 91 to 92% on 2 L.  Positive for influenza patient blood pressure is soft at 102 we will continue with hydration given patient current condition concern for worsening will admit.    Disposition internal medicine team accepted the admission                                ED Course as of 02/07/25 0846   Fri Feb 07, 2025   0658 68yo male came with fever bodyache wheezing. Pending labs      Procedures  Medical Decision Making  Amount and/or Complexity of Data Reviewed  Labs: ordered.  Radiology: ordered.    Risk  OTC drugs.  Prescription drug management.            Disposition  Final diagnoses:   Sepsis (HCC)   Flu   Fever   Hypotension     Time reflects when diagnosis was documented in both MDM as applicable and the Disposition within this note       Time User Action Codes Description Comment    2/7/2025  8:26 AM Ahmad, Zachary Add [A41.9] Sepsis (HCC)     2/7/2025  8:26 AM Ahmad, Zachary Add [J11.1] Flu     2/7/2025  8:26 AM Ahmad, Zachary Add [R50.9] Fever     2/7/2025  8:26 AM Ahmad, Zachary Add [I95.9] Hypotension           ED Disposition       ED Disposition   Admit    Condition   Stable    Date/Time   Fri Feb 7, 2025  8:43 AM    Comment   Case was discussed with   and the patient's admission status was agreed to be Admission Status: inpatient status to the service of    .               Follow-up Information    None       Patient's Medications   Discharge Prescriptions    No  medications on file     No discharge procedures on file.       ED Provider  Electronically Signed by     Zachary Castanon MD  02/07/25 0824

## 2025-02-07 NOTE — ED PROCEDURE NOTE
PROCEDURE  ECG 12 Lead Documentation Only    Date/Time: 2/7/2025 6:41 AM    Performed by: Bora Louie MD  Authorized by: Bora Louie MD    Indications / Diagnosis:  Coronary disease  ECG reviewed by me, the ED Provider: yes    Patient location:  ED  Interpretation:     Interpretation: non-specific    Rate:     ECG rate:  107    ECG rate assessment: tachycardic    Rhythm:     Rhythm: sinus tachycardia    Ectopy:     Ectopy: none    QRS:     QRS axis:  Right    QRS intervals:  Normal  Conduction:     Conduction: normal    ST segments:     ST segments:  Normal  T waves:     T waves: normal         Bora Louie MD  02/07/25 0641

## 2025-02-07 NOTE — ASSESSMENT & PLAN NOTE
Likely precipitated by influenza A infection (see below)  Transient required 2 L of oxygen via nasal cannula, now weaned down to baseline room air  Continue IV Solu-Medrol - c/w Trelegy and nebulizer treatments

## 2025-02-07 NOTE — Clinical Note
Case was discussed with  and the patient's admission status was agreed to be Admission Status: inpatient status to the service of Dr. Islas   Spoke with patient to advise 1 month script was sent in yesterday by the On Call Neurologist as Dr. Lawrence is out of clinic this week.    Encouraged patient to discuss additional refills at fuv on 9/8/22.    Patient verbalized understanding.

## 2025-02-07 NOTE — ASSESSMENT & PLAN NOTE
Wt Readings from Last 3 Encounters:   02/07/25 102 kg (225 lb)   01/15/25 102 kg (225 lb)   12/04/24 103 kg (226 lb 9.6 oz)     Patient on 40 mg Lasix po

## 2025-02-07 NOTE — TELEPHONE ENCOUNTER
Spoke with patient. He is currently in the hospital- he went last night due to not feeling well. He had to get off the phone because they were about to do testing on him.

## 2025-02-07 NOTE — ED PROVIDER NOTES
ED Disposition       None          Assessment & Plan       Medical Decision Making  Patient has onset of fever body aches flulike illness.  Will work him up for pneumonia COPD exacerbation acute coronary syndrome with EKG troponin CBC chemistry viral swab.  Blood cultures, procalcitonin and lactic acid.  Patient has no evidence of hypoxia, alteration in mental status or dehydration at this time.  Patient will be endorsed to a.m. ER physician for further evaluation disposition.  EKG is nonacute at this time.    Amount and/or Complexity of Data Reviewed  Labs: ordered.  Radiology: ordered.    Risk  Prescription drug management.             Medications   ipratropium-albuterol (DUO-NEB) 0.5-2.5 mg/3 mL inhalation solution 3 mL (has no administration in time range)   methylPREDNISolone sodium succinate (Solu-MEDROL) injection 125 mg (125 mg Intravenous Given 2/7/25 0645)   albuterol (FOR EMS ONLY) (2.5 mg/3 mL) 0.083 % inhalation solution 2.5 mg (0 mg Does not apply Given to EMS 2/7/25 0633)       ED Risk Strat Scores                          SBIRT 22yo+      Flowsheet Row Most Recent Value   Initial Alcohol Screen: US AUDIT-C     1. How often do you have a drink containing alcohol? 0 Filed at: 02/07/2025 0633   2. How many drinks containing alcohol do you have on a typical day you are drinking?  0 Filed at: 02/07/2025 0633   3a. Male UNDER 65: How often do you have five or more drinks on one occasion? 0 Filed at: 02/07/2025 0633   3b. FEMALE Any Age, or MALE 65+: How often do you have 4 or more drinks on one occassion? 0 Filed at: 02/07/2025 0633   Audit-C Score 0 Filed at: 02/07/2025 0633   COLTEN: How many times in the past year have you...    Used an illegal drug or used a prescription medication for non-medical reasons? Never Filed at: 02/07/2025 0633                            History of Present Illness       Chief Complaint   Patient presents with    Cough     Started around 1400 yesterday afternoon. Mucous,  congestion, chills, lower back pain. Hx: COPD. B/L wheezing per EMS improved after albuterol treatment.        Past Medical History:   Diagnosis Date    Acute right MCA stroke ([facility]) 09/15/2018    Arthritis     CAD (coronary artery disease)     s/p CABG 2000    COPD (chronic obstructive pulmonary disease) ([facility])     GERD (gastroesophageal reflux disease)     Grand mal status ([facility]) 03/24/2019    H/O blood clots     Heart attack ([facility])     Heart failure ([facility])     Hyperlipidemia     Hypertension     Lexiscan nuclear stress test 03/19/2016    EF 74% Normal    Myocardial infarction ([facility])     Shortness of breath     Stroke ([facility])     TIA (transient ischemic attack) 09/13/2018      Past Surgical History:   Procedure Laterality Date    CARDIAC CATHETERIZATION  08/19/2015    LIMA occluded. No severe native lesions    CARDIAC CATHETERIZATION N/A 12/03/2021    Procedure: Cardiac Coronary Angiogram;  Surgeon: Fredis Rogel MD;  Location: [facility] CARDIAC [facility]ATH [facility];  Service: Cardiology    CARDIAC CATHETERIZATION  12/03/2021    Procedure: Cardiac catheterization;  Surgeon: Fredis Rogel MD;  Location: [facility]ARDIAC [facility];  Service: Cardiology    COLONOSCOPY      CORONARY ARTERY BYPASS GRAFT  2000    CYSTOSCOPY  10/31/2022    [facility]    HERNIA REPAIR      umbilical hernia x2    TONSILLECTOMY        Family History   Problem Relation Age of Onset    Heart disease Mother     Cancer Father     Aneurysm Father     Cancer Maternal Grandmother     Cancer Paternal Grandfather       Social History     Tobacco Use    Smoking status: Every Day     Current packs/day: 1.00     Average packs/day: 1 pack/day for 55.1 years (55.1 ttl pk-yrs)     Types: Cigarettes     Start date: 1970     Passive exposure: Never    Smokeless tobacco: Never   Vaping Use    Vaping status: Never Used   Substance Use Topics    Alcohol use: Not Currently     Comment: Socially    Drug use: Never      E-Cigarette/Vaping    E-Cigarette Use Never User        E-Cigarette/Vaping Substances    Nicotine No     THC No     CBD No     Flavoring No     Other No     Unknown No       I have reviewed and agree with the history as documented.     69-year-old male complaining of bodyaches cough and increasing over the last 24 hours.  Associate with fever, states it came on suddenly in the last 24 hours.  He was at his baseline well before that.  Patient cough is nonproductive.  He uses a inhaler at home, was given 1 albuterol to EMS prior to arrival.  Patient has history of coronary disease bypass, COPD hypertension previous right MCA stroke,, MI, heart failure GERD.  He also has recent DVT left lower extremity takes Xarelto.      Cough  Associated symptoms: fever, shortness of breath and wheezing    Associated symptoms: no chest pain, no diaphoresis, no headaches, no rash and no sore throat        Review of Systems   Constitutional:  Positive for fever. Negative for appetite change, diaphoresis and fatigue.   HENT:  Negative for sinus pressure, sinus pain, sore throat and trouble swallowing.    Respiratory:  Positive for cough, shortness of breath and wheezing. Negative for chest tightness and stridor.    Cardiovascular:  Negative for chest pain, palpitations and leg swelling.   Gastrointestinal:  Negative for abdominal pain, diarrhea, nausea and vomiting.   Genitourinary:  Negative for dysuria, flank pain, frequency, hematuria and urgency.   Musculoskeletal:  Negative for back pain, neck pain and neck stiffness.   Skin:  Negative for pallor and rash.   Neurological:  Negative for speech difficulty, weakness, light-headedness and headaches.   Psychiatric/Behavioral:  Negative for agitation and behavioral problems.            Objective       ED Triage Vitals [02/07/25 0633]   Temperature Pulse Blood Pressure Respirations SpO2 Patient Position - Orthostatic VS   (!) 101.2 °F (38.4 °C) 98 91/66 (!) 24 95 % Sitting      Temp Source Heart Rate Source BP Location FiO2 (%) Pain Score     Temporal Monitor Left arm -- No Pain      Vitals      Date and Time Temp Pulse SpO2 Resp BP Pain Score FACES Pain Rating User   02/07/25 0633 101.2 °F (38.4 °C) 98 95 % 24 91/66 No Pain -- KL            Physical Exam  Vitals and nursing note reviewed.   Constitutional:       General: He is not in acute distress.     Appearance: Normal appearance. He is well-developed. He is not ill-appearing, toxic-appearing or diaphoretic.      Comments: Patient awake alert can speak in full sentences very mild respiratory distress noted normal mental status.   HENT:      Head: Normocephalic and atraumatic.      Right Ear: External ear normal.      Left Ear: External ear normal.      Nose: Nose normal.      Mouth/Throat:      Mouth: Mucous membranes are moist.      Pharynx: Oropharynx is clear. No oropharyngeal exudate or posterior oropharyngeal erythema.   Eyes:      Extraocular Movements: Extraocular movements intact.      Conjunctiva/sclera: Conjunctivae normal.      Pupils: Pupils are equal, round, and reactive to light.   Cardiovascular:      Rate and Rhythm: Normal rate and regular rhythm.      Pulses: Normal pulses.      Heart sounds: Normal heart sounds. No murmur heard.  Pulmonary:      Effort: Pulmonary effort is normal. No respiratory distress.      Breath sounds: No stridor. Wheezing present. No rhonchi or rales.   Abdominal:      General: Abdomen is flat. There is no distension.      Palpations: Abdomen is soft.      Tenderness: There is no abdominal tenderness.   Musculoskeletal:         General: No swelling. Normal range of motion.      Cervical back: Normal range of motion and neck supple.   Skin:     General: Skin is warm and dry.      Capillary Refill: Capillary refill takes less than 2 seconds.      Coloration: Skin is not pale.      Findings: No erythema or rash.   Neurological:      General: No focal deficit present.      Mental Status: He is alert. Mental status is at baseline.   Psychiatric:         Mood  and Affect: Mood normal.         Results Reviewed       Procedure Component Value Units Date/Time    CBC and differential [[id number]]     Lab Status: No result Specimen: Blood     Comprehensive metabolic panel [[id number]]     Lab Status: No result Specimen: Blood     Lactic acid [[id number]]     Lab Status: No result Specimen: Blood     Procalcitonin [[id number]]     Lab Status: No result Specimen: Blood     Protime-INR [[id number]]     Lab Status: No result Specimen: Blood     APTT [[id number]]     Lab Status: No result Specimen: Blood     Blood culture #1 [[id number]]     Lab Status: No result Specimen: Blood     Blood culture #2 [[id number]]     Lab Status: No result Specimen: Blood     UA w Reflex to Microscopic w Reflex to Culture [[id number]]     Lab Status: No result Specimen: Urine     FLU/RSV/COVID - if FLU/RSV clinically relevant [[id number]]     Lab Status: No result Specimen: Nares from Nose     B-Type Natriuretic Peptide(BNP) [[id number]]     Lab Status: No result Specimen: Blood     Magnesium [[id number]]     Lab Status: No result Specimen: Blood             XR chest portable    (Results Pending)       Procedures    ED Medication and Procedure Management   Prior to Admission Medications   Prescriptions Last Dose Informant Patient Reported? Taking?   Diclofenac Sodium (VOLTAREN) 1 %  Self No No   Sig: Apply 2 g topically 4 (four) times a day   Docusate Sodium (DOC-Q-LACE PO)  Self Yes No   LORazepam (ATIVAN) 0.5 mg tablet   No No   Sig: take 1 tablet by mouth every 8 hours if needed for anxiety   Linzess 290 MCG CAPS  Self No No   Sig: Take 1 capsule by mouth AT LEAST 30 MINUTES BEFORE FIRST MEAL OF THE DAY   Patient taking differently: Take 1 capsule by mouth AT LEAST 30 MINUTES BEFORE FIRST MEAL OF THE DAY  PRN   Multiple Vitamin (multivitamin) tablet   Yes No   Sig: Take 1 tablet by mouth daily   Potassium Chloride ER 20 MEQ TBCR   No No   Sig: take 1 tablet by mouth once daily   VITAMIN D PO  Self Yes No    Sig: OTC daily   Patient not taking: Reported on 11/15/2024   albuterol (2.5 mg/3 mL) 0.083 % nebulizer solution  Self No No   Sig: inhale 3 milliliters by mouth three times a day if needed   albuterol (PROVENTIL HFA,VENTOLIN HFA) 90 mcg/act inhaler  Self No No   Sig: inhale 2 puffs every 6 hours if needed for shortness of breath   ammonium lactate (LAC-HYDRIN) 12 % cream  Self No No   Sig: Apply topically as needed for dry skin   aspirin (ECOTRIN LOW STRENGTH) 81 mg EC tablet  Self No No   Sig: Take 1 tablet (81 mg total) by mouth daily   azelastine (ASTELIN) 0.1 % nasal spray  Self No No   Si spray into each nostril 2 (two) times a day Use in each nostril as directed   Patient not taking: Reported on 2024   diltiazem (CARDIZEM CD) 180 mg 24 hr capsule   No No   Sig: take 1 capsule by mouth once daily   esomeprazole (NexIUM) 40 MG capsule   No No   Sig: Take 1 capsule (40 mg total) by mouth every morning   finasteride (PROSCAR) 5 mg tablet  Self No No   Sig: take 1 tablet by mouth once daily   fluticasone (FLONASE) 50 mcg/act nasal spray  Self No No   Si spray into each nostril daily   fluticasone-umeclidinium-vilanterol (Trelegy Ellipta) 200-62.5-25 mcg/actuation AEPB inhaler  Self No No   Sig: Inhale 1 puff daily Rinse mouth after use   folic acid (FOLVITE) 1 mg tablet   No No   Sig: take 1 tablet by mouth once daily   furosemide (LASIX) 40 mg tablet   No No   Sig: Take 1 tablet (40 mg total) by mouth daily   isosorbide mononitrate (IMDUR) 30 mg 24 hr tablet  Self No No   Sig: Take 1 tablet (30 mg total) by mouth daily   methocarbamol (ROBAXIN) 500 mg tablet   No No   Sig: take 1 tablet by mouth four times a day   metoprolol succinate (TOPROL-XL) 25 mg 24 hr tablet   No No   Sig: take 1 tablet by mouth twice a day   nicotine (NICODERM CQ) 21 mg/24 hr TD 24 hr patch  Self No No   Sig: Place 1 patch on the skin over 24 hours every 24 hours   Patient not taking: Reported on 11/15/2024   polyethylene  glycol (MIRALAX) 17 g packet  Self No No   Sig: Take 17 g by mouth daily as needed (constipation)   predniSONE 10 mg tablet   No No   Simg po q d x 5, then 30mg po q d x 5, then 20mg po q d x 5, then 10mg po q d x 5, then d/c.   Patient not taking: Reported on 1/15/2025   pregabalin (LYRICA) 75 mg capsule   No No   Sig: Take 1 capsule (75 mg total) by mouth 2 (two) times a day   rivaroxaban (Xarelto) 20 mg tablet   No No   Sig: Take 1 tablet (20 mg total) by mouth daily   rosuvastatin (CRESTOR) 20 MG tablet   No No   Sig: Take 1 tablet (20 mg total) by mouth daily   tamsulosin (FLOMAX) 0.4 mg   No No   Sig: take 1 capsule by mouth twice a day   vitamin B-12 (VITAMIN B-12) 1,000 mcg tablet  Self No No   Sig: Take 1 tablet (1,000 mcg total) by mouth daily      Facility-Administered Medications: None     Patient's Medications   Discharge Prescriptions    No medications on file     No discharge procedures on file.  ED SEPSIS DOCUMENTATION            Bora Louie MD  25 0628

## 2025-02-07 NOTE — ASSESSMENT & PLAN NOTE
Known history of hypertension -> presented transiently hypotensive due to sepsis/infection  Administer antihypertensives only if meeting parameters   Home regimen includes Imdur/Toprol-XL/Cardizem

## 2025-02-07 NOTE — RESPIRATORY THERAPY NOTE
RT Protocol Note  Miguelangel Lancaster 69 y.o. male MRN: [id number]  Unit/Bed#: -01 Encounter: [id number]    Assessment    Active Problems:    Hypertension    Hyperlipidemia    COPD, severity to be determined ([facility])    Coronary artery disease involving native coronary artery of native heart with angina pectoris (HCC)    Gastroesophageal reflux disease    Hypertensive heart and renal disease with CHF (HCC)    BPH (benign prostatic hyperplasia)      Home Pulmonary Medications:  Trelegy, PRN Albuterol neb, MDI       Past Medical History:   Diagnosis Date    Acute right MCA stroke ([facility]) 09/15/2018    Arthritis     CAD (coronary artery disease)     s/p CABG 2000    COPD (chronic obstructive pulmonary disease) ([facility])     GERD (gastroesophageal reflux disease)     Grand mal status ([facility]) 03/24/2019    H/O blood clots     Heart attack ([facility])     Heart failure ([facility])     Hyperlipidemia     Hypertension     Lexiscan nuclear stress test 03/19/2016    EF 74% Normal    Myocardial infarction ([facility])     Shortness of breath     Stroke ([facility])     TIA (transient ischemic attack) 09/13/2018     Social History     Socioeconomic History    Marital status: Single     Spouse name: None    Number of children: None    Years of education: None    Highest education level: None   Occupational History    None   Tobacco Use    Smoking status: Every Day     Current packs/day: 1.00     Average packs/day: 1 pack/day for 55.1 years (55.1 ttl pk-yrs)     Types: Cigarettes     Start date: 1970     Passive exposure: Never    Smokeless tobacco: Never   Vaping Use    Vaping status: Never Used   Substance and Sexual Activity    Alcohol use: Not Currently     Comment: Socially    Drug use: Never    Sexual activity: Not Currently   Other Topics Concern    None   Social History Narrative    None     Social Drivers of Health     Financial Resource Strain: Low Risk  (6/7/2023)    Overall Financial Resource Strain (CARDIA)     Difficulty of Paying Living Expenses:  "Not very hard   Food Insecurity: No Food Insecurity (2025)    Nursing - Inadequate Food Risk Classification     Worried About Running Out of Food in the Last Year: Never true     Ran Out of Food in the Last Year: Never true     Ran Out of Food in the Last Year: Never true   Transportation Needs: No Transportation Needs (2025)    Nursing - Transportation Risk Classification     Lack of Transportation: Not on file     Lack of Transportation: No   Physical Activity: Not on file   Stress: Not on file   Social Connections: Unknown (2024)    Received from [employer]     How often do you feel lonely or isolated from those around you? (Adult - for ages 18 years and over): Not on file   Intimate Partner Violence: Unknown (2025)    Nursing IPS     Feels Physically and Emotionally Safe: Not on file     Physically Hurt by Someone: Not on file     Humiliated or Emotionally Abused by Someone: Not on file     Physically Hurt by Someone: No     Hurt or Threatened by Someone: No   Housing Stability: Unknown (2025)    Nursing: Inadequate Housing Risk Classification     Has Housing: Not on file     Worried About Losing Housing: Not on file     Unable to Get Utilities: Not on file     Unable to Pay for Housing in the Last Year: No     Has Housin       Subjective         Objective    Physical Exam:   Assessment Type: Pre-treatment  General Appearance: Alert, Awake  Respiratory Pattern: Dyspnea with exertion  Chest Assessment: Chest expansion symmetrical  Bilateral Breath Sounds: Diminished, Expiratory wheezes  Cough: Non-productive, Strong  O2 Device: Room Air    Vitals:  Blood pressure 101/55, pulse 76, temperature 97.9 °F (36.6 °C), temperature source Oral, resp. rate 18, height 5' 10\" (1.778 m), weight 103 kg (227 lb 1.2 oz), SpO2 97%.          Imaging and other studies: Results Review Statement: I reviewed radiology reports from this admission including: chest xray.    O2 Device: Room " Air     Plan    Respiratory Plan: Mild Distress pathway      Will continue Xopenex/ Atrovent TID. Patient uses his PRN neb every night prior to bed.

## 2025-02-08 LAB
ANION GAP SERPL CALCULATED.3IONS-SCNC: 8 MMOL/L (ref 4–13)
ANISOCYTOSIS BLD QL SMEAR: PRESENT
BASOPHILS # BLD MANUAL: 0 THOUSAND/UL (ref 0–0.1)
BASOPHILS NFR MAR MANUAL: 0 % (ref 0–1)
BUN SERPL-MCNC: 10 MG/DL (ref 5–25)
BURR CELLS BLD QL SMEAR: PRESENT
CALCIUM SERPL-MCNC: 8.7 MG/DL (ref 8.4–10.2)
CHLORIDE SERPL-SCNC: 107 MMOL/L (ref 96–108)
CO2 SERPL-SCNC: 21 MMOL/L (ref 21–32)
CREAT SERPL-MCNC: 1.01 MG/DL (ref 0.6–1.3)
EOSINOPHIL # BLD MANUAL: 0 THOUSAND/UL (ref 0–0.4)
EOSINOPHIL NFR BLD MANUAL: 0 % (ref 0–6)
ERYTHROCYTE [DISTWIDTH] IN BLOOD BY AUTOMATED COUNT: 12.6 % (ref 11.6–15.1)
GFR SERPL CREATININE-BSD FRML MDRD: 75 ML/MIN/1.73SQ M
GLUCOSE SERPL-MCNC: 151 MG/DL (ref 65–140)
HCT VFR BLD AUTO: 40.1 % (ref 36.5–49.3)
HGB BLD-MCNC: 13.6 G/DL (ref 12–17)
LYMPHOCYTES # BLD AUTO: 0.49 THOUSAND/UL (ref 0.6–4.47)
LYMPHOCYTES # BLD AUTO: 3 % (ref 14–44)
MAGNESIUM SERPL-MCNC: 2.3 MG/DL (ref 1.9–2.7)
MCH RBC QN AUTO: 30 PG (ref 26.8–34.3)
MCHC RBC AUTO-ENTMCNC: 33.9 G/DL (ref 31.4–37.4)
MCV RBC AUTO: 89 FL (ref 82–98)
MONOCYTES # BLD AUTO: 0.32 THOUSAND/UL (ref 0–1.22)
MONOCYTES NFR BLD: 2 % (ref 4–12)
NEUTROPHILS # BLD MANUAL: 15.36 THOUSAND/UL (ref 1.85–7.62)
NEUTS BAND NFR BLD MANUAL: 3 % (ref 0–8)
NEUTS SEG NFR BLD AUTO: 92 % (ref 43–75)
PLATELET # BLD AUTO: 211 THOUSANDS/UL (ref 149–390)
PLATELET BLD QL SMEAR: ADEQUATE
PMV BLD AUTO: 8.9 FL (ref 8.9–12.7)
POTASSIUM SERPL-SCNC: 4.3 MMOL/L (ref 3.5–5.3)
PROCALCITONIN SERPL-MCNC: 0.07 NG/ML
RBC # BLD AUTO: 4.53 MILLION/UL (ref 3.88–5.62)
RBC MORPH BLD: PRESENT
SODIUM SERPL-SCNC: 136 MMOL/L (ref 135–147)
WBC # BLD AUTO: 16.17 THOUSAND/UL (ref 4.31–10.16)

## 2025-02-08 PROCEDURE — 94760 N-INVAS EAR/PLS OXIMETRY 1: CPT

## 2025-02-08 PROCEDURE — 84145 PROCALCITONIN (PCT): CPT | Performed by: INTERNAL MEDICINE

## 2025-02-08 PROCEDURE — 85027 COMPLETE CBC AUTOMATED: CPT | Performed by: INTERNAL MEDICINE

## 2025-02-08 PROCEDURE — 80048 BASIC METABOLIC PNL TOTAL CA: CPT | Performed by: INTERNAL MEDICINE

## 2025-02-08 PROCEDURE — 99232 SBSQ HOSP IP/OBS MODERATE 35: CPT | Performed by: INTERNAL MEDICINE

## 2025-02-08 PROCEDURE — 83735 ASSAY OF MAGNESIUM: CPT | Performed by: INTERNAL MEDICINE

## 2025-02-08 PROCEDURE — 94664 DEMO&/EVAL PT USE INHALER: CPT

## 2025-02-08 PROCEDURE — 85007 BL SMEAR W/DIFF WBC COUNT: CPT | Performed by: INTERNAL MEDICINE

## 2025-02-08 PROCEDURE — 94640 AIRWAY INHALATION TREATMENT: CPT

## 2025-02-08 RX ORDER — ATORVASTATIN CALCIUM 40 MG/1
40 TABLET, FILM COATED ORAL
Status: DISCONTINUED | OUTPATIENT
Start: 2025-02-08 | End: 2025-02-08

## 2025-02-08 RX ORDER — HYDROCODONE POLISTIREX AND CHLORPHENIRAMINE POLISTIREX 10; 8 MG/5ML; MG/5ML
5 SUSPENSION, EXTENDED RELEASE ORAL EVERY 12 HOURS PRN
Refills: 0 | Status: COMPLETED | OUTPATIENT
Start: 2025-02-08 | End: 2025-02-08

## 2025-02-08 RX ADMIN — CYANOCOBALAMIN TAB 500 MCG 1000 MCG: 500 TAB at 08:32

## 2025-02-08 RX ADMIN — FOLIC ACID 1000 MCG: 1 TABLET ORAL at 08:31

## 2025-02-08 RX ADMIN — CEFEPIME 2000 MG: 2 INJECTION, POWDER, FOR SOLUTION INTRAVENOUS at 00:29

## 2025-02-08 RX ADMIN — ASPIRIN 81 MG: 81 TABLET, COATED ORAL at 08:32

## 2025-02-08 RX ADMIN — FLUTICASONE PROPIONATE 1 SPRAY: 50 SPRAY, METERED NASAL at 10:32

## 2025-02-08 RX ADMIN — NICOTINE 1 PATCH: 14 PATCH, EXTENDED RELEASE TRANSDERMAL at 12:15

## 2025-02-08 RX ADMIN — METHYLPREDNISOLONE SODIUM SUCCINATE 40 MG: 40 INJECTION, POWDER, FOR SOLUTION INTRAMUSCULAR; INTRAVENOUS at 21:09

## 2025-02-08 RX ADMIN — LEVALBUTEROL HYDROCHLORIDE 1.25 MG: 1.25 SOLUTION RESPIRATORY (INHALATION) at 13:04

## 2025-02-08 RX ADMIN — METHYLPREDNISOLONE SODIUM SUCCINATE 40 MG: 40 INJECTION, POWDER, FOR SOLUTION INTRAMUSCULAR; INTRAVENOUS at 06:27

## 2025-02-08 RX ADMIN — LEVALBUTEROL HYDROCHLORIDE 1.25 MG: 1.25 SOLUTION RESPIRATORY (INHALATION) at 07:13

## 2025-02-08 RX ADMIN — IPRATROPIUM BROMIDE 0.5 MG: 0.5 SOLUTION RESPIRATORY (INHALATION) at 20:15

## 2025-02-08 RX ADMIN — IPRATROPIUM BROMIDE 0.5 MG: 0.5 SOLUTION RESPIRATORY (INHALATION) at 13:04

## 2025-02-08 RX ADMIN — HYDROCODONE POLISTIREX AND CHLORPHENIRAMINE POLISTIREX 5 ML: 10; 8 SUSPENSION, EXTENDED RELEASE ORAL at 19:52

## 2025-02-08 RX ADMIN — OSELTAMAVIR PHOSPHATE 75 MG: 75 CAPSULE ORAL at 21:07

## 2025-02-08 RX ADMIN — DILTIAZEM HYDROCHLORIDE 180 MG: 180 CAPSULE, COATED, EXTENDED RELEASE ORAL at 08:32

## 2025-02-08 RX ADMIN — FLUTICASONE FUROATE AND VILANTEROL TRIFENATATE 1 PUFF: 200; 25 POWDER RESPIRATORY (INHALATION) at 10:32

## 2025-02-08 RX ADMIN — METOPROLOL SUCCINATE 25 MG: 25 TABLET, EXTENDED RELEASE ORAL at 08:31

## 2025-02-08 RX ADMIN — LORAZEPAM 0.5 MG: 0.5 TABLET ORAL at 22:46

## 2025-02-08 RX ADMIN — TAMSULOSIN HYDROCHLORIDE 0.4 MG: 0.4 CAPSULE ORAL at 17:13

## 2025-02-08 RX ADMIN — PANTOPRAZOLE SODIUM 40 MG: 40 TABLET, DELAYED RELEASE ORAL at 06:27

## 2025-02-08 RX ADMIN — METHYLPREDNISOLONE SODIUM SUCCINATE 40 MG: 40 INJECTION, POWDER, FOR SOLUTION INTRAMUSCULAR; INTRAVENOUS at 14:18

## 2025-02-08 RX ADMIN — UMECLIDINIUM 1 PUFF: 62.5 AEROSOL, POWDER ORAL at 10:32

## 2025-02-08 RX ADMIN — POTASSIUM CHLORIDE 20 MEQ: 750 TABLET, EXTENDED RELEASE ORAL at 08:31

## 2025-02-08 RX ADMIN — RIVAROXABAN 20 MG: 10 TABLET, FILM COATED ORAL at 08:31

## 2025-02-08 RX ADMIN — TAMSULOSIN HYDROCHLORIDE 0.4 MG: 0.4 CAPSULE ORAL at 08:32

## 2025-02-08 RX ADMIN — LORAZEPAM 0.5 MG: 0.5 TABLET ORAL at 11:11

## 2025-02-08 RX ADMIN — METOPROLOL SUCCINATE 25 MG: 25 TABLET, EXTENDED RELEASE ORAL at 17:13

## 2025-02-08 RX ADMIN — CEFEPIME 2000 MG: 2 INJECTION, POWDER, FOR SOLUTION INTRAVENOUS at 17:14

## 2025-02-08 RX ADMIN — B-COMPLEX W/ C & FOLIC ACID TAB 1 TABLET: TAB at 08:31

## 2025-02-08 RX ADMIN — FINASTERIDE 5 MG: 5 TABLET, FILM COATED ORAL at 08:31

## 2025-02-08 RX ADMIN — LEVALBUTEROL HYDROCHLORIDE 1.25 MG: 1.25 SOLUTION RESPIRATORY (INHALATION) at 20:15

## 2025-02-08 RX ADMIN — OSELTAMAVIR PHOSPHATE 75 MG: 75 CAPSULE ORAL at 08:41

## 2025-02-08 RX ADMIN — ISOSORBIDE MONONITRATE 30 MG: 30 TABLET, EXTENDED RELEASE ORAL at 08:32

## 2025-02-08 RX ADMIN — CEFEPIME 2000 MG: 2 INJECTION, POWDER, FOR SOLUTION INTRAVENOUS at 10:30

## 2025-02-08 RX ADMIN — IPRATROPIUM BROMIDE 0.5 MG: 0.5 SOLUTION RESPIRATORY (INHALATION) at 07:13

## 2025-02-08 NOTE — ASSESSMENT & PLAN NOTE
Presented with fevers coupled with tachypnea/tachycardia  In the setting of influenza A infection  Given a dose of IV Cefepime in the ED -> observe off further antibiotics  Serial procalcitonins and baseline lactic acid normal  Blood cultures remain negative thus far  Monitor vitals and maintain hemodynamics -> goal MAP > 65 -> received IV fluid boluses in the ED with positive BP response

## 2025-02-08 NOTE — NURSING NOTE
This RN walked pt around unit x 1, pt gait steady, no walker needed. SOB on exertion, VSS, SpO2 94% on room air.

## 2025-02-08 NOTE — ASSESSMENT & PLAN NOTE
Continue Tamiflu  Initially presented with bodyaches, weakness, and fevers  Droplet/contact precautions  Supportive care

## 2025-02-08 NOTE — ASSESSMENT & PLAN NOTE
Presented with fevers coupled with tachypnea/tachycardia  In the setting of influenza A infection  Given a dose of IV Cefepime in the ED -> observe off further antibiotics  Baseline procalcitonin and lactic acid normal  Monitor vitals and maintain hemodynamics -> goal MAP > 65 -> received IV fluid boluses in the ED with positive BP response
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
## 2025-02-08 NOTE — ASSESSMENT & PLAN NOTE
Likely precipitated by influenza A infection (see below)  Transiently required 2 L of oxygen via nasal cannula -> now weaned down to baseline room air  Continue IV Solu-Medrol with plan for eventual tapering - c/w Trelegy and nebulizer treatments  Symptomatically starting to improve

## 2025-02-08 NOTE — PROGRESS NOTES
Progress Note - Hospitalist   Name: Miguelangel Lancaster 69 y.o. male I MRN: [id number]  Unit/Bed#: -01 I Date of Admission: 2/7/2025   Date of Service: 2/8/2025 I Hospital Day: 1    Assessment & Plan  COPD exacerbation (HCC)  Likely precipitated by influenza A infection (see below)  Transiently required 2 L of oxygen via nasal cannula -> now weaned down to baseline room air  Continue IV Solu-Medrol with plan for eventual tapering - c/w Trelegy and nebulizer treatments  Symptomatically starting to improve  Influenza A  Continue Tamiflu  Initially presented with bodyaches, weakness, and fevers  Droplet/contact precautions  Supportive care  Hyponatremia  Serum sodium of 129 on admission -> currently normalized  S/p IV fluid hydration  Continue to monitor  Sepsis (HCC)  Presented with fevers coupled with tachypnea/tachycardia  In the setting of influenza A infection  Given a dose of IV Cefepime in the ED -> observe off further antibiotics  Serial procalcitonins and baseline lactic acid normal  Blood cultures remain negative thus far  Monitor vitals and maintain hemodynamics -> goal MAP > 65 -> received IV fluid boluses in the ED with positive BP response  Primary hypertension  Known history of hypertension -> presented transiently hypotensive due to sepsis/infection  Administer antihypertensives only if meeting parameters   Home regimen includes Imdur/Toprol-XL/Cardizem  Chronic kidney disease stage 2-3  Baseline creatinine approximately 1.0-1.2 -> currently stable at 1.33  Monitor renal function and urine output  Limit/avoid nephrotoxins and hypotension as possible  History of stroke  Continue ASA - not currently on statin therapy  Hypomagnesemia  Monitor/replete serum magnesium  Serum potassium normal  Coronary artery disease  Continue ASA/Toprol-XL/Imdur - not currently on statin therapy  S/p CABG  Gastroesophageal reflux disease  Continue PPI  BPH (benign prostatic hyperplasia)  Continue Flomax  History of  DVT  Continue Xarelto      VTE Pharmacologic Prophylaxis: VTE Score: 6 High Risk (Score >/= 5) - Pharmacological DVT Prophylaxis Ordered: Rivaroxaban (Xarelto). Sequential Compression Devices Ordered.    AM-PAC Basic Mobility:  Basic Mobility Inpatient Raw Score: 23  JH-HLM Goal: 7: Walk 25 feet or more  JH-HLM Achieved: 7: Walk 25 feet or more  JH-HLM Goal achieved. Continue to encourage appropriate mobility.s.    Patient Centered Rounds:  I have performed bedside rounds and discussed plan of care with nursing today.  Discussions with Specialists or Other Care Team Provider:  see above assessments if applicable    Education and Discussions with Family / Patient: Patient at bedside, who denied need to update other contacts, currently    Time Spent for Care:  35 minutes. More than 50% of total time spent on counseling and coordination of care, on one or more of the following: performing physical exam; counseling and coordination of care, obtaining or reviewing history, documenting in the medical record, reviewing/ordering tests/medications/procedures, and communicating with other healthcare professionals.    Current Length of Stay: 1 day(s)  Current Patient Status: Inpatient   Certification Statement:  Patient will continue to require additional hospital stay due to assessments as noted above.    Code Status: Level 1 - Full Code      Subjective     Encountered earlier today.  Walking around room.  In general, shortness of breath has improved and frequency of coughing is starting to improve.      Objective     Vitals:   Temp (24hrs), Av.7 °F (36.5 °C), Min:97.4 °F (36.3 °C), Max:98.2 °F (36.8 °C)    Temp:  [97.4 °F (36.3 °C)-98.2 °F (36.8 °C)] 97.4 °F (36.3 °C)  HR:  [69-86] 86  Resp:  [18-20] 20  BP: (101-129)/(55-71) 129/67  SpO2:  [91 %-97 %] 93 %  Body mass index is 32.58 kg/m².     Input and Output Summary (last 24 hours):       Intake/Output Summary (Last 24 hours) at 2025 1208  Last data filed at  2/8/2025 0900  Gross per 24 hour   Intake 840 ml   Output --   Net 840 ml       Physical Exam:     GENERAL Improved weakness/fatigue   HEAD   Normocephalic - atraumatic   EYES Nonicteric   MOUTH   Mucosa moist   NECK   Supple - full range of motion   CARDIAC   Regular rate/rhythm - S1/S2 positive   PULMONARY Improved bilateral breath sounds with residual x-ray wheezes   ABDOMEN Nontender/nondistended   MUSCULOSKELETAL   Motor strength/range of motion intact   NEUROLOGIC   Alert/oriented at baseline   PSYCHIATRIC   Mood/affect stable         Labs & Recent Cultures:  Results from last 7 days   Lab Units 02/08/25 0425 02/07/25  0650   WBC Thousand/uL 16.17* 7.03   HEMOGLOBIN g/dL 13.6 13.6   HEMATOCRIT % 40.1 40.2   PLATELETS Thousands/uL 211 216   BANDS PCT % 3  --    SEGS PCT %  --  69   LYMPHO PCT % 3* 15   MONO PCT % 2* 14*   EOS PCT % 0 0     Results from last 7 days   Lab Units 02/08/25  0425 02/07/25  0650   POTASSIUM mmol/L 4.3 3.5   CHLORIDE mmol/L 107 96   CO2 mmol/L 21 25   BUN mg/dL 10 8   CREATININE mg/dL 1.01 1.33*   CALCIUM mg/dL 8.7 8.4   ALK PHOS U/L  --  99   ALT U/L  --  24   AST U/L  --  19     Results from last 7 days   Lab Units 02/07/25  0650   INR  1.24*             Results from last 7 days   Lab Units 02/08/25 0425 02/07/25  0650   LACTIC ACID mmol/L  --  1.5   PROCALCITONIN ng/ml 0.07 0.12         Results from last 7 days   Lab Units 02/07/25 1944 02/07/25 1943 02/07/25  0650   BLOOD CULTURE   --   --  Received in Microbiology Lab. Culture in Progress.  Received in Microbiology Lab. Culture in Progress.   GRAM STAIN RESULT   --  1+ Epithelial cells per low power field*  Rare Polys*  1+ Gram positive cocci in pairs*  Rare Budding yeast*  --    LEGIONELLA URINARY ANTIGEN  Negative  --   --          Lines/Drains/Telemetry:  Invasive Devices       Peripheral Intravenous Line  Duration             Peripheral IV 02/07/25 Dorsal (posterior);Right Wrist 1 day    Peripheral IV 02/07/25 Right  Antecubital 1 day                    Last 24 Hours Medication List:   Current Facility-Administered Medications   Medication Dose Route Frequency Provider Last Rate    acetaminophen  650 mg Oral Q6H PRN Tiana Espinoza MD      aspirin  81 mg Oral Daily Tiana Espinoza MD      cefepime  2,000 mg Intravenous Q8H Tiana Espinoza MD 2,000 mg (02/08/25 1030)    vitamin B-12  1,000 mcg Oral Daily Tiana Espinoza MD      dextromethorphan-guaiFENesin  10 mL Oral Q4H PRN Tiana Espinoza MD      Diclofenac Sodium  2 g Topical 4x Daily Tiana Espinoza MD      diltiazem  180 mg Oral Daily Tiana Espinoza MD      finasteride  5 mg Oral Daily Tiana Espinoza MD      fluticasone  1 spray Nasal Daily Tiana Espinoza MD      fluticasone-vilanterol  1 puff Inhalation Daily Tiana Espinoza MD      And    umeclidinium  1 puff Inhalation Daily Tiana Espinoza MD      folic acid  1,000 mcg Oral Daily Tiana Espinoza MD      ipratropium  0.5 mg Nebulization TID Tiana Espinoza MD      isosorbide mononitrate  30 mg Oral Daily Tiana Espinoza MD      levalbuterol  1.25 mg Nebulization TID Tiana Espinoza MD      LORazepam  0.5 mg Oral Q8H PRN Tiana Espinoza MD      methocarbamol  500 mg Oral 4x Daily Tiana Espinoza MD      methylPREDNISolone sodium succinate  40 mg Intravenous Q8H [facility] Tiana Espinoza MD      metoprolol succinate  25 mg Oral BID Tiana Espinoza MD      multivitamin stress formula  1 tablet Oral Daily Tiana Espinoza MD      nicotine  1 patch Transdermal Daily PRN Tiana Espinoza MD      oseltamivir  75 mg Oral Q12H [facility] Tiana Espinoza MD      pantoprazole  40 mg Oral Early Morning Tiana Espinoza MD      polyethylene glycol  17 g Oral Daily PRN Tiana Espinoza MD      potassium chloride  20 mEq Oral Daily Tiana Espinoza MD      pregabalin  75 mg Oral BID Tiana Espinoza MD      rivaroxaban  20 mg Oral Daily Tiana Espinoza MD      tamsulosin  0.4 mg Oral BID Tiana Espinoza MD      trimethobenzamide  200 mg Intramuscular Q6H PRN MD TIANA Coleman MD    Hospitalist - [facility] Internal Medicine        ** Please Note:  Documentation is constructed using a voice recognition dictation system.  An occasional wrong word/phrase or “sound-a-like” substitution may have been picked up by dictation device due to the inherent limitations of voice recognition software.  Read the chart carefully and recognize, using reasonable context, where substitutions may have occurred.**

## 2025-02-08 NOTE — ASSESSMENT & PLAN NOTE
Baseline creatinine approximately 1.0-1.2 -> currently stable at 1.33  Monitor renal function and urine output  Limit/avoid nephrotoxins and hypotension as possible

## 2025-02-08 NOTE — ASSESSMENT & PLAN NOTE
Serum sodium of 129 on admission -> currently normalized  S/p IV fluid hydration  Continue to monitor

## 2025-02-08 NOTE — PLAN OF CARE
Problem: PAIN - ADULT  Goal: Verbalizes/displays adequate comfort level or baseline comfort level  Description: Interventions:  - Encourage patient to monitor pain and request assistance  - Assess pain using appropriate pain scale  - Administer analgesics based on type and severity of pain and evaluate response  - Implement non-pharmacological measures as appropriate and evaluate response  - Consider cultural and social influences on pain and pain management  - Notify physician/advanced practitioner if interventions unsuccessful or patient reports new pain  Outcome: Progressing     Problem: INFECTION - ADULT  Goal: Absence or prevention of progression during hospitalization  Description: INTERVENTIONS:  - Assess and monitor for signs and symptoms of infection  - Monitor lab/diagnostic results  - Monitor all insertion sites, i.e. indwelling lines, tubes, and drains  - Monitor endotracheal if appropriate and nasal secretions for changes in amount and color  - [place] appropriate cooling/warming therapies per order  - Administer medications as ordered  - Instruct and encourage patient and family to use good hand hygiene technique  - Identify and instruct in appropriate isolation precautions for identified infection/condition  Outcome: Progressing  Goal: Absence of fever/infection during neutropenic period  Description: INTERVENTIONS:  - Monitor WBC    Outcome: Progressing     Problem: SAFETY ADULT  Goal: Patient will remain free of falls  Description: INTERVENTIONS:  - Educate patient/family on patient safety including physical limitations  - Instruct patient to call for assistance with activity   - Consult OT/PT to assist with strengthening/mobility   - Keep Call bell within reach  - Keep bed low and locked with side rails adjusted as appropriate  - Keep care items and personal belongings within reach  - Initiate and maintain comfort rounds  - Make Fall Risk Sign visible to staff  - Offer Toileting every 2 Hours,  in advance of need  - Initiate/Maintain bedalarm  - Obtain necessary fall risk management equipment: nonskid socks  - Apply yellow socks and bracelet for high fall risk patients  - Consider moving patient to room near nurses station  Outcome: Progressing  Goal: Maintain or return to baseline ADL function  Description: INTERVENTIONS:  -  Assess patient's ability to carry out ADLs; assess patient's baseline for ADL function and identify physical deficits which impact ability to perform ADLs (bathing, care of mouth/teeth, toileting, grooming, dressing, etc.)  - Assess/evaluate cause of self-care deficits   - Assess range of motion  - Assess patient's mobility; develop plan if impaired  - Assess patient's need for assistive devices and provide as appropriate  - Encourage maximum independence but intervene and supervise when necessary  - Involve family in performance of ADLs  - Assess for home care needs following discharge   - Consider OT consult to assist with ADL evaluation and planning for discharge  - Provide patient education as appropriate  Outcome: Progressing  Goal: Maintains/Returns to pre admission functional level  Description: INTERVENTIONS:  - Perform AM-PAC 6 Click Basic Mobility/ Daily Activity assessment daily.  - Set and communicate daily mobility goal to care team and patient/family/caregiver.   - Collaborate with rehabilitation services on mobility goals if consulted  - Perform Range of Motion 3 times a day.  - Reposition patient every 2 hours.  - Dangle patient 3 times a day  - Stand patient 3 times a day  - Ambulate patient 3 times a day  - Out of bed to chair 3 times a day   - Out of bed for meals 3 times a day  - Out of bed for toileting  - Record patient progress and toleration of activity level   Outcome: Progressing     Problem: DISCHARGE PLANNING  Goal: Discharge to home or other facility with appropriate resources  Description: INTERVENTIONS:  - Identify barriers to discharge w/patient and  caregiver  - Arrange for needed discharge resources and transportation as appropriate  - Identify discharge learning needs (meds, wound care, etc.)  - Arrange for interpretive services to assist at discharge as needed  - Refer to Case Management Department for coordinating discharge planning if the patient needs post-hospital services based on physician/advanced practitioner order or complex needs related to functional status, cognitive ability, or social support system  Outcome: Progressing     Problem: Knowledge Deficit  Goal: Patient/family/caregiver demonstrates understanding of disease process, treatment plan, medications, and discharge instructions  Description: Complete learning assessment and assess knowledge base.  Interventions:  - Provide teaching at level of understanding  - Provide teaching via preferred learning methods  Outcome: Progressing     Problem: Decreased Cardiac Output  Goal: Cardiac output adequate for individual needs  Description: INTERVENTIONS: Monitor for signs and symptoms of decreased cardiac output   - Monitor for dyspnea with exertion and at rest  - Monitor for orthopnea  - Monitor for signs of tachycardia. Place patient on telemetry monitoring.  - Assess patient for jugular vein distention  - Assess patient for lower extremity edema and poor peripheral perfusion   - Auscultate lung sound for Fine bibasilar crackles   - Monitor for cardiac arrythmias   - Administer beta blockers, antiarrhythmic, and blood pressure medications as ordered    Outcome: Progressing     Problem: Impaired Gas Exchange  Goal: Optimize oxygenation and ensure adequate ventilation  Description: INTERVENTIONS: Monitor for signs and symptoms of respiratory distress                - Elevate HOB or use high fowlers to promote lung expansion                - Administer oxygen as ordered to maintain adequate oxygenation                - Encourage use of IS to promote lung expansion and prevent PN                -  Monitor ABGs to assess oxygenation status                - Monitor blood oxygen level to maintain adequate oxygenation                - Encourage cough and deep breathing exercises to promote lung expansion                - Monitor patient's mental status for increased confusion    Outcome: Progressing     Problem: Excess Fluid Volume  Goal: Patient is able to achieve and maintain homeostasis  Description: INTERVENTIONS: Monitor for sign and symptoms of fluid overload  - Evaluate LE edema every shift  - Elevate LE to prevent dependent edema  - Apply BALJIT stockings as ordered   - Monitor ankle circumference daily  - Assess for jugular vein distention  - Evaluate provider orders for the CHF diuretic algorithm. Administer diuretics as ordered  - Weigh the patient daily at 0600 and report a weight gain of five pounds or more   - Strict intake and output  - Monitor fluid intake and adhere to fluid restrictions  - Assess lung sounds every shift and as needed  - Monitor vital signs and lab values (CBC, chem, BUN, BNP)  - Measure and document urine output    Outcome: Progressing     Problem: Activity Intolerance  Goal: Patient is able to perform activities within their limitations  Description: INTERVENTIONS:                       -   Alternate periods of activity with periods of rest                 -   Patients is able to maintain normal vitals heart rhythm during activity                 -   Gradually increase activity and exercise as patient can tolerate                 -   Monitor blood pressure and heart before and after exercise                  -   Monitor blood oxygen saturation during activity and apply oxygen as needed    Outcome: Progressing     Problem: Knowledge Deficit  Goal: Patient is able to verbalize understanding of Heart Failure after education  Description: INTERVENTIONS:  - Educate the patient and family on signs and symptoms of HF  - Provide the patient with HF education and HF zone tool  - Educate on  the importance of daily weight in the AM and reporting a weight gain               of 3 or more pounds to their primary care physician  - Monitor for SOB  - Maintain and sodium and fluid restriction  - Educate the patient on the importance of medications such as: diuretics, betablockers,               antiarrhythmics and their purpose, dose, route, side effects and labs               if they are needed    Outcome: Progressing

## 2025-02-09 VITALS
BODY MASS INDEX: 32.51 KG/M2 | RESPIRATION RATE: 20 BRPM | OXYGEN SATURATION: 96 % | SYSTOLIC BLOOD PRESSURE: 128 MMHG | WEIGHT: 227.07 LBS | TEMPERATURE: 97.3 F | HEIGHT: 70 IN | DIASTOLIC BLOOD PRESSURE: 68 MMHG | HEART RATE: 53 BPM

## 2025-02-09 LAB
ANION GAP SERPL CALCULATED.3IONS-SCNC: 8 MMOL/L (ref 4–13)
BUN SERPL-MCNC: 15 MG/DL (ref 5–25)
CALCIUM SERPL-MCNC: 8.7 MG/DL (ref 8.4–10.2)
CHLORIDE SERPL-SCNC: 106 MMOL/L (ref 96–108)
CO2 SERPL-SCNC: 23 MMOL/L (ref 21–32)
CREAT SERPL-MCNC: 0.96 MG/DL (ref 0.6–1.3)
ERYTHROCYTE [DISTWIDTH] IN BLOOD BY AUTOMATED COUNT: 12.8 % (ref 11.6–15.1)
GFR SERPL CREATININE-BSD FRML MDRD: 80 ML/MIN/1.73SQ M
GLUCOSE SERPL-MCNC: 146 MG/DL (ref 65–140)
HCT VFR BLD AUTO: 38.5 % (ref 36.5–49.3)
HGB BLD-MCNC: 12.9 G/DL (ref 12–17)
MCH RBC QN AUTO: 30.2 PG (ref 26.8–34.3)
MCHC RBC AUTO-ENTMCNC: 33.5 G/DL (ref 31.4–37.4)
MCV RBC AUTO: 90 FL (ref 82–98)
PLATELET # BLD AUTO: 216 THOUSANDS/UL (ref 149–390)
PMV BLD AUTO: 9.1 FL (ref 8.9–12.7)
POTASSIUM SERPL-SCNC: 4 MMOL/L (ref 3.5–5.3)
RBC # BLD AUTO: 4.27 MILLION/UL (ref 3.88–5.62)
SODIUM SERPL-SCNC: 137 MMOL/L (ref 135–147)
WBC # BLD AUTO: 19.39 THOUSAND/UL (ref 4.31–10.16)

## 2025-02-09 PROCEDURE — 94640 AIRWAY INHALATION TREATMENT: CPT

## 2025-02-09 PROCEDURE — 85027 COMPLETE CBC AUTOMATED: CPT | Performed by: INTERNAL MEDICINE

## 2025-02-09 PROCEDURE — 94760 N-INVAS EAR/PLS OXIMETRY 1: CPT

## 2025-02-09 PROCEDURE — 94664 DEMO&/EVAL PT USE INHALER: CPT

## 2025-02-09 PROCEDURE — 99239 HOSP IP/OBS DSCHRG MGMT >30: CPT | Performed by: INTERNAL MEDICINE

## 2025-02-09 PROCEDURE — 80048 BASIC METABOLIC PNL TOTAL CA: CPT | Performed by: INTERNAL MEDICINE

## 2025-02-09 RX ORDER — PREDNISONE 10 MG/1
TABLET ORAL
Qty: 20 TABLET | Refills: 0 | Status: SHIPPED | OUTPATIENT
Start: 2025-02-09 | End: 2025-02-17

## 2025-02-09 RX ORDER — OSELTAMIVIR PHOSPHATE 75 MG/1
75 CAPSULE ORAL EVERY 12 HOURS SCHEDULED
Qty: 5 CAPSULE | Refills: 0 | Status: SHIPPED | OUTPATIENT
Start: 2025-02-09 | End: 2025-02-12

## 2025-02-09 RX ORDER — GUAIFENESIN/DEXTROMETHORPHAN 100-10MG/5
10 SYRUP ORAL EVERY 4 HOURS PRN
Start: 2025-02-09

## 2025-02-09 RX ADMIN — CYANOCOBALAMIN TAB 500 MCG 1000 MCG: 500 TAB at 08:21

## 2025-02-09 RX ADMIN — DILTIAZEM HYDROCHLORIDE 180 MG: 180 CAPSULE, COATED, EXTENDED RELEASE ORAL at 08:21

## 2025-02-09 RX ADMIN — IPRATROPIUM BROMIDE 0.5 MG: 0.5 SOLUTION RESPIRATORY (INHALATION) at 07:14

## 2025-02-09 RX ADMIN — CEFEPIME 2000 MG: 2 INJECTION, POWDER, FOR SOLUTION INTRAVENOUS at 08:34

## 2025-02-09 RX ADMIN — ISOSORBIDE MONONITRATE 30 MG: 30 TABLET, EXTENDED RELEASE ORAL at 08:22

## 2025-02-09 RX ADMIN — METHYLPREDNISOLONE SODIUM SUCCINATE 40 MG: 40 INJECTION, POWDER, FOR SOLUTION INTRAMUSCULAR; INTRAVENOUS at 05:16

## 2025-02-09 RX ADMIN — POTASSIUM CHLORIDE 20 MEQ: 750 TABLET, EXTENDED RELEASE ORAL at 08:22

## 2025-02-09 RX ADMIN — ASPIRIN 81 MG: 81 TABLET, COATED ORAL at 08:21

## 2025-02-09 RX ADMIN — FLUTICASONE FUROATE AND VILANTEROL TRIFENATATE 1 PUFF: 200; 25 POWDER RESPIRATORY (INHALATION) at 08:35

## 2025-02-09 RX ADMIN — CEFEPIME 2000 MG: 2 INJECTION, POWDER, FOR SOLUTION INTRAVENOUS at 00:50

## 2025-02-09 RX ADMIN — LORAZEPAM 0.5 MG: 0.5 TABLET ORAL at 08:34

## 2025-02-09 RX ADMIN — LEVALBUTEROL HYDROCHLORIDE 1.25 MG: 1.25 SOLUTION RESPIRATORY (INHALATION) at 07:13

## 2025-02-09 RX ADMIN — B-COMPLEX W/ C & FOLIC ACID TAB 1 TABLET: TAB at 08:22

## 2025-02-09 RX ADMIN — TAMSULOSIN HYDROCHLORIDE 0.4 MG: 0.4 CAPSULE ORAL at 08:27

## 2025-02-09 RX ADMIN — OSELTAMAVIR PHOSPHATE 75 MG: 75 CAPSULE ORAL at 08:34

## 2025-02-09 RX ADMIN — FOLIC ACID 1000 MCG: 1 TABLET ORAL at 08:21

## 2025-02-09 RX ADMIN — UMECLIDINIUM 1 PUFF: 62.5 AEROSOL, POWDER ORAL at 08:35

## 2025-02-09 RX ADMIN — FLUTICASONE PROPIONATE 1 SPRAY: 50 SPRAY, METERED NASAL at 08:35

## 2025-02-09 RX ADMIN — METOPROLOL SUCCINATE 25 MG: 25 TABLET, EXTENDED RELEASE ORAL at 08:22

## 2025-02-09 RX ADMIN — FINASTERIDE 5 MG: 5 TABLET, FILM COATED ORAL at 08:22

## 2025-02-09 RX ADMIN — RIVAROXABAN 20 MG: 10 TABLET, FILM COATED ORAL at 08:22

## 2025-02-09 RX ADMIN — PANTOPRAZOLE SODIUM 40 MG: 40 TABLET, DELAYED RELEASE ORAL at 05:16

## 2025-02-09 NOTE — DISCHARGE SUMMARY
Discharge Summary - Hospitalist   Name: Miguelangel Lancaster 69 y.o. male I MRN: [id number]  Unit/Bed#: [facility]-01 I Date of Admission: 2/7/2025   Date of Service: 2/9/2025 I Hospital Day: 2     Assessment & Plan  COPD exacerbation (HCC)  Likely precipitated by influenza A infection (see below)  Transiently required 2 L of oxygen via nasal cannula -> now weaned down to baseline room air  IV Solu-Medrol to be transitioned to oral Prednisone to complete tapering course postdischarge - c/w Trelegy and nebulizer treatments  Symptomatically improved -> plan for discharge home today  Influenza A  Continue Tamiflu to complete 5-day course  Initially presented with bodyaches, weakness, and fevers  Droplet/contact precautions  Supportive care  Hyponatremia  Serum sodium of 129 on admission -> currently normalized  S/p IV fluid hydration  Continue to monitor  Sepsis (HCC)  Presented with fevers coupled with tachypnea/tachycardia  In the setting of influenza A infection  Given a dose of IV Cefepime in the ED -> observed and stable off further antibiotics  Serial procalcitonins and baseline lactic acid normal  Blood cultures remain negative thus far  Hemodynamically stable  Primary hypertension  Known history of hypertension -> presented transiently hypotensive due to sepsis/infection  Resume home regimen of Imdur/Toprol-XL/Cardizem  Chronic kidney disease stage 2-3  Baseline creatinine approximately 1.0-1.2 -> currently stable 0.96  Limit/avoid nephrotoxins and hypotension as possible  History of stroke  Continue ASA - not currently on statin therapy  Hypomagnesemia  Magnesium normalized status post repletion  Serum potassium normal  Coronary artery disease  Continue ASA/Toprol-XL/Imdur - not currently on statin therapy  S/p CABG  Gastroesophageal reflux disease  Continue PPI  BPH (benign prostatic hyperplasia)  Continue Flomax  History of DVT  Continue Xarelto             Discharging Physician / Practitioner: Tiana Espinoza MD  PCP:  "Candida Loja PA-C    Admission Date:   Admission Orders (From admission, onward)       Ordered        02/07/25 0842  INPATIENT ADMISSION  Once                          Discharge Date: 02/09/25      Reason for Admission:     Shortness of breath      Discharge Diagnoses:     Principal Problem:    COPD exacerbation (HCC)  Active Problems:    Primary hypertension    History of stroke    Coronary artery disease    Gastroesophageal reflux disease    BPH (benign prostatic hyperplasia)    Influenza A    Sepsis (HCC)    Hypomagnesemia    Chronic kidney disease stage 2-3    Hyponatremia    History of DVT  Resolved Problems:    Hypertensive heart and renal disease with CHF (HCC)        Consultations During Hospital Stay:   None      Condition at Discharge: fair       Discharge Day Visit / Exam:     Vitals: Blood Pressure: 128/68 (02/09/25 0824)  Pulse: (!) 53 (02/09/25 0701)  Temperature: (!) 97.3 °F (36.3 °C) (02/08/25 2243)  Temp Source: Oral (02/07/25 1031)  Respirations: 20 (02/09/25 0701)  Height: 5' 10\" (177.8 cm) (02/07/25 1015)  Weight - Scale: 103 kg (227 lb 1.2 oz) (02/07/25 1015)  SpO2: 96 % (02/09/25 0846)      Physical exam - I had a face-to-face encounter with the patient on day of discharge.      Discussion with Patient and/or Family:  The patient has been advised to return to the ER immediately if any symptoms recur or worsen.       Discharge Instructions/Information to Patient and/or Family:   See after visit summary for information provided to patient and/or family.        Provisions for Follow-up Care:   See after visit summary for information related to follow-up care and any pertinent home health orders.        Disposition:   Home      Discharge Medications:   See after visit summary for reconciled discharge medications provided to patient and/or family.        Discharge Statement:   I spent 38 minutes discharging the patient. This time was spent on the day of discharge. I had direct contact with the " patient on the day of discharge. Greater than 50% of the total time was spent examining patient, answering all patient questions, arranging and discussing plan of care with patient as well as directly providing post-discharge instructions.  Additional time then spent on discharge activities.           JAELYN HARVEY MD    Hospitalist - [facility] Internal Medicine        ** Please Note:  Documentation is constructed using a voice recognition dictation system.  An occasional wrong word/phrase or “sound-a-like” substitution may have been picked up by dictation device due to the inherent limitations of voice recognition software.  Read the chart carefully and recognize, using reasonable context, where substitutions may have occurred.**

## 2025-02-09 NOTE — ASSESSMENT & PLAN NOTE
Presented with fevers coupled with tachypnea/tachycardia  In the setting of influenza A infection  Given a dose of IV Cefepime in the ED -> observed and stable off further antibiotics  Serial procalcitonins and baseline lactic acid normal  Blood cultures remain negative thus far  Hemodynamically stable

## 2025-02-09 NOTE — CASE MANAGEMENT
Case Management Discharge Planning Note    Patient name Miguelangel Lancaster  Location /-01 MRN [id number]  : 1955 Date 2025       Current Admission Date: 2025  Current Admission Diagnosis:COPD exacerbation (HCC)   Patient Active Problem List    Diagnosis Date Noted Date Diagnosed    Influenza A 2025     Sepsis ([facility]) 2025     Hypomagnesemia 2025     Chronic kidney disease stage 2-3 2025     Hyponatremia 2025     History of DVT 2025     Prediabetes 2024     Generalized abdominal pain 2024     Venous insufficiency of both lower extremities 2024     Deformity of both feet 2024     Arthritis, midfoot 2024     Pain in both feet 2024     Hammertoe of second toe of right foot 2024     Smoking greater than 20 pack years 2024     Pain in left wrist 2024     Iron deficiency anemia due to chronic blood loss 10/12/2023     Vitamin B12 deficiency 10/12/2023     History of pulmonary embolism 2023     Hernia of abdominal wall 2023    Arthralgia of left lower leg 2023     Ambulatory dysfunction 2023     PAD (peripheral artery disease) ([facility]) 2023     Suspicious nevus 2023     Left hip pain 10/20/2022     BPH (benign prostatic hyperplasia) 2022     Urinary retention 2022     Depression, recurrent ([facility]) 2022     Superior mesenteric artery stenosis ([facility]) 2022     Peripheral neuropathy 2022     Aortic aneurysm ([facility]) 2020     Lower extremity edema 2019     Diverticular disease 2019     Chronic constipation 2019     ED (erectile dysfunction) of organic origin 2019     Gastroesophageal reflux disease 2019     Insomnia 2019     Tobacco abuse 2019     Overweight 2019     Seasonal allergies 2019     Anxiety 2018     Primary hypertension 2018     History of stroke 2018     COPD  exacerbation (HCC) 09/14/2018     Coronary artery disease 09/14/2018     Benign neoplasm of colon 11/19/2010       LOS (days): 2  Geometric Mean LOS (GMLOS) (days): 3.5  Days to GMLOS:1.4     OBJECTIVE:  Risk of Unplanned Readmission Score: 19.4         Current admission status: Inpatient   Preferred Pharmacy:   [id number] [place] [facility] [address]  [facility] [address]  Phone: [phone] Fax: [phone]    Primary Care Provider: Candida Loja PA-C    Primary Insurance: ZALORA  [facility]    DISCHARGE DETAILS:             Transport at Discharge : Other (Comment)     Number/Name of Dispatcher: Roundtrip  Transported by (Company and Unit #): [employer]  ETA of Transport (Date): 02/09/25  ETA of Transport (Time): 1400        Additional Comments: Pt stable for d.c home today per SLIM. Pt reporting he has no means of transport home. Pt ambulatory. CM requested [employer] car transport home via [employer]. They are able to transport pt at 2PM. Pt nurse aware.

## 2025-02-09 NOTE — ASSESSMENT & PLAN NOTE
Continue Tamiflu to complete 5-day course  Initially presented with bodyaches, weakness, and fevers  Droplet/contact precautions  Supportive care

## 2025-02-09 NOTE — UTILIZATION REVIEW
Initial Clinical Review    Admission: Date/Time/Statement:   Admission Orders (From admission, onward)       Ordered        02/07/25 0842  INPATIENT ADMISSION  Once                          Orders Placed This Encounter   Procedures    INPATIENT ADMISSION     Standing Status:   Standing     Number of Occurrences:   1     Level of Care:   Med Surg [16]     Estimated length of stay:   More than 2 Midnights     Certification:   I certify that inpatient services are medically necessary for this patient for a duration of greater than two midnights. See H&P and MD Progress Notes for additional information about the patient's course of treatment.     ED Arrival Information       Expected   2/7/2025 06:21    Arrival   2/7/2025 06:28    Acuity   Urgent              Means of arrival   Ambulance    Escorted by   [facility]    Service   Hospitalist    Admission type   Emergency              Arrival complaint   breathing problems             Chief Complaint   Patient presents with    Cough     Started around 1400 yesterday afternoon. Mucous, congestion, chills, lower back pain. Hx: COPD. B/L wheezing per EMS improved after albuterol treatment.        Initial Presentation: 69 y.o. male to ED via EMS from home.    Admitted to inpatient with Dx: COPD Exacerbation / InFluenza A.  Presented to ED with body aches and coughing, worsening over the last 24 hours.    PMHx:coronary disease bypass, COPD hypertension previous right MCA stroke,, MI, heart failure GERD. He also has recent DVT left lower extremity takes Xarelto.   Date: 02/07/2025   On exam:  Mild respiratory distress,  Normal mentalstatus.   + for fever.  + for cough, SOB - Wheezing.    Imaging shows:  Chest X:  No acute cardiopulmonary disease. Pulmonary nodules noted on recent CT imaging are not clearly evident on this radiograph. ED treatment:  Labs.  Radiology.  ECG: Sinus Tachycardia.  Duo-Neb.  IV solumedrol.  IV Cefepime x 1 dose.    Plan includes:  Admit.  O2 @  2L wean as tolerated.  Continue IV solumedrol along with trelegy and nebulizer treatments.  Continue Tamiflu.  Droplet/Contact precautions.  IV flds.  Monitor vitals and maintain hemodynamics -> goal MAP > 65 -> received IV fluid boluses in the ED with positive BP response.      Anticipated Length of Stay/Certification Statement: Patient will be admitted on an Inpatient basis with an anticipated length of stay of greater than 2 midnights.   Justification for Hospital Stay: COPD exacerbation with influenza A infection requiring intravenous corticosteroids and infectious management.     Day 2: 02/08/2025  Telemetry.  Currently on Room Air.  Continue IV solumedrol.  Nebulizer Treatments.  Continue Tamiflu.  Droplet/contact precautions.  IV flds.      Date: 02/09/2025  Day 3: Has surpassed a 2nd midnight with active treatments and services.    DC to Home.    ED Treatment-Medication Administration from 02/07/2025 0628 to 02/07/2025 1025         Date/Time Order Dose Route Action     02/07/2025 0633 albuterol (FOR EMS ONLY) (2.5 mg/3 mL) 0.083 % inhalation solution 2.5 mg 0 mg Does not apply Given to EMS     02/07/2025 0647 ipratropium-albuterol (DUO-NEB) 0.5-2.5 mg/3 mL inhalation solution 3 mL 3 mL Nebulization Given     02/07/2025 0645 methylPREDNISolone sodium succinate (Solu-MEDROL) injection 125 mg 125 mg Intravenous Given     02/07/2025 0648 acetaminophen (TYLENOL) tablet 650 mg 650 mg Oral Given     02/07/2025 0654 sodium chloride 0.9 % bolus 2,190 mL 2,190 mL Intravenous New Bag     02/07/2025 0830 magnesium sulfate 2 g/50 mL IVPB (premix) 2 g 2 g Intravenous New Bag     02/07/2025 0914 sodium chloride 0.9 % bolus 1,000 mL 1,000 mL Intravenous New Bag     02/07/2025 0830 cefepime (MAXIPIME) 2 g/50 mL dextrose IVPB 2,000 mg Intravenous New Bag     02/07/2025 0829 oseltamivir (TAMIFLU) capsule 75 mg 75 mg Oral Given            Scheduled Medications:  aspirin, 81 mg, Oral, Daily  vitamin B-12, 1,000 mcg, Oral,  Daily  Diclofenac Sodium, 2 g, Topical, 4x Daily  diltiazem, 180 mg, Oral, Daily  finasteride, 5 mg, Oral, Daily  fluticasone, 1 spray, Nasal, Daily  fluticasone-vilanterol, 1 puff, Inhalation, Daily   And  umeclidinium, 1 puff, Inhalation, Daily  folic acid, 1,000 mcg, Oral, Daily  ipratropium, 0.5 mg, Nebulization, TID  isosorbide mononitrate, 30 mg, Oral, Daily  levalbuterol, 1.25 mg, Nebulization, TID  methocarbamol, 500 mg, Oral, 4x Daily  methylPREDNISolone sodium succinate, 40 mg, Intravenous, Q8H YANCI  metoprolol succinate, 25 mg, Oral, BID  multivitamin stress formula, 1 tablet, Oral, Daily  oseltamivir, 75 mg, Oral, Q12H YANCI  pantoprazole, 40 mg, Oral, Early Morning  potassium chloride, 20 mEq, Oral, Daily  pregabalin, 75 mg, Oral, BID  rivaroxaban, 20 mg, Oral, Daily  tamsulosin, 0.4 mg, Oral, BID      Continuous IV Infusions:     PRN Meds:  acetaminophen, 650 mg, Oral, Q6H PRN  dextromethorphan-guaiFENesin, 10 mL, Oral, Q4H PRN  LORazepam, 0.5 mg, Oral, Q8H PRN  nicotine, 1 patch, Transdermal, Daily PRN  polyethylene glycol, 17 g, Oral, Daily PRN  trimethobenzamide, 200 mg, Intramuscular, Q6H PRN      ED Triage Vitals [02/07/25 0633]   Temperature Pulse Respirations Blood Pressure SpO2 Pain Score   (!) 101.2 °F (38.4 °C) 98 (!) 24 91/66 95 % No Pain     Weight (last 2 days) before discharge       Date/Time Weight    02/07/25 1015 103 (227.07)    02/07/25 0633 102 (225)            Vital Signs (last 3 days) before discharge       Date/Time Temp Pulse Resp BP MAP (mmHg) SpO2 O2 Device Patient Position - Orthostatic VS Pain    02/09/25 0846 -- -- -- -- -- 96 % None (Room air) -- No Pain    02/09/25 08:24:02 -- -- -- 128/68 88 100 % -- -- --    02/09/25 0821 -- -- -- 128/68 -- -- -- -- --    02/09/25 0714 -- -- -- -- -- 94 % None (Room air) -- --    02/09/25 07:01:49 -- 53 20 107/61 76 94 % -- -- --    02/08/25 22:43:04 97.3 °F (36.3 °C) 83 -- 126/75 92 96 % -- -- --    02/08/25 2015 -- -- -- -- -- -- None  (Room air) -- --    02/08/25 1938 -- -- -- -- -- -- None (Room air) -- No Pain    02/08/25 14:23:57 -- 81 20 112/71 85 97 % -- -- --    02/08/25 1304 -- -- -- -- -- 98 % None (Room air) -- --    02/08/25 0830 -- -- -- -- -- 93 % None (Room air) -- No Pain    02/08/25 07:35:47 97.4 °F (36.3 °C) 86 20 129/67 88 95 % -- -- --    02/08/25 0713 -- -- -- -- -- -- None (Room air) -- --    02/07/25 22:18:26 97.4 °F (36.3 °C) -- 18 127/67 87 -- -- -- --    02/07/25 2012 -- -- -- -- -- -- -- -- No Pain    02/07/25 14:17:46 98.2 °F (36.8 °C) 76 20 122/71 88 95 % -- -- --    02/07/25 1340 -- -- -- -- -- 91 % -- -- --    02/07/25 1320 -- -- -- -- -- 97 % None (Room air) -- --    02/07/25 1319 -- 76 18 -- -- 95 % -- -- --    02/07/25 12:42:44 -- 69 -- 101/55 70 95 % -- -- --    02/07/25 1241 -- -- -- 101/55 -- -- -- -- --    02/07/25 1130 -- -- -- -- -- -- None (Room air) -- No Pain    02/07/25 10:31:57 97.9 °F (36.6 °C) 73 22 86/59 68 96 % -- Lying No Pain    02/07/25 1015 -- 83 26 117/65 86 95 % -- -- --    02/07/25 0950 -- 77 27 107/59 78 94 % -- -- --    02/07/25 0935 -- 73 31 115/56 79 94 % -- -- --    02/07/25 0912 -- -- -- 92/53 -- -- -- -- --    02/07/25 0902 98.1 °F (36.7 °C) 80 22 -- 67 -- -- -- --    02/07/25 0901 -- 78 26 97/51 72 93 % -- -- --    02/07/25 0801 -- 89 30 106/58 77 91 % -- -- --    02/07/25 0731 -- 87 28 105/60 78 88 % -- -- --    02/07/25 0700 -- 96 24 104/60 76 94 % -- -- --    02/07/25 0659 -- -- -- -- -- -- None (Room air) -- --    02/07/25 0633 101.2 °F (38.4 °C) 98 24 91/66 -- 95 % None (Room air) Sitting No Pain            Pertinent Labs/Diagnostic Test Results:   Radiology:  XR chest portable   Final Interpretation by Donovan Cox DO (02/07 0828)   No acute cardiopulmonary disease. Pulmonary nodules noted on recent CT imaging are not clearly evident on this radiograph. Please refer to chest CT report of 9/20/2024 for further characterization and follow-up recommendations.         Cardiology:  ECG 12 lead   Final Result by Matt Marquez MD (02/07 0834)   Sinus tachycardia    107   Rightward axis   When compared with ECG of 04-Oct-2024 07:27,   No significant change was found        GI:  No orders to display       Results from last 7 days   Lab Units 02/07/25  0650   SARS-COV-2  Negative     Results from last 7 days   Lab Units 02/09/25  0510 02/08/25  0425 02/07/25  0650   WBC Thousand/uL 19.39* 16.17* 7.03   HEMOGLOBIN g/dL 12.9 13.6 13.6   HEMATOCRIT % 38.5 40.1 40.2   PLATELETS Thousands/uL 216 211 216   TOTAL NEUT ABS Thousands/µL  --   --  4.92   BANDS PCT %  --  3  --      Results from last 7 days   Lab Units 02/09/25  0510 02/08/25  0425 02/07/25  0650   SODIUM mmol/L 137 136 129*   POTASSIUM mmol/L 4.0 4.3 3.5   CHLORIDE mmol/L 106 107 96   CO2 mmol/L 23 21 25   ANION GAP mmol/L 8 8 8   BUN mg/dL 15 10 8   CREATININE mg/dL 0.96 1.01 1.33*   EGFR ml/min/1.73sq m 80 75 54   CALCIUM mg/dL 8.7 8.7 8.4   MAGNESIUM mg/dL  --  2.3 1.5*     Results from last 7 days   Lab Units 02/07/25  0650   AST U/L 19   ALT U/L 24   ALK PHOS U/L 99   TOTAL PROTEIN g/dL 6.2*   ALBUMIN g/dL 3.8   TOTAL BILIRUBIN mg/dL 0.59     Results from last 7 days   Lab Units 02/09/25  0510 02/08/25  0425 02/07/25  0650   GLUCOSE RANDOM mg/dL 146* 151* 112     Results from last 7 days   Lab Units 02/07/25  0650   PROTIME seconds 16.1*   INR  1.24*   PTT seconds 32     Results from last 7 days   Lab Units 02/08/25  0425 02/07/25  0650   PROCALCITONIN ng/ml 0.07 0.12     Results from last 7 days   Lab Units 02/07/25  0650   LACTIC ACID mmol/L 1.5     Results from last 7 days   Lab Units 02/07/25  0650   BNP pg/mL 122*     Results from last 7 days   Lab Units 02/07/25 1943   CLARITY UA  Clear   COLOR UA  Yellow   SPEC GRAV UA  <=1.005*   PH UA  6.0   GLUCOSE UA mg/dl Negative   KETONES UA mg/dl Negative   BLOOD UA  Negative   PROTEIN UA mg/dl Negative   NITRITE UA  Negative   BILIRUBIN UA  Negative   UROBILINOGEN UA  E.U./dl 0.2   LEUKOCYTES UA  Negative     Results from last 7 days   Lab Units 02/07/25 1944 02/07/25  0650   STREP PNEUMONIAE ANTIGEN, URINE  Negative  --    LEGIONELLA URINARY ANTIGEN  Negative  --    INFLUENZA A PCR   --  Positive*   INFLUENZA B PCR   --  Negative   RSV PCR   --  Negative     Results from last 7 days   Lab Units 02/07/25 1943 02/07/25  0650   BLOOD CULTURE   --  No Growth at 48 hrs.  No Growth at 48 hrs.   SPUTUM CULTURE  Culture too young- will reincubate  --    GRAM STAIN RESULT  1+ Epithelial cells per low power field*  Rare Polys*  1+ Gram positive cocci in pairs*  Rare Budding yeast*  --      Past Medical History:   Diagnosis Date    Acute right MCA stroke (HCC) 09/15/2018    Arthritis     CAD (coronary artery disease)     s/p CABG 2000    COPD (chronic obstructive pulmonary disease) (HCC)     GERD (gastroesophageal reflux disease)     Grand mal status (HCC) 03/24/2019    H/O blood clots     Heart attack (HCC)     Heart failure (HCC)     Hyperlipidemia     Hypertension     Lexiscan nuclear stress test 03/19/2016    EF 74% Normal    Myocardial infarction (HCC)     Shortness of breath     Stroke (HCC)     TIA (transient ischemic attack) 09/13/2018     Present on Admission:   COPD exacerbation (HCC)   Coronary artery disease   Gastroesophageal reflux disease   Primary hypertension   BPH (benign prostatic hyperplasia)   (Resolved) Hypertensive heart and renal disease with CHF (HCC)      Admitting Diagnosis: Cough [R05.9]  Flu [J11.1]  Hypotension [I95.9]  Fever [R50.9]  Sepsis (HCC) [A41.9]  Age/Sex: 69 y.o. male    Network Utilization Review Department  ATTENTION: Please call with any questions or concerns to [phone] and carefully listen to the prompts so that you are directed to the right person. All voicemails are confidential.   For Discharge needs, contact Care Management DC Support Team at [phone] opt. [phone]  Send all requests for admission clinical reviews, approved or denied  determinations and any other requests to dedicated fax number below belonging to the campus where the patient is receiving treatment. List of dedicated fax numbers for the Facilities:  FACILITY NAME UR FAX NUMBER   ADMISSION DENIALS (Administrative/Medical Necessity) [phone]   DISCHARGE SUPPORT TEAM (NETWORK) [phone]   PARENT CHILD HEALTH (Maternity/NICU/Pediatrics) [phone]   [facility] [phone]   [facility] [phone]   [facility] [phone]   [facility] [phone]   [facility] [phone]   [facility] [phone]   [facility] [phone]   [facility] [phone]   [facility] [phone]   [facility] [phone]   [facility] [phone]   [facility] [phone]

## 2025-02-09 NOTE — ASSESSMENT & PLAN NOTE
Likely precipitated by influenza A infection (see below)  Transiently required 2 L of oxygen via nasal cannula -> now weaned down to baseline room air  IV Solu-Medrol to be transitioned to oral Prednisone to complete tapering course postdischarge - c/w Trelegy and nebulizer treatments  Symptomatically improved -> plan for discharge home today

## 2025-02-09 NOTE — PLAN OF CARE
Problem: PAIN - ADULT  Goal: Verbalizes/displays adequate comfort level or baseline comfort level  Description: Interventions:  - Encourage patient to monitor pain and request assistance  - Assess pain using appropriate pain scale  - Administer analgesics based on type and severity of pain and evaluate response  - Implement non-pharmacological measures as appropriate and evaluate response  - Consider cultural and social influences on pain and pain management  - Notify physician/advanced practitioner if interventions unsuccessful or patient reports new pain  Outcome: Progressing     Problem: INFECTION - ADULT  Goal: Absence or prevention of progression during hospitalization  Description: INTERVENTIONS:  - Assess and monitor for signs and symptoms of infection  - Monitor lab/diagnostic results  - Monitor all insertion sites, i.e. indwelling lines, tubes, and drains  - Monitor endotracheal if appropriate and nasal secretions for changes in amount and color  - [place] appropriate cooling/warming therapies per order  - Administer medications as ordered  - Instruct and encourage patient and family to use good hand hygiene technique  - Identify and instruct in appropriate isolation precautions for identified infection/condition  Outcome: Progressing  Goal: Absence of fever/infection during neutropenic period  Description: INTERVENTIONS:  - Monitor WBC    Outcome: Progressing     Problem: SAFETY ADULT  Goal: Patient will remain free of falls  Description: INTERVENTIONS:  - Educate patient/family on patient safety including physical limitations  - Instruct patient to call for assistance with activity   - Consult OT/PT to assist with strengthening/mobility   - Keep Call bell within reach  - Keep bed low and locked with side rails adjusted as appropriate  - Keep care items and personal belongings within reach  - Initiate and maintain comfort rounds  - Make Fall Risk Sign visible to staff  - Offer Toileting every 2 Hours,  in advance of need  - Initiate/Maintain bed alarm  - Obtain necessary fall risk management equipment: nonskid socks  - Apply yellow socks and bracelet for high fall risk patients  - Consider moving patient to room near nurses station  Outcome: Progressing  Goal: Maintain or return to baseline ADL function  Description: INTERVENTIONS:  -  Assess patient's ability to carry out ADLs; assess patient's baseline for ADL function and identify physical deficits which impact ability to perform ADLs (bathing, care of mouth/teeth, toileting, grooming, dressing, etc.)  - Assess/evaluate cause of self-care deficits   - Assess range of motion  - Assess patient's mobility; develop plan if impaired  - Assess patient's need for assistive devices and provide as appropriate  - Encourage maximum independence but intervene and supervise when necessary  - Involve family in performance of ADLs  - Assess for home care needs following discharge   - Consider OT consult to assist with ADL evaluation and planning for discharge  - Provide patient education as appropriate  Outcome: Progressing  Goal: Maintains/Returns to pre admission functional level  Description: INTERVENTIONS:  - Perform AM-PAC 6 Click Basic Mobility/ Daily Activity assessment daily.  - Set and communicate daily mobility goal to care team and patient/family/caregiver.   - Collaborate with rehabilitation services on mobility goals if consulted  - Perform Range of Motion 3 times a day.  - Reposition patient every 2 hours.  - Dangle patient 3 times a day  - Stand patient 3 times a day  - Ambulate patient 3 times a day  - Out of bed to chair 3 times a day   - Out of bed for meals 3 times a day  - Out of bed for toileting  - Record patient progress and toleration of activity level   Outcome: Progressing     Problem: DISCHARGE PLANNING  Goal: Discharge to home or other facility with appropriate resources  Description: INTERVENTIONS:  - Identify barriers to discharge w/patient and  caregiver  - Arrange for needed discharge resources and transportation as appropriate  - Identify discharge learning needs (meds, wound care, etc.)  - Arrange for interpretive services to assist at discharge as needed  - Refer to Case Management Department for coordinating discharge planning if the patient needs post-hospital services based on physician/advanced practitioner order or complex needs related to functional status, cognitive ability, or social support system  Outcome: Progressing     Problem: Knowledge Deficit  Goal: Patient/family/caregiver demonstrates understanding of disease process, treatment plan, medications, and discharge instructions  Description: Complete learning assessment and assess knowledge base.  Interventions:  - Provide teaching at level of understanding  - Provide teaching via preferred learning methods  Outcome: Progressing     Problem: Decreased Cardiac Output  Goal: Cardiac output adequate for individual needs  Description: INTERVENTIONS: Monitor for signs and symptoms of decreased cardiac output   - Monitor for dyspnea with exertion and at rest  - Monitor for orthopnea  - Monitor for signs of tachycardia. Place patient on telemetry monitoring.  - Assess patient for jugular vein distention  - Assess patient for lower extremity edema and poor peripheral perfusion   - Auscultate lung sound for Fine bibasilar crackles   - Monitor for cardiac arrythmias   - Administer beta blockers, antiarrhythmic, and blood pressure medications as ordered    Outcome: Progressing     Problem: Impaired Gas Exchange  Goal: Optimize oxygenation and ensure adequate ventilation  Description: INTERVENTIONS: Monitor for signs and symptoms of respiratory distress                - Elevate HOB or use high fowlers to promote lung expansion                - Administer oxygen as ordered to maintain adequate oxygenation                - Encourage use of IS to promote lung expansion and prevent PN                -  Monitor ABGs to assess oxygenation status                - Monitor blood oxygen level to maintain adequate oxygenation                - Encourage cough and deep breathing exercises to promote lung expansion                - Monitor patient's mental status for increased confusion    Outcome: Progressing     Problem: Excess Fluid Volume  Goal: Patient is able to achieve and maintain homeostasis  Description: INTERVENTIONS: Monitor for sign and symptoms of fluid overload  - Evaluate LE edema every shift  - Elevate LE to prevent dependent edema  - Apply BALJIT stockings as ordered   - Monitor ankle circumference daily  - Assess for jugular vein distention  - Evaluate provider orders for the CHF diuretic algorithm. Administer diuretics as ordered  - Weigh the patient daily at 0600 and report a weight gain of five pounds or more   - Strict intake and output  - Monitor fluid intake and adhere to fluid restrictions  - Assess lung sounds every shift and as needed  - Monitor vital signs and lab values (CBC, chem, BUN, BNP)  - Measure and document urine output    Outcome: Progressing     Problem: Activity Intolerance  Goal: Patient is able to perform activities within their limitations  Description: INTERVENTIONS:                       -   Alternate periods of activity with periods of rest                 -   Patients is able to maintain normal vitals heart rhythm during activity                 -   Gradually increase activity and exercise as patient can tolerate                 -   Monitor blood pressure and heart before and after exercise                  -   Monitor blood oxygen saturation during activity and apply oxygen as needed    Outcome: Progressing     Problem: Knowledge Deficit  Goal: Patient is able to verbalize understanding of Heart Failure after education  Description: INTERVENTIONS:  - Educate the patient and family on signs and symptoms of HF  - Provide the patient with HF education and HF zone tool  - Educate on  the importance of daily weight in the AM and reporting a weight gain               of 3 or more pounds to their primary care physician  - Monitor for SOB  - Maintain and sodium and fluid restriction  - Educate the patient on the importance of medications such as: diuretics, betablockers,               antiarrhythmics and their purpose, dose, route, side effects and labs               if they are needed    Outcome: Progressing

## 2025-02-09 NOTE — ASSESSMENT & PLAN NOTE
Baseline creatinine approximately 1.0-1.2 -> currently stable 0.96  Limit/avoid nephrotoxins and hypotension as possible

## 2025-02-09 NOTE — ASSESSMENT & PLAN NOTE
Known history of hypertension -> presented transiently hypotensive due to sepsis/infection  Resume home regimen of Imdur/Toprol-XL/Cardizem

## 2025-02-10 ENCOUNTER — TELEPHONE (OUTPATIENT)
Dept: INTERNAL MEDICINE CLINIC | Facility: CLINIC | Age: 70
End: 2025-02-10

## 2025-02-10 ENCOUNTER — TRANSITIONAL CARE MANAGEMENT (OUTPATIENT)
Dept: INTERNAL MEDICINE CLINIC | Facility: CLINIC | Age: 70
End: 2025-02-10

## 2025-02-10 LAB
BACTERIA SPT RESP CULT: ABNORMAL
GRAM STN SPEC: ABNORMAL

## 2025-02-10 NOTE — TELEPHONE ENCOUNTER
Patient scheduled 02/13/25 @ 1pm  
Pt needs to be scheduled for a 7 day TCM. Discharged 2/9.   
radiology results pending

## 2025-02-10 NOTE — UTILIZATION REVIEW
NOTIFICATION OF INPATIENT ADMISSION   AUTHORIZATION REQUEST   SERVICING FACILITY:   [place] [address]  Tax ID: [id number]  NPI: [id number]   ATTENDING PROVIDER:  Attending Name and NPI#: Tiana Espinoza Md [[id number]]  Address: [address] [place] [address]  Phone: [phone]     ADMISSION INFORMATION:  Place of Service: Inpatient Acute [facility]  Place of Service Code: 21  Inpatient Admission Date/Time: 2/7/25  8:42 AM  Discharge Date/Time: 2/9/2025  2:01 PM  Admitting Diagnosis Code/Description:  Cough [R05.9]  Flu [J11.1]  Hypotension [I95.9]  Fever [R50.9]  Sepsis (HCC) [A41.9]     UTILIZATION REVIEW CONTACT:  Natalia Luther, Utilization   Network Utilization Review Department  Phone: [phone]  Fax: [phone]  Email: [email or web address]  Contact for approvals/pending authorizations, clinical reviews, and discharge.     PHYSICIAN ADVISORY SERVICES:  Medical Necessity Denial & Qfxe-hi-Mhat Review  Phone: [phone]  Fax: [phone]  Email: [email or web address]     DISCHARGE SUPPORT TEAM:  For Patients Discharge Needs & Updates  Phone: [phone] opt. 2 Fax: [phone]  Email: [email or web address]

## 2025-02-11 NOTE — UTILIZATION REVIEW
NOTIFICATION OF ADMISSION DISCHARGE   This is a Notification of Discharge from [facility]. Please be advised that this patient has been discharge from our facility. Below you will find the admission and discharge date and time including the patient’s disposition.   UTILIZATION REVIEW CONTACT:  Natalia Luther  Utilization   Network Utilization Review Department  Phone: [phone] x carefully listen to the prompts. All voicemails are confidential.  Email: [email or web address]     ADMISSION INFORMATION  PRESENTATION DATE: 2/7/2025  6:29 AM  OBERVATION ADMISSION DATE: N/A  INPATIENT ADMISSION DATE: 2/7/25  8:42 AM   DISCHARGE DATE: 2/9/2025  2:01 PM   DISPOSITION:Home/Self Care    Network Utilization Review Department  ATTENTION: Please call with any questions or concerns to [phone] and carefully listen to the prompts so that you are directed to the right person. All voicemails are confidential.   For Discharge needs, contact Care Management DC Support Team at [phone] opt. 2  Send all requests for admission clinical reviews, approved or denied determinations and any other requests to dedicated fax number below belonging to the campus where the patient is receiving treatment. List of dedicated fax numbers for the Facilities:  FACILITY NAME UR FAX NUMBER   ADMISSION DENIALS (Administrative/Medical Necessity) [phone]   DISCHARGE SUPPORT TEAM ([facility]) [phone]   PARENT CHILD HEALTH (Maternity/NICU/Pediatrics) [phone]   [facility] [phone]   [facility] [phone]   [facility] [phone]   [facility] [phone]   [facility] [phone]   [facility] [phone]   [facility] [phone]   [facility] [phone]    [facility] [phone]   [facility] [phone]   [facility] [phone]   [facility] [phone]

## 2025-02-12 LAB
BACTERIA BLD CULT: NORMAL
BACTERIA BLD CULT: NORMAL

## 2025-02-17 ENCOUNTER — TELEPHONE (OUTPATIENT)
Age: 70
End: 2025-02-17

## 2025-02-17 NOTE — TELEPHONE ENCOUNTER
Pt called again and was read Dr. Mcnamara's message.  Pt said he does not have a ride to come in tomorrow to be seen.  He's asking again if Dr. Mcnamara can call in an antibiotic to [facility], [place]. He has a bus ride scheduled to go there for noon tomorrow and he said he can  the script. He would like a call back letting him know.

## 2025-02-17 NOTE — TELEPHONE ENCOUNTER
Patient is requesting a script for antibiotics was admitted to the hospital for sepsis.Has a tcm apt on Friday. Would PCP be able to send in antibiotics before Fridays apt. Pls send script to [facility] in [place]. Pls advise patient.

## 2025-02-18 NOTE — TELEPHONE ENCOUNTER
Patient is asking what can he take being it is viral infection. He is very congested pain in  kidneys and back. He has a very bad cough also. Patient would like a sooner appt , but needs a 24 hr notice to set up a ride    Please call patient at [phone]

## 2025-02-19 DIAGNOSIS — I25.10 CORONARY ARTERIOSCLEROSIS IN NATIVE ARTERY: ICD-10-CM

## 2025-02-19 NOTE — TELEPHONE ENCOUNTER
Pt needs 24 hour notice for appts which would make the earlier tomorrow (the 20th), he had an appt for the 21st and Id recommend he keep that appt. As noted below, an antibiotic will not help treat his viral illness, supportive care measures are best until he is seen

## 2025-02-19 NOTE — TELEPHONE ENCOUNTER
Patient returned call, requested to speak only with Radha as she called him. Warm transfer to [facility].

## 2025-02-19 NOTE — TELEPHONE ENCOUNTER
Spoke to patient, explained why we do not give abx for viral infections. Pt will come into office on Friday and call with any concerns. Told pt to treat with OTC medicine, stay hydrated and eat small meals frequently.

## 2025-02-20 RX ORDER — ISOSORBIDE MONONITRATE 30 MG/1
30 TABLET, EXTENDED RELEASE ORAL DAILY
Qty: 90 TABLET | Refills: 1 | Status: SHIPPED | OUTPATIENT
Start: 2025-02-20

## 2025-02-21 ENCOUNTER — TELEPHONE (OUTPATIENT)
Dept: OTHER | Facility: OTHER | Age: 70
End: 2025-02-21

## 2025-02-21 NOTE — TELEPHONE ENCOUNTER
Patient is calling regarding cancelling an appointment.    Date/Time:2/21/2025 at 0900    Patient was rescheduled: YES [] NO [x]    Patient requesting call back to reschedule: YES [] NO [x]     Patient states that he will call back to reschedule.

## 2025-02-23 DIAGNOSIS — J44.9 CHRONIC OBSTRUCTIVE PULMONARY DISEASE, UNSPECIFIED COPD TYPE (HCC): ICD-10-CM

## 2025-02-24 RX ORDER — FLUTICASONE FUROATE, UMECLIDINIUM BROMIDE AND VILANTEROL TRIFENATATE 200; 62.5; 25 UG/1; UG/1; UG/1
POWDER RESPIRATORY (INHALATION)
Qty: 180 BLISTER | Refills: 1 | Status: SHIPPED | OUTPATIENT
Start: 2025-02-24

## 2025-02-26 ENCOUNTER — RESULTS FOLLOW-UP (OUTPATIENT)
Dept: INTERNAL MEDICINE CLINIC | Facility: CLINIC | Age: 70
End: 2025-02-26

## 2025-02-26 ENCOUNTER — APPOINTMENT (OUTPATIENT)
Dept: LAB | Facility: CLINIC | Age: 70
End: 2025-02-26
Payer: COMMERCIAL

## 2025-02-26 ENCOUNTER — OFFICE VISIT (OUTPATIENT)
Dept: INTERNAL MEDICINE CLINIC | Facility: CLINIC | Age: 70
End: 2025-02-26
Payer: COMMERCIAL

## 2025-02-26 VITALS
SYSTOLIC BLOOD PRESSURE: 122 MMHG | BODY MASS INDEX: 31.5 KG/M2 | HEIGHT: 70 IN | DIASTOLIC BLOOD PRESSURE: 62 MMHG | OXYGEN SATURATION: 97 % | WEIGHT: 220 LBS | TEMPERATURE: 97.7 F | HEART RATE: 92 BPM

## 2025-02-26 DIAGNOSIS — J06.9 UPPER RESPIRATORY TRACT INFECTION, UNSPECIFIED TYPE: ICD-10-CM

## 2025-02-26 DIAGNOSIS — J02.9 SORE THROAT: ICD-10-CM

## 2025-02-26 DIAGNOSIS — M79.605 BILATERAL LEG PAIN: ICD-10-CM

## 2025-02-26 DIAGNOSIS — E53.8 FOLIC ACID DEFICIENCY: ICD-10-CM

## 2025-02-26 DIAGNOSIS — Z72.0 TOBACCO ABUSE: ICD-10-CM

## 2025-02-26 DIAGNOSIS — Z86.718 HISTORY OF DVT (DEEP VEIN THROMBOSIS): ICD-10-CM

## 2025-02-26 DIAGNOSIS — M79.89 LEG SWELLING: ICD-10-CM

## 2025-02-26 DIAGNOSIS — R10.32 LLQ PAIN: ICD-10-CM

## 2025-02-26 DIAGNOSIS — M79.604 BILATERAL LEG PAIN: ICD-10-CM

## 2025-02-26 DIAGNOSIS — J44.1 COPD EXACERBATION (HCC): Primary | ICD-10-CM

## 2025-02-26 DIAGNOSIS — D72.829 LEUKOCYTOSIS, UNSPECIFIED TYPE: ICD-10-CM

## 2025-02-26 DIAGNOSIS — E53.8 VITAMIN B12 DEFICIENCY: ICD-10-CM

## 2025-02-26 DIAGNOSIS — L65.9 HAIR LOSS: ICD-10-CM

## 2025-02-26 DIAGNOSIS — J44.1 COPD EXACERBATION (HCC): ICD-10-CM

## 2025-02-26 DIAGNOSIS — D50.0 IRON DEFICIENCY ANEMIA DUE TO CHRONIC BLOOD LOSS: ICD-10-CM

## 2025-02-26 LAB
ALBUMIN SERPL BCG-MCNC: 3.5 G/DL (ref 3.5–5)
ALP SERPL-CCNC: 165 U/L (ref 34–104)
ALT SERPL W P-5'-P-CCNC: 53 U/L (ref 7–52)
AMYLASE SERPL-CCNC: 28 IU/L (ref 29–103)
ANION GAP SERPL CALCULATED.3IONS-SCNC: 12 MMOL/L (ref 4–13)
AST SERPL W P-5'-P-CCNC: 33 U/L (ref 13–39)
BASOPHILS # BLD AUTO: 0.05 THOUSANDS/ÂΜL (ref 0–0.1)
BASOPHILS NFR BLD AUTO: 1 % (ref 0–1)
BILIRUB SERPL-MCNC: 0.5 MG/DL (ref 0.2–1)
BUN SERPL-MCNC: 13 MG/DL (ref 5–25)
CALCIUM SERPL-MCNC: 8.4 MG/DL (ref 8.4–10.2)
CHLORIDE SERPL-SCNC: 96 MMOL/L (ref 96–108)
CO2 SERPL-SCNC: 26 MMOL/L (ref 21–32)
CREAT SERPL-MCNC: 1.14 MG/DL (ref 0.6–1.3)
CRP SERPL QL: 150.7 MG/L
D DIMER PPP FEU-MCNC: 0.73 UG/ML FEU
EOSINOPHIL # BLD AUTO: 0.12 THOUSAND/ÂΜL (ref 0–0.61)
EOSINOPHIL NFR BLD AUTO: 1 % (ref 0–6)
ERYTHROCYTE [DISTWIDTH] IN BLOOD BY AUTOMATED COUNT: 12.5 % (ref 11.6–15.1)
ERYTHROCYTE [SEDIMENTATION RATE] IN BLOOD: 85 MM/HOUR (ref 0–19)
FERRITIN SERPL-MCNC: 310 NG/ML (ref 24–336)
FOLATE SERPL-MCNC: >22.3 NG/ML
GFR SERPL CREATININE-BSD FRML MDRD: 65 ML/MIN/1.73SQ M
GLUCOSE SERPL-MCNC: 99 MG/DL (ref 65–140)
HCT VFR BLD AUTO: 34.8 % (ref 36.5–49.3)
HGB BLD-MCNC: 11.7 G/DL (ref 12–17)
IMM GRANULOCYTES # BLD AUTO: 0.03 THOUSAND/UL (ref 0–0.2)
IMM GRANULOCYTES NFR BLD AUTO: 0 % (ref 0–2)
IRON SATN MFR SERPL: 8 % (ref 15–50)
IRON SERPL-MCNC: 17 UG/DL (ref 50–212)
LDH SERPL-CCNC: 148 U/L (ref 140–271)
LIPASE SERPL-CCNC: 28 U/L (ref 11–82)
LYMPHOCYTES # BLD AUTO: 1.5 THOUSANDS/ÂΜL (ref 0.6–4.47)
LYMPHOCYTES NFR BLD AUTO: 18 % (ref 14–44)
MAGNESIUM SERPL-MCNC: 1.9 MG/DL (ref 1.9–2.7)
MCH RBC QN AUTO: 29.8 PG (ref 26.8–34.3)
MCHC RBC AUTO-ENTMCNC: 33.6 G/DL (ref 31.4–37.4)
MCV RBC AUTO: 89 FL (ref 82–98)
MONOCYTES # BLD AUTO: 0.58 THOUSAND/ÂΜL (ref 0.17–1.22)
MONOCYTES NFR BLD AUTO: 7 % (ref 4–12)
NEUTROPHILS # BLD AUTO: 6.14 THOUSANDS/ÂΜL (ref 1.85–7.62)
NEUTS SEG NFR BLD AUTO: 73 % (ref 43–75)
NRBC BLD AUTO-RTO: 0 /100 WBCS
PLATELET # BLD AUTO: 246 THOUSANDS/UL (ref 149–390)
PMV BLD AUTO: 9.5 FL (ref 8.9–12.7)
POTASSIUM SERPL-SCNC: 4.1 MMOL/L (ref 3.5–5.3)
PROT SERPL-MCNC: 5.6 G/DL (ref 6.4–8.4)
RBC # BLD AUTO: 3.93 MILLION/UL (ref 3.88–5.62)
SODIUM SERPL-SCNC: 134 MMOL/L (ref 135–147)
TESTOST SERPL-MSCNC: 129 NG/DL (ref 175–781)
TIBC SERPL-MCNC: 211.4 UG/DL (ref 250–450)
TRANSFERRIN SERPL-MCNC: 151 MG/DL (ref 203–362)
UIBC SERPL-MCNC: 194 UG/DL (ref 155–355)
VIT B12 SERPL-MCNC: 488 PG/ML (ref 180–914)
WBC # BLD AUTO: 8.42 THOUSAND/UL (ref 4.31–10.16)

## 2025-02-26 PROCEDURE — 84403 ASSAY OF TOTAL TESTOSTERONE: CPT

## 2025-02-26 PROCEDURE — 83540 ASSAY OF IRON: CPT

## 2025-02-26 PROCEDURE — 80053 COMPREHEN METABOLIC PANEL: CPT

## 2025-02-26 PROCEDURE — 82150 ASSAY OF AMYLASE: CPT

## 2025-02-26 PROCEDURE — 96372 THER/PROPH/DIAG INJ SC/IM: CPT | Performed by: INTERNAL MEDICINE

## 2025-02-26 PROCEDURE — 36415 COLL VENOUS BLD VENIPUNCTURE: CPT

## 2025-02-26 PROCEDURE — 83690 ASSAY OF LIPASE: CPT

## 2025-02-26 PROCEDURE — 83735 ASSAY OF MAGNESIUM: CPT

## 2025-02-26 PROCEDURE — 85025 COMPLETE CBC W/AUTO DIFF WBC: CPT

## 2025-02-26 PROCEDURE — 85652 RBC SED RATE AUTOMATED: CPT

## 2025-02-26 PROCEDURE — 83615 LACTATE (LD) (LDH) ENZYME: CPT

## 2025-02-26 PROCEDURE — 86140 C-REACTIVE PROTEIN: CPT

## 2025-02-26 PROCEDURE — 82607 VITAMIN B-12: CPT

## 2025-02-26 PROCEDURE — 82746 ASSAY OF FOLIC ACID SERUM: CPT

## 2025-02-26 PROCEDURE — 83550 IRON BINDING TEST: CPT

## 2025-02-26 PROCEDURE — 82728 ASSAY OF FERRITIN: CPT

## 2025-02-26 PROCEDURE — 99214 OFFICE O/P EST MOD 30 MIN: CPT | Performed by: INTERNAL MEDICINE

## 2025-02-26 PROCEDURE — 85379 FIBRIN DEGRADATION QUANT: CPT

## 2025-02-26 RX ORDER — BENZONATATE 200 MG/1
200 CAPSULE ORAL 3 TIMES DAILY PRN
Qty: 20 CAPSULE | Refills: 0 | Status: SHIPPED | OUTPATIENT
Start: 2025-02-26

## 2025-02-26 RX ORDER — FLUTICASONE PROPIONATE 50 MCG
1 SPRAY, SUSPENSION (ML) NASAL DAILY
Qty: 16 G | Refills: 0 | Status: SHIPPED | OUTPATIENT
Start: 2025-02-26 | End: 2025-02-26 | Stop reason: SDUPTHER

## 2025-02-26 RX ORDER — FLUTICASONE PROPIONATE 50 MCG
1 SPRAY, SUSPENSION (ML) NASAL DAILY
Qty: 16 G | Refills: 0 | Status: SHIPPED | OUTPATIENT
Start: 2025-02-26

## 2025-02-26 RX ORDER — LIDOCAINE HYDROCHLORIDE 20 MG/ML
15 SOLUTION OROPHARYNGEAL 4 TIMES DAILY PRN
Qty: 100 ML | Refills: 1 | Status: SHIPPED | OUTPATIENT
Start: 2025-02-26

## 2025-02-26 RX ORDER — PREDNISONE 10 MG/1
TABLET ORAL
Qty: 42 TABLET | Refills: 0 | Status: SHIPPED | OUTPATIENT
Start: 2025-02-26

## 2025-02-26 RX ORDER — PREDNISONE 10 MG/1
TABLET ORAL
Qty: 42 TABLET | Refills: 0 | Status: SHIPPED | OUTPATIENT
Start: 2025-02-26 | End: 2025-02-26 | Stop reason: SDUPTHER

## 2025-02-26 RX ORDER — GUAIFENESIN 600 MG/1
600 TABLET, EXTENDED RELEASE ORAL EVERY 12 HOURS SCHEDULED
Qty: 20 TABLET | Refills: 0 | Status: SHIPPED | OUTPATIENT
Start: 2025-02-26

## 2025-02-26 RX ORDER — GUAIFENESIN 600 MG/1
600 TABLET, EXTENDED RELEASE ORAL EVERY 12 HOURS SCHEDULED
Qty: 20 TABLET | Refills: 0 | Status: SHIPPED | OUTPATIENT
Start: 2025-02-26 | End: 2025-02-26 | Stop reason: SDUPTHER

## 2025-02-26 RX ORDER — BENZONATATE 200 MG/1
200 CAPSULE ORAL 3 TIMES DAILY PRN
Qty: 20 CAPSULE | Refills: 0 | Status: SHIPPED | OUTPATIENT
Start: 2025-02-26 | End: 2025-02-26 | Stop reason: SDUPTHER

## 2025-02-26 RX ORDER — DEXAMETHASONE SODIUM PHOSPHATE 4 MG/ML
4 INJECTION, SOLUTION INTRA-ARTICULAR; INTRALESIONAL; INTRAMUSCULAR; INTRAVENOUS; SOFT TISSUE ONCE
Status: COMPLETED | OUTPATIENT
Start: 2025-02-26 | End: 2025-02-26

## 2025-02-26 RX ORDER — LIDOCAINE HYDROCHLORIDE 20 MG/ML
15 SOLUTION OROPHARYNGEAL 4 TIMES DAILY PRN
Qty: 100 ML | Refills: 1 | Status: SHIPPED | OUTPATIENT
Start: 2025-02-26 | End: 2025-02-26 | Stop reason: SDUPTHER

## 2025-02-26 RX ADMIN — DEXAMETHASONE SODIUM PHOSPHATE 4 MG: 4 INJECTION, SOLUTION INTRA-ARTICULAR; INTRALESIONAL; INTRAMUSCULAR; INTRAVENOUS; SOFT TISSUE at 08:18

## 2025-02-26 NOTE — ASSESSMENT & PLAN NOTE
Orders:    CBC and differential; Future    Comprehensive metabolic panel; Future    Amylase; Future    Lipase; Future    D-dimer, quantitative; Future    guaiFENesin (Mucinex) 600 mg 12 hr tablet; Take 1 tablet (600 mg total) by mouth every 12 (twelve) hours    fluticasone (FLONASE) 50 mcg/act nasal spray; 1 spray into each nostril daily    benzonatate (TESSALON) 200 MG capsule; Take 1 capsule (200 mg total) by mouth 3 (three) times a day as needed for cough    predniSONE 10 mg tablet; 60mg po q day x2, then 50mg x 2, then 40mg x 2, then 30mg x2, then 20mg x 2, and then 10mg x 2, then d/c.    dexamethasone (DECADRON) injection 4 mg    CT chest abdomen pelvis w contrast; Future    amoxicillin-clavulanate (AUGMENTIN) 875-125 mg per tablet; Take 1 tablet by mouth every 12 (twelve) hours for 7 days

## 2025-02-26 NOTE — PROGRESS NOTES
Name: Miguelangel Lancaster      : 1955      MRN: [id number]  Encounter Provider: Miguelangel Morataya DO  Encounter Date: 2025   Encounter department: [facility], located within [facility]  :  Assessment & Plan  COPD exacerbation (HCC)    Orders:    CBC and differential; Future    Comprehensive metabolic panel; Future    Amylase; Future    Lipase; Future    D-dimer, quantitative; Future    guaiFENesin (Mucinex) 600 mg 12 hr tablet; Take 1 tablet (600 mg total) by mouth every 12 (twelve) hours    fluticasone (FLONASE) 50 mcg/act nasal spray; 1 spray into each nostril daily    benzonatate (TESSALON) 200 MG capsule; Take 1 capsule (200 mg total) by mouth 3 (three) times a day as needed for cough    predniSONE 10 mg tablet; 60mg po q day x2, then 50mg x 2, then 40mg x 2, then 30mg x2, then 20mg x 2, and then 10mg x 2, then d/c.    dexamethasone (DECADRON) injection 4 mg    CT chest abdomen pelvis w contrast; Future    amoxicillin-clavulanate (AUGMENTIN) 875-125 mg per tablet; Take 1 tablet by mouth every 12 (twelve) hours for 7 days    Tobacco abuse    Orders:    CBC and differential; Future    Comprehensive metabolic panel; Future    Amylase; Future    Lipase; Future    D-dimer, quantitative; Future    guaiFENesin (Mucinex) 600 mg 12 hr tablet; Take 1 tablet (600 mg total) by mouth every 12 (twelve) hours    fluticasone (FLONASE) 50 mcg/act nasal spray; 1 spray into each nostril daily    benzonatate (TESSALON) 200 MG capsule; Take 1 capsule (200 mg total) by mouth 3 (three) times a day as needed for cough    predniSONE 10 mg tablet; 60mg po q day x2, then 50mg x 2, then 40mg x 2, then 30mg x2, then 20mg x 2, and then 10mg x 2, then d/c.    dexamethasone (DECADRON) injection 4 mg    CT chest abdomen pelvis w contrast; Future    amoxicillin-clavulanate (AUGMENTIN) 875-125 mg per tablet; Take 1 tablet by mouth every 12 (twelve) hours for 7 days    Upper respiratory tract infection, unspecified type    Orders:    CBC and  differential; Future    Comprehensive metabolic panel; Future    Amylase; Future    Lipase; Future    D-dimer, quantitative; Future    guaiFENesin (Mucinex) 600 mg 12 hr tablet; Take 1 tablet (600 mg total) by mouth every 12 (twelve) hours    fluticasone (FLONASE) 50 mcg/act nasal spray; 1 spray into each nostril daily    benzonatate (TESSALON) 200 MG capsule; Take 1 capsule (200 mg total) by mouth 3 (three) times a day as needed for cough    predniSONE 10 mg tablet; 60mg po q day x2, then 50mg x 2, then 40mg x 2, then 30mg x2, then 20mg x 2, and then 10mg x 2, then d/c.    dexamethasone (DECADRON) injection 4 mg    CT chest abdomen pelvis w contrast; Future    amoxicillin-clavulanate (AUGMENTIN) 875-125 mg per tablet; Take 1 tablet by mouth every 12 (twelve) hours for 7 days    LLQ pain    Orders:    CBC and differential; Future    Comprehensive metabolic panel; Future    Amylase; Future    Lipase; Future    D-dimer, quantitative; Future    guaiFENesin (Mucinex) 600 mg 12 hr tablet; Take 1 tablet (600 mg total) by mouth every 12 (twelve) hours    fluticasone (FLONASE) 50 mcg/act nasal spray; 1 spray into each nostril daily    benzonatate (TESSALON) 200 MG capsule; Take 1 capsule (200 mg total) by mouth 3 (three) times a day as needed for cough    predniSONE 10 mg tablet; 60mg po q day x2, then 50mg x 2, then 40mg x 2, then 30mg x2, then 20mg x 2, and then 10mg x 2, then d/c.    dexamethasone (DECADRON) injection 4 mg    CT chest abdomen pelvis w contrast; Future    amoxicillin-clavulanate (AUGMENTIN) 875-125 mg per tablet; Take 1 tablet by mouth every 12 (twelve) hours for 7 days    Bilateral leg pain    Orders:    CBC and differential; Future    Comprehensive metabolic panel; Future    Amylase; Future    Lipase; Future    D-dimer, quantitative; Future    guaiFENesin (Mucinex) 600 mg 12 hr tablet; Take 1 tablet (600 mg total) by mouth every 12 (twelve) hours    fluticasone (FLONASE) 50 mcg/act nasal spray; 1 spray  into each nostril daily    benzonatate (TESSALON) 200 MG capsule; Take 1 capsule (200 mg total) by mouth 3 (three) times a day as needed for cough    predniSONE 10 mg tablet; 60mg po q day x2, then 50mg x 2, then 40mg x 2, then 30mg x2, then 20mg x 2, and then 10mg x 2, then d/c.    dexamethasone (DECADRON) injection 4 mg    CT chest abdomen pelvis w contrast; Future    amoxicillin-clavulanate (AUGMENTIN) 875-125 mg per tablet; Take 1 tablet by mouth every 12 (twelve) hours for 7 days    Leg swelling    Orders:    CBC and differential; Future    Comprehensive metabolic panel; Future    Amylase; Future    Lipase; Future    D-dimer, quantitative; Future    guaiFENesin (Mucinex) 600 mg 12 hr tablet; Take 1 tablet (600 mg total) by mouth every 12 (twelve) hours    fluticasone (FLONASE) 50 mcg/act nasal spray; 1 spray into each nostril daily    benzonatate (TESSALON) 200 MG capsule; Take 1 capsule (200 mg total) by mouth 3 (three) times a day as needed for cough    predniSONE 10 mg tablet; 60mg po q day x2, then 50mg x 2, then 40mg x 2, then 30mg x2, then 20mg x 2, and then 10mg x 2, then d/c.    dexamethasone (DECADRON) injection 4 mg    CT chest abdomen pelvis w contrast; Future    amoxicillin-clavulanate (AUGMENTIN) 875-125 mg per tablet; Take 1 tablet by mouth every 12 (twelve) hours for 7 days    History of DVT (deep vein thrombosis)    Orders:    CBC and differential; Future    Comprehensive metabolic panel; Future    Amylase; Future    Lipase; Future    D-dimer, quantitative; Future    guaiFENesin (Mucinex) 600 mg 12 hr tablet; Take 1 tablet (600 mg total) by mouth every 12 (twelve) hours    fluticasone (FLONASE) 50 mcg/act nasal spray; 1 spray into each nostril daily    benzonatate (TESSALON) 200 MG capsule; Take 1 capsule (200 mg total) by mouth 3 (three) times a day as needed for cough    predniSONE 10 mg tablet; 60mg po q day x2, then 50mg x 2, then 40mg x 2, then 30mg x2, then 20mg x 2, and then 10mg x 2,  "then d/c.    dexamethasone (DECADRON) injection 4 mg    CT chest abdomen pelvis w contrast; Future    amoxicillin-clavulanate (AUGMENTIN) 875-125 mg per tablet; Take 1 tablet by mouth every 12 (twelve) hours for 7 days    Sore throat    Orders:    CBC and differential; Future    Comprehensive metabolic panel; Future    Amylase; Future    Lipase; Future    D-dimer, quantitative; Future    guaiFENesin (Mucinex) 600 mg 12 hr tablet; Take 1 tablet (600 mg total) by mouth every 12 (twelve) hours    fluticasone (FLONASE) 50 mcg/act nasal spray; 1 spray into each nostril daily    benzonatate (TESSALON) 200 MG capsule; Take 1 capsule (200 mg total) by mouth 3 (three) times a day as needed for cough    predniSONE 10 mg tablet; 60mg po q day x2, then 50mg x 2, then 40mg x 2, then 30mg x2, then 20mg x 2, and then 10mg x 2, then d/c.    dexamethasone (DECADRON) injection 4 mg    CT chest abdomen pelvis w contrast; Future    amoxicillin-clavulanate (AUGMENTIN) 875-125 mg per tablet; Take 1 tablet by mouth every 12 (twelve) hours for 7 days    Lidocaine Viscous HCl (XYLOCAINE) 2 % mucosal solution; Swish and spit 15 mL 4 (four) times a day as needed for mouth pain or discomfort      A/P: Stable and PE with abdominal pain, LE pain and swelling, and lungs congested. Suspect COPD exacerbation and still smoking. ?DVT. Doubt PE at this time. ??ASpiration given pt describing bring up \"mucous\" and burning of the throat. Check labs. Hold on cxr and get CT chest, abd, and pelvis. ?Diverticulitis. Start steroids, mucinex, and viscous xylocaine. Continue nexium. Want to use levaquin, but h/o AAA. Will start augmentin. RTC one week for f/u. Stop smoking.      History of Present Illness   Daily smoking WM, presents for several weeks  history of continued URI s/s. Was admitted and d/c  2/9 for CAP, influenza, sepsis, and hyponatremia. Since d/c, s/s have continued. Notes fatigue, congestion,sore throat, and taste is off. No fevers or chills. " "Still coughing with wheezing and SOB. Using home nebs and MDI. Reports new C/o  LLQ pain. No change in stools or urination. No n/v. Also, reports bilat LE pain and swelling. H/o DVT. Right greater than the left. Still smoking.  Vaccinated against flu, CAP, and covid.. .        Review of Systems   Constitutional:  Positive for activity change, appetite change and fatigue. Negative for chills, diaphoresis and fever.   HENT:  Positive for congestion, postnasal drip, rhinorrhea and sore throat. Negative for ear discharge, ear pain, facial swelling, sinus pressure and sinus pain.    Respiratory:  Positive for cough, shortness of breath and wheezing. Negative for chest tightness.    Cardiovascular:  Positive for leg swelling. Negative for chest pain and palpitations.   Gastrointestinal:  Positive for abdominal pain. Negative for abdominal distention, anal bleeding, blood in stool, constipation, diarrhea, nausea, rectal pain and vomiting.   Genitourinary:  Negative for difficulty urinating, dysuria, frequency and hematuria.   Musculoskeletal:  Negative for arthralgias, gait problem and myalgias.   Neurological:  Positive for headaches. Negative for dizziness, seizures, syncope, weakness and light-headedness.   Psychiatric/Behavioral:  Negative for confusion and dysphoric mood. The patient is not nervous/anxious.        Objective   /62   Pulse 92   Temp 97.7 °F (36.5 °C)   Ht 5' 10\" (1.778 m)   Wt 99.8 kg (220 lb)   SpO2 97%   BMI 31.57 kg/m²      Physical Exam  Vitals and nursing note reviewed.   Constitutional:       General: He is not in acute distress.     Appearance: Normal appearance. He is not ill-appearing.   HENT:      Head: Normocephalic and atraumatic.      Right Ear: Tympanic membrane, ear canal and external ear normal. There is no impacted cerumen.      Left Ear: Tympanic membrane, ear canal and external ear normal. There is no impacted cerumen.      Nose: Rhinorrhea present. No congestion.      " Mouth/Throat:      Mouth: Mucous membranes are moist.      Pharynx: Posterior oropharyngeal erythema present. No oropharyngeal exudate.   Eyes:      Extraocular Movements: Extraocular movements intact.      Conjunctiva/sclera: Conjunctivae normal.      Pupils: Pupils are equal, round, and reactive to light.   Cardiovascular:      Rate and Rhythm: Normal rate and regular rhythm.      Heart sounds: Normal heart sounds. No murmur heard.  Pulmonary:      Effort: Pulmonary effort is normal. No respiratory distress.      Breath sounds: Wheezing present. No rhonchi or rales.      Comments: Coarse and decreased BS>   Abdominal:      General: Bowel sounds are normal. There is no distension.      Palpations: Abdomen is soft.      Tenderness: There is abdominal tenderness (LLQ and epigastric.). There is no right CVA tenderness, left CVA tenderness or guarding.      Hernia: No hernia is present.   Musculoskeletal:      Cervical back: Neck supple. No tenderness.      Right lower leg: Edema present.      Left lower leg: Edema present.      Comments: Bilat LE 1-2/4, negative licea's. Diffuse tenderness. Pulses intact.    Lymphadenopathy:      Cervical: No cervical adenopathy.   Neurological:      General: No focal deficit present.      Mental Status: He is alert and oriented to person, place, and time. Mental status is at baseline.   Psychiatric:         Mood and Affect: Mood normal.         Behavior: Behavior normal.         Thought Content: Thought content normal.         Judgment: Judgment normal.

## 2025-02-26 NOTE — TELEPHONE ENCOUNTER
----- Message from Bora Mcnamara DO sent at 2/26/2025 12:19 PM EST -----  Anemia noted and follow up to discuss further

## 2025-02-27 ENCOUNTER — RESULTS FOLLOW-UP (OUTPATIENT)
Dept: INTERNAL MEDICINE CLINIC | Facility: CLINIC | Age: 70
End: 2025-02-27

## 2025-02-27 ENCOUNTER — TELEPHONE (OUTPATIENT)
Dept: ADMINISTRATIVE | Facility: HOSPITAL | Age: 70
End: 2025-02-27

## 2025-02-27 DIAGNOSIS — R79.89 D-DIMER, ELEVATED: Primary | ICD-10-CM

## 2025-02-27 DIAGNOSIS — M79.604 BILATERAL LEG PAIN: ICD-10-CM

## 2025-02-27 DIAGNOSIS — M79.605 BILATERAL LEG PAIN: ICD-10-CM

## 2025-02-27 DIAGNOSIS — M79.89 LEG SWELLING: ICD-10-CM

## 2025-02-27 NOTE — TELEPHONE ENCOUNTER
Good Morning, just double checking on a STAT order for Miguelangel, it is for 8/27/25 a stat Vas doppler.    Do you want it today or on 8/27/25.Thanks.

## 2025-03-03 ENCOUNTER — TELEPHONE (OUTPATIENT)
Dept: ADMINISTRATIVE | Facility: HOSPITAL | Age: 70
End: 2025-03-03

## 2025-03-03 NOTE — TELEPHONE ENCOUNTER
Patient called back and read verbatim. Patient stated he tried calling nella twice yesterday and then he stated he'll try to call her again tomorrow

## 2025-03-04 ENCOUNTER — TELEPHONE (OUTPATIENT)
Age: 70
End: 2025-03-04

## 2025-03-04 NOTE — TELEPHONE ENCOUNTER
Patient is having  2 test and needs two bottles of barium by Thursday 3/6/2025 has CT of the chest on 3/7 and 3/14 VAS LW LMB CLOTILDE AMY, COMP JONA     Please have it sent to [facility] phone [phone]    Please call patient let him know it is sent [phone] don't text him it is not working for him

## 2025-03-05 DIAGNOSIS — R10.32 LLQ PAIN: ICD-10-CM

## 2025-03-05 DIAGNOSIS — R10.32 LLQ PAIN: Primary | ICD-10-CM

## 2025-03-05 NOTE — TELEPHONE ENCOUNTER
I didn't see pt for this but we dont usually order barium to the pharmacy, pts usually  at the hospital. He will only need it for the CT not the the doppler

## 2025-03-05 NOTE — TELEPHONE ENCOUNTER
Patient called back to check on status. Dr. Morataya was the physician who placed the order. He says he wants to pick it up at the pharmacy bc its more convenient, he can't take the bus to the hospital. Please advise. Thank you!        Miguelangel: [phone]

## 2025-03-05 NOTE — TELEPHONE ENCOUNTER
Pt calling back to follow up. Pt is now aware that Barium order was sent to [facility].     Please advise, thank you.

## 2025-03-06 NOTE — TELEPHONE ENCOUNTER
Patient called in stating he is still looking for who has the barium (READI-CAT 2) 2 % suspension. He will go to [facility] to see if they have it. If they have it he will need it called in. He states he was told the hospital has it but if he has to go to the hospital to get it he will need to cancel his appt for tomorrow and will need to reschedule it until next week. Please advise.

## 2025-03-07 RX ORDER — BARIUM SULFATE 20 MG/ML
SUSPENSION ORAL
Qty: 900 ML | Refills: 0 | Status: SHIPPED | OUTPATIENT
Start: 2025-03-07

## 2025-03-07 NOTE — RESULT ENCOUNTER NOTE
"Pt is going to hospital to pick it up on Monday, patient requested that we call and schedule this for him since he has \"been on the phone all week about the barium\" so I called and scheduled him 3/13/25 at 3 pm at [facility] and notified him. Pt is aware of barium instructions but told him to ask rad department if he needed further help.   "

## 2025-03-09 PROBLEM — A41.9 SEPSIS (HCC): Status: RESOLVED | Noted: 2025-02-07 | Resolved: 2025-03-09

## 2025-03-09 PROBLEM — J10.1 INFLUENZA A: Status: RESOLVED | Noted: 2025-02-07 | Resolved: 2025-03-09

## 2025-03-14 ENCOUNTER — APPOINTMENT (EMERGENCY)
Dept: CT IMAGING | Facility: HOSPITAL | Age: 70
End: 2025-03-14
Payer: COMMERCIAL

## 2025-03-14 ENCOUNTER — HOSPITAL ENCOUNTER (EMERGENCY)
Facility: HOSPITAL | Age: 70
Discharge: HOME | End: 2025-03-14
Attending: EMERGENCY MEDICINE
Payer: COMMERCIAL

## 2025-03-14 ENCOUNTER — TELEPHONE (OUTPATIENT)
Dept: INTERNAL MEDICINE CLINIC | Facility: CLINIC | Age: 70
End: 2025-03-14

## 2025-03-14 ENCOUNTER — HOSPITAL ENCOUNTER (OUTPATIENT)
Dept: NON INVASIVE DIAGNOSTICS | Facility: HOSPITAL | Age: 70
Discharge: HOME | End: 2025-03-14
Attending: INTERNAL MEDICINE
Payer: COMMERCIAL

## 2025-03-14 VITALS
OXYGEN SATURATION: 96 % | RESPIRATION RATE: 18 BRPM | HEIGHT: 70 IN | SYSTOLIC BLOOD PRESSURE: 145 MMHG | TEMPERATURE: 97.8 F | DIASTOLIC BLOOD PRESSURE: 81 MMHG | WEIGHT: 225 LBS | BODY MASS INDEX: 32.21 KG/M2 | HEART RATE: 78 BPM

## 2025-03-14 DIAGNOSIS — S39.012A STRAIN OF LUMBAR REGION, INITIAL ENCOUNTER: Primary | ICD-10-CM

## 2025-03-14 DIAGNOSIS — R79.89 D-DIMER, ELEVATED: ICD-10-CM

## 2025-03-14 DIAGNOSIS — M79.604 BILATERAL LEG PAIN: ICD-10-CM

## 2025-03-14 DIAGNOSIS — M79.89 LEG SWELLING: ICD-10-CM

## 2025-03-14 DIAGNOSIS — I82.509 CHRONIC DEEP VEIN THROMBOSIS (DVT) (HCC): ICD-10-CM

## 2025-03-14 DIAGNOSIS — M79.605 BILATERAL LEG PAIN: ICD-10-CM

## 2025-03-14 LAB
ALBUMIN SERPL BCG-MCNC: 3.6 G/DL (ref 3.5–5)
ALP SERPL-CCNC: 83 U/L (ref 34–104)
ALT SERPL W P-5'-P-CCNC: 13 U/L (ref 7–52)
ANION GAP SERPL CALCULATED.3IONS-SCNC: 6 MMOL/L (ref 4–13)
APTT PPP: 30 SECONDS (ref 23–34)
AST SERPL W P-5'-P-CCNC: 9 U/L (ref 13–39)
BASOPHILS # BLD AUTO: 0.05 THOUSANDS/ÂΜL (ref 0–0.1)
BASOPHILS NFR BLD AUTO: 0 % (ref 0–1)
BILIRUB SERPL-MCNC: 0.36 MG/DL (ref 0.2–1)
BUN SERPL-MCNC: 14 MG/DL (ref 5–25)
CALCIUM SERPL-MCNC: 8.6 MG/DL (ref 8.4–10.2)
CHLORIDE SERPL-SCNC: 99 MMOL/L (ref 96–108)
CO2 SERPL-SCNC: 31 MMOL/L (ref 21–32)
CREAT SERPL-MCNC: 0.95 MG/DL (ref 0.6–1.3)
EOSINOPHIL # BLD AUTO: 0.07 THOUSAND/ÂΜL (ref 0–0.61)
EOSINOPHIL NFR BLD AUTO: 1 % (ref 0–6)
ERYTHROCYTE [DISTWIDTH] IN BLOOD BY AUTOMATED COUNT: 13.6 % (ref 11.6–15.1)
GFR SERPL CREATININE-BSD FRML MDRD: 80 ML/MIN/1.73SQ M
GLUCOSE SERPL-MCNC: 93 MG/DL (ref 65–140)
HCT VFR BLD AUTO: 38.5 % (ref 36.5–49.3)
HGB BLD-MCNC: 12.7 G/DL (ref 12–17)
IMM GRANULOCYTES # BLD AUTO: 0.18 THOUSAND/UL (ref 0–0.2)
IMM GRANULOCYTES NFR BLD AUTO: 1 % (ref 0–2)
INR PPP: 1.58 (ref 0.85–1.19)
LYMPHOCYTES # BLD AUTO: 2.32 THOUSANDS/ÂΜL (ref 0.6–4.47)
LYMPHOCYTES NFR BLD AUTO: 16 % (ref 14–44)
MCH RBC QN AUTO: 30.6 PG (ref 26.8–34.3)
MCHC RBC AUTO-ENTMCNC: 33 G/DL (ref 31.4–37.4)
MCV RBC AUTO: 93 FL (ref 82–98)
MONOCYTES # BLD AUTO: 0.91 THOUSAND/ÂΜL (ref 0.17–1.22)
MONOCYTES NFR BLD AUTO: 6 % (ref 4–12)
NEUTROPHILS # BLD AUTO: 10.62 THOUSANDS/ÂΜL (ref 1.85–7.62)
NEUTS SEG NFR BLD AUTO: 76 % (ref 43–75)
NRBC BLD AUTO-RTO: 0 /100 WBCS
PLATELET # BLD AUTO: 228 THOUSANDS/UL (ref 149–390)
PMV BLD AUTO: 9 FL (ref 8.9–12.7)
POTASSIUM SERPL-SCNC: 4.1 MMOL/L (ref 3.5–5.3)
PROT SERPL-MCNC: 5.9 G/DL (ref 6.4–8.4)
PROTHROMBIN TIME: 19.3 SECONDS (ref 12.3–15)
RBC # BLD AUTO: 4.15 MILLION/UL (ref 3.88–5.62)
SODIUM SERPL-SCNC: 136 MMOL/L (ref 135–147)
WBC # BLD AUTO: 14.15 THOUSAND/UL (ref 4.31–10.16)

## 2025-03-14 PROCEDURE — 85730 THROMBOPLASTIN TIME PARTIAL: CPT | Performed by: EMERGENCY MEDICINE

## 2025-03-14 PROCEDURE — 80053 COMPREHEN METABOLIC PANEL: CPT | Performed by: EMERGENCY MEDICINE

## 2025-03-14 PROCEDURE — 99285 EMERGENCY DEPT VISIT HI MDM: CPT | Performed by: EMERGENCY MEDICINE

## 2025-03-14 PROCEDURE — 74177 CT ABD & PELVIS W/CONTRAST: CPT

## 2025-03-14 PROCEDURE — 93970 EXTREMITY STUDY: CPT

## 2025-03-14 PROCEDURE — 99284 EMERGENCY DEPT VISIT MOD MDM: CPT

## 2025-03-14 PROCEDURE — 71260 CT THORAX DX C+: CPT

## 2025-03-14 PROCEDURE — 36415 COLL VENOUS BLD VENIPUNCTURE: CPT | Performed by: EMERGENCY MEDICINE

## 2025-03-14 PROCEDURE — 96374 THER/PROPH/DIAG INJ IV PUSH: CPT

## 2025-03-14 PROCEDURE — 85025 COMPLETE CBC W/AUTO DIFF WBC: CPT | Performed by: EMERGENCY MEDICINE

## 2025-03-14 PROCEDURE — 93970 EXTREMITY STUDY: CPT | Performed by: SURGERY

## 2025-03-14 PROCEDURE — 85610 PROTHROMBIN TIME: CPT | Performed by: EMERGENCY MEDICINE

## 2025-03-14 RX ORDER — METHOCARBAMOL 500 MG/1
500 TABLET, FILM COATED ORAL 2 TIMES DAILY
Qty: 20 TABLET | Refills: 0 | Status: SHIPPED | OUTPATIENT
Start: 2025-03-14

## 2025-03-14 RX ORDER — METHOCARBAMOL 500 MG/1
500 TABLET, FILM COATED ORAL ONCE
Status: COMPLETED | OUTPATIENT
Start: 2025-03-14 | End: 2025-03-14

## 2025-03-14 RX ORDER — DEXAMETHASONE SODIUM PHOSPHATE 10 MG/ML
8 INJECTION, SOLUTION INTRAMUSCULAR; INTRAVENOUS ONCE
Status: COMPLETED | OUTPATIENT
Start: 2025-03-14 | End: 2025-03-14

## 2025-03-14 RX ADMIN — METHOCARBAMOL 500 MG: 500 TABLET ORAL at 16:32

## 2025-03-14 RX ADMIN — DEXAMETHASONE SODIUM PHOSPHATE 8 MG: 10 INJECTION, SOLUTION INTRAMUSCULAR; INTRAVENOUS at 16:19

## 2025-03-14 RX ADMIN — IOHEXOL 100 ML: 350 INJECTION, SOLUTION INTRAVENOUS at 14:25

## 2025-03-14 NOTE — TELEPHONE ENCOUNTER
----- Message from Miguelangel Morataya DO sent at 3/14/2025 11:44 AM EDT -----  Pt with positive DVT on the right. Apparently is going to the ER. Can you call them and tell them about the DVT and see if they will treat him or if we need to send something in. Thanks.

## 2025-03-14 NOTE — TELEPHONE ENCOUNTER
Spoke to patient, made him aware. In ER right now due to injurying his back after carrying 4 gallons of V8 juice up the stairs. States he did experience pain in his right leg yesterday. He will make providers at ER know what is going on and notify us if they do not treat him. I also contacted Britney Gamino, a patient representative in ER to notify her of this new diagnosis patient has so that she will hopefully relay the information.

## 2025-03-14 NOTE — DISCHARGE INSTRUCTIONS
No heavy lifting, bending, twisting, or stooping for 1 week.    Use Tylenol every 4 hours as needed for pain.    For spasm use Robaxin 1 pill twice a day for spasm.  Do not drive or operate heavy machinery on this medicine.    Your scan shows a chronic appearing DVT or blood clot in your left leg which is stable.  Continue to utilize your blood thinner and do not miss any doses.    Recheck with your family doctor in 2 to 4 days for repeat evaluation.    Keeping your legs elevated above your heart when you are resting will be good for the swelling.

## 2025-03-14 NOTE — ED PROVIDER NOTES
Time reflects when diagnosis was documented in both MDM as applicable and the Disposition within this note       Time User Action Codes Description Comment    3/14/2025  4:10 PM Titi Gibson Add [I82.509] Chronic deep vein thrombosis (DVT) (HCC)     3/14/2025  4:12 PM Titi Gibson Modify [I82.509] Chronic deep vein thrombosis (DVT) (HCC)     3/14/2025  4:12 PM Titi Gibson Add [S39.012A] Strain of lumbar region, initial encounter           ED Disposition       ED Disposition   Discharge    Condition   Stable    Date/Time   Fri Mar 14, 2025  4:12 PM    Comment   Miguelangel Lancaster discharge to home/self care.                   Assessment & Plan       Medical Decision Making  70-year-old male presents emergency department status post injury to his right back.  The patient notes that he has pain in his right lower back radiating to his right buttock and into the back of his left leg since Monday when he was lifting a case of drinks that he bought from the store.  The patient denies any bowel or bladder continence issues any weakness of the leg or any history of IV drug use or fever or direct trauma.  Patient notes that he went to the hospital today to get his lower extremity DVT study that was ordered outpatient and then came to the ER for visit.  Patient was told that he has a positive DVT in his right leg however this appears to be an error as the official read shows that he has a chronic left lower extremity DVT, and the patient knows about this.  The right leg was read as no evidence of clot.  Patient is already on Xarelto and is taking his medicine as directed.  The patient had a CT scan added to his regimen due to what was originally thought to be worsening thrombosis load despite p.o. oral anticoagulants.  This was also ordered prehospital but was done in the ER.  Patient does have lung nodules which are not new.  Other results at this time are nonacute.  The patient's results were discussed with   [place].  The patient will continue his oral anticoagulants and follow-up with his doctors and was given a dose of steroid for his back pain as well as a prescription for Robaxin.  The patient does not drive and takes the bus.  Patient was told to return to the ER for new or concerning issues and discharged.    Amount and/or Complexity of Data Reviewed  Labs: ordered. Decision-making details documented in ED Course.  Radiology: ordered.    Risk  Prescription drug management.        ED Course as of 03/14/25 1714   Fri Mar 14, 2025   1412 WBC(!): 14.15       Medications   iohexol (OMNIPAQUE) 350 MG/ML injection (MULTI-DOSE) 100 mL (100 mL Intravenous Given 3/14/25 1425)   dexamethasone (PF) (DECADRON) injection 8 mg (8 mg Intravenous Given 3/14/25 1619)   methocarbamol (ROBAXIN) tablet 500 mg (500 mg Oral Given 3/14/25 1632)       ED Risk Strat Scores                            SBIRT 22yo+      Flowsheet Row Most Recent Value   Initial Alcohol Screen: US AUDIT-C     1. How often do you have a drink containing alcohol? 0 Filed at: 03/14/2025 1208   2. How many drinks containing alcohol do you have on a typical day you are drinking?  0 Filed at: 03/14/2025 1208   3a. Male UNDER 65: How often do you have five or more drinks on one occasion? 0 Filed at: 03/14/2025 1208   Audit-C Score 0 Filed at: 03/14/2025 1208   COLTEN: How many times in the past year have you...    Used an illegal drug or used a prescription medication for non-medical reasons? Never Filed at: 03/14/2025 1208                            History of Present Illness       Chief Complaint   Patient presents with    Back Injury     According to the patient he was food shopping on Tuesday carrying V8 juice up steps and reports lower back pain that radiates down both legs.    Leg Pain     According to the patient he has had right leg swelling.  Patient had a recent ultrasound and dx with blood clot.       Past Medical History:   Diagnosis Date    Acute right MCA  stroke ([facility]) 09/15/2018    Arthritis     CAD (coronary artery disease)     s/p CABG 2000    COPD (chronic obstructive pulmonary disease) (HCC)     GERD (gastroesophageal reflux disease)     Grand mal status ([facility]) 03/24/2019    H/O blood clots     Heart attack ([facility])     Heart failure ([facility])     Hyperlipidemia     Hypertension     Lexiscan nuclear stress test 03/19/2016    EF 74% Normal    Myocardial infarction (HCC)     Shortness of breath     Stroke ([facility])     TIA (transient ischemic attack) 09/13/2018      Past Surgical History:   Procedure Laterality Date    CARDIAC CATHETERIZATION  08/19/2015    LIMA occluded. No severe native lesions    CARDIAC CATHETERIZATION N/A 12/03/2021    Procedure: Cardiac Coronary Angiogram;  Surgeon: rFedis Rogel MD;  Location: [facility];  Service: Cardiology    CARDIAC CATHETERIZATION  12/03/2021    Procedure: Cardiac catheterization;  Surgeon: Fredis Rogel MD;  Location: [facility];  Service: Cardiology    COLONOSCOPY      CORONARY ARTERY BYPASS GRAFT  2000    CYSTOSCOPY  10/31/2022    [facility]    HERNIA REPAIR      umbilical hernia x2    TONSILLECTOMY        Family History   Problem Relation Age of Onset    Heart disease Mother     Cancer Father     Aneurysm Father     Cancer Maternal Grandmother     Cancer Paternal Grandfather       Social History     Tobacco Use    Smoking status: Every Day     Current packs/day: 1.00     Average packs/day: 1 pack/day for 55.2 years (55.2 ttl pk-yrs)     Types: Cigarettes     Start date: 1970     Passive exposure: Never    Smokeless tobacco: Never   Vaping Use    Vaping status: Never Used   Substance Use Topics    Alcohol use: Not Currently     Comment: Socially    Drug use: Never      E-Cigarette/Vaping    E-Cigarette Use Never User       E-Cigarette/Vaping Substances    Nicotine No     THC No     CBD No     Flavoring No     Other No     Unknown No       I have reviewed and agree with the history as documented.      70-year-old male presents emergency department secondary to right lower back pain which has been ongoing since Tuesday because he states he strained it by lifting heavy bottles of V8 juice into his house.  Patient came to the ER for evaluation due to increasing pain rating into the back of his hamstring region.  The patient notes the pain starts in his right parasacral region.  The patient denies any bowel or bladder continence issues and denies any inability to walk.  Patient does not have a fever or IV drug use, and also has no fever.  Of note, the patient had an outpatient DVT study that was positive for a right sided DVT.  Patient notes though he is on Xarelto for a left-sided DVT.         Review of Systems   Constitutional:  Positive for activity change. Negative for chills and fever.   HENT:  Negative for ear pain and sore throat.    Eyes:  Negative for pain and visual disturbance.   Respiratory:  Negative for cough and shortness of breath.    Cardiovascular:  Negative for chest pain and palpitations.   Gastrointestinal:  Negative for abdominal pain and vomiting.   Genitourinary:  Negative for dysuria and hematuria.   Musculoskeletal:  Positive for back pain, gait problem and myalgias. Negative for arthralgias.   Skin:  Negative for color change and rash.   Neurological:  Negative for syncope and numbness.   All other systems reviewed and are negative.          Objective       ED Triage Vitals [03/14/25 1206]   Temperature Pulse Blood Pressure Respirations SpO2 Patient Position - Orthostatic VS   97.8 °F (36.6 °C) 67 142/85 18 98 % Lying      Temp Source Heart Rate Source BP Location FiO2 (%) Pain Score    Temporal Monitor Right arm -- 7      Vitals      Date and Time Temp Pulse SpO2 Resp BP Pain Score FACES Pain Rating User   03/14/25 1630 -- 78 -- 18 145/81 -- -- RTM   03/14/25 1600 -- 77 96 % 18 160/80 -- -- RTM   03/14/25 1515 97.8 °F (36.6 °C) 64 99 % 18 141/78 -- -- [facility] [place]   03/14/25 1206 97.8 °F (36.6  °C) 67 98 % 18 142/85 7 [place] [facility] [place]            Physical Exam  Constitutional:       General: He is not in acute distress.     Appearance: Normal appearance. He is normal weight. He is not ill-appearing.   HENT:      Head: Normocephalic and atraumatic.      Right Ear: External ear normal.      Left Ear: External ear normal.      Nose: Nose normal.      Mouth/Throat:      Mouth: Mucous membranes are moist.   Eyes:      Conjunctiva/sclera: Conjunctivae normal.   Cardiovascular:      Rate and Rhythm: Normal rate and regular rhythm.      Pulses: Normal pulses.      Heart sounds: Normal heart sounds.   Pulmonary:      Effort: Pulmonary effort is normal.      Breath sounds: Normal breath sounds.      Comments: Coarse breath sounds bilaterally.  Abdominal:      General: Abdomen is flat. There is no distension.      Palpations: Abdomen is soft. There is no mass.   Musculoskeletal:         General: No swelling, tenderness or deformity. Normal range of motion.      Cervical back: Normal range of motion.   Skin:     General: Skin is warm and dry.      Capillary Refill: Capillary refill takes 2 to 3 seconds.      Coloration: Skin is not jaundiced or pale.      Findings: No bruising.   Neurological:      General: No focal deficit present.      Mental Status: He is alert and oriented to person, place, and time. Mental status is at baseline.      Gait: Gait abnormal.      Deep Tendon Reflexes: Reflexes normal.      Comments: Patient with Achilles and patellar reflexes at 1/4 bilaterally.  Sensation is intact to the lower extremities.   Psychiatric:         Mood and Affect: Mood normal.         Results Reviewed       Procedure Component Value Units Date/Time    APTT [[id number]]  (Normal) Collected: 03/14/25 1348    Lab Status: Final result Specimen: Blood from Arm, Right Updated: 03/14/25 1417     PTT 30 seconds     Protime-INR [[id number]]  (Abnormal) Collected: 03/14/25 1348    Lab Status: Final result Specimen: Blood from Arm, Right  Updated: 03/14/25 1415     Protime 19.3 seconds      INR 1.58    Narrative:      INR Therapeutic Range    Indication                                             INR Range      Atrial Fibrillation                                               2.0-3.0  Hypercoagulable State                                    2.0.2.3  Left Ventricular Asist Device                            2.0-3.0  Mechanical Heart Valve                                  -    Aortic(with afib, MI, embolism, HF, LA enlargement,    and/or coagulopathy)                                     2.0-3.0 (2.5-3.5)     Mitral                                                             2.5-3.5  Prosthetic/Bioprosthetic Heart Valve               2.0-3.0  Venous thromboembolism (VTE: VT, PE        2.0-3.0    Comprehensive metabolic panel [[id number]]  (Abnormal) Collected: 03/14/25 1348    Lab Status: Final result Specimen: Blood from Arm, Right Updated: 03/14/25 1407     Sodium 136 mmol/L      Potassium 4.1 mmol/L      Chloride 99 mmol/L      CO2 31 mmol/L      ANION GAP 6 mmol/L      BUN 14 mg/dL      Creatinine 0.95 mg/dL      Glucose 93 mg/dL      Calcium 8.6 mg/dL      AST 9 U/L      ALT 13 U/L      Alkaline Phosphatase 83 U/L      Total Protein 5.9 g/dL      Albumin 3.6 g/dL      Total Bilirubin 0.36 mg/dL      eGFR 80 ml/min/1.73sq m     Narrative:      National Kid[employer] guidelines for Chronic Kidney Disease (CKD):     Stage 1 with normal or high GFR (GFR > 90 mL/min/1.73 square meters)    Stage 2 Mild CKD (GFR = 60-89 mL/min/1.73 square meters)    Stage 3A Moderate CKD (GFR = 45-59 mL/min/1.73 square meters)    Stage 3B Moderate CKD (GFR = 30-44 mL/min/1.73 square meters)    Stage 4 Severe CKD (GFR = 15-29 mL/min/1.73 square meters)    Stage 5 End Stage CKD (GFR <15 mL/min/1.73 square meters)  Note: GFR calculation is accurate only with a steady state creatinine    CBC and differential [[id number]]  (Abnormal) Collected: 03/14/25 1348    Lab  Status: Final result Specimen: Blood from Arm, Right Updated: 03/14/25 1355     WBC 14.15 Thousand/uL      RBC 4.15 Million/uL      Hemoglobin 12.7 g/dL      Hematocrit 38.5 %      MCV 93 fL      MCH 30.6 pg      MCHC 33.0 g/dL      RDW 13.6 %      MPV 9.0 fL      Platelets 228 Thousands/uL      nRBC 0 /100 WBCs      Segmented % 76 %      Immature Grans % 1 %      Lymphocytes % 16 %      Monocytes % 6 %      Eosinophils Relative 1 %      Basophils Relative 0 %      Absolute Neutrophils 10.62 Thousands/µL      Absolute Immature Grans 0.18 Thousand/uL      Absolute Lymphocytes 2.32 Thousands/µL      Absolute Monocytes 0.91 Thousand/µL      Eosinophils Absolute 0.07 Thousand/µL      Basophils Absolute 0.05 Thousands/µL             CT recon only thoracolumbar (No Charge)   Final Interpretation by Donovan Swanson MD (03/14 4492)      No fracture or traumatic subluxation.               Workstation performed: [id number]         CT chest abdomen pelvis w contrast   Final Interpretation by Donovan Swanson MD (03/14 8684)      1.  No acute findings in the chest, abdomen or pelvis.   2.  Single new 2 mm right upper lobe nodule. Further decrease in conspicuity of left upper lobe nodule. Additional nodules are stable. Suggest 12-month follow-up.               Workstation performed: [id number]             Procedures    ED Medication and Procedure Management   Prior to Admission Medications   Prescriptions Last Dose Informant Patient Reported? Taking?   Diclofenac Sodium (VOLTAREN) 1 %  Self No No   Sig: Apply 2 g topically 4 (four) times a day   LORazepam (ATIVAN) 0.5 mg tablet   No No   Sig: take 1 tablet by mouth every 8 hours if needed for anxiety   Lidocaine Viscous HCl (XYLOCAINE) 2 % mucosal solution   No No   Sig: Swish and spit 15 mL 4 (four) times a day as needed for mouth pain or discomfort   Linzess 290 MCG CAPS  Self No No   Sig: Take 1 capsule by mouth AT LEAST 30 MINUTES BEFORE FIRST MEAL OF THE DAY   Multiple Vitamin  (multivitamin) tablet   Yes No   Sig: Take 1 tablet by mouth daily   Potassium Chloride ER 20 MEQ TBCR   No No   Sig: take 1 tablet by mouth once daily   Readi-Cat 2 2 % suspension   No No   Sig: USE AS DIRECTED   Trelegy Ellipta 200-62.5-25 MCG/ACT AEPB inhaler   No No   Sig: INHALE 1 PUFF BY MOUTH AND INTO THE LUNGS DAILY. RINSE MOUTH AFTER USE.   albuterol (2.5 mg/3 mL) 0.083 % nebulizer solution  Self No No   Sig: inhale 3 milliliters by mouth three times a day if needed   albuterol (PROVENTIL HFA,VENTOLIN HFA) 90 mcg/act inhaler  Self No No   Sig: inhale 2 puffs every 6 hours if needed for shortness of breath   aspirin (ECOTRIN LOW STRENGTH) 81 mg EC tablet  Self No No   Sig: Take 1 tablet (81 mg total) by mouth daily   benzonatate (TESSALON) 200 MG capsule   No No   Sig: Take 1 capsule (200 mg total) by mouth 3 (three) times a day as needed for cough   dextromethorphan-guaiFENesin (ROBITUSSIN DM)  mg/5 mL syrup   No No   Sig: Take 10 mL by mouth every 4 (four) hours as needed for cough or congestion   Patient not taking: Reported on 2025   diltiazem (CARDIZEM CD) 180 mg 24 hr capsule   No No   Sig: take 1 capsule by mouth once daily   esomeprazole (NexIUM) 40 MG capsule   No No   Sig: Take 1 capsule (40 mg total) by mouth every morning   finasteride (PROSCAR) 5 mg tablet  Self No No   Sig: take 1 tablet by mouth once daily   fluticasone (FLONASE) 50 mcg/act nasal spray   No No   Si spray into each nostril daily   folic acid (FOLVITE) 1 mg tablet   No No   Sig: take 1 tablet by mouth once daily   furosemide (LASIX) 40 mg tablet   No No   Sig: Take 1 tablet (40 mg total) by mouth daily   guaiFENesin (Mucinex) 600 mg 12 hr tablet   No No   Sig: Take 1 tablet (600 mg total) by mouth every 12 (twelve) hours   isosorbide mononitrate (IMDUR) 30 mg 24 hr tablet   No No   Sig: Take 1 tablet (30 mg total) by mouth daily   methocarbamol (ROBAXIN) 500 mg tablet   No No   Sig: take 1 tablet by mouth four  times a day   Patient not taking: Reported on 2025   metoprolol succinate (TOPROL-XL) 25 mg 24 hr tablet   No No   Sig: take 1 tablet by mouth twice a day   polyethylene glycol (MIRALAX) 17 g packet  Self No No   Sig: Take 17 g by mouth daily as needed (constipation)   predniSONE 10 mg tablet   No No   Simg po q day x2, then 50mg x 2, then 40mg x 2, then 30mg x2, then 20mg x 2, and then 10mg x 2, then d/c.   pregabalin (LYRICA) 75 mg capsule   No No   Sig: Take 1 capsule (75 mg total) by mouth 2 (two) times a day   Patient not taking: Reported on 2025   rivaroxaban (Xarelto) 20 mg tablet   No No   Sig: Take 1 tablet (20 mg total) by mouth daily   tamsulosin (FLOMAX) 0.4 mg   No No   Sig: take 1 capsule by mouth twice a day   vitamin B-12 (VITAMIN B-12) 1,000 mcg tablet  Self No No   Sig: Take 1 tablet (1,000 mcg total) by mouth daily      Facility-Administered Medications: None     Discharge Medication List as of 3/14/2025  4:17 PM        START taking these medications    Details   !! methocarbamol (ROBAXIN) 500 mg tablet Take 1 tablet (500 mg total) by mouth 2 (two) times a day, Starting Fri 3/14/2025, Normal       !! - Potential duplicate medications found. Please discuss with provider.        CONTINUE these medications which have NOT CHANGED    Details   albuterol (2.5 mg/3 mL) 0.083 % nebulizer solution inhale 3 milliliters by mouth three times a day if needed, Normal      albuterol (PROVENTIL HFA,VENTOLIN HFA) 90 mcg/act inhaler inhale 2 puffs every 6 hours if needed for shortness of breath, Normal      aspirin (ECOTRIN LOW STRENGTH) 81 mg EC tablet Take 1 tablet (81 mg total) by mouth daily, Starting 2018, Print      benzonatate (TESSALON) 200 MG capsule Take 1 capsule (200 mg total) by mouth 3 (three) times a day as needed for cough, Starting 2025, Normal      dextromethorphan-guaiFENesin (ROBITUSSIN DM)  mg/5 mL syrup Take 10 mL by mouth every 4 (four) hours as needed  for cough or congestion, Starting Sun 2/9/2025, No Print      Diclofenac Sodium (VOLTAREN) 1 % Apply 2 g topically 4 (four) times a day, Starting Tue 3/5/2024, Normal      diltiazem (CARDIZEM CD) 180 mg 24 hr capsule take 1 capsule by mouth once daily, Starting Thu 12/12/2024, Normal      esomeprazole (NexIUM) 40 MG capsule Take 1 capsule (40 mg total) by mouth every morning, Starting Thu 11/14/2024, Normal      finasteride (PROSCAR) 5 mg tablet take 1 tablet by mouth once daily, Starting Tue 2/20/2024, Normal      fluticasone (FLONASE) 50 mcg/act nasal spray 1 spray into each nostril daily, Starting Wed 2/26/2025, Normal      folic acid (FOLVITE) 1 mg tablet take 1 tablet by mouth once daily, Starting Tue 10/1/2024, Normal      furosemide (LASIX) 40 mg tablet Take 1 tablet (40 mg total) by mouth daily, Starting Thu 1/2/2025, Normal      guaiFENesin (Mucinex) 600 mg 12 hr tablet Take 1 tablet (600 mg total) by mouth every 12 (twelve) hours, Starting Wed 2/26/2025, Normal      isosorbide mononitrate (IMDUR) 30 mg 24 hr tablet Take 1 tablet (30 mg total) by mouth daily, Starting Thu 2/20/2025, Normal      Lidocaine Viscous HCl (XYLOCAINE) 2 % mucosal solution Swish and spit 15 mL 4 (four) times a day as needed for mouth pain or discomfort, Starting Wed 2/26/2025, Normal      Linzess 290 MCG CAPS Take 1 capsule by mouth AT LEAST 30 MINUTES BEFORE FIRST MEAL OF THE DAY, Normal      LORazepam (ATIVAN) 0.5 mg tablet take 1 tablet by mouth every 8 hours if needed for anxiety, Starting Tue 1/7/2025, Normal      !! methocarbamol (ROBAXIN) 500 mg tablet take 1 tablet by mouth four times a day, Starting Mon 12/23/2024, Normal      metoprolol succinate (TOPROL-XL) 25 mg 24 hr tablet take 1 tablet by mouth twice a day, Starting Fri 1/10/2025, Normal      Multiple Vitamin (multivitamin) tablet Take 1 tablet by mouth daily, Historical Med      polyethylene glycol (MIRALAX) 17 g packet Take 17 g by mouth daily as needed  (constipation), Starting Fri 6/9/2023, Normal      Potassium Chloride ER 20 MEQ TBCR take 1 tablet by mouth once daily, Starting Tue 10/1/2024, Normal      predniSONE 10 mg tablet 60mg po q day x2, then 50mg x 2, then 40mg x 2, then 30mg x2, then 20mg x 2, and then 10mg x 2, then d/c., Normal      pregabalin (LYRICA) 75 mg capsule Take 1 capsule (75 mg total) by mouth 2 (two) times a day, Starting Wed 1/15/2025, Normal      Readi-Cat 2 2 % suspension USE AS DIRECTED, Normal      rivaroxaban (Xarelto) 20 mg tablet Take 1 tablet (20 mg total) by mouth daily, Starting Wed 10/2/2024, Normal      tamsulosin (FLOMAX) 0.4 mg take 1 capsule by mouth twice a day, Starting Mon 11/11/2024, Normal      Trelegy Ellipta 200-62.5-25 MCG/ACT AEPB inhaler INHALE 1 PUFF BY MOUTH AND INTO THE LUNGS DAILY. RINSE MOUTH AFTER USE., Normal      vitamin B-12 (VITAMIN B-12) 1,000 mcg tablet Take 1 tablet (1,000 mcg total) by mouth daily, Starting Tue 3/26/2024, Normal       !! - Potential duplicate medications found. Please discuss with provider.        No discharge procedures on file.  ED SEPSIS DOCUMENTATION   Time reflects when diagnosis was documented in both MDM as applicable and the Disposition within this note       Time User Action Codes Description Comment    3/14/2025  4:10 PM Titi Gibson Add [I82.509] Chronic deep vein thrombosis (DVT) ([facility])     3/14/2025  4:12 PM Titi iGbson Modify [I82.509] Chronic deep vein thrombosis (DVT) ([facility]CC)     3/14/2025  4:12 PM Titi Gibson Add [S39.012A] Strain of lumbar region, initial encounter                  Titi Gibson Jr., DO  03/14/25 171

## 2025-03-17 ENCOUNTER — VBI (OUTPATIENT)
Dept: ADMINISTRATIVE | Facility: OTHER | Age: 70
End: 2025-03-17

## 2025-03-17 NOTE — TELEPHONE ENCOUNTER
03/17/25 11:20 AM    Patient contacted post ED visit, first outreach attempt made. Message was left for patient to return a call to the [facility] Department at [place]: Phone [phone].    Thank you.  Monica Gutiérrze MA  [facility]

## 2025-03-18 ENCOUNTER — HOSPITAL ENCOUNTER (OUTPATIENT)
Dept: CT IMAGING | Facility: HOSPITAL | Age: 70
Discharge: HOME | End: 2025-03-18
Attending: INTERNAL MEDICINE

## 2025-03-18 DIAGNOSIS — F41.9 ANXIETY: ICD-10-CM

## 2025-03-18 DIAGNOSIS — J44.9 CHRONIC OBSTRUCTIVE PULMONARY DISEASE, UNSPECIFIED COPD TYPE (HCC): ICD-10-CM

## 2025-03-18 NOTE — TELEPHONE ENCOUNTER
03/18/25 12:55 PM    Patient contacted post ED visit, second outreach attempt made. Message was left for patient to return a call to the [facility] Department at [place]: Phone [phone].    Thank you.  Monica Gutiérrez MA  [facility]

## 2025-03-19 ENCOUNTER — TELEPHONE (OUTPATIENT)
Age: 70
End: 2025-03-19

## 2025-03-19 ENCOUNTER — OFFICE VISIT (OUTPATIENT)
Dept: INTERNAL MEDICINE CLINIC | Facility: CLINIC | Age: 70
End: 2025-03-19
Payer: COMMERCIAL

## 2025-03-19 VITALS
WEIGHT: 226 LBS | TEMPERATURE: 97.7 F | DIASTOLIC BLOOD PRESSURE: 82 MMHG | OXYGEN SATURATION: 99 % | SYSTOLIC BLOOD PRESSURE: 142 MMHG | HEART RATE: 82 BPM | BODY MASS INDEX: 32.35 KG/M2 | HEIGHT: 70 IN

## 2025-03-19 DIAGNOSIS — M23.92 INTERNAL DERANGEMENT OF LEFT KNEE: Primary | ICD-10-CM

## 2025-03-19 DIAGNOSIS — M25.562 ACUTE PAIN OF LEFT KNEE: Primary | ICD-10-CM

## 2025-03-19 DIAGNOSIS — M25.562 CHRONIC PAIN OF LEFT KNEE: ICD-10-CM

## 2025-03-19 DIAGNOSIS — G89.29 CHRONIC PAIN OF LEFT KNEE: ICD-10-CM

## 2025-03-19 PROCEDURE — 99213 OFFICE O/P EST LOW 20 MIN: CPT | Performed by: INTERNAL MEDICINE

## 2025-03-19 PROCEDURE — G2211 COMPLEX E/M VISIT ADD ON: HCPCS | Performed by: INTERNAL MEDICINE

## 2025-03-19 RX ORDER — LORAZEPAM 0.5 MG/1
0.5 TABLET ORAL EVERY 8 HOURS PRN
Qty: 90 TABLET | Refills: 0 | Status: SHIPPED | OUTPATIENT
Start: 2025-03-19

## 2025-03-19 RX ORDER — MELOXICAM 15 MG/1
15 TABLET ORAL DAILY
Qty: 30 TABLET | Refills: 1 | Status: SHIPPED | OUTPATIENT
Start: 2025-03-19

## 2025-03-19 RX ORDER — ALBUTEROL SULFATE 90 UG/1
2 INHALANT RESPIRATORY (INHALATION) EVERY 6 HOURS PRN
Qty: 54 G | Refills: 1 | Status: SHIPPED | OUTPATIENT
Start: 2025-03-19

## 2025-03-19 NOTE — TELEPHONE ENCOUNTER
03/19/25 1:23 PM    Patient contacted post ED visit, [facility] department spoke with patient/caregiver and outreach was successful.    Thank you.  Monica Gutiérrez MA  [facility]

## 2025-03-19 NOTE — TELEPHONE ENCOUNTER
Patient calls stating he was just seen in the office today and his leg/ankle pain is significantly increased after manipulation/assessment by provider. He declined triage but wanted a message sent to provider.    He is aware that ED/UC is an option if pain become worse.

## 2025-03-19 NOTE — PROGRESS NOTES
"Name: Miguelangel Lancaster      : 1955      MRN: [id number]  Encounter Provider: Miguelangel Morataya DO  Encounter Date: 3/19/2025   Encounter department: [facility]  :  Assessment & Plan  Chronic pain of left knee    Orders:    Ambulatory referral to Orthopedic Surgery; Future    Internal derangement of left knee    Orders:    Ambulatory referral to Orthopedic Surgery; Future    A/P: stable and PE not overly impressive for significant pathology, but suspect strain/sprain. Discussed thermal therapy and PT along with meds. Pt defers and wants to see ortho. Will hold on imaging and see if ortho wants MRI w/o having PT. Referred. Continue current treatment and RTC as scheduled.        History of Present Illness   WM reports past left knee meniscal tear for which he declined sx several years ago, presents with a one week h/o worsening left knee pain. No trauma. Just gave out on him. Difficulty ambulating since. No swelling or redness.       Review of Systems   Constitutional:  Positive for activity change. Negative for chills, diaphoresis, fatigue and fever.   Respiratory:  Negative for cough, chest tightness, shortness of breath and wheezing.    Cardiovascular:  Negative for chest pain, palpitations and leg swelling.   Gastrointestinal:  Negative for abdominal pain, constipation, diarrhea, nausea and vomiting.   Genitourinary:  Negative for difficulty urinating, dysuria and frequency.   Musculoskeletal:  Positive for arthralgias and gait problem. Negative for joint swelling and myalgias.   Neurological:  Negative for light-headedness and headaches.   Psychiatric/Behavioral:  Negative for confusion. The patient is not nervous/anxious.        Objective   /82 (Patient Position: Sitting, Cuff Size: Large)   Pulse 82   Temp 97.7 °F (36.5 °C) (Tympanic)   Ht 5' 10\" (1.778 m)   Wt 103 kg (226 lb)   SpO2 99%   BMI 32.43 kg/m²      Physical Exam  Vitals and nursing note reviewed.   Constitutional:  "      General: He is not in acute distress.     Appearance: Normal appearance. He is not ill-appearing.   HENT:      Head: Normocephalic and atraumatic.      Mouth/Throat:      Mouth: Mucous membranes are moist.   Eyes:      Extraocular Movements: Extraocular movements intact.      Conjunctiva/sclera: Conjunctivae normal.      Pupils: Pupils are equal, round, and reactive to light.   Musculoskeletal:         General: Tenderness present. No signs of injury.      Comments: Left knee w/o any gross deformities, increase temp, erythema, or swelling. No effusion or ballotment. Some crepitus. Collaterals intact. Negative Drawer's. Negative Austin's. ROM wnl. Tenderness medial aspect. .        Neurological:      General: No focal deficit present.      Mental Status: He is alert and oriented to person, place, and time. Mental status is at baseline.   Psychiatric:         Mood and Affect: Mood normal.         Behavior: Behavior normal.         Thought Content: Thought content normal.         Judgment: Judgment normal.

## 2025-03-21 ENCOUNTER — APPOINTMENT (OUTPATIENT)
Dept: RADIOLOGY | Facility: CLINIC | Age: 70
End: 2025-03-21
Payer: COMMERCIAL

## 2025-03-21 ENCOUNTER — OFFICE VISIT (OUTPATIENT)
Dept: OBGYN CLINIC | Facility: CLINIC | Age: 70
End: 2025-03-21
Payer: COMMERCIAL

## 2025-03-21 VITALS
BODY MASS INDEX: 31.78 KG/M2 | HEART RATE: 63 BPM | WEIGHT: 222 LBS | TEMPERATURE: 98.2 F | HEIGHT: 70 IN | OXYGEN SATURATION: 98 %

## 2025-03-21 DIAGNOSIS — M25.562 CHRONIC PAIN OF LEFT KNEE: ICD-10-CM

## 2025-03-21 DIAGNOSIS — M23.92 INTERNAL DERANGEMENT OF LEFT KNEE: ICD-10-CM

## 2025-03-21 DIAGNOSIS — G89.29 CHRONIC PAIN OF LEFT KNEE: ICD-10-CM

## 2025-03-21 DIAGNOSIS — M17.12 PRIMARY OSTEOARTHRITIS OF ONE KNEE, LEFT: Primary | ICD-10-CM

## 2025-03-21 PROCEDURE — 73562 X-RAY EXAM OF KNEE 3: CPT

## 2025-03-21 PROCEDURE — 99204 OFFICE O/P NEW MOD 45 MIN: CPT | Performed by: FAMILY MEDICINE

## 2025-03-21 NOTE — PROGRESS NOTES
[facility] [address]  [facility] [id number] [phone]      Assessment:  1. Primary osteoarthritis of one knee, left  -     Brace  -     Ambulatory Referral to Physical Therapy; Future  2. Chronic pain of left knee  -     Ambulatory referral to Orthopedic Surgery  -     XR knee 3 vw left non injury; Future; Expected date: 03/21/2025  -     Ambulatory Referral to Physical Therapy; Future  3. Internal derangement of left knee  -     Ambulatory referral to Orthopedic Surgery  -     XR knee 3 vw left non injury; Future; Expected date: 03/21/2025  -     Ambulatory Referral to Physical Therapy; Future    Assessment & Plan  Primary osteoarthritis of one knee, left    Orders:    Brace    Ambulatory Referral to Physical Therapy; Future    Chronic pain of left knee    Orders:    Ambulatory referral to Orthopedic Surgery    XR knee 3 vw left non injury; Future    Ambulatory Referral to Physical Therapy; Future    Internal derangement of left knee    Orders:    Ambulatory referral to Orthopedic Surgery    XR knee 3 vw left non injury; Future    Ambulatory Referral to Physical Therapy; Future      Patient Instructions   F/u as needed  Knee brace  Patient decline injection today  Icing/heat/OTC pain meds as needed.  Home exercises  Physical therapy  Plan:  Patient Instructions   F/u as needed  Knee brace  Patient decline injection today  Icing/heat/OTC pain meds as needed.  Home exercises  Physical therapy   Return if symptoms worsen or fail to improve.    Chief Complaint:  Chief Complaint   Patient presents with    Left Knee - Pain       Subjective:   HPI    Patient ID: Miguelangel Lancaster is a 70 y.o. male     Here c/o L knee pain  Pain started last week  Felt like it went out of place  Pain walking  No swelling  Sharp pain  Tylenol PRN- helps a little  Locking/giving out    Review of Systems   Constitutional:  Negative for fatigue and fever.   Respiratory:  " Negative for shortness of breath.    Cardiovascular:  Negative for chest pain.   Gastrointestinal:  Negative for abdominal pain and nausea.   Genitourinary:  Negative for dysuria.   Musculoskeletal:  Positive for arthralgias.   Skin:  Negative for rash and wound.   Neurological:  Negative for weakness and headaches.       Objective:  Vitals:  Pulse 63   Temp 98.2 °F (36.8 °C) (Tympanic)   Ht 5' 10\" (1.778 m)   Wt 101 kg (222 lb)   SpO2 98%   BMI 31.85 kg/m²     The following portions of the patient's history were reviewed and updated as appropriate: allergies, current medications, past family history, past medical history, past social history, past surgical history, and problem list.    Physical exam:  Physical Exam  Constitutional:       Appearance: Normal appearance. He is normal weight.   Eyes:      Extraocular Movements: Extraocular movements intact.   Pulmonary:      Effort: Pulmonary effort is normal.   Musculoskeletal:      Cervical back: Normal range of motion.      Left knee: Effusion present.      Instability Tests: Medial Austin test positive. Lateral Austin test negative.   Skin:     General: Skin is warm and dry.   Neurological:      General: No focal deficit present.      Mental Status: He is alert and oriented to person, place, and time. Mental status is at baseline.   Psychiatric:         Mood and Affect: Mood normal.         Behavior: Behavior normal.         Thought Content: Thought content normal.         Judgment: Judgment normal.       Left Knee Exam     Tenderness   The patient is experiencing no tenderness.     Range of Motion   The patient has normal left knee ROM.    Tests   Austin:  Medial - positive Lateral - negative  Varus: negative Valgus: negative    Other   Swelling: mild  Effusion: effusion present          Observations   Left Knee   Positive for effusion.       I have personally reviewed pertinent films in PACS and my interpretation is XR L knee- medial joint space " narrowing. Mod OA. No fx.      Fredis Larry MD

## 2025-03-21 NOTE — PATIENT INSTRUCTIONS
F/u as needed  Knee brace  Patient decline injection today  Icing/heat/OTC pain meds as needed.  Home exercises  Physical therapy

## 2025-03-25 ENCOUNTER — OFFICE VISIT (OUTPATIENT)
Dept: HEMATOLOGY ONCOLOGY | Facility: CLINIC | Age: 70
End: 2025-03-25
Payer: COMMERCIAL

## 2025-03-25 VITALS
OXYGEN SATURATION: 94 % | WEIGHT: 226 LBS | RESPIRATION RATE: 18 BRPM | TEMPERATURE: 98.1 F | DIASTOLIC BLOOD PRESSURE: 86 MMHG | BODY MASS INDEX: 32.35 KG/M2 | HEIGHT: 70 IN | HEART RATE: 72 BPM | SYSTOLIC BLOOD PRESSURE: 132 MMHG

## 2025-03-25 DIAGNOSIS — I82.492 ACUTE DEEP VEIN THROMBOSIS (DVT) OF OTHER SPECIFIED VEIN OF LEFT LOWER EXTREMITY (HCC): ICD-10-CM

## 2025-03-25 DIAGNOSIS — E53.8 VITAMIN B12 DEFICIENCY: ICD-10-CM

## 2025-03-25 DIAGNOSIS — D50.0 IRON DEFICIENCY ANEMIA DUE TO CHRONIC BLOOD LOSS: Primary | ICD-10-CM

## 2025-03-25 PROCEDURE — G2211 COMPLEX E/M VISIT ADD ON: HCPCS | Performed by: INTERNAL MEDICINE

## 2025-03-25 PROCEDURE — 99214 OFFICE O/P EST MOD 30 MIN: CPT | Performed by: INTERNAL MEDICINE

## 2025-03-25 NOTE — PROGRESS NOTES
Name: Miguelangel Lancaster      : 1955      MRN: [id number]  Encounter Provider: Cedrick Hadley MD  Encounter Date: 3/25/2025   Encounter department: [facility] [place]  :  Assessment & Plan  Iron deficiency anemia due to chronic blood loss  The patient is not anemic.  His iron saturation is low with high ferritin which could be due to inflammatory process.  I did ask him to take oral iron supplements on every other day basis.  Iron IV will be discussed if he continues to be iron deficient at the next visit.  He stated that he is up-to-date on screening colonoscopy.  Orders:    CBC and differential; Future    Comprehensive metabolic panel; Future    Ferritin; Future    Magnesium; Future    C-reactive protein; Future    Iron Panel (Includes Ferritin, Iron Sat%, Iron, and TIBC); Future    LD,Blood; Future    Sedimentation rate, automated; Future    Vitamin B12 deficiency  Recent vitamin B12 level was within normal range.  He was encouraged to continue with vitamin B12 supplements.  Orders:    CBC and differential; Future    Comprehensive metabolic panel; Future    Magnesium; Future    C-reactive protein; Future    LD,Blood; Future    Sedimentation rate, automated; Future    Vitamin B12; Future    Acute deep vein thrombosis (DVT) of other specified vein of left lower extremity (HCC)  He is on indefinite anticoagulation with Xarelto 20 mg daily. He denied obvious bleeding from any sites.            Return in about 6 months (around 2025) for Office Visit 20 min, Labs.    History of Present Illness   Chief Complaint   Patient presents with    Follow-up   HPI:  Patient is a 70-year-old male who presents for further evaluation/recommendations regarding his recent acute pulmonary embolism/left lower extremity DVT.  He has a past medical history of tobacco abuse, COPD, TIA, hyperlipidemia, GERD, CAD status post CABG , PAD, arthritis and hypertension.  No prior thrombosis up until  recent events.  No family history of thrombosis.     He states that he presented to the hospital 6/8/2023 with reports of progressing heaviness, pain and edema to the left lower extremity.  VAS lower limb venous duplex study, unilateral/limited (6/8/23)  CONCLUSION:  Impression:  RIGHT LOWER LIMB LIMITED:  Evaluation shows no evidence of thrombus in the common femoral vein, SFJ,  femoral vein or popliteal vein.  Doppler evaluation shows a normal response to augmentation maneuvers.     LEFT LOWER LIMB:  There is acute deep vein thrombosis in the mid posterior tibial, mid peroneal,  popliteal and femoral veins, mostly occlusive to very minimal flow. Common  femoral vein appears patent and compressible.  No evidence of superficial thrombophlebitis noted.  Doppler evaluation shows a normal response to augmentation maneuvers.  Popliteal, posterior tibial and anterior tibial arterial Doppler waveform's are  triphasic.     He was started on Eliquis for anticoagulation and admitted overnight for observation.  No chest imaging was pursued.       He then represented to the hospital 7/3/2023 with reports of worsening shortness of breath, back pain.  CTA ED chest PE study (7/3/23)  IMPRESSION:  1.  Right-sided pulmonary emboli as described above. Measured RV/LV ratio is within normal limits at less than 0.9.  2.  Bronchial wall thickening may represent bronchitis.     PULMONARY ARTERIAL TREE: Pulmonary embolism in the proximal lobar branch of the right middle lobe with segmental extension (series 2, image images 139-143). Probable embolism in the proximal lobar branch of the right upper lobe (series 601, image 74;   series 2, image 115). Eccentric filling defect within a proximal segmental branch in the right lower lobe is likely an additional embolism, though may be nonacute given its eccentric location (series 2, image 141).  LUNGS: Mild emphysema. Mild bronchial wall thickening. No focal consolidation. Respiratory motion  limits detection of small pulmonary nodules. Approximate 5 mm right upper lobe nodule is unchanged from index study of June 26, 2017 (series 3, image 85).     He was then readmitted to the hospital and started on a heparin drip which was later transitioned to alternative DOAC Xarelto at discharge.  He continues to Xarelto 20 mg daily at this time.     Interval history:  The patient came today for a follow-up visit.  He stated that he is taking the Xarelto on a daily basis without interruption.  He denied obvious bleeding from any sites.  He did complain about significant arthralgia.  CBC on 3/14/2025 showed hemoglobin of 12.7.  White cell count was 14.1 with normal platelets.  Vitamin B12 level was normal.  Iron panel in February showed saturation of 8% with ferritin of 310.    Review of Systems   Constitutional:  Positive for activity change and fatigue. Negative for chills and fever.   HENT:  Negative for ear pain and sore throat.    Eyes:  Negative for pain and visual disturbance.   Respiratory:  Positive for cough and shortness of breath.    Cardiovascular:  Negative for chest pain and palpitations.   Gastrointestinal:  Negative for abdominal pain and vomiting.   Genitourinary:  Positive for dysuria. Negative for hematuria.   Musculoskeletal:  Negative for arthralgias and back pain.   Skin:  Negative for color change and rash.   Neurological:  Positive for dizziness, numbness and headaches. Negative for seizures and syncope.   Psychiatric/Behavioral:  Positive for sleep disturbance.    All other systems reviewed and are negative.    Medical History Reviewed by provider this encounter:     .  Current Outpatient Medications on File Prior to Visit   Medication Sig Dispense Refill    albuterol (2.5 mg/3 mL) 0.083 % nebulizer solution inhale 3 milliliters by mouth three times a day if needed 75 mL 1    albuterol (PROVENTIL HFA,VENTOLIN HFA) 90 mcg/act inhaler Inhale 2 puffs every 6 (six) hours as needed for wheezing  or shortness of breath 54 g 1    aspirin (ECOTRIN LOW STRENGTH) 81 mg EC tablet Take 1 tablet (81 mg total) by mouth daily 30 tablet 0    benzonatate (TESSALON) 200 MG capsule Take 1 capsule (200 mg total) by mouth 3 (three) times a day as needed for cough (Patient not taking: Reported on 3/19/2025) 20 capsule 0    dextromethorphan-guaiFENesin (ROBITUSSIN DM)  mg/5 mL syrup Take 10 mL by mouth every 4 (four) hours as needed for cough or congestion (Patient not taking: Reported on 3/19/2025)      Diclofenac Sodium (VOLTAREN) 1 % Apply 2 g topically 4 (four) times a day (Patient not taking: Reported on 3/19/2025) 100 g 2    diltiazem (CARDIZEM CD) 180 mg 24 hr capsule take 1 capsule by mouth once daily 90 capsule 3    esomeprazole (NexIUM) 40 MG capsule Take 1 capsule (40 mg total) by mouth every morning 90 capsule 0    finasteride (PROSCAR) 5 mg tablet take 1 tablet by mouth once daily 90 tablet 1    fluticasone (FLONASE) 50 mcg/act nasal spray 1 spray into each nostril daily 16 g 0    folic acid (FOLVITE) 1 mg tablet take 1 tablet by mouth once daily 30 tablet 5    furosemide (LASIX) 40 mg tablet Take 1 tablet (40 mg total) by mouth daily 90 tablet 1    guaiFENesin (Mucinex) 600 mg 12 hr tablet Take 1 tablet (600 mg total) by mouth every 12 (twelve) hours (Patient not taking: Reported on 3/19/2025) 20 tablet 0    isosorbide mononitrate (IMDUR) 30 mg 24 hr tablet Take 1 tablet (30 mg total) by mouth daily 90 tablet 1    Lidocaine Viscous HCl (XYLOCAINE) 2 % mucosal solution Swish and spit 15 mL 4 (four) times a day as needed for mouth pain or discomfort 100 mL 1    Linzess 290 MCG CAPS Take 1 capsule by mouth AT LEAST 30 MINUTES BEFORE FIRST MEAL OF THE DAY 30 capsule 2    LORazepam (ATIVAN) 0.5 mg tablet Take 1 tablet (0.5 mg total) by mouth every 8 (eight) hours as needed for anxiety 90 tablet 0    meloxicam (MOBIC) 15 mg tablet Take 1 tablet (15 mg total) by mouth daily 30 tablet 1    methocarbamol (ROBAXIN)  500 mg tablet take 1 tablet by mouth four times a day (Patient not taking: Reported on 2/26/2025) 90 tablet 0    methocarbamol (ROBAXIN) 500 mg tablet Take 1 tablet (500 mg total) by mouth 2 (two) times a day (Patient not taking: Reported on 3/19/2025) 20 tablet 0    metoprolol succinate (TOPROL-XL) 25 mg 24 hr tablet take 1 tablet by mouth twice a day 180 tablet 1    Multiple Vitamin (multivitamin) tablet Take 1 tablet by mouth daily      polyethylene glycol (MIRALAX) 17 g packet Take 17 g by mouth daily as needed (constipation) (Patient not taking: Reported on 3/19/2025) 10 each 0    Potassium Chloride ER 20 MEQ TBCR take 1 tablet by mouth once daily 90 tablet 1    predniSONE 10 mg tablet 60mg po q day x2, then 50mg x 2, then 40mg x 2, then 30mg x2, then 20mg x 2, and then 10mg x 2, then d/c. (Patient not taking: Reported on 3/19/2025) 42 tablet 0    pregabalin (LYRICA) 75 mg capsule Take 1 capsule (75 mg total) by mouth 2 (two) times a day 60 capsule 0    Readi-Cat 2 2 % suspension USE AS DIRECTED 900 mL 0    rivaroxaban (Xarelto) 20 mg tablet Take 1 tablet (20 mg total) by mouth daily 90 tablet 1    tamsulosin (FLOMAX) 0.4 mg take 1 capsule by mouth twice a day 180 capsule 1    Trelegy Ellipta 200-62.5-25 MCG/ACT AEPB inhaler INHALE 1 PUFF BY MOUTH AND INTO THE LUNGS DAILY. RINSE MOUTH AFTER USE. 180 blister 1    vitamin B-12 (VITAMIN B-12) 1,000 mcg tablet Take 1 tablet (1,000 mcg total) by mouth daily 30 tablet 5     No current facility-administered medications on file prior to visit.      Social History     Tobacco Use    Smoking status: Every Day     Current packs/day: 1.00     Average packs/day: 1 pack/day for 55.2 years (55.2 ttl pk-yrs)     Types: Cigarettes     Start date: 1970     Passive exposure: Never    Smokeless tobacco: Never   Vaping Use    Vaping status: Never Used   Substance and Sexual Activity    Alcohol use: Not Currently     Comment: Socially    Drug use: Never    Sexual activity: Not  "Currently         Objective   /86 (BP Location: Left arm, Patient Position: Sitting)   Pulse 72   Temp 98.1 °F (36.7 °C) (Temporal)   Resp 18   Ht 5' 10\" (1.778 m)   Wt 103 kg (226 lb)   SpO2 94%   BMI 32.43 kg/m²     Pain Screening:  Pain Score: 10-Worst pain ever  ECOG   2  Physical Exam  Constitutional:       Appearance: He is well-developed.   HENT:      Head: Normocephalic and atraumatic.      Nose: Nose normal.   Eyes:      General: No scleral icterus.        Right eye: No discharge.         Left eye: No discharge.      Conjunctiva/sclera: Conjunctivae normal.      Pupils: Pupils are equal, round, and reactive to light.   Neck:      Thyroid: No thyromegaly.      Trachea: No tracheal deviation.   Cardiovascular:      Rate and Rhythm: Normal rate and regular rhythm.      Heart sounds: Normal heart sounds. No murmur heard.     No friction rub.   Pulmonary:      Effort: Pulmonary effort is normal. No respiratory distress.      Breath sounds: Normal breath sounds. No wheezing or rales.   Chest:      Chest wall: No tenderness.   Abdominal:      General: There is no distension.      Palpations: Abdomen is soft. There is no hepatomegaly or splenomegaly.      Tenderness: There is no abdominal tenderness. There is no guarding or rebound.   Musculoskeletal:         General: No tenderness or deformity. Normal range of motion.      Cervical back: Normal range of motion and neck supple.   Lymphadenopathy:      Cervical: No cervical adenopathy.   Skin:     General: Skin is warm and dry.      Coloration: Skin is not pale.      Findings: No erythema or rash.   Neurological:      Mental Status: He is alert and oriented to person, place, and time.      Cranial Nerves: No cranial nerve deficit.      Coordination: Coordination normal.      Deep Tendon Reflexes: Reflexes are normal and symmetric.   Psychiatric:         Behavior: Behavior normal.         Thought Content: Thought content normal.         Judgment: " Judgment normal.         Labs: I have reviewed the following labs:  Lab Results   Component Value Date/Time    WBC 14.15 (H) 03/14/2025 01:48 PM    RBC 4.15 03/14/2025 01:48 PM    Hemoglobin 12.7 03/14/2025 01:48 PM    Hematocrit 38.5 03/14/2025 01:48 PM    MCV 93 03/14/2025 01:48 PM    MCH 30.6 03/14/2025 01:48 PM    RDW 13.6 03/14/2025 01:48 PM    Platelets 228 03/14/2025 01:48 PM    Segmented % 76 (H) 03/14/2025 01:48 PM    Lymphocytes % 16 03/14/2025 01:48 PM    Monocytes % 6 03/14/2025 01:48 PM    Eosinophils Relative 1 03/14/2025 01:48 PM    Basophils Relative 0 03/14/2025 01:48 PM    Immature Grans % 1 03/14/2025 01:48 PM    Absolute Neutrophils 10.62 (H) 03/14/2025 01:48 PM     Lab Results   Component Value Date/Time    Potassium 4.1 03/14/2025 01:48 PM    Chloride 99 03/14/2025 01:48 PM    CO2 31 03/14/2025 01:48 PM    BUN 14 03/14/2025 01:48 PM    Creatinine 0.95 03/14/2025 01:48 PM    Glucose, Fasting 90 07/24/2024 10:54 AM    Calcium 8.6 03/14/2025 01:48 PM    AST 9 (L) 03/14/2025 01:48 PM    ALT 13 03/14/2025 01:48 PM    Alkaline Phosphatase 83 03/14/2025 01:48 PM    Total Protein 5.9 (L) 03/14/2025 01:48 PM    Albumin 3.6 03/14/2025 01:48 PM    Total Bilirubin 0.36 03/14/2025 01:48 PM    eGFR 80 03/14/2025 01:48 PM     Lab Results   Component Value Date/Time    WBC 14.15 (H) 03/14/2025 01:48 PM    RBC 4.15 03/14/2025 01:48 PM    Hemoglobin 12.7 03/14/2025 01:48 PM    Hematocrit 38.5 03/14/2025 01:48 PM    MCV 93 03/14/2025 01:48 PM    MCH 30.6 03/14/2025 01:48 PM    RDW 13.6 03/14/2025 01:48 PM    Platelets 228 03/14/2025 01:48 PM    Segmented % 76 (H) 03/14/2025 01:48 PM    Bands % 3 02/08/2025 04:25 AM    Lymphocytes % 16 03/14/2025 01:48 PM    Monocytes % 6 03/14/2025 01:48 PM    Eosinophils Relative 1 03/14/2025 01:48 PM    Basophils Relative 0 03/14/2025 01:48 PM    Immature Grans % 1 03/14/2025 01:48 PM    Absolute Neutrophils 10.62 (H) 03/14/2025 01:48 PM      Lab Results   Component Value  Date/Time    Sodium 136 03/14/2025 01:48 PM    Potassium 4.1 03/14/2025 01:48 PM    Chloride 99 03/14/2025 01:48 PM    CO2 31 03/14/2025 01:48 PM    ANION GAP 6 03/14/2025 01:48 PM    BUN 14 03/14/2025 01:48 PM    Creatinine 0.95 03/14/2025 01:48 PM    Glucose 93 03/14/2025 01:48 PM    Glucose, Fasting 90 07/24/2024 10:54 AM    Calcium 8.6 03/14/2025 01:48 PM    AST 9 (L) 03/14/2025 01:48 PM    ALT 13 03/14/2025 01:48 PM    Alkaline Phosphatase 83 03/14/2025 01:48 PM    Total Protein 5.9 (L) 03/14/2025 01:48 PM    Albumin 3.6 03/14/2025 01:48 PM    Total Bilirubin 0.36 03/14/2025 01:48 PM    eGFR 80 03/14/2025 01:48 PM      Lab Results   Component Value Date/Time    Iron 17 (L) 02/26/2025 08:50 AM    Iron Saturation 8 (L) 02/26/2025 08:50 AM    Ferritin 310 02/26/2025 08:50 AM    Vitamin B-12 488 02/26/2025 08:50 AM    Folate >22.3 02/26/2025 08:50 AM    Sed Rate 85 (H) 02/26/2025 08:50 AM    .7 (H) 02/26/2025 08:50 AM      Results for orders placed or performed during the hospital encounter of 03/14/25   CBC and differential   Result Value Ref Range    WBC 14.15 (H) 4.31 - 10.16 Thousand/uL    RBC 4.15 3.88 - 5.62 Million/uL    Hemoglobin 12.7 12.0 - 17.0 g/dL    Hematocrit 38.5 36.5 - 49.3 %    MCV 93 82 - 98 fL    MCH 30.6 26.8 - 34.3 pg    MCHC 33.0 31.4 - 37.4 g/dL    RDW 13.6 11.6 - 15.1 %    MPV 9.0 8.9 - 12.7 fL    Platelets 228 149 - 390 Thousands/uL    nRBC 0 /100 WBCs    Segmented % 76 (H) 43 - 75 %    Immature Grans % 1 0 - 2 %    Lymphocytes % 16 14 - 44 %    Monocytes % 6 4 - 12 %    Eosinophils Relative 1 0 - 6 %    Basophils Relative 0 0 - 1 %    Absolute Neutrophils 10.62 (H) 1.85 - 7.62 Thousands/µL    Absolute Immature Grans 0.18 0.00 - 0.20 Thousand/uL    Absolute Lymphocytes 2.32 0.60 - 4.47 Thousands/µL    Absolute Monocytes 0.91 0.17 - 1.22 Thousand/µL    Eosinophils Absolute 0.07 0.00 - 0.61 Thousand/µL    Basophils Absolute 0.05 0.00 - 0.10 Thousands/µL   Protime-INR   Result Value  Ref Range    Protime 19.3 (H) 12.3 - 15.0 seconds    INR 1.58 (H) 0.85 - 1.19   APTT   Result Value Ref Range    PTT 30 23 - 34 seconds   Comprehensive metabolic panel   Result Value Ref Range    Sodium 136 135 - 147 mmol/L    Potassium 4.1 3.5 - 5.3 mmol/L    Chloride 99 96 - 108 mmol/L    CO2 31 21 - 32 mmol/L    ANION GAP 6 4 - 13 mmol/L    BUN 14 5 - 25 mg/dL    Creatinine 0.95 0.60 - 1.30 mg/dL    Glucose 93 65 - 140 mg/dL    Calcium 8.6 8.4 - 10.2 mg/dL    AST 9 (L) 13 - 39 U/L    ALT 13 7 - 52 U/L    Alkaline Phosphatase 83 34 - 104 U/L    Total Protein 5.9 (L) 6.4 - 8.4 g/dL    Albumin 3.6 3.5 - 5.0 g/dL    Total Bilirubin 0.36 0.20 - 1.00 mg/dL    eGFR 80 ml/min/1.73sq m

## 2025-03-25 NOTE — ASSESSMENT & PLAN NOTE
Recent vitamin B12 level was within normal range.  He was encouraged to continue with vitamin B12 supplements.  Orders:    CBC and differential; Future    Comprehensive metabolic panel; Future    Magnesium; Future    C-reactive protein; Future    LD,Blood; Future    Sedimentation rate, automated; Future    Vitamin B12; Future

## 2025-03-25 NOTE — ASSESSMENT & PLAN NOTE
The patient is not anemic.  His iron saturation is low with high ferritin which could be due to inflammatory process.  I did ask him to take oral iron supplements on every other day basis.  Iron IV will be discussed if he continues to be iron deficient at the next visit.  He stated that he is up-to-date on screening colonoscopy.  Orders:    CBC and differential; Future    Comprehensive metabolic panel; Future    Ferritin; Future    Magnesium; Future    C-reactive protein; Future    Iron Panel (Includes Ferritin, Iron Sat%, Iron, and TIBC); Future    LD,Blood; Future    Sedimentation rate, automated; Future

## 2025-03-30 DIAGNOSIS — E53.8 FOLIC ACID DEFICIENCY: ICD-10-CM

## 2025-04-01 ENCOUNTER — APPOINTMENT (OUTPATIENT)
Dept: RADIOLOGY | Facility: CLINIC | Age: 70
End: 2025-04-01
Payer: COMMERCIAL

## 2025-04-01 ENCOUNTER — OFFICE VISIT (OUTPATIENT)
Dept: PODIATRY | Facility: CLINIC | Age: 70
End: 2025-04-01
Payer: COMMERCIAL

## 2025-04-01 VITALS — HEIGHT: 70 IN | WEIGHT: 226 LBS | BODY MASS INDEX: 32.35 KG/M2

## 2025-04-01 DIAGNOSIS — M79.672 PAIN OF LEFT HEEL: ICD-10-CM

## 2025-04-01 DIAGNOSIS — S90.32XS: ICD-10-CM

## 2025-04-01 DIAGNOSIS — I83.893 VARICOSE VEINS OF BOTH LEGS WITH EDEMA: ICD-10-CM

## 2025-04-01 DIAGNOSIS — M79.672 PAIN OF LEFT HEEL: Primary | ICD-10-CM

## 2025-04-01 DIAGNOSIS — Z79.01 LONG TERM CURRENT USE OF ANTICOAGULANT: ICD-10-CM

## 2025-04-01 DIAGNOSIS — I87.2 VENOUS INSUFFICIENCY OF BOTH LOWER EXTREMITIES: ICD-10-CM

## 2025-04-01 PROCEDURE — 99213 OFFICE O/P EST LOW 20 MIN: CPT | Performed by: PODIATRIST

## 2025-04-01 PROCEDURE — 73650 X-RAY EXAM OF HEEL: CPT

## 2025-04-01 NOTE — PROGRESS NOTES
Name: Miguelangel Lancaster      : 1955      MRN: [id number]  Encounter Provider: Logan Garcia DPM  Encounter Date: 2025   Encounter department: [facility] PODIATRY [place]  :  Assessment & Plan  Pain of left heel    Orders:    XR heel / calcaneus 2+ vw left; Future    Contusion of foot or heel, left, sequela         Long term current use of anticoagulant         Venous insufficiency of both lower extremities         Varicose veins of both legs with edema         Recommended to the patient that he Proper fitting shoes that are his size.  The shoes that he currently has are approximately 2 sizes too big and approximately 2 widths to wide.  Recommended that the patient go up to [employer] in [place] shoes that fit properly.  Also recommended that the patient get a new set of inserts for both feet.  Recommended use of local pharmacy or [facility] for the inserts    Return in about 4 weeks (around 2025).     History of Present Illness   HPI  Miguelangel Lancaster is a 70 y.o. male who presents with pain in his left heel.  He states approximately 2 years ago he jumped out of a truck and hit left heel on a curb with his foot off of the curb.  He states he has been hobbling around since his right foot completely missed the curb.  History obtained from: patient    Review of Systems  Medical History Reviewed by provider this encounter:     .  Current Outpatient Medications on File Prior to Visit   Medication Sig Dispense Refill    albuterol (2.5 mg/3 mL) 0.083 % nebulizer solution inhale 3 milliliters by mouth three times a day if needed 75 mL 1    albuterol (PROVENTIL HFA,VENTOLIN HFA) 90 mcg/act inhaler Inhale 2 puffs every 6 (six) hours as needed for wheezing or shortness of breath 54 g 1    aspirin (ECOTRIN LOW STRENGTH) 81 mg EC tablet Take 1 tablet (81 mg total) by mouth daily 30 tablet 0    diltiazem (CARDIZEM CD) 180 mg 24 hr capsule take 1 capsule by mouth once daily 90 capsule 3    esomeprazole  (NexIUM) 40 MG capsule Take 1 capsule (40 mg total) by mouth every morning 90 capsule 0    finasteride (PROSCAR) 5 mg tablet take 1 tablet by mouth once daily 90 tablet 1    fluticasone (FLONASE) 50 mcg/act nasal spray 1 spray into each nostril daily 16 g 0    folic acid (FOLVITE) 1 mg tablet take 1 tablet by mouth once daily 30 tablet 5    furosemide (LASIX) 40 mg tablet Take 1 tablet (40 mg total) by mouth daily 90 tablet 1    isosorbide mononitrate (IMDUR) 30 mg 24 hr tablet Take 1 tablet (30 mg total) by mouth daily 90 tablet 1    Lidocaine Viscous HCl (XYLOCAINE) 2 % mucosal solution Swish and spit 15 mL 4 (four) times a day as needed for mouth pain or discomfort 100 mL 1    Linzess 290 MCG CAPS Take 1 capsule by mouth AT LEAST 30 MINUTES BEFORE FIRST MEAL OF THE DAY 30 capsule 2    LORazepam (ATIVAN) 0.5 mg tablet Take 1 tablet (0.5 mg total) by mouth every 8 (eight) hours as needed for anxiety 90 tablet 0    meloxicam (MOBIC) 15 mg tablet Take 1 tablet (15 mg total) by mouth daily 30 tablet 1    metoprolol succinate (TOPROL-XL) 25 mg 24 hr tablet take 1 tablet by mouth twice a day 180 tablet 1    Multiple Vitamin (multivitamin) tablet Take 1 tablet by mouth daily      Potassium Chloride ER 20 MEQ TBCR take 1 tablet by mouth once daily 90 tablet 1    pregabalin (LYRICA) 75 mg capsule Take 1 capsule (75 mg total) by mouth 2 (two) times a day 60 capsule 0    Readi-Cat 2 2 % suspension USE AS DIRECTED 900 mL 0    rivaroxaban (Xarelto) 20 mg tablet Take 1 tablet (20 mg total) by mouth daily 90 tablet 1    tamsulosin (FLOMAX) 0.4 mg take 1 capsule by mouth twice a day 180 capsule 1    Trelegy Ellipta 200-62.5-25 MCG/ACT AEPB inhaler INHALE 1 PUFF BY MOUTH AND INTO THE LUNGS DAILY. RINSE MOUTH AFTER USE. 180 blister 1    vitamin B-12 (VITAMIN B-12) 1,000 mcg tablet Take 1 tablet (1,000 mcg total) by mouth daily 30 tablet 5    benzonatate (TESSALON) 200 MG capsule Take 1 capsule (200 mg total) by mouth 3 (three)  "times a day as needed for cough (Patient not taking: Reported on 3/19/2025) 20 capsule 0    dextromethorphan-guaiFENesin (ROBITUSSIN DM)  mg/5 mL syrup Take 10 mL by mouth every 4 (four) hours as needed for cough or congestion (Patient not taking: Reported on 3/19/2025)      Diclofenac Sodium (VOLTAREN) 1 % Apply 2 g topically 4 (four) times a day (Patient not taking: Reported on 3/19/2025) 100 g 2    guaiFENesin (Mucinex) 600 mg 12 hr tablet Take 1 tablet (600 mg total) by mouth every 12 (twelve) hours (Patient not taking: Reported on 3/19/2025) 20 tablet 0    methocarbamol (ROBAXIN) 500 mg tablet take 1 tablet by mouth four times a day (Patient not taking: Reported on 2/26/2025) 90 tablet 0    methocarbamol (ROBAXIN) 500 mg tablet Take 1 tablet (500 mg total) by mouth 2 (two) times a day (Patient not taking: Reported on 3/19/2025) 20 tablet 0    polyethylene glycol (MIRALAX) 17 g packet Take 17 g by mouth daily as needed (constipation) (Patient not taking: Reported on 3/19/2025) 10 each 0    predniSONE 10 mg tablet 60mg po q day x2, then 50mg x 2, then 40mg x 2, then 30mg x2, then 20mg x 2, and then 10mg x 2, then d/c. (Patient not taking: Reported on 3/19/2025) 42 tablet 0     No current facility-administered medications on file prior to visit.      Social History     Tobacco Use    Smoking status: Every Day     Current packs/day: 1.00     Average packs/day: 1 pack/day for 55.2 years (55.2 ttl pk-yrs)     Types: Cigarettes     Start date: 1970     Passive exposure: Never    Smokeless tobacco: Never   Vaping Use    Vaping status: Never Used   Substance and Sexual Activity    Alcohol use: Not Currently     Comment: Socially    Drug use: Never    Sexual activity: Not Currently        Objective   Ht 5' 10\" (1.778 m)   Wt 103 kg (226 lb)   BMI 32.43 kg/m²      Personally reviewed the x-rays and they show small calcaneal spur present but no fractures are healed fracture lines healed.    Physical Exam  Vascular " status is a faint 1/4 DP PT negative digital hair delayed capillary refill with slight dependent rubor present left extremity.  There is edema present on the left extremity also.  Capillary refill is approximately 3 seconds and the edema is +1 pitting.    Derm there are numerous ecchymotic spots on the left foot secondary to the patient being on blood thinners.  There are varicose veins also present bilaterally which are asymptomatic.    Ortho there is pain upon palpation of the plantar aspect of the calcaneus no pain with palpation of the plantar fascia in the area.  No pain with tarsal tunnel pressure.    Neuro light touch is intact on the left extremity.  Administrative Statements   I have spent a total time of 15 minutes in caring for this patient on the day of the visit/encounter including Risks and benefits of tx options, Instructions for management, Patient and family education, Importance of tx compliance, Risk factor reductions, Counseling / Coordination of care, Documenting in the medical record, and Obtaining or reviewing history  .

## 2025-04-02 RX ORDER — FOLIC ACID 1 MG/1
1000 TABLET ORAL DAILY
Qty: 30 TABLET | Refills: 11 | Status: SHIPPED | OUTPATIENT
Start: 2025-04-02

## 2025-04-22 DIAGNOSIS — I26.99 ACUTE PULMONARY EMBOLISM WITHOUT ACUTE COR PULMONALE, UNSPECIFIED PULMONARY EMBOLISM TYPE (HCC): ICD-10-CM

## 2025-04-23 RX ORDER — RIVAROXABAN 20 MG/1
20 TABLET, FILM COATED ORAL DAILY
Qty: 90 TABLET | Refills: 1 | Status: SHIPPED | OUTPATIENT
Start: 2025-04-23

## 2025-04-25 ENCOUNTER — NURSE TRIAGE (OUTPATIENT)
Age: 70
End: 2025-04-25

## 2025-04-25 NOTE — TELEPHONE ENCOUNTER
"FOLLOW UP: Patient declines ED recommendation.      REASON FOR CONVERSATION: Chest Pain  Patient has follow up on Thursday 5/1 in [place] with Dr Crowder.  He called today asking for a sooner appointment   SYMPTOMS: Patient states he has been experiencing chest pain for about the last two -3 months.  He said the pain begins in his left armpit and radiates to the center of his chest.  The pain is a number 6 on a pain scale and lasts for about a minute.      OTHER: I advised him to report to the ED.  He refuses ED as he said the cardiologist who practices at the ED where he would go \"is someone I don't want to see\".  Patient asked if Dr Crowder \"could order a test\"    DISPOSITION: Go to ED Now  Reason for Disposition   Pain also in shoulder(s) or arm(s) or jaw    Answer Assessment - Initial Assessment Questions  1. LOCATION: \"Where does it hurt?\"          2. RADIATION: \"Does the pain go anywhere else?\" (e.g., into neck, jaw, arms, back)      Starts under left armpit and radiates to center of chest  3. ONSET: \"When did the chest pain begin?\" (Minutes, hours or days)       Started about two months ago   4. PATTERN: \"Does the pain come and go, or has it been constant since it started?\"  \"Does it get worse with exertion?\"       Comes and goes   5. DURATION: \"How long does it last\" (e.g., seconds, minutes, hours)      A few minutes   6. SEVERITY: \"How bad is the pain?\"  (e.g., Scale 1-10; mild, moderate, or severe)      # 6  7. CARDIAC RISK FACTORS: \"Do you have any history of heart problems or risk factors for heart disease?\" (e.g., angina, prior heart attack; diabetes, high blood pressure, high cholesterol, smoker, or strong family history of heart disease)      History of CABG in 2000    9. CAUSE: \"What do you think is causing the chest pain?\"      Unsure   10. OTHER SYMPTOMS: \"Do you have any other symptoms?\" (e.g., dizziness, nausea, vomiting, sweating, fever, difficulty breathing, cough)        Shortness of breath " walking up stairs,    Protocols used: Chest Pain-Adult-[place]

## 2025-04-25 NOTE — TELEPHONE ENCOUNTER
I called the patient to give him Dr. Crowder's message. He insisted that I find him an earlier appointment. I explained to him that Dr. Crowder did not have any sooner appointments. That if he's having problems before he comes in for his josafat on 5/1 to go to the ER. I offered him an josafat with [facility] but that josafat would be even later in July. He said to cancel his josafat on 5/1 and if he's having problems he'll go to the ER. Then hung up.

## 2025-05-02 DIAGNOSIS — I25.119 CORONARY ARTERY DISEASE INVOLVING NATIVE CORONARY ARTERY OF NATIVE HEART WITH ANGINA PECTORIS (HCC): ICD-10-CM

## 2025-05-02 RX ORDER — POTASSIUM CHLORIDE 1500 MG/1
1 TABLET, EXTENDED RELEASE ORAL DAILY
Qty: 90 TABLET | Refills: 1 | Status: SHIPPED | OUTPATIENT
Start: 2025-05-02

## 2025-05-08 ENCOUNTER — OFFICE VISIT (OUTPATIENT)
Dept: LAB | Facility: HOSPITAL | Age: 70
End: 2025-05-08
Payer: COMMERCIAL

## 2025-05-08 DIAGNOSIS — I25.119 CHEST PAIN DUE TO CAD (HCC): ICD-10-CM

## 2025-05-08 LAB
ATRIAL RATE: 69 BPM
P AXIS: 24 DEGREES
PR INTERVAL: 156 MS
QRS AXIS: 61 DEGREES
QRSD INTERVAL: 92 MS
QT INTERVAL: 396 MS
QTC INTERVAL: 425 MS
T WAVE AXIS: 58 DEGREES
VENTRICULAR RATE: 69 BPM

## 2025-05-08 PROCEDURE — 93010 ELECTROCARDIOGRAM REPORT: CPT | Performed by: INTERNAL MEDICINE

## 2025-05-08 PROCEDURE — 93005 ELECTROCARDIOGRAM TRACING: CPT

## 2025-05-09 DIAGNOSIS — R06.00 DYSPNEA: Primary | ICD-10-CM

## 2025-05-15 ENCOUNTER — OFFICE VISIT (OUTPATIENT)
Dept: INTERNAL MEDICINE CLINIC | Facility: CLINIC | Age: 70
End: 2025-05-15
Payer: COMMERCIAL

## 2025-05-15 VITALS
HEART RATE: 97 BPM | OXYGEN SATURATION: 97 % | TEMPERATURE: 97.5 F | HEIGHT: 70 IN | DIASTOLIC BLOOD PRESSURE: 72 MMHG | SYSTOLIC BLOOD PRESSURE: 124 MMHG | WEIGHT: 225 LBS | BODY MASS INDEX: 32.21 KG/M2

## 2025-05-15 DIAGNOSIS — K21.9 GASTROESOPHAGEAL REFLUX DISEASE, UNSPECIFIED WHETHER ESOPHAGITIS PRESENT: Chronic | ICD-10-CM

## 2025-05-15 DIAGNOSIS — Z72.0 TOBACCO ABUSE: Chronic | ICD-10-CM

## 2025-05-15 DIAGNOSIS — F33.9 DEPRESSION, RECURRENT (HCC): ICD-10-CM

## 2025-05-15 DIAGNOSIS — I10 PRIMARY HYPERTENSION: Primary | ICD-10-CM

## 2025-05-15 DIAGNOSIS — Z59.82 TRANSPORTATION INSECURITY: ICD-10-CM

## 2025-05-15 DIAGNOSIS — R73.03 PREDIABETES: Chronic | ICD-10-CM

## 2025-05-15 DIAGNOSIS — G62.9 PERIPHERAL POLYNEUROPATHY: ICD-10-CM

## 2025-05-15 DIAGNOSIS — E53.8 VITAMIN B12 DEFICIENCY: ICD-10-CM

## 2025-05-15 DIAGNOSIS — F41.9 ANXIETY: ICD-10-CM

## 2025-05-15 DIAGNOSIS — Z12.5 SCREENING FOR PROSTATE CANCER: ICD-10-CM

## 2025-05-15 DIAGNOSIS — Z12.11 SCREENING FOR COLON CANCER: ICD-10-CM

## 2025-05-15 DIAGNOSIS — R10.9 LEFT SIDED ABDOMINAL PAIN: ICD-10-CM

## 2025-05-15 DIAGNOSIS — D50.0 IRON DEFICIENCY ANEMIA DUE TO CHRONIC BLOOD LOSS: Chronic | ICD-10-CM

## 2025-05-15 DIAGNOSIS — N18.2 CHRONIC KIDNEY DISEASE, STAGE 2 (MILD): ICD-10-CM

## 2025-05-15 DIAGNOSIS — I25.119 CORONARY ARTERY DISEASE INVOLVING NATIVE CORONARY ARTERY OF NATIVE HEART WITH ANGINA PECTORIS (HCC): Chronic | ICD-10-CM

## 2025-05-15 DIAGNOSIS — J44.1 COPD EXACERBATION (HCC): ICD-10-CM

## 2025-05-15 PROCEDURE — 99214 OFFICE O/P EST MOD 30 MIN: CPT | Performed by: INTERNAL MEDICINE

## 2025-05-15 PROCEDURE — G2211 COMPLEX E/M VISIT ADD ON: HCPCS | Performed by: INTERNAL MEDICINE

## 2025-05-15 RX ORDER — LORAZEPAM 0.5 MG/1
0.5 TABLET ORAL EVERY 8 HOURS PRN
Qty: 90 TABLET | Refills: 0 | Status: SHIPPED | OUTPATIENT
Start: 2025-05-15

## 2025-05-15 SDOH — ECONOMIC STABILITY - TRANSPORTATION SECURITY: TRANSPORTATION INSECURITY: Z59.82

## 2025-05-15 NOTE — ASSESSMENT & PLAN NOTE
Lab Results   Component Value Date    EGFR 80 03/14/2025    EGFR 65 02/26/2025    EGFR 80 02/09/2025    CREATININE 0.95 03/14/2025    CREATININE 1.14 02/26/2025    CREATININE 0.96 02/09/2025       Orders:    CBC and differential; Future    Comprehensive metabolic panel; Future    Hemoglobin A1C; Future    LDL cholesterol, direct; Future    Triglycerides; Future    TSH, 3rd generation; Future

## 2025-05-15 NOTE — ASSESSMENT & PLAN NOTE
Orders:    CBC and differential; Future    Ambulatory referral to Gastroenterology; Future

## 2025-05-15 NOTE — ASSESSMENT & PLAN NOTE
Orders:    CBC and differential; Future    Comprehensive metabolic panel; Future    Hemoglobin A1C; Future    LDL cholesterol, direct; Future    Triglycerides; Future

## 2025-05-15 NOTE — ASSESSMENT & PLAN NOTE
Orders:    LORazepam (ATIVAN) 0.5 mg tablet; Take 1 tablet (0.5 mg total) by mouth every 8 (eight) hours as needed for anxiety

## 2025-05-15 NOTE — PROGRESS NOTES
Name: Miguelangel Lancaster      : 1955      MRN: [id number]  Encounter Provider: Miguelangel Morataya DO  Encounter Date: 5/15/2025   Encounter department: [facility]  :  Assessment & Plan  Screening for colon cancer    Orders:    Ambulatory Referral to Gastroenterology; Future    Ambulatory referral to Gastroenterology; Future    Primary hypertension    Orders:    CBC and differential; Future    Coronary artery disease    Orders:    CBC and differential; Future    Comprehensive metabolic panel; Future    Hemoglobin A1C; Future    LDL cholesterol, direct; Future    Triglycerides; Future    COPD exacerbation (HCC)    Orders:    CBC and differential; Future    Comprehensive metabolic panel; Future    Gastroesophageal reflux disease, unspecified whether esophagitis present         Peripheral polyneuropathy         Chronic kidney disease stage 2-3  Lab Results   Component Value Date    EGFR 80 2025    EGFR 65 2025    EGFR 80 2025    CREATININE 0.95 2025    CREATININE 1.14 2025    CREATININE 0.96 2025       Orders:    CBC and differential; Future    Comprehensive metabolic panel; Future    Hemoglobin A1C; Future    LDL cholesterol, direct; Future    Triglycerides; Future    TSH, 3rd generation; Future    Depression, recurrent (HCC)      Orders:    TSH, 3rd generation; Future    Tobacco abuse    Orders:    CBC and differential; Future    Comprehensive metabolic panel; Future    Iron deficiency anemia due to chronic blood loss        Orders:    CBC and differential; Future    Ambulatory referral to Gastroenterology; Future    Prediabetes    Orders:    Hemoglobin A1C; Future    Vitamin B12 deficiency         Screening for prostate cancer    Orders:    PSA, Total Screen; Future    Left sided abdominal pain    Orders:    Ambulatory referral to Gastroenterology; Future    Anxiety    Orders:    LORazepam (ATIVAN) 0.5 mg tablet; Take 1 tablet (0.5 mg total) by mouth every 8  "(eight) hours as needed for anxiety    A/P: Stable and will check labs. ?left sided abdominal pain. CT recently negative. Passing stools. Due for CRC as well. Will refer to GI and check labs. Keep f/u with cards and for echo. . Wean tobacco. Continue current treatment and RTC four months with his PCP.        History of Present Illness   WM RTC for f/u HTN, CAD, etc. Doing ok, but continues to c/o left sided abdominal pain. No changes in stools. Appetite is down as well, but no wt loss.  Negative CT 3/25. Remains active w/ some VALDEZ, but no falls. Denies CP,  worsening edema, palpitations, orthopnea or PND. Has an echo scheduled this week by cards. Still smoking. Breathing no worse and limited rescue MDI use. No claudication or stroke like events. No reflux. Chronic pain is manageable. WANDA/MDD is controlled. Due for labs.       Review of Systems   Constitutional:  Negative for activity change, chills, diaphoresis, fatigue and fever.   HENT: Negative.     Eyes:  Negative for visual disturbance.   Respiratory:  Negative for cough, chest tightness, shortness of breath and wheezing.    Cardiovascular:  Negative for chest pain, palpitations and leg swelling.   Gastrointestinal:  Positive for abdominal pain. Negative for abdominal distention, anal bleeding, blood in stool, constipation, diarrhea, nausea, rectal pain and vomiting.   Endocrine: Negative for cold intolerance and heat intolerance.   Genitourinary:  Negative for difficulty urinating, dysuria and frequency.   Musculoskeletal:  Negative for arthralgias, gait problem and myalgias.   Neurological:  Negative for dizziness, seizures, syncope, weakness, light-headedness and headaches.   Psychiatric/Behavioral:  Negative for confusion, dysphoric mood and sleep disturbance. The patient is not nervous/anxious.        Objective   /72 (BP Location: Left arm, Patient Position: Sitting)   Pulse 97   Temp 97.5 °F (36.4 °C) (Tympanic)   Ht 5' 10\" (1.778 m)   Wt 102 kg " (225 lb)   SpO2 97%   BMI 32.28 kg/m²      Physical Exam  Vitals and nursing note reviewed.   Constitutional:       General: He is not in acute distress.     Appearance: Normal appearance. He is obese. He is not ill-appearing.   HENT:      Head: Normocephalic and atraumatic.      Mouth/Throat:      Mouth: Mucous membranes are moist.     Eyes:      Extraocular Movements: Extraocular movements intact.      Conjunctiva/sclera: Conjunctivae normal.      Pupils: Pupils are equal, round, and reactive to light.     Neck:      Vascular: No carotid bruit.     Cardiovascular:      Rate and Rhythm: Normal rate and regular rhythm.      Heart sounds: Normal heart sounds. No murmur heard.  Pulmonary:      Effort: Pulmonary effort is normal. No respiratory distress.      Breath sounds: Normal breath sounds. No wheezing, rhonchi or rales.   Abdominal:      General: There is no distension.      Palpations: Abdomen is soft.      Tenderness: There is no abdominal tenderness.     Musculoskeletal:      Cervical back: Neck supple.      Right lower leg: Edema present.      Left lower leg: Edema present.      Comments: Bilat LE edema with right 1/4 and left trace.     Neurological:      General: No focal deficit present.      Mental Status: He is alert and oriented to person, place, and time. Mental status is at baseline.     Psychiatric:         Mood and Affect: Mood normal.         Behavior: Behavior normal.         Thought Content: Thought content normal.         Judgment: Judgment normal.         Scheduled Medication Review:  Pt's scheduled medication use was reviewed by myself/staff via the PDMP website. Pt's use has been found to be appropriate w/o any concerns for misuse by the patient. Pt's current conditions require continued scheduled medication use at this time. Future review for continued appropriate medication use and misuse will continue.     Tobacco Cessation Counseling: Tobacco cessation counseling and education was  provided. The patient is sincerely urged to quit consumption of tobacco. He is not ready to quit tobacco. The numerous health risks of tobacco consumption were discussed. If he decides to quit, there are a number of helpful adjunctive aids, and he can see me to discuss nicotine replacement therapy, chantix, or bupropion anytime in the future.. I spent 5 minutes on Tobacco Cessation counseling during today's visit.

## 2025-05-16 ENCOUNTER — HOSPITAL ENCOUNTER (OUTPATIENT)
Dept: NON INVASIVE DIAGNOSTICS | Facility: HOSPITAL | Age: 70
Discharge: HOME | End: 2025-05-16
Attending: INTERNAL MEDICINE
Payer: COMMERCIAL

## 2025-05-16 VITALS
DIASTOLIC BLOOD PRESSURE: 62 MMHG | SYSTOLIC BLOOD PRESSURE: 126 MMHG | BODY MASS INDEX: 32.19 KG/M2 | HEART RATE: 61 BPM | HEIGHT: 70 IN | WEIGHT: 224.87 LBS

## 2025-05-16 DIAGNOSIS — R06.00 DYSPNEA: ICD-10-CM

## 2025-05-16 LAB
AORTIC ROOT: 4.3 CM
ASCENDING AORTA: 3.7 CM
BSA FOR ECHO PROCEDURE: 2.19 M2
E WAVE DECELERATION TIME: 203 MS
E/A RATIO: 1.15
FRACTIONAL SHORTENING: 31 (ref 28–44)
GLOBAL LONGITUIDAL STRAIN: -19 %
INTERVENTRICULAR SEPTUM IN DIASTOLE (PARASTERNAL SHORT AXIS VIEW): 1.4 CM
INTERVENTRICULAR SEPTUM: 1.4 CM (ref 0.6–1.1)
LAAS-AP2: 22.5 CM2
LAAS-AP4: 26.3 CM2
LEFT ATRIUM SIZE: 4.9 CM
LEFT ATRIUM VOLUME (MOD BIPLANE): 81 ML
LEFT ATRIUM VOLUME INDEX (MOD BIPLANE): 37 ML/M2
LEFT INTERNAL DIMENSION IN SYSTOLE: 3.4 CM (ref 2.1–4)
LEFT VENTRICLE DIASTOLIC VOLUME (MOD BIPLANE): 85 ML
LEFT VENTRICLE DIASTOLIC VOLUME INDEX (MOD BIPLANE): 38.8 ML/M2
LEFT VENTRICLE SYSTOLIC VOLUME (MOD BIPLANE): 31 ML
LEFT VENTRICLE SYSTOLIC VOLUME INDEX (MOD BIPLANE): 14.2 ML/M2
LEFT VENTRICULAR INTERNAL DIMENSION IN DIASTOLE: 4.9 CM (ref 3.5–6)
LEFT VENTRICULAR POSTERIOR WALL IN END DIASTOLE: 1.3 CM
LEFT VENTRICULAR STROKE VOLUME: 68 ML
LV EF BIPLANE MOD: 63 %
LV EF US.2D.A4C+ESTIMATED: 62 %
LVSV (TEICH): 68 ML
MV E'TISSUE VEL-LAT: 10 CM/S
MV E'TISSUE VEL-SEP: 9 CM/S
MV PEAK A VEL: 0.85 M/S
MV PEAK E VEL: 98 CM/S
MV STENOSIS PRESSURE HALF TIME: 59 MS
MV VALVE AREA P 1/2 METHOD: 3.73
RIGHT ATRIUM AREA SYSTOLE A4C: 20.5 CM2
RIGHT VENTRICLE ID DIMENSION: 2.9 CM
SL CV LEFT ATRIUM LENGTH A2C: 5.9 CM
SL CV LV EF: 60
SL CV PED ECHO LEFT VENTRICLE DIASTOLIC VOLUME (MOD BIPLANE) 2D: 115 ML
SL CV PED ECHO LEFT VENTRICLE SYSTOLIC VOLUME (MOD BIPLANE) 2D: 47 ML
TRICUSPID ANNULAR PLANE SYSTOLIC EXCURSION: 2.1 CM
TRICUSPID VALVE PEAK REGURGITATION VELOCITY: 2.42 M/S

## 2025-05-16 PROCEDURE — 93306 TTE W/DOPPLER COMPLETE: CPT

## 2025-05-16 PROCEDURE — 93306 TTE W/DOPPLER COMPLETE: CPT | Performed by: INTERNAL MEDICINE

## 2025-05-21 ENCOUNTER — PATIENT OUTREACH (OUTPATIENT)
Dept: CASE MANAGEMENT | Facility: OTHER | Age: 70
End: 2025-05-21

## 2025-05-21 NOTE — PROGRESS NOTES
Received referral from Miguelangel Morataya DO requesting that [facility] outreach patient and assess for needs. Per chart review, patient had PCP follow up visit on 5/15. Reported transportation insecurity.     Attempted outreaching patient, no answer and left message for return call.

## 2025-05-23 ENCOUNTER — APPOINTMENT (OUTPATIENT)
Dept: LAB | Facility: CLINIC | Age: 70
End: 2025-05-23
Payer: COMMERCIAL

## 2025-05-23 DIAGNOSIS — E78.2 MIXED HYPERLIPIDEMIA: ICD-10-CM

## 2025-05-23 DIAGNOSIS — I10 PRIMARY HYPERTENSION: ICD-10-CM

## 2025-05-23 DIAGNOSIS — F33.9 DEPRESSION, RECURRENT (HCC): ICD-10-CM

## 2025-05-23 DIAGNOSIS — I25.119 CORONARY ARTERY DISEASE INVOLVING NATIVE CORONARY ARTERY OF NATIVE HEART WITH ANGINA PECTORIS (HCC): Chronic | ICD-10-CM

## 2025-05-23 DIAGNOSIS — N18.2 CHRONIC KIDNEY DISEASE, STAGE 2 (MILD): ICD-10-CM

## 2025-05-23 DIAGNOSIS — Z72.0 TOBACCO ABUSE: Chronic | ICD-10-CM

## 2025-05-23 DIAGNOSIS — J44.1 COPD EXACERBATION (HCC): ICD-10-CM

## 2025-05-23 DIAGNOSIS — E78.00 ELEVATED LDL CHOLESTEROL LEVEL: ICD-10-CM

## 2025-05-23 DIAGNOSIS — Z12.5 SCREENING FOR PROSTATE CANCER: ICD-10-CM

## 2025-05-23 DIAGNOSIS — Z13.220 SCREENING FOR HYPERLIPIDEMIA: ICD-10-CM

## 2025-05-23 DIAGNOSIS — D50.0 IRON DEFICIENCY ANEMIA DUE TO CHRONIC BLOOD LOSS: Chronic | ICD-10-CM

## 2025-05-23 DIAGNOSIS — R73.03 PREDIABETES: Chronic | ICD-10-CM

## 2025-05-23 LAB
ALBUMIN SERPL BCG-MCNC: 4 G/DL (ref 3.5–5)
ALP SERPL-CCNC: 101 U/L (ref 34–104)
ALT SERPL W P-5'-P-CCNC: 8 U/L (ref 7–52)
ANION GAP SERPL CALCULATED.3IONS-SCNC: 10 MMOL/L (ref 4–13)
AST SERPL W P-5'-P-CCNC: 11 U/L (ref 13–39)
BASOPHILS # BLD AUTO: 0.06 THOUSANDS/ÂΜL (ref 0–0.1)
BASOPHILS NFR BLD AUTO: 1 % (ref 0–1)
BILIRUB SERPL-MCNC: 0.38 MG/DL (ref 0.2–1)
BUN SERPL-MCNC: 10 MG/DL (ref 5–25)
CALCIUM SERPL-MCNC: 8.9 MG/DL (ref 8.4–10.2)
CHLORIDE SERPL-SCNC: 103 MMOL/L (ref 96–108)
CHOLEST SERPL-MCNC: 189 MG/DL (ref ?–200)
CO2 SERPL-SCNC: 26 MMOL/L (ref 21–32)
CREAT SERPL-MCNC: 1.02 MG/DL (ref 0.6–1.3)
EOSINOPHIL # BLD AUTO: 0.19 THOUSAND/ÂΜL (ref 0–0.61)
EOSINOPHIL NFR BLD AUTO: 2 % (ref 0–6)
ERYTHROCYTE [DISTWIDTH] IN BLOOD BY AUTOMATED COUNT: 13.2 % (ref 11.6–15.1)
EST. AVERAGE GLUCOSE BLD GHB EST-MCNC: 120 MG/DL
GFR SERPL CREATININE-BSD FRML MDRD: 74 ML/MIN/1.73SQ M
GLUCOSE P FAST SERPL-MCNC: 104 MG/DL (ref 65–99)
HBA1C MFR BLD: 5.8 %
HCT VFR BLD AUTO: 39.9 % (ref 36.5–49.3)
HDLC SERPL-MCNC: 31 MG/DL
HGB BLD-MCNC: 13.4 G/DL (ref 12–17)
IMM GRANULOCYTES # BLD AUTO: 0.04 THOUSAND/UL (ref 0–0.2)
IMM GRANULOCYTES NFR BLD AUTO: 1 % (ref 0–2)
LDLC SERPL CALC-MCNC: 135 MG/DL (ref 0–100)
LDLC SERPL DIRECT ASSAY-MCNC: 150 MG/DL (ref 0–100)
LYMPHOCYTES # BLD AUTO: 1.94 THOUSANDS/ÂΜL (ref 0.6–4.47)
LYMPHOCYTES NFR BLD AUTO: 23 % (ref 14–44)
MCH RBC QN AUTO: 30.1 PG (ref 26.8–34.3)
MCHC RBC AUTO-ENTMCNC: 33.6 G/DL (ref 31.4–37.4)
MCV RBC AUTO: 90 FL (ref 82–98)
MONOCYTES # BLD AUTO: 0.48 THOUSAND/ÂΜL (ref 0.17–1.22)
MONOCYTES NFR BLD AUTO: 6 % (ref 4–12)
NEUTROPHILS # BLD AUTO: 5.76 THOUSANDS/ÂΜL (ref 1.85–7.62)
NEUTS SEG NFR BLD AUTO: 67 % (ref 43–75)
NRBC BLD AUTO-RTO: 0 /100 WBCS
PLATELET # BLD AUTO: 269 THOUSANDS/UL (ref 149–390)
PMV BLD AUTO: 9.6 FL (ref 8.9–12.7)
POTASSIUM SERPL-SCNC: 3.8 MMOL/L (ref 3.5–5.3)
PROT SERPL-MCNC: 6.3 G/DL (ref 6.4–8.4)
PSA SERPL-MCNC: 0.48 NG/ML (ref 0–4)
RBC # BLD AUTO: 4.45 MILLION/UL (ref 3.88–5.62)
SODIUM SERPL-SCNC: 139 MMOL/L (ref 135–147)
TRIGL SERPL-MCNC: 117 MG/DL (ref ?–150)
TSH SERPL DL<=0.05 MIU/L-ACNC: 1.63 UIU/ML (ref 0.45–4.5)
WBC # BLD AUTO: 8.47 THOUSAND/UL (ref 4.31–10.16)

## 2025-05-23 PROCEDURE — G0103 PSA SCREENING: HCPCS

## 2025-05-23 PROCEDURE — 85025 COMPLETE CBC W/AUTO DIFF WBC: CPT

## 2025-05-23 PROCEDURE — 83721 ASSAY OF BLOOD LIPOPROTEIN: CPT

## 2025-05-23 PROCEDURE — 80053 COMPREHEN METABOLIC PANEL: CPT

## 2025-05-23 PROCEDURE — 84443 ASSAY THYROID STIM HORMONE: CPT

## 2025-05-23 PROCEDURE — 80061 LIPID PANEL: CPT

## 2025-05-23 PROCEDURE — 83036 HEMOGLOBIN GLYCOSYLATED A1C: CPT

## 2025-05-23 PROCEDURE — 36415 COLL VENOUS BLD VENIPUNCTURE: CPT

## 2025-05-24 ENCOUNTER — RESULTS FOLLOW-UP (OUTPATIENT)
Dept: INTERNAL MEDICINE CLINIC | Facility: CLINIC | Age: 70
End: 2025-05-24

## 2025-05-28 ENCOUNTER — PATIENT OUTREACH (OUTPATIENT)
Dept: CASE MANAGEMENT | Facility: OTHER | Age: 70
End: 2025-05-28

## 2025-05-28 NOTE — LETTER
[address] [facility] [address]  [phone]    Re: Unable to reach   5/28/2025       Dear Miguelangel,    I am a [facility]  and wanted to be certain you had information to contact me should you desire assistance with or have questions about non-medical aspects of your care such as [but not limited to] medical insurance, housing, transportation, material needs, or emergency needs.  If I do not have an answer I will assist you in finding the appropriate agency or individual who can help.      Please feel free to contact me at [phone]. Thank You.    Sincerely,         Tita Ramos

## 2025-05-28 NOTE — PROGRESS NOTES
2nd attempt outreaching patient to assess for needs. Per chart review, patient had PCP follow up visit on 5/15. Reported transportation insecurity. No answer and left message. UTR letter sent.

## 2025-05-30 ENCOUNTER — RESULTS FOLLOW-UP (OUTPATIENT)
Dept: NON INVASIVE DIAGNOSTICS | Facility: HOSPITAL | Age: 70
End: 2025-05-30

## 2025-05-30 DIAGNOSIS — I73.9 PAD (PERIPHERAL ARTERY DISEASE) (HCC): ICD-10-CM

## 2025-05-30 DIAGNOSIS — I25.119 CORONARY ARTERY DISEASE INVOLVING NATIVE CORONARY ARTERY OF NATIVE HEART WITH ANGINA PECTORIS (HCC): Primary | Chronic | ICD-10-CM

## 2025-05-30 NOTE — TELEPHONE ENCOUNTER
----- Message from Ani Crowder MD sent at 5/30/2025  9:12 AM EDT -----    Cholesterol levels are significantly elevated, please ask him if he has been taking the rosuvastatin 20 mg, if not, he will need to start it, if he has been taking it, will need to increase it to 40   mg as well as add another medication-Zetia 10 mg    Check again in 3 months  ----- Message -----  From: Lab, Background User  Sent: 5/23/2025   7:48 PM EDT  To: Ani Crowder MD

## 2025-06-09 DIAGNOSIS — M54.10 RADICULOPATHY: Primary | ICD-10-CM

## 2025-06-23 ENCOUNTER — HOSPITAL ENCOUNTER (OUTPATIENT)
Dept: MRI IMAGING | Facility: HOSPITAL | Age: 70
Discharge: HOME | End: 2025-06-23
Attending: INTERNAL MEDICINE
Payer: COMMERCIAL

## 2025-06-23 DIAGNOSIS — M54.10 RADICULOPATHY: ICD-10-CM

## 2025-06-23 PROCEDURE — 72141 MRI NECK SPINE W/O DYE: CPT

## 2025-06-25 ENCOUNTER — OFFICE VISIT (OUTPATIENT)
Dept: INTERNAL MEDICINE CLINIC | Facility: CLINIC | Age: 70
End: 2025-06-25
Payer: COMMERCIAL

## 2025-06-25 VITALS
SYSTOLIC BLOOD PRESSURE: 128 MMHG | WEIGHT: 222 LBS | HEIGHT: 70 IN | DIASTOLIC BLOOD PRESSURE: 64 MMHG | BODY MASS INDEX: 31.78 KG/M2 | OXYGEN SATURATION: 97 % | HEART RATE: 87 BPM | TEMPERATURE: 97.9 F

## 2025-06-25 DIAGNOSIS — G47.00 INSOMNIA, UNSPECIFIED TYPE: Primary | ICD-10-CM

## 2025-06-25 DIAGNOSIS — Z98.890 S/P CARDIAC CATHETERIZATION: ICD-10-CM

## 2025-06-25 DIAGNOSIS — R63.0 POOR APPETITE: ICD-10-CM

## 2025-06-25 PROCEDURE — G2211 COMPLEX E/M VISIT ADD ON: HCPCS | Performed by: PHYSICAL MEDICINE & REHABILITATION

## 2025-06-25 PROCEDURE — 99214 OFFICE O/P EST MOD 30 MIN: CPT | Performed by: PHYSICAL MEDICINE & REHABILITATION

## 2025-06-25 RX ORDER — MIRTAZAPINE 7.5 MG/1
7.5 TABLET, FILM COATED ORAL
Qty: 90 TABLET | Refills: 3 | Status: SHIPPED | OUTPATIENT
Start: 2025-06-25

## 2025-06-25 NOTE — PROGRESS NOTES
"Name: Miguelangel Lancaster      : 1955      MRN: [id number]  Encounter Provider: Lacie Godwin PA-C  Encounter Date: 2025   Encounter department: [facility]  :  Assessment & Plan  Insomnia, unspecified type    Orders:    mirtazapine (REMERON) 7.5 MG tablet; Take 1 tablet (7.5 mg total) by mouth daily at bedtime    Poor appetite    Orders:    mirtazapine (REMERON) 7.5 MG tablet; Take 1 tablet (7.5 mg total) by mouth daily at bedtime    S/P cardiac catheterization  25   No cardiology follow up scheduled- encouraged patient to schedule a follow up  Encourage patient continue taking xarelto and aspirin as recommended by Cardiology at discharge              Depression Screening and Follow-up Plan: Patient's depression screening was positive with a PHQ-9 score of 17.   Patient advised to follow-up with PCP for further management. Will start Remeron to also aid with sleep and appetite.      History of Present Illness   Pt is a 70 year old male presenting post cardiac cath on 25. Per patient, the blockage was not fixed. He does not currently have a next cardiology appointment or is unaware of a plan. He denies chest pain, shortness of breath, palpitations. He does have some BLE edema R>L. Complaining of poor sleep and poor appetite for a \"long time\".      Review of Systems   Constitutional:  Negative for chills, diaphoresis, fatigue and fever.   HENT:  Negative for congestion, postnasal drip, sinus pressure, sore throat and trouble swallowing.    Eyes: Negative.    Respiratory:  Negative for shortness of breath and wheezing.    Cardiovascular: Negative.    Gastrointestinal:  Negative for abdominal distention, diarrhea, nausea and vomiting.   Endocrine: Negative.    Genitourinary:  Negative for dysuria.   Musculoskeletal: Negative.         BLE Edema   Skin:  Negative for rash.   Allergic/Immunologic: Negative.    Neurological: Negative.  Negative for light-headedness and headaches. " "  Hematological: Negative.    Psychiatric/Behavioral: Negative.         Objective   /64   Pulse 87   Temp 97.9 °F (36.6 °C)   Ht 5' 10\" (1.778 m)   Wt 101 kg (222 lb)   SpO2 97%   BMI 31.85 kg/m²      Physical Exam  Vitals and nursing note reviewed.   Constitutional:       General: He is not in acute distress.     Appearance: Normal appearance. He is well-developed.   HENT:      Head: Normocephalic and atraumatic.     Eyes:      Conjunctiva/sclera: Conjunctivae normal.       Cardiovascular:      Rate and Rhythm: Normal rate and regular rhythm.      Pulses: Normal pulses.      Heart sounds: Normal heart sounds. No murmur heard.  Pulmonary:      Effort: Pulmonary effort is normal. No respiratory distress.      Breath sounds: Normal breath sounds.   Abdominal:      Palpations: Abdomen is soft.      Tenderness: There is no abdominal tenderness.     Musculoskeletal:         General: No swelling, tenderness, deformity or signs of injury.      Cervical back: Neck supple.      Right lower leg: Edema present.      Left lower leg: Edema present.      Comments: R>L, non- pitting      Skin:     General: Skin is warm and dry.      Capillary Refill: Capillary refill takes less than 2 seconds.     Neurological:      General: No focal deficit present.      Mental Status: He is alert and oriented to person, place, and time.     Psychiatric:         Mood and Affect: Mood normal.         Behavior: Behavior normal.         "

## 2025-06-26 ENCOUNTER — TELEPHONE (OUTPATIENT)
Age: 70
End: 2025-06-26

## 2025-06-26 NOTE — TELEPHONE ENCOUNTER
06/26/25  Screened by: Jase Lozano    Referring Provider      Pre- Screening:     There is no height or weight on file to calculate BMI.  Has patient been referred for a routine screening Colonoscopy? yes  Is the patient between 45-75 years old? yes      Previous Colonoscopy yes   If yes:    Date: 3 yrs        Does the patient want to see a Gastroenterologist prior to their procedure OR are they having any GI symptoms? no    Has the patient been hospitalized or had abdominal surgery in the past 6 months? yes    Does the patient use supplemental oxygen? no    Does the patient take Coumadin, Lovenox, Plavix, Elliquis, Xarelto, or other blood thinning medication? yes    Has the patient had a stroke, cardiac event, or stent placed in the past year? no    If patient is between 45yrs - 49yrs, please advise patient that we will have to confirm benefits & coverage with their insurance company for a routine screening colonoscopy.

## 2025-06-26 NOTE — TELEPHONE ENCOUNTER
Pt referred for a colonoscopy due to abdominal pain and anemia, Pt says last colonoscopy was 3 yrs ago at the [address] [place] [address] in [place].  Pt takes Xaralto and had a cordio proc in June so has been scheduled for an OV on 09.04.25

## 2025-07-01 ENCOUNTER — TELEPHONE (OUTPATIENT)
Age: 70
End: 2025-07-01

## 2025-07-01 NOTE — TELEPHONE ENCOUNTER
Patient calls looking for alternative to recently prescribed mirtazapine.     He took it one time on 6/26 and states he felt like his breathing was heavy. He has not taken it since and the symptoms resolved without returning.     He is requesting Librium.     No current symptoms or concerns.

## 2025-07-08 ENCOUNTER — TELEPHONE (OUTPATIENT)
Age: 70
End: 2025-07-08

## 2025-07-08 DIAGNOSIS — M50.30 DDD (DEGENERATIVE DISC DISEASE), CERVICAL: Primary | ICD-10-CM

## 2025-07-08 NOTE — TELEPHONE ENCOUNTER
I'm not sure why his cardiologist ordered this? Is pt following with spine and pain, if not I would recommend this

## 2025-07-08 NOTE — TELEPHONE ENCOUNTER
Gris from [facility] called stating they ordered an MRI cervical spine on 6/26/25 for patient. Asking if the provider would please have a look at it and advise.

## 2025-07-14 ENCOUNTER — HOSPITAL ENCOUNTER (OUTPATIENT)
Dept: MRI IMAGING | Facility: HOSPITAL | Age: 70
Discharge: HOME | End: 2025-07-14
Attending: INTERNAL MEDICINE
Payer: COMMERCIAL

## 2025-07-14 DIAGNOSIS — M54.10 RADICULOPATHY: ICD-10-CM

## 2025-07-14 PROCEDURE — 72146 MRI CHEST SPINE W/O DYE: CPT

## 2025-07-16 ENCOUNTER — OFFICE VISIT (OUTPATIENT)
Dept: INTERNAL MEDICINE CLINIC | Facility: CLINIC | Age: 70
End: 2025-07-16
Payer: COMMERCIAL

## 2025-07-16 VITALS
BODY MASS INDEX: 32.16 KG/M2 | HEART RATE: 81 BPM | HEIGHT: 70 IN | SYSTOLIC BLOOD PRESSURE: 130 MMHG | DIASTOLIC BLOOD PRESSURE: 78 MMHG | WEIGHT: 224.6 LBS | OXYGEN SATURATION: 97 % | TEMPERATURE: 97.6 F

## 2025-07-16 DIAGNOSIS — Z12.11 SCREENING FOR COLON CANCER: Primary | ICD-10-CM

## 2025-07-16 DIAGNOSIS — G47.00 INSOMNIA, UNSPECIFIED TYPE: ICD-10-CM

## 2025-07-16 DIAGNOSIS — G89.29 OTHER CHRONIC PAIN: ICD-10-CM

## 2025-07-16 DIAGNOSIS — F41.9 ANXIETY: ICD-10-CM

## 2025-07-16 PROCEDURE — G2211 COMPLEX E/M VISIT ADD ON: HCPCS | Performed by: PHYSICAL MEDICINE & REHABILITATION

## 2025-07-16 PROCEDURE — 99214 OFFICE O/P EST MOD 30 MIN: CPT | Performed by: PHYSICAL MEDICINE & REHABILITATION

## 2025-07-16 RX ORDER — LORAZEPAM 0.5 MG/1
0.5 TABLET ORAL EVERY 8 HOURS PRN
Qty: 30 TABLET | Refills: 0 | Status: SHIPPED | OUTPATIENT
Start: 2025-07-16

## 2025-07-16 RX ORDER — DOXEPIN HYDROCHLORIDE 10 MG/1
10 CAPSULE ORAL
Status: CANCELLED | OUTPATIENT
Start: 2025-07-16

## 2025-07-16 RX ORDER — RAMELTEON 8 MG/1
8 TABLET ORAL
Qty: 30 TABLET | Refills: 0 | Status: SHIPPED | OUTPATIENT
Start: 2025-07-16 | End: 2025-07-21

## 2025-07-16 NOTE — ASSESSMENT & PLAN NOTE
PDMP reviewed, last refill from May 2025  Orders:    LORazepam (ATIVAN) 0.5 mg tablet; Take 1 tablet (0.5 mg total) by mouth every 8 (eight) hours as needed for anxiety

## 2025-07-16 NOTE — ASSESSMENT & PLAN NOTE
Remeron added to allergy list due to shortness of breath  Will trial Rozerem   Advised to keep follow up appointment with Dr. Morataya in August   Can determine effectiveness of Rozerem at this time and if needed to continue vs adjust     Orders:    ramelteon (ROZEREM) 8 mg tablet; Take 1 tablet (8 mg total) by mouth daily at bedtime Take 1 tablet by mouth 30 minutes prior to bedtime

## 2025-07-16 NOTE — PATIENT INSTRUCTIONS
Trial Ramelteon 8 mg; take one tablet approximately 30 minutes prior to desired bedtime   Follow up with Dr. Morataya at scheduled appointment 8/15/2025   Follow up with spine and pain management  [phone]- central scheduling number if needed to call to schedule with spine and pain.

## 2025-07-16 NOTE — PROGRESS NOTES
Name: Miguelangel Lancaster      : 1955      MRN: [id number]  Encounter Provider: Lacie Godwin PA-C  Encounter Date: 2025   Encounter department: [facility]  :  Assessment & Plan  Screening for colon cancer    Orders:    Cologuard    Anxiety  PDMP reviewed, last refill from May 2025  Orders:    LORazepam (ATIVAN) 0.5 mg tablet; Take 1 tablet (0.5 mg total) by mouth every 8 (eight) hours as needed for anxiety    Insomnia, unspecified type  Remeron added to allergy list due to shortness of breath  Will trial Rozerem   Advised to keep follow up appointment with Dr. 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   Orders:    Ambulatory referral to Spine & Pain Management; Future           History of Present Illness   Miguelangel is a 70 year old male presenting for follow up s/p starting Remeron on 25. He states he felt short of breath on the medication and discontinued. He still admits to ongoing insomnia. Stating he has a difficult time falling asleep and staying asleep. He admits to only a couple hours of sleep at a time. He has tried several medications in the past but unsure of the names and if any were helpful. Patient wants to start Librium after reading this can help with nerve pain. Patient admits to ongoing chronic pain in the neck, back with nerve pain. He just recently had an MRI of the thoracic spine  "on 7/14. He did have a cervical spine MRI in June of this year which showed changes from his 2018 scan that he states was described as narrowing. He has been referred to spine and pain management in the past for ongoing issues but has yet to schedule an appointment.      Review of Systems   Constitutional:  Positive for fatigue.   Respiratory: Negative.  Negative for shortness of breath.    Cardiovascular: Negative.  Negative for chest pain.   Musculoskeletal:  Positive for arthralgias and myalgias.   Neurological:  Positive for numbness.       Objective   /78   Pulse 81   Temp 97.6 °F (36.4 °C)   Ht 5' 10\" (1.778 m)   Wt 102 kg (224 lb 9.6 oz)   SpO2 97%   BMI 32.23 kg/m²      Physical Exam  Vitals and nursing note reviewed.   Constitutional:       General: He is not in acute distress.     Appearance: Normal appearance. He is well-developed. He is not ill-appearing or diaphoretic.   HENT:      Head: Normocephalic and atraumatic.     Eyes:      Pupils: Pupils are equal, round, and reactive to light.       Cardiovascular:      Rate and Rhythm: Normal rate and regular rhythm.      Pulses: Normal pulses.      Heart sounds: Normal heart sounds.   Pulmonary:      Effort: Pulmonary effort is normal. No respiratory distress.      Breath sounds: Normal breath sounds.     Musculoskeletal:         General: Normal range of motion.      Cervical back: Normal range of motion and neck supple.     Skin:     General: Skin is warm and dry.      Capillary Refill: Capillary refill takes less than 2 seconds.     Neurological:      Mental Status: He is alert and oriented to person, place, and time.      Gait: Gait normal.     Psychiatric:         Mood and Affect: Mood normal.         Behavior: Behavior normal.       "

## 2025-07-17 ENCOUNTER — TELEPHONE (OUTPATIENT)
Age: 70
End: 2025-07-17

## 2025-07-17 DIAGNOSIS — R60.0 LOWER EXTREMITY EDEMA: ICD-10-CM

## 2025-07-17 NOTE — TELEPHONE ENCOUNTER
PA for RAMELETOEON 8MG SUBMITTED    via    [x]SS    [x]PA sent as URGENT    All office notes, labs and other pertaining documents and studies sent. Clinical questions answered. Awaiting determination from insurance company.     Turnaround time for your insurance to make a decision on your Prior Authorization can take 7-21 business days.

## 2025-07-17 NOTE — TELEPHONE ENCOUNTER
Spoke to pt as he was asking about his medications as to if they were sent to the pharmacy was on hold for an extremely long time to find out the the lorazepam was ready for  and the ramelteon (ROZEREM) 8 mg tablet, needs a prior auth. Team was sent a message, so that can be started.    Called pt back to make aware with no answer he was left a message

## 2025-07-18 RX ORDER — FUROSEMIDE 40 MG/1
40 TABLET ORAL DAILY
Qty: 90 TABLET | Refills: 1 | Status: SHIPPED | OUTPATIENT
Start: 2025-07-18

## 2025-07-18 NOTE — TELEPHONE ENCOUNTER
PA for RAMELETOEON 8MG  DENIED    Reason:(Screenshot if applicable)        Message sent to office clinical pool Yes    Denial letter scanned into Media Yes    We can gladly do an appeal but the process can take about 30-60 days to provide determination. Please have the office staff schedule a Peer to Peer at phone [phone] . If an appeal is truly warranted please have Provider send clinical documentation to the PA department to support the appeal.     **Please follow up with your patient regarding denial and next steps**

## 2025-07-18 NOTE — TELEPHONE ENCOUNTER
Phone call back from patient and I read him Lacie Godwin's message. He is ok [facility]. Patient verbally understands message and has no further questions.

## 2025-08-05 DIAGNOSIS — R60.0 LOWER EXTREMITY EDEMA: ICD-10-CM

## 2025-08-05 DIAGNOSIS — I26.99 ACUTE PULMONARY EMBOLISM WITHOUT ACUTE COR PULMONALE, UNSPECIFIED PULMONARY EMBOLISM TYPE (HCC): ICD-10-CM

## 2025-08-05 DIAGNOSIS — F41.9 ANXIETY: ICD-10-CM

## 2025-08-06 ENCOUNTER — HOSPITAL ENCOUNTER (EMERGENCY)
Facility: HOSPITAL | Age: 70
Discharge: HOME | End: 2025-08-06
Attending: EMERGENCY MEDICINE
Payer: COMMERCIAL

## 2025-08-06 ENCOUNTER — APPOINTMENT (EMERGENCY)
Dept: RADIOLOGY | Facility: HOSPITAL | Age: 70
End: 2025-08-06
Payer: COMMERCIAL

## 2025-08-07 ENCOUNTER — VBI (OUTPATIENT)
Dept: INTERNAL MEDICINE CLINIC | Facility: CLINIC | Age: 70
End: 2025-08-07

## 2025-08-07 RX ORDER — LORAZEPAM 0.5 MG/1
0.5 TABLET ORAL EVERY 8 HOURS PRN
Qty: 30 TABLET | Refills: 0 | Status: SHIPPED | OUTPATIENT
Start: 2025-08-07

## 2025-08-07 RX ORDER — FUROSEMIDE 40 MG/1
40 TABLET ORAL DAILY
Qty: 90 TABLET | Refills: 1 | Status: SHIPPED | OUTPATIENT
Start: 2025-08-07

## 2025-08-08 ENCOUNTER — TELEPHONE (OUTPATIENT)
Dept: INTERNAL MEDICINE CLINIC | Facility: CLINIC | Age: 70
End: 2025-08-08

## 2025-08-08 VITALS
HEIGHT: 70 IN | HEART RATE: 81 BPM | BODY MASS INDEX: 31.98 KG/M2 | OXYGEN SATURATION: 97 % | WEIGHT: 223.4 LBS | TEMPERATURE: 97.8 F

## 2025-08-08 DIAGNOSIS — S22.32XA CLOSED FRACTURE OF ONE RIB OF LEFT SIDE, INITIAL ENCOUNTER: Primary | ICD-10-CM

## 2025-08-08 PROCEDURE — 99214 OFFICE O/P EST MOD 30 MIN: CPT | Performed by: FAMILY MEDICINE

## 2025-08-11 ENCOUNTER — RESULTS FOLLOW-UP (OUTPATIENT)
Dept: INTERNAL MEDICINE CLINIC | Facility: CLINIC | Age: 70
End: 2025-08-11

## 2025-08-11 ENCOUNTER — OFFICE VISIT (OUTPATIENT)
Dept: INTERNAL MEDICINE CLINIC | Facility: CLINIC | Age: 70
End: 2025-08-11
Payer: COMMERCIAL

## 2025-08-11 ENCOUNTER — TELEPHONE (OUTPATIENT)
Age: 70
End: 2025-08-11

## 2025-08-11 ENCOUNTER — APPOINTMENT (OUTPATIENT)
Dept: LAB | Facility: CLINIC | Age: 70
End: 2025-08-11
Attending: PHYSICAL MEDICINE & REHABILITATION
Payer: COMMERCIAL

## 2025-08-11 ENCOUNTER — APPOINTMENT (OUTPATIENT)
Dept: RADIOLOGY | Facility: CLINIC | Age: 70
End: 2025-08-11
Attending: PHYSICAL MEDICINE & REHABILITATION
Payer: COMMERCIAL

## 2025-08-14 ENCOUNTER — TELEPHONE (OUTPATIENT)
Dept: INTERNAL MEDICINE CLINIC | Facility: CLINIC | Age: 70
End: 2025-08-14

## 2025-08-16 ENCOUNTER — NURSE TRIAGE (OUTPATIENT)
Dept: OTHER | Facility: OTHER | Age: 70
End: 2025-08-16

## (undated) DEVICE — DGW .035 FC J3MM 150CM TEF: Brand: EMERALD

## (undated) DEVICE — CATH DIAG 6FR IMPULSE 100CM FL4

## (undated) DEVICE — PINNACLE INTRODUCER SHEATH: Brand: PINNACLE

## (undated) DEVICE — CATH DIAG 6FR IMPULSE 100CM FR4